# Patient Record
Sex: FEMALE | Race: OTHER | HISPANIC OR LATINO | Employment: UNEMPLOYED | URBAN - METROPOLITAN AREA
[De-identification: names, ages, dates, MRNs, and addresses within clinical notes are randomized per-mention and may not be internally consistent; named-entity substitution may affect disease eponyms.]

---

## 2017-09-07 ENCOUNTER — HOSPITAL ENCOUNTER (EMERGENCY)
Facility: HOSPITAL | Age: 57
Discharge: HOME | End: 2017-09-07
Attending: EMERGENCY MEDICINE
Payer: COMMERCIAL

## 2017-09-07 VITALS
BODY MASS INDEX: 29.66 KG/M2 | RESPIRATION RATE: 18 BRPM | SYSTOLIC BLOOD PRESSURE: 178 MMHG | HEIGHT: 65 IN | WEIGHT: 178 LBS | HEART RATE: 78 BPM | DIASTOLIC BLOOD PRESSURE: 93 MMHG | TEMPERATURE: 98.1 F

## 2017-09-07 DIAGNOSIS — R21 RASH OF HANDS: Primary | ICD-10-CM

## 2017-09-07 PROCEDURE — 99282 EMERGENCY DEPT VISIT SF MDM: CPT

## 2017-09-07 RX ORDER — HYDROXYZINE HYDROCHLORIDE 25 MG/1
25 TABLET, FILM COATED ORAL EVERY 6 HOURS PRN
Qty: 30 TABLET | Refills: 0 | Status: ON HOLD | OUTPATIENT
Start: 2017-09-07 | End: 2019-07-19

## 2017-09-07 RX ORDER — HYDROXYZINE HYDROCHLORIDE 25 MG/1
25 TABLET, FILM COATED ORAL ONCE
Status: COMPLETED | OUTPATIENT
Start: 2017-09-07 | End: 2017-09-07

## 2017-09-07 RX ADMIN — HYDROXYZINE HYDROCHLORIDE 25 MG: 25 TABLET, FILM COATED ORAL at 22:20

## 2017-09-08 NOTE — ED ATTENDING ATTESTATION
Neetu Recinos MD, saw and evaluated the patient  All available labs and X-rays were ordered by me or the resident and have been reviewed by myself  I discussed the patient with the resident / non-physician and agree with the resident's / non-physician practitioner's findings and plan as documented in the resident's / non-physician practicitioner's note, except where noted  At this point, I agree with the current assessment done in the ED  Chief Complaint   Patient presents with    Rash     Pt c/o dry itchy rash on both hands for 1 month now  Pt uses hydrocortisone cream with no releif  This is a 76-year-old female presenting for evaluation of dry hands  According to the patient and the daughter and the granddaughter the patient having these lesions of very dry hands looking the knuckles of both hands  She was diagnosed with it [place] about 3 months ago  She has been using steroid cream but it seems like it is not helping  She does wash her dishes without any gloves  No fevers chills nausea vomiting  No other complaints  She is unable to get any medications here secondary to lack of insurance so she came into the emergency room for evaluation  No history of psoriasis, no history of ex my  No previous rashes apart From being 2 or 3 months ago  PE:  Vitals:    09/07/17 2115   BP: (!) 178/93   Pulse: 78   Resp: 18   Temp: 98 1 °F (36 7 °C)   TempSrc: Oral   Weight: 80 7 kg (178 lb)   Height: 5' 5" (1 651 m)   Dry hands    General: VS reviewed  Appears in NAD  awake, alert  Well-nourished, well-developed  Appears stated age  Speaking normally in full sentences  Head: Normocephalic, atraumatic, nontender  Eyes: EOM-I  No diplopia  No hyphema  No subconjunctival hemorrhages  Symmetrical lids  ENT: Atraumatic external nose and ears  MMM  No malocclusion  No stridor  Normal phonation  No drooling  Normal swallowing  Neck: No JVD    CV: No pallor noted  Peripheral pulses +2 throughout  No chest wall tenderness  Lungs:   No tachypnea  No respiratory distress  MSK:   FROM   Skin: Dry, intact  Neuro: Awake, alert, GCS15, CN II-XII grossly intact  Motor grossly intact  Psychiatric/Behavioral: Appropriate mood and affect   Exam: deferred  A:  - Dry skin verses psoriatic lesions  P:  - advise glove use, advised using Aquaphor for hydration, will change to an appointment instead of a cream, increased concentration at 2 5%, close follow-up with primary care  Return for worsening   - 13 point ROS was performed and all are normal unless stated in the history above  - Nursing note reviewed  Vitals reviewed  - Orders placed by myself and/or advanced practitioner / resident     - Previous chart was not reviewed  - No language barrier    - History obtained from patient, daughter, granddaughter    - There are no limitations to the history obtained  - Critical care time: Not applicable for this patient  Final Diagnosis:  1  Rash of hands        ED Course as of Sep 11 1805   Thu Sep 07, 2017   2305 Awaiting medications from pharmacy then will discharge home  Medications   hydrOXYzine HCL (ATARAX) tablet 25 mg (25 mg Oral Given 9/7/17 2220)     No orders to display     No orders of the defined types were placed in this encounter  Labs Reviewed - No data to display  ED Disposition     ED Disposition Condition Comment    Discharge  Sherrel Seats discharge to home/self care  Condition at discharge: Good        Follow-up Information     Follow up With Specialties Details Why Contact Ignacio Silva   Dermatology, Multidisciplinary Schedule an appointment as soon as possible for a visit  [address] [facility] [address]          Discharge Medication List as of 9/7/2017 11:09 PM      START taking these medications    Details   hydrOXYzine HCL (ATARAX) 25 mg tablet Take 1 tablet by mouth every 6 (six) hours as needed for itching, Starting u 9/7/2017, Print      mineral oil-hydrophilic petrolatum (AQUAPHOR) ointment Apply topically as needed for dry skin, Starting Thu 9/7/2017, Print         CONTINUE these medications which have NOT CHANGED    Details   amLODIPine (NORVASC) 5 mg tablet Take 5 mg by mouth daily  , Until Discontinued, Historical Med      cloNIDine (CATAPRES) 0 1 mg tablet Take 0 1 mg by mouth 2 (two) times a day Indications: patient unsure of correct dosage  , Until Discontinued, Historical Med      hydrochlorothiazide (MICROZIDE) 12 5 mg capsule Take 12 5 mg by mouth daily  , Until Discontinued, Historical Med      olmesartan (BENICAR) 20 mg tablet Take 20 mg by mouth daily  , Until Discontinued, Historical Med           No discharge procedures on file  Prior to Admission Medications   Prescriptions Last Dose Informant Patient Reported? Taking? amLODIPine (NORVASC) 5 mg tablet   Yes No   Sig: Take 5 mg by mouth daily  cloNIDine (CATAPRES) 0 1 mg tablet   Yes No   Sig: Take 0 1 mg by mouth 2 (two) times a day Indications: patient unsure of correct dosage  hydrochlorothiazide (MICROZIDE) 12 5 mg capsule   Yes No   Sig: Take 12 5 mg by mouth daily  olmesartan (BENICAR) 20 mg tablet   Yes No   Sig: Take 20 mg by mouth daily  Facility-Administered Medications: None       Portions of the record may have been created with voice recognition software  Occasional wrong word or "sound a like" substitutions may have occurred due to the inherent limitations of voice recognition software  Read the chart carefully and recognize, using context, where substitutions have occurred      Electronically signed by:  Ruth Robin

## 2017-09-08 NOTE — ED PROVIDER NOTES
History  Chief Complaint   Patient presents with    Rash     Pt c/o dry itchy rash on both hands for 1 month now  Pt uses hydrocortisone cream with no releif  HPI   54-year-old female presents to the emergency department complaining of rash  Patient states that over the past 2 or 3 months, she has had increasing dryness, itchiness, and rash on both hands  She was seen by a doctor and [place] when symptoms 1st began and she was given hydrocortisone cream   She has been applying the cream, but symptoms have continued to worsen  Patient denies having had similar symptoms in the past and has not noted any modifying factors for her symptoms  She denies any known new exposures prior to onset of symptoms  On ROS, she denies any complaints other than stated above  Prior to Admission Medications   Prescriptions Last Dose Informant Patient Reported? Taking? amLODIPine (NORVASC) 5 mg tablet   Yes No   Sig: Take 5 mg by mouth daily  cloNIDine (CATAPRES) 0 1 mg tablet   Yes No   Sig: Take 0 1 mg by mouth 2 (two) times a day Indications: patient unsure of correct dosage  hydrochlorothiazide (MICROZIDE) 12 5 mg capsule   Yes No   Sig: Take 12 5 mg by mouth daily  olmesartan (BENICAR) 20 mg tablet   Yes No   Sig: Take 20 mg by mouth daily  Facility-Administered Medications: None       Past Medical History:   Diagnosis Date    Cancer     Hypertension        History reviewed  No pertinent surgical history  History reviewed  No pertinent family history  I have reviewed and agree with the history as documented  Social History   Substance Use Topics    Smoking status: Never Smoker    Smokeless tobacco: Never Used    Alcohol use No        Review of Systems   Constitutional: Negative for chills and fever  Respiratory: Negative for shortness of breath  Gastrointestinal: Negative for abdominal pain, nausea and vomiting  Musculoskeletal: Negative for arthralgias and joint swelling     Skin: Positive for rash  Negative for wound  Allergic/Immunologic: Negative for immunocompromised state  Neurological: Negative for headaches  Psychiatric/Behavioral: The patient is not nervous/anxious  Physical Exam  ED Triage Vitals [09/07/17 2115]   Temperature Pulse Respirations Blood Pressure SpO2   98 1 °F (36 7 °C) 78 18 (!) 178/93 --      Temp Source Heart Rate Source Patient Position BP Location FiO2 (%)   Oral Monitor Sitting Right arm --      Pain Score       No Pain           Physical Exam   Constitutional: She is oriented to person, place, and time  She appears well-nourished  No distress  HENT:   Head: Normocephalic and atraumatic  Eyes: EOM are normal    Neck: Normal range of motion  Neck supple  Cardiovascular: Normal rate and regular rhythm  Pulmonary/Chest: Effort normal and breath sounds normal  No respiratory distress  Abdominal: Soft  She exhibits no distension  There is no tenderness  Musculoskeletal: Normal range of motion  Neurological: She is alert and oriented to person, place, and time  Skin: Skin is warm and dry  She is not diaphoretic  Dry/cracked skin on hands bilaterally  See imaged below  Psychiatric: She has a normal mood and affect  Her behavior is normal    Nursing note and vitals reviewed  ED Medications  Medications   hydrOXYzine HCL (ATARAX) tablet 25 mg (25 mg Oral Given 9/7/17 2220)       Diagnostic Studies  Labs Reviewed - No data to display    No orders to display       Procedures  Procedures      Phone Consults  ED Phone Contact    ED Course  ED Course            MDM  Number of Diagnoses or Management Options  Rash of hands:   Diagnosis management comments: 59-year-old female with dry/cracked/pruritic hands x 2-3 months  Will prescribe atarax, aquaphor, and hydrocortisone ointment  Derm follow up PRN       CritCare Time    Disposition  Final diagnoses:   Rash of hands     ED Disposition     ED Disposition Condition Comment    Discharge Sudheer Weathers discharge to home/self care  Condition at discharge: Good        Follow-up Information     Follow up With Specialties Details Why Contact Ignacio Herrera  Dermatology, Multidisciplinary Schedule an appointment as soon as possible for a visit  [address] [facility] [address] [place] [address]          Discharge Medication List as of 9/7/2017 11:09 PM      START taking these medications    Details   hydrOXYzine HCL (ATARAX) 25 mg tablet Take 1 tablet by mouth every 6 (six) hours as needed for itching, Starting u 9/7/2017, Print      mineral oil-hydrophilic petrolatum (AQUAPHOR) ointment Apply topically as needed for dry skin, Starting Thu 9/7/2017, Print         CONTINUE these medications which have NOT CHANGED    Details   amLODIPine (NORVASC) 5 mg tablet Take 5 mg by mouth daily  , Until Discontinued, Historical Med      cloNIDine (CATAPRES) 0 1 mg tablet Take 0 1 mg by mouth 2 (two) times a day Indications: patient unsure of correct dosage  , Until Discontinued, Historical Med      hydrochlorothiazide (MICROZIDE) 12 5 mg capsule Take 12 5 mg by mouth daily  , Until Discontinued, Historical Med      olmesartan (BENICAR) 20 mg tablet Take 20 mg by mouth daily  , Until Discontinued, Historical Med           No discharge procedures on file  ED Provider  Attending physically available and evaluated Sudheer Weathers  I managed the patient along with the ED Attending      Electronically Signed by       Cassandra Dunbar  Resident  09/09/17 0061

## 2017-09-08 NOTE — DISCHARGE INSTRUCTIONS
Sarpullido pepe   LO QUE NECESITA SABER:   Un salpullido es la irritación, enrojecimiento o comezón de la piel o de las membranas mucosas  Las [employer] mucosas son las áreas conchis el revestimiento de santana nariz o garganta  Pepe significa que el salpullido comienza repentinamente, que empeora rápidamente y que dura poco tiempo  El salpullido pepe podría ser provocado por aby enfermedad, conchis la hepatitis o la vasculitis  El salpullido podría ser [place]  reacción a algo a lo que usted es alérgico, conchis al látex  Ciertos medicamentos, incluyendo los antibióticos, los AINEs, los medicamentos prescritos para el dolor y la aspirina también pueden provocar un salpullido  INSTRUCCIONES SOBRE [facility]ITAL[facility]:   Regrese a la letty de emergencias si:   · Tiene dolor en el pecho o dificultad repentina para respirar  · Usted está vomitando, tiene dolor de [place] o muscular y santana garganta está adolorida  Pregúntele a santana [place] vitaminas y minerales son adecuados para usted  · Usted tiene fiebre  · Colette heridas se abren debido a que se está rascando santana piel o usted tiene [place]qu[place]  herida que está zaki, [place] o adolorida  · Santana sarpullido dura más de 3 meses  · Usted tiene inflamación o dolor en colette articulaciones  · Usted tiene preguntas o inquietudes acerca de santana condición o cuidado  Medicamentos:  Si santana salpullido no desaparece por sí solo, usted podría necesitar los siguientes medicamentos:  · Los antihistamínicos  podrían administrarse para disminuir la comezón  · Esteroides  podría administrarse para disminuir la inflamación  · Antibióticos  ayudan a combatir o a evitar aby infección bacteriana  · [place] colette medicamentos conchis se le haya indicado  Consulte con santana médico si usted augustus que santana medicamento no le está ayudando o si presenta efectos secundarios  Infórmele si es alérgico a algún medicamento   Mantenga aby lista actualizada de los [place], las vitaminas y los productos herbales que Alexandra Richters los siguientes datos de los medicamentos: cantidad, frecuencia y motivo de administración  Traiga con usted la lista o los envases de la píldoras a colette citas de seguimiento  Lleve la lista de los medicamentos con usted en jeff de aby emergencia  Evite un salpullido o cuide santana piel cuando tenga salpullido:  La piel reseca puede generar más problemas  No se rasque santana piel si tiene comezón  Usted podría provocar aby infección en la piel si se rasca  Lo siguiente podría evitar que se reseque santana piel y podría ayudar a que se karine mejor:  · Use cremas espesas o vaselina para ayudar a aliviar santana salpullido  Estos productos funcionan fransisca en áreas con la piel gruesa, conchis en colette pies  Las compresas de agua fría también podrían usarse para aliviar santana piel  Aplique aby compresa de agua fría o use aby toalla mojada con agua fría y después cúbrala con aby toalla seca  · Use agua tibia para bañarse  [place] puede dañar más santana piel  Séquese la piel con palmaditas suaves  No se frote santana piel con la toalla  · Use detergentes, jabones, champú y burbujas para bañarse que estén hechos para piel sensible  Use ropa que esté hecha de algodón en vez de naylon o de huong  El algodón es [address], así que no dañará tanto santana piel  Acuda a colette consultas de control con santana médico según le indicaron  Es posible que usted necesite aury a un dermatólogo si otros médicos no saben lo que le está provocando el salpullido  Usted también podría necesitar aury a un dermatólogo si santana salpullido no mejora aun con tratamiento  Usted podría necesitar aury a un dietista si tiene alergias a los alimentos  Anote colette preguntas para que se acuerde de hacerlas marianela colette visitas  © 2017 [address] [employer] Information is for End User's use only and may not be sold, redistributed or otherwise used for commercial purposes   All illustrations and images included in CareNotes® are the copyrighted property of A D A M , Inc  or Samson Lieberman  Esta información es sólo para uso en educación  Santana intención no es darle un consejo médico sobre enfermedades o tratamientos  Colsulte con santana [place] farmacéutico antes de seguir cualquier régimen médico para saber si es seguro y efectivo para usted

## 2017-10-27 ENCOUNTER — ALLSCRIPTS OFFICE VISIT (OUTPATIENT)
Dept: OTHER | Facility: OTHER | Age: 57
End: 2017-10-27

## 2017-10-27 DIAGNOSIS — Z85.3 PERSONAL HISTORY OF MALIGNANT NEOPLASM OF BREAST: ICD-10-CM

## 2017-10-27 DIAGNOSIS — I10 ESSENTIAL (PRIMARY) HYPERTENSION: ICD-10-CM

## 2018-01-09 ENCOUNTER — GENERIC CONVERSION - ENCOUNTER (OUTPATIENT)
Dept: OTHER | Facility: OTHER | Age: 58
End: 2018-01-09

## 2018-01-09 DIAGNOSIS — Z11.1 ENCOUNTER FOR SCREENING FOR RESPIRATORY TUBERCULOSIS: ICD-10-CM

## 2018-01-10 ENCOUNTER — APPOINTMENT (OUTPATIENT)
Dept: LAB | Facility: CLINIC | Age: 58
End: 2018-01-10
Payer: COMMERCIAL

## 2018-01-10 DIAGNOSIS — I10 ESSENTIAL (PRIMARY) HYPERTENSION: ICD-10-CM

## 2018-01-10 DIAGNOSIS — Z11.1 ENCOUNTER FOR SCREENING FOR RESPIRATORY TUBERCULOSIS: ICD-10-CM

## 2018-01-10 DIAGNOSIS — Z85.3 PERSONAL HISTORY OF MALIGNANT NEOPLASM OF BREAST: ICD-10-CM

## 2018-01-10 LAB
ALBUMIN SERPL BCP-MCNC: 3.6 G/DL (ref 3.5–5)
ALP SERPL-CCNC: 120 U/L (ref 46–116)
ALT SERPL W P-5'-P-CCNC: 21 U/L (ref 12–78)
ANION GAP SERPL CALCULATED.3IONS-SCNC: 7 MMOL/L (ref 4–13)
AST SERPL W P-5'-P-CCNC: 20 U/L (ref 5–45)
BACTERIA UR QL AUTO: ABNORMAL /HPF
BILIRUB SERPL-MCNC: 0.34 MG/DL (ref 0.2–1)
BILIRUB UR QL STRIP: NEGATIVE
BUN SERPL-MCNC: 13 MG/DL (ref 5–25)
CALCIUM SERPL-MCNC: 9.2 MG/DL (ref 8.3–10.1)
CHLORIDE SERPL-SCNC: 104 MMOL/L (ref 100–108)
CHOLEST SERPL-MCNC: 236 MG/DL (ref 50–200)
CLARITY UR: ABNORMAL
CO2 SERPL-SCNC: 27 MMOL/L (ref 21–32)
COLOR UR: YELLOW
CREAT SERPL-MCNC: 0.76 MG/DL (ref 0.6–1.3)
ERYTHROCYTE [DISTWIDTH] IN BLOOD BY AUTOMATED COUNT: 13.4 % (ref 11.6–15.1)
GFR SERPL CREATININE-BSD FRML MDRD: 87 ML/MIN/1.73SQ M
GLUCOSE P FAST SERPL-MCNC: 81 MG/DL (ref 65–99)
GLUCOSE UR STRIP-MCNC: NEGATIVE MG/DL
HCT VFR BLD AUTO: 39.2 % (ref 34.8–46.1)
HDLC SERPL-MCNC: 43 MG/DL (ref 40–60)
HGB BLD-MCNC: 13 G/DL (ref 11.5–15.4)
HGB UR QL STRIP.AUTO: ABNORMAL
HYALINE CASTS #/AREA URNS LPF: ABNORMAL /LPF
KETONES UR STRIP-MCNC: NEGATIVE MG/DL
LDLC SERPL CALC-MCNC: 143 MG/DL (ref 0–100)
LEUKOCYTE ESTERASE UR QL STRIP: NEGATIVE
MCH RBC QN AUTO: 31.6 PG (ref 26.8–34.3)
MCHC RBC AUTO-ENTMCNC: 33.2 G/DL (ref 31.4–37.4)
MCV RBC AUTO: 95 FL (ref 82–98)
NITRITE UR QL STRIP: NEGATIVE
NON-SQ EPI CELLS URNS QL MICRO: ABNORMAL /HPF
PH UR STRIP.AUTO: 6 [PH] (ref 4.5–8)
PLATELET # BLD AUTO: 287 THOUSANDS/UL (ref 149–390)
PMV BLD AUTO: 11.2 FL (ref 8.9–12.7)
POTASSIUM SERPL-SCNC: 3.6 MMOL/L (ref 3.5–5.3)
PROT SERPL-MCNC: 8.3 G/DL (ref 6.4–8.2)
PROT UR STRIP-MCNC: ABNORMAL MG/DL
RBC # BLD AUTO: 4.12 MILLION/UL (ref 3.81–5.12)
RBC #/AREA URNS AUTO: ABNORMAL /HPF
SODIUM SERPL-SCNC: 138 MMOL/L (ref 136–145)
SP GR UR STRIP.AUTO: 1.02 (ref 1–1.03)
TRIGL SERPL-MCNC: 250 MG/DL
TSH SERPL DL<=0.05 MIU/L-ACNC: 2.19 UIU/ML (ref 0.36–3.74)
UROBILINOGEN UR QL STRIP.AUTO: 0.2 E.U./DL
WBC # BLD AUTO: 5.96 THOUSAND/UL (ref 4.31–10.16)
WBC #/AREA URNS AUTO: ABNORMAL /HPF

## 2018-01-10 PROCEDURE — 36415 COLL VENOUS BLD VENIPUNCTURE: CPT

## 2018-01-10 PROCEDURE — 80061 LIPID PANEL: CPT

## 2018-01-10 PROCEDURE — 85027 COMPLETE CBC AUTOMATED: CPT

## 2018-01-10 PROCEDURE — 86480 TB TEST CELL IMMUN MEASURE: CPT

## 2018-01-10 PROCEDURE — 86803 HEPATITIS C AB TEST: CPT

## 2018-01-10 PROCEDURE — 81001 URINALYSIS AUTO W/SCOPE: CPT

## 2018-01-10 PROCEDURE — 84443 ASSAY THYROID STIM HORMONE: CPT

## 2018-01-10 PROCEDURE — 80053 COMPREHEN METABOLIC PANEL: CPT

## 2018-01-10 PROCEDURE — 87389 HIV-1 AG W/HIV-1&-2 AB AG IA: CPT

## 2018-01-11 LAB — HCV AB SER QL: NORMAL

## 2018-01-12 LAB
ANNOTATION COMMENT IMP: NORMAL
GAMMA INTERFERON BACKGROUND BLD IA-ACNC: 0.03 IU/ML
HIV 1+2 AB+HIV1 P24 AG SERPL QL IA: NORMAL
M TB IFN-G BLD-IMP: NEGATIVE
M TB IFN-G CD4+ BCKGRND COR BLD-ACNC: 0 IU/ML
M TB IFN-G CD4+ T-CELLS BLD-ACNC: 0.03 IU/ML
MITOGEN IGNF BLD-ACNC: >10 IU/ML
QUANTIFERON-TB GOLD IN TUBE: NORMAL
SERVICE CMNT-IMP: NORMAL

## 2018-01-15 NOTE — MISCELLANEOUS
To whom it may concern,     Nereyda Rivera is currently under my care and requires the use of a cream on her hands for medical reasons  I am writing to you to request that accomadation        Sincerely,      Radha Landis       Electronically signed by:Faraz Pradhan DO  Oct 27 2017 10:29AM EST Author

## 2018-01-22 VITALS
TEMPERATURE: 98.2 F | DIASTOLIC BLOOD PRESSURE: 80 MMHG | SYSTOLIC BLOOD PRESSURE: 124 MMHG | BODY MASS INDEX: 31.72 KG/M2 | HEIGHT: 63 IN | HEART RATE: 76 BPM | WEIGHT: 179.01 LBS

## 2018-02-06 RX ORDER — DIPHENHYDRAMINE HCL 25 MG
TABLET ORAL
Status: ON HOLD | COMMUNITY
Start: 2017-10-27 | End: 2019-07-19

## 2018-02-08 ENCOUNTER — OFFICE VISIT (OUTPATIENT)
Dept: INTERNAL MEDICINE CLINIC | Facility: CLINIC | Age: 58
End: 2018-02-08
Payer: COMMERCIAL

## 2018-02-08 VITALS
TEMPERATURE: 97.9 F | HEIGHT: 63 IN | WEIGHT: 175.49 LBS | BODY MASS INDEX: 31.09 KG/M2 | HEART RATE: 80 BPM | SYSTOLIC BLOOD PRESSURE: 178 MMHG | DIASTOLIC BLOOD PRESSURE: 110 MMHG

## 2018-02-08 DIAGNOSIS — R21 RASH AND NONSPECIFIC SKIN ERUPTION: ICD-10-CM

## 2018-02-08 DIAGNOSIS — Z23 NEED FOR TDAP VACCINATION: Primary | ICD-10-CM

## 2018-02-08 DIAGNOSIS — Z12.31 ENCOUNTER FOR MAMMOGRAM TO ESTABLISH BASELINE MAMMOGRAM: ICD-10-CM

## 2018-02-08 DIAGNOSIS — M54.50 CHRONIC MIDLINE LOW BACK PAIN WITHOUT SCIATICA: ICD-10-CM

## 2018-02-08 DIAGNOSIS — I10 ESSENTIAL HYPERTENSION: ICD-10-CM

## 2018-02-08 DIAGNOSIS — G89.29 CHRONIC MIDLINE LOW BACK PAIN WITHOUT SCIATICA: ICD-10-CM

## 2018-02-08 PROCEDURE — 90471 IMMUNIZATION ADMIN: CPT | Performed by: INTERNAL MEDICINE

## 2018-02-08 PROCEDURE — 99213 OFFICE O/P EST LOW 20 MIN: CPT | Performed by: INTERNAL MEDICINE

## 2018-02-08 PROCEDURE — 90715 TDAP VACCINE 7 YRS/> IM: CPT | Performed by: INTERNAL MEDICINE

## 2018-02-08 RX ORDER — ACETAMINOPHEN 325 MG/1
650 TABLET ORAL EVERY 6 HOURS PRN
Status: DISCONTINUED | OUTPATIENT
Start: 2018-02-08 | End: 2018-05-01

## 2018-02-08 RX ORDER — TRIAMCINOLONE ACETONIDE 1 MG/G
CREAM TOPICAL 2 TIMES DAILY
Qty: 30 G | Refills: 0 | Status: ON HOLD | OUTPATIENT
Start: 2018-02-08 | End: 2019-07-19

## 2018-02-08 RX ORDER — AMLODIPINE BESYLATE 10 MG/1
10 TABLET ORAL DAILY
Qty: 90 TABLET | Refills: 1 | Status: SHIPPED | OUTPATIENT
Start: 2018-02-08 | End: 2018-05-02 | Stop reason: HOSPADM

## 2018-02-08 NOTE — PROGRESS NOTES
ASSESSMENT/PLAN:    1  Hypertension - patient was found to have a blood pressure 178/110  - she says she is compliant with her medication and takes amlodipine 5 mg in the morning and Benicar 20 mg in the evening  I increased her amlodipine to 10 ng daily    - Patient was advised blood pressure machine and measure her blood pressure daily  2   Low back pain -patient admits to having low back pain for approximately 3 years, status in [facility], was receiving physical therapy with good response  He is currently taking pain medications from [facility] however she is unable tell me what those are    -I will order physical therapy and Tylenol for patient    3  Bilateral rash on her hands -dry, scaly, erythematous rash that started 1 year and 2 months ago  -hands look like mechanical hands, however patient denies any manual work  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    -in the past patient was given atarax and aquaphor cream -with minimal improvement  -she has an appointment with Dermatology on March 4th  -we will order triamcinolone 0 1% cream for her    4  HM -patient  will receive Tdap today, she already received flu shot  -she claims she had a colonoscopy 5 years ago in [facility] with normal results  -will give her a new referral for mammogram        Problem List Items Addressed This Visit        Cardiovascular and Mediastinum    Hypertension    Relevant Medications    amLODIPine (NORVASC) 10 mg tablet       Musculoskeletal and Integument    Rash and nonspecific skin eruption       Other    Low back pain    Encounter for mammogram to establish baseline mammogram    Relevant Orders    Mammo diagnostic bilateral w cad    Need for Tdap vaccination - Primary    Relevant Orders    Tdap vaccine greater than or equal to 8yo IM (Completed)          Health Maintenance:  Patient says she had colonoscopy 5 years ago in [facility] with normal results    She is due for her mammogram, referral given at the last appointment however she has not made the appointment yet  Patient already received flu shot  Patient is due for Tdap  Schedule a follow-up appointment in 3 months    CHIEF COMPLAINT: low back pain    HISTORY OF PRESENT ILLNESS:  Patient is a 69-year-old female who comes for follow-up of her chronic conditions  She is only Citizen of [place]-speaking,  phone used  Patient comes with a new acute complaint of low back pain  She states that she has had back pain for the past 3 years, started in [place]  She had physical therapy in [facility] with good response  She claims she is currently taking medications for her back pain from [facility], however she is unable to tell me what those are  She was advised to bring those medications at her next appointment  Patient says that her back pain is currently 7/10 intensity, worse with bending, lifting, prolonged standing or sitting and improves with medications  Upon initial evaluation patient was also found to be hypertensive  She was advised to buy a blood pressure machine and measure her blood pressure daily and bring her blood pressure numbers to her next appointment  Otherwise patient denies any nausea, vomiting, diarrhea, constipation, chest pain, abdominal pain, any skin lesions, any joint pain, changes in bowel movements or urination  Review of Systems   Constitutional: Negative for chills, fatigue and fever  HENT: Negative for congestion, postnasal drip and sore throat  Respiratory: Negative for cough, chest tightness and shortness of breath  Cardiovascular: Negative for chest pain and palpitations  Gastrointestinal: Negative for abdominal pain, blood in stool, constipation, diarrhea, nausea and vomiting  Genitourinary: Negative for dysuria  Musculoskeletal: Positive for back pain  Negative for arthralgias  Neurological: Negative for dizziness, light-headedness and headaches  Psychiatric/Behavioral: Negative for behavioral problems   The patient is not nervous/anxious  OBJECTIVE:  Vitals:    02/08/18 0957   BP: (!) 178/110   BP Location: Left arm   Patient Position: Sitting   Cuff Size: Adult   Pulse: 80   Temp: 97 9 °F (36 6 °C)   TempSrc: Oral   Weight: 79 6 kg (175 lb 7 8 oz)   Height: 5' 3" (1 6 m)     Physical Exam   Constitutional: She appears well-developed and well-nourished  HENT:   Head: Normocephalic and atraumatic  Mouth/Throat: No oropharyngeal exudate  Eyes: Pupils are equal, round, and reactive to light  Right eye exhibits no discharge  Left eye exhibits no discharge  No scleral icterus  Neck: Neck supple  Cardiovascular: Normal rate and regular rhythm  No murmur heard  Pulmonary/Chest: Effort normal and breath sounds normal  No respiratory distress  She has no wheezes  Abdominal: Soft  She exhibits no distension  There is no tenderness  Musculoskeletal: She exhibits no edema  Neurological: She is alert  Skin: Rash noted  There is erythema  Bilateral hands rash   Psychiatric: Her behavior is normal          Current Outpatient Prescriptions:     diphenhydrAMINE (BENADRYL ALLERGY) 25 mg tablet, Take by mouth, Disp: , Rfl:     mineral oil-hydrophilic petrolatum (AQUAPHOR) ointment, Apply topically as needed for dry skin, Disp: 420 g, Rfl: 0    olmesartan (BENICAR) 20 mg tablet, Take 20 mg by mouth daily  , Disp: , Rfl:     amLODIPine (NORVASC) 10 mg tablet, Take 1 tablet (10 mg total) by mouth daily, Disp: 90 tablet, Rfl: 1    hydrochlorothiazide (MICROZIDE) 12 5 mg capsule, Take 12 5 mg by mouth daily  , Disp: , Rfl:     hydrOXYzine HCL (ATARAX) 25 mg tablet, Take 1 tablet by mouth every 6 (six) hours as needed for itching, Disp: 30 tablet, Rfl: 0    Past Medical History:   Diagnosis Date    Back ache     Cancer ([facility] [address] )     Hypertension      Past Surgical History:   Procedure Laterality Date    APPENDECTOMY      BILATERAL OOPHORECTOMY      BREAST SURGERY      HERNIA REPAIR       Social History     Social History    Marital status: /Civil Union     Spouse name: N/A    Number of children: N/A    Years of education: N/A     Occupational History    Not on file  Social History Main Topics    Smoking status: Former Smoker    Smokeless tobacco: Never Used      Comment: quit 20 years ago    Alcohol use No    Drug use: No    Sexual activity: Not on file     Other Topics Concern    Not on file     Social History Narrative    No narrative on file     Family History   Problem Relation Age of Onset    Hypertension Mother     Heart disease Mother     Diabetes Mother     Cancer Father        ==  Si Olszewski, MD [address] Internal Medicine PGY-2    [facility]  [address]   [facility] , [address] [address] [facility] [address]  Office: [phone]  Fax: [phone]

## 2018-02-14 ENCOUNTER — EVALUATION (OUTPATIENT)
Dept: PHYSICAL THERAPY | Age: 58
End: 2018-02-14
Payer: COMMERCIAL

## 2018-02-14 DIAGNOSIS — G89.29 CHRONIC MIDLINE LOW BACK PAIN WITHOUT SCIATICA: Primary | ICD-10-CM

## 2018-02-14 DIAGNOSIS — M54.50 CHRONIC MIDLINE LOW BACK PAIN WITHOUT SCIATICA: Primary | ICD-10-CM

## 2018-02-14 PROCEDURE — 97162 PT EVAL MOD COMPLEX 30 MIN: CPT | Performed by: PHYSICAL THERAPIST

## 2018-02-14 PROCEDURE — G8979 MOBILITY GOAL STATUS: HCPCS | Performed by: PHYSICAL THERAPIST

## 2018-02-14 PROCEDURE — G8978 MOBILITY CURRENT STATUS: HCPCS | Performed by: PHYSICAL THERAPIST

## 2018-02-14 NOTE — PROGRESS NOTES
PT Evaluation     Today's date: 2018  Patient name: Meseret Quevedo  : 1960  MRN: [id number]  Referring provider: Georgi Walker MD  Dx:   Encounter Diagnosis   Name Primary?  Chronic midline low back pain without sciatica Yes                  Assessment  Impairments: abnormal or restricted ROM, activity intolerance, impaired physical strength, lacks appropriate home exercise program and pain with function  Functional limitations: Patient reports pain with bending, lifting, household chores, with IADLs, with recreational activiites and with sleeping at night  Assessment details: Patient seen for PT evaluation for chronic low back pain  Patient presents with decreased lumbar ROM with pain at end ranges, lacks LE strength and flexibility, decreased core strength and stability, postural deficits, functional deficits  PT attempted HEP for supine and prone TE , unable to tolerate these positions  Attempted prone press ups- able to perform 1 exercise, then c/o increased back pain  PT talked with patient about aqua therapy vs land therapy  Patient may benefit from aqua therapy to address muscle guarding, improve flexibility and to improve strength, will try to progress to land if able  Patient agreeable to aqua therapy as a trial to assist with patient's ability to tolerate exercising  Thank you for your referrals  Understanding of Dx/Px/POC: good   Prognosis: fair    Goals  Impairment Goals to be met within 4 weeks  - Decrease pain to 3/10  - Improve ROM in lumbar spine to [facility]  - Increase strength to 5/5 throughout B LE s  Functional Goals to be met within 4-6 weeks     - Return to Prior Level of Function  - Increase Functional Status Measure to: 54  - Patient will be independent with HEP         Plan  Patient would benefit from: skilled PT  Planned modality interventions: thermotherapy: hydrocollator packs and cryotherapy  Planned therapy interventions: abdominal trunk stabilization, activity modification, aquatic therapy, manual therapy, neuromuscular re-education, postural training, patient education, stretching, strengthening, therapeutic activities, therapeutic exercise, gait training and home exercise program  Frequency: 2x week  Duration in weeks: 4  Treatment plan discussed with: patient        Subjective Evaluation    History of Present Illness  Mechanism of injury: Patient reports chronic low back pain x ~ 5 years - 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  Notes that she has a herniated disc in her back as well  Patient unsure of which disc specifically she herniated (thinks it may be L3 or L4)  Thinks there are 3 places where her discs are herniated  Patient is currently not working  Patient reports that she tried PT in the past, no relief with TENs nor with heat  Patient with history of breast cancer- had chemotherapy 22 years ago  Patient reports that she has a history of possible hairline fracture in spine (unsure of which level)  Pain  Current pain ratin  At best pain ratin  At worst pain ratin  Quality: dull ache, sharp and radiating  Aggravating factors: sitting, standing and walking  Progression: worsening          Objective     Active Range of Motion     Lumbar   Flexion: 80 degrees with pain  Extension: 20 degrees with pain  Left lateral flexion: 20 degrees   Right lateral flexion: 10 degrees   Left rotation: 20 degrees   Right rotation: 15 degrees   Left Hip   Flexion: 90 degrees with pain    Right Hip   Flexion: 90 degrees with pain  Left Knee   Flexion: WFL  Extension: WFL    Right Knee   Flexion: WFL  Extension: [facility]    Additional Active Range of Motion Details  Patient reports numbness/tingling in B LE s and into B hands  Patient reports that she has numbness/tingling with prolonged sitting and standing and when she lifts heavy objects  Patient reports hand tingling with sleeping at night       Central low back pain at end range flexion and extension, uses UE s to lift self into upright position from flexed position  Negative for clonus in B feet      Strength/Myotome Testing     Left Hip   Planes of Motion   Flexion: 4-  Abduction: 4-  Adduction: 4-    Right Hip   Planes of Motion   Flexion: 4-  Abduction: 4-  Adduction: 4-    Left Knee   Flexion: 4-  Extension: 4-    Right Knee   Flexion: 4-  Extension: 4-    Precautions: h/o breast CA ,     Daily Treatment Diary       Exercise Diary                           Lap walking pre/post                                       Ham stretch with strap             SKTC             Piriformis- if able                                       LAQ                          Standing hip 3 way             Standing ham curls             HR/TR             sidestepping             squats             Step ups                                      Flowsheet Rows    Flowsheet Row Most Recent Value   PT/OT G-Codes   Current Score  47   Projected Score  55   FOTO information reviewed  Yes   Assessment Type  Evaluation   G code set  Mobility: Walking & Moving Around   Mobility: Walking and Moving Around Current Status ()  CK   Mobility: Walking and Moving Around Goal Status ()  CK

## 2018-02-19 ENCOUNTER — APPOINTMENT (OUTPATIENT)
Dept: PHYSICAL THERAPY | Age: 58
End: 2018-02-19
Payer: COMMERCIAL

## 2018-02-21 ENCOUNTER — APPOINTMENT (OUTPATIENT)
Dept: PHYSICAL THERAPY | Age: 58
End: 2018-02-21
Payer: COMMERCIAL

## 2018-02-26 ENCOUNTER — APPOINTMENT (OUTPATIENT)
Dept: PHYSICAL THERAPY | Age: 58
End: 2018-02-26
Payer: COMMERCIAL

## 2018-02-26 NOTE — PROGRESS NOTES
To whom it may concern,     I am one of the providers at [facility] and patient was seen here for PPD screening  Patient reported that she is allergic to the PPD skin test  We ordered a TB quantiferon test which was negative today  Patient currently does not have lab studies suggesting active TB  Thank you     Lyndon Sandhu   1/12/18      Electronically signed by:Tameka  [id number] [facility]  Jan 12 2018 10:51AM EST Author       Electronically signed by:David Mickel Nyhan M D    Jan 12 2018 11:15AM EST Acknowledgement

## 2018-02-28 ENCOUNTER — OFFICE VISIT (OUTPATIENT)
Dept: PHYSICAL THERAPY | Age: 58
End: 2018-02-28
Payer: COMMERCIAL

## 2018-02-28 DIAGNOSIS — G89.29 CHRONIC MIDLINE LOW BACK PAIN WITHOUT SCIATICA: Primary | ICD-10-CM

## 2018-02-28 DIAGNOSIS — M54.50 CHRONIC MIDLINE LOW BACK PAIN WITHOUT SCIATICA: Primary | ICD-10-CM

## 2018-02-28 PROCEDURE — 97113 AQUATIC THERAPY/EXERCISES: CPT

## 2018-02-28 NOTE — PROGRESS NOTES
Daily Note     Today's date: 2018  Patient name: Kathia Lombardo  : 1960  MRN: [id number]  Referring provider: Rachid Bridges MD  Dx:   Encounter Diagnosis     ICD-10-CM    1  Chronic midline low back pain without sciatica M54 5     G89 29                   Subjective: No change to symptoms since IE   Objective: See treatment diary below    Precautions: h/o breast CA ,      Daily Treatment Diary         Exercise Diary                                                Lap walking pre/post  5x                                                                     Ham stretch with strap  5x :20                     SKTC  5x :20                     Piriformis- if able  5x :20                                             LAQ  20x                                             Standing hip flex, abd   10x                     Standing ham curls  10x                     HR  20x                                  sidestepping  NT                     squats  NT                     Step ups  NT                                                  Assessment: Tolerated treatment poor and demonstrates decreased activity tolerance  Patient only able to tolerate short period in pool  May benefit from additional seated TE until tolerance improved  Patient demonstrated fatigue post treatment and would benefit from continued PT  Plan: Continue per plan of care

## 2018-03-05 ENCOUNTER — OFFICE VISIT (OUTPATIENT)
Dept: PHYSICAL THERAPY | Age: 58
End: 2018-03-05
Payer: COMMERCIAL

## 2018-03-05 DIAGNOSIS — G89.29 CHRONIC MIDLINE LOW BACK PAIN WITHOUT SCIATICA: Primary | ICD-10-CM

## 2018-03-05 DIAGNOSIS — M54.50 CHRONIC MIDLINE LOW BACK PAIN WITHOUT SCIATICA: Primary | ICD-10-CM

## 2018-03-05 PROCEDURE — 97113 AQUATIC THERAPY/EXERCISES: CPT | Performed by: PHYSICAL THERAPIST

## 2018-03-05 NOTE — PROGRESS NOTES
Daily Note     Today's date: 3/5/2018  Patient name: Carolyn Pteers  : 1960  MRN: [id number]  Referring provider: Freddy Rouse MD  Dx:   Encounter Diagnosis     ICD-10-CM    1  Chronic midline low back pain without sciatica M54 5     G89 29                   Subjective: Continues to report back pain; has been avoiding heavy lifting and cleaning her home to avoid increasing low back pain  Objective: See treatment diary below    Precautions: h/o breast CA ,      Daily Treatment Diary         Exercise Diary   2/28  3/5                                           Lap walking pre/post  5x  5x                                                                   Ham stretch with strap  5x :20  5:20                   SKTC  5x :20  5x:20                   Piriformis- if able  5x :20  5x:20                                           LAQ  20x  20x                                           Standing hip flex, abd   10x  10x                   Standing ham curls  10x  10x                   HR  20x  20x                                sidestepping  NT  NT                   squats  NT  NT                   Step ups  NT  NT                                                Assessment:  Continued with current plan  PT talked with patient about her pain, MRI negative for defect in lumbar spine, muscle spasms noted and decreased lumbar lordosis  Talked with patient about using the pool to exercise, stretch - recommended to start stretching at home  Administered seated stretches for home - supine position for patient to start stretching daily as she's able to address muscle tightness  Agreeable to continuing with pool therapy at this time  Patient hoping the pool will help her with her flexibility and pain  Plan: Continue per plan of care

## 2018-03-08 ENCOUNTER — APPOINTMENT (OUTPATIENT)
Dept: PHYSICAL THERAPY | Age: 58
End: 2018-03-08
Payer: COMMERCIAL

## 2018-03-13 NOTE — PROGRESS NOTES
Received overdue message for b/l screening mammogram in our in basket  I called pt no answer, message left reminding pt to call central scheduling at [phone] to schedule appt for mammogram asap  Pt to call office with any further questions

## 2018-03-26 ENCOUNTER — HOSPITAL ENCOUNTER (OUTPATIENT)
Dept: MAMMOGRAPHY | Facility: CLINIC | Age: 58
Discharge: HOME | End: 2018-03-26
Payer: COMMERCIAL

## 2018-03-26 DIAGNOSIS — Z85.3 PERSONAL HISTORY OF MALIGNANT NEOPLASM OF BREAST: ICD-10-CM

## 2018-03-26 PROCEDURE — 77066 DX MAMMO INCL CAD BI: CPT

## 2018-04-24 ENCOUNTER — OFFICE VISIT (OUTPATIENT)
Dept: INTERNAL MEDICINE CLINIC | Facility: CLINIC | Age: 58
End: 2018-04-24
Payer: COMMERCIAL

## 2018-04-24 ENCOUNTER — TRANSCRIBE ORDERS (OUTPATIENT)
Dept: RADIOLOGY | Facility: HOSPITAL | Age: 58
End: 2018-04-24

## 2018-04-24 ENCOUNTER — HOSPITAL ENCOUNTER (OUTPATIENT)
Dept: RADIOLOGY | Facility: HOSPITAL | Age: 58
Discharge: HOME | End: 2018-04-24
Payer: COMMERCIAL

## 2018-04-24 VITALS
DIASTOLIC BLOOD PRESSURE: 98 MMHG | BODY MASS INDEX: 31.25 KG/M2 | SYSTOLIC BLOOD PRESSURE: 190 MMHG | TEMPERATURE: 98 F | HEART RATE: 72 BPM | HEIGHT: 63 IN | WEIGHT: 176.37 LBS

## 2018-04-24 DIAGNOSIS — I10 ESSENTIAL HYPERTENSION: ICD-10-CM

## 2018-04-24 DIAGNOSIS — M54.50 ACUTE LOW BACK PAIN DUE TO TRAUMA: ICD-10-CM

## 2018-04-24 DIAGNOSIS — Z12.11 SCREENING FOR COLON CANCER: Primary | ICD-10-CM

## 2018-04-24 DIAGNOSIS — G89.11 ACUTE LOW BACK PAIN DUE TO TRAUMA: ICD-10-CM

## 2018-04-24 DIAGNOSIS — R21 RASH AND OTHER NONSPECIFIC SKIN ERUPTION: Primary | ICD-10-CM

## 2018-04-24 PROCEDURE — 72020 X-RAY EXAM OF SPINE 1 VIEW: CPT

## 2018-04-24 PROCEDURE — 99213 OFFICE O/P EST LOW 20 MIN: CPT | Performed by: INTERNAL MEDICINE

## 2018-04-24 RX ORDER — OLMESARTAN MEDOXOMIL 20 MG/1
40 TABLET ORAL DAILY
Qty: 90 TABLET | Refills: 1 | Status: SHIPPED | OUTPATIENT
Start: 2018-04-24 | End: 2018-05-02 | Stop reason: HOSPADM

## 2018-04-24 RX ORDER — CYCLOBENZAPRINE HCL 5 MG
5 TABLET ORAL 3 TIMES DAILY PRN
Qty: 90 TABLET | Refills: 0 | Status: ON HOLD | OUTPATIENT
Start: 2018-04-24 | End: 2019-07-19

## 2018-04-24 NOTE — PROGRESS NOTES
CLINIC VISIT NOTE  Oksana Simmons 61 y o  female   MRN: [id number]    ASSESSMENT/PLAN:  Problem List Items Addressed This Visit        Cardiovascular and Mediastinum    Hypertension    Relevant Medications    olmesartan (BENICAR) 20 mg tablet    Other Relevant Orders    Renin Direct Assay    Aldosterone    VAS renal artery complete       Other    Acute low back pain due to trauma    Relevant Medications    cyclobenzaprine (FLEXERIL) 5 mg tablet    acetaminophen (TYLENOL) 500 MG chewable tablet    Other Relevant Orders    XR spine lumbosacral 1 view      Other Visit Diagnoses     Screening for colon cancer    -  Primary        1  Hypertension - patient was found to have a blood pressure 190/98  - she says she is compliant with her medication and takes amlodipine 10 mg in the morning and Benicar 20 mg in the evening  Patient thinks she has an elevated blood pressure due to pain from the fall  Patient claims that she takes her blood pressure regularly and the numbers are always high in 180s  Patient says that she has high blood pressure all the time  - will order secondary hypertension workup -VAS or renal arteries, renin, aldosterone  - patient will need baseline EKG - to be done at next appointment   - f/u in 1 month    2  Acute on chronic low back pain - patient admits to having low back pain for approximately 3 years, status in [facility] [place], was receiving physical therapy with good response  Last week patient fell backwards, she slipped on the stairs  She admits to very bed lumbar spine/right buttock pain      -patient says Tylenol is not helping for her pain  -due to the fact the patient has an elevated blood pressure we will not give her NSAIDs  - patient was given flexeril and tylenol for short period of time     HM -patient  UTD with immunizations  - she claims she had a colonoscopy 5 years ago in [facility] [place] with normal results, refusing colonoscopy or FIT kit  -patient had mammogram in March 2018, follow-up in 1 year       Schedule a follow-up appointment in 1 month  Chief Complaint: low back, right buttock pain    History of Present Illness:  Patient is 61years old female with past medical history of hypertension, chronic low back pain comes for follow-up of her chronic conditions  Patient admits to acute low back pain, right buttock pain  Last week patient fell she was going down the stairs  She fell on her low back and right buttock region  Today she complains of severe right buttock/lumbar spine achy pain  This pain impairs her ability to sleep and walk properly  Patient is asking if she could get x-ray of her back just to make sure that everything is okay  She is also asking for tramadol  She measures her blood pressure regularly and says that it constantly elevated in 101H systolic  She says that her blood pressure is higher than usually due to her back pain  Otherwise patient denies any other medical problems  Review of Systems   Constitutional: Negative for chills  HENT: Negative for congestion and postnasal drip  Eyes: Negative for pain  Respiratory: Negative for cough, choking and shortness of breath  Cardiovascular: Negative for chest pain, palpitations and leg swelling  Gastrointestinal: Negative for abdominal distention, abdominal pain, blood in stool, constipation, diarrhea and nausea  Genitourinary: Negative for dysuria  Musculoskeletal: Positive for back pain  Negative for arthralgias  Right buttock pain   Neurological: Negative for dizziness, light-headedness and headaches  Psychiatric/Behavioral: Negative for agitation, confusion and hallucinations  The patient is not nervous/anxious          Objective:  Vitals:    04/24/18 1111   BP: (!) 190/98   BP Location: Left arm   Patient Position: Sitting   Cuff Size: Adult   Pulse: 72   Temp: 98 °F (36 7 °C)   TempSrc: Oral   Weight: 80 kg (176 lb 5 9 oz)   Height: 5' 3" (1 6 m)     Physical Exam Constitutional: She appears well-developed and well-nourished  HENT:   Head: Normocephalic and atraumatic  Eyes: Pupils are equal, round, and reactive to light  Neck: Neck supple  Cardiovascular: Normal rate and regular rhythm  No murmur heard  Pulmonary/Chest: Effort normal and breath sounds normal  No respiratory distress  Abdominal: Soft  She exhibits no distension  There is no tenderness  Musculoskeletal: She exhibits no edema  Right buttock, low lumbar spine tenderness   Neurological: She is alert  Skin: Skin is warm and dry  Rash noted  No erythema  Bilateral hand rash   Psychiatric: She has a normal mood and affect  Current Outpatient Prescriptions:     amLODIPine (NORVASC) 10 mg tablet, Take 1 tablet (10 mg total) by mouth daily, Disp: 90 tablet, Rfl: 1    diphenhydrAMINE (BENADRYL ALLERGY) 25 mg tablet, Take by mouth, Disp: , Rfl:     hydrOXYzine HCL (ATARAX) 25 mg tablet, Take 1 tablet by mouth every 6 (six) hours as needed for itching, Disp: 30 tablet, Rfl: 0    mineral oil-hydrophilic petrolatum (AQUAPHOR) ointment, Apply topically as needed for dry skin, Disp: 420 g, Rfl: 0    olmesartan (BENICAR) 20 mg tablet, Take 2 tablets (40 mg total) by mouth daily, Disp: 90 tablet, Rfl: 1    triamcinolone (KENALOG) 0 1 % cream, Apply topically 2 (two) times a day, Disp: 30 g, Rfl: 0    acetaminophen (TYLENOL) 500 MG chewable tablet, Chew 1 tablet (500 mg total) every 6 (six) hours as needed for mild pain or moderate pain, Disp: 90 tablet, Rfl: 0    cyclobenzaprine (FLEXERIL) 5 mg tablet, Take 1 tablet (5 mg total) by mouth 3 (three) times a day as needed for muscle spasms, Disp: 90 tablet, Rfl: 0    hydrochlorothiazide (MICROZIDE) 12 5 mg capsule, Take 12 5 mg by mouth daily  , Disp: , Rfl:     Current Facility-Administered Medications:     acetaminophen (TYLENOL) tablet 650 mg, 650 mg, Oral, Q6H PRN, Fay Pulido MD    Past Medical History:   Diagnosis Date    Back ache     Cancer ([facility] [address] )     Hypertension      Past Surgical History:   Procedure Laterality Date    APPENDECTOMY      BILATERAL OOPHORECTOMY      BREAST SURGERY      HERNIA REPAIR       Social History     Social History    Marital status: /Civil Union     Spouse name: N/A    Number of children: N/A    Years of education: N/A     Occupational History    Not on file  Social History Main Topics    Smoking status: Former Smoker    Smokeless tobacco: Never Used      Comment: quit 20 years ago    Alcohol use No    Drug use: No    Sexual activity: No     Other Topics Concern    Not on file     Social History Narrative    No narrative on file     Family History   Problem Relation Age of Onset    Hypertension Mother     Heart disease Mother     Diabetes Mother     Cancer Father        ==  Gracy Hatch MD    [place] [address] [address]   [facility] , [address] [address] [facility] [address] [facility]  Office: [phone]  Fax: [phone]

## 2018-04-25 ENCOUNTER — TELEPHONE (OUTPATIENT)
Dept: INTERNAL MEDICINE CLINIC | Facility: CLINIC | Age: 58
End: 2018-04-25

## 2018-04-25 ENCOUNTER — APPOINTMENT (OUTPATIENT)
Dept: LAB | Facility: CLINIC | Age: 58
End: 2018-04-25
Payer: COMMERCIAL

## 2018-04-25 ENCOUNTER — OFFICE VISIT (OUTPATIENT)
Dept: MULTI SPECIALTY CLINIC | Facility: CLINIC | Age: 58
End: 2018-04-25
Payer: COMMERCIAL

## 2018-04-25 DIAGNOSIS — R21 RASH AND OTHER NONSPECIFIC SKIN ERUPTION: ICD-10-CM

## 2018-04-25 DIAGNOSIS — I10 ESSENTIAL HYPERTENSION: ICD-10-CM

## 2018-04-25 PROCEDURE — 99202 OFFICE O/P NEW SF 15 MIN: CPT | Performed by: SPECIALIST

## 2018-04-25 PROCEDURE — 36415 COLL VENOUS BLD VENIPUNCTURE: CPT

## 2018-04-25 PROCEDURE — 82088 ASSAY OF ALDOSTERONE: CPT

## 2018-04-25 PROCEDURE — 84244 ASSAY OF RENIN: CPT

## 2018-04-29 LAB
ALDOST SERPL-MCNC: 14.6 NG/DL (ref 0–30)
RENIN PLAS-CCNC: 0.69 NG/ML/HR (ref 0.17–5.38)

## 2018-05-01 ENCOUNTER — TELEPHONE (OUTPATIENT)
Dept: INTERNAL MEDICINE CLINIC | Facility: CLINIC | Age: 58
End: 2018-05-01

## 2018-05-01 ENCOUNTER — OFFICE VISIT (OUTPATIENT)
Dept: INTERNAL MEDICINE CLINIC | Facility: CLINIC | Age: 58
End: 2018-05-01
Payer: COMMERCIAL

## 2018-05-01 ENCOUNTER — APPOINTMENT (EMERGENCY)
Dept: RADIOLOGY | Facility: HOSPITAL | Age: 58
End: 2018-05-01
Payer: COMMERCIAL

## 2018-05-01 ENCOUNTER — HOSPITAL ENCOUNTER (OUTPATIENT)
Facility: HOSPITAL | Age: 58
Setting detail: OBSERVATION
Discharge: HOME | End: 2018-05-02
Attending: EMERGENCY MEDICINE | Admitting: INTERNAL MEDICINE
Payer: COMMERCIAL

## 2018-05-01 VITALS
HEIGHT: 63 IN | SYSTOLIC BLOOD PRESSURE: 180 MMHG | BODY MASS INDEX: 31.17 KG/M2 | HEART RATE: 84 BPM | DIASTOLIC BLOOD PRESSURE: 110 MMHG | WEIGHT: 175.93 LBS | TEMPERATURE: 98 F

## 2018-05-01 DIAGNOSIS — R01.1 HEART MURMUR: Primary | ICD-10-CM

## 2018-05-01 DIAGNOSIS — I10 BENIGN ESSENTIAL HYPERTENSION: ICD-10-CM

## 2018-05-01 DIAGNOSIS — R07.9 CHEST PAIN: ICD-10-CM

## 2018-05-01 DIAGNOSIS — R51.9 HEADACHE: ICD-10-CM

## 2018-05-01 DIAGNOSIS — E78.5 HYPERLIPIDEMIA, UNSPECIFIED HYPERLIPIDEMIA TYPE: ICD-10-CM

## 2018-05-01 DIAGNOSIS — I16.1 HYPERTENSIVE EMERGENCY: Primary | ICD-10-CM

## 2018-05-01 DIAGNOSIS — R11.2 NAUSEA AND VOMITING: ICD-10-CM

## 2018-05-01 PROBLEM — S02.5XXA BROKEN TOOTH: Status: ACTIVE | Noted: 2017-10-27

## 2018-05-01 PROBLEM — I16.0 HYPERTENSIVE URGENCY: Status: ACTIVE | Noted: 2018-05-01

## 2018-05-01 PROBLEM — H53.9 VISION DISTURBANCE: Status: ACTIVE | Noted: 2017-10-27

## 2018-05-01 PROBLEM — R21 RASH, SKIN: Status: ACTIVE | Noted: 2017-10-27

## 2018-05-01 LAB
ALBUMIN SERPL BCP-MCNC: 3.4 G/DL (ref 3.5–5)
ALP SERPL-CCNC: 135 U/L (ref 46–116)
ALT SERPL W P-5'-P-CCNC: 18 U/L (ref 12–78)
ANION GAP SERPL CALCULATED.3IONS-SCNC: 6 MMOL/L (ref 4–13)
APTT PPP: 32 SECONDS (ref 23–35)
AST SERPL W P-5'-P-CCNC: 20 U/L (ref 5–45)
ATRIAL RATE: 79 BPM
BASOPHILS # BLD AUTO: 0.03 THOUSANDS/ΜL (ref 0–0.1)
BASOPHILS NFR BLD AUTO: 0 % (ref 0–1)
BILIRUB SERPL-MCNC: 0.28 MG/DL (ref 0.2–1)
BUN SERPL-MCNC: 13 MG/DL (ref 5–25)
CALCIUM SERPL-MCNC: 9.1 MG/DL (ref 8.3–10.1)
CHLORIDE SERPL-SCNC: 105 MMOL/L (ref 100–108)
CO2 SERPL-SCNC: 26 MMOL/L (ref 21–32)
CREAT SERPL-MCNC: 0.95 MG/DL (ref 0.6–1.3)
EOSINOPHIL # BLD AUTO: 0.15 THOUSAND/ΜL (ref 0–0.61)
EOSINOPHIL NFR BLD AUTO: 2 % (ref 0–6)
ERYTHROCYTE [DISTWIDTH] IN BLOOD BY AUTOMATED COUNT: 13.2 % (ref 11.6–15.1)
GFR SERPL CREATININE-BSD FRML MDRD: 66 ML/MIN/1.73SQ M
GLUCOSE SERPL-MCNC: 91 MG/DL (ref 65–140)
HCT VFR BLD AUTO: 35.5 % (ref 34.8–46.1)
HGB BLD-MCNC: 12.4 G/DL (ref 11.5–15.4)
INR PPP: 1.05 (ref 0.86–1.16)
LYMPHOCYTES # BLD AUTO: 2.67 THOUSANDS/ΜL (ref 0.6–4.47)
LYMPHOCYTES NFR BLD AUTO: 33 % (ref 14–44)
MCH RBC QN AUTO: 32.4 PG (ref 26.8–34.3)
MCHC RBC AUTO-ENTMCNC: 34.9 G/DL (ref 31.4–37.4)
MCV RBC AUTO: 93 FL (ref 82–98)
MONOCYTES # BLD AUTO: 0.59 THOUSAND/ΜL (ref 0.17–1.22)
MONOCYTES NFR BLD AUTO: 7 % (ref 4–12)
NEUTROPHILS # BLD AUTO: 4.72 THOUSANDS/ΜL (ref 1.85–7.62)
NEUTS SEG NFR BLD AUTO: 58 % (ref 43–75)
NRBC BLD AUTO-RTO: 0 /100 WBCS
P AXIS: 48 DEGREES
PLATELET # BLD AUTO: 288 THOUSANDS/UL (ref 149–390)
PMV BLD AUTO: 10.6 FL (ref 8.9–12.7)
POTASSIUM SERPL-SCNC: 3.7 MMOL/L (ref 3.5–5.3)
PR INTERVAL: 172 MS
PROT SERPL-MCNC: 8 G/DL (ref 6.4–8.2)
PROTHROMBIN TIME: 13.7 SECONDS (ref 12.1–14.4)
QRS AXIS: -25 DEGREES
QRSD INTERVAL: 90 MS
QT INTERVAL: 356 MS
QTC INTERVAL: 408 MS
RBC # BLD AUTO: 3.83 MILLION/UL (ref 3.81–5.12)
SODIUM SERPL-SCNC: 137 MMOL/L (ref 136–145)
T WAVE AXIS: 61 DEGREES
TROPONIN I SERPL-MCNC: <0.02 NG/ML
TROPONIN I SERPL-MCNC: <0.02 NG/ML
VENTRICULAR RATE: 79 BPM
WBC # BLD AUTO: 8.19 THOUSAND/UL (ref 4.31–10.16)

## 2018-05-01 PROCEDURE — 84484 ASSAY OF TROPONIN QUANT: CPT | Performed by: EMERGENCY MEDICINE

## 2018-05-01 PROCEDURE — 1036F TOBACCO NON-USER: CPT | Performed by: NURSE PRACTITIONER

## 2018-05-01 PROCEDURE — 99213 OFFICE O/P EST LOW 20 MIN: CPT | Performed by: NURSE PRACTITIONER

## 2018-05-01 PROCEDURE — 85730 THROMBOPLASTIN TIME PARTIAL: CPT | Performed by: EMERGENCY MEDICINE

## 2018-05-01 PROCEDURE — 85025 COMPLETE CBC W/AUTO DIFF WBC: CPT | Performed by: EMERGENCY MEDICINE

## 2018-05-01 PROCEDURE — 99285 EMERGENCY DEPT VISIT HI MDM: CPT

## 2018-05-01 PROCEDURE — 80053 COMPREHEN METABOLIC PANEL: CPT | Performed by: EMERGENCY MEDICINE

## 2018-05-01 PROCEDURE — 3008F BODY MASS INDEX DOCD: CPT | Performed by: NURSE PRACTITIONER

## 2018-05-01 PROCEDURE — 96374 THER/PROPH/DIAG INJ IV PUSH: CPT

## 2018-05-01 PROCEDURE — 84484 ASSAY OF TROPONIN QUANT: CPT | Performed by: INTERNAL MEDICINE

## 2018-05-01 PROCEDURE — 96361 HYDRATE IV INFUSION ADD-ON: CPT

## 2018-05-01 PROCEDURE — 93005 ELECTROCARDIOGRAM TRACING: CPT

## 2018-05-01 PROCEDURE — 70450 CT HEAD/BRAIN W/O DYE: CPT

## 2018-05-01 PROCEDURE — 71046 X-RAY EXAM CHEST 2 VIEWS: CPT

## 2018-05-01 PROCEDURE — 1111F DSCHRG MED/CURRENT MED MERGE: CPT | Performed by: NURSE PRACTITIONER

## 2018-05-01 PROCEDURE — 36415 COLL VENOUS BLD VENIPUNCTURE: CPT | Performed by: EMERGENCY MEDICINE

## 2018-05-01 PROCEDURE — 93010 ELECTROCARDIOGRAM REPORT: CPT | Performed by: INTERNAL MEDICINE

## 2018-05-01 PROCEDURE — 85610 PROTHROMBIN TIME: CPT | Performed by: EMERGENCY MEDICINE

## 2018-05-01 PROCEDURE — 96375 TX/PRO/DX INJ NEW DRUG ADDON: CPT

## 2018-05-01 RX ORDER — AMLODIPINE BESYLATE 10 MG/1
10 TABLET ORAL DAILY
Status: DISCONTINUED | OUTPATIENT
Start: 2018-05-01 | End: 2018-05-02 | Stop reason: HOSPADM

## 2018-05-01 RX ORDER — CYCLOBENZAPRINE HCL 10 MG
5 TABLET ORAL 3 TIMES DAILY PRN
Status: DISCONTINUED | OUTPATIENT
Start: 2018-05-01 | End: 2018-05-02 | Stop reason: HOSPADM

## 2018-05-01 RX ORDER — OLMESARTAN MEDOXOMIL 20 MG/1
40 TABLET ORAL DAILY
Status: DISCONTINUED | OUTPATIENT
Start: 2018-05-02 | End: 2018-05-02 | Stop reason: HOSPADM

## 2018-05-01 RX ORDER — METOCLOPRAMIDE HYDROCHLORIDE 5 MG/ML
10 INJECTION INTRAMUSCULAR; INTRAVENOUS ONCE
Status: COMPLETED | OUTPATIENT
Start: 2018-05-01 | End: 2018-05-01

## 2018-05-01 RX ORDER — ACETAMINOPHEN 160 MG/5ML
500 SUSPENSION, ORAL (FINAL DOSE FORM) ORAL EVERY 6 HOURS PRN
Status: DISCONTINUED | OUTPATIENT
Start: 2018-05-01 | End: 2018-05-02 | Stop reason: HOSPADM

## 2018-05-01 RX ORDER — DIPHENHYDRAMINE HYDROCHLORIDE 50 MG/ML
25 INJECTION INTRAMUSCULAR; INTRAVENOUS ONCE
Status: COMPLETED | OUTPATIENT
Start: 2018-05-01 | End: 2018-05-01

## 2018-05-01 RX ORDER — LABETALOL HYDROCHLORIDE 5 MG/ML
10 INJECTION, SOLUTION INTRAVENOUS EVERY 4 HOURS PRN
Status: DISCONTINUED | OUTPATIENT
Start: 2018-05-01 | End: 2018-05-02 | Stop reason: HOSPADM

## 2018-05-01 RX ORDER — LABETALOL HYDROCHLORIDE 5 MG/ML
20 INJECTION, SOLUTION INTRAVENOUS ONCE
Status: COMPLETED | OUTPATIENT
Start: 2018-05-01 | End: 2018-05-01

## 2018-05-01 RX ORDER — HYDROCHLOROTHIAZIDE 12.5 MG/1
12.5 TABLET ORAL DAILY
Status: DISCONTINUED | OUTPATIENT
Start: 2018-05-02 | End: 2018-05-02 | Stop reason: HOSPADM

## 2018-05-01 RX ORDER — LIDOCAINE HYDROCHLORIDE 10 MG/ML
10 INJECTION, SOLUTION EPIDURAL; INFILTRATION; INTRACAUDAL; PERINEURAL ONCE
Status: DISCONTINUED | OUTPATIENT
Start: 2018-05-01 | End: 2018-05-02 | Stop reason: HOSPADM

## 2018-05-01 RX ORDER — ATORVASTATIN CALCIUM 40 MG/1
40 TABLET, FILM COATED ORAL DAILY
Qty: 90 TABLET | Refills: 1 | Status: ON HOLD | OUTPATIENT
Start: 2018-05-01 | End: 2019-07-19

## 2018-05-01 RX ORDER — DIPHENHYDRAMINE HCL 25 MG
25 TABLET ORAL EVERY 6 HOURS PRN
Status: DISCONTINUED | OUTPATIENT
Start: 2018-05-01 | End: 2018-05-02 | Stop reason: HOSPADM

## 2018-05-01 RX ORDER — ATORVASTATIN CALCIUM 40 MG/1
40 TABLET, FILM COATED ORAL DAILY
Status: DISCONTINUED | OUTPATIENT
Start: 2018-05-02 | End: 2018-05-02 | Stop reason: HOSPADM

## 2018-05-01 RX ADMIN — DIPHENHYDRAMINE HYDROCHLORIDE 25 MG: 50 INJECTION, SOLUTION INTRAMUSCULAR; INTRAVENOUS at 15:44

## 2018-05-01 RX ADMIN — METOCLOPRAMIDE 10 MG: 5 INJECTION, SOLUTION INTRAMUSCULAR; INTRAVENOUS at 15:44

## 2018-05-01 RX ADMIN — LABETALOL 20 MG/4 ML (5 MG/ML) INTRAVENOUS SYRINGE 20 MG: at 16:44

## 2018-05-01 RX ADMIN — SODIUM CHLORIDE 500 ML: 0.9 INJECTION, SOLUTION INTRAVENOUS at 15:17

## 2018-05-01 RX ADMIN — AMLODIPINE BESYLATE 10 MG: 10 TABLET ORAL at 21:22

## 2018-05-01 NOTE — TELEPHONE ENCOUNTER
Noted pt with elevated blood pressure again today  Patient was sent to the ED after her encounter today  I called the see [facility] on [address] [facility] to check on her prescriptions  The patient had a prescription for amlodipine but the last fill was on February 8, 2018, they did not have her hydrochlorothiazide on file, and she had a recent prescription for home olmesartan, but this was not covered  It appears that losartan valsartan and irbesartan and are covered  So it appears that there are compliance problems in this particular patient  Will cc the staff so appropriate changes can be made in her medication regimen

## 2018-05-01 NOTE — PATIENT INSTRUCTIONS
Hipertensión crónica   CUIDADO AMBULATORIO:   Hipertensión  es la presión arterial cece  La presión arterial es la fuerza que ejerce la kamla contra las mcgee de las arterias  La presión arterial normal debería estar a menos de 120/80  La pre-hipertensión estaría entre 120/80 y 139/ 80  La presión arterial cece estaría a 140/90 o más cece  La hipertensión causa que santana presión arterial se eleve tanto que santana corazón se ve forzado a trabajar [employer] de lo normal  [place] puede dañar santana corazón  La hipertensión crónica es aby condición de roselia plazo que usted puede controlar con un estilo de faheem mariusz o con medicamentos  La presión [place] a proteger colette órganos conchis santana corazón, pulmones, cerebro, y riñones  Los síntomas más comunes incluyen los siguientes:   · Dolor de linette     · Visión borrosa    · Dolor de pecho     · Mareos o debilidad     · Dificultad para respirar     · Hemorragias nasales (sangrado de la Marcelo Durham)  Llame al 911 en jeff de presentar lo siguiente:   · Usted tiene malestar en el pecho que se siente conchis estrujamiento, presión, Minor Mariner o dolor  · Usted se siente confundido o tiene dificultad para hablar  · Repentinamente se siente aturdido o con dificultad para respirar  · Usted tiene dolor o [employer] espalda, [place], [place], abdomen o Kaylyn Cristino  Busque atención médica de inmediato si:   · Usted tiene un darren dolor de linette o pérdida de la visión  · Usted tiene debilidad en un brazo o en aby pierna  Pregúntele a santana Samule Rode vitaminas y minerales son adecuados para usted  · Usted se siente mareado, confundido, somnoliento o conchis si se fuera a desmayar  · Usted se ha tomado santana medicamento para la presión arterial kip santana presión arterial todavía está más cece de lo que le indicó santana médico     · Usted tiene preguntas o inquietudes acerca de santana condición o cuidado    El tratamiento para la hipertensión crónica  puede incluir medicamentos para bajar la presión arterial y reducir el nivel de colesterol  Un nivel bajo de colesterol ayuda a prevenir enfermedades cardíacas y facilita el control de la presión arterial  La enfermedad cardíaca puede dificultar el control de la presión arterial  Es probable que usted también necesite hacer algunos cambios en santana estilo de faheem  Tómese colette medicamentos exactamente conchis se lo indicaron  Controle la hipertensión crónica:  Hable con santana médico sobre las siguientes recomendaciones y otras formas de controlar la hipertensión:  · Tómese la presión arterial en santana casa  Siéntese y descanse por 5 minutos antes de tomarse la presión arterial  Extienda santana brazo y apóyelo en aby superficie plana  Santana brazo debe estar a la misma altura que santana corazón  Siga las instrucciones que vienen con el monitor para la presión arterial o tensiómetro  Si es posible tome por lo menos 2 lecturas de la presión cada vez  Tómese la presión arterial por lo [employer] al día a la misma hora todos los días, aby en la mañana y la otra en la noche  Mantenga un registro de las lecturas de santana presión arterial y llévelo consigo a colette consultas  Pregúntele a santana médico cuál debería ser santana presión arterial            · Limite el sodio (la sal) conchis se le haya indicado  Demasiado sodio puede afectar el equilibrio de líquidos  Revise las etiquetas para buscar alimentos bajos en sodio o sin sal agregada  Algunos alimentos bajos en sodio utilizan sales de potasio para añadir sabor  Demasiado potasio también puede causar problemas de [place]  Santana médico le dirá qué cantidad de sodio y potasio es pearson para el consumo en un día  Él puede recomendarle que limite el sodio a 2,300 mg al día  · Siga el plan de comidas recomendado por santana médico   Un dietista o médico puede darle más información sobre planes de bajo contenido de sodio o el plan de alimentación DASH (enfoques dietéticos para detener la hipertensión)   El plan DASH es bajo en sodio, grasas saturadas y grasa total  Es alto en potasio, calcio y [place]  · Ejercítese para mantener un peso saludable  Realice actividad física por lo menos 30 minutos al día, la mayoría de los días de la [place]  [place] ayudará a bajar addison presión arterial  Pida más información acerca de un plan de ejercicio adecuado para usted  · [address] [facility] estr[address]  [place] podría ayudarlo a bajar addison presión arterial  Aprenda sobre formas de relajarse, conchis respiración profunda o escuchar música  · Limite el consumo de alcohol  Las mujeres deberían limitar el consumo de alcohol a 1 bebida por día  Los hombres deberían limitar el consumo de alcohol a 2 tragos al día  Un trago equivale a 12 onzas de cerveza, 5 onzas de vino o 1 onza y ½ de licor  · No fume  La nicotina y otros químicos en los cigarrillos y cigarros pueden aumentar addison presión arterial y también pueden provocar daño al pulmón  Pida información a addison médico si usted actualmente fuma y necesita ayuda para dejar de fumar  Los cigarrillos electrónicos o tabaco sin humo todavía contienen nicotina  Consulte con addison médico antes de [employer]  Acuda a colette consultas de control con addison médico según le indicaron  Usted tendrá que regresar para que le revisen la presión arterial y para que le delfino otras pruebas de laboratorio  Anote colette preguntas para que se acuerde de hacerlas marianela colette visitas  © 2017 [address] Information is for End User's use only and may not be sold, redistributed or otherwise used for commercial purposes  All illustrations and images included in CareNotes® are the copyrighted property of A D A M , Inc  or Samson Lieberman  Esta información es sólo para uso en educación  Addison intención no es darle un consejo médico sobre enfermedades o tratamientos  Colsulte con addison [place] farmacéutico antes de seguir cualquier régimen médico para saber si es seguro y efectivo para usted

## 2018-05-01 NOTE — ED PROVIDER NOTES
History  Chief Complaint   Patient presents with    Hypertension     pt took her BP at home, was reading SBP 200s, saw her PCP and it was reading SBP 180s/90s  sent in for eval by pcp  states she woke up with a severe headache which prompted her to check her bp  pt sates she has been having problems with increasing blood pressure over the past week, was her PCP on thursday and was prescribed a new medication, has been taking them as prescribed  69-year-old female history of chronic hypertension presents emergency department for evaluation of hypertension headache and chest pain  Patient states that she was sent over by her PCP to the emergency department for further evaluation of her retention  Patient was last seen last week by her PCP was given a prescription of omesartan in addition to her other blood pressure medication  Patient states that despite her taking her bp medications, her bp is still high with  systolics in the 141Y for two months  Patient states that last night she was sleeping and woke up with a headache  Patient states that she had nausea and vomiting associated this headache however she does state that in the past she has had similar type of headache  Patient believes that her headache attributed to her hypertension since she has had similar episodes of headaches in the past   Patient states that since last night she has also had chest pain radiation to the left arm as well which has been intermittent however it has been constant today since 6:00 a m  René Guerrero Patient denies any alleviating remitting factors  Patient has not taken any medication for pain relief  Patient denies any recent trauma  Patient denies any real risk factors or history of PE  Otherwise, patient denies any focal neurological deficits denies any she has shortness of breath abdominal pain dysuria constipation or diarrhea  Prior to Admission Medications   Prescriptions Last Dose Informant Patient Reported? Taking?   acetaminophen (TYLENOL) 500 MG chewable tablet   No No   Sig: Chew 1 tablet (500 mg total) every 6 (six) hours as needed for mild pain or moderate pain   amLODIPine (NORVASC) 10 mg tablet  Self No Yes   Sig: Take 1 tablet (10 mg total) by mouth daily   atorvastatin (LIPITOR) 40 mg tablet   No No   Sig: Take 1 tablet (40 mg total) by mouth daily   cyclobenzaprine (FLEXERIL) 5 mg tablet   No No   Sig: Take 1 tablet (5 mg total) by mouth 3 (three) times a day as needed for muscle spasms   diphenhydrAMINE (BENADRYL ALLERGY) 25 mg tablet  Self Yes No   Sig: Take by mouth   hydrOXYzine HCL (ATARAX) 25 mg tablet  Self No No   Sig: Take 1 tablet by mouth every 6 (six) hours as needed for itching   hydrochlorothiazide (MICROZIDE) 12 5 mg capsule  Self Yes No   Sig: Take 12 5 mg by mouth daily  mineral oil-hydrophilic petrolatum (AQUAPHOR) ointment  Self No No   Sig: Apply topically as needed for dry skin   olmesartan (BENICAR) 20 mg tablet   No No   Sig: Take 2 tablets (40 mg total) by mouth daily   triamcinolone (KENALOG) 0 1 % cream  Self No No   Sig: Apply topically 2 (two) times a day      Facility-Administered Medications: None       Past Medical History:   Diagnosis Date    Back ache     Cancer ([facility] [address] )     Hypertension        Past Surgical History:   Procedure Laterality Date    APPENDECTOMY      BILATERAL OOPHORECTOMY      BREAST SURGERY      HERNIA REPAIR         Family History   Problem Relation Age of Onset    Hypertension Mother     Heart disease Mother     Diabetes Mother     Cancer Father      I have reviewed and agree with the history as documented  Social History   Substance Use Topics    Smoking status: Former Smoker    Smokeless tobacco: Never Used      Comment: quit 20 years ago    Alcohol use No        Review of Systems   Constitutional: Negative for appetite change, chills, diaphoresis, fatigue and fever     HENT: Negative for congestion, ear discharge, ear pain, hearing loss, postnasal drip, rhinorrhea, sneezing and sore throat  Eyes: Negative for pain, discharge and redness  Respiratory: Negative for choking, chest tightness, shortness of breath, wheezing and stridor  Cardiovascular: Positive for chest pain  Negative for palpitations  Gastrointestinal: Positive for nausea and vomiting  Negative for abdominal distention, abdominal pain, blood in stool, constipation and diarrhea  Genitourinary: Negative for decreased urine volume, difficulty urinating, dysuria, flank pain, frequency and hematuria  Musculoskeletal: Negative for arthralgias, gait problem, joint swelling and neck pain  Skin: Negative for color change, pallor and rash  Allergic/Immunologic: Negative for environmental allergies, food allergies and immunocompromised state  Neurological: Positive for light-headedness and headaches  Negative for dizziness, seizures, weakness and numbness  Hematological: Negative for adenopathy  Does not bruise/bleed easily  Psychiatric/Behavioral: Negative for agitation and behavioral problems  Physical Exam  ED Triage Vitals [05/01/18 1430]   Temperature Pulse Respirations Blood Pressure SpO2   98 °F (36 7 °C) 83 22 (!) 178/59 99 %      Temp Source Heart Rate Source Patient Position - Orthostatic VS BP Location FiO2 (%)   Tympanic Monitor Lying Left arm --      Pain Score       Worst Possible Pain           Orthostatic Vital Signs  Vitals:    05/02/18 0000 05/02/18 0400 05/02/18 0736 05/02/18 1200   BP: 120/64 137/65 142/68 145/89   Pulse:   77 81   Patient Position - Orthostatic VS:   Lying Lying       Physical Exam   Constitutional: She is oriented to person, place, and time  She appears well-developed and well-nourished  HENT:   Head: Normocephalic and atraumatic  Nose: Nose normal    Mouth/Throat: Oropharynx is clear and moist    Eyes: Conjunctivae and EOM are normal  Pupils are equal, round, and reactive to light  Neck: Normal range of motion   Neck supple  Cardiovascular: Normal rate, regular rhythm and normal heart sounds  Exam reveals no gallop and no friction rub  No murmur heard  Pulmonary/Chest: Effort normal and breath sounds normal    Abdominal: Soft  Bowel sounds are normal  She exhibits no distension  There is no tenderness  There is no rebound and no guarding  Musculoskeletal: Normal range of motion  Neurological: She is alert and oriented to person, place, and time  She has normal strength and normal reflexes  No cranial nerve deficit or sensory deficit  She displays a negative Romberg sign  Skin: Skin is warm and dry  No erythema  No pallor  Psychiatric: She has a normal mood and affect  Her behavior is normal    Nursing note and vitals reviewed        ED Medications  Medications   sodium chloride 0 9 % bolus 500 mL (0 mL Intravenous Stopped 5/1/18 1644)   metoclopramide (REGLAN) injection 10 mg (10 mg Intravenous Given 5/1/18 1544)   diphenhydrAMINE (BENADRYL) injection 25 mg (25 mg Intravenous Given 5/1/18 1544)   labetalol (NORMODYNE) injection 20 mg (20 mg Intravenous Given 5/1/18 1644)       Diagnostic Studies  Results Reviewed     Procedure Component Value Units Date/Time    Protime-INR [[id number]]  (Normal) Collected:  05/01/18 1601    Lab Status:  Final result Specimen:  Blood from Arm, Left Updated:  05/01/18 1626     Protime 13 7 seconds      INR 1 05    APTT [[id number]]  (Normal) Collected:  05/01/18 1601    Lab Status:  Final result Specimen:  Blood from Arm, Left Updated:  05/01/18 1626     PTT 32 seconds     Comprehensive metabolic panel [[id number]]  (Abnormal) Collected:  05/01/18 1516    Lab Status:  Final result Specimen:  Blood from Arm, Left Updated:  05/01/18 1606     Sodium 137 mmol/L      Potassium 3 7 mmol/L      Chloride 105 mmol/L      CO2 26 mmol/L      Anion Gap 6 mmol/L      BUN 13 mg/dL      Creatinine 0 95 mg/dL      Glucose 91 mg/dL      Calcium 9 1 mg/dL      AST 20 U/L      ALT 18 U/L      Alkaline Phosphatase 135 (H) U/L      Total Protein 8 0 g/dL      Albumin 3 4 (L) g/dL      Total Bilirubin 0 28 mg/dL      eGFR 66 ml/min/1 73sq m     Narrative:         National Kidney Disease Education Program recommendations are as follows:  GFR calculation is accurate only with a steady state creatinine  Chronic Kidney disease less than 60 ml/min/1 73 sq  meters  Kidney failure less than 15 ml/min/1 73 sq  meters  Troponin I [[id number]]  (Normal) Collected:  05/01/18 1516    Lab Status:  Final result Specimen:  Blood from Arm, Left Updated:  05/01/18 1604     Troponin I <0 02 ng/mL     Narrative:         [employer] Chemistry analyzer 99% cutoff is > 0 04 ng/mL in network labs    o cTnI 99% cutoff is useful only when applied to patients in the clinical setting of myocardial ischemia  o cTnI 99% cutoff should be interpreted in the context of clinical history, ECG findings and possibly cardiac imaging to establish correct diagnosis  o cTnI 99% cutoff may be suggestive but clearly not indicative of a coronary event without the clinical setting of myocardial ischemia      CBC and differential [[id number]] Collected:  05/01/18 1516    Lab Status:  Final result Specimen:  Blood from Arm, Left Updated:  05/01/18 1535     WBC 8 19 Thousand/uL      RBC 3 83 Million/uL      Hemoglobin 12 4 g/dL      Hematocrit 35 5 %      MCV 93 fL      MCH 32 4 pg      MCHC 34 9 g/dL      RDW 13 2 %      MPV 10 6 fL      Platelets 896 Thousands/uL      nRBC 0 /100 WBCs      Neutrophils Relative 58 %      Lymphocytes Relative 33 %      Monocytes Relative 7 %      Eosinophils Relative 2 %      Basophils Relative 0 %      Neutrophils Absolute 4 72 Thousands/µL      Lymphocytes Absolute 2 67 Thousands/µL      Monocytes Absolute 0 59 Thousand/µL      Eosinophils Absolute 0 15 Thousand/µL      Basophils Absolute 0 03 Thousands/µL                  VAS renal artery complete   Final Result by Micheline Hinkle MD (05/02 1917)      XR chest 2 views   ED Interpretation by Irene Hughes MD (05/01 0795)   No signs of pulmonary edema, pleural effusiion, infiltrate, or pneumothorax  Final Result by Kylee Borges MD (05/01 2675)      No acute cardiopulmonary disease  Workstation performed: [id number]         CT head without contrast   Final Result by Real Cooley MD (05/01 4910)      No acute intracranial abnormality                    Workstation performed: [id number]               Procedures  ECG 12 Lead Documentation  Date/Time: 5/1/2018 5:00 PM  Performed by: Ignacio Keith  Authorized by: Sarah Villalobos     Indications / Diagnosis:  Hypertension  ECG reviewed by me, the ED Provider: yes    Patient location:  ED  Previous ECG:     Previous ECG:  Unavailable    Comparison to cardiac monitor: Yes    Interpretation:     Interpretation: normal    Rate:     ECG rate:  75    ECG rate assessment: normal    Rhythm:     Rhythm: sinus rhythm    Ectopy:     Ectopy: none    QRS:     QRS axis:  Normal    QRS intervals:  Normal  Conduction:     Conduction: normal    ST segments:     ST segments:  Normal  T waves:     T waves: normal    Other findings:     Other findings: LVH              Phone Consults  ED Phone Contact    ED Course         HEART Risk Score      Most Recent Value   History  1 Filed at: 05/01/2018 1545   ECG  1 Filed at: 05/01/2018 1545   Age  1 Filed at: 05/01/2018 1545   Risk Factors  2 Filed at: 05/01/2018 1545   Troponin  0 Filed at: 05/01/2018 1545   Heart Score Risk Calculator   History  1 Filed at: 05/01/2018 1545   ECG  1 Filed at: 05/01/2018 1545   Age  1 Filed at: 05/01/2018 1545   Risk Factors  2 Filed at: 05/01/2018 1545   Troponin  0 Filed at: 05/01/2018 1545   HEART Score  5 Filed at: 05/01/2018 1545   HEART Score  5 Filed at: 05/01/2018 1545                            MDM  Number of Diagnoses or Management Options  Chest pain:   Headache:   Hypertensive emergency:   Nausea and vomiting:   Diagnosis management comments: 50-year-old female history of chronic hypertension presents emergent department for evaluation of hypertension headache and chest pain  I workup hypertensive emergency by ordering EKG CBC CMP troponin chest x-ray  Also order urinalysis and CT head  I will treat patient's headache with Reglan and Benadryl normal saline  Patient's blood pressure was noted to be systolic in the 060Z I would give 10 mg of labetalol  Patient will be admitted for further workup of her hypertension  CritCare Time    Disposition  Final diagnoses:   Hypertensive emergency   Headache   Chest pain   Nausea and vomiting     Time reflects when diagnosis was documented in both MDM as applicable and the Disposition within this note     Time User Action Codes Description Comment    5/1/2018  4:32 PM Francee Nim Add [R51] Headache     5/1/2018  4:32 PM Francee Nim Remove [R51] Headache     5/1/2018  4:32 PM Francee Nim Add [I16 1] Hypertensive emergency     5/1/2018  4:32 PM Francee Nim Add [R51] Headache     5/1/2018  4:32 PM Francee Nim Add [R07 9] Chest pain     5/1/2018  4:33 PM Francee Nim Add [R11 2] Nausea and vomiting     5/2/2018  1:40 PM Karen Morale Add [I10] Benign essential hypertension       ED Disposition     ED Disposition Condition Comment    Admit  Case was discussed with SOD and the patient's admission status was agreed to be Admission Status: observation status to the service of Dr Irma Jack           Follow-up Information     Follow up With Specialties Details Why [address], DO Internal Medicine Follow up Dr Lillian Herrera May 10th [address]  [phone]          Discharge Medication List as of 5/2/2018  2:53 PM      START taking these medications    Details   amLODIPine-benazepril (LOTREL) 10-20 MG per capsule Take 1 capsule by mouth daily, Starting Wed 5/2/2018, Print      chlorthalidone 25 mg tablet Take 0 5 tablets (12 5 mg total) by mouth daily, Starting Wed 5/2/2018, Print         CONTINUE these medications which have NOT CHANGED    Details   acetaminophen (TYLENOL) 500 MG chewable tablet Chew 1 tablet (500 mg total) every 6 (six) hours as needed for mild pain or moderate pain, Starting Tue 4/24/2018, Normal      atorvastatin (LIPITOR) 40 mg tablet Take 1 tablet (40 mg total) by mouth daily, Starting Tue 5/1/2018, Normal      cyclobenzaprine (FLEXERIL) 5 mg tablet Take 1 tablet (5 mg total) by mouth 3 (three) times a day as needed for muscle spasms, Starting Tue 4/24/2018, Normal      diphenhydrAMINE (BENADRYL ALLERGY) 25 mg tablet Take by mouth, Starting Fri 10/27/2017, Historical Med      hydrOXYzine HCL (ATARAX) 25 mg tablet Take 1 tablet by mouth every 6 (six) hours as needed for itching, Starting Thu 9/7/2017, Print      mineral oil-hydrophilic petrolatum (AQUAPHOR) ointment Apply topically as needed for dry skin, Starting Thu 9/7/2017, Print      triamcinolone (KENALOG) 0 1 % cream Apply topically 2 (two) times a day, Starting Thu 2/8/2018, Normal         STOP taking these medications       amLODIPine (NORVASC) 10 mg tablet Comments:   Reason for Stopping:         hydrochlorothiazide (MICROZIDE) 12 5 mg capsule Comments:   Reason for Stopping:         olmesartan (BENICAR) 20 mg tablet Comments:   Reason for Stopping:               Outpatient Discharge Orders  Discharge Diet     Activity as tolerated         ED Provider  Attending physically available and evaluated Shaka Lopez I managed the patient along with the ED Attending      Electronically Signed by         Joyce Hill MD  05/03/18 9823

## 2018-05-01 NOTE — PROGRESS NOTES
[facility] Senior Admission Note   Unit/Bed # CW2 218-01 Encounter: [id number]  SOD Team B           [address] [facility] [place] 61 y o  female [phone]  79-year-old female with past medical history of hypertension who presents with elevated blood pressure, likely secondary to medication noncompliance at home  We will treat with her home regimen and p r n  labetalol for hypertensive urgency  Patient currently in no acute distress and not complain of any chest pain or shortness of breath  Mild headache on exam      Patient seen and examined  Reviewed H&P per Dr Mya Nunez  Agree with the assessment and plan  Assessment/Plan:   Principal Problem:    Hypertensive urgency  Active Problems:    Benign essential hypertension    Chest pain    Headache    Nausea and vomiting     Hypertensive urgency  · Restart home regimen of Norvasc, hydrochlorothiazide, and olmesartan  · Labetalol p r n    · Obtain echocardiogram and renal vascular duplex    Disposition:  OBSERVATION    Expected LOS: <2 Midnights      Cece Smith, DO

## 2018-05-01 NOTE — H&P
INTERNAL MEDICINE HISTORY AND PHYSICAL  CW2 [id number] SOD Team B     NAME: Oksana Simmons  AGE: 61 y o  SEX: female  : 1960   MRN: [id number]  ENCOUNTER: [id number]    DATE: 2018  TIME: 8:36 PM    Primary Care Physician: Florian Pace DO  Admitting Provider: Evangelista Marcum MD    Chief complaint:  Headaches and elevated blood pressure    Assessment/plan:    Hypertensive emergency (elevated blood pressure with chest pain, headache, and vomiting)  -likely secondary to medical noncompliance  -EKG negative for any acute ischemic changes  Troponins negative x2  -CT head negative for any acute intracranial abnormalities  -chest pain resolved  Mild headache still present  -obtain renal Doppler ultrasound to evaluate for secondary causes of hypertension  -continue home antihypertensive regimen:  Norvasc, hydrochlorothiazide, Benicar  Give Norvasc today and resume hydrochlorothiazide and Benicar with Norvasc in the morning  -continue labetalol p r n ; goal blood pressure less than 170  -continue Reglan for nausea    Chest pain  -likely secondary to #1  See above  -2/6 systolic ejection murmur noted  -obtain echocardiogram    Hyperlipidemia  -stable  -atorvastatin    Acute on chronic low back pain  -secondary to fall approximately 2 weeks ago   -treated by primary care physician  -continue home Flexeril and Tylenol  -encourage ambulation    History of Present Illness     Cristian Hodge is a 61 y o  female with past medical history top of hypertension, hyperlipidemia, and low back pain presents from her primary care physician's office for evaluation of hypertension, chest pain, headaches  Patient reports that for the past week she has had elevated blood pressures with systolic in [address]  This morning she woke up with a headache, dizziness, lightheadedness, and 2 episodes of vomiting which prompted her to visit her primary care physician's office    At the primary care physician's office the patient was noted to be hypertensive at 180/110 and 178/104 on 2 separate readings respectively  Patient was advised to come to the emergency department for evaluation  Her home hypertensive regimen a recently increase Norvasc to 10 mg daily from 5 mg daily, hydrochlorothiazide 12 5 mg daily, and Benicar 40 mg daily  Patient reports only taking Norvasc 5 mg daily  She cannot recall why she was not taking hydrochlorothiazide but states that she is not taking her Benicar due to cost   Per primary care physician notes, patient previously reported she has been compliant with all of her medications  Emergency department evaluation not reveal any acute ischemic changes on EKG and no acute intracranial abnormalities on CT head  She received Benadryl, Reglan 500 mL normal saline bolus, and 20 mg IV labetalol  Upon transfer to the hospital floor, the patient's blood pressure decreased to systolic 978R  Review of Systems   Review of Systems   Constitutional: Negative for activity change, chills, diaphoresis, fatigue and fever  HENT: Negative for congestion, drooling, postnasal drip, rhinorrhea, sinus pain, sinus pressure, sore throat and trouble swallowing  Eyes: Negative for photophobia and visual disturbance  Respiratory: Negative for apnea, cough, choking, chest tightness, shortness of breath, wheezing and stridor  Cardiovascular: Positive for chest pain  Negative for palpitations and leg swelling  Gastrointestinal: Positive for nausea and vomiting  Negative for abdominal distention, abdominal pain, blood in stool, constipation, diarrhea and rectal pain  Endocrine: Negative for polydipsia, polyphagia and polyuria  Genitourinary: Negative for decreased urine volume, difficulty urinating, dysuria, flank pain, frequency, hematuria, pelvic pain and urgency  Musculoskeletal: Positive for back pain  Negative for arthralgias, gait problem, joint swelling, myalgias, neck pain and neck stiffness     Skin: Negative for color change, pallor, rash and wound  Neurological: Positive for dizziness, light-headedness and headaches  Negative for tremors, seizures, syncope, facial asymmetry, speech difficulty, weakness and numbness  Hematological: Does not bruise/bleed easily  Psychiatric/Behavioral: Negative for confusion and sleep disturbance  The patient is not nervous/anxious  Past Medical History     Past Medical History:   Diagnosis Date    Back ache     Cancer ([address] )     Hypertension        Past Surgical History     Past Surgical History:   Procedure Laterality Date    APPENDECTOMY      BILATERAL OOPHORECTOMY      BREAST SURGERY      HERNIA REPAIR         Social History     History   Alcohol Use No     History   Drug Use No     History   Smoking Status    Former Smoker   Smokeless Tobacco    Never Used     Comment: quit 21 years ago       Family History     Family History   Problem Relation Age of Onset    Hypertension Mother     Heart disease Mother     Diabetes Mother     Cancer Father        Medications Prior to Admission     Prior to Admission medications    Medication Sig Start Date End Date Taking? Authorizing Provider   acetaminophen (TYLENOL) 500 MG chewable tablet Chew 1 tablet (500 mg total) every 6 (six) hours as needed for mild pain or moderate pain 4/24/18   Srinivas Bower MD   amLODIPine (NORVASC) 10 mg tablet Take 1 tablet (10 mg total) by mouth daily 2/8/18   Srinivas Bower MD   atorvastatin (LIPITOR) 40 mg tablet Take 1 tablet (40 mg total) by mouth daily 5/1/18   HUONG Valenzuela   cyclobenzaprine (FLEXERIL) 5 mg tablet Take 1 tablet (5 mg total) by mouth 3 (three) times a day as needed for muscle spasms 4/24/18   Srinivas Bower MD   diphenhydrAMINE (BENADRYL ALLERGY) 25 mg tablet Take by mouth 10/27/17   Historical Provider, MD   hydrochlorothiazide (MICROZIDE) 12 5 mg capsule Take 12 5 mg by mouth daily      Historical Provider, MD   hydrOXYzine HCL (ATARAX) 25 mg tablet Take 1 tablet by mouth every 6 (six) hours as needed for itching 9/7/17   Hali Garcia MD   mineral oil-hydrophilic petrolatum (AQUAPHOR) ointment Apply topically as needed for dry skin 9/7/17   Hali Garcia MD   olmesartan (BENICAR) 20 mg tablet Take 2 tablets (40 mg total) by mouth daily 4/24/18   Connor Botello MD   triamcinolone (KENALOG) 0 1 % cream Apply topically 2 (two) times a day 2/8/18   Connor Botello MD       Allergies   No Known Allergies    Objective     Vitals:    05/01/18 1430 05/01/18 1545 05/01/18 1727 05/01/18 1948   BP: (!) 178/59 (!) 212/93 140/73 148/72   BP Location: Left arm  Left arm    Pulse: 83 72 80    Resp: 22 20 18    Temp: 98 °F (36 7 °C)  97 8 °F (36 6 °C)    TempSrc: Tympanic  Oral    SpO2: 99% 99% 96%    Weight:   79 9 kg (176 lb 3 2 oz)      Body mass index is 31 21 kg/m²  No intake or output data in the 24 hours ending 05/01/18 2036  Invasive Devices          No matching active lines, drains, or airways          Physical Exam  Physical Exam   Constitutional: She is oriented to person, place, and time  She appears well-developed and well-nourished  No distress  HENT:   Head: Normocephalic and atraumatic  Right Ear: External ear normal    Left Ear: External ear normal    Nose: Nose normal    Mouth/Throat: Oropharynx is clear and moist  No oropharyngeal exudate  Eyes: Conjunctivae and EOM are normal  Pupils are equal, round, and reactive to light  Right eye exhibits no discharge  Left eye exhibits no discharge  No scleral icterus  Neck: Normal range of motion  Neck supple  No JVD present  No tracheal deviation present  Cardiovascular: Normal rate, regular rhythm and intact distal pulses  Exam reveals no gallop and no friction rub  Murmur heard  Grade 2/6 systolic ejection murmur noted   Pulmonary/Chest: Effort normal and breath sounds normal  No stridor  No respiratory distress  She has no wheezes  She has no rales  She exhibits no tenderness     Abdominal: Soft  Bowel sounds are normal  She exhibits no distension and no mass  There is no tenderness  There is no rebound and no guarding  No hernia  Musculoskeletal: Normal range of motion  She exhibits no edema, tenderness or deformity  Neurological: She is alert and oriented to person, place, and time  No cranial nerve deficit or sensory deficit  Coordination normal    Skin: Skin is warm and dry  Capillary refill takes less than 2 seconds  No rash noted  She is not diaphoretic  No erythema  No pallor  Vitals reviewed  Lab Results: I have personally reviewed pertinent reports      CBC:   Results from last 7 days  Lab Units 05/01/18  1516   WBC Thousand/uL 8 19   RBC Million/uL 3 83   HEMOGLOBIN g/dL 12 4   HEMATOCRIT % 35 5   MCV fL 93   MCH pg 32 4   MCHC g/dL 34 9   RDW % 13 2   MPV fL 10 6   PLATELETS Thousands/uL 288   NRBC AUTO /100 WBCs 0   NEUTROS PCT % 58   LYMPHS PCT % 33   MONOS PCT % 7   EOS PCT % 2   BASOS PCT % 0   NEUTROS ABS Thousands/µL 4 72   LYMPHS ABS Thousands/µL 2 67   MONOS ABS Thousand/µL 0 59   EOS ABS Thousand/µL 0 15   , Chemistry Profile:   Results from last 7 days  Lab Units 05/01/18  1516   SODIUM mmol/L 137   POTASSIUM mmol/L 3 7   CHLORIDE mmol/L 105   CO2 mmol/L 26   ANION GAP mmol/L 6   BUN mg/dL 13   CREATININE mg/dL 0 95   GLUCOSE RANDOM mg/dL 91   CALCIUM mg/dL 9 1   AST U/L 20   ALT U/L 18   ALK PHOS U/L 135*   TOTAL PROTEIN g/dL 8 0   BILIRUBIN TOTAL mg/dL 0 28   EGFR ml/min/1 73sq m 66   , Coagulation Studies:   Results from last 7 days  Lab Units 05/01/18  1601   PROTIME seconds 13 7   INR  1 05   PTT seconds 32   , Cardiac Studies:   Results from last 7 days  Lab Units 05/01/18  1917   TROPONIN I ng/mL <0 02   , Additional Labs:   , iSTAT CHEM 8:   Results from last 7 days  Lab Units 05/01/18  1516   EGFR ml/min/1 73sq m 66   GLUCOSE RANDOM mg/dL 91   HEMOGLOBIN g/dL 12 4   , ABG:   , Toxicology:   , Last A1C/Lipid Panel/Thyroid Panel:   Lab Results   Component Value Date    TRIG 250 (H) 01/10/2018    CHOL 236 (H) 01/10/2018    HDL 43 01/10/2018    LDLCALC 143 (H) 01/10/2018    GYH6YBULNMQE 2 190 01/10/2018       Imaging: I have personally reviewed pertinent films in PACS  Xr Chest 2 Views    Result Date: 5/1/2018  Narrative: CHEST INDICATION:   chest pain  COMPARISON:  3/21/2016  EXAM PERFORMED/VIEWS:  XR CHEST PA & LATERAL FINDINGS: Cardiomediastinal silhouette appears unremarkable  The lungs are clear  No pneumothorax or pleural effusion  Osseous structures appear within normal limits for patient age  Impression: No acute cardiopulmonary disease  Workstation performed: [id number]     Ct Head Without Contrast    Result Date: 5/1/2018  Narrative: CT BRAIN - WITHOUT CONTRAST INDICATION:   Headache and hypertension  COMPARISON:  None  TECHNIQUE:  CT examination of the brain was performed  In addition to axial images, coronal 2D reformatted images were created and submitted for interpretation  Radiation dose length product (DLP) for this visit:  949 77 mGy-cm   This examination, like all CT scans performed in the [facility], was performed utilizing techniques to minimize radiation dose exposure, including the use of iterative  reconstruction and automated exposure control  IMAGE QUALITY:  Diagnostic  FINDINGS: PARENCHYMA:  No intracranial mass, mass effect or midline shift  No CT signs of acute infarction  No acute intracranial hemorrhage  VENTRICLES AND EXTRA-AXIAL SPACES:  Normal for the patient's age  VISUALIZED ORBITS AND PARANASAL SINUSES:  There is a probable mucus retention cyst in the left maxillary sinus, partially imaged  CALVARIUM AND EXTRACRANIAL SOFT TISSUES:  Normal      Impression: No acute intracranial abnormality  Workstation performed: [id number]     Xr Spine Lumbosacral 1 View    Result Date: 4/30/2018  Narrative: LUMBAR SPINE INDICATION:   M54 5: Low back pain G89 11: Acute pain due to trauma   COMPARISON:  None VIEWS:  XR SPINE LUMBOSACRAL 1 VIEW Only a single AP view was requested  FINDINGS: No fracture identified  Alignment is unremarkable  Lateral osteophytes noted within the L2-L4 endplates  The pedicles appear intact  Visualized soft tissues appear unremarkable  Impression: Limited AP projection only demonstrates endplate osteophytes within the mid lumbar spine  No compression fracture  Recommend a complete study with lateral view  Workstation performed: [id number]       Microbiology: cultures obtained in emergency department include; none obtained    Urinalysis:       Invalid input(s): URIBILINOGEN     Urine Micro:        EKG, Pathology, and Other Studies: I have personally reviewed pertinent reports        Medications given in Emergency Department     Medication Administration - last 24 hours from 04/30/2018 2036 to 05/01/2018 2036       Date/Time Order Dose Route Action Action by     05/01/2018 1644 sodium chloride 0 9 % bolus 500 mL 0 mL Intravenous Stopped Kourtney Miller RN     05/01/2018 1517 sodium chloride 0 9 % bolus 500 mL 500 mL [facility]venous [facility] Kourtney [address]     05/01/2018 1544 metoclopramide (REGLAN) injection 10 mg 10 mg Intravenous Given Kourtney Miller RN     05/01/2018 1544 diphenhydrAMINE (BENADRYL) injection 25 mg 25 mg Intravenous Given Kourtney Miller RN     05/01/2018 1654 lidocaine (PF) (XYLOCAINE-MPF) 1 % injection 10 mL 10 mL Infiltration Not Given Kourtney Miller RN     05/01/2018 1644 labetalol (NORMODYNE) injection 20 mg 20 mg Intravenous Given Kourtney Miller RN          Assessment and Plan     Problem List     * (Principal)Hypertensive emergency    Rash and nonspecific skin eruption    Acute low back pain due to trauma    Benign essential hypertension    Broken tooth    Vision disturbance    Chest pain    Headache    Nausea and vomiting          Code Status: Level 3 - DNAR/DNI  VTE Pharmacologic Prophylaxis: Enoxaparin (Lovenox)   VTE Mechanical Prophylaxis: sequential compression device  Admission Status: OBSERVATION    Admission Time  I spent 30 minutes admitting the patient  This involved direct patient contact where I performed a full history and physical, reviewing previous records, and reviewing laboratory and other diagnostic studies      Jennifer Boyer DO  Internal Medicine  PGY-1

## 2018-05-01 NOTE — PROGRESS NOTES
Assessment/Plan:     Diagnoses and all orders for this visit:    Hypertension        -     Patient sent to the ED for evaluation        -     Uncontrolled HTN    Heart murmur  -     Echo complete with contrast if indicated; Future  -     She is advised to get this done if it not done while she is in the ED    Hyperlipidemia, unspecified hyperlipidemia type  -     atorvastatin (LIPITOR) 40 mg tablet; Take 1 tablet (40 mg total) by mouth daily      she is given the order for VAS kidney that was ordered previously by resident  Advised to get it done if it is not done while she is in the hospital      f/u with pcp after leaving the hospital        Subjective: Patient presents to the clinic for elevated BP       Patient ID: Mei Florence is a 61 y o  female  HPI  She reports that earlier today her BP was 200/110 mmHg  Denies C P , but feels like she is going to faint  Has generalized body weakness  Slight change in vision (decreased) with moderate to severe headache, throbbing, nausea and vomiting (x 2 today) and chest tightness  The HA is located on the top and back of the head  She is currently on benicar, amlodipine and HCTZ  Still her BP has not been controlled  Upon arrival at the clinic, her BP was 180/110 mmHg by MA and 178/104 by me, L arm, sitting  10 mins after  Denies abd pain  She has a Hx of elevated cholesterol with last LDL in jan 2018 at 143 mg/dL and total chol at 236 mg/dL  Her ASCVD is calculated and is 9 8% with recommendation for moderate to high statin  She is started on 40 mg atorvastatin  Renin and angiotensin have been negative  Patient is advised to to to the ED  The following portions of the patient's history were reviewed and updated as appropriate: allergies, current medications, past family history, past medical history, past social history, past surgical history and problem list     Review of Systems   Constitutional: Positive for fatigue   Negative for appetite change, chills and unexpected weight change  HENT: Negative for congestion, facial swelling, sore throat and trouble swallowing  Respiratory: Positive for chest tightness  Negative for cough, shortness of breath and wheezing  Cardiovascular: Negative for chest pain and palpitations  Gastrointestinal: Positive for nausea and vomiting  Negative for abdominal pain, constipation and diarrhea  Genitourinary: Negative for dysuria, frequency, hematuria and urgency  Neurological: Positive for dizziness and headaches  Negative for tremors, syncope, speech difficulty and weakness  Objective:      BP (!) 180/110 (BP Location: Left arm, Patient Position: Sitting, Cuff Size: Adult)   Pulse 84   Temp 98 °F (36 7 °C) (Oral)   Ht 5' 3" (1 6 m)   Wt 79 8 kg (175 lb 14 8 oz)   BMI 31 16 kg/m²          Physical Exam   Constitutional: She is oriented to person, place, and time  She appears well-developed and well-nourished  No distress  Eyes: Conjunctivae and EOM are normal  Pupils are equal, round, and reactive to light  Right eye exhibits no discharge  Left eye exhibits no discharge  Cardiovascular: Normal rate, regular rhythm and normal heart sounds  No murmur heard  Pulmonary/Chest: Effort normal and breath sounds normal  No respiratory distress  She has no wheezes  Neurological: She is alert and oriented to person, place, and time  Skin: Skin is warm and dry  She is not diaphoretic  Psychiatric: She has a normal mood and affect   Her behavior is normal  Judgment and thought content normal

## 2018-05-02 ENCOUNTER — APPOINTMENT (OUTPATIENT)
Dept: NON INVASIVE DIAGNOSTICS | Facility: HOSPITAL | Age: 58
End: 2018-05-02
Payer: COMMERCIAL

## 2018-05-02 VITALS
RESPIRATION RATE: 18 BRPM | TEMPERATURE: 98.6 F | DIASTOLIC BLOOD PRESSURE: 89 MMHG | HEART RATE: 81 BPM | BODY MASS INDEX: 31.21 KG/M2 | OXYGEN SATURATION: 98 % | WEIGHT: 176.2 LBS | SYSTOLIC BLOOD PRESSURE: 145 MMHG

## 2018-05-02 PROBLEM — Z91.148 NONCOMPLIANCE WITH MEDICATIONS: Status: ACTIVE | Noted: 2018-05-02

## 2018-05-02 PROBLEM — R11.2 NAUSEA AND VOMITING: Status: RESOLVED | Noted: 2018-05-01 | Resolved: 2018-05-02

## 2018-05-02 PROBLEM — R07.9 CHEST PAIN: Status: RESOLVED | Noted: 2018-05-01 | Resolved: 2018-05-02

## 2018-05-02 PROBLEM — Z91.14 NONCOMPLIANCE WITH MEDICATIONS: Status: ACTIVE | Noted: 2018-05-02

## 2018-05-02 PROBLEM — R51.9 HEADACHE: Status: RESOLVED | Noted: 2018-05-01 | Resolved: 2018-05-02

## 2018-05-02 LAB
ANION GAP SERPL CALCULATED.3IONS-SCNC: 6 MMOL/L (ref 4–13)
BUN SERPL-MCNC: 12 MG/DL (ref 5–25)
CALCIUM SERPL-MCNC: 9.2 MG/DL (ref 8.3–10.1)
CHLORIDE SERPL-SCNC: 107 MMOL/L (ref 100–108)
CO2 SERPL-SCNC: 26 MMOL/L (ref 21–32)
CREAT SERPL-MCNC: 0.84 MG/DL (ref 0.6–1.3)
GFR SERPL CREATININE-BSD FRML MDRD: 77 ML/MIN/1.73SQ M
GLUCOSE SERPL-MCNC: 93 MG/DL (ref 65–140)
PLATELET # BLD AUTO: 264 THOUSANDS/UL (ref 149–390)
PMV BLD AUTO: 10.6 FL (ref 8.9–12.7)
POTASSIUM SERPL-SCNC: 3.6 MMOL/L (ref 3.5–5.3)
SODIUM SERPL-SCNC: 139 MMOL/L (ref 136–145)

## 2018-05-02 PROCEDURE — 80048 BASIC METABOLIC PNL TOTAL CA: CPT | Performed by: INTERNAL MEDICINE

## 2018-05-02 PROCEDURE — 93306 TTE W/DOPPLER COMPLETE: CPT

## 2018-05-02 PROCEDURE — 93975 VASCULAR STUDY: CPT

## 2018-05-02 PROCEDURE — 93306 TTE W/DOPPLER COMPLETE: CPT | Performed by: INTERNAL MEDICINE

## 2018-05-02 PROCEDURE — 85049 AUTOMATED PLATELET COUNT: CPT | Performed by: INTERNAL MEDICINE

## 2018-05-02 PROCEDURE — 93975 VASCULAR STUDY: CPT | Performed by: SURGERY

## 2018-05-02 RX ORDER — CHLORTHALIDONE 25 MG/1
12.5 TABLET ORAL DAILY
Qty: 30 TABLET | Refills: 0 | Status: ON HOLD | OUTPATIENT
Start: 2018-05-02 | End: 2019-07-19

## 2018-05-02 RX ORDER — AMLODIPINE BESYLATE AND BENAZEPRIL HYDROCHLORIDE 10; 20 MG/1; MG/1
1 CAPSULE ORAL DAILY
Qty: 30 CAPSULE | Refills: 0 | Status: ON HOLD | OUTPATIENT
Start: 2018-05-02 | End: 2019-07-19

## 2018-05-02 RX ADMIN — ACETAMINOPHEN 500 MG: 160 SUSPENSION ORAL at 08:44

## 2018-05-02 RX ADMIN — ATORVASTATIN CALCIUM 40 MG: 40 TABLET, FILM COATED ORAL at 08:44

## 2018-05-02 RX ADMIN — ENOXAPARIN SODIUM 40 MG: 40 INJECTION SUBCUTANEOUS at 08:47

## 2018-05-02 RX ADMIN — AMLODIPINE BESYLATE 10 MG: 10 TABLET ORAL at 08:44

## 2018-05-02 RX ADMIN — HYDROCHLOROTHIAZIDE 12.5 MG: 12.5 TABLET ORAL at 08:45

## 2018-05-02 RX ADMIN — OLMESARTAN MEDOXOMIL 40 MG: 20 TABLET, FILM COATED ORAL at 08:45

## 2018-05-02 NOTE — ED ATTENDING ATTESTATION
Vickie Barron MD, saw and evaluated the patient  I have discussed the patient with the resident/non-physician practitioner and agree with the resident's/non-physician practitioner's findings, Plan of Care, and MDM as documented in the resident's/non-physician practitioner's note, except where noted  All available labs and Radiology studies were reviewed  At this point I agree with the current assessment done in the Emergency Department  I have conducted an independent evaluation of this patient a history and physical is as follows:  Patient sent in by primary care doctor for hypertension  Patient states that she woke during the night with a headache, and checked her blood pressure which was high  The patient states that she often gets headaches when she has high blood pressure  These are always the same  There we severe, and occasionally accompanied by nausea and vomiting  The patient vomited twice after onset of her headache  The patient states that she is currently being evaluated for hypertension, and is on 2 medications with which she is compliant  The patient states that she has also developed left-sided chest pain that radiates into her left arm and makes her short of breath  The patient does not typically get chest pain  The patient has not had fevers or cough  She received Benadryl for her headache, and states that her headache is now gone  She does not have any neck pain  She denies insect exposures  She denies any abdominal pain or diarrhea  Her review of systems otherwise negative in 12 systems were reviewed  On exam Vital signs were reviewed  Patient is awake, alert, interactive  The patient's pupils are equally round reactive to light  Oropharynx is clear with moist mucous membranes  Neck is supple and nontender with no adenopathy or JVD  Heart is regular with no murmurs, rubs, or gallops  Lungs are clear and equal with no wheezes, rales, or rhonchi    Abdomen is soft and nontender with no masses, rebound, or guarding  There is no CVA tenderness  The patient was completely exposed  There is no skin breakdown  There are no rashes or skin changes  Extremities are warm and well perfused with good pulses  The patient has normal strength, sensation, and cranial nerves  Patient's EKG with no ischemia or ectopy  Her 1st troponin is negative  Impression:  Hypertensive urgency, headache which has resolved, chest pain  Patient has heart score of 5  Will plan to do cardiac evaluation, will admit for observation for chest pain  Will medicate with labetalol for severe hypertension      Critical Care Time  CritCare Time    Procedures

## 2018-05-02 NOTE — DISCHARGE SUMMARY
[facility] Discharge Summary - Medical Oksana Simmons 61 y o  female MRN: [id number]    [facility] Room / Bed: Lashay Person 218/2 218-02 Encounter: [id number]    BRIEF OVERVIEW    Admitting Provider: Gracy Sears MD  Discharge Provider: Gracy Sears MD  Primary Care Physician at Discharge: Marla Bobo, [address] [place] [address]  Admission Date: 5/1/2018     Discharge Date: No discharge date for patient encounter  Matthew Winter is a 67-year-old female past history of uncontrolled hypertension presented for evaluation of headaches  See HPI for further details  Blood pressure in the emergency room was 178/59, heart rate 83  Patient was given a 1 time dose of labetalol with blood pressure improvement to 231 systolic  CT head without contrast was obtained not show any acute abnormalities  Patient was admitted for management of hypertensive urgency  Laboratory examination was unremarkable  Troponin was negative  EKG did not show any signs of acute ischemia, but left ventricular hypertrophy  Patient was given normal saline, and Reglan for symptomatic treatment  Renal duplex was obtained to assess any secondary causes have high blood pressure  Patient's symptoms resolved  Echo was obtained and was significant for grade 1 diastolic dysfunction  Blood pressure on the day of discharge was 145/89, heart rate 81  Physical exam the day of discharge was:   General:  No acute distress  HEENT:  Moist mucous membranes, No jvd  CVS: Normal rate and rhythm  Lungs: cta  Abd; non tender non distended no bruits  Ext: no edema    Patient was stable at day of discharge without any symptoms  Blood pressure was stable  Appropriate follow-up appointment was made for PCP in about a week  Patient was discharged on a new blood pressure regimen- chlorthalidone, and Lotrel to increase compliance from 3 to 2 medications daily    She has a blood pressure cuff at home as to take daily readings and present to PCP next week for appointment  Presenting Problem/History of Present Illness  Principal Problem:    Hypertensive urgency  Active Problems:    Benign essential hypertension    Noncompliance with medications  Resolved Problems:    Chest pain    Headache    Nausea and vomiting        Diagnostic Procedures Performed  Imaging Studies:  Xr Chest 2 Views    Result Date: 5/1/2018  Impression: No acute cardiopulmonary disease  Workstation performed: [id number]     Ct Head Without Contrast    Result Date: 5/1/2018  Impression: No acute intracranial abnormality  Workstation performed: [id number]       Echo    Result Date: 5/2/2018  LEFT VENTRICLE:  Systolic function was normal  Ejection fraction was estimated to be 60 %  There were no regional wall motion abnormalities  Doppler parameters were consistent with abnormal left ventricular relaxation (grade 1 diastolic dysfunction)      RIGHT VENTRICLE:  The size was normal   Systolic function was normal      HISTORY: PRIOR HISTORY: Cancer, HTN     PROCEDURE: The procedure was performed at the bedside  This was a routine study  The transthoracic approach was used  The study included complete 2D imaging, M-mode, complete spectral Doppler, and color Doppler  The heart rate was 75 bpm,  at the start of the study  Images were obtained from the parasternal, apical, subcostal, and suprasternal notch acoustic windows  Echocardiographic views were limited due to decreased penetration  This was a technically difficult study      LEFT VENTRICLE: Size was normal  Systolic function was normal  Ejection fraction was estimated to be 60 %  There were no regional wall motion abnormalities  Wall thickness was normal  DOPPLER: There was an increased relative contribution  of atrial contraction to ventricular filling   Doppler parameters were consistent with abnormal left ventricular relaxation (grade 1 diastolic dysfunction)      RIGHT VENTRICLE: The size was normal  Systolic function was normal  Wall thickness was normal      LEFT ATRIUM: Size was normal      RIGHT ATRIUM: Size was normal      MITRAL VALVE: Valve structure was normal  There was normal leaflet separation  DOPPLER: The transmitral velocity was within the normal range  There was no evidence for stenosis  There was trace regurgitation      AORTIC VALVE: The valve was trileaflet  Leaflets exhibited normal thickness and normal cuspal separation  DOPPLER: Transaortic velocity was within the normal range  There was no evidence for stenosis  There was trace regurgitation      TRICUSPID VALVE: The valve structure was normal  There was normal leaflet separation  DOPPLER: The transtricuspid velocity was within the normal range  There was no evidence for stenosis  There was trace regurgitation  Pulmonary artery  systolic pressure was within the normal range  Estimated peak PA pressure was 23 mmHg  PULMONIC VALVE: Leaflets exhibited normal thickness, no calcification, and normal cuspal separation  DOPPLER: The transpulmonic velocity was within the normal range  There was trace regurgitation      PERICARDIUM: There was no pericardial effusion  The pericardium was normal in appearance      AORTA: The root exhibited normal size      SYSTEMIC VEINS: IVC: The inferior vena cava was normal in size   Respirophasic changes were normal          Pertinent Labs:      Results from last 7 days  Lab Units 05/02/18  0553 05/01/18  1516   SODIUM mmol/L 139 137   POTASSIUM mmol/L 3 6 3 7   CHLORIDE mmol/L 107 105   CO2 mmol/L 26 26   BUN mg/dL 12 13   CREATININE mg/dL 0 84 0 95   CALCIUM mg/dL 9 2 9 1   TOTAL PROTEIN g/dL  --  8 0   BILIRUBIN TOTAL mg/dL  --  0 28   ALK PHOS U/L  --  135*   ALT U/L  --  18   AST U/L  --  20   GLUCOSE RANDOM mg/dL 93 91         Therapeutic Procedures Performed  n/a    Test Results Pending at Discharge:   Renal artery duplex     Medications Medication List to be Continued at Discharge  Current Discharge Medication List      CONTINUE these medications which have NOT CHANGED    Details   amLODIPine (NORVASC) 10 mg tablet Take 1 tablet (10 mg total) by mouth daily  Qty: 90 tablet, Refills: 1    Associated Diagnoses: Essential hypertension      acetaminophen (TYLENOL) 500 MG chewable tablet Chew 1 tablet (500 mg total) every 6 (six) hours as needed for mild pain or moderate pain  Qty: 90 tablet, Refills: 0    Associated Diagnoses: Acute low back pain due to trauma      atorvastatin (LIPITOR) 40 mg tablet Take 1 tablet (40 mg total) by mouth daily  Qty: 90 tablet, Refills: 1    Associated Diagnoses: Hyperlipidemia, unspecified hyperlipidemia type      cyclobenzaprine (FLEXERIL) 5 mg tablet Take 1 tablet (5 mg total) by mouth 3 (three) times a day as needed for muscle spasms  Qty: 90 tablet, Refills: 0    Associated Diagnoses: Acute low back pain due to trauma      diphenhydrAMINE (BENADRYL ALLERGY) 25 mg tablet Take by mouth      hydrochlorothiazide (MICROZIDE) 12 5 mg capsule Take 12 5 mg by mouth daily        hydrOXYzine HCL (ATARAX) 25 mg tablet Take 1 tablet by mouth every 6 (six) hours as needed for itching  Qty: 30 tablet, Refills: 0      mineral oil-hydrophilic petrolatum (AQUAPHOR) ointment Apply topically as needed for dry skin  Qty: 420 g, Refills: 0      olmesartan (BENICAR) 20 mg tablet Take 2 tablets (40 mg total) by mouth daily  Qty: 90 tablet, Refills: 1    Associated Diagnoses: Essential hypertension      triamcinolone (KENALOG) 0 1 % cream Apply topically 2 (two) times a day  Qty: 30 g, Refills: 0    Associated Diagnoses: Rash and nonspecific skin eruption             Current Discharge Medication List      STOP taking these medications       amLODIPine (NORVASC) 10 mg tablet Comments:   Reason for Stopping:         hydrochlorothiazide (MICROZIDE) 12 5 mg capsule Comments:   Reason for Stopping:         olmesartan (BENICAR) 20 mg tablet Comments:   Reason for Stopping:                   Current Discharge Medication List      START taking these medications    Details   amLODIPine-benazepril (LOTREL) 10-20 MG per capsule Take 1 capsule by mouth daily  Qty: 30 capsule, Refills: 0    Associated Diagnoses: Benign essential hypertension      chlorthalidone 25 mg tablet Take 0 5 tablets (12 5 mg total) by mouth daily  Qty: 30 tablet, Refills: 0    Associated Diagnoses: Benign essential hypertension                 Allergies  No Known Allergies  Discharge Diet: cardiac diet  Activity restrictions: none  Discharge Condition: stable  Discharged With Lines: no    Discharge Disposition: [address] [place] [address] Avenue / Family Member Name: Dion Lehman  Phone Number: [phone]    Outpatient Follow-Up  yes      Follow up: Dr Edward Madrigal  Date and time:  May 10th, 2pm  Location: [facility]        Code Status: Level 3 - DNAR and DNI  Advance Directive and Living Will: <no information>  Power of :    POLST:      Discharge  Statement   I spent 20 minutes minutes discharging the patient  This time was spent on the day of discharge  I had direct contact with the patient on the day of discharge  Additional documentation is required if more than 30 minutes were spent on discharge

## 2018-05-02 NOTE — DISCHARGE INSTRUCTIONS
Crisis hipertensiva   LO QUE NECESITA SABER:   La crisis hipertensiva es un aumento repentino en la presión sanguínea de 180 / 80 o más  También se conoce conchis hipertensión [place] crisis hipertensiva es aby emergencia médica  Podría ocasionar daño a órganos o ser potencialmente mortal    [facility]:   Medicamentos:  A usted le pueden  prescribir los siguientes medicamentos:  · Medicamentos para la presión arterial  se administra para disminuir santana presión arterial  Existen muchos tipos diferentes de medicamentos para la presión arterial y es probable que usted necesite más de un tipo  · Diuréticos  ayudan a eliminar el exceso de líquido que se acumula en colette vasos sanguíneos  [address] reduce la presión arterial al disminuir la presión en las arterias  Los diuréticos se conocen también conchis píldoras de [place]  Es posible que orine más seguido mientras ally melanie medicamento  · [place] colette medicamentos conchis se le haya indicado  Consulte con santana médico si usted augustus que santana medicamento no le está ayudando o si presenta efectos secundarios  Infórmele si es alérgico a cualquier medicamento  Mantenga aby lista actualizada de los [place], las vitaminas y los productos herbales que ally  Incluya los siguientes datos de los medicamentos: cantidad, frecuencia y motivo de administración  Traiga con usted la lista o los envases de la píldoras a colette citas de seguimiento  Lleve la lista de los medicamentos con usted en jeff de aby emergencia  Programe aby jessi con santana médico para dentro de 1 semana o según se le haya indicado:  Usted tendrá que regresar a que le tomen la presión arterial y también para realizarse otras pruebas  Santana médico podría también remitirlo a un cardiólogo  Anote colette preguntas para que se acuerde de hacerlas marianela colette visitas  [place]ese la presión arterial en casa:  Tó[place]ese santana presión arterial mientras está sentado   Tó[place]ese la presión por lo [place] al día, por ejemplo en las mañanas y las noches  Mantenga un control de las lecturas de santana presión arterial y llévelo a oclette citas  Ayude a prevenir otra crisis hipertensiva:   · Controle otras afecciones de [employer] diabetes, la enfermedad de la tiroides o problemas de la glándula suprarrenal  Estos padecimientos pueden causar o empeorar aby crisis hipertensiva  · Consuma alimentos saludables y variados  incluyendo frutas, verduras, panes integrales, productos lácteos bajos en grasa, frijoles, jacinto magras y pescado  Usted tendrá que limitar la cantidad de sodio y grasas que consume  Además podría necesitar consumir más potasio  Pregunte si necesita seguir aby dieta especial  Los cambios en santana Stacie Cutter a bajar santana presión arterial      · Pregúntele a santana médico si santana peso es mariusz  Solicite que santana médico le ayude a crear un plan para adelgazar si usted tiene sobrepeso  Incluso un poco de pérdida de peso puede ayudar a bajar santana presión arterial      · Pregunte a santana médico acerca del mejor plan de ejercicio para usted  El ejercicio podría ayudar a bajar santana presión arterial      · Limite el consumo de alcohol  a 1 bebida al día si usted es sneha  Los hombres deben limitar el consumo de alcohol a 2 bebidas al día  Un trago equivale a 12 onzas de cerveza, 5 onzas de vino o 1 onza y ½ de licor  · No fume  El fumado causa enfermedad cardíaca y podría también elevar santana presión arterial  Si usted fuma, nunca es demasiado tarde para dejar de hacerlo  Solicite a santana médico más información si usted necesita ayuda para dejar de fumar  Para más información:   · [place] Matthew Jackson   Phone: [phone]  Web Address: https://www strong com/  org   Pregúntele a santana Daquan Stephen vitaminas y minerales son adecuados para usted  · Se le acaba el medicamento  · Usted tiene preguntas o inquietudes acerca de santana condición o cuidado    [place] atención médica de inmediato o llame al 911 si:   · Usted se ally la presión arterial y está en 180 / 110 o más cece  · Usted tiene un darren dolor de linette  · Usted tiene dolor en el pecho o falta de aire  · Usted siente debilidad o adormecimiento en la katie, brazos o piernas  · Usted no puede aury o hablar clark fransisca conchis usualmente lo hace  © 2017 [address] Information is for End User's use only and may not be sold, redistributed or otherwise used for commercial purposes  All illustrations and images included in CareNotes® are the copyrighted property of A D A M , Inc  or Samson Lieberman  Esta información es sólo para uso en educación  Addison intención no es darle un consejo médico sobre enfermedades o tratamientos  Colsulte con addison Stormy Binder farmacéutico antes de seguir cualquier régimen médico para saber si es seguro y efectivo para usted

## 2018-05-02 NOTE — SOCIAL WORK
CM received scripts from [facility] Amanda Davies,requesting CM  To check for cost  CM tubed scripts and facesheet to [facility] with request to check for cost and fill  CM received a phone call from Guadalupe at [facility]  Reporting there is no copay  CM requested for meds to be delivered to patient's bedside  Patient for discharge to home today

## 2018-05-02 NOTE — CASE MANAGEMENT
Initial Clinical Review    Thank you,  [facility] in the [facility] by Samson Lieberman for 2017  Network Utilization Review Department  Phone: [phone]; Fax [phone]  ATTENTION: The Network Utilization Review Department is now centralized for our 7 Facilities  Make a note that we have a new phone and fax numbers for our Department  Please call with any questions or concerns to [phone] and carefully follow the prompts so that you are directed to the right person  All voicemails are confidential  Fax any determinations, approvals, denials, and requests for initial or continue stay review clinical to [phone]  Due to HIGH CALL volume, it would be easier if you could please send faxed requests to expedite your requests and in part, help us provide discharge notifications faster  Admission: Date/Time/Statement: Placed in Observation status on 5/1 @ 16:32    Orders Placed This Encounter   Procedures    Place in Observation (expected length of stay for this patient is less than two midnights)     Standing Status:   Standing     Number of Occurrences:   1     Order Specific Question:   Admitting Physician     Answer:   Heather Gonzalez     Order Specific Question:   Level of Care     Answer:   Med Surg [16]         ED: Date/Time/Mode of Arrival:   ED Arrival Information     Expected Arrival Acuity Means of Arrival Escorted By Service Admission Type    - 5/1/2018 14:17 Urgent Ambulance [facility] General Medicine Urgent    Arrival Complaint    HBP          Chief Complaint:   Chief Complaint   Patient presents with    Hypertension     pt took her BP at home, was reading SBP 200s, saw her PCP and it was reading SBP 180s/90s   sent in for eval by pcp  states she woke up with a severe headache which prompted her to check her bp  pt sates she has been having problems with increasing blood pressure over the past week, was her PCP on thursday and was prescribed a new medication, has been taking them as prescribed  History of Illness: Roberto Marks is a 61 y o  female  presents from her primary care physician's office for evaluation of hypertension, chest pain, headaches  Patient reports that for the past week she has had elevated blood pressures with systolic in [address]54[address]  This morning she woke up with a headache, dizziness, lightheadedness, and 2 episodes of vomiting which prompted her to visit her primary care physician's office  At the primary care physician's office the patient was noted to be hypertensive at 180/110 and 178/104 on 2 separate readings respectively  Patient was advised to come to the emergency department for evaluation  Her home hypertensive regimen a recently increase Norvasc to 10 mg daily from 5 mg daily, hydrochlorothiazide 12 5 mg daily, and Benicar 40 mg daily  Patient reports only taking Norvasc 5 mg daily  She cannot recall why she was not taking hydrochlorothiazide but states that she is not taking her Benicar due to cost   Per primary care physician notes, patient previously reported she has been compliant with all of her medications  Emergency department evaluation not reveal any acute ischemic changes on EKG and no acute intracranial abnormalities on CT head  She received Benadryl, Reglan 500 mL normal saline bolus, and 20 mg IV labetalol    Upon transfer to the hospital floor, the patient's blood pressure decreased to systolic 958V        ED Vital Signs:   ED Triage Vitals [05/01/18 1430]   Temperature Pulse Respirations Blood Pressure SpO2   98 °F (36 7 °C) 83 22 (!) 178/59 99 %      Temp Source Heart Rate Source Patient Position - Orthostatic VS BP Location FiO2 (%)   Tympanic Monitor Lying Left arm --      Pain Score       Worst Possible Pain        Wt Readings from Last 1 Encounters:   05/01/18 79 9 kg (176 lb 3 2 oz)       Vital Signs (abnormal):   Date/Time  Temp  Pulse  Resp  BP  SpO2  O2 Device Patient Position - Orthostatic VS   05/02/18 0400  --  --  --  137/65  --  --  --   05/02/18 0000  --  --  --  120/64  --  --  --   05/01/18 2254  98 9 °F (37 2 °C)  79  20  124/59  97 %  None (Room air)  Lying   05/01/18 1948  --  --  --  148/72  --  --  --   05/01/18 1727  97 8 °F (36 6 °C)  80  18  140/73  96 %  None (Room air)  Lying   05/01/18 1545  --  72  20   212/93  99 %  --  --         Abnormal Labs/Diagnostic Test Results:  Troponin 0 02-0 02  Alk Phos 135- Albumin 3 4- CBC - normal     CXR - no acute   CT Head - no acute     ECHO - pending    ED Treatment:   Medication Administration from 05/01/2018 1417 to 05/01/2018 1726       Date/Time Order Dose Route Action     05/01/2018 1517 sodium chloride 0 9 % bolus 500 mL 500 mL Intravenous New Bag     05/01/2018 1544 metoclopramide (REGLAN) injection 10 mg 10 mg Intravenous Given     05/01/2018 1544 diphenhydrAMINE (BENADRYL) injection 25 mg 25 mg Intravenous Given     05/01/2018 1644 labetalol (NORMODYNE) injection 20 mg 20 mg Intravenous Given       Past Medical/Surgical History:     Diagnosis    Back ache    Cancer ([facility] [address] )    Hypertension     Past Surgical History:   Procedure Laterality Date    APPENDECTOMY        BILATERAL OOPHORECTOMY        BREAST SURGERY        HERNIA REPAIR           Admitting Diagnosis: Chest pain [R07 9]  Nausea and vomiting [R11 2]  HBP (high blood pressure) [I10]  Hypertensive emergency [I16 1]  Headache [R51]    Age/Sex: 61 y o  female    Assessment/Plan:   Hypertensive emergency (elevated blood pressure with chest pain, headache, and vomiting)  -likely secondary to medical noncompliance  -EKG negative for any acute ischemic changes  Troponins negative x2  -CT head negative for any acute intracranial abnormalities  -chest pain resolved    Mild headache still present  -obtain renal Doppler ultrasound to evaluate for secondary causes of hypertension  -continue home antihypertensive regimen:  Norvasc, hydrochlorothiazide, Benicar  Give Norvasc today and resume hydrochlorothiazide and Benicar with Norvasc in the morning  -continue labetalol p r n ; goal blood pressure less than 170  -continue Reglan for nausea     Chest pain  -likely secondary to #1   See above  -2/6 systolic ejection murmur noted  -obtain echocardiogram     Hyperlipidemia  -stable  -atorvastatin     Acute on chronic low back pain  -secondary to fall approximately 2 weeks ago   -treated by primary care physician  -continue home Flexeril and Tylenol  -encourage ambulation    Admission Orders:  Scheduled Meds:   Current Facility-Administered Medications:  acetaminophen 500 mg Oral Q6H PRN   amLODIPine 10 mg Oral Daily   atorvastatin 40 mg Oral Daily   cyclobenzaprine 5 mg Oral TID PRN   diphenhydrAMINE 25 mg Oral Q6H PRN   enoxaparin 40 mg Subcutaneous Daily   hydrochlorothiazide 12 5 mg Oral Daily   labetalol 10 mg Intravenous Q4H PRN   lidocaine (PF) 10 mL Infiltration Once   olmesartan (Benicar)  40 mg Oral Daily     Nursing Orders -   VS q 4 - SCD's to le's- Up with assistance - Diet cardiac 2 gm NA -

## 2018-05-03 DIAGNOSIS — G89.11 ACUTE LOW BACK PAIN DUE TO TRAUMA: Primary | ICD-10-CM

## 2018-05-03 DIAGNOSIS — M54.50 ACUTE LOW BACK PAIN DUE TO TRAUMA: Primary | ICD-10-CM

## 2018-05-04 ENCOUNTER — TELEPHONE (OUTPATIENT)
Dept: INTERNAL MEDICINE CLINIC | Facility: CLINIC | Age: 58
End: 2018-05-04

## 2018-05-04 NOTE — TELEPHONE ENCOUNTER
Called patient today to discuss orders for VAS renal artery complete and Echo complete with contrast if indicated   In order for her to call and schedule these test

## 2018-05-07 NOTE — TELEPHONE ENCOUNTER
Patient called, I gave her the number to central scheduling and explained to her she had to call to schedule

## 2019-07-01 ENCOUNTER — PATIENT OUTREACH (OUTPATIENT)
Dept: INTERNAL MEDICINE CLINIC | Facility: CLINIC | Age: 59
End: 2019-07-01

## 2019-07-01 DIAGNOSIS — Z78.9 NEEDS ASSISTANCE WITH COMMUNITY RESOURCES: Primary | ICD-10-CM

## 2019-07-01 NOTE — PROGRESS NOTES
JHONY met with this 61 y/o [place] speaking  female this date as an urgent request as pt and her disabled elderly  had their power shut off  Pt and  are  extremely anxious due to same as well as an impending eviction  Wife has lost hours at work to care for her   Case discussed with Dr Brisa Iniguez who agrees to help with a request to [employer] to resume power  JHONY called [employer] [phone] and we spoke to Helen Prader and she relates they have completed phone certification with Dr Brisa Iniguez and power should be turned on in 24 hours  Pt is working on catching up the bill  JHONY also assisted wife with call to [facility] and [phone] Intake  & [phone]  We were able to get through and Intake relates they will attempt to help with both power shut off and eviction  Issue

## 2019-07-11 ENCOUNTER — HOSPITAL ENCOUNTER (EMERGENCY)
Facility: HOSPITAL | Age: 59
Discharge: HOME | End: 2019-07-12
Attending: EMERGENCY MEDICINE
Payer: COMMERCIAL

## 2019-07-11 ENCOUNTER — APPOINTMENT (EMERGENCY)
Dept: RADIOLOGY | Facility: HOSPITAL | Age: 59
End: 2019-07-11
Payer: COMMERCIAL

## 2019-07-11 DIAGNOSIS — G43.909 MIGRAINE: ICD-10-CM

## 2019-07-11 DIAGNOSIS — R55 SYNCOPE: Primary | ICD-10-CM

## 2019-07-11 DIAGNOSIS — M54.50 CHRONIC LEFT-SIDED LOW BACK PAIN WITHOUT SCIATICA: ICD-10-CM

## 2019-07-11 DIAGNOSIS — G89.29 CHRONIC LEFT-SIDED LOW BACK PAIN WITHOUT SCIATICA: ICD-10-CM

## 2019-07-11 LAB
ALBUMIN SERPL BCP-MCNC: 2.9 G/DL (ref 3.5–5)
ALP SERPL-CCNC: 102 U/L (ref 46–116)
ALT SERPL W P-5'-P-CCNC: 17 U/L (ref 12–78)
ANION GAP SERPL CALCULATED.3IONS-SCNC: 3 MMOL/L (ref 4–13)
AST SERPL W P-5'-P-CCNC: 23 U/L (ref 5–45)
BASOPHILS # BLD AUTO: 0.05 THOUSANDS/ΜL (ref 0–0.1)
BASOPHILS NFR BLD AUTO: 1 % (ref 0–1)
BILIRUB SERPL-MCNC: 0.17 MG/DL (ref 0.2–1)
BUN SERPL-MCNC: 17 MG/DL (ref 5–25)
CALCIUM SERPL-MCNC: 7.9 MG/DL (ref 8.3–10.1)
CHLORIDE SERPL-SCNC: 111 MMOL/L (ref 100–108)
CO2 SERPL-SCNC: 27 MMOL/L (ref 21–32)
CREAT SERPL-MCNC: 0.84 MG/DL (ref 0.6–1.3)
EOSINOPHIL # BLD AUTO: 0.23 THOUSAND/ΜL (ref 0–0.61)
EOSINOPHIL NFR BLD AUTO: 3 % (ref 0–6)
ERYTHROCYTE [DISTWIDTH] IN BLOOD BY AUTOMATED COUNT: 13 % (ref 11.6–15.1)
GFR SERPL CREATININE-BSD FRML MDRD: 76 ML/MIN/1.73SQ M
GLUCOSE SERPL-MCNC: 117 MG/DL (ref 65–140)
GLUCOSE SERPL-MCNC: 96 MG/DL (ref 65–140)
HCT VFR BLD AUTO: 33.6 % (ref 34.8–46.1)
HGB BLD-MCNC: 10.9 G/DL (ref 11.5–15.4)
IMM GRANULOCYTES # BLD AUTO: 0.01 THOUSAND/UL (ref 0–0.2)
IMM GRANULOCYTES NFR BLD AUTO: 0 % (ref 0–2)
LYMPHOCYTES # BLD AUTO: 2.77 THOUSANDS/ΜL (ref 0.6–4.47)
LYMPHOCYTES NFR BLD AUTO: 35 % (ref 14–44)
MCH RBC QN AUTO: 31.3 PG (ref 26.8–34.3)
MCHC RBC AUTO-ENTMCNC: 32.4 G/DL (ref 31.4–37.4)
MCV RBC AUTO: 97 FL (ref 82–98)
MONOCYTES # BLD AUTO: 0.84 THOUSAND/ΜL (ref 0.17–1.22)
MONOCYTES NFR BLD AUTO: 11 % (ref 4–12)
NEUTROPHILS # BLD AUTO: 3.93 THOUSANDS/ΜL (ref 1.85–7.62)
NEUTS SEG NFR BLD AUTO: 50 % (ref 43–75)
NRBC BLD AUTO-RTO: 0 /100 WBCS
PLATELET # BLD AUTO: 250 THOUSANDS/UL (ref 149–390)
PMV BLD AUTO: 11.2 FL (ref 8.9–12.7)
POTASSIUM SERPL-SCNC: 3.5 MMOL/L (ref 3.5–5.3)
PROT SERPL-MCNC: 6.9 G/DL (ref 6.4–8.2)
RBC # BLD AUTO: 3.48 MILLION/UL (ref 3.81–5.12)
SODIUM SERPL-SCNC: 141 MMOL/L (ref 136–145)
TROPONIN I SERPL-MCNC: <0.02 NG/ML
WBC # BLD AUTO: 7.83 THOUSAND/UL (ref 4.31–10.16)

## 2019-07-11 PROCEDURE — 96361 HYDRATE IV INFUSION ADD-ON: CPT

## 2019-07-11 PROCEDURE — 80053 COMPREHEN METABOLIC PANEL: CPT | Performed by: EMERGENCY MEDICINE

## 2019-07-11 PROCEDURE — 82948 REAGENT STRIP/BLOOD GLUCOSE: CPT

## 2019-07-11 PROCEDURE — 96375 TX/PRO/DX INJ NEW DRUG ADDON: CPT

## 2019-07-11 PROCEDURE — 84484 ASSAY OF TROPONIN QUANT: CPT | Performed by: EMERGENCY MEDICINE

## 2019-07-11 PROCEDURE — 85025 COMPLETE CBC W/AUTO DIFF WBC: CPT | Performed by: EMERGENCY MEDICINE

## 2019-07-11 PROCEDURE — 36415 COLL VENOUS BLD VENIPUNCTURE: CPT

## 2019-07-11 PROCEDURE — 96374 THER/PROPH/DIAG INJ IV PUSH: CPT

## 2019-07-11 PROCEDURE — 99284 EMERGENCY DEPT VISIT MOD MDM: CPT

## 2019-07-11 PROCEDURE — 71046 X-RAY EXAM CHEST 2 VIEWS: CPT

## 2019-07-11 PROCEDURE — 99284 EMERGENCY DEPT VISIT MOD MDM: CPT | Performed by: EMERGENCY MEDICINE

## 2019-07-11 PROCEDURE — 93005 ELECTROCARDIOGRAM TRACING: CPT

## 2019-07-11 RX ORDER — KETOROLAC TROMETHAMINE 30 MG/ML
15 INJECTION, SOLUTION INTRAMUSCULAR; INTRAVENOUS ONCE
Status: COMPLETED | OUTPATIENT
Start: 2019-07-11 | End: 2019-07-11

## 2019-07-11 RX ORDER — DEXAMETHASONE SODIUM PHOSPHATE 4 MG/ML
10 INJECTION, SOLUTION INTRA-ARTICULAR; INTRALESIONAL; INTRAMUSCULAR; INTRAVENOUS; SOFT TISSUE ONCE
Status: COMPLETED | OUTPATIENT
Start: 2019-07-11 | End: 2019-07-11

## 2019-07-11 RX ORDER — LIDOCAINE 50 MG/G
1 PATCH TOPICAL ONCE
Status: DISCONTINUED | OUTPATIENT
Start: 2019-07-11 | End: 2019-07-12 | Stop reason: HOSPADM

## 2019-07-11 RX ORDER — METOCLOPRAMIDE HYDROCHLORIDE 5 MG/ML
10 INJECTION INTRAMUSCULAR; INTRAVENOUS ONCE
Status: COMPLETED | OUTPATIENT
Start: 2019-07-11 | End: 2019-07-11

## 2019-07-11 RX ADMIN — DEXAMETHASONE SODIUM PHOSPHATE 10 MG: 4 INJECTION, SOLUTION INTRAMUSCULAR; INTRAVENOUS at 23:32

## 2019-07-11 RX ADMIN — SODIUM CHLORIDE 1000 ML: 0.9 INJECTION, SOLUTION INTRAVENOUS at 23:32

## 2019-07-11 RX ADMIN — LIDOCAINE 1 PATCH: 50 PATCH CUTANEOUS at 23:31

## 2019-07-11 RX ADMIN — KETOROLAC TROMETHAMINE 15 MG: 30 INJECTION, SOLUTION INTRAMUSCULAR at 23:33

## 2019-07-11 RX ADMIN — METOCLOPRAMIDE 10 MG: 5 INJECTION, SOLUTION INTRAMUSCULAR; INTRAVENOUS at 23:33

## 2019-07-12 VITALS
WEIGHT: 180 LBS | HEART RATE: 70 BPM | TEMPERATURE: 98 F | SYSTOLIC BLOOD PRESSURE: 168 MMHG | HEIGHT: 63 IN | RESPIRATION RATE: 22 BRPM | BODY MASS INDEX: 31.89 KG/M2 | OXYGEN SATURATION: 98 % | DIASTOLIC BLOOD PRESSURE: 74 MMHG

## 2019-07-12 LAB
ATRIAL RATE: 74 BPM
P AXIS: 57 DEGREES
PR INTERVAL: 170 MS
QRS AXIS: -29 DEGREES
QRSD INTERVAL: 94 MS
QT INTERVAL: 378 MS
QTC INTERVAL: 419 MS
T WAVE AXIS: 60 DEGREES
VENTRICULAR RATE: 74 BPM

## 2019-07-12 PROCEDURE — 93010 ELECTROCARDIOGRAM REPORT: CPT | Performed by: INTERNAL MEDICINE

## 2019-07-12 NOTE — ED ATTENDING ATTESTATION
Estiven Rhoades DO, saw and evaluated the patient  I have discussed the patient with the resident/non-physician practitioner and agree with the resident's/non-physician practitioner's findings, Plan of Care, and MDM as documented in the resident's/non-physician practitioner's note, except where noted  All available labs and Radiology studies were reviewed  I was present for key portions of any procedure(s) performed by the resident/non-physician practitioner and I was immediately available to provide assistance  At this point I agree with the current assessment done in the Emergency Department  I have conducted an independent evaluation of this patient a history and physical is as follows:    61 yo female presents for evaluation after syncope  She felt lightheaded while talking to a neighbor, was found unresponsive  EMS did a quick sternal rub resulting in her awakening and had normal mental status  C/o intermittent HA earlier today, then again at 1900h, bifrontal throbbing HA associated with photophobia and nausea  Denies focal weakness/numbness/tingling  States it feels like her prior HA  Also c/o lower back pain which is chronic, no new symptoms  Denies CP/SOB/cough, abd pain  No other c/o at this time  Imp: syncope  Plan: FNP cardiac eval sent and reviewed - no acute findings  Will tx HA and reassess        Critical Care Time  Procedures

## 2019-07-12 NOTE — ED PROVIDER NOTES
History  Chief Complaint   Patient presents with    Syncope     Patient passed out at home today; once police arrived they were able to wake her up with a sternal rub and she has been oriented since      65-year-old female with past medical history of hypertension and hyperlipidemia who is presenting after a syncopal episode  Patient reports she was at home talking with her neighbor when she became dizzy  She is not certain if she lost consciousness  Per report from EMS, the patient was found unresponsive but she was able to be woken easily and has been at her baseline mental status since that time  Patient's daughter and niece at bedside both agree that the patient is at her baseline  Patient reports that she has had a migraine headache off and on since 1300 this afternoon  The headache returned at 1900 and she took Advil for her symptoms with some relief  The pain is located in the bilateral frontal regions and is described as throbbing  It is typical of the patient's usual migraine and is not more intense or different than normal   Patient reports sensitivity to light as well as nausea  She denies any vomiting  No neck pain, neck stiffness, focal numbness or weakness, speech difficulty  On review of systems, patient notes chronic left lower back pain which she states she has had intermittently for years  She relates this to a fall which occurred many years ago  This back pain is again typical of her usual back pain and is not in any way different  Patient denies any weakness or numbness of her lower extremities, urinary incontinence, urinary retention, or saddle anesthesia  Patient denies any chest pain, shortness of breath, palpitations, diaphoresis, abdominal pain, or any other complaints at this time  Prior to Admission Medications   Prescriptions Last Dose Informant Patient Reported?  Taking?   acetaminophen (TYLENOL) 500 MG chewable tablet   No No   Sig: Chew 1 tablet (500 mg total) every 6 (six) hours as needed for mild pain or moderate pain   amLODIPine-benazepril (LOTREL) 10-20 MG per capsule   No Yes   Sig: Take 1 capsule by mouth daily   atorvastatin (LIPITOR) 40 mg tablet   No Yes   Sig: Take 1 tablet (40 mg total) by mouth daily   chlorthalidone 25 mg tablet   No No   Sig: Take 0 5 tablets (12 5 mg total) by mouth daily   cyclobenzaprine (FLEXERIL) 5 mg tablet   No No   Sig: Take 1 tablet (5 mg total) by mouth 3 (three) times a day as needed for muscle spasms   diphenhydrAMINE (BENADRYL ALLERGY) 25 mg tablet  Self Yes No   Sig: Take by mouth   hydrOXYzine HCL (ATARAX) 25 mg tablet  Self No No   Sig: Take 1 tablet by mouth every 6 (six) hours as needed for itching   mineral oil-hydrophilic petrolatum (AQUAPHOR) ointment  Self No No   Sig: Apply topically as needed for dry skin   triamcinolone (KENALOG) 0 1 % cream  Self No No   Sig: Apply topically 2 (two) times a day      Facility-Administered Medications: None       Past Medical History:   Diagnosis Date    Back ache     Cancer ([facility] [address] )     Hypertension        Past Surgical History:   Procedure Laterality Date    APPENDECTOMY      BILATERAL OOPHORECTOMY      BREAST SURGERY      HERNIA REPAIR         Family History   Problem Relation Age of Onset    Hypertension Mother     Heart disease Mother     Diabetes Mother     Cancer Father      I have reviewed and agree with the history as documented  Social History     Tobacco Use    Smoking status: Former Smoker    Smokeless tobacco: Never Used    Tobacco comment: quit 20 years ago   Substance Use Topics    Alcohol use: No    Drug use: No        Review of Systems   Constitutional: Negative for diaphoresis, fever and unexpected weight change  HENT: Negative for congestion, rhinorrhea and sore throat  Eyes: Negative for pain, discharge and visual disturbance  Respiratory: Negative for cough, shortness of breath and wheezing      Cardiovascular: Negative for chest pain, palpitations and leg swelling  Gastrointestinal: Negative for abdominal pain, blood in stool, constipation, diarrhea, nausea and vomiting  Genitourinary: Negative for dysuria, flank pain and hematuria  Musculoskeletal: Positive for back pain  Negative for arthralgias and joint swelling  Skin: Negative for rash and wound  Allergic/Immunologic: Negative for environmental allergies and food allergies  Neurological: Positive for syncope and headaches  Negative for dizziness, seizures, weakness and numbness  Hematological: Negative for adenopathy  Psychiatric/Behavioral: Negative for confusion and hallucinations  Physical Exam  ED Triage Vitals [07/11/19 2227]   Temperature Pulse Respirations Blood Pressure SpO2   98 °F (36 7 °C) 73 22 (!) 184/81 98 %      Temp Source Heart Rate Source Patient Position - Orthostatic VS BP Location FiO2 (%)   Oral Monitor Lying Right arm --      Pain Score       9             Orthostatic Vital Signs  Vitals:    07/11/19 2227 07/11/19 2345   BP: (!) 184/81 168/74   Pulse: 73 70   Patient Position - Orthostatic VS: Lying Lying       Physical Exam   Constitutional: She is oriented to person, place, and time  She appears well-developed and well-nourished  No distress  HENT:   Head: Normocephalic and atraumatic  Right Ear: External ear normal    Left Ear: External ear normal    Eyes: Pupils are equal, round, and reactive to light  Conjunctivae and EOM are normal    Patient reluctant to open eyes for exam due to photophobia  Pupils reactive bilaterally  Extraocular movements intact  Conjunctiva appears normal    Neck: Normal range of motion  Neck supple  No meningismus  Cardiovascular: Normal rate, regular rhythm and normal heart sounds  No murmur heard  Pulmonary/Chest: Effort normal and breath sounds normal  No respiratory distress  She has no wheezes  She has no rales  Abdominal: Soft  Bowel sounds are normal  She exhibits no distension   There is no tenderness  There is no guarding  Musculoskeletal: Normal range of motion  She exhibits tenderness  She exhibits no deformity  Mild tenderness to palpation of left lumbar paraspinal region  Strength is 5/5 and symmetric in the bilateral lower extremities  Sensation grossly intact in the bilateral lower extremities  Neurological: She is alert and oriented to person, place, and time  Patient is alert and oriented to time, person, place, and situation  Speech is fluent with no aphasia or dysarthria  CN II-XII are intact  Strength is 5/5 in the upper and lower extremities bilaterally  Sensation grossly intact  No dysmetria on finger to nose testing  No pronator drift  Skin: Skin is warm and dry  Capillary refill takes less than 2 seconds  Psychiatric: She has a normal mood and affect  Her behavior is normal    Nursing note and vitals reviewed        ED Medications  Medications   lidocaine (LIDODERM) 5 % patch 1 patch (1 patch Topical Medication Applied 7/11/19 2331)   metoclopramide (REGLAN) injection 10 mg (10 mg Intravenous Given 7/11/19 2333)   sodium chloride 0 9 % bolus 1,000 mL (0 mL Intravenous Stopped 7/12/19 0035)   ketorolac (TORADOL) injection 15 mg (15 mg Intravenous Given 7/11/19 2333)   dexamethasone (DECADRON) injection 10 mg (10 mg Intravenous Given 7/11/19 2332)       Diagnostic Studies  Results Reviewed     Procedure Component Value Units Date/Time    Comprehensive metabolic panel [[id number]]  (Abnormal) Collected:  07/11/19 2235    Lab Status:  Final result Specimen:  Blood from Arm, Left Updated:  07/11/19 2331     Sodium 141 mmol/L      Potassium 3 5 mmol/L      Chloride 111 mmol/L      CO2 27 mmol/L      ANION GAP 3 mmol/L      BUN 17 mg/dL      Creatinine 0 84 mg/dL      Glucose 96 mg/dL      Calcium 7 9 mg/dL      AST 23 U/L      ALT 17 U/L      Alkaline Phosphatase 102 U/L      Total Protein 6 9 g/dL      Albumin 2 9 g/dL      Total Bilirubin 0 17 mg/dL      eGFR 76 ml/min/1 73sq m Narrative:       National Kidney Disease Foundation guidelines for Chronic Kidney Disease (CKD):     Stage 1 with normal or high GFR (GFR > 90 mL/min/1 73 square meters)    Stage 2 Mild CKD (GFR = 60-89 mL/min/1 73 square meters)    Stage 3A Moderate CKD (GFR = 45-59 mL/min/1 73 square meters)    Stage 3B Moderate CKD (GFR = 30-44 mL/min/1 73 square meters)    Stage 4 Severe CKD (GFR = 15-29 mL/min/1 73 square meters)    Stage 5 End Stage CKD (GFR <15 mL/min/1 73 square meters)  Note: GFR calculation is accurate only with a steady state creatinine    Troponin I [[id number]]  (Normal) Collected:  07/11/19 2235    Lab Status:  Final result Specimen:  Blood from Arm, Left Updated:  07/11/19 2322     Troponin I <0 02 ng/mL     CBC and differential [[id number]]  (Abnormal) Collected:  07/11/19 2235    Lab Status:  Final result Specimen:  Blood from Arm, Left Updated:  07/11/19 2303     WBC 7 83 Thousand/uL      RBC 3 48 Million/uL      Hemoglobin 10 9 g/dL      Hematocrit 33 6 %      MCV 97 fL      MCH 31 3 pg      MCHC 32 4 g/dL      RDW 13 0 %      MPV 11 2 fL      Platelets 068 Thousands/uL      nRBC 0 /100 WBCs      Neutrophils Relative 50 %      Immat GRANS % 0 %      Lymphocytes Relative 35 %      Monocytes Relative 11 %      Eosinophils Relative 3 %      Basophils Relative 1 %      Neutrophils Absolute 3 93 Thousands/µL      Immature Grans Absolute 0 01 Thousand/uL      Lymphocytes Absolute 2 77 Thousands/µL      Monocytes Absolute 0 84 Thousand/µL      Eosinophils Absolute 0 23 Thousand/µL      Basophils Absolute 0 05 Thousands/µL     Fingerstick Glucose (POCT) [[id number]]  (Normal) Collected:  07/11/19 2229    Lab Status:  Final result Updated:  07/11/19 2230     POC Glucose 117 mg/dl                  XR chest 2 views    (Results Pending)         Procedures  ECG 12 Lead Documentation Only  Date/Time: 7/11/2019 11:30 PM  Performed by: Hudson Lamar MD  Authorized by: Hudson Lamar MD     Patient location:  ED  Previous ECG:     Previous ECG:  Compared to current    Comparison ECG info: May 1, 2018    Similarity:  Changes noted    Comparison to cardiac monitor: Yes    Interpretation:     Interpretation: non-specific    Rate:     ECG rate:  74    ECG rate assessment: normal    Rhythm:     Rhythm: sinus rhythm    Ectopy:     Ectopy: none    QRS:     QRS axis:  Left    QRS intervals:  Normal  Conduction:     Conduction: normal    ST segments:     ST segments:  Normal  T waves:     T waves: inverted      Inverted:  V2 and aVL            ED Course  ED Course as of Jul 12 0214   Thu Jul 11, 2019   2310 POC Glucose: 117   2310 12 4 approximately 1 year ago, no recent baseline  Hemoglobin(!): 10 9   2324 Troponin I: <0 02   Fri Jul 12, 2019   0028 Patient re-evaluated,  She reports a significant improvement in her pain  She is ready for discharge home  MDM  Number of Diagnoses or Management Options  Chronic left-sided low back pain without sciatica: established and worsening  Migraine: established and worsening  Syncope: new and requires workup  Diagnosis management comments:     Patient presented after a syncopal event  Patient was unable to characterize the event and did not recall it completely  She was back to baseline by the time she arrives to the ER  She had no complaints of chest pain, shortness of breath, palpitations, or other symptoms that would indicate a more concerning etiology for the syncopal event  Patient noted headache similar to her usual migraine  No high risk features on history or physical examination to suggest a more serious secondary etiology for the headache  Patient was treated symptomatically with IV Reglan, IV Toradol, and IV Decadron with near resolution of her symptoms  Patient also complained of acute on chronic left lower back pain  She had this pain for many years and was unchanged    No high risk features on history or physical examination to suggest a more serious acute etiology for the back pain such as acute fracture, metastatic neoplasm, epidural hematoma, spinal epidural abscess, or cauda equina syndrome  Back pain was treated with a Lidoderm patch  On reassessment, the patient had improvement in her symptoms  She felt at her baseline  Discussed return precautions for syncope, headache, and back pain with the patient and her daughter at bedside  Both verbalized understanding of return precautions  Amount and/or Complexity of Data Reviewed  Clinical lab tests: ordered and reviewed  Decide to obtain previous medical records or to obtain history from someone other than the patient: yes  Obtain history from someone other than the patient: yes  Review and summarize past medical records: yes  Independent visualization of images, tracings, or specimens: yes    Risk of Complications, Morbidity, and/or Mortality  Presenting problems: moderate  Diagnostic procedures: minimal  Management options: minimal    Patient Progress  Patient progress: improved      Disposition  Final diagnoses:   Syncope   Migraine   Chronic left-sided low back pain without sciatica     Time reflects when diagnosis was documented in both MDM as applicable and the Disposition within this note     Time User Action Codes Description Comment    7/12/2019 12:40 AM Becky Rojas [R55] Syncope     7/12/2019 12:40 AM Becky Rojas [G43 909] Migraine     7/12/2019 12:41 AM Becky Rojas [M54 5,  G89 29] Chronic left-sided low back pain without sciatica       ED Disposition     ED Disposition Condition Date/Time Comment    Discharge Stable Fri Jul 12, 2019 12:40 AM [place] Ha discharge to home/self care  Follow-up Information     Follow up With Specialties Details Why Contact Info Additional [address] Internal Medicine Call  Please follow up with your PCP as needed   Jerrod Walters [address] [facility] [address]  [address] [facility], [address] [place] [address], [place], [place], [place] Emergency Department Emergency Medicine Go to  If symptoms worsen  [address]  [phone]  ED, [address], [address]          Discharge Medication List as of 7/12/2019 12:41 AM      CONTINUE these medications which have NOT CHANGED    Details   amLODIPine-benazepril (LOTREL) 10-20 MG per capsule Take 1 capsule by mouth daily, Starting Wed 5/2/2018, Print      atorvastatin (LIPITOR) 40 mg tablet Take 1 tablet (40 mg total) by mouth daily, Starting Tue 5/1/2018, Normal      acetaminophen (TYLENOL) 500 MG chewable tablet Chew 1 tablet (500 mg total) every 6 (six) hours as needed for mild pain or moderate pain, Starting Tue 4/24/2018, Normal      chlorthalidone 25 mg tablet Take 0 5 tablets (12 5 mg total) by mouth daily, Starting Wed 5/2/2018, Print      cyclobenzaprine (FLEXERIL) 5 mg tablet Take 1 tablet (5 mg total) by mouth 3 (three) times a day as needed for muscle spasms, Starting Tue 4/24/2018, Normal      diphenhydrAMINE (BENADRYL ALLERGY) 25 mg tablet Take by mouth, Starting Fri 10/27/2017, Historical Med      hydrOXYzine HCL (ATARAX) 25 mg tablet Take 1 tablet by mouth every 6 (six) hours as needed for itching, Starting Thu 9/7/2017, Print      mineral oil-hydrophilic petrolatum (AQUAPHOR) ointment Apply topically as needed for dry skin, Starting Thu 9/7/2017, Print      triamcinolone (KENALOG) 0 1 % cream Apply topically 2 (two) times a day, Starting Thu 2/8/2018, Normal           No discharge procedures on file  ED Provider  Attending physically available and evaluated Jaya  I managed the patient along with the ED Attending      Electronically Signed by         Tashi Wild MD  07/12/19 8463

## 2019-07-18 ENCOUNTER — APPOINTMENT (INPATIENT)
Dept: RADIOLOGY | Facility: HOSPITAL | Age: 59
DRG: 305 | End: 2019-07-18
Payer: COMMERCIAL

## 2019-07-18 ENCOUNTER — APPOINTMENT (EMERGENCY)
Dept: RADIOLOGY | Facility: HOSPITAL | Age: 59
DRG: 305 | End: 2019-07-18
Payer: COMMERCIAL

## 2019-07-18 ENCOUNTER — TELEPHONE (OUTPATIENT)
Dept: INTERNAL MEDICINE CLINIC | Facility: CLINIC | Age: 59
End: 2019-07-18

## 2019-07-18 ENCOUNTER — HOSPITAL ENCOUNTER (INPATIENT)
Facility: HOSPITAL | Age: 59
LOS: 2 days | Discharge: HOME | DRG: 305 | End: 2019-07-20
Attending: EMERGENCY MEDICINE | Admitting: INTERNAL MEDICINE
Payer: COMMERCIAL

## 2019-07-18 DIAGNOSIS — I16.1 HYPERTENSIVE EMERGENCY: ICD-10-CM

## 2019-07-18 DIAGNOSIS — G45.9 TIA (TRANSIENT ISCHEMIC ATTACK): ICD-10-CM

## 2019-07-18 DIAGNOSIS — R29.90 STROKE-LIKE SYMPTOMS: Primary | ICD-10-CM

## 2019-07-18 DIAGNOSIS — I10 HYPERTENSION: ICD-10-CM

## 2019-07-18 PROBLEM — R91.1 INCIDENTAL PULMONARY NODULE: Status: ACTIVE | Noted: 2019-07-18

## 2019-07-18 LAB
ALBUMIN SERPL BCP-MCNC: 3.4 G/DL (ref 3.5–5)
ALP SERPL-CCNC: 130 U/L (ref 46–116)
ALT SERPL W P-5'-P-CCNC: 24 U/L (ref 12–78)
ANION GAP SERPL CALCULATED.3IONS-SCNC: 5 MMOL/L (ref 4–13)
APTT PPP: 30 SECONDS (ref 23–37)
AST SERPL W P-5'-P-CCNC: 19 U/L (ref 5–45)
ATRIAL RATE: 71 BPM
BASOPHILS # BLD AUTO: 0.05 THOUSANDS/ΜL (ref 0–0.1)
BASOPHILS NFR BLD AUTO: 1 % (ref 0–1)
BILIRUB SERPL-MCNC: 0.39 MG/DL (ref 0.2–1)
BUN SERPL-MCNC: 13 MG/DL (ref 5–25)
CALCIUM SERPL-MCNC: 9.3 MG/DL (ref 8.3–10.1)
CHLORIDE SERPL-SCNC: 103 MMOL/L (ref 100–108)
CO2 SERPL-SCNC: 27 MMOL/L (ref 21–32)
CREAT SERPL-MCNC: 0.85 MG/DL (ref 0.6–1.3)
EOSINOPHIL # BLD AUTO: 0.18 THOUSAND/ΜL (ref 0–0.61)
EOSINOPHIL NFR BLD AUTO: 2 % (ref 0–6)
ERYTHROCYTE [DISTWIDTH] IN BLOOD BY AUTOMATED COUNT: 12.8 % (ref 11.6–15.1)
GFR SERPL CREATININE-BSD FRML MDRD: 75 ML/MIN/1.73SQ M
GLUCOSE SERPL-MCNC: 93 MG/DL (ref 65–140)
HCT VFR BLD AUTO: 38.4 % (ref 34.8–46.1)
HGB BLD-MCNC: 12.9 G/DL (ref 11.5–15.4)
IMM GRANULOCYTES # BLD AUTO: 0.04 THOUSAND/UL (ref 0–0.2)
IMM GRANULOCYTES NFR BLD AUTO: 1 % (ref 0–2)
INR PPP: 1.08 (ref 0.84–1.19)
LYMPHOCYTES # BLD AUTO: 1.9 THOUSANDS/ΜL (ref 0.6–4.47)
LYMPHOCYTES NFR BLD AUTO: 22 % (ref 14–44)
MCH RBC QN AUTO: 31.9 PG (ref 26.8–34.3)
MCHC RBC AUTO-ENTMCNC: 33.6 G/DL (ref 31.4–37.4)
MCV RBC AUTO: 95 FL (ref 82–98)
MONOCYTES # BLD AUTO: 0.95 THOUSAND/ΜL (ref 0.17–1.22)
MONOCYTES NFR BLD AUTO: 11 % (ref 4–12)
NEUTROPHILS # BLD AUTO: 5.54 THOUSANDS/ΜL (ref 1.85–7.62)
NEUTS SEG NFR BLD AUTO: 63 % (ref 43–75)
NRBC BLD AUTO-RTO: 0 /100 WBCS
P AXIS: 57 DEGREES
PLATELET # BLD AUTO: 264 THOUSANDS/UL (ref 149–390)
PMV BLD AUTO: 11 FL (ref 8.9–12.7)
POTASSIUM SERPL-SCNC: 3.5 MMOL/L (ref 3.5–5.3)
PR INTERVAL: 174 MS
PROT SERPL-MCNC: 8.2 G/DL (ref 6.4–8.2)
PROTHROMBIN TIME: 13.6 SECONDS (ref 11.6–14.5)
QRS AXIS: 45 DEGREES
QRSD INTERVAL: 100 MS
QT INTERVAL: 394 MS
QTC INTERVAL: 428 MS
RBC # BLD AUTO: 4.04 MILLION/UL (ref 3.81–5.12)
SODIUM SERPL-SCNC: 135 MMOL/L (ref 136–145)
T WAVE AXIS: 31 DEGREES
VENTRICULAR RATE: 71 BPM
WBC # BLD AUTO: 8.66 THOUSAND/UL (ref 4.31–10.16)

## 2019-07-18 PROCEDURE — 85610 PROTHROMBIN TIME: CPT | Performed by: EMERGENCY MEDICINE

## 2019-07-18 PROCEDURE — 80053 COMPREHEN METABOLIC PANEL: CPT | Performed by: EMERGENCY MEDICINE

## 2019-07-18 PROCEDURE — 96374 THER/PROPH/DIAG INJ IV PUSH: CPT

## 2019-07-18 PROCEDURE — 70496 CT ANGIOGRAPHY HEAD: CPT

## 2019-07-18 PROCEDURE — 99284 EMERGENCY DEPT VISIT MOD MDM: CPT | Performed by: EMERGENCY MEDICINE

## 2019-07-18 PROCEDURE — 70551 MRI BRAIN STEM W/O DYE: CPT

## 2019-07-18 PROCEDURE — 93005 ELECTROCARDIOGRAM TRACING: CPT

## 2019-07-18 PROCEDURE — 36415 COLL VENOUS BLD VENIPUNCTURE: CPT | Performed by: EMERGENCY MEDICINE

## 2019-07-18 PROCEDURE — 85025 COMPLETE CBC W/AUTO DIFF WBC: CPT | Performed by: EMERGENCY MEDICINE

## 2019-07-18 PROCEDURE — 93010 ELECTROCARDIOGRAM REPORT: CPT | Performed by: INTERNAL MEDICINE

## 2019-07-18 PROCEDURE — 70498 CT ANGIOGRAPHY NECK: CPT

## 2019-07-18 PROCEDURE — 99285 EMERGENCY DEPT VISIT HI MDM: CPT

## 2019-07-18 PROCEDURE — 85730 THROMBOPLASTIN TIME PARTIAL: CPT | Performed by: EMERGENCY MEDICINE

## 2019-07-18 RX ORDER — LABETALOL 20 MG/4 ML (5 MG/ML) INTRAVENOUS SYRINGE
5 EVERY 4 HOURS PRN
Status: DISCONTINUED | OUTPATIENT
Start: 2019-07-18 | End: 2019-07-20 | Stop reason: HOSPADM

## 2019-07-18 RX ORDER — LABETALOL 20 MG/4 ML (5 MG/ML) INTRAVENOUS SYRINGE
10 ONCE
Status: COMPLETED | OUTPATIENT
Start: 2019-07-18 | End: 2019-07-18

## 2019-07-18 RX ORDER — ASPIRIN 81 MG/1
81 TABLET ORAL DAILY
Status: DISCONTINUED | OUTPATIENT
Start: 2019-07-19 | End: 2019-07-20 | Stop reason: HOSPADM

## 2019-07-18 RX ORDER — AMLODIPINE BESYLATE 10 MG/1
10 TABLET ORAL DAILY
Status: DISCONTINUED | OUTPATIENT
Start: 2019-07-19 | End: 2019-07-18

## 2019-07-18 RX ORDER — ASPIRIN 325 MG
325 TABLET ORAL ONCE
Status: COMPLETED | OUTPATIENT
Start: 2019-07-18 | End: 2019-07-18

## 2019-07-18 RX ORDER — ATORVASTATIN CALCIUM 40 MG/1
40 TABLET, FILM COATED ORAL EVERY EVENING
Status: DISCONTINUED | OUTPATIENT
Start: 2019-07-18 | End: 2019-07-20 | Stop reason: HOSPADM

## 2019-07-18 RX ORDER — LISINOPRIL 20 MG/1
20 TABLET ORAL DAILY
Status: DISCONTINUED | OUTPATIENT
Start: 2019-07-19 | End: 2019-07-18

## 2019-07-18 RX ORDER — HEPARIN SODIUM 5000 [USP'U]/ML
5000 INJECTION, SOLUTION INTRAVENOUS; SUBCUTANEOUS EVERY 8 HOURS SCHEDULED
Status: DISCONTINUED | OUTPATIENT
Start: 2019-07-18 | End: 2019-07-20 | Stop reason: HOSPADM

## 2019-07-18 RX ORDER — AMLODIPINE BESYLATE 10 MG/1
10 TABLET ORAL DAILY
Status: DISCONTINUED | OUTPATIENT
Start: 2019-07-19 | End: 2019-07-20 | Stop reason: HOSPADM

## 2019-07-18 RX ORDER — AMLODIPINE BESYLATE 10 MG/1
10 TABLET ORAL DAILY
Status: ON HOLD | COMMUNITY
End: 2019-07-19

## 2019-07-18 RX ORDER — ACETAMINOPHEN 325 MG/1
650 TABLET ORAL EVERY 6 HOURS PRN
Status: DISCONTINUED | OUTPATIENT
Start: 2019-07-18 | End: 2019-07-20 | Stop reason: HOSPADM

## 2019-07-18 RX ADMIN — LABETALOL 20 MG/4 ML (5 MG/ML) INTRAVENOUS SYRINGE 10 MG: at 12:59

## 2019-07-18 RX ADMIN — ATORVASTATIN CALCIUM 40 MG: 40 TABLET, FILM COATED ORAL at 21:07

## 2019-07-18 RX ADMIN — ASPIRIN 325 MG ORAL TABLET 325 MG: 325 PILL ORAL at 21:06

## 2019-07-18 RX ADMIN — HEPARIN SODIUM 5000 UNITS: 5000 INJECTION INTRAVENOUS; SUBCUTANEOUS at 21:06

## 2019-07-18 RX ADMIN — IOHEXOL 85 ML: 350 INJECTION, SOLUTION INTRAVENOUS at 14:29

## 2019-07-18 RX ADMIN — ACETAMINOPHEN 650 MG: 325 TABLET ORAL at 21:06

## 2019-07-18 NOTE — SOCIAL WORK
CM consulted to meet with Pt and daughter as Pt will be admitted to hospital and her  needs 24/7 care  Pt's  has an aide until 1500  CM met with Pt and daughter, at bedside  Pt Chinese speaking only  Daughter provided translation  Daughter reported she has been trying to reach the agency and  but no one has called her back  Alex Hilario number is [phone]  CM contacted Nathalie Shanks who reported she contacted the aide agency is attempting to have them come to Pt's house for emergency services  Konrad Watkins reported she has already spoke to Pt and daughter  Konrad June requesting update on Pt's D/C  CM met with Pt and daughter who reported they did speak with Nathalie Shanks and are aware of the plan

## 2019-07-18 NOTE — TELEPHONE ENCOUNTER
SPOKE TO PTS  AID MAURISIO, SHE STATED HER BP TODAY /159, SHE IS C/O HA, HEAD PRESSURE, DIZZINESS, BLURRY VISION AND SHE FEELS HOT  THIS ALL STARTED THIS A M  AND SHE STATED SHE DID TAKE HER BP MEDS TODAY BUT THIS ALL HAPPENED AFTER  I ADVISED HER AID THAT SHE NEEDS TO GO TO THE ER NOW AS SHE IS AT RISK FOR STROKE WITH THE ELEVATED BP  SHE ASKED IF THERE WAS AN URGENT CARE CLOSE BY AND I ADVISED SHE SHOULD NOT GO TO URGENT CARE BUT DIRECTLY TO [facility] [address]  SHE VERBALIZED UNDERSTANDING

## 2019-07-18 NOTE — H&P
INTERNAL MEDICINE RESIDENCY ADMISSION H&P     Name: Angeli Diaz   Age & Sex: 60 y o  female   MRN: [id number]  Unit/Bed#: CW2 212-01   Encounter: [id number]  Primary Care Provider: Shelby Villegas DO    Code Status: Level 3 - DNAR and DNI  Admission Status: INPATIENT   Disposition: Patient requires Med/Surg with Telemetry    ASSESSMENT/PLAN     Principal Problem:    Stroke-like symptoms  Active Problems:    Hypertensive emergency    Incidental pulmonary nodule    * Stroke-like symptoms  Assessment & Plan  Reported generalized weakness but R worse than left, slurred speech, numbness along R face, R arm, R leg  Present at 7am this morning with occipital headache, improved with labetalol in ED, per pt  Will continue with permissive hypertension given stroke like symptoms with labetalol 5mg q4h PRN SBP >200  Will restart home lotrel (amlodipine-benazepril 10-20) tomorrow AM  Pt reports taking 10mg amlodipine this AM   Admitted via stroke pathway,  CTA without acute intracranial abnormality  Will obtain lipid panel, Hgb A1c  Echo cardiogram  Monitor on tele  Neurology consult  Aspirin 325 to be given now  Aspirin 81mg daily tomorrow  MRI ordered  Incidental pulmonary nodule  Assessment & Plan  3 mm pulmonary nodule noted on CT  20 pack year history given 30 years smoking with 6-7 cigarettes per day, quit 15 years ago  Pt will need 12 month follow-up non-contrast chest CT, will need to discuss with pt  Hypertensive emergency  Assessment & Plan  BP elevated on admission   Decreased to 166 SBP in the ED with 10mg Labetalol  Pt stated that she takes amlodipine 10mg at home, however this has not been filled since 2/2018  Suspect patient is noncompliant with medications  Pt was previously prescribed Lotrel (combination benazepril-amlodipine) per chart review to improve compliance    Will restart amlodipine 10mg tomorrow,   Can consider restarting lisinopril 20mg tomorrow AM depending on blood pressures, unclear chronic BP if pt has not been taking medications, goal -200 as pt presented with  and will allow for permissive hypertension in the setting of stroke like symptoms  Labetalol PRN >200  Pt reports no other medications, pt stated her BP was normal at most recent clinic and that all medications were stopped besides amlodipine  VTE Pharmacologic Prophylaxis: Heparin  VTE Mechanical Prophylaxis: sequential compression device    CHIEF COMPLAINT     Chief Complaint   Patient presents with    Hypertension     pt started with htn this morning at home, now c/o severe posterior headache, R sided weakness, blurry vision, tingling in b/l hands  denies n/v  states to be taking her bp meds as prescribed      HISTORY OF PRESENT ILLNESS     62-year-old Liberian-speaking female with past medical history significant for breast cancer 18 years ago (s/p resection, chemo, radiation), history of stroke 4 years ago in [facility], hypertension, who presented to the ED today with elevated blood pressure and stroke-like symptoms  Patient reported that her symptoms started around 7:00 a m  with right-sided numbness and tingling in her face arm and leg in addition to slurred speech and generalized joint pains bilaterally  Patient reported generalized weakness but stated it was worse on the right  Patient additionally had a headache at the back of her head that was different from her chronic migraines; patient checked her blood pressure and noticed it was elevated  When symptoms persisted for several hours, patient came into the ED for further evaluation around noon  Patient reports that she had a similar episode diagnosed with either a stroke or a mini stroke 4 years ago in [facility] in which she experienced left-sided weakness and numbness and tingling  Patient states she was discharged from the hospital after 2 days but required therapy to improve her weakness   Patient reported that all of her left-sided symptoms resolved after that episode and that she was also discharged with medications at that time for treatment  Pt reports she smoked for 30 years, quit 15 years ago, smoked 6-7 cigarettes per day  In the ED, temperature 97 9, pulse 67, respirations 20, blood pressure 236/92, SpO2 98% on room air  Patient received 10 mg of labetalol with improvement in her blood pressure to 184 systolic and reported improvement in all of her symptoms  CTA was negative for acute abnormality  At time of evaluation, pt continued to report mild right sided weakness, numbness and tingling in bilateral hands, worse on R, occipital headache  Per [facility] [address], pt last filled medications in 2018 - did not fill any medications in 2019:   atorvastatin 40mg 30 tablets, losartan 20mg 60 tablets  2018 amlodipine 10mg 30 tablets  Pt reports no other pharmacies  REVIEW OF SYSTEMS     Review of Systems   Constitutional: Negative for chills, fatigue and fever  Respiratory: Negative for chest tightness, shortness of breath and wheezing  Cardiovascular: Negative for chest pain, palpitations and leg swelling  Gastrointestinal: Negative for diarrhea, nausea and vomiting  Genitourinary: Negative for hematuria  Musculoskeletal: Positive for arthralgias  Neurological: Positive for weakness, numbness and headaches (occipital HA)  Negative for dizziness       OBJECTIVE     Vitals:    19 1416 19 1602 19 1700 19 1802   BP: 166/70 (!) 175/76  (!) 178/88   BP Location:  Right arm     Pulse: 68 84     Resp: 20 17  18   Temp:    98 5 °F (36 9 °C)   TempSrc:       SpO2: 98% 93%     Weight:   79 4 kg (175 lb)    Height:   5' 4" (1 626 m)       Temperature:   Temp (24hrs), Av 2 °F (36 8 °C), Min:97 9 °F (36 6 °C), Max:98 5 °F (36 9 °C)    Temperature: 98 5 °F (36 9 °C)  Intake & Output:  I/O     None        Weights:   IBW: 54 7 kg    Body mass index is 30 04 kg/m²  Weight (last 2 days)     Date/Time   Weight    07/18/19 1700   79 4 (175)    07/18/19 1238   78 5 (173 06)    07/18/19 1209   76 8 (169 31)            Physical Exam   Constitutional: She is oriented to person, place, and time  She appears well-developed and well-nourished  HENT:   Head: Normocephalic and atraumatic  Nose: Nose normal    Mouth/Throat: Oropharynx is clear and moist    Eyes: Right eye exhibits no discharge  Left eye exhibits no discharge  Cardiovascular: Normal rate, regular rhythm and normal heart sounds  Exam reveals no gallop and no friction rub  No murmur heard  Pulmonary/Chest: Effort normal and breath sounds normal  No respiratory distress  She has no wheezes  She has no rales  Abdominal: Soft  Bowel sounds are normal  She exhibits no distension  There is no tenderness  There is no guarding  Neurological: She is alert and oriented to person, place, and time  CN2-12 intact besides some R facial numbness in V2,V3 distribution  Difficulty with finger to nose using RUE  Muscle strength appears 5/5 bilateral upper and lower extremities with 4+/5 for right  strength, 5/5 L  strength  Numbness to dorsal and palmar R hand  Skin: Skin is warm and dry     Psychiatric: Her speech is normal      PAST MEDICAL HISTORY     Past Medical History:   Diagnosis Date    Back ache     Cancer ([address] )     Hypertension      PAST SURGICAL HISTORY     Past Surgical History:   Procedure Laterality Date    APPENDECTOMY      BILATERAL OOPHORECTOMY      BREAST SURGERY      HERNIA REPAIR       SOCIAL & FAMILY HISTORY     Social History     Substance and Sexual Activity   Alcohol Use No     Substance and Sexual Activity   Alcohol Use No        Substance and Sexual Activity   Drug Use No     Social History     Tobacco Use   Smoking Status Former Smoker    Packs/day: 0 65    Years: 30 00    Pack years: 19 50   Smokeless Tobacco Never Used   Tobacco Comment    quit 15 years ago Family History   Problem Relation Age of Onset    Hypertension Mother     Heart disease Mother     Diabetes Mother     Cancer Father      LABORATORY DATA     Labs: I have personally reviewed pertinent reports  Results from last 7 days   Lab Units 07/18/19  1258 07/11/19  2235   WBC Thousand/uL 8 66 7 83   HEMOGLOBIN g/dL 12 9 10 9*   HEMATOCRIT % 38 4 33 6*   PLATELETS Thousands/uL 264 250   NEUTROS PCT % 63 50   MONOS PCT % 11 11      Results from last 7 days   Lab Units 07/18/19  1258 07/11/19  2235   POTASSIUM mmol/L 3 5 3 5   CHLORIDE mmol/L 103 111*   CO2 mmol/L 27 27   BUN mg/dL 13 17   CREATININE mg/dL 0 85 0 84   CALCIUM mg/dL 9 3 7 9*   ALK PHOS U/L 130* 102   ALT U/L 24 17   AST U/L 19 23              Results from last 7 days   Lab Units 07/18/19  1351   INR  1 08   PTT seconds 30         Results from last 7 days   Lab Units 07/11/19  2235   TROPONIN I ng/mL <0 02     Micro:  No results found for: Jn Dupree, WOUNDCULT, SPUTUMCULTUR  IMAGING & DIAGNOSTIC TESTS     Imaging: I have personally reviewed pertinent reports  Cta Head And Neck With And Without Contrast    Result Date: 7/18/2019  Impression: 1  No acute intracranial CT abnormality  2   Patent major vasculature of [place] without critical stenosis  3   Mild atherosclerotic disease of cervical carotid bifurcation and proximal cervical internal carotid arteries without significant stenosis 4  Incidentally noted right upper lobe 3 mm nodule  Based on current Fleischner Society 2017 Guidelines on incidental pulmonary nodule, no routine follow-up is needed for this nodule if the patient is considered low risk for lung cancer  If the patient is considered high risk for lung cancer, and if no prior outside chest CT available to assess interval change, 12 month follow-up non-contrast chest CT is recommended    Workstation performed: [id number]     EKG, Pathology, and Other Studies: I have personally reviewed pertinent reports  ALLERGIES   No Known Allergies  MEDICATIONS PRIOR TO ARRIVAL     Prior to Admission medications    Medication Sig Start Date End Date Taking?  Authorizing Provider   amLODIPine (NORVASC) 10 mg tablet Take 10 mg by mouth daily   Yes Historical Provider, MD   acetaminophen (TYLENOL) 500 MG chewable tablet Chew 1 tablet (500 mg total) every 6 (six) hours as needed for mild pain or moderate pain  Patient not taking: Reported on 7/18/2019 4/24/18   Connor Botello MD   amLODIPine-benazepril (LOTREL) 10-20 MG per capsule Take 1 capsule by mouth daily  Patient not taking: Reported on 7/18/2019 5/2/18   Wendy Chirinos MD   atorvastatin (LIPITOR) 40 mg tablet Take 1 tablet (40 mg total) by mouth daily  Patient not taking: Reported on 7/18/2019 5/1/18   HUONG Tovar   chlorthalidone 25 mg tablet Take 0 5 tablets (12 5 mg total) by mouth daily  Patient not taking: Reported on 7/18/2019 5/2/18   Wendy Chirinos MD   cyclobenzaprine (FLEXERIL) 5 mg tablet Take 1 tablet (5 mg total) by mouth 3 (three) times a day as needed for muscle spasms  Patient not taking: Reported on 7/18/2019 4/24/18   Connor Botello MD   diphenhydrAMINE (BENADRYL ALLERGY) 25 mg tablet Take by mouth 10/27/17   Historical Provider, MD   hydrOXYzine HCL (ATARAX) 25 mg tablet Take 1 tablet by mouth every 6 (six) hours as needed for itching  Patient not taking: Reported on 7/18/2019 9/7/17   Hali Garcia MD   mineral oil-hydrophilic petrolatum (AQUAPHOR) ointment Apply topically as needed for dry skin  Patient not taking: Reported on 7/18/2019 9/7/17   Hali Garcia MD   triamcinolone (KENALOG) 0 1 % cream Apply topically 2 (two) times a day  Patient not taking: Reported on 7/18/2019 2/8/18   Connor Botello MD     MEDICATIONS ADMINISTERED IN LAST 24 HOURS     Medication Administration - last 24 hours from 07/17/2019 1837 to 07/18/2019 1837       Date/Time Order Dose Route Action Action by     07/18/2019 1259 Labetalol HCl (NORMODYNE) injection 10 mg 10 mg Intravenous Given Joaquin Iyer RN     07/18/2019 1429 iohexol (OMNIPAQUE) 350 MG/ML injection (MULTI-DOSE) 85 mL 85 mL Intravenous Given Melissa Radha        CURRENT MEDICATIONS       Current Facility-Administered Medications:  acetaminophen 650 mg Oral Q6H PRN Aleshia Chamakkala, DO   [START ON 7/19/2019] lisinopril 20 mg Oral Daily Aleshia Chamakkala, DO   And       [START ON 7/19/2019] amLODIPine 10 mg Oral Daily Aleshia Chamakkala, DO   [START ON 7/19/2019] aspirin 81 mg Oral Daily Aleshia Chamakkala, DO   aspirin 325 mg Oral Once Aleshia Chamakkala, DO   atorvastatin 40 mg Oral QPM Aleshia Chamakkala, DO   heparin (porcine) 5,000 Units Subcutaneous Q8H [facility] Aleshia Chamakkala, DO   Labetalol HCl 5 mg Intravenous Q4H PRN Aleshia Chamakkala, DO          acetaminophen 650 mg Q6H PRN   Labetalol HCl 5 mg Q4H PRN       Admission Time  I spent 30 minutes admitting the patient  This involved direct patient contact where I performed a full history and physical, reviewing previous records, and reviewing laboratory and other diagnostic studies  Portions of the record may have been created with voice recognition software  Occasional wrong word or "sound a like" substitutions may have occurred due to the inherent limitations of voice recognition software    Read the chart carefully and recognize, using context, where substitutions have occurred     ==  Brice Beltre,   [address]  Internal Medicine Residency PGY-2

## 2019-07-18 NOTE — ED PROVIDER NOTES
History  Chief Complaint   Patient presents with    Hypertension     pt started with htn this morning at home, now c/o severe posterior headache, R sided weakness, blurry vision, tingling in b/l hands  denies n/v  states to be taking her bp meds as prescribed     HPI  Patient is a 79-year-old female with a past medical history of high blood pressure and she states a small stroke 12 years ago that affected her right side  She states that that time she did not have any residual problems  Patient states that this morning at approximately 7:00 a m  She started with the sensation of a headache especially in the occipital area right-sided weakness of her hand and arm blurry vision tingling in both hands and a sensation of numbness over the right hand and face  She denies any nausea or vomiting she said she took her prescribed medication for her high blood pressure at that time  Patient does feel that she had some slurred speech at that time but now it has improved she also feels that the weakness in her hand has improved she feel that there still numbness in that area and she feels weak all over  Patient denies chest pain shortness of breath abdominal pain fevers chills cough weight loss  MDM:  Will do CT with and without contrast of head and neck will treat with labetalol will check labs were I have already tried to offer the patient admission to the hospital and it is questionable whether she will stay I am hoping I can convince her to do that  Prior to Admission Medications   Prescriptions Last Dose Informant Patient Reported?  Taking?   acetaminophen (TYLENOL) 500 MG chewable tablet   No No   Sig: Chew 1 tablet (500 mg total) every 6 (six) hours as needed for mild pain or moderate pain   amLODIPine-benazepril (LOTREL) 10-20 MG per capsule   No No   Sig: Take 1 capsule by mouth daily   atorvastatin (LIPITOR) 40 mg tablet   No No   Sig: Take 1 tablet (40 mg total) by mouth daily   chlorthalidone 25 mg tablet   No No   Sig: Take 0 5 tablets (12 5 mg total) by mouth daily   cyclobenzaprine (FLEXERIL) 5 mg tablet   No No   Sig: Take 1 tablet (5 mg total) by mouth 3 (three) times a day as needed for muscle spasms   diphenhydrAMINE (BENADRYL ALLERGY) 25 mg tablet  Self Yes No   Sig: Take by mouth   hydrOXYzine HCL (ATARAX) 25 mg tablet  Self No No   Sig: Take 1 tablet by mouth every 6 (six) hours as needed for itching   mineral oil-hydrophilic petrolatum (AQUAPHOR) ointment  Self No No   Sig: Apply topically as needed for dry skin   triamcinolone (KENALOG) 0 1 % cream  Self No No   Sig: Apply topically 2 (two) times a day      Facility-Administered Medications: None       Past Medical History:   Diagnosis Date    Back ache     Cancer [facility] [address]     Hypertension        Past Surgical History:   Procedure Laterality Date    APPENDECTOMY      BILATERAL OOPHORECTOMY      BREAST SURGERY      HERNIA REPAIR         Family History   Problem Relation Age of Onset    Hypertension Mother     Heart disease Mother     Diabetes Mother     Cancer Father      I have reviewed and agree with the history as documented  Social History     Tobacco Use    Smoking status: Former Smoker    Smokeless tobacco: Never Used    Tobacco comment: quit 20 years ago   Substance Use Topics    Alcohol use: No    Drug use: No        Review of Systems   Constitutional: Positive for fatigue  Negative for activity change, appetite change and fever  HENT: Negative for congestion, sinus pressure and sinus pain  Eyes: Positive for visual disturbance  Negative for photophobia and discharge  Respiratory: Negative for chest tightness and shortness of breath  Cardiovascular: Negative for chest pain  Gastrointestinal: Negative for abdominal distention, abdominal pain, constipation, diarrhea and nausea  Genitourinary: Negative for difficulty urinating  Musculoskeletal: Negative for arthralgias and back pain     Neurological: Positive for speech difficulty, weakness, numbness and headaches  Negative for facial asymmetry  Weakness and speech symptoms have resolved  Psychiatric/Behavioral: Negative for agitation  All other systems reviewed and are negative  Physical Exam  Physical Exam   Constitutional: She is oriented to person, place, and time  She appears well-developed and well-nourished  HENT:   Head: Normocephalic and atraumatic  Eyes: Pupils are equal, round, and reactive to light  EOM are normal    Neck: Normal range of motion  Neck supple  Cardiovascular: Normal rate, regular rhythm, normal heart sounds and intact distal pulses  Pulmonary/Chest: Effort normal and breath sounds normal  No respiratory distress  Abdominal: Soft  Bowel sounds are normal  She exhibits no distension  There is no tenderness  Musculoskeletal: Normal range of motion  She exhibits no edema or deformity  Neurological: She is alert and oriented to person, place, and time  She displays normal reflexes  A cranial nerve deficit and sensory deficit is present  She exhibits normal muscle tone  Coordination normal    Patient has some numbness over the 5th cranial nerve in all distributions  To light touch on the right   Skin: Skin is warm and dry  No rash noted  She is not diaphoretic         Vital Signs  ED Triage Vitals   Temperature Pulse Respirations Blood Pressure SpO2   07/18/19 1209 07/18/19 1209 07/18/19 1209 07/18/19 1225 07/18/19 1209   97 9 °F (36 6 °C) 67 20 (!) 236/92 98 %      Temp Source Heart Rate Source Patient Position - Orthostatic VS BP Location FiO2 (%)   07/18/19 1209 07/18/19 1209 07/18/19 1209 07/18/19 1209 --   Oral Monitor Lying Right arm       Pain Score       07/18/19 1209       Worst Possible Pain           Vitals:    07/18/19 1209 07/18/19 1225 07/18/19 1244   BP:  (!) 236/92 (!) 138/102   Pulse: 67  74   Patient Position - Orthostatic VS: Lying  Lying         Visual Acuity      ED Medications  Medications   Labetalol HCl (NORMODYNE) injection 10 mg (10 mg Intravenous Given 7/18/19 1259)       Diagnostic Studies  Results Reviewed     Procedure Component Value Units Date/Time    Protime-INR [[id number]] Updated:  07/18/19 1324    Lab Status:  No result Specimen:  Blood from Arm, Right     APTT [[id number]] Updated:  07/18/19 1324    Lab Status:  No result Specimen:  Blood from Arm, Right     CBC and differential [[id number]] Collected:  07/18/19 1258    Lab Status:  Final result Specimen:  Blood from Arm, Right Updated:  07/18/19 1308     WBC 8 66 Thousand/uL      RBC 4 04 Million/uL      Hemoglobin 12 9 g/dL      Hematocrit 38 4 %      MCV 95 fL      MCH 31 9 pg      MCHC 33 6 g/dL      RDW 12 8 %      MPV 11 0 fL      Platelets 122 Thousands/uL      nRBC 0 /100 WBCs      Neutrophils Relative 63 %      Immat GRANS % 1 %      Lymphocytes Relative 22 %      Monocytes Relative 11 %      Eosinophils Relative 2 %      Basophils Relative 1 %      Neutrophils Absolute 5 54 Thousands/µL      Immature Grans Absolute 0 04 Thousand/uL      Lymphocytes Absolute 1 90 Thousands/µL      Monocytes Absolute 0 95 Thousand/µL      Eosinophils Absolute 0 18 Thousand/µL      Basophils Absolute 0 05 Thousands/µL     Comprehensive metabolic panel [[id number]] Collected:  07/18/19 1258    Lab Status: In process Specimen:  Blood from Arm, Right Updated:  07/18/19 1304    POCT urinalysis dipstick [[id number]]     Lab Status:  No result Specimen:  Urine                  CTA head and neck with and without contrast    (Results Pending)              Procedures  ECG 12 Lead Documentation Only  Date/Time: 7/18/2019 2:38 PM  Performed by: Caryn Kirkpatrick MD  Authorized by:  Caryn Kirkpatrick MD     ECG reviewed by me, the ED Provider: yes    Patient location:  ED  Previous ECG:     Previous ECG:  Compared to current    Similarity:  No change  Interpretation:     Interpretation: normal             ED Course                               MDM    Disposition  Final diagnoses:   None     ED Disposition     None      Follow-up Information    None         Patient's Medications   Discharge Prescriptions    No medications on file     No discharge procedures on file      ED Provider  Electronically Signed by           Darrel Delcid MD  07/18/19 6677

## 2019-07-18 NOTE — SOCIAL WORK
CM met with Pt and daughter, Jimmy Marroquin, to discuss CM role at d/c  Jimmy Marroquin translated  Pt resides in a town home with her   Her  has aide services during the day while Pt is at work  Pt is IPTA, does not use DME, and relies on Jimmy Marroquin or the aide for transportation  No MH dx or drug/etoh tx  Pt [facility] the [facility]  [facility] [address]  No POA reported  Jimmy Marroquin reported the aide agency will be able to have an aide stay with Pt's  tonight and until she gets out of hospital      CM reviewed d/c planning process including the following: identifying help at home, patient preference for d/c planning needs, Discharge Lounge, [facility] Meds to Bed program, availability of treatment team to discuss questions or concerns patient and/or family may have regarding understanding medications and recognizing signs and symptoms once discharged  CM also encouraged patient to follow up with all recommended appointments after discharge  Patient advised of importance for patient and family to participate in managing patients medical well being

## 2019-07-18 NOTE — NURSING NOTE
Patient ordered [facility]  Spoke with Lesia Rasmussen charge RN on P7 and is unable to come down at this time  Also spoke with [facility] charge RN on [facility] and is also unable to come down to complete baseline NIH  Will attempt again later

## 2019-07-18 NOTE — ASSESSMENT & PLAN NOTE
MRI brain with extensive periventricular and subcortical white matter lesions possibly recommending micro angiopathy disease, vasculitis or demyelinating process    MRI C spine and C-spine without demyelinating disease  Echo benign  Hyperlipidemia lipid panel  Hemoglobin A1c 5 5  Plan:  Neurology considering possible rheumatologic cause of symptoms  Follow-up serologies as outpatient  Recommend referral to Rheumatology  Discharged on high-intensity statin  Goal normotension with amlodipine and losartan

## 2019-07-18 NOTE — ASSESSMENT & PLAN NOTE
3 mm pulmonary nodule noted on CT  20 pack year history given 30 years smoking with 6-7 cigarettes per day, quit 15 years ago  Pt will need 12 month follow-up non-contrast chest CT, will need to discuss with pt

## 2019-07-18 NOTE — ASSESSMENT & PLAN NOTE
BP elevated on admission   Decreased to 166 SBP in the ED with 10mg Labetalol  Pt stated that she takes amlodipine 10mg at home, however this has not been filled since 2/2018  Suspect patient is noncompliant with medications  Pt was previously prescribed Lotrel (combination benazepril-amlodipine) per chart review to improve compliance  Plan  Continue amlodipine, losartan 25 mg on discharge  Outpatient follow-up with PCP

## 2019-07-19 ENCOUNTER — APPOINTMENT (INPATIENT)
Dept: RADIOLOGY | Facility: HOSPITAL | Age: 59
DRG: 305 | End: 2019-07-19
Payer: COMMERCIAL

## 2019-07-19 ENCOUNTER — APPOINTMENT (INPATIENT)
Dept: NON INVASIVE DIAGNOSTICS | Facility: HOSPITAL | Age: 59
DRG: 305 | End: 2019-07-19
Payer: COMMERCIAL

## 2019-07-19 PROBLEM — I16.1 HYPERTENSIVE EMERGENCY: Status: RESOLVED | Noted: 2019-07-18 | Resolved: 2019-07-19

## 2019-07-19 PROBLEM — R29.90 STROKE-LIKE SYMPTOMS: Status: RESOLVED | Noted: 2019-07-18 | Resolved: 2019-07-19

## 2019-07-19 PROBLEM — G45.9 TIA (TRANSIENT ISCHEMIC ATTACK): Status: ACTIVE | Noted: 2019-07-19

## 2019-07-19 PROBLEM — I16.0 HYPERTENSIVE URGENCY: Status: RESOLVED | Noted: 2018-05-01 | Resolved: 2019-07-19

## 2019-07-19 PROBLEM — I10 HYPERTENSION: Status: ACTIVE | Noted: 2019-07-19

## 2019-07-19 LAB
ANION GAP SERPL CALCULATED.3IONS-SCNC: 7 MMOL/L (ref 4–13)
BUN SERPL-MCNC: 16 MG/DL (ref 5–25)
CALCIUM SERPL-MCNC: 9.2 MG/DL (ref 8.3–10.1)
CHLORIDE SERPL-SCNC: 104 MMOL/L (ref 100–108)
CHOLEST SERPL-MCNC: 233 MG/DL (ref 50–200)
CO2 SERPL-SCNC: 25 MMOL/L (ref 21–32)
CREAT SERPL-MCNC: 0.9 MG/DL (ref 0.6–1.3)
CRP SERPL QL: 14 MG/L
DEPRECATED D DIMER PPP: 343 NG/ML (FEU)
ERYTHROCYTE [DISTWIDTH] IN BLOOD BY AUTOMATED COUNT: 13.2 % (ref 11.6–15.1)
EST. AVERAGE GLUCOSE BLD GHB EST-MCNC: 111 MG/DL
GFR SERPL CREATININE-BSD FRML MDRD: 70 ML/MIN/1.73SQ M
GLUCOSE SERPL-MCNC: 105 MG/DL (ref 65–140)
HBA1C MFR BLD: 5.5 % (ref 4.2–6.3)
HCT VFR BLD AUTO: 36.7 % (ref 34.8–46.1)
HDLC SERPL-MCNC: 32 MG/DL (ref 40–60)
HGB BLD-MCNC: 12 G/DL (ref 11.5–15.4)
LDLC SERPL CALC-MCNC: 127 MG/DL (ref 0–100)
MCH RBC QN AUTO: 31.3 PG (ref 26.8–34.3)
MCHC RBC AUTO-ENTMCNC: 32.7 G/DL (ref 31.4–37.4)
MCV RBC AUTO: 96 FL (ref 82–98)
PLATELET # BLD AUTO: 269 THOUSANDS/UL (ref 149–390)
PMV BLD AUTO: 11.1 FL (ref 8.9–12.7)
POTASSIUM SERPL-SCNC: 3.5 MMOL/L (ref 3.5–5.3)
RBC # BLD AUTO: 3.83 MILLION/UL (ref 3.81–5.12)
SODIUM SERPL-SCNC: 136 MMOL/L (ref 136–145)
TRIGL SERPL-MCNC: 371 MG/DL
WBC # BLD AUTO: 7.15 THOUSAND/UL (ref 4.31–10.16)

## 2019-07-19 PROCEDURE — G8978 MOBILITY CURRENT STATUS: HCPCS

## 2019-07-19 PROCEDURE — 86618 LYME DISEASE ANTIBODY: CPT | Performed by: PSYCHIATRY & NEUROLOGY

## 2019-07-19 PROCEDURE — 99223 1ST HOSP IP/OBS HIGH 75: CPT | Performed by: PSYCHIATRY & NEUROLOGY

## 2019-07-19 PROCEDURE — 86140 C-REACTIVE PROTEIN: CPT | Performed by: PSYCHIATRY & NEUROLOGY

## 2019-07-19 PROCEDURE — 72157 MRI CHEST SPINE W/O & W/DYE: CPT

## 2019-07-19 PROCEDURE — 85379 FIBRIN DEGRADATION QUANT: CPT | Performed by: PSYCHIATRY & NEUROLOGY

## 2019-07-19 PROCEDURE — 93306 TTE W/DOPPLER COMPLETE: CPT

## 2019-07-19 PROCEDURE — 86430 RHEUMATOID FACTOR TEST QUAL: CPT | Performed by: PSYCHIATRY & NEUROLOGY

## 2019-07-19 PROCEDURE — 72156 MRI NECK SPINE W/O & W/DYE: CPT

## 2019-07-19 PROCEDURE — 80048 BASIC METABOLIC PNL TOTAL CA: CPT | Performed by: INTERNAL MEDICINE

## 2019-07-19 PROCEDURE — 84165 PROTEIN E-PHORESIS SERUM: CPT | Performed by: PSYCHIATRY & NEUROLOGY

## 2019-07-19 PROCEDURE — 99222 1ST HOSP IP/OBS MODERATE 55: CPT | Performed by: INTERNAL MEDICINE

## 2019-07-19 PROCEDURE — 83036 HEMOGLOBIN GLYCOSYLATED A1C: CPT | Performed by: INTERNAL MEDICINE

## 2019-07-19 PROCEDURE — A9585 GADOBUTROL INJECTION: HCPCS | Performed by: PSYCHIATRY & NEUROLOGY

## 2019-07-19 PROCEDURE — G8979 MOBILITY GOAL STATUS: HCPCS

## 2019-07-19 PROCEDURE — 84165 PROTEIN E-PHORESIS SERUM: CPT | Performed by: PATHOLOGY

## 2019-07-19 PROCEDURE — 97163 PT EVAL HIGH COMPLEX 45 MIN: CPT

## 2019-07-19 PROCEDURE — 93306 TTE W/DOPPLER COMPLETE: CPT | Performed by: INTERNAL MEDICINE

## 2019-07-19 PROCEDURE — 86038 ANTINUCLEAR ANTIBODIES: CPT | Performed by: PSYCHIATRY & NEUROLOGY

## 2019-07-19 PROCEDURE — 85027 COMPLETE CBC AUTOMATED: CPT | Performed by: INTERNAL MEDICINE

## 2019-07-19 PROCEDURE — 80061 LIPID PANEL: CPT | Performed by: INTERNAL MEDICINE

## 2019-07-19 RX ORDER — AMLODIPINE BESYLATE 10 MG/1
10 TABLET ORAL DAILY
Qty: 60 TABLET | Refills: 0 | Status: CANCELLED | OUTPATIENT
Start: 2019-07-19

## 2019-07-19 RX ORDER — AMLODIPINE BESYLATE 10 MG/1
10 TABLET ORAL DAILY
Qty: 60 TABLET | Refills: 0 | Status: SHIPPED | OUTPATIENT
Start: 2019-07-20 | End: 2020-05-21 | Stop reason: SDUPTHER

## 2019-07-19 RX ORDER — ASPIRIN 81 MG/1
81 TABLET ORAL DAILY
Qty: 60 TABLET | Refills: 0 | Status: SHIPPED | OUTPATIENT
Start: 2019-07-19 | End: 2019-07-25

## 2019-07-19 RX ORDER — POTASSIUM CHLORIDE 20 MEQ/1
40 TABLET, EXTENDED RELEASE ORAL ONCE
Status: COMPLETED | OUTPATIENT
Start: 2019-07-19 | End: 2019-07-19

## 2019-07-19 RX ORDER — LOSARTAN POTASSIUM 25 MG/1
25 TABLET ORAL DAILY
Qty: 60 TABLET | Refills: 0 | Status: SHIPPED | OUTPATIENT
Start: 2019-07-19 | End: 2019-07-22 | Stop reason: SDUPTHER

## 2019-07-19 RX ORDER — LOSARTAN POTASSIUM 25 MG/1
25 TABLET ORAL DAILY
Status: DISCONTINUED | OUTPATIENT
Start: 2019-07-19 | End: 2019-07-20 | Stop reason: HOSPADM

## 2019-07-19 RX ORDER — ATORVASTATIN CALCIUM 40 MG/1
40 TABLET, FILM COATED ORAL EVERY EVENING
Qty: 60 TABLET | Refills: 0 | Status: SHIPPED | OUTPATIENT
Start: 2019-07-19 | End: 2020-05-21 | Stop reason: SDUPTHER

## 2019-07-19 RX ADMIN — HEPARIN SODIUM 5000 UNITS: 5000 INJECTION INTRAVENOUS; SUBCUTANEOUS at 21:15

## 2019-07-19 RX ADMIN — ATORVASTATIN CALCIUM 40 MG: 40 TABLET, FILM COATED ORAL at 21:15

## 2019-07-19 RX ADMIN — GADOBUTROL 8 ML: 604.72 INJECTION INTRAVENOUS at 22:56

## 2019-07-19 RX ADMIN — HEPARIN SODIUM 5000 UNITS: 5000 INJECTION INTRAVENOUS; SUBCUTANEOUS at 06:11

## 2019-07-19 RX ADMIN — HEPARIN SODIUM 5000 UNITS: 5000 INJECTION INTRAVENOUS; SUBCUTANEOUS at 14:51

## 2019-07-19 RX ADMIN — ASPIRIN 81 MG: 81 TABLET, COATED ORAL at 10:24

## 2019-07-19 RX ADMIN — AMLODIPINE BESYLATE 10 MG: 10 TABLET ORAL at 10:24

## 2019-07-19 RX ADMIN — ACETAMINOPHEN 650 MG: 325 TABLET ORAL at 23:24

## 2019-07-19 RX ADMIN — LOSARTAN POTASSIUM 25 MG: 25 TABLET, FILM COATED ORAL at 16:58

## 2019-07-19 RX ADMIN — POTASSIUM CHLORIDE 40 MEQ: 1500 TABLET, EXTENDED RELEASE ORAL at 10:24

## 2019-07-19 NOTE — PHYSICAL THERAPY NOTE
Physical Therapy Evaluation     Patient's Name: Replaced by [facility]    Admitting Diagnosis  TIA (transient ischemic attack) [G45 9]  Hypertension [I10]    Problem List  Patient Active Problem List   Diagnosis    Rash and nonspecific skin eruption    Acute low back pain due to trauma    Benign essential hypertension    Broken tooth    Vision disturbance    Hypertensive urgency    Noncompliance with medications    Incidental pulmonary nodule       Past Medical History  Past Medical History:   Diagnosis Date    Back ache     Cancer ([address] )     Hypertension        Past Surgical History  Past Surgical History:   Procedure Laterality Date    APPENDECTOMY      BILATERAL OOPHORECTOMY      BREAST SURGERY      HERNIA REPAIR          07/19/19 1300   Note Type   Note type Eval only   Pain Assessment   Pain Assessment No/denies pain   Home Living   Type of Home Apartment  ([facility])   Home Layout Two level;Stairs to enter without rails;Bed/bath upstairs   Bathroom Toilet Standard   Bathroom Equipment   (none)   Home Equipment   (none)   Prior Function   Level of [place] Independent with ADLs and functional mobility   Lives With Spouse   Receives Help From Family   ADL Assistance Independent   IADLs Independent   Falls in the last 6 months 0   Vocational Full time employment  (was working full time + overtime for financial reasons)   Comments pt is concerned regarding financial hardship of being ill  PT spoke with CM regarding this   receives aide services, patient does not usually need to assist him      Restrictions/Precautions   Weight Bearing Precautions Per Order No   Braces or Orthoses   (none)   Other Precautions Fall Risk;Telemetry;Multiple lines   General   Family/Caregiver Present Yes  (dtr present and translating)   Cognition   Overall Cognitive Status WFL   Arousal/Participation Cooperative   Orientation Level Oriented X4   Memory Within functional limits   Following Commands Follows all commands and directions without difficulty   RLE Assessment   RLE Assessment X  (tight gastroc and R knee pain with knee extension)   Strength RLE   RLE Overall Strength 4/5   LLE Assessment   LLE Assessment X  (tight gastroc)   Strength LLE   LLE Overall Strength 4+/5   Coordination   Movements are Fluid and Coordinated 0   Coordination and Movement Description slowed AGUILAR BLEs, sequencing appropriate   Bed Mobility   Additional Comments received seated EOB, independent with bed mob   Transfers   Sit to Stand 6  Modified independent   Additional items Bedrails   Stand to Sit 6  Modified independent   Additional items Armrests   Ambulation/Elevation   Gait pattern Short stride;Decreased R stance;Decreased foot clearance   Gait Assistance 5  Supervision   Assistive Device   (none)   Distance 160'   Stair Management Assistance 5  Supervision   Additional items Assist x 1   Stair Management Technique One rail R;Alternating pattern; Step to pattern  (reciprocal pattern to ascend, reciprocal->step to descending)   Number of Stairs 10   Balance   Static Sitting Normal   Dynamic Sitting Good   Static Standing Good   Dynamic Standing Good   Ambulatory Fair +  (on evens in low stim environment)   Endurance Deficit   Endurance Deficit Yes   Endurance Deficit Description seated rest breaks x3 during session   Activity Tolerance   Activity Tolerance Patient limited by fatigue   Medical Staff Made Aware Darlene DELGADO   Nurse Made Aware ok to see per RN   Assessment   Prognosis Good   Problem List Decreased strength;Decreased range of motion;Decreased endurance; Impaired balance;Decreased mobility; Decreased coordination; Impaired judgement  (impaired judgement in regards to not returning to work at [employer])   Assessment Pt is 60 y o  female seen for PT evaluation s/p admit to [facility] on 7/18/2019 w/ Stroke-like symptoms  PT consulted to assess pt's functional mobility and d/c needs   Order placed for PT eval and tx, w/ up and OOB as tolerated order  Comorbidities affecting pt's physical performance at time of assessment include: breast CA s/p 18 yrs, CVA s/p 4 yrs, HTN  PTA, pt was independent w/ all functional mobility w/ out AD, ambulates community distances and elevations, lives in multi-level home, has 3 SIM[address]N and works full time  Personal factors affecting pt at time of IE include: stairs to enter home, inability to navigate community distances, unable to perform dynamic tasks in community, preferred language not [place] (language barrier), inability to perform current job functions and inability to perform IADLs  Please find objective findings from PT assessment regarding body systems outlined above with impairments and limitations including weakness, decreased ROM, impaired balance, decreased endurance, impaired coordination, gait deviations, decreased activity tolerance and impaired judgement  The following objective measures performed on IE also reveal limitations: Barthel Index: 75/100  Pt's clinical presentation is currently unstable/unpredictable seen in pt's presentation of ongoing medical needs regarding primary dx, signifcant decline in functional mobility with need for supervision and not able to perform IADLs and work duties  Pt to benefit from continued PT tx to address deficits as defined above and maximize level of functional independent mobility and consistency  From PT/mobility standpoint, recommendation at time of d/c would be Home PT with family support pending progress in order to facilitate return to PLOF     Barriers to Discharge None  (dtr is working on changing her schedule to be more avail)   Goals   Patient Goals to go home and back to work   STG Expiration Date 07/29/19   Short Term Goal #1 In 10 days, Jaya will demonstrate 1) bed mobility at independent level in order to get in/out of bed safely, 2) sit<>stand at independent level in order to rise/sit from bed, chair, and complete toileting, 3) ambulate 500 feet with out AD at independent level in order to access their home environment, 4) negotiate full flight of stairs with one railing at mod I level in order to safely enter/exit their home and access second floor of home, 5) participation in HEP to include but not limited to strengthening, stretching, endurance, balance, and coordination in order to facilitate progress toward the above goals  Treatment Day 0   Plan   Treatment/Interventions LE strengthening/ROM; Elevations; Therapeutic exercise; Endurance training;Patient/family training;Equipment eval/education;Gait training   PT Frequency   (3-5x/wk)   Recommendation   Recommendation Home PT; Home with family support   Equipment Recommended   (no AD for mobility)   PT - OK to Discharge Yes  (when med yaya)   Barthel Index   Feeding 10   Bathing 0   Grooming Score 5   Dressing Score 5   Bladder Score 10   Bowels Score 10   Toilet Use Score 10   Transfers (Bed/Chair) Score 10   Mobility (Level Surface) Score 10   Stairs Score 5   Barthel Index Score 75           Jane Rutherford, PT

## 2019-07-19 NOTE — PROGRESS NOTES
IM Residency Progress Note   Unit/Bed#: CW2 212-01 Encounter: [id number]  SOD Team A      Tereso Palomares 60 y o  female [id number]    Hospital Stay Days: 1      Assessment/Plan:    Principal Problem:    TIA (transient ischemic attack)  Active Problems:    Incidental pulmonary nodule    Hypertension    1  Stroke like symptoms:  Generalized weakness, right more than left, preceded by occipital headache  Patient has  Hypertension, not complient with medications because she lost her insurance  Hx of stroke 4 years ago  Ex-smoker, quit 15 years ago  mild rt arm distal weakness  CTA without acute intracranial pathology  MRI showed extensive periventricular and subcortical white matter lesions possibly representing microangiopathic disease, advanced for age  Vasculitis or demyelinating process could also have this appearance in the appropriate clinical context  No evidence of acute infarct, hemorrhage or mass  ECHO Systolic function was hyperdynamic  Ejection fraction was estimated to be 70 %  There were no regional wall motion abnormalities  Wall thickness was increased  Concentric hypertrophy was present  Doppler parameters were consistent with abnormal left ventricular relaxation (grade 1 diastolic dysfunction)  Plan:  Continue ASA 81 mg  Continue atorvastatin 40 mg  Continue Amlodipine 10 mg   Neurology Consulted, appreciate recommendations  2  Incidental Pulmonary Nodule  3 mm pulmonary nodule noted on CTA  Ex-smoker, 6-7 cigarettes per day for 30 years, quit 15 years ago  Plan:  12 month follow-up non-contrast chest CT    3   Hypertensive Emergency- Resolved     Disposition: Pending Neurology recommendations       Subjective:   Patient looks well, headache subsided, weakness improved        Vitals: Temp (24hrs), Av 5 °F (36 9 °C), Min:98 2 °F (36 8 °C), Max:98 6 °F (37 °C)  Current: Temperature: 98 6 °F (37 °C)  Vitals:    19 0313 19 0715 19 1523 19 1653   BP: 166/75 115/53 (!) 170/136 157/66   BP Location:  Left arm     Pulse: 80 68 89    Resp: 18 18 18    Temp:  98 5 °F (36 9 °C) 98 6 °F (37 °C)    TempSrc:  Oral     SpO2: 94% 99% 98%    Weight:       Height:        Body mass index is 30 04 kg/m²  I/O last 24 hours: In: 400 [P O :400]  Out: -       Physical Exam:  Physical Exam   Constitutional: She is oriented to person, place, and time  She appears well-developed and well-nourished  HENT:   Head: Normocephalic and atraumatic  Eyes: Pupils are equal, round, and reactive to light  EOM are normal    Neck: No JVD present  Cardiovascular: Normal rate, regular rhythm, normal heart sounds and intact distal pulses  Exam reveals no gallop and no friction rub  No murmur heard  Pulmonary/Chest: Effort normal and breath sounds normal  She has no wheezes  Abdominal: Soft  She exhibits no distension  There is no tenderness  Neurological: She is alert and oriented to person, place, and time  No cranial nerve deficit  She exhibits normal muscle tone  Coordination normal    Strength   RUE 4/5 , other wise unremarkable     Skin: Skin is warm  Dry scaly rash on the dorsum of both hands and wrists    Psychiatric: She has a normal mood and affect   Her behavior is normal        Invasive Devices     Peripheral Intravenous Line            Peripheral IV 07/18/19 Right Antecubital 1 day                          Labs:   Recent Results (from the past 24 hour(s))   Lipid Panel with Direct LDL reflex    Collection Time: 07/19/19  5:53 AM   Result Value Ref Range    Cholesterol 233 (H) 50 - 200 mg/dL    Triglycerides 371 (H) <=150 mg/dL    HDL, Direct 32 (L) 40 - 60 mg/dL    LDL Calculated 127 (H) 0 - 100 mg/dL   Hemoglobin A1c w/EAG Estimation    Collection Time: 07/19/19  5:53 AM   Result Value Ref Range    Hemoglobin A1C 5 5 4 2 - 6 3 %     mg/dl   Basic metabolic panel    Collection Time: 07/19/19  5:53 AM   Result Value Ref Range    Sodium 136 136 - 145 mmol/L    Potassium 3 5 3 5 - 5 3 mmol/L    Chloride 104 100 - 108 mmol/L    CO2 25 21 - 32 mmol/L    ANION GAP 7 4 - 13 mmol/L    BUN 16 5 - 25 mg/dL    Creatinine 0 90 0 60 - 1 30 mg/dL    Glucose 105 65 - 140 mg/dL    Calcium 9 2 8 3 - 10 1 mg/dL    eGFR 70 ml/min/1 73sq m   CBC (With Platelets)    Collection Time: 07/19/19  5:53 AM   Result Value Ref Range    WBC 7 15 4 31 - 10 16 Thousand/uL    RBC 3 83 3 81 - 5 12 Million/uL    Hemoglobin 12 0 11 5 - 15 4 g/dL    Hematocrit 36 7 34 8 - 46 1 %    MCV 96 82 - 98 fL    MCH 31 3 26 8 - 34 3 pg    MCHC 32 7 31 4 - 37 4 g/dL    RDW 13 2 11 6 - 15 1 %    Platelets 778 544 - 275 Thousands/uL    MPV 11 1 8 9 - 12 7 fL   C-reactive protein    Collection Time: 07/19/19  4:23 PM   Result Value Ref Range    CRP 14 0 (H) <3 0 mg/L       Radiology Results: I have personally reviewed pertinent reports  Other Diagnostic Testing:   I have personally reviewed pertinent reports          Active Meds:   Current Facility-Administered Medications   Medication Dose Route Frequency    acetaminophen (TYLENOL) tablet 650 mg  650 mg Oral Q6H PRN    amLODIPine (NORVASC) tablet 10 mg  10 mg Oral Daily    aspirin (ECOTRIN LOW STRENGTH) EC tablet 81 mg  81 mg Oral Daily    atorvastatin (LIPITOR) tablet 40 mg  40 mg Oral QPM    heparin (porcine) subcutaneous injection 5,000 Units  5,000 Units Subcutaneous Q8H [facility]    Labetalol HCl (NORMODYNE) injection 5 mg  5 mg Intravenous Q4H PRN    losartan (COZAAR) tablet 25 mg  25 mg Oral Daily         VTE Pharmacologic Prophylaxis: Heparin  VTE Mechanical Prophylaxis: sequential compression device    Leslie Doe MD

## 2019-07-19 NOTE — UTILIZATION REVIEW
Initial Clinical Review    Admission: Date/Time/Statement: 7/18/19 @ 1712  Orders Placed This Encounter   Procedures    Inpatient Admission     Standing Status:   Standing     Number of Occurrences:   1     Order Specific Question:   Admitting Physician     Answer:   Tamie Diggs [[id number]]     Order Specific Question:   Level of Care     Answer:   Med Surg [16]     Order Specific Question:   Estimated length of stay     Answer:   More than 2 Midnights     Order Specific Question:   Certification     Answer:   I certify that inpatient services are medically necessary for this patient for a duration of greater than two midnights  See H&P and MD Progress Notes for additional information about the patient's course of treatment  ED Arrival Information     Expected Arrival Acuity Means of Arrival Escorted By Service Admission Type    - 7/18/2019 11:45 Emergent Walk-In Self General Medicine Emergency    Arrival Complaint    -headache, right sided weakness        Chief Complaint   Patient presents with    Hypertension     pt started with htn this morning at home, now c/o severe posterior headache, R sided weakness, blurry vision, tingling in b/l hands  denies n/v  states to be taking her bp meds as prescribed     Assessment/Plan: 60year old female, presented to the ED from home via car  Admitted as INPATIENT due to stroke like symptoms  Started around 7AM   with right-sided numbness, headache and tingling in her face arm and leg in addition to slurred speech and generalized joint pains bilaterally  patient checked her blood pressure and noticed it was elevated  patient came into the ED for further evaluation around noon  In the ED, temperature 97 9, pulse 67, respirations 20, blood pressure 236/92, SpO2 98% on room air  Patient received 10 mg of labetalol with improvement in her blood pressure to 403 systolic and reported improvement in all of her symptoms  CTA was negative for acute abnormality     * Stroke-like symptoms:  Reported generalized weakness but R worse than left, slurred speech, numbness along R face, R arm, R leg  Present at 7am this morning with occipital headache, improved with labetalol in ED, per pt  Will continue with permissive hypertension given stroke like symptoms with labetalol 5mg q4h PRN SBP >200  Will restart home lotrel (amlodipine-benazepril 10-20) tomorrow AM  Pt reports taking 10mg amlodipine this AM   Admitted via stroke pathway  CTA without acute intracranial abnormality  Will obtain lipid panel, Hgb A1c  Echo cardiogram   Monitor on tele  Neurology consult  Aspirin 325 to be given now  Aspirin 81mg daily tomorrow  MRI ordered      ED Triage Vitals   Temperature Pulse Respirations Blood Pressure SpO2   07/18/19 1209 07/18/19 1209 07/18/19 1209 07/18/19 1225 07/18/19 1209   97 9 °F (36 6 °C) 67 20 (!) 236/92 98 %      Temp Source Heart Rate Source Patient Position - Orthostatic VS BP Location FiO2 (%)   07/18/19 1209 07/18/19 1209 07/18/19 1209 07/18/19 1209 --   Oral Monitor Lying Right arm       Pain Score       07/18/19 1209       Worst Possible Pain        Wt Readings from Last 1 Encounters:   07/18/19 79 4 kg (175 lb)     Additional Vital Signs:   Date/Time  Temp  Pulse  Resp  BP  SpO2  O2 Device  Patient Position - Orthostatic VS   07/19/19 0715  98 5 °F (36 9 °C)  68  18  115/53  99 %  None (Room air)  Lying   07/19/19 03:13:03    80  18  166/75  94 %       07/18/19 23:20:52  98 2 °F (36 8 °C)    18  166/75  94 %       07/18/19 2112            None (Room air)     07/18/19 20:22:55        157/88         07/18/19 1900        171/83Abnormal          07/18/19 1830        185/104Abnormal          07/18/19 18:02:53  98 5 °F (36 9 °C)    18  178/88Abnormal          07/18/19 1602    84  17  175/76Abnormal   93 %  None (Room air)     07/18/19 1416    68  20  166/70  98 %  None (Room air)  Lying   07/18/19 1338        171/76Abnormal        07/18/19 1330    73  20  171/76Abnormal   98 %  None (Room air)  Lying   07/18/19 1244    74  20  138/102Abnormal   97 %  None (Room air)  Lying       Pertinent Labs/Diagnostic Test Results:   Results from last 7 days   Lab Units 07/19/19  0553 07/18/19  1258   WBC Thousand/uL 7 15 8 66   HEMOGLOBIN g/dL 12 0 12 9   HEMATOCRIT % 36 7 38 4   PLATELETS Thousands/uL 269 264   NEUTROS ABS Thousands/µL  --  5 54     Results from last 7 days   Lab Units 07/19/19  0553 07/18/19  1258   SODIUM mmol/L 136 135*   POTASSIUM mmol/L 3 5 3 5   CHLORIDE mmol/L 104 103   CO2 mmol/L 25 27   ANION GAP mmol/L 7 5   BUN mg/dL 16 13   CREATININE mg/dL 0 90 0 85   EGFR ml/min/1 73sq m 70 75   CALCIUM mg/dL 9 2 9 3     Results from last 7 days   Lab Units 07/18/19  1258   AST U/L 19   ALT U/L 24   ALK PHOS U/L 130*   TOTAL PROTEIN g/dL 8 2   ALBUMIN g/dL 3 4*   TOTAL BILIRUBIN mg/dL 0 39     Results from last 7 days   Lab Units 07/19/19  0553 07/18/19  1258   GLUCOSE RANDOM mg/dL 105 93     Results from last 7 days   Lab Units 07/19/19  0553   HEMOGLOBIN A1C % 5 5   EAG mg/dl 111     Results from last 7 days   Lab Units 07/18/19  1351   PROTIME seconds 13 6   INR  1 08   PTT seconds 30     07/18/2019 @ 1928  MRI brain:   Extensive periventricular and subcortical white matter lesions possibly representing microangiopathic disease, advanced for age   Vasculitis or demyelinating process could also have this appearance in the appropriate clinical context  2   No evidence of acute infarct, hemorrhage or mass  07/18/2019 @  1501  CTa head/neck:  1   No acute intracranial CT abnormality  2   Patent major vasculature of [place] without critical stenosis  3   Mild atherosclerotic disease of cervical carotid bifurcation and proximal cervical internal carotid arteries without significant stenosis  4    Incidentally noted right upper lobe 3 mm nodule   Based on current Fleischner Society 2017 Guidelines on incidental pulmonary nodule, no routine follow-up is needed for this nodule if the patient is considered low risk for lung cancer   If the patient is considered high risk for lung cancer, and if no prior outside chest CT available to assess interval change, 12 month follow-up non-contrast chest CT is recommended       2019 @ 1254   EC, NSR    ED Treatment:   Medication Administration from 2019 1145 to 2019 1704       Date/Time Order Dose Route Action     2019 1259 Labetalol HCl (NORMODYNE) injection 10 mg 10 mg Intravenous Given     2019 1429 iohexol (OMNIPAQUE) 350 MG/ML injection (MULTI-DOSE) 85 mL 85 mL Intravenous Given        Past Medical History:   Diagnosis Date    Back ache     Cancer [address] [address] )     Hypertension      Admitting Diagnosis: TIA (transient ischemic attack) [G45 9]  Hypertension [I10]  Age/Sex: 60 y o  female  Admission Orders:  Current Facility-Administered Medications:  acetaminophen 650 mg Oral Q6H PRN   amLODIPine 10 mg Oral Daily   aspirin 81 mg Oral Daily   atorvastatin 40 mg Oral QPM   heparin (porcine) 5,000 Units Subcutaneous Q8H [address]   Labetalol HCl 5 mg Intravenous Q4H PRN   dysphagia eval > regular diet  ECHO: pending  TELM  Dani SCDs  Neuro checks q1hx4, q2hx4 then q4h   GCS   IP CONSULT TO CASE MANAGEMENT  IP CONSULT TO NEUROLOGY  IP CONSULT TO CASE MANAGEMENT  IP CONSULT TO [address] [place] [address] Utilization Review Department  Phone: [phone]; Fax [phone]  [email or web address]  ATTENTION: Please call with any questions or concerns to [phone]  and carefully listen to the prompts so that you are directed to the right person  Send all requests for admission clinical reviews, approved or denied determinations and any other requests to fax [phone]   All voicemails are confidential

## 2019-07-19 NOTE — DISCHARGE SUMMARY
IMR Discharge Summary - Medical Craige Fair 60 y o  female MRN: [id number]    [address] [facility] Room / Bed: [facility] 212-01 Encounter: [id number]    BRIEF OVERVIEW    Admitting Provider: Charissa Clements MD  Discharge Provider: Charissa Clements MD  Primary Care Physician at Discharge: Lisa Flores     Discharge To: Home  Facility / Family Member Name: Jade Blevins (daughter)  Phone Number: [phone]    Admission Date: 7/18/2019     Discharge Date: 7/19/2019    Primary Discharge Diagnosis  Principal Problem:    Stroke-like symptoms  Active Problems:    Hypertensive emergency    Incidental pulmonary nodule  Resolved Problems:    * No resolved hospital problems  *      Other Problems Addressed: None     Consulting Providers   Neurology          Therapeutic Operative Procedures Performed  ***    Diagnostic Procedures Performed  radiology: X-Ray: Chest, MRI: Head  and CT scan: head    Discharge Disposition: Home/Self Care  Discharged With Lines: no    Test Results Pending at Discharge: ECHO     Outpatient Follow-Up  PCP: Jean Chiang   Follow up with consulting providers  No   Active Issues Requiring Follow-up   Hypertension, Hyperlipidemia     Code Status: Level 3 - DNAR and DNI  Advance Directive and Living Will: <no information>  Power of :    POLST:      Medications   See after visit summary for reconciled discharge medications provided to patient and family  Allergies  No Known Allergies  Discharge Diet: low fat, low cholesterol diet  Activity restrictions: As tollerated     [facility] Course  ***    Presenting Problem/History of Present Illness  Principal Problem:    Stroke-like symptoms  Active Problems:    Hypertensive emergency    Incidental pulmonary nodule  Resolved Problems:    * No resolved hospital problems   *        Other Pertinent Test Results  ***     Discharge Condition: good  ***    Discharge  Statement   I spent {Time; 15 min - 1 hour:18279} minutes discharging the patient  This time was spent on the day of discharge  I had direct contact with the patient on the day of discharge  Additional documentation is required if more than 30 minutes were spent on discharge     ***

## 2019-07-19 NOTE — CONSULTS
PATIENT NAME: Jaya  YOB: 1960   MEDICAL RECORD NUMBER: [id number]  Chief Complaint/Reason for Consult: Weakness Right side, slurred speak, blurry vision, head ache, numbness-R face, R arm and R leg  HPI: Jaya is a 60 y o  female with history of hypertension  She is Sami speaking  History was given by her daughter, she reported that when her mother woke up in the morning at 7:00 a m  Then she was feeling dizzy and weak that time their attendant who was also at home checked BP- 270/ ? she took her blood pressure medication but within an hour she also started having blurry vision, posterior headache, tingling in both of her hands, sensation of numbness over right hand and face and slurred speech  She also mentioned she had deviation of her face towards the right side and dysarthria for about half an hour then they called 911 and admitted in ED  In ED her blood pressure was noted to be 236/92  that time labetalol was given to her and then her blood pressure became okay with time  There is also a past history of stroke about 4 years ago when she was in [facility]  She also mentioned history of migraine that she gets about once in 2-3 months under control use Fioricet as needed for her migraines  Currently She c/o generalized malaise  No h/o headache,  fever, chills, loss of appetite or  loss of weight  PAST MEDICAL HISTORY  Past Medical History:   Diagnosis Date    Back ache     Cancer ([address] )     Hypertension         PAST SURGICAL HISTORY  Past Surgical History:   Procedure Laterality Date    APPENDECTOMY      BILATERAL OOPHORECTOMY      BREAST SURGERY      HERNIA REPAIR          ALLERGIES: Patient has no known allergies       CURRENT MEDICATIONS  Scheduled Meds:  Current Facility-Administered Medications:  acetaminophen 650 mg Oral Q6H PRN Aleshia Chamakkala, DO   amLODIPine 10 mg Oral Daily Aleshia Chamakkala, DO   aspirin 81 mg Oral Daily Aleshia Chamakkala, DO   atorvastatin 40 mg Oral QPM Aleshia Irena, DO   heparin (porcine) 5,000 Units Subcutaneous Q8H [address] Aleshia Irena, DO   Labetalol HCl 5 mg Intravenous Q4H PRN Aleshia Irena, DO   losartan 25 mg Oral Daily Merilee Peaches, DO     Continuous Infusions:   PRN Meds:   acetaminophen    Labetalol HCl     SOCIAL HISTORY   reports that she has quit smoking  She has a 19 50 pack-year smoking history  She has never used smokeless tobacco  She reports that she does not drink alcohol or use drugs  The patient currently lives with the   She works as a ware house worker  Her baseline functional status is within normal limits  There is past h/o smoking  FAMILY HISTORY  Family History   Problem Relation Age of Onset    Hypertension Mother     Heart disease Mother     Diabetes Mother     Cancer Father         REVIEW OF SYSTEMS   Constitutional: Negative for fever, chills, diaphoresis, activity change and appetite change  HEENT: Negative for hearing loss, ear pain, facial swelling, neck pain, neck stiffness, tinnitus and ear discharge  Negative for ocular pain, discharge, redness and itching  Respiratory: Negative for apnea, chest tightness, shortness of breath, wheezing and stridor  Cardiovascular: Negative for chest pain, palpitations and leg swelling  Gastrointestinal: Negative for diarrhea, constipation and abdominal distention  Endocrine: Negative for cold intolerance and heat intolerance  Genitourinary: Negative for dysuria, urgency, frequency, hematuria, flank pain and difficulty urinating  Neurological: Negative for dizziness, seizures, syncope, facial asymmetry, speech difficulty, light-headedness, numbness and headaches  Hematological: Negative for adenopathy  Does not bruise/bleed easily  Psychiatric/Behavioral: Negative for behavioral problems and agitation  Otherwise complete review of systems is negative aside from what is mentioned above and in the HPI       PHYSICAL EXAMINATION  Temp:  [98 2 °F (36 8 °C)-98 6 °F (37 °C)] 98 6 °F (37 °C)  HR:  [68-89] 89  Resp:  [18] 18  BP: (115-185)/() 170/136   General Examination: In no apparent distress, well developed and well nourished, and cooperative   HEENT: Normocephalic, Atraumatic  Moist mucus membranes  Anicteric  PPC    CVS: Regular rate and rhythm  S1 S2 noted  No audible murmurs  No carotids bruits  Peripheral pulses palpable throughout   Lungs: Clear to auscultation bilaterally  No rales, rhonchi, wheezing  Abdomen: Bowel sounds positive  Non- tender  Non-distended  No organomegaly  Ext: No edema   Psych: Thought content - No VH/AH  No delusions  Though Process - logical    Skin - Rash on both of her hands  Neurological Examination:   Exam could be limited due to pain in her Rt hand joint  Mental Status: The patient was awake, alert, attentive, oriented to person, place, and time  While the exam was limited because she was speaking Citizen of [place]  Cranial Nerves:   I: smell Not tested   II: visual fields Full to confrontation  Pupils equal, round, reactive to light with normal accomodation  Fundus: benign fundus  III,IV,VI: extraocular muscles EOMI, no nystagmus   V: [place] and pterygoid strength slightly lower towards right  Sensation in the V1 seems intact  V2 through V3 distributions on right side slightly diminished with light touch  V1 through V3 distribution is intact with light touch and pin prick  VII: Face is symmetric with no weakness noted  VIII: Audition intact to finger rub bilaterally  IX/X: Uvula midline  Soft palate elevation symmetric  XI: Trapezius and SCM strength 5/5 B/L  XII: Tongue midline with no atrophy or fasciculations with appropriate movement  Motor Examination:   No pronator drift  Bulk: Normal  No atrophy Tone: Normal  Fasciculations: None        Deltoid Biceps Triceps WE   WF   FF IO     Right        4         4          4       4        4     4   4 Left           5        5          5          5      5     5   5                       IP        Quad   Ham     TA       Gastroc   Right      4            4          4         4               4  Left         5            5         5         5                5       Reflexes:                   Biceps Brachioradialis Triceps Patella Achilles Plantars   Right          2+            2+                  2+        2+       2+         Down   Left            2+             2+                 2+         2+       2+         Down         Coordination: Patient able to perform  finger-to-nose with slower intensity  Slightly slower rapid alternating movements d/t pain in her right wrist joint  Sensory: Normal sensation to light touch, pin prick and vibratory sensation throughout left side  Sensation slighly dimished to light touch on her Rt  Hand  Normal on Rt arm, Rt leg  Gait:normal stance and posture, normal stride length and arm swing, normal turn around    Labs:  Recent Results (from the past 24 hour(s))   Lipid Panel with Direct LDL reflex    Collection Time: 07/19/19  5:53 AM   Result Value Ref Range    Cholesterol 233 (H) 50 - 200 mg/dL    Triglycerides 371 (H) <=150 mg/dL    HDL, Direct 32 (L) 40 - 60 mg/dL    LDL Calculated 127 (H) 0 - 100 mg/dL   Hemoglobin A1c w/EAG Estimation    Collection Time: 07/19/19  5:53 AM   Result Value Ref Range    Hemoglobin A1C 5 5 4 2 - 6 3 %     mg/dl   Basic metabolic panel    Collection Time: 07/19/19  5:53 AM   Result Value Ref Range    Sodium 136 136 - 145 mmol/L    Potassium 3 5 3 5 - 5 3 mmol/L    Chloride 104 100 - 108 mmol/L    CO2 25 21 - 32 mmol/L    ANION GAP 7 4 - 13 mmol/L    BUN 16 5 - 25 mg/dL    Creatinine 0 90 0 60 - 1 30 mg/dL    Glucose 105 65 - 140 mg/dL    Calcium 9 2 8 3 - 10 1 mg/dL    eGFR 70 ml/min/1 73sq m   CBC (With Platelets)    Collection Time: 07/19/19  5:53 AM   Result Value Ref Range    WBC 7 15 4 31 - 10 16 Thousand/uL RBC 3 83 3 81 - 5 12 Million/uL    Hemoglobin 12 0 11 5 - 15 4 g/dL    Hematocrit 36 7 34 8 - 46 1 %    MCV 96 82 - 98 fL    MCH 31 3 26 8 - 34 3 pg    MCHC 32 7 31 4 - 37 4 g/dL    RDW 13 2 11 6 - 15 1 %    Platelets 761 600 - 989 Thousands/uL    MPV 11 1 8 9 - 12 7 fL            panel  Results from last 7 days   Lab Units 07/19/19  0553   POTASSIUM mmol/L 3 5   CHLORIDE mmol/L 104   BUN mg/dL 16   CREATININE mg/dL 0 90    Results from last 7 days   Lab Units 07/19/19  0553   HEMATOCRIT % 36 7   HEMOGLOBIN g/dL 12 0   MCH pg 31 3   MCHC g/dL 32 7   MCV fL 96   MPV fL 11 1   PLATELETS Thousands/uL 269   RDW % 13 2   WBC Thousand/uL 7 15    Results from last 7 days   Lab Units 07/18/19  1351   INR  1 08   PTT seconds 30    Results from last 7 days   Lab Units 07/19/19  0553   SODIUM mmol/L 136   CO2 mmol/L 25   BUN mg/dL 16   CREATININE mg/dL 0 90    Lab Results   Component Value Date    ALT 24 07/18/2019    AST 19 07/18/2019    ALKPHOS 130 (H) 07/18/2019    TBILI 0 39 07/18/2019    Results from last 7 days   Lab Units 07/19/19  0553   CHOLESTEROL mg/dL 233*   HDL mg/dL 32*    Lab Results   Component Value Date    HGBA1C 5 5 07/19/2019    TSH i: No results found for: TSH Imaging: CT head         ASSESSMENT/PLAN  59 y/o female with history of hypertension presented with blurry vision, head ache, Rt side weakness  Current symptoms likely due to  Hypertensive emergency with                                                      Autoimmune arthritis  Vs                                                       Demyelination (White matter change)                                                          -MRI C&T Spine with and without contrast    -Sedimentation Rate  -CRP  -D-Dimer  -CASIMIRO with ds dna  -Lyme titre  -RF (Rheumatoid factor)  SPEP    -PT/OT/Speech   -Check Hba1c    Euglycemia (140-180 goal)   Normothermia       Please call with questions

## 2019-07-19 NOTE — INCIDENTAL FINDINGS
Incidentally noted right upper lobe 3 mm nodule  Based on current Fleischner Society 2017 Guidelines on incidental pulmonary nodule, no routine follow-up is needed for this nodule if the patient is considered low risk for lung cancer  If the   patient is considered high risk for lung cancer, and if no prior outside chest CT available to assess interval change, 12 month follow-up non-contrast chest CT is recommended

## 2019-07-19 NOTE — PROGRESS NOTES
Provider ordered [facility] daria  Called [facility] and spoke with Krysta Hall  She stated that she will attempt to send someone but it may have to wait until morning shift  Will continue to monitor

## 2019-07-19 NOTE — PLAN OF CARE
Problem: Potential for Falls  Goal: Patient will remain free of falls  Description  INTERVENTIONS:  - Assess patient frequently for physical needs  -  Identify cognitive and physical deficits and behaviors that affect risk of falls    -  [place] fall precautions as indicated by assessment   - Educate patient/family on patient safety including physical limitations  - Instruct patient to call for assistance with activity based on assessment  - Modify environment to reduce risk of injury  - Consider OT/PT consult to assist with strengthening/mobility  Outcome: Progressing

## 2019-07-19 NOTE — SPEECH THERAPY NOTE
Speech Language/Pathology    Speech/Language Pathology Progress Note    Patient Name: Megan Gray  Today's Date: 7/19/2019     Consult received and chart reviewed  Pt passed RN dysphagia screen and per RN, has been tolerating meals and meds whole c water without difficulties  Speech is WNL as well  Skilled ST intervention not warranted at this time

## 2019-07-19 NOTE — PLAN OF CARE
Problem: PHYSICAL THERAPY ADULT  Goal: Performs mobility at highest level of function for planned discharge setting  See evaluation for individualized goals  Description  Treatment/Interventions: LE strengthening/ROM, Elevations, Therapeutic exercise, Endurance training, Patient/family training, Equipment eval/education, Gait training  Equipment Recommended: (no AD for mobility)       See flowsheet documentation for full assessment, interventions and recommendations  Note:   Prognosis: Good  Problem List: Decreased strength, Decreased range of motion, Decreased endurance, Impaired balance, Decreased mobility, Decreased coordination, Impaired judgement(impaired judgement in regards to not returning to work at [employer])  Assessment: Pt is 60 y o  female seen for PT evaluation s/p admit to [facility] on 7/18/2019 w/ Stroke-like symptoms  PT consulted to assess pt's functional mobility and d/c needs  Order placed for PT eval and tx, w/ up and OOB as tolerated order  Comorbidities affecting pt's physical performance at time of assessment include: breast CA s/p 18 yrs, CVA s/p 4 yrs, HTN  PTA, pt was independent w/ all functional mobility w/ out AD, ambulates community distances and elevations, lives in multi-level home, has 3 SIMÓN and works full time  Personal factors affecting pt at time of IE include: stairs to enter home, inability to navigate community distances, unable to perform dynamic tasks in community, preferred language not [place] (language barrier), inability to perform current job functions and inability to perform IADLs  Please find objective findings from PT assessment regarding body systems outlined above with impairments and limitations including weakness, decreased ROM, impaired balance, decreased endurance, impaired coordination, gait deviations, decreased activity tolerance and impaired judgement   The following objective measures performed on IE also reveal limitations: Barthel Index: 75/100  Pt's clinical presentation is currently unstable/unpredictable seen in pt's presentation of ongoing medical needs regarding primary dx, signifcant decline in functional mobility with need for supervision and not able to perform IADLs and work duties  Pt to benefit from continued PT tx to address deficits as defined above and maximize level of functional independent mobility and consistency  From PT/mobility standpoint, recommendation at time of d/c would be Home PT with family support pending progress in order to facilitate return to PLOF  Barriers to Discharge: None(dtr is working on changing her schedule to be more avail)     Recommendation: Home PT, Home with family support     PT - OK to Discharge: Yes(when med yaya)    See flowsheet documentation for full assessment

## 2019-07-20 VITALS
OXYGEN SATURATION: 97 % | WEIGHT: 175 LBS | TEMPERATURE: 97.7 F | BODY MASS INDEX: 29.88 KG/M2 | HEIGHT: 64 IN | HEART RATE: 82 BPM | DIASTOLIC BLOOD PRESSURE: 91 MMHG | RESPIRATION RATE: 18 BRPM | SYSTOLIC BLOOD PRESSURE: 143 MMHG

## 2019-07-20 LAB
B BURGDOR IGG SER IA-ACNC: 0.05
B BURGDOR IGM SER IA-ACNC: 0.31
ERYTHROCYTE [SEDIMENTATION RATE] IN BLOOD: 67 MM/HOUR (ref 0–20)

## 2019-07-20 PROCEDURE — 97166 OT EVAL MOD COMPLEX 45 MIN: CPT

## 2019-07-20 PROCEDURE — G8989 SELF CARE D/C STATUS: HCPCS

## 2019-07-20 PROCEDURE — G8988 SELF CARE GOAL STATUS: HCPCS

## 2019-07-20 PROCEDURE — G8987 SELF CARE CURRENT STATUS: HCPCS

## 2019-07-20 PROCEDURE — 85652 RBC SED RATE AUTOMATED: CPT | Performed by: PSYCHIATRY & NEUROLOGY

## 2019-07-20 PROCEDURE — 99239 HOSP IP/OBS DSCHRG MGMT >30: CPT | Performed by: INTERNAL MEDICINE

## 2019-07-20 PROCEDURE — 99232 SBSQ HOSP IP/OBS MODERATE 35: CPT | Performed by: PSYCHIATRY & NEUROLOGY

## 2019-07-20 RX ADMIN — AMLODIPINE BESYLATE 10 MG: 10 TABLET ORAL at 09:06

## 2019-07-20 RX ADMIN — LOSARTAN POTASSIUM 25 MG: 25 TABLET, FILM COATED ORAL at 09:06

## 2019-07-20 RX ADMIN — ASPIRIN 81 MG: 81 TABLET, COATED ORAL at 09:05

## 2019-07-20 RX ADMIN — HEPARIN SODIUM 5000 UNITS: 5000 INJECTION INTRAVENOUS; SUBCUTANEOUS at 05:22

## 2019-07-20 NOTE — OCCUPATIONAL THERAPY NOTE
[address]  Evaluation     Patient Name: Eden Martinez  Today's Date: 7/20/2019  Problem List  Patient Active Problem List   Diagnosis    Rash and nonspecific skin eruption    Acute low back pain due to trauma    Benign essential hypertension    Broken tooth    Vision disturbance    Noncompliance with medications    Incidental pulmonary nodule    Hypertension     Past Medical History  Past Medical History:   Diagnosis Date    Back ache     Cancer ([address] )     Hypertension      Past Surgical History  Past Surgical History:   Procedure Laterality Date    APPENDECTOMY      BILATERAL OOPHORECTOMY      BREAST SURGERY      HERNIA REPAIR           07/20/19 0943   Note Type   Note type Eval only   Restrictions/Precautions   Weight Bearing Precautions Per Order No   Other Precautions Fall Risk;Telemetry   Pain Assessment   Pain Assessment No/denies pain   Pain Score No Pain   Home Living   Type of Home House  (2 story townhouse with 3STE)   Home Layout Two level   Bathroom Shower/Tub Tub/shower unit   Bathroom Toilet Standard   Bathroom Equipment   (no bathroom DME PTA)   Home Equipment   (no use of DME PTA)   Additional Comments Pt resides with her  in a 2 story townhouse with 3STE  Pt's spouse is unable to provide physical assist   Prior Function   Level of [place] Independent with ADLs and functional mobility   Lives With Spouse   ADL Assistance Independent   IADLs Independent   Falls in the last 6 months 0   Vocational Full time employment   Lifestyle   Autonomy Pt reports being completely I with ADLs, IADLs, and functional mobility without use of DME PTA  Pt is a      Reciprocal Relationships daughter   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   Intrinsic Gratification active PTA   Felix Kennedy 19 (WDL) C/[address] and her daughter report that they feel that pt is back to her baseline and do not have any concerns about d/c home and completing her daily activities  ADL   Eating Assistance 7  Independent   Grooming Assistance 7  Independent   UB Bathing Assistance 5  Supervision/Setup   LB Bathing Assistance 5  Supervision/Setup   UB Dressing Assistance 7  Independent   LB Dressing Assistance 5  Supervision/Setup   Toileting Assistance  7  Independent   Bed Mobility   Supine to Sit 7  Independent   Sit to Supine 7  Independent   Transfers   Sit to Stand 6  Modified independent   Stand to Sit 6  Modified independent   Functional Mobility   Functional Mobility 5  Supervision   Additional Comments excessively slow, short stride   Balance   Static Sitting Normal   Dynamic Sitting Good   Static Standing Good   Dynamic Standing Good   Ambulatory Fair +   Activity Tolerance   Activity Tolerance Patient tolerated treatment well   Nurse Made Aware Pt cleared by NGUYEN [place] for OT evaluation and OOB mobility   RUE Assessment   RUE Assessment WFL   LUE Assessment   LUE Assessment WFL   Hand Function   Gross Motor Coordination Functional   Fine Motor Coordination Functional   Sensation   Light Touch No apparent deficits   Sharp/Dull No apparent deficits   Cognition   Overall Cognitive Status WFL   Arousal/Participation Alert; Cooperative;Responsive   Attention Within functional limits   Orientation Level Oriented X4   Memory Within functional limits   Following Commands Follows one step commands without difficulty   Assessment   Prognosis Fair   Assessment Pt is a 60year old male seen for initial OT evaluation s/p admission to [facility] with dizziness, weakness, blurry vision, posterior headache, tingling in b/l hands, and slurred speech  Etiology thought to be rheumatologic  MRI brain negative  Pt is primarily American speaking, but understands some [place]  Pt's daughter present for evaluation and translates  Pt resides with her  in a [facility] with 3STE  Pt's spouse is unable to provide any physical assistance    Pt reports being I with ADLs, IADLs, and functional mobility without use of DME PTA  Pt is a   Pt is currently functioning at/close to her functional baseline, and does not require continued acute care occupational therapy services at this time  From an OT standpoint, recommend home with family support upon d/c   OT orders to be d/c  Please re-consult OT if medically appropriate     Goals   Patient Goals to go home   Recommendation   OT Discharge Recommendation Home with family support   OT - OK to Discharge Yes  (when medically stable)   Barthel Index   Feeding 10   Bathing 0   Grooming Score 5   Dressing Score 5   Bladder Score 10   Bowels Score 10   Toilet Use Score 10   Transfers (Bed/Chair) Score 15   Mobility (Level Surface) Score 10   Stairs Score 0  (not assessed)   Barthel Index Score 75     Sarai Venegas, MOT, OTR/L

## 2019-07-20 NOTE — PROGRESS NOTES
Progress Note - Neurology   Kee Kerns 60 y o  female MRN: [id number]  Unit/Bed#: CW2 212-01 Encounter: [id number]    Assessment:  Arthralgia and subjective weakness on R without evidence of CNS pathology  Suspect underlying rheumatologic etiology, especially with elevated CRP  Plan:  -MRI Brain reviewed - nonspecific white matter lesions; cannot entirely rule out demyelinating disease but appears less likely  -MRI C/T spine reviewed - unremarkable  -CRP 14, D-dimer normal  -Lyme, RF, SPEP, CASIMIRO, ANCA, and Sjogren's pending  -recommend Rheumatology follow-up as an outpatient  -no further inpatient recommendations; does not require Neurology follow-up      Subjective:   Continues to have arthralgia and weakness on R  MRI C/T spine reviewed - negative for evidence of demyelination  Rheumatology work-up pending  No other complaints  ROS:  Arthralgia, weakness on 12-point review  Vitals: Blood pressure 143/91, pulse 82, temperature 97 7 °F (36 5 °C), temperature source Oral, resp  rate 18, height 5' 4" (1 626 m), weight 79 4 kg (175 lb), SpO2 97 %, not currently breastfeeding  ,Body mass index is 30 04 kg/m²  Physical Exam: General appearance: alert and oriented, in no acute distress  Head: Normocephalic, without obvious abnormality, atraumatic  Eyes: conjunctivae/corneas clear  PERRL, EOM's intact  Fundi benign  Neck: no adenopathy, no carotid bruit, no JVD and supple, symmetrical, trachea midline  Lungs: clear to auscultation bilaterally  Heart: regular rate and rhythm, S1, S2 normal, no murmur, click, rub or gallop  Abdomen: soft, non-tender; bowel sounds normal; no masses,  no organomegaly  Extremities: extremities normal, warm and well-perfused; no cyanosis, clubbing, or edema    Neurologic exam:  Awake and alert with appropriate mental status and language function  No visual, EOM, or facial deficit   Motor testing reveals 5/5 strength on L; with encouragement and full effort able to provide 5/5 strength throughout R however appears limited otherwise by effort/pain  Sensation is intact to light touch in the bilateral upper and lower extremities      Lab, Imaging and other studies:   CBC:   Results from last 7 days   Lab Units 07/19/19  0553 07/18/19  1258   WBC Thousand/uL 7 15 8 66   RBC Million/uL 3 83 4 04   HEMOGLOBIN g/dL 12 0 12 9   HEMATOCRIT % 36 7 38 4   MCV fL 96 95   PLATELETS Thousands/uL 269 264   , BMP/CMP:   Results from last 7 days   Lab Units 07/19/19  0553 07/18/19  1258   SODIUM mmol/L 136 135*   POTASSIUM mmol/L 3 5 3 5   CHLORIDE mmol/L 104 103   CO2 mmol/L 25 27   BUN mg/dL 16 13   CREATININE mg/dL 0 90 0 85   CALCIUM mg/dL 9 2 9 3   AST U/L  --  19   ALT U/L  --  24   ALK PHOS U/L  --  130*   EGFR ml/min/1 73sq m 70 75     VTE Prophylaxis: Heparin

## 2019-07-20 NOTE — DISCHARGE INSTRUCTIONS
Please make appointment at [address] [place], [place] [address]micheal Snow for hospital follow-up  Please make follow-up appointment with Neurology  Take medications scripts and fill at [facility]  Hipertensión   LO QUE NECESITA SABER:   La hipertensión es la presión arterial fidelia  La presión arterial es la fuerza que ejerce la kamla contra las mcgee de las arterias  La presión arterial normal debería estar a menos de 120/80  La pre-hipertensión estaría entre 120/80 y 139/ 80  La presión arterial fidelia estaría a 140/90 o más fidelia  La hipertensión causa que santana presión arterial se eleve tanto que santana corazón se ve forzado a trabajar [employer] de lo normal  [place] puede dañar santana corazón  Puede controlar la hipertensión con un estilo de faheem saludable o con medicamentos  La presión [place] a proteger colette órganos conchis santana corazón, pulmones, cerebro, y riñones  INSTRUCCIONES SOBRE [facility]ITAL[facility]:   Vladimir al 911 en jeff de presentar lo siguiente:   · Usted tiene malestar en el pecho que se siente conchis estrujamiento, presión, Ross Fus o dolor  · Usted se siente confundido o tiene dificultad para hablar  · Repentinamente se siente aturdido o con dificultad para respirar  · Usted tiene dolor o [employer] espalda, [place], [place], abdomen o Lawerence People  Regrese a la letty de emergencias si:   · Usted tiene un darren dolor de linette o pérdida de la visión  · Usted tiene debilidad en un brazo o en aby pierna  Pregúntele a santana Chelsy Dust vitaminas y minerales son adecuados para usted  · Usted se siente mareado, confundido, somnoliento o conchis si se fuera a desmayar  · Usted se ha tomado santana medicamento para la presión arterial kip santana presión arterial todavía está más fidelia de lo que le indicó santana médico     · Usted tiene preguntas o inquietudes acerca de santana condición o cuidado    Medicamentos:  Es posible que usted necesite alguno de los siguientes:  · Medicamento  podría usarse para ayudar a disminuir la presión arterial  Es posible que necesite más de un tipo de [place]  [place] el medicamento exactamente conchis indicado  · Diuréticos  ayudan a eliminar el exceso de líquido que se acumula en el organismo  [place] contribuirá a bajar santana presión arterial  Es posible que orine más seguido mientras ally melanie medicamento  · Los medicamentos para el colesterol  ayudan a bajar los niveles de [place]  Un nivel bajo de colesterol ayuda a prevenir enfermedades cardíacas y facilita el control de la presión arterial      · [place] colette medicamentos conchis se le haya indicado  Consulte con santana médico si usted augustus que santana medicamento no le está ayudando o si presenta efectos secundarios  Infórmele si es alérgico a cualquier medicamento  Mantenga aby lista actualizada de los [place], las vitaminas y los productos herbales que ally  Incluya los siguientes datos de los medicamentos: cantidad, frecuencia y motivo de administración  Traiga con usted la lista o los envases de la píldoras a colette citas de seguimiento  Lleve la lista de los medicamentos con usted en jeff de aby emergencia  Acuda a colette consultas de control con santana médico según le indicaron  Usted tendrá que regresar para que le revisen la presión arterial y para que le delfino otras pruebas de laboratorio  Anote colette preguntas para que se acuerde de hacerlas marianela colette visitas  Maneje santana hipertensión:  Hable con santana médico sobre las siguientes recomendaciones y otras formas de controlar la hipertensión:  · Revise santana presión arterial en casa  Siéntese y descanse por 5 minutos antes de tomarse la presión arterial  Extienda santana brazo y apóyelo en aby superficie plana  Santana brazo debe estar a la misma altura que santana corazón  Siga las instrucciones que vienen con el monitor para la presión arterial o tensiómetro  Si es posible tome por lo menos 2 lecturas de la presión cada vez   Tómese la presión arterial por lo [place] [employer] al día a la misma hora todos los días, Penny en la Serene Romero y la otra en la noche  Mantenga un registro de las lecturas de santana presión arterial y llévelo consigo a colette consultas  Pregúntele a santana médico cuál debe ser santana presión arterial            · Limite el sodio (la sal) conchis se le haya indicado  Demasiado sodio puede afectar el equilibrio de líquidos  Revise las etiquetas para buscar alimentos bajos en sodio o sin sal agregada  Algunos alimentos bajos en sodio utilizan sales de potasio para añadir sabor  Demasiado potasio también puede causar problemas de [place]  Santana médico le dirá qué cantidad de sodio y potasio es pearson para el consumo en un día  Él puede recomendarle que limite el sodio a 2,300 mg al día  · Siga el plan de comidas recomendado por santana médico   Un dietista o médico puede darle más información sobre planes de bajo contenido de sodio o el plan de alimentación DASH (enfoques dietéticos para detener la hipertensión)  El plan DASH es bajo en sodio, grasas saturadas y grasa total  Es alto en potasio, calcio y [place]  · Ejercítese para mantener un peso saludable  Realice actividad física por lo menos 30 minutos al día, la mayoría de los días de la [place]  [facility] ayudará a bajar santana presión arterial  Pregunte a santana médico acerca del mejor plan de ejercicio para usted  · [address] [place] [address] estrés  [facility] podría ayudarlo a bajar santana presión arterial  Aprenda sobre formas de relajarse, conchis respiración profunda o escuchar música  · Limite el consumo de alcohol  Las mujeres deberían limitar el consumo de alcohol a 1 bebida por día  Los hombres deberían limitar el consumo de alcohol a 2 tragos al día  Un trago equivale a 12 onzas de cerveza, 5 onzas de vino o 1 onza y ½ de licor  · No fume  La nicotina y otros químicos en los cigarrillos y cigarros pueden aumentar santana presión arterial y también pueden provocar daño al pulmón  Pida información a santana médico si usted actualmente fuma y necesita ayuda para dejar de fumar   Los cigarrillos electrónicos o tabaco sin humo todavía contienen nicotina  Consulte con addison médico antes de [employer]  · Controle cualquier otra condición médica que usted tenga  Algunas condiciones médicas conchis la diabetes pueden aumentar addison riesgo de hipertensión  [address] addison médico y tómese colette medicamentos según dichas instrucciones  © 2017 [facility] Information is for End User's use only and may not be sold, redistributed or otherwise used for commercial purposes  All illustrations and images included in CareNotes® are the copyrighted property of [employer]  or Samson Lieberman  Esta información es sólo para uso en educación  Addison intención no es darle un consejo médico sobre enfermedades o tratamientos  Colsulte con addison Dewain Racer farmacéutico antes de seguir cualquier régimen médico para saber si es seguro y efectivo para usted  Derrame cerebral   CUIDADO AMBULATORIO:   Un derrame cerebral  ocurre cuando el flujo sanguíneo a parte del cerebro es interrumpido  [place] puede provocar daño noemi al cerebro debido a la falta de oxígeno  La función cerebral podría verse afectada dependiendo del lugar donde ocurre el derrame cerebral  Un derrame cerebral provocado por un coágulo de kamla se conoce conchis derrame cerebral isquémico  Marina es el tipo más común  Un derrame cerebral provocado por un vaso sanguíneo que estalla o se desgarra se conoce conchis derrame cerebral hemorrágico  Cuando los síntomas de un accidente cerebrovascular gordon entre unos minutos y unas horas y no provocan daño, se conoce conchis accidente isquémico transitorio (AIT)  Un AIT es aby señal de advertencia de que usted tiene riesgo de tener un derrame cerebral pronto  Conozca los signos de advertencia de un derrame cerebral:  La palabra C B H T   (F A S  T  por colette siglas en inglés) puede ayudarlo a recordar y reconocer los signos de advertencia de un derrame    · C = Katie:  Un lado de addison katie está caído  · B = [place]: Un brazo comienza a caerse cuando ambos brazos están levantados  · H = Habla:  Dificultad para hablar o sonar diferente de lo normal     · T = Tiempo:  Aby persona que está teniendo un derrame cerebral necesita atención médica inmediata  Un derrame cerebral es aby emergencia médica que necesita tratamiento inmediato  Amanda Loulou de los medicamentos funcionan mejor cuando son administrados rápidamente  ·        Llame al 911 en jeff de presentar lo siguiente:   · Usted tiene alguno de los siguientes signos de derrame cerebral:      ¨ Adormecimiento o caída de un lado de santana katie     ¨ Debilidad en un brazo o aby pierna    ¨ Confusión o debilidad para hablar    ¨ Mareos o dolor de linette intenso, o pérdida de la visión  · Usted sufre aby convulsión  · Usted se siente mareada, le hace falta el aire y tiene dolor de [place]  Busque atención médica de inmediato si:   · Santana nivel de azúcar en la kamla o santana presión arterial es más alto o bajo que lo habitual     · Usted tiene dificultad para tragar  · Usted sufre aby caída  Pregúntele a santana [place] vitaminas y minerales son adecuados para usted  · Usted tiene dificultad para evacuar el intestino u orinar  · Usted tiene preguntas o inquietudes acerca de santana condición o cuidado  Los signos y síntomas de un derrame cerebral  dependerán del tipo de derrame cerebral y de cuándo ocurrió:  · Debilidad repentina en santana brazo, pierna o katie    · Dificultad repentina para caminar    · Dificultad para hablar o entender las palabras que usted donnie o escucha     · Cambios en la visión    · Incapacidad repentina de sentir parte de santana cuerpo o de colette extremidades    · Pérdida del conocimiento    · Vómitos o dolor de linette intenso  El tratamiento  depende del tipo de derrame cerebral:  · Medicamentos,  para evitar los coágulos sanguíneos, para [employer] coágulos o para ayudar a que santana cuerpo coagule más fácilmente   Es posible que usted necesite medicamentos para tratar el colesterol alto, la hipertensión o la diabetes  · Trombólisis  es un procedimiento que se [place] para disolver los coágulos en aby arteria  Un catéter será guiado dentro de la arteria hasta que esté cerca del coágulo  [place] medicamento en el catéter para ayudar a disolver el coágulo  O podrían jalar el coágulo de la arteria  · Cirugía  podría usarse para extraer un coágulo de kamla o para ayudar a aliviar la presión dentro de santana cerebro  Es posible que también necesite cirugía para remover la placa acumulada de yanely arterias carótidas  Acuda a yanely consultas de control con santana médico según le indicaron  Es posible que usted necesite concurrir para que le realicen exámenes periódicos de santana función cerebral  Si usted está tomando warfarina, usted necesitará hacer citas para análisis de Primo periódicos  Yanely niveles del índice internacional normalizado (IIN) también necesitan revisarse  Estos exámenes ayudan a determinar si usted está tomando la cantidad apropiada de [place]  Anote yanely preguntas para que se acuerde de hacerlas marianela yanely visitas  Acuda a rehabilitación conchis se le indique  La rehabilitación es aby parte importante del tratamiento  Un terapeuta del habla le ayuda a aprender de nuevo y a mejorar santana capacidad de hablar y de tragar  Usted podría comenzar lentamente y empezar a realizar tareas más difíciles con el tiempo  Los fisioterapeutas pueden ayudarlo a obtener fuerza y resistencia  Los terapeutas ocupacionales pueden enseñarle nuevas formas de hacer yanely actividades cotidianas, conchis vestirse  La terapia puede ayudarlo a mejorar santana capacidad de caminar y de JENNY el equilibrio  Santana terapia podría incluir tareas o movimientos que usted necesitará hacer para las actividades cotidianas  Un ejemplo es el poder subirse o levantarse usted mismo de aby silla  Cuidado personal después de un derrame cerebral:   · [place] de santana hogar un lugar seguro    Quite cualquier cosa con la que pueda tropezarse  Coloque cinta [employer]  Mantenga libres los pasillos libres en santana hogar  Asegúrese que santana hogar esté fransisca iluminado  Coloque material antiderrapante en las superficies que podrían estar resbaladizas  Un ejemplo sería santana ira de baño o el piso de la ducha  · Use aparatos de asistencia  Un bastón o aby caminadora podrían ayudarlo a mantener santana equilibrio mientras camina  Prevenga el infarto cerebral:   · Controle colette afecciones de maricel  Las [employer] la fibrilación auricular y la diabetes pueden aumentar santana riesgo de un derrame cerebral  Controle santana glucosa cuidadosamente si usted tiene hiperglucemia o diabetes  · Tómese la presión arterial micky conchis le indicaron  La hipertensión puede aumentar santana riesgo de un derrame cerebral  Si usted tiene hipertensión, siga las indicaciones de santana médico sobre cómo controlar santana presión arterial            · No fume  La nicotina y otros químicos en los cigarrillo y los cigarros pueden aumentar santana riesgo de otro derrame cerebral y provocar daño a los pulmones  Pida información a santana médico si usted actualmente fuma y necesita ayuda para dejar de fumar  Los cigarrillos electrónicos o tabaco sin humo todavía contienen nicotina  Consulte con santana médico antes de [employer]  · No consuma alcohol  El alcohol puede aumentar santana riesgo de un derrame cerebral  El alcohol también podría aumentar santana presión arterial o adelgazar santana kamla  El adelgazamiento de la kamla puede aumentar santana riesgo de un derrame cerebral hemorrágico     · Consuma alimentos saludables y variados  Los alimentos sanos incluyen panes integrales, productos lácteos bajos en grasas, frijoles, jacinto livianas y pescado  Consuma al menos 5 porciones de frutas y verduras todos los días  Escoja alimentos con un bajo contenido de grasa, colesterol, sal y azúcar   Consuma alimentos ricos en potasio, conchis las walter y METHLICK plátanos  · Ejercítese según indicaciones  La actividad es importante para evitar un derrame cerebral  Es posible que usted necesite trabajar junto con un terapeuta de ejercicio para aprender cómo ejercitarse de manera pearson  El ejercicio podría ayudarlo a que pueda hacer colette actividades normales más fácilmente  El ejercicio también ayuda a controlar addison presión arterial y [place]  · Mantenga un peso saludable  Consulte con addison médico cuánto debería pesar  [place] que lo ayude a crear un plan para bajar de peso si tiene sobrepeso  Pida más información acerca de un plan de ejercicio adecuado para usted  · Controle el estrés  El estrés puede aumentar addison presión arterial  Busque nuevas maneras de [place], conchis la respiración profunda o escuchar música  Lo que necesita saber sobre la depresión:  La depresión puede ocurrir después de un derrame cerebral  Hable con addison médico si usted tiene depresión que continúa o está empeorando  Addison médico puede ayudar a tratar addison depresión  Addison médico también puede recomendarle grupos de [employer] que podría participar  Un porsha de apoyo es un lugar para hablar con otras personas que chacon sufrido un derrame cerebral  También puede ayudar si habla con amigos y familiares sobre cómo se siente  Dígale a colette familiares y amigos que si notan estos signos, deben informar a addison médico  Usted podría mostrar cualquiera de los siguientes signos de depresión:  · Tristeza extrema    · Abby la interacción social con familiares o amigos    · Falta de interés en cosas que antes disfrutaba    · Irritabilidad    · Dificultad para dormir    · Niveles bajos de energía    · Un cambio en hábitos alimenticios, ganancia o pérdida repentina de peso  © 2017 [address] Information is for End User's use only and may not be sold, redistributed or otherwise used for commercial purposes   All illustrations and images included in CareNotes® are the copyrighted property of [employer]  or Samson Mio  Esta información es sólo para uso en educación  Santana intención no es darle un consejo médico sobre enfermedades o tratamientos  Colsulte con santana Catherne Bernardino farmacéutico antes de seguir cualquier régimen médico para saber si es seguro y efectivo para usted

## 2019-07-20 NOTE — DISCHARGE SUMMARY
INTERNAL MEDICINE RESIDENCY DISCHARGE SUMMARY     Meera Gonzalez   60 y o  female  MRN: [id number]  Room/Bed: [facility] 212/[facility] [address]    Encounter: [id number]    Active Problems:    Stroke-like symptoms    Hypertensive urgency    Incidental pulmonary nodule    Hypertension      Stroke-like symptoms  Assessment & Plan  MRI brain with extensive periventricular and subcortical white matter lesions possibly recommending micro angiopathy disease, vasculitis or demyelinating process  MRI C spine and C-spine without demyelinating disease  Echo benign  Hyperlipidemia lipid panel  Hemoglobin A1c 5 5  Plan:  Neurology considering possible rheumatologic cause of symptoms  Follow-up serologies as outpatient  Recommend referral to Rheumatology  Discharged on high-intensity statin  Goal normotension with amlodipine and losartan      Hypertension  Assessment & Plan  Management as above    Incidental pulmonary nodule  Assessment & Plan  3 mm pulmonary nodule noted on CT  20 pack year history given 30 years smoking with 6-7 cigarettes per day, quit 15 years ago  Pt will need 12 month follow-up non-contrast chest CT, will need to discuss with pt  Hypertensive urgency  Assessment & Plan  BP elevated on admission   Decreased to 166 SBP in the ED with 10mg Labetalol  Pt stated that she takes amlodipine 10mg at home, however this has not been filled since 2/2018  Suspect patient is noncompliant with medications  Pt was previously prescribed Lotrel (combination benazepril-amlodipine) per chart review to improve compliance  Plan  Continue amlodipine, losartan 25 mg on discharge  Outpatient follow-up with PCP        [address]   "79-year-old Portuguese-speaking female with past medical history significant for breast cancer 18 years ago (s/p resection, chemo, radiation), history of stroke 4 years ago in [place], hypertension, who presented to the ED today with elevated blood pressure and stroke-like symptoms  Patient reported that her symptoms started around 7:00 a m  with right-sided numbness and tingling in her face arm and leg in addition to slurred speech and generalized joint pains bilaterally  Patient reported generalized weakness but stated it was worse on the right  Patient additionally had a headache at the back of her head that was different from her chronic migraines; patient checked her blood pressure and noticed it was elevated  When symptoms persisted for several hours, patient came into the ED for further evaluation around noon       Patient reports that she had a similar episode diagnosed with either a stroke or a mini stroke 4 years ago in [facility] in which she experienced left-sided weakness and numbness and tingling  Patient states she was discharged from the hospital after 2 days but required therapy to improve her weakness  Patient reported that all of her left-sided symptoms resolved after that episode and that she was also discharged with medications at that time for treatment      Pt reports she smoked for 30 years, quit 15 years ago, smoked 6-7 cigarettes per day      In the ED, temperature 97 9, pulse 67, respirations 20, blood pressure 236/92, SpO2 98% on room air  Patient received 10 mg of labetalol with improvement in her blood pressure to 277 systolic and reported improvement in all of her symptoms  CTA was negative for acute abnormality  At time of evaluation, pt continued to report mild right sided weakness, numbness and tingling in bilateral hands, worse on R, occipital headache      Per [facility] on [address], pt last filled medications in 5/2018 - did not fill any medications in 2019:   atorvastatin 40mg 30 tablets, losartan 20mg 60 tablets  February 2018 amlodipine 10mg 30 tablets    Pt reports no other pharmacies " Per Dr Darline Real     While inpatient, patient continued to complain of multiple musculoskeletal complaints in her back shoulder hands  She did have continued right upper extremity 4/5 weakness  Patient was admitted under stroke pathway, Echo was benign, MRI showed microangiopathic disease concerning for possible demyelination disorder  Neurology recommended MRI C-spine and T-spine to rule out demyelination as well as multiple serologies  MRI showed no demyelination  Plan for outpatient follow-up with Rheumatology  On day of discharge patient's exam was stable  Discussed plan for discharge and follow up  Physical exam on day of discharge  Physical Exam   Constitutional: She is oriented to person, place, and time  She appears well-developed and well-nourished  No distress  HENT:   Head: Normocephalic and atraumatic  Mouth/Throat: Oropharynx is clear and moist  No oropharyngeal exudate  Eyes: Pupils are equal, round, and reactive to light  Conjunctivae and EOM are normal  No scleral icterus  Neck: Normal range of motion  Neck supple  Cardiovascular: Normal rate, regular rhythm and normal heart sounds  No murmur heard  Pulmonary/Chest: Effort normal and breath sounds normal  No respiratory distress  She has no wheezes  She has no rales  Abdominal: Soft  She exhibits no distension  There is no tenderness  There is no rebound and no guarding  Musculoskeletal: She exhibits no edema, tenderness or deformity  Lymphadenopathy:     She has no cervical adenopathy  Neurological: She is alert and oriented to person, place, and time  Skin: Skin is warm and dry  Rash (dry, scaly on back of bilateral hands) noted  She is not diaphoretic  Psychiatric: She has a normal mood and affect  Her behavior is normal  Judgment and thought content normal    Vitals reviewed          DISCHARGE INFORMATION     PCP at Discharge: Jose Francisco Narvaez MD    Admitting Provider: Jose Francisco Narvaez MD  Admission Date: 7/18/2019    Discharge Provider: No att  providers found  Discharge Date: 7/20/2019    Discharge Disposition: Home/Self Care  Discharge Condition: good  Discharge with Lines: no    Discharge Diet: diabetic diet  Activity Restrictions: none  Test Results Pending at Discharge:  CASIMIRO, SPEP, rheumatoid factor    Discharge Diagnoses: Active Problems:    Stroke-like symptoms    Hypertensive urgency    Incidental pulmonary nodule    Hypertension  Resolved Problems:    * No resolved hospital problems  *      Consulting Providers:  Neurology - Dr Shala Bhatt      Diagnostic & Therapeutic Procedures Performed:  Ranny Shaper And Neck With And Without Contrast    Result Date: 7/18/2019  Impression: 1  No acute intracranial CT abnormality  2   Patent major vasculature of [place] without critical stenosis  3   Mild atherosclerotic disease of cervical carotid bifurcation and proximal cervical internal carotid arteries without significant stenosis 4  Incidentally noted right upper lobe 3 mm nodule  Based on current Fleischner Society 2017 Guidelines on incidental pulmonary nodule, no routine follow-up is needed for this nodule if the patient is considered low risk for lung cancer  If the patient is considered high risk for lung cancer, and if no prior outside chest CT available to assess interval change, 12 month follow-up non-contrast chest CT is recommended  Workstation performed: [id number]     Mri Brain Wo Contrast    Result Date: 7/18/2019  Impression: 1  Extensive periventricular and subcortical white matter lesions possibly representing microangiopathic disease, advanced for age  Vasculitis or demyelinating process could also have this appearance in the appropriate clinical context  2   No evidence of acute infarct, hemorrhage or mass  Workstation performed: [id number]     Mri Cervical Spine W Wo Contrast    Result Date: 7/19/2019  Impression: 1  No evidence of demyelinating process in the cervical cord  2   Chronic disc degenerative change, most prominent from C3-4 to C5-6  Workstation performed: [id number]     Mri Thoracic Spine W Wo Contrast    Result Date: 7/19/2019  Impression: No evidence of demyelinating lesion in the thoracic cord  Workstation performed: [id number]       Code Status: Prior  Advance Directive & Living Will: <no information>  Power of :    POLST:      Medications:  Current Discharge Medication List        Current Discharge Medication List      START taking these medications    Details   amLODIPine (NORVASC) 10 mg tablet Take 1 tablet (10 mg total) by mouth daily  Qty: 60 tablet, Refills: 0    Associated Diagnoses: Hypertension; Hypertensive emergency      aspirin (ECOTRIN LOW STRENGTH) 81 mg EC tablet Take 1 tablet (81 mg total) by mouth daily  Qty: 60 tablet, Refills: 0    Associated Diagnoses: Stroke-like symptoms      atorvastatin (LIPITOR) 40 mg tablet Take 1 tablet (40 mg total) by mouth every evening  Qty: 60 tablet, Refills: 0    Associated Diagnoses: Stroke-like symptoms      losartan (COZAAR) 25 mg tablet Take 1 tablet (25 mg total) by mouth daily  Qty: 60 tablet, Refills: 0    Associated Diagnoses: Hypertension           Current Discharge Medication List          Allergies:  No Known Allergies    FOLLOW-UP     PCP Outpatient Follow-up:  [id number] [place] [address]  Internal Medicine [phone] [address] [address]     Next Steps: Schedule an appointment as soon as possible for a visit in 1 week(s)            Discharge Statement:   I spent 45 minutes minutes discharging the patient  This time was spent on the day of discharge  I had direct contact with the patient on the day of discharge  Additional documentation is required if more than 30 minutes were spent on discharge  Portions of the record may have been created with voice recognition software  Occasional wrong word or "sound a like" substitutions may have occurred due to the inherent limitations of voice recognition software    Read the chart carefully and recognize, using context, where substitutions have occurred     ==  Ana Ureña, [address] [facility]  Internal Medicine Resident PGY-3

## 2019-07-21 LAB — RHEUMATOID FACT SER QL LA: NEGATIVE

## 2019-07-22 ENCOUNTER — TRANSITIONAL CARE MANAGEMENT (OUTPATIENT)
Dept: INTERNAL MEDICINE CLINIC | Facility: CLINIC | Age: 59
End: 2019-07-22

## 2019-07-22 ENCOUNTER — OFFICE VISIT (OUTPATIENT)
Dept: INTERNAL MEDICINE CLINIC | Facility: CLINIC | Age: 59
End: 2019-07-22

## 2019-07-22 VITALS
SYSTOLIC BLOOD PRESSURE: 142 MMHG | WEIGHT: 168.87 LBS | DIASTOLIC BLOOD PRESSURE: 84 MMHG | HEIGHT: 63 IN | HEART RATE: 76 BPM | TEMPERATURE: 98.7 F | BODY MASS INDEX: 29.92 KG/M2

## 2019-07-22 DIAGNOSIS — I10 HYPERTENSION: ICD-10-CM

## 2019-07-22 DIAGNOSIS — R29.90 STROKE-LIKE SYMPTOMS: ICD-10-CM

## 2019-07-22 DIAGNOSIS — I16.0 HYPERTENSIVE URGENCY: Primary | ICD-10-CM

## 2019-07-22 PROBLEM — S02.5XXA BROKEN TOOTH: Status: RESOLVED | Noted: 2017-10-27 | Resolved: 2019-07-22

## 2019-07-22 PROBLEM — R21 RASH AND NONSPECIFIC SKIN ERUPTION: Status: RESOLVED | Noted: 2018-02-08 | Resolved: 2019-07-22

## 2019-07-22 LAB — RYE IGE QN: NEGATIVE

## 2019-07-22 PROCEDURE — 99214 OFFICE O/P EST MOD 30 MIN: CPT | Performed by: PHYSICIAN ASSISTANT

## 2019-07-22 PROCEDURE — 1111F DSCHRG MED/CURRENT MED MERGE: CPT | Performed by: PHYSICIAN ASSISTANT

## 2019-07-22 RX ORDER — LOSARTAN POTASSIUM 50 MG/1
50 TABLET ORAL DAILY
Qty: 30 TABLET | Refills: 1
Start: 2019-07-22 | End: 2019-07-25

## 2019-07-22 NOTE — PROGRESS NOTES
Assessment/Plan:      Diagnoses and all orders for this visit:    Hypertensive urgency    Stroke-like symptoms    Hypertension  -     losartan (COZAAR) 50 mg tablet; Take 1 tablet (50 mg total) by mouth daily      patient is a 48-year-old female added as a same-day appointment as she walked into office requesting blood pressure check  She was placed on my schedule today to be evaluated  She recently was admitted to hospital as she was evaluated in the emergency department for stroke-like symptoms  Based on resident discharge note does not seem she had a stroke but possibly symptoms secondary to a rheumatologic/autoimmune cause and has been referred to Rheumatology  Her facial and extremity  N/T and slurred speech has resolved  She has an appointment with them next week  She was noted to have hypertensive urgency and has a history of noncompliance with medication  Patient was discharged on amlodipine 10 mg, atorvastatin 40 mg and was also started on losartan 25 mg  Patient states that she has been checking blood pressures at home 150/90 this AM with repeat 140/80  Tolerating medications well, still reporting some mild weakness and fatigue, intermittent mild dizziness but reporting much better than what it was  Pt has TCM scheduled this Thursday 7/25 with resident  Today /84  Based in reported BP's at home and today, will increase her losartan to 50mg once a day, continue amlodipine at 10mg daily  Continue checking BP's at home daily and keep a written log, pt advised to bring to her appt on Thursday  She can call ASAP with any urgent concerns  ED precautions also discussed  Chief Complaint   Patient presents with    Follow-up    Blood Pressure Check       Subjective:     Patient ID: Susy Sanchez is a 60 y o  female     58y/o female here today for HTN check  Pt walked into office asking for BP check today w/o appt and was put on my schedule  She has TCM scheduled this Thursday 7/25   Was in ED for stroke like steven, had hypertensive urgency   She feels tired and a little weak, No facial or extremity weakness, just generalized weakness  She is drinking a lot of water and eating 3 meals a day  No pain in chest or trouble breathing  She sometimes feels dizziness but not as much as before  She notes BP this AM with arm cuff from neighbor 150/90, then 140/80 later on  Review of Systems   Constitutional: Positive for fatigue  Respiratory: Negative  Cardiovascular: Negative  Gastrointestinal: Negative  Genitourinary: Negative  Neurological: Positive for dizziness (intermittent, less than before) and weakness (generalized, mild)  Psychiatric/Behavioral: Negative  The following portions of the patient's history were reviewed and updated as appropriate: allergies, current medications, past family history, past medical history, past social history, past surgical history and problem list       Objective:     Physical Exam   Constitutional: She is oriented to person, place, and time  She appears well-developed  No distress  Neck: Neck supple  Normal carotid pulses present  Carotid bruit is not present  Cardiovascular: Normal rate, regular rhythm, normal heart sounds and normal pulses  No LE edema   Pulmonary/Chest: Effort normal and breath sounds normal    Lymphadenopathy:        Head (right side): No submandibular and no tonsillar adenopathy present  Head (left side): No submandibular and no tonsillar adenopathy present  Neurological: She is alert and oriented to person, place, and time  Coordination and gait normal    Psychiatric: She has a normal mood and affect  Vitals reviewed        Vitals:    07/22/19 1205   BP: 142/84   BP Location: Right arm   Patient Position: Sitting   Cuff Size: Adult   Pulse: 76   Temp: 98 7 °F (37 1 °C)   TempSrc: Oral   Weight: 76 6 kg (168 lb 14 oz)   Height: 5' 3" (1 6 m)

## 2019-07-23 LAB
ALBUMIN SERPL ELPH-MCNC: 3.58 G/DL (ref 3.5–5)
ALBUMIN SERPL ELPH-MCNC: 49.7 % (ref 52–65)
ALPHA1 GLOB SERPL ELPH-MCNC: 0.32 G/DL (ref 0.1–0.4)
ALPHA1 GLOB SERPL ELPH-MCNC: 4.4 % (ref 2.5–5)
ALPHA2 GLOB SERPL ELPH-MCNC: 0.95 G/DL (ref 0.4–1.2)
ALPHA2 GLOB SERPL ELPH-MCNC: 13.2 % (ref 7–13)
BETA GLOB ABNORMAL SERPL ELPH-MCNC: 0.4 G/DL (ref 0.4–0.8)
BETA1 GLOB SERPL ELPH-MCNC: 5.5 % (ref 5–13)
BETA2 GLOB SERPL ELPH-MCNC: 9.2 % (ref 2–8)
BETA2+GAMMA GLOB SERPL ELPH-MCNC: 0.66 G/DL (ref 0.2–0.5)
GAMMA GLOB ABNORMAL SERPL ELPH-MCNC: 1.3 G/DL (ref 0.5–1.6)
GAMMA GLOB SERPL ELPH-MCNC: 18 % (ref 12–22)
IGG/ALB SER: 0.99 {RATIO} (ref 1.1–1.8)
PROT PATTERN SERPL ELPH-IMP: ABNORMAL
PROT SERPL-MCNC: 7.2 G/DL (ref 6.4–8.2)

## 2019-07-25 ENCOUNTER — OFFICE VISIT (OUTPATIENT)
Dept: INTERNAL MEDICINE CLINIC | Facility: CLINIC | Age: 59
End: 2019-07-25

## 2019-07-25 ENCOUNTER — PATIENT OUTREACH (OUTPATIENT)
Dept: INTERNAL MEDICINE CLINIC | Facility: CLINIC | Age: 59
End: 2019-07-25

## 2019-07-25 VITALS
DIASTOLIC BLOOD PRESSURE: 92 MMHG | BODY MASS INDEX: 30.39 KG/M2 | HEIGHT: 63 IN | SYSTOLIC BLOOD PRESSURE: 150 MMHG | TEMPERATURE: 98.5 F | WEIGHT: 171.52 LBS | HEART RATE: 88 BPM

## 2019-07-25 DIAGNOSIS — R91.1 INCIDENTAL PULMONARY NODULE: ICD-10-CM

## 2019-07-25 DIAGNOSIS — E78.5 HYPERLIPIDEMIA, UNSPECIFIED HYPERLIPIDEMIA TYPE: ICD-10-CM

## 2019-07-25 DIAGNOSIS — Z12.39 SCREENING FOR BREAST CANCER: ICD-10-CM

## 2019-07-25 DIAGNOSIS — I10 ESSENTIAL HYPERTENSION: ICD-10-CM

## 2019-07-25 DIAGNOSIS — R29.90 STROKE-LIKE SYMPTOMS: Primary | ICD-10-CM

## 2019-07-25 PROCEDURE — 99496 TRANSJ CARE MGMT HIGH F2F 7D: CPT | Performed by: INTERNAL MEDICINE

## 2019-07-25 RX ORDER — LOSARTAN POTASSIUM 100 MG/1
100 TABLET ORAL DAILY
Qty: 90 TABLET | Refills: 1 | Status: SHIPPED | OUTPATIENT
Start: 2019-07-25 | End: 2020-05-21 | Stop reason: SDUPTHER

## 2019-07-25 RX ORDER — MINERAL OIL 100 MG/100ML
OIL ORAL; TOPICAL DAILY
Qty: 1 DEVICE | Refills: 0 | Status: CANCELLED | OUTPATIENT
Start: 2019-07-25

## 2019-07-25 NOTE — PATIENT INSTRUCTIONS
Hipertensión crónica, Cuidados ambulatorios   INFORMACIÓN GENERAL:   La hipertensión crónica  es aby condición a roselia plazo en la cual santana presión arterial es más cece de lo normal  La presión arterial es la fuerza que ejerce la kamla contra las mcgee de las arterias  La hipertensión es aby presión arterial de 140/90 o mucho más elevada  Los siguientes son los síntomas más comunes:   · Dolor de linette     · Visión borrosa     · Dolor en el pecho     · [employer] o debilidad    · Dificultad para respirar     · Sangrados nasales  Busque atención médica inmediata al presentar los siguientes síntomas:   · Rachelle dolor de linette o pérdida de la visión    · Debilidad en un brazo o aby pierna     · Confusión o tiene dificultad para hablar claramente    · Siente aby molestia en santana pecho que parece conchis si lo estuvieran apretando, aby presión, aby pesadez o dolor     · De repente se siente mareado o tiene dificultad para respirar    · Dolor o incomodidad en santana espalda, gaby, mandíbula, estómago o brazo  El tratamiento para la hipertensión crónica  puede incluir un medicamento para baja la presión arterial  También necesitará hacer un cambio en santana estilo de faheem  Se debe jihan colette medicamentos exactamente conchis se lo indicaron  Controle la hipertensión crónica:   · Tómese la presión arterial en santana casa  Siéntese y descanse por 5 minutos antes de tomarse la presión arterial  Extienda santana brazo y apóyelo en aby superficie plana  Santana brazo debe estar a la misma altura que santana corazón  Siga las instrucciones que vienen con el monitor para la presión arterial o tensiómetro  Si es posible tome por lo menos 2 lecturas de la presión cada vez  Tómese la presión arterial por lo [employer] al día a la misma hora todos los días, por ejemplo aby en la mañana y la otra en la noche  Mantenga un registro de colette lecturas de santana presión arterial y llévelo consigo a colette consultas de control  · Consuma menos sodio    No le añada sodio a los alimentos  Limete el consumo de alimentos que contienen un alto nivel de sodio, conchis los alimentos [place], papitas saladas de [employer] air force, y jacinto para sandwich  Mejia proveedor de maricel puede recomendarle que siga el régimen de alimentación denominada enfoque dietético para disminuir la hipertensión (o DASH, por colette siglas en inglés)  El régimen es bajo en sodio, grasas saturadas y las grasas en total  Es cece en potasio, calcio y Kinga  · Cheek Speedy regular  Genoveva actividad física que sobrepase los 30 minutos al día margarita todos los días de la [place]  [place] ayudará a bajar mejia presión arterial  Solicítele a mejia proveedor de [employer] recomiende el plan de actividad física que se ajuste a mejia situación personal      · Limite el consumo de bebidas alcohólicas  Las mujeres deberían limitar mejia consumo de alcohol a 1 trago por día  Los hombres deberían limitar mejia consumo de alcohol a 2 tragos por día  Se considera un trago de alcohol 12 onzas (350 ml) de cerveza, 5 onzas (150 ml) de vino o 1 ½ onza (45 ml) de licor  · No fume  Si usted fuma, nunca es tarde para dejar de fumar  El tabaco puede aumentar mejia presión arterial  El tabaquismo también puede empeorar otras afecciones de maricel que usted padezca las cuales pueden aumentar mejia riesgo de presentar hipertensión  Solicite a mejia proveedor de [employer] información si requiere ayuda para dejar de fumar  Kristy Sabrina a mejia jesis de control con mejia proveedor de [employer] se lo indicaron:  Usted necesitará regresar para que le revisen mejia presión arterial y para que le ordenen otros exámenes de laboratorio  Escriba las preguntas que tenga para que recuerde hacerlas marianela colette consultas médicas  ACUERDOS SOBRE MEJIA CUIDADO:   Usted tiene el derecho de participar en la planificación de mejia cuidado  Aprenda todo lo que pueda sobre mejia condición y conchis darle tratamiento   Discuta con colette médicos colette opciones de tratamiento para juntos decidir el cuidado que usted quiere recibir  Usted siempre tiene el derecho a rechazar santana tratamiento  Esta información es sólo para uso en educación  Santana intención no es darle un consejo médico sobre enfermedades o tratamientos  Colsulte con santana Helene Cabot farmacéutico antes de seguir cualquier régimen médico para saber si es seguro y efectivo para usted  © 2014 [address] is for End User's use only and may not be sold, redistributed or otherwise used for commercial purposes  All illustrations and images included in CareNotes® are the copyrighted property of [employer] , [employer]  or Samson Lieberman

## 2019-07-25 NOTE — PROGRESS NOTES
JHONY/Zackary has met with pt this date as per her request and  has spoken with her via  ID # [id number]  Pt is known to me as she is the spouse of another of our pts who is disabled and under the P A waiver Program   Wife is is only family support and she is under termendous stress due to his care needs , her loss of employment , their already limited finances ( he receives $ 569/month SSI and $ 135  SNAP)and now also feelings of depression  Pt denies any SI or HI  Pt is receptive to ESTELA Quevedo and with pt's permission call made to [facility] and they will return call either [facility] or tomorrow to set up an appointment  S has also provided pt with the # for Crisis and encouraged her to call or go to ED if symptoms worsen  Pt again denies any SI/HI   Supportive counseling provided  Sw informed her the if any temporary housing is needed for her hubansd we can help (He would require a Nursing home)  Pt denies that there are any family members to help them   Her children can not help and his family in [place] are unable as well  With pt's permission call made to Renee Coon [phone] [address] [facility] COURTNEY for [address]  She has previously refered to for the  and to help with Housing options/ resources as possible  SW to remain available for support and to assist as indicted

## 2019-07-25 NOTE — PROGRESS NOTES
TCM Call (since 6/24/2019)     Date and time call was made  7/22/2019 12:44 PM    Hospital care reviewed  Records reviewed    Patient was hospitialized at  [facility]    Date of Admission  07/18/19    Date of discharge  07/20/19    Diagnosis  STROKE LIKE SYMPTOMS    Disposition  Home <img src='C:FILES (X86)    Were the patients medications reviewed and updated  Yes    Current Symptoms  Fatigue; Weakness    Weakness severity  Mild    Fatigue severity  Mild      TCM Call (since 6/24/2019)     Post hospital issues  Poor medication adherence <img src='C:FILES (X86)    Should patient be enrolled in anticoag monitoring? No    Scheduled for follow up? Yes <img src='C:FILES (X86)    Did you obtain your prescribed medications  Yes <img src='C:FILES (X86)    Do you need help managing your prescriptions or medications  No <img src='C:FILES (X86)    Is transportation to your appointment needed  No <img src='C:FILES (X86)    I have advised the patient to call PCP with any new or worsening symptoms  Lizzie Hammond LPN    Living Arrangements  Spouse or Significiant other    Are you recieving any outpatient services  No    Are you recieving home care services  No    Are you using any community resources  No    Have you fallen in the last 12 months  No    Interperter language line needed  Yes <img src='C:FILES (X86)    Counseling  Patient    Counseling topics  instructions for management; Importance of RX compliance    Comments  PT  IS S/P STROKE A FEW YEARS AGO IN [place], NON COMPLIANT WITH TAKING HER MEDS            INTERNAL MEDICINE FOLLOW-UP OFFICE VISIT  [facility]  [address] [place] [address]    NAME: Lb Hughes  AGE: 60 y o  SEX: female    DATE OF ENCOUNTER: 7/25/2019    Assessment and Plan     Diagnoses and all orders for this visit:    Stroke-like symptoms    Essential hypertension  -     losartan (COZAAR) 100 MG tablet;  Take 1 tablet (100 mg total) by mouth daily    Incidental pulmonary nodule    Hyperlipidemia, unspecified hyperlipidemia type    Screening for breast cancer  -     Mammo screening bilateral w cad; Future    Other orders  -     Cancel: Ambulatory referral to Rheumatology; Future  -     Cancel: Blood Pressure Monitor NASH; by Does not apply route daily        Orders Placed This Encounter   Procedures    Mammo screening bilateral w cad       1  Stroke like symptoms - Symptoms have since evaluated  Patient has an MRI which r/o acute stroke  MRI  Of the t spine and c spine were also performed which showed no demyelinating process is unlikely  Neurology recommened Rheum evaluation  ESR 67, CRP 14, CASIMIRO, RF negative  Can hold off on Rheum workup at this time given negative antibody testing  Will repeat ESR, CRP in the future to see if down trending vs acutely worsening  Wll discontinue ASA at this time as unnecessary given no acute stroke    2  HTN- Currently on Losartan 25  and amlodipine 10   BP at home has consistently been 968-523 systolic the past 1 week    Will increase losartin to 100 mg    3- HLD- C/w atorvastatin    4- Incidental pulm nodule - h/o of smoking abuse, quit 15 yrs ago  CT chest recommened in 12 months  HM- Referral for Mammogram    - Counseling Documentation: patient was counseled regarding: diagnostic results, instructions for management, risk factor reductions, prognosis, patient and family education, impressions, risks and benefits of treatment options and importance of compliance with treatment  - Counseling Time: counseling time more than 50% of visit: 30 minutes  - Barriers to treatment: no  - Medication Side Effects: Adverse side effects of medications were reviewed with the patient/guardian today      Chief Complaint     Chief Complaint   Patient presents with    Transition of Care Management     hypertension       History of Present Illness     60year old female with PMH of breast CA s/p resection, chemo, HTN, HLD who presents as a transtion of care  Patient was recently discharged 5 days ago  Admitted due to R sided numbness, slurred speech  Symptoms resolved prior to admission  MRI was performed which showed no acute stroke  Was evalauted by Neurology in the inpatient setting  Also was noted to have hypertensive urgency with SBP >230  The following portions of the patient's history were reviewed and updated as appropriate: allergies, current medications, past family history, past medical history, past social history, past surgical history and problem list     Review of Systems     Review of Systems   Constitutional: Negative for activity change, appetite change, chills, diaphoresis, fever and unexpected weight change  HENT: Negative for congestion, facial swelling and rhinorrhea  Eyes: Negative for photophobia and visual disturbance  Respiratory: Negative for cough, shortness of breath and wheezing  Cardiovascular: Negative for chest pain and palpitations  Gastrointestinal: Negative for abdominal pain, blood in stool, constipation, diarrhea, nausea and vomiting  Genitourinary: Negative for decreased urine volume, difficulty urinating, dysuria, flank pain, frequency, hematuria and urgency  Musculoskeletal: Negative for arthralgias, back pain, joint swelling and myalgias  Neurological: Negative for dizziness, syncope, facial asymmetry, light-headedness, numbness and headaches  Psychiatric/Behavioral: Negative for confusion and decreased concentration  The patient is not nervous/anxious          Active Problem List     Patient Active Problem List   Diagnosis    Acute low back pain due to trauma    Benign essential hypertension    Vision disturbance    Hypertensive urgency    Noncompliance with medications    Stroke-like symptoms    Incidental pulmonary nodule    Hypertension    Hyperlipemia       Objective     /92 (BP Location: Right arm, Patient Position: Sitting, Cuff Size: Adult) Pulse 88   Temp 98 5 °F (36 9 °C) (Oral)   Ht 5' 3" (1 6 m)   Wt 77 8 kg (171 lb 8 3 oz)   LMP  (LMP Unknown)   BMI 30 38 kg/m²     Physical Exam   Constitutional: She is oriented to person, place, and time  She appears well-developed and well-nourished  No distress  HENT:   Head: Normocephalic and atraumatic  Nose: Nose normal    Mouth/Throat: Oropharynx is clear and moist  No oropharyngeal exudate  Eyes: Pupils are equal, round, and reactive to light  EOM are normal  Right eye exhibits no discharge  Left eye exhibits no discharge  No scleral icterus  Neck: Normal range of motion  Neck supple  No JVD present  Cardiovascular: Normal rate, regular rhythm, normal heart sounds and intact distal pulses  Exam reveals no gallop and no friction rub  No murmur heard  Pulmonary/Chest: Effort normal and breath sounds normal  No respiratory distress  She has no wheezes  She has no rales  She exhibits no tenderness  Abdominal: Soft  Bowel sounds are normal  She exhibits no distension and no mass  There is no tenderness  There is no rebound and no guarding  Musculoskeletal: Normal range of motion  She exhibits no edema, tenderness or deformity  Lymphadenopathy:     She has no cervical adenopathy  Neurological: She is alert and oriented to person, place, and time  She has normal reflexes  Skin: Skin is warm and dry  No rash noted  She is not diaphoretic  No erythema  No pallor  Psychiatric: She has a normal mood and affect   Her behavior is normal        Pertinent Laboratory/Diagnostic Studies:  CBC:   Lab Results   Component Value Date/Time    WBC 7 15 07/19/2019 05:53 AM    RBC 3 83 07/19/2019 05:53 AM    HGB 12 0 07/19/2019 05:53 AM    HCT 36 7 07/19/2019 05:53 AM    MCV 96 07/19/2019 05:53 AM    MCH 31 3 07/19/2019 05:53 AM    MCHC 32 7 07/19/2019 05:53 AM    RDW 13 2 07/19/2019 05:53 AM    MPV 11 1 07/19/2019 05:53 AM     07/19/2019 05:53 AM    NRBC 0 07/18/2019 12:58 PM    NEUTOPHILPCT 63 07/18/2019 12:58 PM    LYMPHOPCT 22 07/18/2019 12:58 PM    MONOPCT 11 07/18/2019 12:58 PM    EOSPCT 2 07/18/2019 12:58 PM    BASOPCT 1 07/18/2019 12:58 PM    NEUTROABS 5 54 07/18/2019 12:58 PM    LYMPHSABS 1 90 07/18/2019 12:58 PM    MONOSABS 0 95 07/18/2019 12:58 PM    EOSABS 0 18 07/18/2019 12:58 PM     Chemistry Profile:   Lab Results   Component Value Date/Time    K 3 5 07/19/2019 05:53 AM     07/19/2019 05:53 AM    CO2 25 07/19/2019 05:53 AM    BUN 16 07/19/2019 05:53 AM    CREATININE 0 90 07/19/2019 05:53 AM    GLUC 105 07/19/2019 05:53 AM    GLUF 81 01/10/2018 10:19 AM    CALCIUM 9 2 07/19/2019 05:53 AM    AST 19 07/18/2019 12:58 PM    ALT 24 07/18/2019 12:58 PM    ALKPHOS 130 (H) 07/18/2019 12:58 PM    EGFR 70 07/19/2019 05:53 AM     CBC:   Results from last 6 Months   Lab Units 07/19/19  0553 07/18/19  1258   WBC Thousand/uL 7 15 8 66   RBC Million/uL 3 83 4 04   HEMOGLOBIN g/dL 12 0 12 9   HEMATOCRIT % 36 7 38 4   MCV fL 96 95   MCH pg 31 3 31 9   MCHC g/dL 32 7 33 6   RDW % 13 2 12 8   MPV fL 11 1 11 0   PLATELETS Thousands/uL 269 264   NRBC AUTO /100 WBCs  --  0   NEUTROS PCT %  --  63   LYMPHS PCT %  --  22   MONOS PCT %  --  11   EOS PCT %  --  2   BASOS PCT %  --  1   NEUTROS ABS Thousands/µL  --  5 54   LYMPHS ABS Thousands/µL  --  1 90   MONOS ABS Thousand/µL  --  0 95   EOS ABS Thousand/µL  --  0 18       Current Medications     Current Outpatient Medications:     amLODIPine (NORVASC) 10 mg tablet, Take 1 tablet (10 mg total) by mouth daily, Disp: 60 tablet, Rfl: 0    atorvastatin (LIPITOR) 40 mg tablet, Take 1 tablet (40 mg total) by mouth every evening, Disp: 60 tablet, Rfl: 0    losartan (COZAAR) 100 MG tablet, Take 1 tablet (100 mg total) by mouth daily, Disp: 90 tablet, Rfl: 1    Health Maintenance     Health Maintenance   Topic Date Due    CRC Screening: Colonoscopy  1960    Pneumococcal Vaccine: Pediatrics (0 to 5 Years) and At-Risk Patients (6 to 59 Years) (1 of 3 - PCV13) 07/31/1965    BMI: Followup Plan  07/31/1977    Depression Screening PHQ  02/14/2019    MAMMOGRAM  03/26/2019    INFLUENZA VACCINE  07/01/2019    BMI: Adult  07/22/2020    Pneumococcal Vaccine: 65+ Years (1 of 2 - PCV13) 07/31/2024    DTaP,Tdap,and Td Vaccines (2 - Td) 02/08/2028    Hepatitis C Screening  Completed    HEPATITIS B VACCINES  Aged Out     Immunization History   Administered Date(s) Administered    INFLUENZA 10/27/2017    Influenza Quadrivalent, 6-35 Months IM 10/27/2017    Tdap 02/08/2018       Keisha MADDOX  Internal Medicine PGY-2  7/25/2019 10:03 AM

## 2019-08-14 ENCOUNTER — TELEPHONE (OUTPATIENT)
Dept: INTERNAL MEDICINE CLINIC | Facility: CLINIC | Age: 59
End: 2019-08-14

## 2019-09-16 ENCOUNTER — PATIENT OUTREACH (OUTPATIENT)
Dept: INTERNAL MEDICINE CLINIC | Facility: CLINIC | Age: 59
End: 2019-09-16

## 2019-09-16 NOTE — UTILIZATION REVIEW
URGENT/EMERGENT  INPATIENT/SPU AUTHORIZATION REQUEST    Date: 09/16/19            # Pages in this Request:     x New Request   Additional Information for PA#:     Office Contact Name:  Fuad Allred Title: Utilization Review, [place] Nurse     Phone: [phone]  Ext  Availability (Date/Time): Wednesday - Friday 8 am- 4 pm    x Inpatient Review  SPU Review        Current       x Late Pick-up   · How your facility was first notified of the Late Pick-up: Paths Letter   · When your facility was first notified of the Late Pick-up (date):9/3/2019         RECIPIENT INFORMATION    Recipient ID#: [id number]   Recipient Name: Chaparrita Lam     YOB: 1960  60 y o  Recipient Alias:     Gender:   Male x Female Medicaid Eligibility ([address] [place] [address]): INSURANCE INFORMATION    (All other private or governmental health insurance benefits must be utilized prior to billing the [place] Program)    Was this admission the result of an MVA, other accident, assault, injury, fall, gunshot, bite etc ? Yes x No                   If yes, provide a brief description of the incident  Does the recipient have other insurance coverage? Yes x No        Insurance Company Name/Policy #      Did that insurance pay on this claim? Yes  No        Did that insurance deny this claim? Yes  No    If yes, reason for denial:      Does the recipient have Medicare? Yes x No        Did Medicare exhaust prior to this admission? Yes  No        Did Medicare partially pay this claim? Yes  No        Did that insurance deny this claim? Yes  No    If yes, reason for denial:          Was the recipient a prisoner at the time of admission?   Yes x No            PROVIDER INFORMATION    Hospital Name: [address] [place] [address] Provider ID#:[id number]    Admitting Physician Name: Elmer Duke Provider ID#: [id number]        ADMISSION INFORMATION    Type of Admission: (please choose one)    x ED      Direct    If yes, from where? Transfer    If yes, transferring hospital (inpatient, rehab, or psych) Provider Name/Provider ID#: Admission Floor or Unit Type: Med Surg     Dates/Times:        ED Date/Time: 7/18/2019 11:45 AM        Observation Date/Time:         Admission Date/Time: 7/18/19 1712        Discharge or Transfer Date/Time: 7/20/2019 12:49 PM        DIAGNOSIS/PROCEDURE CODES    Primary Diagnosis Code/Primary Diagnosis Code description:  G45 9 Transient cerebral ischemic attack, unspecified     I16 1 Hypertensive emergency   M25 50 Pain in unspecified joint     I10 Essential (primary) hypertension  Additional Diagnosis Code(s) and Description(s)-(up to three additional codes):    Procedure Code (one) and description:        CLINICAL INFORMATION - PRIOR ADMISSION ONLY    Is there a prior admission with a discharge date within 30 days of the date of this admission?    x No (Proceed to the next section - "Clinical Information - General Review Checklist:)      Yes (Provide the following information)     Prior admission dates:    MA Prior Authorization Number:        Review Outcome:     Diagnosis Code(s)/Description:    Procedure Code/Description:    Findings:    Treatment:    Condition on Discharge:   Vitals:    Labs:   Imaging:   Medications: Follow-up Instructions:    Disposition:        CLINICAL INFORMATION - GENERAL REVIEW CHECKLIST    EMERGENCY DEPARTMENT: (Proceed to "ADMISSION" if Direct Admission)    Presenting Signs/Symptoms:60year old female, presented to the ED from home via car  Admitted as INPATIENT due to stroke like symptoms  Started around 7AM   with right-sided numbness, headache and tingling in her face arm and leg in addition to slurred speech and generalized joint pains bilaterally  patient checked her blood pressure and noticed it was elevated  patient came into the ED for further evaluation around noon    In the ED, temperature 97 9, pulse 67, respirations 20, blood pressure 236/92, SpO2 98% on room air   Patient received 10 mg of labetalol with improvement in her blood pressure to 123 systolic and reported improvement in all of her symptoms  CTA was negative for acute abnormality  * Stroke-like symptoms:  Reported generalized weakness but R worse than left, slurred speech, numbness along R face, R arm, R leg  Present at 7am this morning with occipital headache, improved with labetalol in ED, per pt  Will continue with permissive hypertension given stroke like symptoms with labetalol 5mg q4h PRN SBP >200  Will restart home lotrel (amlodipine-benazepril 10-20) tomorrow AM  Pt reports taking 10mg amlodipine this AM   Admitted via stroke pathway  CTA without acute intracranial abnormality  Will obtain lipid panel, Hgb A1c  Echo cardiogram   Monitor on tele  Neurology consult  Aspirin 325 to be given now  Aspirin 81mg daily tomorrow  MRI ordered  Medication/treatment prior to arrival in the ED:    Past Medical History:    Active Ambulatory Problems     Diagnosis Date Noted    Acute low back pain due to trauma 02/08/2018    Benign essential hypertension 10/27/2017    Vision disturbance 10/27/2017    Hypertensive urgency 05/01/2018    Noncompliance with medications 05/02/2018    Hyperlipemia 07/25/2019     Past Medical History:   Diagnosis Date    Back ache     Cancer ([facility] [address] )     Hypertension        Clinical Exam:    Initial Vital Signs: (Temp, Pulse, Resp, and BP)   ED Triage Vitals   Temperature Pulse Respirations Blood Pressure SpO2   07/18/19 1209 07/18/19 1209 07/18/19 1209 07/18/19 1225 07/18/19 1209   97 9 °F (36 6 °C) 67 20 (!) 236/92 98 %      Temp Source Heart Rate Source Patient Position - Orthostatic VS BP Location FiO2 (%)   07/18/19 1209 07/18/19 1209 07/18/19 1209 07/18/19 1209 --   Oral Monitor Lying Right arm       Pain Score       07/18/19 1209       Worst Possible Pain           Pertinent Repeat Vital Signs: (include times they were obtained)  Date/Time   Temp   Pulse   Resp   BP   SpO2   O2 Device   Patient Position - Orthostatic VS   07/19/19 0715   98 5 °F (36 9 °C)   68   18   115/53   99 %   None (Room air)   Lying   07/19/19 03:13:03      80   18   166/75   94 %         07/18/19 23:20:52   98 2 °F (36 8 °C)      18   166/75   94 %         07/18/19 2112                  None (Room air)      07/18/19 20:22:55            157/88            07/18/19 1900            171/83Abnormal             07/18/19 1830            185/104Abnormal             07/18/19 18:02:53   98 5 °F (36 9 °C)      18   178/88Abnormal             07/18/19 1602      84   17   175/76Abnormal    93 %   None (Room air)      07/18/19 1416      68   20   166/70   98 %   None (Room air)   Lying   07/18/19 1338            171/76Abnormal             07/18/19 1330      73   20   171/76Abnormal    98 %   None (Room air)   Lying   07/18/19 1244      74   20   138/102Abnormal    97 %   None (Room air)   Lying       Pertinent Sustained Findings: (include times they were obtained)    Weight in Kilograms:  Wt Readings from Last 1 Encounters:   07/25/19 77 8 kg (171 lb 8 3 oz)       Pertinent Labs (results):  Results from last 7 days   Lab Units 07/19/19  0553 07/18/19  1258   WBC Thousand/uL 7 15 8 66   HEMOGLOBIN g/dL 12 0 12 9   HEMATOCRIT % 36 7 38 4   PLATELETS Thousands/uL 269 264   NEUTROS ABS Thousands/µL  --  5 54            Results from last 7 days   Lab Units 07/19/19  0553 07/18/19  1258   SODIUM mmol/L 136 135*   POTASSIUM mmol/L 3 5 3 5   CHLORIDE mmol/L 104 103   CO2 mmol/L 25 27   ANION GAP mmol/L 7 5   BUN mg/dL 16 13   CREATININE mg/dL 0 90 0 85   EGFR ml/min/1 73sq m 70 75   CALCIUM mg/dL 9 2 9 3           Results from last 7 days   Lab Units 07/18/19  1258   AST U/L 19   ALT U/L 24   ALK PHOS U/L 130*   TOTAL PROTEIN g/dL 8 2   ALBUMIN g/dL 3 4*   TOTAL BILIRUBIN mg/dL 0 39            Results from last 7 days   Lab Units 07/19/19  0553 07/18/19  1259 GLUCOSE RANDOM mg/dL 105 93      Results from last 7 days   Lab Units 07/19/19  0553   HEMOGLOBIN A1C % 5 5   EAG mg/dl 111           Results from last 7 days   Lab Units 07/18/19  1351   PROTIME seconds 13 6   INR   1 08   PTT seconds 30       Radiology (results):  07/18/2019 @ 1928  MRI brain:   Extensive periventricular and subcortical white matter lesions possibly representing microangiopathic disease, advanced for age   Vasculitis or demyelinating process could also have this appearance in the appropriate clinical context  2   No evidence of acute infarct, hemorrhage or mass      07/18/2019 @  1501  CTa head/neck:  1   No acute intracranial CT abnormality  2   Patent major vasculature of [place] without critical stenosis  3   Mild atherosclerotic disease of cervical carotid bifurcation and proximal cervical internal carotid arteries without significant stenosis  4    Incidentally noted right upper lobe 3 mm nodule  EKG (results):  71, NSR    Other tests (results):    Tests pending final results:    Treatment in the ED:   Medication Administration from 07/18/2019 1145 to 07/18/2019 1704       Date/Time Order Dose Route Action     07/18/2019 1259 Labetalol HCl (NORMODYNE) injection 10 mg 10 mg Intravenous Given     07/18/2019 1429 iohexol (OMNIPAQUE) 350 MG/ML injection (MULTI-DOSE) 85 mL 85 mL Intravenous Given           Other treatments:      Change in condition while in the ED:     Response to ED Treatment:          OBSERVATION: (Proceed to "ADMISSION" if Direct Admission)    Orders written during the observation period  Meds Name, dose, route, time, how may doses given:  PRN Meds Name, dose, route, time, how many doses given within the first 24 hrs :  IVs Type, rate, and total amt   ordered/given:  Labs, imaging, other:  Consults and findings:    Test Results during the observation period  Pertinent Lab tests (dates/results):  Culture results (blood, urine, spinal, wound, respiratory, etc ):  Imaging tests (dates/results):  EKG (dates/results): Other test (dates/results):  Tests pending (dates/results):    Surgical or Invasive Procedures during the observation period  Name of surgery/procedure:  Date & Time:  Patient Response:  Post-operative orders:  Operative Report/Findings:    Response to Treatment, Major Change in Condition, Major Charge in Treatment during the observation period          ADMISSION:    DIRECT Admissions Only:    · Presenting Signs/Symptoms:   ·   · Medication/treatment prior to arrival:  ·   · Past Medical History:  ·   · Clinical Exam on admission:  ·   · Vital Signs on admission: (Temp, Pulse, Resp, and BP)  ·   · Weight in kilograms:     ALL Admissions:    Admission Orders and Other Orders written within the first 24 hrs after admission    dysphagia eval > regular diet  ECHO: pending  TELM  Dani SCDs  Neuro checks q1hx4, q2hx4 then q4h   GCS       Meds Name, dose, route, time, how may doses given:  acetaminophen 650 mg Oral Q6H PRN   amLODIPine 10 mg Oral Daily   aspirin 81 mg Oral Daily   atorvastatin 40 mg Oral QPM   heparin (porcine) 5,000 Units Subcutaneous Q8H [address]   Labetalol HCl 5 mg Intravenous Q4H PRN       PRN Meds Name, dose, route, time, how many doses given within the first 24 hrs :  IVs Type, rate, and total amt  ordered/given:  Labs, imaging, other:  Lyme antibody 7/19   Ref Range & Units 7/19/19 1623   LYME AB IGG 0 00 - 0 79 0 05    Comment: NEGATIVE(0 00-0 79)-Absence of detectable Borrelia IgG Antibodies  A negative result does not exclude the possibility of Borrelia infection  If early Lyme disease is suspected,a second sample should be collected & tested 4 weeks after initial testing  LYME AB IGM 0 00 - 0 79 0 31    Comment: NEGATIVE (0 00-0 79)-Absence of detectable Borrelia IgM antibodies  A negative result does not exclude the possibility of Borrelia infection   If early lyme disease is suspected, a second sample should be collected & tested 4 weeks after initial testing  CRP - 7/19 - 14 0    Consults and findings: Neurology - 7/19 -    I personally reviewed her MRI brain and there is no evidence for acute ischemic stroke  At this point in time I have greater concern for rheumatologic disease verses spinal stenosis  There is a reasonably significant component of white matter disease on her MRI brain  This does raise the question of possible demyelinating disease which should be evaluated as well      -MRI C and T-spine with and without contrast  -will check a in inflammatory rheumatologic panel    Test Results after admission  Pertinent Lab tests (dates/results):  Culture results (blood, urine, spinal, wound, respiratory, etc ):  Imaging tests (dates/results):  MRI Thoracic Spine - 7/19 - No evidence of demyelinating lesion in the thoracic cord  MRI Cervical Spine - 7/19 - 1  No evidence of demyelinating process in the cervical cord  2   Chronic disc degenerative change, most prominent from C3-4 to C5-6  EKG (dates/results): Other test (dates/results):  Tests pending (dates/results):    Surgical or Invasive Procedures  Name of surgery/procedure:  Date & Time:  Patient Response:  Post-operative orders:  Operative Report/Findings:    Response to Treatment, Major Change in Condition, Major Charge in Treatment anytime during admission  While inpatient, patient continued to complain of multiple musculoskeletal complaints in her back shoulder hands  She did have continued right upper extremity 4/5 weakness  Patient was admitted under stroke pathway, Echo was benign, MRI showed microangiopathic disease concerning for possible demyelination disorder  Neurology recommended MRI C-spine and T-spine to rule out demyelination as well as multiple serologies  MRI showed no demyelination  Plan for outpatient follow-up with Rheumatology    Disposition on Discharge  Home, Rehab, SNF, LTC, Shelter, etc : Home/Self Care    Cease to Breathe (CTB)  If a patient expires during an admission, in addition to the above information, please include:    Summary/timeline of the patient's decline in condition:    Medications and treatment:    Patient response to treatment:    Date and time patient ceased to breathe:        Is there a Readmission that follows this admission? Yes x No    If yes, provide dates:          InterQual Review    InterQual Criteria Met: x Yes  No  N/A        Please include the InterQual Review, InterQual year/version used, and the criteria selected:   Created Using Review Status Review Entered   [employer] In Primary 7/19/2019 14:32       Criteria Set Name - Subset   LOC:Acute Adult-General Medical       Criteria Review   REVIEW SUMMARY     Patient: Soila Richards  Review Number: [id number]  Review Status: In Primary  Criteria Status: Acute Met  Day Met: Episode Day 1     Condition Specific: Yes        OUTCOMES  Outcome Type: Primary           REVIEW DETAILS     Product: [employer] [place] Adult  Subset: General Medical      (Symptom or finding within 24h)         (Excludes PO medications unless noted)          [X] Select Day, One:              [X] Episode Day 1, One:                  [X] ACUTE, >= One:                      [X] Neurological, One:                          [X] Other neurological diagnosis, actual or suspected, >= One:                              [X] Neurological disorder, new onset, All:                                  [X] Neurological disorder, new onset                                  [X] Finding, >= One:                                      [X] Paresis or paralysis of extremity                                  [X] Intervention, Both:                                      [X] Central nervous system (CNS) imaging study, scheduled or performed within 24h                                      [X] Neurological assessment at least 6x/24h     Version: [employer] 2018  2  [employer] and [employer]  © 2018 [employer] and/or one of its subsidiaries  All Rights Reserved  CPT only © 2017 American Medical Association  All Rights Reserved             PLEASE SUBMIT THE COMPLETED FORM TO THE DEPARTMENT OF HUMAN SERVICES - DIVISION OF CLINICAL  REVIEW VIA FAX -[phone] or VIA E-MAIL TO [email or web address]    Signature: [place] Zoe Date:  09/16/19    Confidentiality Notice: The documents accompanying this telecopy may contain confidential information belonging to the sender  The information is intended only for the use of the individual named above  If you are not the intended recipient, you are hereby notified  That any disclosure, copying, distribution or taking of any telecopy is strictly prohibited

## 2019-09-17 ENCOUNTER — PATIENT OUTREACH (OUTPATIENT)
Dept: INTERNAL MEDICINE CLINIC | Facility: CLINIC | Age: 59
End: 2019-09-17

## 2019-09-17 DIAGNOSIS — Z59.9 HOUSING OR ECONOMIC PROBLEM: Primary | ICD-10-CM

## 2019-09-17 SDOH — ECONOMIC STABILITY - INCOME SECURITY: PROBLEM RELATED TO HOUSING AND ECONOMIC CIRCUMSTANCES, UNSPECIFIED: Z59.9

## 2019-09-18 ENCOUNTER — PATIENT OUTREACH (OUTPATIENT)
Dept: INTERNAL MEDICINE CLINIC | Facility: CLINIC | Age: 59
End: 2019-09-18

## 2019-09-18 ENCOUNTER — TELEPHONE (OUTPATIENT)
Dept: INTERNAL MEDICINE CLINIC | Facility: CLINIC | Age: 59
End: 2019-09-18

## 2019-09-18 NOTE — PROGRESS NOTES
JHONY joined meeting with Kassandra Escamilla this date  Pt,  and his [place] Waiver aide were present  Pt's aide assisted with translation per pt's request  This couple is facing possible eviction due to continuing inability to pay there rent  Pt reports through [facility] assistance she has applied for Unemployment has received help from the [employer] which has covered this months rent  A Rastafarian in [facility] helped last months through the [address] [facility] assistance  JHONY has advised they consider other housing option,  Wife declines the [place] /room option  JHONY has asked what their Plan B will because if they continue to not have enough $ for rent they will be evicted  He might then need a SNF and she a shelter  Pt reports that her niece will be moving to [place] [facility] from [place] in October if nothing else works she has said they will be able to live with her niece  JHONY has encouraged pt to apply to th [address] , if not approved for SSI/SSD or feels better and can work have Waiver increase hours so she can work  Also encouraged doing as many housing applications as possible   [facility] will contin to follow and as sit with process as possible

## 2019-09-18 NOTE — PROGRESS NOTES
JHONY spoke with pt via the phone and  ID # [id number]  SW has inquired if pt and  may want to consider try the Minisrty through  [facility] which rents rooms to see if this  would be more affordable  Pt indicates she does not seem to think this will work as they need two bedrooms and he has a queen size bed he needs and a bedside commode  There would not be enough room for the 2 of them and their things  She has been asking programs and family for help with her rent and our  is attempting to help with resources as well  Her  is on the A Waiver Program and her aide has been helping with these many applications  SW has suggested  Pt confirm he is  on Bere and get an estimate of how long they will need to wait  SW has perviously suggested pt/ get on as many housing list as possible  Wife relates  will not consider any high rises as due to fear of heights  With pt call made to [facility] and spoke with Cassie Hernández [phone]  She confirms applications are pending with Social Security  she would let SSI know that this is an  Urgent situation  Their applications are pending in [place]   It will take approximately 2 weeks for husbands decision which may give him a slight increase in $ and 1-2 months for wife's decision  Wife has been cautioned that  this  can be denied as many SSI/SSD application often  Initially are  denied  Pt states she understand same

## 2019-09-18 NOTE — TELEPHONE ENCOUNTER
07:37am- [facility] was contacted to confirm the family coming in for help with unemployment benefit application

## 2019-09-18 NOTE — PROGRESS NOTES
Visited with Patient and family on 9/17/2019 at 1:30pm- Received referral from [employer] [employer] to see how we might be able to assist with patients housing issue  Upon arrival I was met by the patient Aide who is bilingual  The Aide sat with the patients wife and I as we discussed the families issues  Outcome- Patients wife is unemployed and has some medical issues  Patients wife is seeking to receive her own Social Security for being disabled  The Patient and Wife are facing eviction due to lack of funds   At this time they are short 400 dollars on Septembers rent  Assessment- Upon arrival patient was lying on a couch with a german under him to prevent urinating on the couch  House appeared to be very clean, no odors of urine  Patients wife was tearful, and seemed to not know what to do about the financial hardship  Patients wife had a job but was let go  I asked the patients wife did she apply for Unemployment Benefits since she was let go  She stated No  She didn't know if she could  Today the patients wife will be arriving so that she can get assistance with the application for benefits

## 2019-09-19 ENCOUNTER — PATIENT OUTREACH (OUTPATIENT)
Dept: INTERNAL MEDICINE CLINIC | Facility: CLINIC | Age: 59
End: 2019-09-19

## 2019-09-19 NOTE — PATIENT INSTRUCTIONS
Patient arrived at office yesterday with his wife and Aide  Patients wife stated that they were still 300 dollars short on the rent  Some calls were made by myself to see how I would be able to find funding for the patient and wife  A call was made to the [employer] in [place] that agreed to give the patient and wife 300 dollars to help pay the rent  Patients wife was asked about what she would do about the rent for the months to come  She stated that she had a Plan B and that her niece was moving to town and that she and her  would not have to face homelessness once the niece arrived  Also, while the patient was here and application for unemployment was done online and a case was opened in her name  It is undetermined how long the response time is from [address], so the family was instructed to call when the determination letter arrives in the mail

## 2019-09-20 ENCOUNTER — TELEPHONE (OUTPATIENT)
Dept: INTERNAL MEDICINE CLINIC | Facility: CLINIC | Age: 59
End: 2019-09-20

## 2019-09-20 NOTE — TELEPHONE ENCOUNTER
At [phone] a Phone call went out to the family as a courtesy to not miss the appointment for the funding through the [employer] on Monday at 10 am  I spoke to the Patients Aide who translated to the patient in [place]

## 2019-09-23 ENCOUNTER — PATIENT OUTREACH (OUTPATIENT)
Dept: INTERNAL MEDICINE CLINIC | Facility: CLINIC | Age: 59
End: 2019-09-23

## 2019-09-23 NOTE — PROGRESS NOTES
07:50 am Phone call was made to speak to Patients Arianna Seleneivon to remind her that the family had an appointment at [facility] to  some funds that I was able to get for them to help pay the remainder of this months rent  Patients wife is a bit unsure of how she will handle next months rent  She is hoping that her niece is still moving to the area and can help her with her financial issues

## 2019-09-24 ENCOUNTER — TELEPHONE (OUTPATIENT)
Dept: INTERNAL MEDICINE CLINIC | Facility: CLINIC | Age: 59
End: 2019-09-24

## 2019-09-24 NOTE — PROGRESS NOTES
0130 09/23/19 Patient called stating that she received her determination letter from unemployment  The patient explained to me that her employer had let her go before it was determined that she was sick  When asked if she applied for benefits the patient told me no and that she didn't know she could  I explained to her if she was fired she could apply  I assisted the patient in applying last week which resulted in her receiving 270 dollars a week before taxes  Her determination is 240 dollars a week  This will help the family temporarily until the benefits are exhausted  09/24/19 0730 A call went out to patient about the Direct Deposit form for her benefits  The patient will be arriving today to bring the form into the office for assistance in faxing it back to Unemployment Compensation

## 2019-09-25 ENCOUNTER — TELEPHONE (OUTPATIENT)
Dept: INTERNAL MEDICINE CLINIC | Facility: CLINIC | Age: 59
End: 2019-09-25

## 2019-09-25 ENCOUNTER — PATIENT OUTREACH (OUTPATIENT)
Dept: INTERNAL MEDICINE CLINIC | Facility: CLINIC | Age: 59
End: 2019-09-25

## 2019-09-25 NOTE — PROGRESS NOTES
0730 am A call went out to the [facility] home about returning her information for Direct Deposit for her Unemployment Benefits  I arrived at the Home around 0800 am and spoke to the patients Maribel Hemphill and returned that patients information with a copy of all submitted paperwork

## 2019-09-26 ENCOUNTER — PATIENT OUTREACH (OUTPATIENT)
Dept: INTERNAL MEDICINE CLINIC | Facility: CLINIC | Age: 59
End: 2019-09-26

## 2019-09-26 ENCOUNTER — TELEPHONE (OUTPATIENT)
Dept: INTERNAL MEDICINE CLINIC | Facility: CLINIC | Age: 59
End: 2019-09-26

## 2019-09-26 NOTE — TELEPHONE ENCOUNTER
5am Phone call was made to the Patients Stevie Gallegos asking if everything was ok and was there anything that I could further assist them with  I asked if the patient had a shower chair and if he needed anything that could help his wife provided better care  Vita Ny expressed that at this time all was well  But she did not know whether or not the patients unemployment would be enough to sustain family considering that they have a huge electric bill and asked if there was any assistance that could aid with the bill  I assured the aid that I would look into available resources

## 2019-10-01 ENCOUNTER — TELEPHONE (OUTPATIENT)
Dept: INTERNAL MEDICINE CLINIC | Facility: CLINIC | Age: 59
End: 2019-10-01

## 2019-10-01 NOTE — TELEPHONE ENCOUNTER
A follow call was made this morning to check on the family  I was told by the patients Aide that the family is still having a financial issue  Due to the fact that the patient did not pay One Gerry dollars to her Minna Irene that was owed, the [employer] has not paid out the 300 to help with the rent  The patient was suppose to pay the one hundred dollars first before she could receive the cash to help  As of this point the patient has not complied

## 2019-10-07 ENCOUNTER — PATIENT OUTREACH (OUTPATIENT)
Dept: INTERNAL MEDICINE CLINIC | Facility: CLINIC | Age: 59
End: 2019-10-07

## 2019-10-07 ENCOUNTER — TELEPHONE (OUTPATIENT)
Dept: INTERNAL MEDICINE CLINIC | Facility: CLINIC | Age: 59
End: 2019-10-07

## 2019-10-07 NOTE — PROGRESS NOTES
JHONY initially attempted to reach pt via  ID # [id number] but had to leave a message  Later JHONY and CHW was able to reach pt  via 's  Nilda Montoya [phone]  through the [place] Waiver Program   JHONY and Antonette Jacobo jey was able to speak with pt who continues to attempt to obtain funding for their rent  She is still working on  rent  It is feared they may face eviction at any moment  At her request call made to [facility] and spoke with Azra Gallegos  who confirms pt/ are on the regular housing list ( Not handicapped ) and is # 52 for a 2 bedroom apt  She relates that the wait list is similar to the wait for a one bedroom apt in [facility]  This wait is still many Months away  Housing repeated there is no  EMERGENCY Housing with them  They can not be moved up on the list   Pt has been told this in Telugu  Pt is despirate to keep  at home and appears unrealistic as they can not afford their rent  Pt is hoping to start receiving unemployment as of 10/13/19  Pt admits to feeling depressed  She denies any SI/HI   She reports she is active with [facility] and would like  to go there if posible  His insurance  would need to be changed to do same  JHONY has provided pt with the # for Crisis and encouraged her to call or go to ED if symptoms worsen  Jhony has called [facility] and spoke with German Marshall re any additional options, AAA not appropriate as they do still have housing and as per his aide and pt she and  are getting their nutrition and medicine  JHONY has placed a call to Mr Dany Woodward from the Older Adult [address] [place] [address]  [phone] to see if he can assist with any additional services for   He is off at this time and will return 10/14/10 and JHONY requested return call when able      JHONY later returned call to Wife via [phone]  to assist with call to Miss Shaw DP[employer]  [phone] to request documentation of their BENEFITS  for their [address] application which is being filing in [place]  Ms Shaw reported she will mail it out to wife this date  Wife appreciated same  Supportive counseling provided  SW has repeatedly encouraged her to consider all housing options and to reach out to family  SW and CHW to remain available to assist as indicted

## 2019-10-08 ENCOUNTER — TELEPHONE (OUTPATIENT)
Dept: INTERNAL MEDICINE CLINIC | Facility: CLINIC | Age: 59
End: 2019-10-08

## 2019-10-08 NOTE — TELEPHONE ENCOUNTER
Called patients Aide to let her know to continue to call resources to help the family get the funding they need  At this point the niece that was arriving to help, has not showed up yet  Patient Aide told me that she accidentally answered one of the questions wrong when filing her claim for Unemployment benefits and this delayed her process  Unemployment will be paying the patient from the first time that she applied  Patient is hoping to get caught up on all of her bills

## 2019-10-22 ENCOUNTER — PATIENT OUTREACH (OUTPATIENT)
Dept: INTERNAL MEDICINE CLINIC | Facility: CLINIC | Age: 59
End: 2019-10-22

## 2019-10-22 NOTE — PROGRESS NOTES
JHONY met with pt   Joseph Beard [birth year] along with Naty Edmonds our certified   Pt has been introduced to AAA older adult's  Mr Kecia Ma  who will attempt to help this couple with their housing and other services for her ,  Please see 's chart for details

## 2019-10-23 ENCOUNTER — APPOINTMENT (OUTPATIENT)
Dept: LAB | Facility: CLINIC | Age: 59
End: 2019-10-23
Payer: COMMERCIAL

## 2019-10-23 DIAGNOSIS — E66.9 OBESITY, UNSPECIFIED CLASSIFICATION, UNSPECIFIED OBESITY TYPE, UNSPECIFIED WHETHER SERIOUS COMORBIDITY PRESENT: ICD-10-CM

## 2019-10-23 DIAGNOSIS — E78.5 HYPERLIPIDEMIA, UNSPECIFIED HYPERLIPIDEMIA TYPE: ICD-10-CM

## 2019-10-23 DIAGNOSIS — F31.10 BIPOLAR AFFECTIVE DISORDER, CURRENT EPISODE MANIC, CURRENT EPISODE SEVERITY UNSPECIFIED (HCC): Primary | ICD-10-CM

## 2019-10-23 LAB
25(OH)D3 SERPL-MCNC: 8.8 NG/ML (ref 30–100)
ALBUMIN SERPL BCP-MCNC: 3.9 G/DL (ref 3.5–5)
ALP SERPL-CCNC: 130 U/L (ref 46–116)
ALT SERPL W P-5'-P-CCNC: 17 U/L (ref 12–78)
ANION GAP SERPL CALCULATED.3IONS-SCNC: 6 MMOL/L (ref 4–13)
AST SERPL W P-5'-P-CCNC: 17 U/L (ref 5–45)
BASOPHILS # BLD AUTO: 0.06 THOUSANDS/ΜL (ref 0–0.1)
BASOPHILS NFR BLD AUTO: 1 % (ref 0–1)
BILIRUB SERPL-MCNC: 0.3 MG/DL (ref 0.2–1)
BUN SERPL-MCNC: 10 MG/DL (ref 5–25)
CALCIUM SERPL-MCNC: 9.6 MG/DL (ref 8.3–10.1)
CHLORIDE SERPL-SCNC: 109 MMOL/L (ref 100–108)
CHOLEST SERPL-MCNC: 230 MG/DL (ref 50–200)
CO2 SERPL-SCNC: 27 MMOL/L (ref 21–32)
CREAT SERPL-MCNC: 0.86 MG/DL (ref 0.6–1.3)
EOSINOPHIL # BLD AUTO: 0.15 THOUSAND/ΜL (ref 0–0.61)
EOSINOPHIL NFR BLD AUTO: 2 % (ref 0–6)
ERYTHROCYTE [DISTWIDTH] IN BLOOD BY AUTOMATED COUNT: 13.1 % (ref 11.6–15.1)
EST. AVERAGE GLUCOSE BLD GHB EST-MCNC: 103 MG/DL
GFR SERPL CREATININE-BSD FRML MDRD: 74 ML/MIN/1.73SQ M
GLUCOSE P FAST SERPL-MCNC: 97 MG/DL (ref 65–99)
HBA1C MFR BLD: 5.2 % (ref 4.2–6.3)
HCT VFR BLD AUTO: 36 % (ref 34.8–46.1)
HDLC SERPL-MCNC: 37 MG/DL
HGB BLD-MCNC: 11.7 G/DL (ref 11.5–15.4)
IMM GRANULOCYTES # BLD AUTO: 0.02 THOUSAND/UL (ref 0–0.2)
IMM GRANULOCYTES NFR BLD AUTO: 0 % (ref 0–2)
LDLC SERPL CALC-MCNC: 131 MG/DL (ref 0–100)
LYMPHOCYTES # BLD AUTO: 1.9 THOUSANDS/ΜL (ref 0.6–4.47)
LYMPHOCYTES NFR BLD AUTO: 27 % (ref 14–44)
MCH RBC QN AUTO: 30.8 PG (ref 26.8–34.3)
MCHC RBC AUTO-ENTMCNC: 32.5 G/DL (ref 31.4–37.4)
MCV RBC AUTO: 95 FL (ref 82–98)
MONOCYTES # BLD AUTO: 0.65 THOUSAND/ΜL (ref 0.17–1.22)
MONOCYTES NFR BLD AUTO: 9 % (ref 4–12)
NEUTROPHILS # BLD AUTO: 4.21 THOUSANDS/ΜL (ref 1.85–7.62)
NEUTS SEG NFR BLD AUTO: 61 % (ref 43–75)
NONHDLC SERPL-MCNC: 193 MG/DL
NRBC BLD AUTO-RTO: 0 /100 WBCS
PLATELET # BLD AUTO: 276 THOUSANDS/UL (ref 149–390)
PMV BLD AUTO: 10.9 FL (ref 8.9–12.7)
POTASSIUM SERPL-SCNC: 3.9 MMOL/L (ref 3.5–5.3)
PROT SERPL-MCNC: 8 G/DL (ref 6.4–8.2)
RBC # BLD AUTO: 3.8 MILLION/UL (ref 3.81–5.12)
SODIUM SERPL-SCNC: 142 MMOL/L (ref 136–145)
TRIGL SERPL-MCNC: 308 MG/DL
TSH SERPL DL<=0.05 MIU/L-ACNC: 1.57 UIU/ML (ref 0.36–3.74)
WBC # BLD AUTO: 6.99 THOUSAND/UL (ref 4.31–10.16)

## 2019-10-23 PROCEDURE — 80307 DRUG TEST PRSMV CHEM ANLYZR: CPT

## 2019-10-23 PROCEDURE — 83036 HEMOGLOBIN GLYCOSYLATED A1C: CPT

## 2019-10-23 PROCEDURE — 80053 COMPREHEN METABOLIC PANEL: CPT

## 2019-10-23 PROCEDURE — 36415 COLL VENOUS BLD VENIPUNCTURE: CPT

## 2019-10-23 PROCEDURE — 82306 VITAMIN D 25 HYDROXY: CPT

## 2019-10-23 PROCEDURE — 80061 LIPID PANEL: CPT

## 2019-10-23 PROCEDURE — 84443 ASSAY THYROID STIM HORMONE: CPT

## 2019-10-23 PROCEDURE — 85025 COMPLETE CBC W/AUTO DIFF WBC: CPT

## 2019-10-24 LAB
AMPHETAMINES UR QL SCN: NEGATIVE NG/ML
BARBITURATES UR QL SCN: NEGATIVE NG/ML
BENZODIAZ UR QL: NEGATIVE NG/ML
BZE UR QL: NEGATIVE NG/ML
CANNABINOIDS UR QL SCN: NEGATIVE NG/ML
METHADONE UR QL SCN: NEGATIVE NG/ML
OPIATES UR QL: NEGATIVE NG/ML
PCP UR QL: NEGATIVE NG/ML
PROPOXYPH UR QL SCN: NEGATIVE NG/ML

## 2019-10-28 ENCOUNTER — PATIENT OUTREACH (OUTPATIENT)
Dept: INTERNAL MEDICINE CLINIC | Facility: CLINIC | Age: 59
End: 2019-10-28

## 2019-10-28 NOTE — PROGRESS NOTES
10:21am 10:28/19 Spoke to Patients husbands Aide to let her know that I would be coming out this week to do a home visit  Patients marcus Akhtar stated that Friday this week would be good  I will be conducting my home visit at that time to check on the wellness of the patient as well as the families financial situation

## 2019-11-19 ENCOUNTER — PATIENT OUTREACH (OUTPATIENT)
Dept: INTERNAL MEDICINE CLINIC | Facility: CLINIC | Age: 59
End: 2019-11-19

## 2019-11-21 NOTE — PROGRESS NOTES
On 11/19/2019 Patients marcus Hollis called asking if I knew of any resources that could help with a xochitl bed  I told her that would ask around for resources  While I was on the phone with Sisi Hollis I asked how the patient and  were doing  Sisi Hollis explained that they are still having trouble paying the rent and that they are now two months behind in the rent  There were other community resources that the family tried to receive funds from but were unable to obtain it

## 2019-11-25 ENCOUNTER — PATIENT OUTREACH (OUTPATIENT)
Dept: INTERNAL MEDICINE CLINIC | Facility: CLINIC | Age: 59
End: 2019-11-25

## 2019-11-25 ENCOUNTER — OFFICE VISIT (OUTPATIENT)
Dept: INTERNAL MEDICINE CLINIC | Facility: CLINIC | Age: 59
End: 2019-11-25

## 2019-11-25 VITALS
WEIGHT: 175.49 LBS | BODY MASS INDEX: 31.09 KG/M2 | OXYGEN SATURATION: 98 % | SYSTOLIC BLOOD PRESSURE: 140 MMHG | HEIGHT: 63 IN | TEMPERATURE: 97.6 F | HEART RATE: 76 BPM | DIASTOLIC BLOOD PRESSURE: 80 MMHG

## 2019-11-25 DIAGNOSIS — Z23 NEED FOR INFLUENZA VACCINATION: ICD-10-CM

## 2019-11-25 DIAGNOSIS — E55.9 VITAMIN D DEFICIENCY: ICD-10-CM

## 2019-11-25 DIAGNOSIS — G89.29 CHRONIC PAIN: ICD-10-CM

## 2019-11-25 DIAGNOSIS — R21 RASH: ICD-10-CM

## 2019-11-25 DIAGNOSIS — I10 BENIGN ESSENTIAL HYPERTENSION: Primary | ICD-10-CM

## 2019-11-25 PROCEDURE — 90682 RIV4 VACC RECOMBINANT DNA IM: CPT | Performed by: INTERNAL MEDICINE

## 2019-11-25 PROCEDURE — 90471 IMMUNIZATION ADMIN: CPT | Performed by: INTERNAL MEDICINE

## 2019-11-25 PROCEDURE — 99213 OFFICE O/P EST LOW 20 MIN: CPT | Performed by: INTERNAL MEDICINE

## 2019-11-25 PROCEDURE — 3008F BODY MASS INDEX DOCD: CPT | Performed by: INTERNAL MEDICINE

## 2019-11-25 PROCEDURE — 1036F TOBACCO NON-USER: CPT | Performed by: INTERNAL MEDICINE

## 2019-11-25 RX ORDER — HYDROXYZINE HYDROCHLORIDE 25 MG/1
25 TABLET, FILM COATED ORAL
Refills: 0 | COMMUNITY
Start: 2019-11-23

## 2019-11-25 RX ORDER — CHLORTHALIDONE 25 MG/1
25 TABLET ORAL DAILY
Qty: 30 TABLET | Refills: 5 | Status: SHIPPED | OUTPATIENT
Start: 2019-11-25 | End: 2020-05-21 | Stop reason: ALTCHOICE

## 2019-11-25 RX ORDER — SERTRALINE HYDROCHLORIDE 100 MG/1
100 TABLET, FILM COATED ORAL EVERY MORNING
Refills: 0 | COMMUNITY
Start: 2019-11-23 | End: 2020-05-21 | Stop reason: ALTCHOICE

## 2019-11-25 NOTE — PROGRESS NOTES
INTERNAL MEDICINE FOLLOW-UP OFFICE VISIT  [facility] [place]  [address] [place] [address]    NAME: Sabine Ivey  AGE: 60 y o  SEX: female    DATE OF ENCOUNTER: 11/25/2019    Assessment and Plan     1  Benign essential hypertension  Average home blood pressure systolics 238K to 710R over 90s  Reports compliance with medication  Add chlorthalidone 25 mg  Check BMP in 1 week  - chlorthalidone 25 mg tablet; Take 1 tablet (25 mg total) by mouth daily  Dispense: 30 tablet; Refill: 5  - Basic metabolic panel; Future    2  Need for influenza vaccination  - influenza vaccine, [id number], quadrivalent, recombinant, PF, 0 5 mL, for patients 18 yr+ (FLUBLOK)    3  Rash  Appearance to dyshidrotic eczema  Patient has had issue for many years and has tried different creams, steroids, without improvement  Referral to Dermatology, may benefit from biopsy  - Ambulatory referral to Dermatology; Future    4  Vitamin D deficiency  Replace vitamin-D 15119 units per week x8 weeks  - cholecalciferol (VITAMIN D3) 250 MCG (55133 UT) capsule; Take 5 capsules (50,000 Units total) by mouth once a week for 8 doses  Dispense: 40 capsule; Refill: 0    5  Chronic pain  Elevated ESR, CRP in the past  Recheck inflammatory markers, anti CCP, hepatitis-B, CK  Consider discontinuing statin in future, would need alternative medication as patient has hyperlipidemia  - Sedimentation rate, automated; Future  - C-reactive protein; Future  - Cyclic citrul peptide antibody, IgG; Future  - Hepatitis B Immunity Panel; Future  - CK (with reflex to MB);  Future    Orders Placed This Encounter   Procedures    influenza vaccine, [id number], quadrivalent, recombinant, PF, 0 5 mL, for patients 18 yr+ (FLUBLOK)    Sedimentation rate, automated    C-reactive protein    Cyclic citrul peptide antibody, IgG    Hepatitis B Immunity Panel    CK (with reflex to MB)    Basic metabolic panel    Ambulatory referral to Dermatology       - Counseling Documentation: patient was counseled regarding: diagnostic results, instructions for management and risks and benefits of treatment options    Chief Complaint     Chief Complaint   Patient presents with    Rash on hands       History of Present Illness     Patient is a 9year-old female with past medical history breast cancer status post chemotherapy, hypertension, hyperlipidemia who presents for follow-up  Patient has had rash on hands for 2 years has tried multiple different creams and steroids without benefit  Saw Dermatology in the past and would like to see them again  Checks blood pressure at home average BP is 140s to 150s over 90s  Reports compliance with medication  Denies any change in her diet or high salt intake  Also complains of chronic joint pain which has been also been present for many years  Review most recent labs vitamin-D low at 8  The following portions of the patient's history were reviewed and updated as appropriate: allergies, current medications, past family history, past medical history, past social history, past surgical history and problem list     Review of Systems     Review of Systems   Constitutional: Negative for chills and fever  Eyes: Negative for photophobia and visual disturbance  Respiratory: Negative for chest tightness and shortness of breath  Cardiovascular: Negative for chest pain and palpitations  Gastrointestinal: Negative for abdominal pain  Musculoskeletal: Positive for arthralgias, back pain, joint swelling and neck pain  Skin: Positive for rash         Active Problem List     Patient Active Problem List   Diagnosis    Acute low back pain due to trauma    Benign essential hypertension    Vision disturbance    Hypertensive urgency    Noncompliance with medications    Stroke-like symptoms    Incidental pulmonary nodule    Hypertension    Hyperlipemia       Objective     /80 (BP Location: Right arm, Patient Position: Sitting, Cuff Size: Standard)   Pulse 76   Temp 97 6 °F (36 4 °C) (Oral)   Ht 5' 3" (1 6 m)   Wt 79 6 kg (175 lb 7 8 oz)   SpO2 98%   BMI 31 09 kg/m²     Physical Exam   Constitutional: She appears well-developed and well-nourished  No distress  HENT:   Head: Normocephalic and atraumatic  Eyes: Conjunctivae are normal  No scleral icterus  Cardiovascular: Normal rate, regular rhythm and normal heart sounds  No murmur heard  Pulmonary/Chest: Effort normal and breath sounds normal  No stridor  No respiratory distress  She has no wheezes  Musculoskeletal: She exhibits no edema, tenderness or deformity  Skin: She is not diaphoretic  Raised rash on bilateral hands extending to wrists, areas of excoriation       Pertinent Laboratory/Diagnostic Studies:  Cta Head And Neck With And Without Contrast    Result Date: 7/18/2019  Impression: 1  No acute intracranial CT abnormality  2   Patent major vasculature of [place] without critical stenosis  3   Mild atherosclerotic disease of cervical carotid bifurcation and proximal cervical internal carotid arteries without significant stenosis 4  Incidentally noted right upper lobe 3 mm nodule  Based on current Fleischner Society 2017 Guidelines on incidental pulmonary nodule, no routine follow-up is needed for this nodule if the patient is considered low risk for lung cancer  If the patient is considered high risk for lung cancer, and if no prior outside chest CT available to assess interval change, 12 month follow-up non-contrast chest CT is recommended  Workstation performed: [id number]     Mri Brain Wo Contrast    Result Date: 7/18/2019  Impression: 1  Extensive periventricular and subcortical white matter lesions possibly representing microangiopathic disease, advanced for age  Vasculitis or demyelinating process could also have this appearance in the appropriate clinical context   2   No evidence of acute infarct, hemorrhage or mass  Workstation performed: [id number]     Mri Cervical Spine W Wo Contrast    Result Date: 7/19/2019  Impression: 1  No evidence of demyelinating process in the cervical cord  2   Chronic disc degenerative change, most prominent from C3-4 to C5-6  Workstation performed: [id number]     Mri Thoracic Spine W Wo Contrast    Result Date: 7/19/2019  Impression: No evidence of demyelinating lesion in the thoracic cord   Workstation performed: [id number]       Images and diagnostics reviewed     Current Medications     Current Outpatient Medications:     amLODIPine (NORVASC) 10 mg tablet, Take 1 tablet (10 mg total) by mouth daily, Disp: 60 tablet, Rfl: 0    atorvastatin (LIPITOR) 40 mg tablet, Take 1 tablet (40 mg total) by mouth every evening, Disp: 60 tablet, Rfl: 0    hydrOXYzine HCL (ATARAX) 25 mg tablet, Take 25 mg by mouth daily at bedtime as needed, Disp: , Rfl: 0    losartan (COZAAR) 100 MG tablet, Take 1 tablet (100 mg total) by mouth daily, Disp: 90 tablet, Rfl: 1    sertraline (ZOLOFT) 100 mg tablet, Take 100 mg by mouth every morning, Disp: , Rfl: 0    chlorthalidone 25 mg tablet, Take 1 tablet (25 mg total) by mouth daily, Disp: 30 tablet, Rfl: 5    cholecalciferol (VITAMIN D3) 250 MCG (67955 UT) capsule, Take 5 capsules (50,000 Units total) by mouth once a week for 8 doses, Disp: 40 capsule, Rfl: 0    Health Maintenance     Health Maintenance   Topic Date Due    CRC Screening: Colonoscopy  1960    Pneumococcal Vaccine: Pediatrics (0 to 5 Years) and At-Risk Patients (6 to 59 Years) (1 of 3 - PCV13) 07/31/1965    BMI: Followup Plan  07/31/1977    Cervical Cancer Screening  07/31/1980    MAMMOGRAM  03/26/2019    Influenza Vaccine  07/01/2019    BMI: Adult  07/25/2020    Pneumococcal Vaccine: 65+ Years (1 of 2 - PCV13) 07/31/2024    DTaP,Tdap,and Td Vaccines (2 - Td) 02/08/2028    HIV Screening  Completed    Hepatitis C Screening  Completed    HIB Vaccine  Aged Out    Hepatitis B Vaccine  Aged Out    IPV Vaccine  Aged Out    Hepatitis A Vaccine  Aged Out    Meningococcal ACWY Vaccine  Aged Out    HPV Vaccine  Aged Out     Immunization History   Administered Date(s) Administered    INFLUENZA 10/27/2017    Influenza Quadrivalent, 6-35 Months IM 10/27/2017    Tdap 02/08/2018       Raymond Contreras  Internal Medicine PGY-3  [address] , [address] [address] [facility], [address] [facility]  Office: [phone]  Fax: [phone]

## 2019-11-25 NOTE — PATIENT INSTRUCTIONS
La forma de jihan la presión arterial   LO QUE NECESITA SABER:   ¿Qué es la presión arterial?  La presión arterial es la fuerza o presión que ejerce la kamla en las mcgee de las arterias mientras la kamla fluye a través del cuerpo  Las lecturas de la presión arterial usualmente se escriben conchis 2 números  El virginia o número de [place] se llama presión arterial sistólica  El [place], o número de abajo es la presión diastólica  Aby presión arterial normal está por debajo de 120/80  ¿Por qué barbi tomarme la presión arterial?  Es posible que necesite tomarse la presión arterial en casa si sufre de hipertensión (presión arterial cece) o hipotensión (presión arterial baja)  La presión arterial cece aumenta santana riesgo de un derrame cerebral, un ataque cardíaco o aby enfermedad en los riñones  La presión arterial baja puede disminuir el flujo sanguíneo a los órganos, conchis el cerebro y los riñones  [place] puede dañar los [employer]  Es posible que necesite jihan un medicamento para mantener la presión arterial a un nivel normal  Santana médico puede utilizar las lecturas de la presión arterial que usted se ally en casa para comprobar que colette medicamentos para la presión estén funcionando  Pregúntele a santana médico cuál debe ser santana presión arterial    ¿Cómo me demario la presión arterial?  Usted puede tomarse santana presión en casa con un monitor digital para la presión arterial o tensiómetro  Se recomienda leer las instrucciones que vienen con el tensiómetro  El monitor viene con un brazalete ajustable  Pregunte a santana médico si el brazalete es del tamaño correcto  Un brazalete demasiado pequeño causará aby presión arterial falsamente cece  Un brazalete demasiado tegan causará aby presión arterial falsamente baja  · No tome la presión arterial en un brazo lesionado, o si tiene aby sonda intravenosa o aby derivación       · No se tome la presión arterial marianela los siguientes 30 minutos después de fumar, jihan café, o realizar aby actividad física  Éstos pueden afectar la lectura de santana presión arterial     · Siéntese y descanse por 5 minutos antes de tomarse la presión arterial      · Siéntese con los pies planos en el piso y la espalda contra aby silla  · Extienda santana brazo y apóyelo en aby superficie plana  Santana brazo debe estar a la misma altura que santana corazón  · Asegúrese de que todo el aire está fuera del brazalete  Colóquese la smith por encima del pliegue del codo por lo menos a 1 pulgada (2 5 cm) de distancia  Coloque la smith alrededor del brazo de modo que [place]  La lectura de la presión arterial podría ser incorrecta si la abrazadera está muy floja o suelta  · Si está usando aby Paaste, envuelva el brazalete cómodamente alrededor de la [place] [address]  Mantenga la [place] [address] en el mismo nivel que santana corazón  · Encienda el monitor de la presión arterial y Matt Morena instrucciones  · Anote la lectura de santana presión arterial, la fecha, la hora y en qué brazo se tomó la presión  Si es posible, tómese [employer] presión arterial y [employer]  Estas lecturas pueden ser tomadas con 1 minuto de diferencia  ¿Con que frecuencia barbi jihan mi presión arterial?  Santana médico le puede recomendar que usted se tome la presión arterial al [employer] al día  Tómese la presión arterial a la misma hora todos los días, por ejemplo aby en la mañana y la otra en la noche  Pregúntele a santana médico cuándo y con qué frecuencia debería medir santana presión arterial   ¿Qué más necesito saber? · Santana médico le dirá cuál debe ser sanatna nivel de presión arterial  [place] presión arterial de 120/80 no puede ser santana objetivo  · Tómese colette medicamentos para la presión arterial conchis se le indique  No deje de jihan colette medicamentos si santana presión arterial está en santana objetivo  Si la presión arterial está en santana objetivo significa que el medicamento está funcionando correctamente       · Mantenga un registro de las lecturas de santana presión arterial y Threasa Redder a las consultas de control  · Lleve mejia monitor de presión arterial a colette citas de seguimiento  Mejia médico puede comprobar que usted está usando al monitor correctamente  ¿Cuándo barbi comunicarme con mi médico?   · Mejia presión arterial está más cece o más baja de lo que mejia médico le watson indicado que debería estar  · Usted tiene preguntas o inquietudes acerca de mejia condición o cuidado  ACUERDOS SOBRE MEJIA CUIDADO:   Usted tiene el derecho de ayudar a planear mejia cuidado  Aprenda todo lo que pueda sobre mejia condición y conchis darle tratamiento  Discuta colette opciones de tratamiento con colette médicos para decidir el cuidado que usted desea recibir  Usted siempre tiene el derecho de rechazar el tratamiento  Esta información es sólo para uso en educación  Mejia intención no es darle un consejo médico sobre enfermedades o tratamientos  Colsulte con mejia [facility] farmacéutico antes de seguir cualquier régimen médico para saber si es seguro y efectivo para usted  © 2017 [facility] Information is for End User's use only and may not be sold, redistributed or otherwise used for commercial purposes  All illustrations and images included in CareNotes® are the copyrighted property of A D A M , Inc  or Samson Lieberman  Clortalidona (Por la boca)   Se [place] para tratar la retención de líquidos (edema) y la presión arterial cece  Marina medicamento es un diurético    Agusto(s) :   Existen muchas otras marcas de marina medicamento  Marina medicamento no debe ser usado cuando:   No use marina medicamento si alguna vez ha tenido aby reacción alérgica a la clortalidona, las sulfamidas u otros diuréticos  Usted no debe usar [employer] si es incapaz de orinar  Baldomero Vicente de usar marina medicamento:   Francoise Langston  · Mejia doctor le dirá cuanta medicina usar y que tan a menudo  No tome mas medicina de la que le indique mejia doctor, ni con mayor frecuencia    · Siga cuidadosamente las instrucciones de mejia médico, relacionadas con alguna dieta especial  Es posible que usted necesite comer alimentos con alto contenido en potasio (conchis naranjas y bananos) para prevenir la pérdida de potasio mientras esté usando melanie medicamento  Si aby dosis es olvidada:   · Use la dosis olvidada lo más pronto posible  No use la dosis olvidada si es hora de santana próxima dosis regular  · No use medicina adicional para compensar aby dosis [place]  Forma de guardar y botar melanie medicamento:   · Guarde a temperatura ambiente, lejos del calor, la humedad y la kimberly directa  · Guarde todas las medicinas fuera del alcance de los niños y no las comparta con otra persona  Medicamentos y alimentos que debe evitar:   Consulte con santana médico o farmacéutico antes de usar cualquier medicamento, incluyendo los que compra sin receta médica, las vitaminas y los productos herbales  · No olvide informarle a santana médico si [address] bepridil (Vascor®), colestiramina (Brian), colestipol (Colestid®), digoxina (Lanoxin®), litio, esteroides (conchis cortisona, prednisona), o está tomando leche baja en sal   · No consuma alcohol mientras esté usando Kaiima  Precauciones marianela el uso de melanie medicamento:   · No olvide informarle a santana médico si está embarazada o dando de lactar, o si tiene enfermedad en el hígado o Jestine La, gota, diabetes, pancreatitis o lupus  · Kaiima puede hacer que usted sienta mareos  Evite manejar automóvil, usar maquinaria o hacer alguna otra cosa que pueda ser peligrosa si usted no está alerta  · Melanie medicamento puede hacer que santana piel se vuelva más sensible a la kimberly solar  Use un bloqueador de sol cuando tenga que permanecer al [employer]  Evite las lámparas y christi payam    Efectos secundarios que pueden presentarse marianela el uso de melanie medicamento:   Consulte inmediatamente con el médico si nota cualquiera de estos efectos secundarios:  · Finn en la orina o en las deposiciones  · Confusión, debilidad, ritmo cardíaco irregular, falta de aliento, adormecimiento u hormigueo en las nicole, pies o labios  · [employer] boca, aumento de sed, calambres musculares, náuseas o vómito  · Abhay, escalofríos, tos, ronquera  · Problemas para orinar, dolor en un costado o en la parte inferior de la espalda  · Ronchas o picazón en la piel  · Sangrado o moretones inusuales  · Piel u ojos amarillos  Consulte con el médico si nota los siguientes efectos secundarios menos graves:   · Pérdida del apetito  · Dificultad para tener relaciones sexuales  · Diarrea moderada o malestar estomacal  Consulte con el médico si nota otros efectos secundarios que augustus son causados por melanie medicamento  Llame a addison médico para consultarle Jose R Moore puede notificar colette efectos secundarios al FDA al [id number]  © 2017 [address] Eduardo Gibson Information is for End User's use only and may not be sold, redistributed or otherwise used for commercial purposes  Esta información es sólo para uso en educación  Addison intención no es darle un consejo médico sobre enfermedades o tratamientos  Colsulte con addison Regan Ban farmacéutico antes de seguir cualquier régimen médico para saber si es seguro y efectivo para usted

## 2019-11-25 NOTE — PROGRESS NOTES
ALIREZA has met with pt this date at PCP visit  ALIREZA spoke with pt via [address]  Pt continues to be in a housing crisis  She can not afford where she is living and is behind in rent  She reports today the she was able to get her Unemployment back ($420)  and her  has his SSI ( $035)   She will make a rent payment and she declines any other housing options  Sw has suggested trying to rent a room they could afford or have  go short term to a [address] until their apt opens up as they are on [employer]   Again pt declines   She relates again her niece will move to the area soon and she can move in with her or her good friend and neighbor if needed  Pt reports she continues with MH  She denies any SI or HI  Support Provided  Alireza also assisted pt with a call to her [address] as she wanted to have [employer] added to  her card as we are her PCP  SW assisted with same  Will remain available to assit as needed

## 2019-12-03 ENCOUNTER — PATIENT OUTREACH (OUTPATIENT)
Dept: INTERNAL MEDICINE CLINIC | Facility: CLINIC | Age: 59
End: 2019-12-03

## 2019-12-04 ENCOUNTER — PATIENT OUTREACH (OUTPATIENT)
Dept: INTERNAL MEDICINE CLINIC | Facility: CLINIC | Age: 59
End: 2019-12-04

## 2019-12-05 NOTE — PROGRESS NOTES
12/04/19 I met with Representative from the [facility] in [place]  Representative stated that the Household could be eligible for 500 dollars in funding to help them get back on track with their rent  I received the application for funding, and spoke to the patients Landlorcarla who offered me a new Notice to Quit for to terminate the patients lease if she did not pay the amount which is owed  Currently the patient is responsible for paying 1,200 in rears  I will be meeting with the [address] [place] [address], or the patient on Monday to give them the necessary application for funding  I was told that there should be funding available in January, it is a first come, first serve basis  Also, the highest amount alloted is 500 00 no exceptions

## 2019-12-06 ENCOUNTER — PATIENT OUTREACH (OUTPATIENT)
Dept: INTERNAL MEDICINE CLINIC | Facility: CLINIC | Age: 59
End: 2019-12-06

## 2019-12-06 NOTE — PROGRESS NOTES
1030 I spoke with the Patients aide about a CHW visit on Monday  Patients Lon needs to fill the paper work out to receive the available funding to her with her past due rent   Appointment is set for Monday at 9:00am

## 2019-12-10 ENCOUNTER — PATIENT OUTREACH (OUTPATIENT)
Dept: INTERNAL MEDICINE CLINIC | Facility: CLINIC | Age: 59
End: 2019-12-10

## 2019-12-10 NOTE — PROGRESS NOTES
12/10/19 Patient came into the clinic for an appointment and it had been cancelled  The patients appointment was cancelled due to the [employer] picking Mr Kandace Alston up late  When I seen the two of them in the Clinic, I gave the forms for rental assistance to Oksana Tyler and asked her to have her Landlord fill them out, and to get them back to me asap  When I spoke to Mr Kandace Alston he smiled at me and nodded his head which was the first time he has interacted or acknowledged me since September  It is unknown when the patient will be able to return the papers back to me considering she must see her landlord first

## 2019-12-12 ENCOUNTER — PATIENT OUTREACH (OUTPATIENT)
Dept: INTERNAL MEDICINE CLINIC | Facility: CLINIC | Age: 59
End: 2019-12-12

## 2019-12-12 NOTE — PROGRESS NOTES
12/11/19 I met patients aide on 5th and Manuel in the community to retrieve some information for the patients application for rental assistance  All documentation needed has been collected and application for funding was mailed yesterday to the [facility]  All of the documents concerning the patients application have been scanned into her chart  This includes a Past Due Rent Application from [facility], Request for [employer] form, copies of Social Security card, photo IDS, [place] Notice of Financial Determination for Unemployment, Benefit Amounts for SSI, Medicare information and [employer] to [place] from Azeem Miranda  All documents were provided by the patient for Submission  CHW will remain available to advocate for the patients funding

## 2019-12-17 ENCOUNTER — PATIENT OUTREACH (OUTPATIENT)
Dept: INTERNAL MEDICINE CLINIC | Facility: CLINIC | Age: 59
End: 2019-12-17

## 2019-12-18 ENCOUNTER — PATIENT OUTREACH (OUTPATIENT)
Dept: INTERNAL MEDICINE CLINIC | Facility: CLINIC | Age: 59
End: 2019-12-18

## 2019-12-18 NOTE — PROGRESS NOTES
Incoming call from Patient stating that she needed copies of items out of her chart  Patient asked for her husbands Social Security information and information concerning her unemployment benefits  Patient is going to [employer] to see if she can get any assistance to help with her rent which is overdue  All paperwork requested was given to the pattient by [facility] and myself

## 2019-12-18 NOTE — PROGRESS NOTES
0900 I received a call from the Patients  Rosana Oliveros  Alison Ward seems to take care of both residents in the house  She asked about [facility] and said they were going for help for financial assistance  I confirmed that the Agency's name is [employer] where they are going  Alison Ward wanted to make sure that it was not the same place that [facility]  I confirmed to her that they are not  Alison Ward states that she is concerned about what will happen after all of the resources for help have been exhausted  Alison Ward also states that Oksana Scott has not been receiving her unemployment benefits  Alison Ward stated that she will be checking into why not and taking Oksana Scott to the [facility] office to see what can be done to restore them  Alison Ward was instructed to inform CHW of any changes

## 2019-12-20 ENCOUNTER — PATIENT OUTREACH (OUTPATIENT)
Dept: INTERNAL MEDICINE CLINIC | Facility: CLINIC | Age: 59
End: 2019-12-20

## 2019-12-20 NOTE — PROGRESS NOTES
Called patient today to ask if there was any new on the funding that was needed to help save the couples home  It was explained to me by the patients Aide that the family will be receiving some funding from [facility] to help with November and Decembers rent  The patient has an appointment to receive funding also at the [facility] on Jan 8th which could help with [place] rent  I  Then asked what will happen in February? I was told that Trinidads  Cuco Brooks is expected to have an increase in his Social Security for the expected new year  CHW will remain available for any other community health needs pertaining to this family

## 2020-01-07 ENCOUNTER — LAB (OUTPATIENT)
Dept: LAB | Facility: CLINIC | Age: 60
End: 2020-01-07
Payer: COMMERCIAL

## 2020-01-07 DIAGNOSIS — G89.29 CHRONIC PAIN: ICD-10-CM

## 2020-01-07 DIAGNOSIS — I10 BENIGN ESSENTIAL HYPERTENSION: ICD-10-CM

## 2020-01-07 LAB
ANION GAP SERPL CALCULATED.3IONS-SCNC: 5 MMOL/L (ref 4–13)
BUN SERPL-MCNC: 17 MG/DL (ref 5–25)
CALCIUM SERPL-MCNC: 9.3 MG/DL (ref 8.3–10.1)
CHLORIDE SERPL-SCNC: 108 MMOL/L (ref 100–108)
CK SERPL-CCNC: 118 U/L (ref 26–192)
CO2 SERPL-SCNC: 28 MMOL/L (ref 21–32)
CREAT SERPL-MCNC: 0.92 MG/DL (ref 0.6–1.3)
CRP SERPL QL: 12.3 MG/L
ERYTHROCYTE [SEDIMENTATION RATE] IN BLOOD: 72 MM/HOUR (ref 0–20)
GFR SERPL CREATININE-BSD FRML MDRD: 68 ML/MIN/1.73SQ M
GLUCOSE P FAST SERPL-MCNC: 94 MG/DL (ref 65–99)
HBV CORE AB SER QL: NORMAL
HBV SURFACE AB SER-ACNC: <3.1 MIU/ML
POTASSIUM SERPL-SCNC: 3.8 MMOL/L (ref 3.5–5.3)
SODIUM SERPL-SCNC: 141 MMOL/L (ref 136–145)

## 2020-01-07 PROCEDURE — 82550 ASSAY OF CK (CPK): CPT

## 2020-01-07 PROCEDURE — 86706 HEP B SURFACE ANTIBODY: CPT

## 2020-01-07 PROCEDURE — 80048 BASIC METABOLIC PNL TOTAL CA: CPT

## 2020-01-07 PROCEDURE — 86140 C-REACTIVE PROTEIN: CPT

## 2020-01-07 PROCEDURE — 85652 RBC SED RATE AUTOMATED: CPT

## 2020-01-07 PROCEDURE — 86200 CCP ANTIBODY: CPT

## 2020-01-07 PROCEDURE — 86704 HEP B CORE ANTIBODY TOTAL: CPT

## 2020-01-07 PROCEDURE — 36415 COLL VENOUS BLD VENIPUNCTURE: CPT

## 2020-01-09 LAB — CCP IGA+IGG SERPL IA-ACNC: 9 UNITS (ref 0–19)

## 2020-01-13 ENCOUNTER — PATIENT OUTREACH (OUTPATIENT)
Dept: INTERNAL MEDICINE CLINIC | Facility: CLINIC | Age: 60
End: 2020-01-13

## 2020-01-14 NOTE — PROGRESS NOTES
[address] [place] [address] Dr Bernal visited patient yesterday to see how everything was going with her appointment at [facility]  Patient was denied the funds because she could not show a receipt that she had paid the other monies that were owed  On 1/14/20 Oksana Scott was told that was awarded 1400 dollars froman agency she applied for in [facility]  Patients Landlord however, filed eviction on the family for failure to pay  At this time the patients marcus Caicedo is working to speak to the [place] about dropping the charges for rent  The Landlord is concerned that it cost her 180 00 to file for the court proceedings  At this time it is unknown if the Landlord with accept the money  CHW will follow up at the end of the week to see what the outcome is

## 2020-02-05 ENCOUNTER — PATIENT OUTREACH (OUTPATIENT)
Dept: INTERNAL MEDICINE CLINIC | Facility: CLINIC | Age: 60
End: 2020-02-05

## 2020-02-05 NOTE — PROGRESS NOTES
CHW received a call from the [facility]  The Representative Srinivas Gonzalez explained the the family had been granted 500 dollars for help with the rent but had not submitted a ZACK form from welfare  The ZACK is an Emergency [address] [facility]  [address] [facility] [facility] [address] form  CHW tried to reach out to the patient but got no answer at the time  Update Patients Aide called back and stated she was having a hard time getting the paperwork from the patients   CHW will be calling the patients  in the morning to try to have this issue resolved

## 2020-02-06 ENCOUNTER — PATIENT OUTREACH (OUTPATIENT)
Dept: INTERNAL MEDICINE CLINIC | Facility: CLINIC | Age: 60
End: 2020-02-06

## 2020-02-06 NOTE — PROGRESS NOTES
CHW called [facility] office to see if we were abole to ge t the tenant the letter she needs from the Emergency [address] [facility] [address]  Patients  has retired so I was transferred to a new  where I left a message explaining that the patient needs help with funding and that I need her to return my phone call  CHW left contact information behind and will remain available to help the patient with trying to get assistance

## 2020-02-20 ENCOUNTER — PATIENT OUTREACH (OUTPATIENT)
Dept: INTERNAL MEDICINE CLINIC | Facility: CLINIC | Age: 60
End: 2020-02-20

## 2020-02-20 NOTE — PROGRESS NOTES
CHW called patient to ask her to pay close attention to her telephone because someone from the [facility] would be calling her about picking up a 500 hundred Dollar Check to help with back rent owed for her home  Patient is at the time caught up with her rent, but she will be responsible for paying the March rent which is due

## 2020-03-03 ENCOUNTER — PATIENT OUTREACH (OUTPATIENT)
Dept: INTERNAL MEDICINE CLINIC | Facility: CLINIC | Age: 60
End: 2020-03-03

## 2020-03-03 NOTE — PROGRESS NOTES
RAKESHW has completed all task concerning patients referral including finding funding to help support back rent owed at this time CHW is closing the referral

## 2020-05-20 ENCOUNTER — TELEMEDICINE (OUTPATIENT)
Dept: INTERNAL MEDICINE CLINIC | Facility: CLINIC | Age: 60
End: 2020-05-20

## 2020-05-20 DIAGNOSIS — I10 ESSENTIAL HYPERTENSION: Primary | ICD-10-CM

## 2020-05-20 PROCEDURE — 99213 OFFICE O/P EST LOW 20 MIN: CPT | Performed by: INTERNAL MEDICINE

## 2020-05-21 ENCOUNTER — TELEPHONE (OUTPATIENT)
Dept: INTERNAL MEDICINE CLINIC | Facility: CLINIC | Age: 60
End: 2020-05-21

## 2020-05-21 ENCOUNTER — OFFICE VISIT (OUTPATIENT)
Dept: INTERNAL MEDICINE CLINIC | Facility: CLINIC | Age: 60
End: 2020-05-21

## 2020-05-21 VITALS
DIASTOLIC BLOOD PRESSURE: 98 MMHG | SYSTOLIC BLOOD PRESSURE: 180 MMHG | OXYGEN SATURATION: 98 % | WEIGHT: 183.86 LBS | BODY MASS INDEX: 32.58 KG/M2 | HEIGHT: 63 IN | HEART RATE: 82 BPM | TEMPERATURE: 98.5 F

## 2020-05-21 DIAGNOSIS — I10 HYPERTENSION: ICD-10-CM

## 2020-05-21 DIAGNOSIS — E78.5 HYPERLIPIDEMIA: ICD-10-CM

## 2020-05-21 DIAGNOSIS — I10 ESSENTIAL HYPERTENSION: ICD-10-CM

## 2020-05-21 DIAGNOSIS — I16.1 HYPERTENSIVE EMERGENCY: ICD-10-CM

## 2020-05-21 DIAGNOSIS — E55.9 VITAMIN D DEFICIENCY: Primary | ICD-10-CM

## 2020-05-21 DIAGNOSIS — R29.90 STROKE-LIKE SYMPTOMS: ICD-10-CM

## 2020-05-21 PROCEDURE — 3077F SYST BP >= 140 MM HG: CPT | Performed by: INTERNAL MEDICINE

## 2020-05-21 PROCEDURE — 3080F DIAST BP >= 90 MM HG: CPT | Performed by: INTERNAL MEDICINE

## 2020-05-21 PROCEDURE — 1036F TOBACCO NON-USER: CPT | Performed by: INTERNAL MEDICINE

## 2020-05-21 PROCEDURE — 99213 OFFICE O/P EST LOW 20 MIN: CPT | Performed by: INTERNAL MEDICINE

## 2020-05-21 PROCEDURE — 3008F BODY MASS INDEX DOCD: CPT | Performed by: INTERNAL MEDICINE

## 2020-05-21 RX ORDER — AMLODIPINE BESYLATE 10 MG/1
10 TABLET ORAL DAILY
Qty: 90 TABLET | Refills: 3 | Status: SHIPPED | OUTPATIENT
Start: 2020-05-21 | End: 2020-10-14 | Stop reason: SDUPTHER

## 2020-05-21 RX ORDER — ESCITALOPRAM OXALATE 10 MG/1
10 TABLET ORAL EVERY MORNING
COMMUNITY
Start: 2020-04-23 | End: 2020-10-14 | Stop reason: SDUPTHER

## 2020-05-21 RX ORDER — LOSARTAN POTASSIUM 100 MG/1
100 TABLET ORAL DAILY
Qty: 90 TABLET | Refills: 3 | Status: SHIPPED | OUTPATIENT
Start: 2020-05-21 | End: 2020-11-20

## 2020-05-21 RX ORDER — ATORVASTATIN CALCIUM 40 MG/1
40 TABLET, FILM COATED ORAL EVERY EVENING
Qty: 90 TABLET | Refills: 3 | Status: SHIPPED | OUTPATIENT
Start: 2020-05-21 | End: 2020-10-14 | Stop reason: SDUPTHER

## 2020-05-27 ENCOUNTER — TELEPHONE (OUTPATIENT)
Dept: INTERNAL MEDICINE CLINIC | Facility: CLINIC | Age: 60
End: 2020-05-27

## 2020-07-17 ENCOUNTER — PATIENT OUTREACH (OUTPATIENT)
Dept: INTERNAL MEDICINE CLINIC | Facility: CLINIC | Age: 60
End: 2020-07-17

## 2020-07-17 NOTE — PROGRESS NOTES
SW spoke with pt via  ID # [id number] this date  She reports she and her  are doing alright at víctor time  Pt relates they have an appointment with Housing in 2 weeks and she hoped they will get an affordable apt soon  She reports they are up to date with her rent  She also relates she remains active with [facility] and has her ICM  She will f/u with SW as needed

## 2020-07-27 ENCOUNTER — HOSPITAL ENCOUNTER (EMERGENCY)
Facility: HOSPITAL | Age: 60
Discharge: HOME | End: 2020-07-27
Attending: EMERGENCY MEDICINE | Admitting: EMERGENCY MEDICINE
Payer: COMMERCIAL

## 2020-07-27 ENCOUNTER — APPOINTMENT (EMERGENCY)
Dept: RADIOLOGY | Facility: HOSPITAL | Age: 60
End: 2020-07-27
Payer: COMMERCIAL

## 2020-07-27 VITALS
DIASTOLIC BLOOD PRESSURE: 84 MMHG | SYSTOLIC BLOOD PRESSURE: 166 MMHG | TEMPERATURE: 98.1 F | WEIGHT: 185 LBS | HEART RATE: 85 BPM | HEIGHT: 63 IN | OXYGEN SATURATION: 98 % | RESPIRATION RATE: 20 BRPM | BODY MASS INDEX: 32.78 KG/M2

## 2020-07-27 DIAGNOSIS — M79.672 LEFT FOOT PAIN: Primary | ICD-10-CM

## 2020-07-27 PROCEDURE — 99284 EMERGENCY DEPT VISIT MOD MDM: CPT | Performed by: EMERGENCY MEDICINE

## 2020-07-27 PROCEDURE — 99283 EMERGENCY DEPT VISIT LOW MDM: CPT

## 2020-07-27 PROCEDURE — 73630 X-RAY EXAM OF FOOT: CPT

## 2020-07-27 RX ORDER — IBUPROFEN 600 MG/1
600 TABLET ORAL ONCE
Status: COMPLETED | OUTPATIENT
Start: 2020-07-27 | End: 2020-07-27

## 2020-07-27 RX ORDER — ACETAMINOPHEN 325 MG/1
975 TABLET ORAL ONCE
Status: COMPLETED | OUTPATIENT
Start: 2020-07-27 | End: 2020-07-27

## 2020-07-27 RX ADMIN — ACETAMINOPHEN 975 MG: 325 TABLET, FILM COATED ORAL at 19:15

## 2020-07-27 RX ADMIN — IBUPROFEN 600 MG: 600 TABLET, FILM COATED ORAL at 19:15

## 2020-07-27 NOTE — DISCHARGE INSTRUCTIONS
Return to the emergency department if you experience worsening of symptoms including worsening pain, any numbness or tingling, if you notice a color change in your foot, or if you develop fevers      Recommend Tylenol and ibuprofen for pain    Follow-up with podiatry the foot specialist

## 2020-07-27 NOTE — ED PROVIDER NOTES
History  Chief Complaint   Patient presents with   [address] [place] [address] Injury     Patient reports that while she was walking today she noticed a sharp pain in her left foot and it hurts to stretch the foot out; ambulatory to the room at this time      56-year-old female history of hypertension and cancer presents for evaluation of left foot pain  She states the symptoms began a few days ago while walking  She describes it as a sharp pain primarily located in her heel  The pain is nonradiating  There is no associated paresthesias  She has not taken any medications for the pain  She does not remember any inciting event  The pain is worse in the morning and slowly improves throughout the day  She denies any prior injury to the foot  No systemic symptoms  History taking utilizing daughter who was in the room  The patient gave permission to utilize the daughter as a   Prior to Admission Medications   Prescriptions Last Dose Informant Patient Reported? Taking?    amLODIPine (NORVASC) 10 mg tablet   No Yes   Sig: Take 1 tablet (10 mg total) by mouth daily   atorvastatin (LIPITOR) 40 mg tablet   No Yes   Sig: Take 1 tablet (40 mg total) by mouth every evening   escitalopram (LEXAPRO) 10 mg tablet   Yes Yes   Sig: Take 10 mg by mouth every morning   hydrOXYzine HCL (ATARAX) 25 mg tablet   Yes Yes   Sig: Take 25 mg by mouth daily at bedtime as needed   losartan (COZAAR) 100 MG tablet   No Yes   Sig: Take 1 tablet (100 mg total) by mouth daily      Facility-Administered Medications: None       Past Medical History:   Diagnosis Date    Back ache     Cancer ([facility] [address] )     Hypertension        Past Surgical History:   Procedure Laterality Date    APPENDECTOMY      BILATERAL OOPHORECTOMY      BREAST SURGERY      HERNIA REPAIR         Family History   Problem Relation Age of Onset    Hypertension Mother     Heart disease Mother     Diabetes Mother     Cancer Father      I have reviewed and agree with the history as documented  E-Cigarette/Vaping    E-Cigarette Use Never User      E-Cigarette/Vaping Substances     Social History     Tobacco Use    Smoking status: Former Smoker     Packs/day: 0 65     Years: 30 00     Pack years: 19 50    Smokeless tobacco: Never Used    Tobacco comment: quit 15 years ago   Substance Use Topics    Alcohol use: No    Drug use: No        Review of Systems   Constitutional: Negative for activity change, chills, diaphoresis and fever  HENT: Negative for congestion, rhinorrhea, sore throat and tinnitus  Eyes: Negative for photophobia and visual disturbance  Respiratory: Negative for cough, chest tightness, shortness of breath and wheezing  Cardiovascular: Negative for chest pain, palpitations and leg swelling  Gastrointestinal: Negative for abdominal distention, abdominal pain, blood in stool, constipation, diarrhea, nausea and vomiting  Genitourinary: Negative for dysuria, enuresis, flank pain and frequency  Musculoskeletal: Positive for myalgias  Negative for neck pain and neck stiffness  Skin: Negative for pallor and rash  Neurological: Negative for dizziness, seizures, syncope, light-headedness and headaches  Hematological: Negative  Psychiatric/Behavioral: Negative  Physical Exam  ED Triage Vitals [07/27/20 1852]   Temperature Pulse Respirations Blood Pressure SpO2   98 1 °F (36 7 °C) 85 20 166/84 98 %      Temp Source Heart Rate Source Patient Position - Orthostatic VS BP Location FiO2 (%)   Oral Monitor Lying Right arm --      Pain Score       5             Orthostatic Vital Signs  Vitals:    07/27/20 1852   BP: 166/84   Pulse: 85   Patient Position - Orthostatic VS: Lying       Physical Exam   Constitutional: She is oriented to person, place, and time  She appears well-developed and well-nourished  No distress  HENT:   Head: Normocephalic and atraumatic     Right Ear: External ear normal    Left Ear: External ear normal    Eyes: Pupils are equal, round, and reactive to light  Conjunctivae and EOM are normal    Neck: Normal range of motion  Neck supple  Cardiovascular: Normal rate, regular rhythm, normal heart sounds and intact distal pulses  Exam reveals no gallop and no friction rub  No murmur heard  Pulmonary/Chest: Effort normal and breath sounds normal  No stridor  No respiratory distress  She has no wheezes  Abdominal: Soft  Bowel sounds are normal  She exhibits no distension  There is no tenderness  There is no rebound and no guarding  Musculoskeletal:        Left ankle: Normal  She exhibits no swelling, no ecchymosis, no deformity, no laceration and normal pulse  No tenderness  No lateral malleolus, no medial malleolus, no AITFL, no CF ligament, no posterior TFL, no head of 5th metatarsal and no proximal fibula tenderness found  Achilles tendon normal  Achilles tendon exhibits no pain, no defect and normal Herring's test results  Left lower leg: Normal         Left foot: There is tenderness  There is normal range of motion, no bony tenderness, no swelling, no crepitus, no deformity and no laceration  Feet:    Neurological: She is alert and oriented to person, place, and time  No cranial nerve deficit or sensory deficit  She exhibits normal muscle tone  Skin: Skin is warm and dry  Capillary refill takes less than 2 seconds  No rash noted  No erythema  Psychiatric: She has a normal mood and affect  Her behavior is normal    Nursing note and vitals reviewed        ED Medications  Medications   acetaminophen (TYLENOL) tablet 975 mg (975 mg Oral Given 7/27/20 1915)   ibuprofen (MOTRIN) tablet 600 mg (600 mg Oral Given 7/27/20 1915)       Diagnostic Studies  Results Reviewed     None                 XR foot 3+ views LEFT   ED Interpretation by Kennedy Bolivar MD (07/27 1944)   No acute fracture or dislocation            Procedures  Procedures      ED Course  ED Course as of Jul 28 0159   Mon Jul 27, 2020   1853 Blood Pressure: 166/84   1853 Temperature: 98 1 °F (36 7 °C)   1853 Pulse: 85   1853 Respirations: 20   1853 SpO2: 98 %       US AUDIT      Most Recent Value   Initial Alcohol Screen: US AUDIT-C    1  How often do you have a drink containing alcohol?  0 Filed at: 07/27/2020 1853   2  How many drinks containing alcohol do you have on a typical day you are drinking? 0 Filed at: 07/27/2020 1853   3a  Male UNDER 65: How often do you have five or more drinks on one occasion? 0 Filed at: 07/27/2020 1853   3b  FEMALE Any Age, or MALE 65+: How often do you have 4 or more drinks on one occassion? 0 Filed at: 07/27/2020 1853   Audit-C Score  0 Filed at: 07/27/2020 1853                  HAYLEE/DAST-10      Most Recent Value   How many times in the past year have you    Used an illegal drug or used a prescription medication for non-medical reasons? Never Filed at: 07/27/2020 1853                              MDM  Number of Diagnoses or Management Options  Left foot pain:   Diagnosis management comments: 70-year-old female presents for evaluation of atraumatic left heel pain  The pain is worse in the morning and improves throughout the day  On exam patient is overall well appearing in no acute distress  She was able to ambulate without difficulty  Patient has tenderness along the plantar aspect of her left foot  There is no specific bony tenderness  No surrounding swelling or erythema  Patient requesting x-rays  Will treat symptomatically and x-ray the left foot  X-ray was negative for any acute fracture or dislocation  Patient's symptoms likely secondary to plantar fasciitis  Patient was advised to take Tylenol and ibuprofen for pain  She was also advised to freeze a water bottle and to roll the bottom of her foot on the water bottle a few times a day  She was given information to follow-up with podiatry  She was given strict return precautions for which he expressed understanding          Disposition  Final diagnoses:   Left foot pain     Time reflects when diagnosis was documented in both MDM as applicable and the Disposition within this note     Time User Action Codes Description Comment    7/27/2020  7:44 PM Check, Jose Raulidania Donald [O84 [id number]] Left foot pain       ED Disposition     ED Disposition Condition Date/Time Comment    Discharge Stable Mon Jul 27, 2020  7:44 PM Bashir Haskins discharge to home/self care  Follow-up Information     Follow up With Specialties Details Why Contact Info Additional Information    [facility] Schedule an appointment as soon as possible for a visit   [address] [id number]  [address] [facility], [address] Emergency Department Emergency Medicine  If symptoms worsen [address]  [phone]  ED, [address] , [place], [place], [address]   [phone]          Discharge Medication List as of 7/27/2020  7:45 PM      CONTINUE these medications which have NOT CHANGED    Details   amLODIPine (NORVASC) 10 mg tablet Take 1 tablet (10 mg total) by mouth daily, Starting Thu 5/21/2020, Normal      atorvastatin (LIPITOR) 40 mg tablet Take 1 tablet (40 mg total) by mouth every evening, Starting Thu 5/21/2020, Normal      escitalopram (LEXAPRO) 10 mg tablet Take 10 mg by mouth every morning, Starting Thu 4/23/2020, Historical Med      hydrOXYzine HCL (ATARAX) 25 mg tablet Take 25 mg by mouth daily at bedtime as needed, Starting Sat 11/23/2019, Historical Med      losartan (COZAAR) 100 MG tablet Take 1 tablet (100 mg total) by mouth daily, Starting Thu 5/21/2020, Normal           No discharge procedures on file  PDMP Review     None           ED Provider  Attending physically available and evaluated Bashir Shonnajulia  I managed the patient along with the ED Attending      Electronically Signed by         Veronica Fonseca MD  07/28/20 7618

## 2020-07-30 NOTE — ED ATTENDING ATTESTATION
7/27/2020  I, Saleem Newell MD, saw and evaluated the patient  I have discussed the patient with the resident/non-physician practitioner and agree with the resident's/non-physician practitioner's findings, Plan of Care, and MDM as documented in the resident's/non-physician practitioner's note, except where noted  All available labs and Radiology studies were reviewed  I was present for key portions of any procedure(s) performed by the resident/non-physician practitioner and I was immediately available to provide assistance  At this point I agree with the current assessment done in the Emergency Department  I have conducted an independent evaluation of this patient a history and physical is as follows:    Patient is a 79-year-old female with left heel pain  Patient states pain is worse in the morning improves throughout the day denies any fevers chills or skin changes  Vital signs reviewed  Patient point tender over the plantar aspect of her heel  Skin is normal appearing ears no erythema no rash no pain upper portion  Foot is warm well perfused neurovascular intact palpable pulses and sensate to light touch  Impression heel pain suspect possible plantar fasciitis or heel spur  Will check x-ray of foot  Pain control anticipate discharge home with follow-up with Podiatry          ED Course         Critical Care Time  Procedures

## 2020-08-18 ENCOUNTER — CONSULT (OUTPATIENT)
Dept: MULTI SPECIALTY CLINIC | Facility: CLINIC | Age: 60
End: 2020-08-18

## 2020-08-18 ENCOUNTER — TELEPHONE (OUTPATIENT)
Dept: MULTI SPECIALTY CLINIC | Facility: CLINIC | Age: 60
End: 2020-08-18

## 2020-08-18 VITALS
OXYGEN SATURATION: 98 % | HEART RATE: 76 BPM | DIASTOLIC BLOOD PRESSURE: 102 MMHG | BODY MASS INDEX: 32.73 KG/M2 | TEMPERATURE: 97.2 F | WEIGHT: 184.75 LBS | SYSTOLIC BLOOD PRESSURE: 160 MMHG | HEIGHT: 63 IN

## 2020-08-18 DIAGNOSIS — G57.52 TARSAL TUNNEL SYNDROME OF LEFT SIDE: ICD-10-CM

## 2020-08-18 DIAGNOSIS — M76.829 PTTD (POSTERIOR TIBIAL TENDON DYSFUNCTION): Primary | ICD-10-CM

## 2020-08-18 DIAGNOSIS — M72.2 PLANTAR FASCIITIS: ICD-10-CM

## 2020-08-18 PROCEDURE — 3080F DIAST BP >= 90 MM HG: CPT | Performed by: PODIATRIST

## 2020-08-18 PROCEDURE — 1036F TOBACCO NON-USER: CPT | Performed by: PODIATRIST

## 2020-08-18 PROCEDURE — 3077F SYST BP >= 140 MM HG: CPT | Performed by: PODIATRIST

## 2020-08-18 PROCEDURE — 99243 OFF/OP CNSLTJ NEW/EST LOW 30: CPT | Performed by: PODIATRIST

## 2020-08-18 PROCEDURE — 3008F BODY MASS INDEX DOCD: CPT | Performed by: PODIATRIST

## 2020-08-18 RX ORDER — METHYLPREDNISOLONE 4 MG/1
TABLET ORAL
Qty: 1 EACH | Refills: 0 | Status: SHIPPED | OUTPATIENT
Start: 2020-08-18 | End: 2020-08-25

## 2020-08-18 NOTE — PROGRESS NOTES
Podiatry Clinic Visit  Cheng Robles 63 y o  female MRN: [id number]  Encounter: [id number]    Assessment/Plan        Diagnoses and all orders for this visit:    PTTD (posterior tibial tendon dysfunction)  -     methylPREDNISolone 4 MG tablet therapy pack; Use as directed on package  -     Cam Boot  -     Ambulatory referral to Physical Therapy; Future    Plantar fasciitis  -     methylPREDNISolone 4 MG tablet therapy pack; Use as directed on package  -     Ambulatory referral to Physical Therapy; Future    Tarsal tunnel syndrome of left side  -     methylPREDNISolone 4 MG tablet therapy pack; Use as directed on package  -     Cam Boot         Plan:   Patient was seen and examined with all their questions and concerns addressed   Medrol pack, CAM boot, and Physical therapy were prescribed   Pt was instructed to Ice and elevate her left lower extremity on the daily basis   Patient was re-appointed for 3 weeks     - Dr Nate Escobar was available/present for entirety of patient encounter and present for all procedures  History of Present Illness     HPI: Cheng Robles is a 63 y o  female who presents complaining of pain on the left foot  She states that she went to the ED 7/27/20 were xrays were taken  Pt reports that her left foot is painfull  She states that the pain has been present for 2 months  She describe the pain as deep pain She states that the pain is worse at night  After walking for any amount of time and distance the pain is present  The pain is also present at rest  She states that she takes Advil for pain and that it helps her a little bit  She states that the swollen on her foot and legs b/l appeared at the same time as her foot pain  She denies N/V/F/CP       Review of Systems   Constitutional: Negative  HENT: Negative  Eyes: Negative  Respiratory: Negative  Cardiovascular: b/l leg edema   Gastrointestinal: Negative      Musculoskeletal: leg pain and foot pain  Skin: negative  Neurological: Negative  Historical Information   Past Medical History:   Diagnosis Date    Back ache     Cancer ([address] )     Hypertension      Past Surgical History:   Procedure Laterality Date    APPENDECTOMY      BILATERAL OOPHORECTOMY      BREAST SURGERY      HERNIA REPAIR       Social History   Social History     Substance and Sexual Activity   Alcohol Use No     Social History     Substance and Sexual Activity   Drug Use No     Social History     Tobacco Use   Smoking Status Former Smoker    Packs/day: 0 65    Years: 30 00    Pack years: 19 50   Smokeless Tobacco Never Used   Tobacco Comment    quit 15 years ago     Family History:   Family History   Problem Relation Age of Onset    Hypertension Mother     Heart disease Mother     Diabetes Mother     Cancer Father        Meds/Allergies   (Not in a hospital admission)    No Known Allergies    Objective     Current Vitals:   Blood Pressure: (!) 160/102(did not take meds yet today) (08/18/20 0914)  Pulse: 76 (08/18/20 0914)  Temperature: (!) 97 2 °F (36 2 °C) (08/18/20 0914)  Temp Source: Temporal (08/18/20 0914)  Height: 5' 3" (160 cm) (08/18/20 0914)  Weight - Scale: 83 8 kg (184 lb 11 9 oz) (08/18/20 0914)  SpO2: 98 % (08/18/20 0914)        BP (!) 160/102 (BP Location: Right arm, Patient Position: Sitting, Cuff Size: Adult) Comment: did not take meds yet today  Pulse 76   Temp (!) 97 2 °F (36 2 °C) (Temporal)   Ht 5' 3" (1 6 m)   Wt 83 8 kg (184 lb 11 9 oz)   LMP  (LMP Unknown)   SpO2 98%   BMI 32 73 kg/m²       Lower Extremity Exam:    Foot Exam    Musculoskeletal:  MMT is 4/5 to all compartments of the LE, +2/4 edema B/L, Hallux limitus is present  Equinus is present  No pain with passive ROM of pedal joints  Pain on palpation of left plantar heel  Positive tinel sign on the left  Inability to perform heel lift L>R  Pain with dorsiflexion of ankle b/l  Pain with palpation of calf distally        Vascular:   DP pulses and PT pulses are present bilaterally  , CFT< 3sec to all digits  Pedal hair is not Present  Pulse has regular rate and rhythm  Skin temperature warm to warm B/L  Dermatological:  No open Lesions  Skin of the LE is normal texture, temperature, turgor  Interdigital maceration is not present  Neurologic:  Gross sensation is intact  Protective sensation is Intact

## 2020-08-18 NOTE — TELEPHONE ENCOUNTER
[address] calling to verify if cam boot  is "low tide or high tide"  Pharmacist also requesting progress notes       Phone number: [phone]  Fax: [phone]

## 2020-08-23 ENCOUNTER — HOSPITAL ENCOUNTER (EMERGENCY)
Facility: HOSPITAL | Age: 60
Discharge: HOME | End: 2020-08-23
Attending: EMERGENCY MEDICINE | Admitting: EMERGENCY MEDICINE
Payer: COMMERCIAL

## 2020-08-23 ENCOUNTER — APPOINTMENT (EMERGENCY)
Dept: RADIOLOGY | Facility: HOSPITAL | Age: 60
End: 2020-08-23
Payer: COMMERCIAL

## 2020-08-23 VITALS
HEART RATE: 78 BPM | RESPIRATION RATE: 18 BRPM | DIASTOLIC BLOOD PRESSURE: 89 MMHG | WEIGHT: 184 LBS | TEMPERATURE: 98 F | BODY MASS INDEX: 32.6 KG/M2 | OXYGEN SATURATION: 98 % | HEIGHT: 63 IN | SYSTOLIC BLOOD PRESSURE: 198 MMHG

## 2020-08-23 DIAGNOSIS — M79.89 HAND SWELLING: Primary | ICD-10-CM

## 2020-08-23 PROCEDURE — 99283 EMERGENCY DEPT VISIT LOW MDM: CPT

## 2020-08-23 PROCEDURE — 96372 THER/PROPH/DIAG INJ SC/IM: CPT

## 2020-08-23 PROCEDURE — 99284 EMERGENCY DEPT VISIT MOD MDM: CPT | Performed by: EMERGENCY MEDICINE

## 2020-08-23 PROCEDURE — 73130 X-RAY EXAM OF HAND: CPT

## 2020-08-23 RX ORDER — ACETAMINOPHEN 325 MG/1
650 TABLET ORAL ONCE
Status: COMPLETED | OUTPATIENT
Start: 2020-08-23 | End: 2020-08-23

## 2020-08-23 RX ORDER — KETOROLAC TROMETHAMINE 30 MG/ML
30 INJECTION, SOLUTION INTRAMUSCULAR; INTRAVENOUS ONCE
Status: COMPLETED | OUTPATIENT
Start: 2020-08-23 | End: 2020-08-23

## 2020-08-23 RX ADMIN — KETOROLAC TROMETHAMINE 30 MG: 30 INJECTION, SOLUTION INTRAMUSCULAR at 22:23

## 2020-08-23 RX ADMIN — ACETAMINOPHEN 650 MG: 325 TABLET, FILM COATED ORAL at 22:23

## 2020-08-24 NOTE — ED ATTENDING ATTESTATION
8/23/2020  IGonzalo MD, saw and evaluated the patient  I have discussed the patient with the resident/non-physician practitioner and agree with the resident's/non-physician practitioner's findings, Plan of Care, and MDM as documented in the resident's/non-physician practitioner's note, except where noted  All available labs and Radiology studies were reviewed  I was present for key portions of any procedure(s) performed by the resident/non-physician practitioner and I was immediately available to provide assistance  At this point I agree with the current assessment done in the Emergency Department  I have conducted an independent evaluation of this patient a history and physical is as follows:    ED Course         Critical Care Time  Procedures     62 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

## 2020-08-25 NOTE — ED PROVIDER NOTES
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  who recently had a syncopal episode  She was going to try to stop him from falling and was unable to, helping to lower him to the ground  As he was being evaluated here in the hospital she then started to feel a discomfort in her right hand  It has progressed to swell and is now extremely painful  She denies any change in sensation  Worse with motion  No other complaints  - No language barrier    - History obtained from patient  - There are no limitations to the history obtained  - Previous charting was reviewed    PMH:   has a past medical history of Back ache, Cancer ([address] ), and Hypertension  PSH:   has a past surgical history that includes Breast surgery; Appendectomy; Hernia repair; and Bilateral oophorectomy  ROS:  Review of Systems   Constitutional: Negative for chills, diaphoresis, fatigue and fever  Respiratory: Negative for cough and shortness of breath  Cardiovascular: Negative for chest pain and palpitations  Gastrointestinal: Negative for abdominal distention, abdominal pain, constipation, diarrhea, nausea and vomiting  Genitourinary: Negative for dysuria, frequency and hematuria  Musculoskeletal: Positive for arthralgias and myalgias  Negative for neck pain  Neurological: Negative for dizziness, syncope, light-headedness and headaches  All other systems reviewed and are negative  PE:   Vitals:    08/23/20 2148   BP: (!) 198/89   BP Location: Left arm   Pulse: 78   Resp: 18   Temp: 98 °F (36 7 °C)   TempSrc: Oral   SpO2: 98%   Weight: 83 5 kg (184 lb)   Height: 5' 3" (1 6 m)     Vitals reviewed by me  Physical Exam  Vitals signs reviewed  Constitutional:       General: She is not in acute distress  Appearance: She is not diaphoretic  HENT:      Head: Normocephalic and atraumatic  Right Ear: External ear normal       Left Ear: External ear normal    Eyes:      General: No scleral icterus  Right eye: No discharge  Left eye: No discharge  Conjunctiva/sclera: Conjunctivae normal       Pupils: Pupils are equal, round, and reactive to light  Neck:      Musculoskeletal: Normal range of motion and neck supple  Vascular: No JVD  Cardiovascular:      Rate and Rhythm: Normal rate and regular rhythm  Heart sounds: Normal heart sounds  No murmur  No friction rub  No gallop  Pulmonary:      Effort: Pulmonary effort is normal  No respiratory distress  Breath sounds: Normal breath sounds  No wheezing or rales  Abdominal:      General: Bowel sounds are normal  There is no distension  Palpations: Abdomen is soft  There is no mass  Tenderness: There is no abdominal tenderness  There is no guarding  Musculoskeletal: Normal range of motion  General: Tenderness present  No deformity  Right hand: She exhibits tenderness  She exhibits normal range of motion, normal capillary refill and no laceration  Normal sensation noted  Normal strength noted  Hands:    Skin:     General: Skin is warm  Neurological:      Mental Status: She is alert and oriented to person, place, and time  Cranial Nerves: No cranial nerve deficit  Sensory: No sensory deficit  Motor: No abnormal muscle tone  Coordination: Coordination normal    Psychiatric:         Behavior: Behavior normal          Thought Content: Thought content normal          Judgment: Judgment normal           A:  - Nursing note reviewed  -                      XR hand 3+ views RIGHT   Final Result      No acute osseous abnormality        Workstation performed: [id number] Orders Placed This Encounter   Procedures    XR hand 3+ views RIGHT     Labs Reviewed - No data to display      Final Diagnosis:  1  Hand swelling        P:  - patient presents with nontraumatic swelling of right hand  -x-ray with possible fracture of 2nd metacarpal  -patient provided with thumb spica splint and instructed to follow-up with orthopedics for further evaluation  -Tylenol and ibuprofen as needed for analgesia    Medications   ketorolac (TORADOL) injection 30 mg (30 mg Intramuscular Given 8/23/20 2223)   acetaminophen (TYLENOL) tablet 650 mg (650 mg Oral Given 8/23/20 2223)     Time reflects when diagnosis was documented in both MDM as applicable and the Disposition within this note     Time User Action Codes Description Comment    8/23/2020 11:02 PM Yanet Dang Add [T28 19] Hand swelling       ED Disposition     ED Disposition Condition Date/Time Comment    Discharge Stable Sun Aug 23, 2020 11:02 PM Bashir Haskins discharge to home/self care              Follow-up Information     Follow up With Specialties Details Why Contact Info Additional Information    [address] [place] [address] Emergency Department Emergency Medicine   [address]  [phone]  ED, [address]    [facility] Orth[facility] Surgery Schedule an appointment as soon as possible for a visit   [address] [facility] [facility], [address], [address] [facility]        Discharge Medication List as of 8/23/2020 11:05 PM      CONTINUE these medications which have NOT CHANGED    Details   amLODIPine (NORVASC) 10 mg tablet Take 1 tablet (10 mg total) by mouth daily, Starting Thu 5/21/2020, Normal      atorvastatin (LIPITOR) 40 mg tablet Take 1 tablet (40 mg total) by mouth every evening, Starting Thu 5/21/2020, Normal      escitalopram (LEXAPRO) 10 mg tablet Take 10 mg by mouth every morning, Starting Thu 4/23/2020, Historical Med      hydrOXYzine HCL (ATARAX) 25 mg tablet Take 25 mg by mouth daily at bedtime as needed, Starting Sat 11/23/2019, Historical Med      losartan (COZAAR) 100 MG tablet Take 1 tablet (100 mg total) by mouth daily, Starting Thu 5/21/2020, Normal      methylPREDNISolone 4 MG tablet therapy pack Use as directed on package, Normal           No discharge procedures on file  Prior to Admission Medications   Prescriptions Last Dose Informant Patient Reported? Taking? amLODIPine (NORVASC) 10 mg tablet   No No   Sig: Take 1 tablet (10 mg total) by mouth daily   atorvastatin (LIPITOR) 40 mg tablet   No No   Sig: Take 1 tablet (40 mg total) by mouth every evening   escitalopram (LEXAPRO) 10 mg tablet   Yes No   Sig: Take 10 mg by mouth every morning   hydrOXYzine HCL (ATARAX) 25 mg tablet   Yes No   Sig: Take 25 mg by mouth daily at bedtime as needed   losartan (COZAAR) 100 MG tablet   No No   Sig: Take 1 tablet (100 mg total) by mouth daily   methylPREDNISolone 4 MG tablet therapy pack   No No   Sig: Use as directed on package      Facility-Administered Medications: None       Portions of the record may have been created with voice recognition software  Occasional wrong word or "sound a like" substitutions may have occurred due to the inherent limitations of voice recognition software  Read the chart carefully and recognize, using context, where substitutions have occurred      Electronically signed by:  Vishnu Martines, PGY 3, MD Salbador Waggoner MD  08/25/20 0559

## 2020-09-03 ENCOUNTER — TELEPHONE (OUTPATIENT)
Dept: INTERNAL MEDICINE CLINIC | Facility: CLINIC | Age: 60
End: 2020-09-03

## 2020-09-03 DIAGNOSIS — M79.673 PAIN OF FOOT, UNSPECIFIED LATERALITY: Primary | ICD-10-CM

## 2020-09-08 ENCOUNTER — OFFICE VISIT (OUTPATIENT)
Dept: MULTI SPECIALTY CLINIC | Facility: CLINIC | Age: 60
End: 2020-09-08

## 2020-09-08 ENCOUNTER — HOSPITAL ENCOUNTER (OUTPATIENT)
Dept: RADIOLOGY | Facility: HOSPITAL | Age: 60
Discharge: HOME | End: 2020-09-08
Payer: COMMERCIAL

## 2020-09-08 ENCOUNTER — OFFICE VISIT (OUTPATIENT)
Dept: OBGYN CLINIC | Facility: HOSPITAL | Age: 60
End: 2020-09-08
Payer: COMMERCIAL

## 2020-09-08 ENCOUNTER — PATIENT OUTREACH (OUTPATIENT)
Dept: INTERNAL MEDICINE CLINIC | Facility: CLINIC | Age: 60
End: 2020-09-08

## 2020-09-08 VITALS
TEMPERATURE: 97.1 F | OXYGEN SATURATION: 97 % | SYSTOLIC BLOOD PRESSURE: 176 MMHG | HEART RATE: 77 BPM | DIASTOLIC BLOOD PRESSURE: 100 MMHG

## 2020-09-08 VITALS
DIASTOLIC BLOOD PRESSURE: 88 MMHG | HEIGHT: 63 IN | HEART RATE: 88 BPM | BODY MASS INDEX: 32.59 KG/M2 | SYSTOLIC BLOOD PRESSURE: 170 MMHG

## 2020-09-08 DIAGNOSIS — M76.822 POSTERIOR TIBIAL TENDON DYSFUNCTION (PTTD) OF LEFT LOWER EXTREMITY: ICD-10-CM

## 2020-09-08 DIAGNOSIS — R52 PAIN: ICD-10-CM

## 2020-09-08 DIAGNOSIS — R52 PAIN: Primary | ICD-10-CM

## 2020-09-08 DIAGNOSIS — S66.911A STRAIN OF RIGHT WRIST, INITIAL ENCOUNTER: ICD-10-CM

## 2020-09-08 DIAGNOSIS — G57.52 TARSAL TUNNEL SYNDROME, LEFT: ICD-10-CM

## 2020-09-08 DIAGNOSIS — G57.82 SURAL NEURITIS, LEFT: Primary | ICD-10-CM

## 2020-09-08 PROCEDURE — 73110 X-RAY EXAM OF WRIST: CPT

## 2020-09-08 PROCEDURE — 99213 OFFICE O/P EST LOW 20 MIN: CPT | Performed by: PODIATRIST

## 2020-09-08 PROCEDURE — 99203 OFFICE O/P NEW LOW 30 MIN: CPT | Performed by: PHYSICIAN ASSISTANT

## 2020-09-08 PROCEDURE — 1036F TOBACCO NON-USER: CPT | Performed by: PHYSICIAN ASSISTANT

## 2020-09-08 NOTE — PROGRESS NOTES
Assessment/Plan   Diagnoses and all orders for this visit:        Strain of right wrist, initial encounter  - May discontinue splint  - Start to work on gentle ROM with hand therapy  - Ice as needed  - Follow up with hand        Subjective   Patient ID: Afshin Navarrete is a 63 y o  female  Vitals:    09/08/20 1343   BP: 170/88   Pulse: 80     59yo female comes in for an evaluation of her right wrist and hand  She was injured about 2 weeks ago when she was trying to help her  get up after fainting  There was no specific injury, but after helping lift him, she noticed some soreness in the area of the radial wrist, 1st, and 2nd metacarpals  The pain is dull in character, mild in severity, pain does not radiate and is not associated with numbness  She went to the ER where xrays were read as normal   She was treated with a splint and her pain has significantly improved          The following portions of the patient's history were reviewed and updated as appropriate: allergies, current medications, past family history, past medical history, past social history, past surgical history and problem list The following portions of the patient's history were reviewed and updated as appropriate: allergies, current medications, past family history, past medical history, past social history, past surgical history and problem list     Review of Systems  Ortho Exam  Past Medical History:   Diagnosis Date    Back ache     Cancer ([address] )     Hypertension      Past Surgical History:   Procedure Laterality Date    APPENDECTOMY      BILATERAL OOPHORECTOMY      BREAST SURGERY      HERNIA REPAIR       Family History   Problem Relation Age of Onset    Hypertension Mother     Heart disease Mother     Diabetes Mother     Cancer Father      Social History     Occupational History    Occupation: unemployed   Tobacco Use    Smoking status: Former Smoker     Packs/day: 0 65     Years: 30 00     Pack years: 19 50    Smokeless tobacco: Never Used    Tobacco comment: quit 15 years ago   Substance and Sexual Activity    Alcohol use: No    Drug use: No    Sexual activity: Not Currently       Review of Systems   Constitutional: Negative  HENT: Negative  Eyes: Negative  Respiratory: Negative  Cardiovascular: Negative  Gastrointestinal: Negative  Endocrine: Negative  Genitourinary: Negative  Musculoskeletal: As below      Allergic/Immunologic: Negative  Neurological: Negative  Hematological: Negative  Psychiatric/Behavioral: Negative  Objective   Physical Exam    · Constitutional: Awake, Alert, Oriented  · Eyes: EOMI  · Psych: Mood and affect appropriate  · Heart: regular rate and rhythm  · Lungs: No audible wheezing  · Abdomen: soft  · Lymph: no lymphedema   right wrist, hand:  - Appearance   No swelling, discoloration, deformity, or ecchymosis  - Palpation  o Eduin, mildly, over the snuffbox, 1st MC, 2nd MC  And 1st webspace  Otherwise nontender about the wrist and hand   - ROM  o + stiffness in all planes  Cannot make a full fist   - Motor  o  5/5, wrist extension 5/5, and wrist flexion 5/5, interossi 5/5  - Special Tests  o normal sensation of hand and arm  - NVI distally    I have personally reviewed pertinent films in PACS and my interpretation is no acute displaced fracture

## 2020-09-08 NOTE — PROGRESS NOTES
SW has met withpt as per her  request this date  SW spoke via her adult granddgt Sumanth Crawford [phone] as per pt request     Pt reports she and her  are doing okay at this time  She is healing from her injuries from his fall  She is very excited as she is a [employer] Mother as she has a new great grand son born last week  Her dgt lives in [place] asn 2 sons are in [place]  She relates she is active with [facility] as has her [facility]  Pt has re-applied for [employer] and is waiting fo their answer  She will be moving into their new Housing very soon  She denines any otherneeds at this time  Pt encouraged t f/u with SW if needed

## 2020-09-08 NOTE — PROGRESS NOTES
Podiatry Clinic Visit  Kennedy Quintero 63 y o  female MRN: [id number]  Encounter: [id number]    Assessment/Plan        Diagnoses and all orders for this visit:    Sural neuritis, left  -     EMG 1 Limb; Future    Tarsal tunnel syndrome, left  -     EMG 1 Limb; Future    Posterior tibial tendon dysfunction (PTTD) of left lower extremity       Plan:   Patient was seen/examined  All questions and concerns addressed   EMG ordered left LE to evaluate shooting shock-like ankle pain, positive tinnel's sign both medial and laterally   Continue WBAT in CAM walker with RICE protocol   Patient will re-schedule PT appointment, she was unable to start PT 2/2 fracturing her right arm recently   RTC once EMG is completed  Dr Misty Harmon was present during this entire procedure  History of Present Illness     HPI:   Kennedy Quintero is a 62yo female presenting for follow-up evaluation of left foot/ankle pain likely 2/2 PTTD  She was unable to start physical therapy due to fracturing her arm recently  She has been wearing her CAM walker daily and states it has improved her symptoms mildly  She describes symptoms of numbness, tingling and electric shocks in her left ankle  Onset of symptoms was months ago  Symptoms are currently of moderate severity  Symptoms occur intermittently and last minutes  The patient denies cramping and squeezing  Symptoms are worse on the left side  She denies  dry eyes and dry mouth, blurred vision and lack of sweating  Previous treatment has included none  Review of Systems   Constitutional: Negative  HENT: Negative  Eyes: Negative  Respiratory: Negative  Cardiovascular: Negative  Gastrointestinal: Negative  Musculoskeletal: leg pain  Skin: negative    Neurological: Electric shock pain      Historical Information   Past Medical History:   Diagnosis Date    Back ache     Cancer [address] [address] )     Hypertension      Past Surgical History:   Procedure Laterality Date    APPENDECTOMY      BILATERAL OOPHORECTOMY      BREAST SURGERY      HERNIA REPAIR       Social History   Social History     Substance and Sexual Activity   Alcohol Use No     Social History     Substance and Sexual Activity   Drug Use No     Social History     Tobacco Use   Smoking Status Former Smoker    Packs/day: 0 65    Years: 30 00    Pack years: 19 50   Smokeless Tobacco Never Used   Tobacco Comment    quit 15 years ago     Family History:   Family History   Problem Relation Age of Onset    Hypertension Mother     Heart disease Mother     Diabetes Mother     Cancer Father        Meds/Allergies   (Not in a hospital admission)    No Known Allergies    Objective     Current Vitals:   Blood Pressure: (!) 176/100 (09/08/20 0913)  Pulse: 77 (09/08/20 0913)  Temperature: (!) 97 1 °F (36 2 °C) (09/08/20 0913)  Temp Source: Temporal (09/08/20 0913)  SpO2: 97 % (09/08/20 0913)        BP (!) 176/100 (BP Location: Left arm, Patient Position: Sitting, Cuff Size: Standard)   Pulse 77   Temp (!) 97 1 °F (36 2 °C) (Temporal)   LMP  (LMP Unknown)   SpO2 97%       Lower Extremity Exam:    Foot Exam    Musculoskeletal:  MMT is 5/5 to all compartments of the LE, +0/4 edema B/L, Hallux limitus is present  Equinus is present  Vascular:   R DP is +2/4, R PT is +2/4, L DP is +2/4, L PT is +2/4, CFT< 3sec to all digits  Pedal hair is Present  regular rate and rhythm  Skin:  No open Lesions  Skin of the LE is normal texture, temperature, turgor  Interdigital maceration is not present  Neurologic:  Gross sensation is intact  Protective sensation is Intact  Positive tinnel's sign tibial nerve left leg at ankle, reproducible electric shock pain upon percussion of the lateral ankle traveling both proximally and distally  Positive straight leg raise left side

## 2020-09-14 ENCOUNTER — EVALUATION (OUTPATIENT)
Dept: OCCUPATIONAL THERAPY | Age: 60
End: 2020-09-14
Payer: COMMERCIAL

## 2020-09-14 DIAGNOSIS — IMO0001 STRAIN OF RIGHT HAND AND FINGER, INITIAL ENCOUNTER: Primary | ICD-10-CM

## 2020-09-14 PROCEDURE — 97166 OT EVAL MOD COMPLEX 45 MIN: CPT

## 2020-09-14 NOTE — PROGRESS NOTES
OT Evaluation     Today's date: 2020  Patient name: Cam Beckham  : 1960  MRN: [id number]  Referring provider: Stacie Vila  Dx:   Encounter Diagnosis     ICD-10-CM    1  Strain of right hand and finger, initial encounter  R87 569A                   Assessment  Assessment details: Pt is a 62 yo female, who complains of R  wrist and hand pain  She was injured about 2 weeks ago, 2020, when she was trying to help her  get up after fainting  There was no specific injury, but after helping lift him, she noticed some soreness in the area of the radial wrist, 1st, and 2nd metacarpals  The pain is dull in character, mild in severity, pain can radiate, with now c/o occ numbness at 3rd digit  She went to the ER where xrays were read as normal   She was treated with a splint at ER and ortho f/u d/c splint and order gentle ROM exercises and ice as needed  Pt still demo with mild edema, pain at her thumb, webspace of thumb, 2nd MCP, and wrist  Pt reports feeling numbness at 3rd digit down forearm with tinels test and phalens test  Pt noted with mild limitation of wrist ROM and pain with thumb ROM  Pt c/o inc pain with flexion  Pt provided with edema glove to reduce edema, and KT to also reduce edema and provide support  Pt educated in gentler ROM exercises of thumb, wrist, and fingers  Pt educated to wear brace during sleep as she reports pain/tingling waking her up at night and ice as needed  Impairments: abnormal coordination, abnormal or restricted ROM, activity intolerance, impaired physical strength, lacks appropriate home exercise program and pain with function  Other impairment: Mild edema, mild tenderness, occ c/o numbness/tingling down 3rd digit, inc pain with flexion, unable to make composite fist  Functional limitations: unable to make composite fist, unable to gold/grasp items, difficulty assisting her  when he needs assistance     Symptom irritability: lowBarriers to therapy: Language Barrier; Amharic Speaking  Understanding of Dx/Px/POC: fair   Prognosis: fair    Goals  STGs:  1  Pt will dec pain with movement/function 3/10  2  Pt will demo no edema of R hand  3  Pt will be able to make a fist  LTGs:  1  Pt will be free from pain  2  Pt will report no numbness/tingling  3  Pt will return to I/ADL tasks without difficulty  Plan  Patient would benefit from: skilled occupational therapy  Planned modality interventions: cryotherapy, thermotherapy: hydrocollator packs and ultrasound  Other planned modality interventions: IAS  Planned therapy interventions: manual therapy, massage, neuromuscular re-education, patient education, self care, therapeutic activities, strengthening, stretching, therapeutic exercise, functional ROM exercises and home exercise program  Frequency: 1-2x per week  Duration in weeks: 4  Plan of Care beginning date: 2020  Plan of Care expiration date: 10/12/2020  Treatment plan discussed with: patient        Subjective Evaluation    History of Present Illness  Date of onset: 2020  Mechanism of injury: trauma  Mechanism of injury: Pt is a 64 yo female, who complains of R  wrist and hand pain  She was injured about 2 weeks ago, 2020, when she was trying to help her  get up after fainting  There was no specific injury, but after helping lift him, she noticed some soreness in the area of the radial wrist, 1st, and 2nd metacarpals  The pain is dull in character, mild in severity, pain can radiate, with now c/o occ numbness at 3rd digit  She went to the ER where xrays were read as normal   She was treated with a splint at ER and ortho f/u d/c splint and order gentle ROM exercises and ice as needed    Quality of life: fair    Pain  Current pain rating: 3  At best pain rating: 3  At worst pain ratin  Location: R wrist, R middle finger, R thumb/webspace  Quality: throbbing, radiating and cramping  Relieving factors: ice, rest and support  Aggravating factors: lifting  Progression: no change    Hand dominance: right      Diagnostic Tests  X-ray: normal  Treatments  Previous treatment: immobilization  Current treatment: occupational therapy  Patient Goals  Patient goals for therapy: decreased edema, decreased pain and independence with ADLs/IADLs          Objective     Observations     Right Wrist/Hand   Positive for edema  Additional Observation Details  Mild edema at wrist and hand noted    Tenderness     Right Wrist/Hand   Tenderness in the common extensor tendon  Additional Tenderness Details  Extensor incidis TTP    Neurological Testing     Sensation     Wrist/Hand     Right   Intact: light touch, pin prick and sharp/dull discrimination    Active Range of Motion     Right Wrist   Wrist flexion: 50 degrees   Wrist extension: 50 degrees   Radial deviation: 15 degrees   Ulnar deviation: 15 degrees     Right Thumb   Flexion     MP: 30    DIP: 40  Palmar Abduction    CMC: 40  Radial Abduction    CMC: 40    Strength/Myotome Testing     Additional Strength Details  NT 2* pain    Tests     Right Wrist/Hand   Positive Phalen's sign and Tinel's sign (medial nerve)  Negative Finkelstein's  Swelling     Left Wrist/Hand   Circumference MCP: 20 cm  Circumference wrist: 17 5 cm    Right Wrist/Hand   Circumference MCP: 20 4 cm  Circumference wrist: 18 2 cm      Flowsheet Rows      Most Recent Value   PT/OT G-Codes   Current Score  39   Projected Score  59             Precautions: Universal; MD d/c splint, gentle ROM to wrist/hand, ice as needed        Manuals             IASTM wrist/thumb             Edema mgmt                                       Neuro Re-Ed             AAROM wrist/thumb                                                                                           Ther Ex             Wrist PREs             Thumb ROM             CMC ROM                                                                              Ther Activity 39 Betty [place]gil dexterity                          Gait Training                                       Modalities             MP/CP             U/S

## 2020-10-06 ENCOUNTER — TELEPHONE (OUTPATIENT)
Dept: INTERNAL MEDICINE CLINIC | Facility: CLINIC | Age: 60
End: 2020-10-06

## 2020-10-14 ENCOUNTER — OFFICE VISIT (OUTPATIENT)
Dept: INTERNAL MEDICINE CLINIC | Facility: CLINIC | Age: 60
End: 2020-10-14

## 2020-10-14 VITALS
HEART RATE: 75 BPM | OXYGEN SATURATION: 98 % | DIASTOLIC BLOOD PRESSURE: 100 MMHG | TEMPERATURE: 97.1 F | SYSTOLIC BLOOD PRESSURE: 180 MMHG

## 2020-10-14 DIAGNOSIS — I16.1 HYPERTENSIVE EMERGENCY: ICD-10-CM

## 2020-10-14 DIAGNOSIS — Z12.11 ENCOUNTER FOR SCREENING COLONOSCOPY: ICD-10-CM

## 2020-10-14 DIAGNOSIS — Z23 NEED FOR INFLUENZA VACCINATION: ICD-10-CM

## 2020-10-14 DIAGNOSIS — I10 ESSENTIAL HYPERTENSION: ICD-10-CM

## 2020-10-14 DIAGNOSIS — I10 BENIGN ESSENTIAL HYPERTENSION: ICD-10-CM

## 2020-10-14 DIAGNOSIS — R29.90 STROKE-LIKE SYMPTOMS: ICD-10-CM

## 2020-10-14 DIAGNOSIS — R21 RASH: ICD-10-CM

## 2020-10-14 DIAGNOSIS — I10 HYPERTENSION: Primary | ICD-10-CM

## 2020-10-14 PROCEDURE — 99213 OFFICE O/P EST LOW 20 MIN: CPT | Performed by: INTERNAL MEDICINE

## 2020-10-14 PROCEDURE — 90682 RIV4 VACC RECOMBINANT DNA IM: CPT | Performed by: INTERNAL MEDICINE

## 2020-10-14 PROCEDURE — 1036F TOBACCO NON-USER: CPT | Performed by: INTERNAL MEDICINE

## 2020-10-14 PROCEDURE — 90471 IMMUNIZATION ADMIN: CPT | Performed by: INTERNAL MEDICINE

## 2020-10-14 RX ORDER — ATORVASTATIN CALCIUM 40 MG/1
40 TABLET, FILM COATED ORAL EVERY EVENING
Qty: 90 TABLET | Refills: 3 | Status: SHIPPED | OUTPATIENT
Start: 2020-10-14 | End: 2022-05-13 | Stop reason: SDUPTHER

## 2020-10-14 RX ORDER — ESCITALOPRAM OXALATE 10 MG/1
10 TABLET ORAL EVERY MORNING
Qty: 90 TABLET | Refills: 1 | Status: SHIPPED | OUTPATIENT
Start: 2020-10-14 | End: 2020-11-20 | Stop reason: SDUPTHER

## 2020-10-14 RX ORDER — AMLODIPINE BESYLATE 10 MG/1
10 TABLET ORAL DAILY
Qty: 90 TABLET | Refills: 3 | Status: SHIPPED | OUTPATIENT
Start: 2020-10-14 | End: 2020-11-20 | Stop reason: SDUPTHER

## 2020-10-20 ENCOUNTER — HOSPITAL ENCOUNTER (OUTPATIENT)
Dept: NEUROLOGY | Facility: CLINIC | Age: 60
Discharge: HOME | End: 2020-10-20
Payer: COMMERCIAL

## 2020-10-20 DIAGNOSIS — G57.82 SURAL NEURITIS, LEFT: ICD-10-CM

## 2020-10-20 DIAGNOSIS — G57.52 TARSAL TUNNEL SYNDROME, LEFT: ICD-10-CM

## 2020-10-20 PROCEDURE — 95886 MUSC TEST DONE W/N TEST COMP: CPT | Performed by: PSYCHIATRY & NEUROLOGY

## 2020-10-20 PROCEDURE — 95910 NRV CNDJ TEST 7-8 STUDIES: CPT | Performed by: PSYCHIATRY & NEUROLOGY

## 2020-11-20 ENCOUNTER — TELEMEDICINE (OUTPATIENT)
Dept: INTERNAL MEDICINE CLINIC | Facility: CLINIC | Age: 60
End: 2020-11-20

## 2020-11-20 DIAGNOSIS — I16.1 HYPERTENSIVE EMERGENCY: ICD-10-CM

## 2020-11-20 DIAGNOSIS — I10 ESSENTIAL HYPERTENSION: ICD-10-CM

## 2020-11-20 DIAGNOSIS — E78.5 HYPERLIPIDEMIA, UNSPECIFIED HYPERLIPIDEMIA TYPE: ICD-10-CM

## 2020-11-20 DIAGNOSIS — I10 HYPERTENSION: Primary | ICD-10-CM

## 2020-11-20 PROCEDURE — 99213 OFFICE O/P EST LOW 20 MIN: CPT | Performed by: INTERNAL MEDICINE

## 2020-11-20 PROCEDURE — 1036F TOBACCO NON-USER: CPT | Performed by: INTERNAL MEDICINE

## 2020-11-20 RX ORDER — ESCITALOPRAM OXALATE 10 MG/1
10 TABLET ORAL EVERY MORNING
Qty: 90 TABLET | Refills: 1 | Status: SHIPPED | OUTPATIENT
Start: 2020-11-20 | End: 2021-04-22 | Stop reason: SDUPTHER

## 2020-11-20 RX ORDER — LISINOPRIL 20 MG/1
20 TABLET ORAL DAILY
Qty: 90 TABLET | Refills: 1 | Status: SHIPPED | OUTPATIENT
Start: 2020-11-20 | End: 2021-04-22 | Stop reason: SDUPTHER

## 2020-11-20 RX ORDER — AMLODIPINE BESYLATE 10 MG/1
10 TABLET ORAL DAILY
Qty: 90 TABLET | Refills: 3 | Status: SHIPPED | OUTPATIENT
Start: 2020-11-20 | End: 2021-04-22 | Stop reason: SDUPTHER

## 2020-12-02 ENCOUNTER — PREP FOR PROCEDURE (OUTPATIENT)
Dept: GASTROENTEROLOGY | Facility: CLINIC | Age: 60
End: 2020-12-02

## 2020-12-02 ENCOUNTER — OFFICE VISIT (OUTPATIENT)
Dept: GASTROENTEROLOGY | Facility: CLINIC | Age: 60
End: 2020-12-02
Payer: COMMERCIAL

## 2020-12-02 VITALS
WEIGHT: 183 LBS | BODY MASS INDEX: 29.41 KG/M2 | TEMPERATURE: 97.1 F | DIASTOLIC BLOOD PRESSURE: 76 MMHG | SYSTOLIC BLOOD PRESSURE: 178 MMHG | HEIGHT: 66 IN | HEART RATE: 76 BPM

## 2020-12-02 DIAGNOSIS — R10.9 ABDOMINAL PAIN, UNSPECIFIED ABDOMINAL LOCATION: Primary | ICD-10-CM

## 2020-12-02 DIAGNOSIS — Z12.11 COLON CANCER SCREENING: ICD-10-CM

## 2020-12-02 DIAGNOSIS — Z12.11 ENCOUNTER FOR SCREENING COLONOSCOPY: ICD-10-CM

## 2020-12-02 PROCEDURE — 3077F SYST BP >= 140 MM HG: CPT | Performed by: INTERNAL MEDICINE

## 2020-12-02 PROCEDURE — 99243 OFF/OP CNSLTJ NEW/EST LOW 30: CPT | Performed by: INTERNAL MEDICINE

## 2020-12-02 PROCEDURE — 3008F BODY MASS INDEX DOCD: CPT | Performed by: INTERNAL MEDICINE

## 2020-12-02 PROCEDURE — 3078F DIAST BP <80 MM HG: CPT | Performed by: INTERNAL MEDICINE

## 2020-12-02 PROCEDURE — 1036F TOBACCO NON-USER: CPT | Performed by: INTERNAL MEDICINE

## 2020-12-02 RX ORDER — PAROXETINE 10 MG/1
TABLET, FILM COATED ORAL
COMMUNITY
Start: 2020-11-25 | End: 2021-04-22 | Stop reason: ALTCHOICE

## 2021-01-08 ENCOUNTER — APPOINTMENT (OUTPATIENT)
Dept: LAB | Facility: HOSPITAL | Age: 61
End: 2021-01-08
Attending: INTERNAL MEDICINE
Payer: COMMERCIAL

## 2021-01-08 DIAGNOSIS — I10 ESSENTIAL HYPERTENSION: ICD-10-CM

## 2021-01-08 DIAGNOSIS — E78.5 HYPERLIPIDEMIA, UNSPECIFIED HYPERLIPIDEMIA TYPE: ICD-10-CM

## 2021-01-08 DIAGNOSIS — E55.9 VITAMIN D DEFICIENCY: ICD-10-CM

## 2021-01-08 LAB
25(OH)D3 SERPL-MCNC: 7.4 NG/ML (ref 30–100)
ANION GAP SERPL CALCULATED.3IONS-SCNC: 3 MMOL/L (ref 4–13)
BUN SERPL-MCNC: 13 MG/DL (ref 5–25)
CALCIUM SERPL-MCNC: 9.6 MG/DL (ref 8.3–10.1)
CHLORIDE SERPL-SCNC: 106 MMOL/L (ref 100–108)
CHOLEST SERPL-MCNC: 243 MG/DL (ref 50–200)
CO2 SERPL-SCNC: 29 MMOL/L (ref 21–32)
CREAT SERPL-MCNC: 0.9 MG/DL (ref 0.6–1.3)
GFR SERPL CREATININE-BSD FRML MDRD: 69 ML/MIN/1.73SQ M
GLUCOSE SERPL-MCNC: 73 MG/DL (ref 65–140)
HDLC SERPL-MCNC: 38 MG/DL
LDLC SERPL CALC-MCNC: 158 MG/DL (ref 0–100)
NONHDLC SERPL-MCNC: 205 MG/DL
POTASSIUM SERPL-SCNC: 4 MMOL/L (ref 3.5–5.3)
SODIUM SERPL-SCNC: 138 MMOL/L (ref 136–145)
TRIGL SERPL-MCNC: 234 MG/DL

## 2021-01-08 PROCEDURE — 82306 VITAMIN D 25 HYDROXY: CPT

## 2021-01-08 PROCEDURE — 80048 BASIC METABOLIC PNL TOTAL CA: CPT

## 2021-01-08 PROCEDURE — 80061 LIPID PANEL: CPT

## 2021-01-08 PROCEDURE — 36415 COLL VENOUS BLD VENIPUNCTURE: CPT

## 2021-02-17 ENCOUNTER — TELEPHONE (OUTPATIENT)
Dept: INTERNAL MEDICINE CLINIC | Facility: CLINIC | Age: 61
End: 2021-02-17

## 2021-02-17 NOTE — TELEPHONE ENCOUNTER
I already spoke to the patient and gave her information the she needs to buy the Miralax and dulcolax over the counter   No questions at this time

## 2021-02-17 NOTE — TELEPHONE ENCOUNTER
Patient has a colonoscopy scheduled and must take SUPREP prior to procedure  She was told by the pharmacy that it cannot be purchased OTC and there must be a prescription placed by provider  Please place order  Thank you

## 2021-02-18 ENCOUNTER — HOSPITAL ENCOUNTER (OUTPATIENT)
Dept: GASTROENTEROLOGY | Facility: HOSPITAL | Age: 61
Setting detail: OUTPATIENT SURGERY
Discharge: HOME | End: 2021-02-18
Attending: INTERNAL MEDICINE

## 2021-03-26 ENCOUNTER — IMMUNIZATIONS (OUTPATIENT)
Dept: FAMILY MEDICINE CLINIC | Facility: HOSPITAL | Age: 61
End: 2021-03-26

## 2021-03-26 DIAGNOSIS — Z23 ENCOUNTER FOR IMMUNIZATION: Primary | ICD-10-CM

## 2021-03-26 PROCEDURE — 0001A SARS-COV-2 / COVID-19 MRNA VACCINE (PFIZER-BIONTECH) 30 MCG: CPT

## 2021-03-26 PROCEDURE — 91300 SARS-COV-2 / COVID-19 MRNA VACCINE (PFIZER-BIONTECH) 30 MCG: CPT

## 2021-03-30 ENCOUNTER — TELEPHONE (OUTPATIENT)
Dept: GASTROENTEROLOGY | Facility: CLINIC | Age: 61
End: 2021-03-30

## 2021-03-30 NOTE — TELEPHONE ENCOUNTER
Patient called the office to reschedule  procedure  If someone can please follow up with her   Thank you     Patient only speaks Swedish

## 2021-04-05 NOTE — TELEPHONE ENCOUNTER
Procedure rescheduled on 7/8/21 with Dr Stephens at [facility]  I gave pt verbal instructions/mailed   Prep-Miralax/Dulcolax

## 2021-04-12 ENCOUNTER — HOSPITAL ENCOUNTER (INPATIENT)
Facility: HOSPITAL | Age: 61
LOS: 4 days | Discharge: HOME | DRG: 282 | End: 2021-04-16
Attending: EMERGENCY MEDICINE | Admitting: INTERNAL MEDICINE
Payer: COMMERCIAL

## 2021-04-12 ENCOUNTER — APPOINTMENT (EMERGENCY)
Dept: RADIOLOGY | Facility: HOSPITAL | Age: 61
DRG: 282 | End: 2021-04-12
Payer: COMMERCIAL

## 2021-04-12 DIAGNOSIS — N17.9 AKI (ACUTE KIDNEY INJURY) (HCC): ICD-10-CM

## 2021-04-12 DIAGNOSIS — R11.2 NAUSEA AND VOMITING: ICD-10-CM

## 2021-04-12 DIAGNOSIS — R10.9 ABDOMINAL PAIN: ICD-10-CM

## 2021-04-12 DIAGNOSIS — C25.0 MALIGNANT NEOPLASM OF HEAD OF PANCREAS (HCC): ICD-10-CM

## 2021-04-12 DIAGNOSIS — G89.3 CANCER ASSOCIATED PAIN: ICD-10-CM

## 2021-04-12 DIAGNOSIS — K29.70 GASTRITIS: ICD-10-CM

## 2021-04-12 DIAGNOSIS — K86.89 PANCREATIC MASS: Primary | ICD-10-CM

## 2021-04-12 PROBLEM — Z71.89 GOALS OF CARE, COUNSELING/DISCUSSION: Status: ACTIVE | Noted: 2021-04-12

## 2021-04-12 PROBLEM — F32.A DEPRESSION: Status: ACTIVE | Noted: 2021-04-12

## 2021-04-12 PROBLEM — D72.829 LEUKOCYTOSIS: Status: ACTIVE | Noted: 2021-04-12

## 2021-04-12 LAB
ALBUMIN SERPL BCP-MCNC: 3.3 G/DL (ref 3.5–5)
ALP SERPL-CCNC: 136 U/L (ref 46–116)
ALT SERPL W P-5'-P-CCNC: 23 U/L (ref 12–78)
ANION GAP SERPL CALCULATED.3IONS-SCNC: 1 MMOL/L (ref 4–13)
AST SERPL W P-5'-P-CCNC: 21 U/L (ref 5–45)
BACTERIA UR QL AUTO: NORMAL /HPF
BASOPHILS # BLD AUTO: 0.02 THOUSANDS/ΜL (ref 0–0.1)
BASOPHILS NFR BLD AUTO: 0 % (ref 0–1)
BILIRUB SERPL-MCNC: 0.31 MG/DL (ref 0.2–1)
BILIRUB UR QL STRIP: NEGATIVE
BUN SERPL-MCNC: 16 MG/DL (ref 5–25)
CALCIUM ALBUM COR SERPL-MCNC: 9.4 MG/DL (ref 8.3–10.1)
CALCIUM SERPL-MCNC: 8.8 MG/DL (ref 8.3–10.1)
CEA SERPL-MCNC: <0.5 NG/ML (ref 0–3)
CHLORIDE SERPL-SCNC: 109 MMOL/L (ref 100–108)
CLARITY UR: CLEAR
CO2 SERPL-SCNC: 28 MMOL/L (ref 21–32)
COLOR UR: YELLOW
CREAT SERPL-MCNC: 1.29 MG/DL (ref 0.6–1.3)
EOSINOPHIL # BLD AUTO: 0.12 THOUSAND/ΜL (ref 0–0.61)
EOSINOPHIL NFR BLD AUTO: 1 % (ref 0–6)
ERYTHROCYTE [DISTWIDTH] IN BLOOD BY AUTOMATED COUNT: 13.3 % (ref 11.6–15.1)
GFR SERPL CREATININE-BSD FRML MDRD: 45 ML/MIN/1.73SQ M
GLUCOSE SERPL-MCNC: 104 MG/DL (ref 65–140)
GLUCOSE UR STRIP-MCNC: NEGATIVE MG/DL
HCT VFR BLD AUTO: 35.5 % (ref 34.8–46.1)
HGB BLD-MCNC: 12 G/DL (ref 11.5–15.4)
HGB UR QL STRIP.AUTO: ABNORMAL
HYALINE CASTS #/AREA URNS LPF: NORMAL /LPF
IMM GRANULOCYTES # BLD AUTO: 0.03 THOUSAND/UL (ref 0–0.2)
IMM GRANULOCYTES NFR BLD AUTO: 0 % (ref 0–2)
KETONES UR STRIP-MCNC: NEGATIVE MG/DL
LEUKOCYTE ESTERASE UR QL STRIP: NEGATIVE
LIPASE SERPL-CCNC: 74 U/L (ref 73–393)
LYMPHOCYTES # BLD AUTO: 1.06 THOUSANDS/ΜL (ref 0.6–4.47)
LYMPHOCYTES NFR BLD AUTO: 7 % (ref 14–44)
MCH RBC QN AUTO: 31.7 PG (ref 26.8–34.3)
MCHC RBC AUTO-ENTMCNC: 33.8 G/DL (ref 31.4–37.4)
MCV RBC AUTO: 94 FL (ref 82–98)
MONOCYTES # BLD AUTO: 1.12 THOUSAND/ΜL (ref 0.17–1.22)
MONOCYTES NFR BLD AUTO: 8 % (ref 4–12)
NEUTROPHILS # BLD AUTO: 12.14 THOUSANDS/ΜL (ref 1.85–7.62)
NEUTS SEG NFR BLD AUTO: 84 % (ref 43–75)
NITRITE UR QL STRIP: NEGATIVE
NON-SQ EPI CELLS URNS QL MICRO: NORMAL /HPF
NRBC BLD AUTO-RTO: 0 /100 WBCS
PH UR STRIP.AUTO: 7 [PH] (ref 4.5–8)
PLATELET # BLD AUTO: 283 THOUSANDS/UL (ref 149–390)
PMV BLD AUTO: 10.5 FL (ref 8.9–12.7)
POTASSIUM SERPL-SCNC: 3.6 MMOL/L (ref 3.5–5.3)
PROT SERPL-MCNC: 8.1 G/DL (ref 6.4–8.2)
PROT UR STRIP-MCNC: ABNORMAL MG/DL
RBC # BLD AUTO: 3.79 MILLION/UL (ref 3.81–5.12)
RBC #/AREA URNS AUTO: NORMAL /HPF
SODIUM SERPL-SCNC: 138 MMOL/L (ref 136–145)
SP GR UR STRIP.AUTO: 1.01 (ref 1–1.03)
TROPONIN I SERPL-MCNC: <0.02 NG/ML
UROBILINOGEN UR QL STRIP.AUTO: 0.2 E.U./DL
WBC # BLD AUTO: 14.49 THOUSAND/UL (ref 4.31–10.16)
WBC #/AREA URNS AUTO: NORMAL /HPF

## 2021-04-12 PROCEDURE — 99285 EMERGENCY DEPT VISIT HI MDM: CPT

## 2021-04-12 PROCEDURE — 86301 IMMUNOASSAY TUMOR CA 19-9: CPT | Performed by: INTERNAL MEDICINE

## 2021-04-12 PROCEDURE — 96361 HYDRATE IV INFUSION ADD-ON: CPT

## 2021-04-12 PROCEDURE — 36415 COLL VENOUS BLD VENIPUNCTURE: CPT

## 2021-04-12 PROCEDURE — 96375 TX/PRO/DX INJ NEW DRUG ADDON: CPT

## 2021-04-12 PROCEDURE — 99285 EMERGENCY DEPT VISIT HI MDM: CPT | Performed by: EMERGENCY MEDICINE

## 2021-04-12 PROCEDURE — 74177 CT ABD & PELVIS W/CONTRAST: CPT

## 2021-04-12 PROCEDURE — 85025 COMPLETE CBC W/AUTO DIFF WBC: CPT | Performed by: EMERGENCY MEDICINE

## 2021-04-12 PROCEDURE — G1004 CDSM NDSC: HCPCS

## 2021-04-12 PROCEDURE — 82378 CARCINOEMBRYONIC ANTIGEN: CPT | Performed by: INTERNAL MEDICINE

## 2021-04-12 PROCEDURE — 81001 URINALYSIS AUTO W/SCOPE: CPT

## 2021-04-12 PROCEDURE — 96374 THER/PROPH/DIAG INJ IV PUSH: CPT

## 2021-04-12 PROCEDURE — 80053 COMPREHEN METABOLIC PANEL: CPT | Performed by: EMERGENCY MEDICINE

## 2021-04-12 PROCEDURE — 83690 ASSAY OF LIPASE: CPT | Performed by: EMERGENCY MEDICINE

## 2021-04-12 PROCEDURE — 84484 ASSAY OF TROPONIN QUANT: CPT | Performed by: EMERGENCY MEDICINE

## 2021-04-12 PROCEDURE — 71046 X-RAY EXAM CHEST 2 VIEWS: CPT

## 2021-04-12 PROCEDURE — 93005 ELECTROCARDIOGRAM TRACING: CPT

## 2021-04-12 PROCEDURE — 99223 1ST HOSP IP/OBS HIGH 75: CPT | Performed by: INTERNAL MEDICINE

## 2021-04-12 RX ORDER — ONDANSETRON 2 MG/ML
4 INJECTION INTRAMUSCULAR; INTRAVENOUS ONCE
Status: COMPLETED | OUTPATIENT
Start: 2021-04-12 | End: 2021-04-12

## 2021-04-12 RX ORDER — METOPROLOL TARTRATE 5 MG/5ML
5 INJECTION INTRAVENOUS EVERY 6 HOURS PRN
Status: DISCONTINUED | OUTPATIENT
Start: 2021-04-12 | End: 2021-04-12

## 2021-04-12 RX ORDER — SODIUM CHLORIDE, SODIUM GLUCONATE, SODIUM ACETATE, POTASSIUM CHLORIDE, MAGNESIUM CHLORIDE, SODIUM PHOSPHATE, DIBASIC, AND POTASSIUM PHOSPHATE .53; .5; .37; .037; .03; .012; .00082 G/100ML; G/100ML; G/100ML; G/100ML; G/100ML; G/100ML; G/100ML
100 INJECTION, SOLUTION INTRAVENOUS CONTINUOUS
Status: DISCONTINUED | OUTPATIENT
Start: 2021-04-12 | End: 2021-04-14

## 2021-04-12 RX ORDER — FENTANYL CITRATE 50 UG/ML
50 INJECTION, SOLUTION INTRAMUSCULAR; INTRAVENOUS ONCE
Status: COMPLETED | OUTPATIENT
Start: 2021-04-12 | End: 2021-04-12

## 2021-04-12 RX ORDER — ATORVASTATIN CALCIUM 40 MG/1
40 TABLET, FILM COATED ORAL EVERY EVENING
Status: DISCONTINUED | OUTPATIENT
Start: 2021-04-12 | End: 2021-04-16 | Stop reason: HOSPADM

## 2021-04-12 RX ORDER — HYDROXYZINE HYDROCHLORIDE 25 MG/1
25 TABLET, FILM COATED ORAL
Status: DISCONTINUED | OUTPATIENT
Start: 2021-04-12 | End: 2021-04-16 | Stop reason: HOSPADM

## 2021-04-12 RX ORDER — OXYCODONE HYDROCHLORIDE 10 MG/1
10 TABLET ORAL EVERY 4 HOURS PRN
Status: DISCONTINUED | OUTPATIENT
Start: 2021-04-12 | End: 2021-04-16 | Stop reason: HOSPADM

## 2021-04-12 RX ORDER — HYDROMORPHONE HCL/PF 1 MG/ML
1 SYRINGE (ML) INJECTION ONCE
Status: COMPLETED | OUTPATIENT
Start: 2021-04-12 | End: 2021-04-12

## 2021-04-12 RX ORDER — ACETAMINOPHEN 325 MG/1
650 TABLET ORAL EVERY 6 HOURS PRN
Status: DISCONTINUED | OUTPATIENT
Start: 2021-04-12 | End: 2021-04-16 | Stop reason: HOSPADM

## 2021-04-12 RX ORDER — LISINOPRIL 20 MG/1
20 TABLET ORAL DAILY
Status: DISCONTINUED | OUTPATIENT
Start: 2021-04-13 | End: 2021-04-12

## 2021-04-12 RX ORDER — POLYETHYLENE GLYCOL 3350 17 G/17G
17 POWDER, FOR SOLUTION ORAL DAILY
Status: DISCONTINUED | OUTPATIENT
Start: 2021-04-13 | End: 2021-04-16 | Stop reason: HOSPADM

## 2021-04-12 RX ORDER — PAROXETINE 10 MG/1
10 TABLET, FILM COATED ORAL DAILY
Status: DISCONTINUED | OUTPATIENT
Start: 2021-04-13 | End: 2021-04-13

## 2021-04-12 RX ORDER — AMOXICILLIN 250 MG
1 CAPSULE ORAL
Status: DISCONTINUED | OUTPATIENT
Start: 2021-04-12 | End: 2021-04-16 | Stop reason: HOSPADM

## 2021-04-12 RX ORDER — ONDANSETRON 2 MG/ML
4 INJECTION INTRAMUSCULAR; INTRAVENOUS EVERY 6 HOURS PRN
Status: DISCONTINUED | OUTPATIENT
Start: 2021-04-12 | End: 2021-04-16 | Stop reason: HOSPADM

## 2021-04-12 RX ORDER — HEPARIN SODIUM 5000 [USP'U]/ML
5000 INJECTION, SOLUTION INTRAVENOUS; SUBCUTANEOUS EVERY 8 HOURS SCHEDULED
Status: DISCONTINUED | OUTPATIENT
Start: 2021-04-12 | End: 2021-04-16 | Stop reason: HOSPADM

## 2021-04-12 RX ORDER — HYDROMORPHONE HCL/PF 1 MG/ML
1 SYRINGE (ML) INJECTION EVERY 4 HOURS PRN
Status: DISCONTINUED | OUTPATIENT
Start: 2021-04-12 | End: 2021-04-16 | Stop reason: HOSPADM

## 2021-04-12 RX ORDER — OXYCODONE HYDROCHLORIDE 5 MG/1
5 TABLET ORAL EVERY 4 HOURS PRN
Status: DISCONTINUED | OUTPATIENT
Start: 2021-04-12 | End: 2021-04-16 | Stop reason: HOSPADM

## 2021-04-12 RX ORDER — LABETALOL 20 MG/4 ML (5 MG/ML) INTRAVENOUS SYRINGE
10 EVERY 4 HOURS PRN
Status: DISCONTINUED | OUTPATIENT
Start: 2021-04-12 | End: 2021-04-16 | Stop reason: HOSPADM

## 2021-04-12 RX ORDER — AMLODIPINE BESYLATE 10 MG/1
10 TABLET ORAL DAILY
Status: DISCONTINUED | OUTPATIENT
Start: 2021-04-13 | End: 2021-04-12

## 2021-04-12 RX ORDER — ESCITALOPRAM OXALATE 10 MG/1
10 TABLET ORAL EVERY MORNING
Status: DISCONTINUED | OUTPATIENT
Start: 2021-04-13 | End: 2021-04-13

## 2021-04-12 RX ADMIN — HEPARIN SODIUM 5000 UNITS: 5000 INJECTION INTRAVENOUS; SUBCUTANEOUS at 23:43

## 2021-04-12 RX ADMIN — HYDROMORPHONE HYDROCHLORIDE 1 MG: 1 INJECTION, SOLUTION INTRAMUSCULAR; INTRAVENOUS; SUBCUTANEOUS at 18:47

## 2021-04-12 RX ADMIN — ONDANSETRON 4 MG: 2 INJECTION INTRAMUSCULAR; INTRAVENOUS at 16:48

## 2021-04-12 RX ADMIN — FENTANYL CITRATE 50 MCG: 50 INJECTION INTRAMUSCULAR; INTRAVENOUS at 16:48

## 2021-04-12 RX ADMIN — SODIUM CHLORIDE 500 ML: 0.9 INJECTION, SOLUTION INTRAVENOUS at 16:48

## 2021-04-12 RX ADMIN — HYDROMORPHONE HYDROCHLORIDE 1 MG: 1 INJECTION, SOLUTION INTRAMUSCULAR; INTRAVENOUS; SUBCUTANEOUS at 23:47

## 2021-04-12 RX ADMIN — SODIUM CHLORIDE, SODIUM GLUCONATE, SODIUM ACETATE, POTASSIUM CHLORIDE, MAGNESIUM CHLORIDE, SODIUM PHOSPHATE, DIBASIC, AND POTASSIUM PHOSPHATE 100 ML/HR: .53; .5; .37; .037; .03; .012; .00082 INJECTION, SOLUTION INTRAVENOUS at 23:41

## 2021-04-12 RX ADMIN — ONDANSETRON 4 MG: 2 INJECTION INTRAMUSCULAR; INTRAVENOUS at 20:30

## 2021-04-12 RX ADMIN — IOHEXOL 100 ML: 350 INJECTION, SOLUTION INTRAVENOUS at 17:53

## 2021-04-12 NOTE — ASSESSMENT & PLAN NOTE
Goals of care discussion in regards to new probable pancreatic cancer diagnosis  Patient is reasonable and practical   Number one goal as for her  with Alzheimer's dementia to be cared for      Patient has family support from children and grandchildren who were present at bedside  Patient is level 3 which is appropriate  Palliative care consulted, will follow-up outpatient for pain management  Spiritual care consulted

## 2021-04-12 NOTE — ASSESSMENT & PLAN NOTE
Patient states compliance with medications  Patient was unable to tolerate oral medications for 1 week  Plan:  Restart home meds Lexapro 10 and paroxetine 10  Atarax 25 qhs prn     Outpatient follow-up with PCP   Consider only 1 SSRI at an increased dose

## 2021-04-12 NOTE — ASSESSMENT & PLAN NOTE
Patient comes in with vomiting for 4 days, unable to tolerate solids, liquids, medications for 1 and half months, particularly worse over the last week  Associated with abdominal pain that radiates occasionally to the lower quadrants   Patient has history of 40 pack smoking history, breast cancer history status post chemo, radiation, mastectomy 18 years ago  Workup:  - CT demonstrates lobulated pancreatic head/neck hyperenhancing lesion measuring 19 x 18 x 20 mm  suspicious for a pancreatic malignancy, most typical of a neuroendocrine tumor   - MRCP demonstrates to 2 x 1 9 cm pancreatic head mass similar to neuroendocrine in appearance, no biliary dilation or obstruction and no metastases to liver  - Patient is status post EGD revealing C0M2 Short's esophagus s/p biopsy  Gastritis s/p biopsy for h pylori  And EUS: 1 7 cm oval, well defined mass at head of pancreas s/p core biopsies  - CT chest for staging completed, 2 nonspecific 3 and 4 mm pulmonary nodules identified  Will need initial 3 months follow-up with CT chest   - Medical oncology consulted, once diagnosis of pancreatic cancer established, discussion with Surgical Oncology regarding sequences of treatment either with upfront Whipple followed by chemo or new adjuvant chemotherapy followed by Whipple procedure  As outpatient basis, patient will need genetic consultation has she may have BRCA-2 mutation with her personal history of breast cancer at young age, probable pancreatic cancer as well as family history of ovarian cancer   - Surgical oncology consulted, awaiting pathology results, would likely need resection/Whipple once pathology is established  - A1c normal, CEA and CA 19-9 normal   - Somatostatin, glucagon, VIP, gastrin pending  Plan:   - Palliative care consult for assistance in symptom management and establishment of care  - Odansetron 4mg q6h PRN nausea  - Protonix 40 mg b i d   And Bentyl 10 mg q i d   - Pain regimen:   Oxycodone 5 mg q 4 hours moderate pain   Oxycodone 10 mg q 4 hours severe pain   Dilaudid 0 5 mg q 4 hours breakthrough pain   Prophylactic bowel regimen in place of Senokot-s and MiraLax  - Cleared for discharge from GI perspective with outpatient follow-up with Dr Ernesto Lyon  - Outpatient follow-up with Medical and Surgical Oncology

## 2021-04-12 NOTE — ED ATTENDING ATTESTATION
4/12/2021  I, Snehal Moralez MD, saw and evaluated the patient  I have discussed the patient with the resident/non-physician practitioner and agree with the resident's/non-physician practitioner's findings, Plan of Care, and MDM as documented in the resident's/non-physician practitioner's note, except where noted  All available labs and Radiology studies were reviewed  I was present for key portions of any procedure(s) performed by the resident/non-physician practitioner and I was immediately available to provide assistance  At this point I agree with the current assessment done in the Emergency Department  I have conducted an independent evaluation of this patient a history and physical is as follows:    ED Course         Critical Care Time  Procedures    62 yo female c/o abdominal pain for one month worse with eating, vomiting after eating  Pt symptoms worsened today  pmh appendectomy, hysterectomy, ovarian ca,  Pt with no blood in vomitus  No cp, no sob, no back pain  No fever  Vss, afebrile, lungs cta, rrr, abdomen soft tender epigastric, ruq, no rebound, no guarding  Labs, ekg, trop, cxr, ct a/p, urine, pain meds, ivf

## 2021-04-12 NOTE — ASSESSMENT & PLAN NOTE
Leukocytosis elevated of 14 9 on admission, denies any fevers or sick contacts       Leukocytosis improved to 8 4 this morning, resolved

## 2021-04-12 NOTE — PROGRESS NOTES
INTERNAL MEDICINE RESIDENCY SENIOR ADMISSION NOTE     Name: Adolfo Kim   Age & Sex: 63 y o  female   MRN: [id number]  Unit/Bed#: ED 15   Encounter: [id number]  Primary Care Provider: Fernanda Acharya DO    Admit to team: SOD Team A    Patient seen and examined  Reviewed H&P per Dr Frank Needle  Agree with the assessment and plan with any exception/addition as noted below:    Principal Problem:    Pancreatic mass  Active Problems:    Hypertension    Hyperlipemia    Leukocytosis    Depression    Goals of care, counseling/discussion    YULIYA (acute kidney injury) [facility])    25-year-old female with past medical history significant for breast cancer 20 years ago status post resection, chemo, radiation, hypertension, and history stroke with no deficits  Patient presents to the hospital today secondary to 1-2 months of abdominal pain and weight loss  CT abdomen pelvis with lobulated pancreatic head/neck lesion measuring 19 x 18 x 22 mm suspicious for pancreatic malignancy possibly neuroendocrine tumor  Patient will be admitted for further workup of pancreatic mass  Will consult Gastroenterology, and Medical Oncology  Patient will need biopsy of this area further treatment plans  Pain regimen in place      Code Status: Level 3 - DNAR and DNI  Admission Status: INPATIENT   Disposition: Patient requires Med/Surg  Expected Length of Stay: >2 days

## 2021-04-12 NOTE — ED PROVIDER NOTES
History  Chief Complaint   Patient presents with    Abdominal Pain     Patient reports abdominal pain that has been going on for a month; states that it was been getting worse; reports n/v/d     64 yo female presenting for approximately 1 month  States the pain is significantly worse after eating  It does come and go  It is not there every day however it is there most days  States that today significantly worse than it has been period approximately 4 episodes of nonbloody and nonbilious vomiting earlier today  Complaining of nausea at this time  Also complaining of diarrhea throughout the month  Denies any fevers, sweats, chills, sore throat, cough, chest pain, shortness of breath, dysuria, hematuria  Prior abdominal surgeries include total hysterectomy, hernia surgery, appendectomy  Patient states that she has had prior breast and ovarian cancer  Prior to Admission Medications   Prescriptions Last Dose Informant Patient Reported? Taking?    PARoxetine (PAXIL) 10 mg tablet Past Week at Unknown time Self Yes Yes   amLODIPine (NORVASC) 10 mg tablet Past Week at Unknown time Self No Yes   Sig: Take 1 tablet (10 mg total) by mouth daily   atorvastatin (LIPITOR) 40 mg tablet Past Week at Unknown time Self No Yes   Sig: Take 1 tablet (40 mg total) by mouth every evening   escitalopram (LEXAPRO) 10 mg tablet Past Week at Unknown time Self No Yes   Sig: Take 1 tablet (10 mg total) by mouth every morning   hydrOXYzine HCL (ATARAX) 25 mg tablet Past Week at Unknown time Self Yes Yes   Sig: Take 25 mg by mouth daily at bedtime as needed   lisinopril (ZESTRIL) 20 mg tablet Past Week at Unknown time Self No Yes   Sig: Take 1 tablet (20 mg total) by mouth daily   triamcinolone (KENALOG) 0 1 % ointment Past Week at Unknown time Self No Yes   Sig: Apply 1 application topically 2 (two) times a day      Facility-Administered Medications: None       Past Medical History:   Diagnosis Date    Back ache     Cancer ([facility] [address]     Hypertension        Past Surgical History:   Procedure Laterality Date    APPENDECTOMY      BILATERAL OOPHORECTOMY      BREAST SURGERY      HERNIA REPAIR         Family History   Problem Relation Age of Onset    Hypertension Mother     Heart disease Mother     Diabetes Mother     Cancer Father      I have reviewed and agree with the history as documented  E-Cigarette/Vaping    E-Cigarette Use Never User      E-Cigarette/Vaping Substances    Nicotine No     THC No     CBD No     Flavoring No     Other No     Unknown No      Social History     Tobacco Use    Smoking status: Former Smoker     Packs/day: 0 65     Years: 30 00     Pack years: 19 50    Smokeless tobacco: Never Used    Tobacco comment: quit 15 years ago   Substance Use Topics    Alcohol use: No    Drug use: No        Review of Systems   Gastrointestinal: Positive for abdominal pain, diarrhea, nausea and vomiting  Negative for abdominal distention, anal bleeding, blood in stool and constipation  All other systems reviewed and are negative  Physical Exam  ED Triage Vitals [04/12/21 1622]   Temperature Pulse Respirations Blood Pressure SpO2   98 1 °F (36 7 °C) 90 18 (!) 205/95 98 %      Temp Source Heart Rate Source Patient Position - Orthostatic VS BP Location FiO2 (%)   Oral Monitor Lying Right arm --      Pain Score       9             Orthostatic Vital Signs  Vitals:    04/12/21 1622 04/12/21 1700   BP: (!) 205/95    Pulse: 90 80   Patient Position - Orthostatic VS: Lying        Physical Exam  Vitals signs and nursing note reviewed  Constitutional:       General: She is not in acute distress  Appearance: She is well-developed  She is not diaphoretic  HENT:      Head: Normocephalic and atraumatic  Right Ear: External ear normal       Left Ear: External ear normal       Nose: Nose normal    Eyes:      General: No scleral icterus  Right eye: No discharge  Left eye: No discharge  Conjunctiva/sclera: Conjunctivae normal       Pupils: Pupils are equal, round, and reactive to light  Neck:      Musculoskeletal: Normal range of motion and neck supple  Vascular: No JVD  Trachea: No tracheal deviation  Cardiovascular:      Rate and Rhythm: Normal rate and regular rhythm  Heart sounds: Normal heart sounds  No murmur  No friction rub  No gallop  Pulmonary:      Effort: Pulmonary effort is normal  No respiratory distress  Breath sounds: Normal breath sounds  No stridor  No wheezing or rales  Abdominal:      General: Bowel sounds are normal  There is no distension  Palpations: Abdomen is soft  There is no mass  Tenderness: There is abdominal tenderness in the right upper quadrant and epigastric area  There is no right CVA tenderness, left CVA tenderness, guarding or rebound  Negative signs include Howard's sign, Rovsing's sign, McBurney's sign, psoas sign and obturator sign  Musculoskeletal: Normal range of motion  General: No tenderness or deformity  Skin:     General: Skin is warm and dry  Coloration: Skin is not pale  Findings: No erythema or rash  Neurological:      Mental Status: She is alert and oriented to person, place, and time  Cranial Nerves: No cranial nerve deficit  Sensory: No sensory deficit  Motor: No abnormal muscle tone  Psychiatric:         Behavior: Behavior normal          Thought Content:  Thought content normal          Judgment: Judgment normal          ED Medications  Medications   sodium chloride 0 9 % bolus 500 mL (500 mL Intravenous New Bag 4/12/21 1648)   fentanyl citrate (PF) 100 MCG/2ML 50 mcg (50 mcg Intravenous Given 4/12/21 1648)   ondansetron (ZOFRAN) injection 4 mg (4 mg Intravenous Given 4/12/21 1648)   iohexol (OMNIPAQUE) 350 MG/ML injection (MULTI-DOSE) 100 mL (100 mL Intravenous Given 4/12/21 1753)   HYDROmorphone (DILAUDID) injection 1 mg (1 mg Intravenous Given 4/12/21 1847) Diagnostic Studies  Results Reviewed     Procedure Component Value Units Date/Time    Urine Microscopic [[id number]]  (Normal) Collected: 04/12/21 1813    Lab Status: Final result Specimen: Urine, Other Updated: 04/12/21 1838     RBC, UA 2-4 /hpf      WBC, UA None Seen /hpf      Epithelial Cells None Seen /hpf      Bacteria, UA None Seen /hpf      Hyaline Casts, UA None Seen /lpf     Urine Macroscopic, POC [[id number]]  (Abnormal) Collected: 04/12/21 1813    Lab Status: Final result Specimen: Urine Updated: 04/12/21 1814     Color, UA Yellow     Clarity, UA Clear     pH, UA 7 0     Leukocytes, UA Negative     Nitrite, UA Negative     Protein, UA Trace mg/dl      Glucose, UA Negative mg/dl      Ketones, UA Negative mg/dl      Urobilinogen, UA 0 2 E U /dl      Bilirubin, UA Negative     Blood, UA Small     Specific [place], UA 1 015    Narrative:      CLINITEK RESULT    Troponin I [[id number]]  (Normal) Collected: 04/12/21 1655    Lab Status: Final result Specimen: Blood from Arm, Right Updated: 04/12/21 1726     Troponin I <0 02 ng/mL     Comprehensive metabolic panel [[id number]]  (Abnormal) Collected: 04/12/21 1636    Lab Status: Final result Specimen: Blood from Arm, Left Updated: 04/12/21 1716     Sodium 138 mmol/L      Potassium 3 6 mmol/L      Chloride 109 mmol/L      CO2 28 mmol/L      ANION GAP 1 mmol/L      BUN 16 mg/dL      Creatinine 1 29 mg/dL      Glucose 104 mg/dL      Calcium 8 8 mg/dL      Corrected Calcium 9 4 mg/dL      AST 21 U/L      ALT 23 U/L      Alkaline Phosphatase 136 U/L      Total Protein 8 1 g/dL      Albumin 3 3 g/dL      Total Bilirubin 0 31 mg/dL      eGFR 45 ml/min/1 73sq m     Narrative:      [facility] guidelines for Chronic Kidney Disease (CKD):     Stage 1 with normal or high GFR (GFR > 90 mL/min/1 73 square meters)    Stage 2 Mild CKD (GFR = 60-89 mL/min/1 73 square meters)    Stage 3A Moderate CKD (GFR = 45-59 mL/min/1 73 square meters)    Stage 3B Moderate CKD (GFR = 30-44 mL/min/1 73 square meters)    Stage 4 Severe CKD (GFR = 15-29 mL/min/1 73 square meters)    Stage 5 End Stage CKD (GFR <15 mL/min/1 73 square meters)  Note: GFR calculation is accurate only with a steady state creatinine    Lipase [[id number]]  (Normal) Collected: 04/12/21 1636    Lab Status: Final result Specimen: Blood from Arm, Left Updated: 04/12/21 1716     Lipase 74 u/L     CBC and differential [[id number]]  (Abnormal) Collected: 04/12/21 1636    Lab Status: Final result Specimen: Blood from Arm, Left Updated: 04/12/21 1651     WBC 14 49 Thousand/uL      RBC 3 79 Million/uL      Hemoglobin 12 0 g/dL      Hematocrit 35 5 %      MCV 94 fL      MCH 31 7 pg      MCHC 33 8 g/dL      RDW 13 3 %      MPV 10 5 fL      Platelets 778 Thousands/uL      nRBC 0 /100 WBCs      Neutrophils Relative 84 %      Immat GRANS % 0 %      Lymphocytes Relative 7 %      Monocytes Relative 8 %      Eosinophils Relative 1 %      Basophils Relative 0 %      Neutrophils Absolute 12 14 Thousands/µL      Immature Grans Absolute 0 03 Thousand/uL      Lymphocytes Absolute 1 06 Thousands/µL      Monocytes Absolute 1 12 Thousand/µL      Eosinophils Absolute 0 12 Thousand/µL      Basophils Absolute 0 02 Thousands/µL                  CT abdomen pelvis with contrast   Final Result by Dallin Campa MD (04/12 1818)      Diffuse gastric thickening and multiple thickened loops of right sided small bowel in keeping with a gastritis/nonspecific enteritis  Lobulated pancreatic head/neck hyperenhancing lesion measuring 19 x 18 x 20 mm #2/29 and #601/65 suspicious for a pancreatic malignancy, most typical of a neuroendocrine tumor  The pancreatic neck body and tail distal to this area are diffusely fatty    involuted/atrophic  Surgical consultation is recommended on a nonurgent basis   Some islet cell tumors, such as insulinoma, gastrinoma and VIPoma can can be associated with bowel abnormalities and could explain the above bowel findings  No bowel obstruction or bowel pneumatosis  The study was marked in [facility] for immediate notification  Workstation performed: [id number]         XR chest pa & lateral    (Results Pending)         Procedures  ECG 12 Lead Documentation Only    Date/Time: 4/12/2021 5:20 PM  Performed by: Milton Bailey DO  Authorized by: Milton Bailey DO     Previous ECG:     Previous ECG:  Compared to current    Similarity:  No change  Interpretation:     Interpretation: normal    Rate:     ECG rate:  77    ECG rate assessment: normal    Rhythm:     Rhythm: sinus rhythm    Ectopy:     Ectopy: none    QRS:     QRS axis:  Left    QRS intervals:  Normal  Conduction:     Conduction: normal    ST segments:     ST segments:  Normal  T waves:     T waves: normal            ED Course  ED Course as of Apr 12 1908   Mon Apr 12, 2021   1728 Troponin I: <0 02             HEART Risk Score      Most Recent Value   Heart Score Risk Calculator   History  0 Filed at: 04/12/2021 1908   ECG  0 Filed at: 04/12/2021 1908   Age  2 Filed at: 04/12/2021 1908   Risk Factors  1 Filed at: 04/12/2021 1908   Troponin  0 Filed at: 04/12/2021 1908   HEART Score  3 Filed at: 04/12/2021 1908                                MDM  Number of Diagnoses or Management Options  Abdominal pain:   Gastritis:   Malignant neoplasm of head of pancreas [facility]):   Nausea and vomiting:   Diagnosis management comments: Presenting with abdominal pain  Will check CBC, CMP, EKG, troponin, lipase, CT abdomen pelvis, chest x-ray, urinalysis      CT results discussed with patient via  Casper Huntley [id number]      Disposition  Final diagnoses:   Malignant neoplasm of head of pancreas ([address] )   Abdominal pain   Nausea and vomiting   Gastritis     Time reflects when diagnosis was documented in both MDM as applicable and the Disposition within this note     Time User Action Codes Description Comment    4/12/2021  6:50 PM Flakita Fay Add [C25 0] Malignant neoplasm of head of pancreas ([facility] [address] )     4/12/2021  6:50 PM Lanetta Lobito Add [R10 9] Abdominal pain     4/12/2021  6:50 PM Lanetta Lobito Add [R11 2] Nausea and vomiting     4/12/2021  6:50 PM Lanetta Lobito Add [K29 70] Gastritis       ED Disposition     ED Disposition Condition Date/Time Comment    Admit Stable Mon Apr 12, 2021  6:50 PM Case was discussed with SOD and the patient's admission status was agreed to be Admission Status: inpatient status to the service of Dr Dmitri Gonzalez  Follow-up Information    None         Patient's Medications   Discharge Prescriptions    No medications on file     No discharge procedures on file  PDMP Review     None           ED Provider  Attending physically available and evaluated Jaya  I managed the patient along with the ED Attending      Electronically Signed by         César Santizo DO  04/12/21 2977

## 2021-04-12 NOTE — ASSESSMENT & PLAN NOTE
Patient is compliant with outpatient medications of lisinopril 20 mg and amlodipine 10 mg      Plan:  Restart home amlodipine  Hold home lisinopril in the setting of YULIYA    Restart home meds on discharge

## 2021-04-12 NOTE — H&P
INTERNAL MEDICINE RESIDENCY ADMISSION H&P     Name: [facility]   Age & Sex: 63 y o  female   MRN: [id number]  Unit/Bed#: ED 15   Encounter: [id number]  Primary Care Provider: Tab Liu DO    Code Status: Level 3 - DNAR and DNI  Admission Status: INPATIENT   Disposition: Patient requires Med/Surg    Admit to team: SOD Team A    ASSESSMENT/PLAN     Principal Problem:    Pancreatic mass  Active Problems:    Goals of care, counseling/discussion    YULIYA (acute kidney injury) ([facility] [address] )    Hypertension    Hyperlipemia    Leukocytosis    Depression      * Pancreatic mass  Assessment & Plan  Patient comes in with vomiting for 4 days, unable to tolerate solids, liquids, medications for 1 and half months, particularly worse over the last week  She also endorses abdominal pain which she describes as a tightness in the epigastric region  It radiates occasionally to the lower quadrants and she describes the pain as 10/10  She did not tried taking anything for it  Family convinced her to come in to the hospital to be evaluated  Denies any fever, chills, chest pain, shortness of breath, headaches  Patient has history of 40 pack smoking history, breast cancer history status post chemo, radiation, mastectomy 18 years ago    CT demonstrates Diffuse gastric thickening and multiple thickened loops of right sided small bowel in keeping with a gastritis/nonspecific enteritis  Lobulated pancreatic head/neck hyperenhancing lesion measuring 19 x 18 x 20 mm #2/29 and #601/65 suspicious for a pancreatic malignancy, most typical of a neuroendocrine tumor  The pancreatic neck body and tail distal to this area are diffusely fatty involuted/atrophic  Surgical consultation is recommended on a nonurgent basis  Some islet cell tumors, such as insulinoma, gastrinoma and VIPoma can can be associated with bowel abnormalities and could explain the above bowel findings      Medical oncology consulted  gastroenterology consulted   Will likely need EBUS  Will need surgical and oncological consultation likely  NPO secondary to nausea and vomiting/ p o  Intolerance, IV fluids running  Continue to monitor leukocytosis and fever curve  odansetron 8mg q6h PRN nausea  Pain regimen:   Oxycodone 5 mg q 4 hours moderate pain   Oxycodone 10 mg q 4 hours severe pain   Dilaudid 0 5 mg q 4 hours breakthrough pain   Prophylactic bowel regimen in place of Senokot-s and MiraLax          Goals of care, counseling/discussion  Assessment & Plan  Had goals of care discussion in regards to new probable pancreatic cancer diagnosis  Patient is reasonable and practical   Number one goal as for her  with Alzheimer's dementia to be cared for  Patient has family support from children and grandchildren who were present at bedside  Patient is level 3 which is appropriate  Consider palliative care in future  Will have consult for spiritual care, federico sheppard    YULIYA (acute kidney injury) ([facility] [address] )  [address] [facility], complaining of nausea and vomiting for past week, unable to tolerate solids or liquids    Likely pre renal secondary to decreased p o  Intake    Baseline creatinine 0 9, currently 1 29  Continue to monitor with daily BMP  Continue IV fluids  Avoid any nephrotoxins such as NSAIDs, contrast, hypotensive episodes    Depression  Assessment & Plan  Patient states compliance with medications  Patient unable to tolerate oral medications for 1 week    Will continue Paxil 10mg and Lexapro 10 mg as tolerated  Consider IM mood management      Leukocytosis  Assessment & Plan  Leukocytosis elevated of 14 9, denies any fevers or sick contacts    Denies any night fevers    Elevated in setting of pancreatic mass    14 49 on admission, will continue to trend daily CBC  Continue to monitor fever curve    Hyperlipemia  Assessment & Plan  Stable  Continue statin    Hypertension  Assessment & Plan  Patient is compliant with outpatient medications of lisinopril 20 mg and amlodipine 10 mg  Patient unable to tolerate p o  Medication for past 1 week  Denies any HA, CP or palpitations    stable  Will have labetalol 10 mg p r n  And Lopressor 5 p r n  VTE Pharmacologic Prophylaxis: Heparin  VTE Mechanical Prophylaxis: sequential compression device    CHIEF COMPLAINT     Chief Complaint   Patient presents with    Abdominal Pain     Patient reports abdominal pain that has been going on for a month; states that it was been getting worse; reports n/v/d      HISTORY OF PRESENT ILLNESS     Patient is a 69-year-old female with past medical history of breast cancer status post chemo, radiation and mastectomy 18 years ago, essential hypertension and depression who presents [facility] for 1 month of decreased p o  Intake and intractable nausea and vomiting  Patient states that approximately 1 5  months ago patient began to not be interested in food and discomfort after eating  Approximately 1 week ago the pain became worse  The pain is located in the epigastric region and she describes it as a tightening pressure  It is worsened by a p o  Intake to solids, liquids and even medications  Patient vomited approximately 4 times each day since Friday 4/9  Nonbilious, nonbloody vomiting, and she attributed it to only GERD  She is very stoic  Patient denies any night sweats, fever, chills, chest pain, shortness of breath, palpitations, bloody stools, or falls  While in the ED, blood pressure 205/95 and saturating well on room air  Hypertension likely secondary stress of new cancer news  BMP only remarkable for mild leukocytosis of 14 49 and YULIYA of 1 29 above her creatinine of 0 9  Patient received fentanyl 50 mcg x 1, Dilaudid 1 mg x 1, 1 L normal saline bolus, and 4 mg of Decadron x1  CT chest abdomen pelvis demonstrated 2 cm pancreatic mass in the head concerning for malignancy  Daughter and granddaughter were bedside and contributing to history    Daughter is appointed as POA as per patient  Of note, patient has 40 year pack history, no alcohol or drug history  Patient is expectedly tearful with new diagnosis however she remains practical     REVIEW OF SYSTEMS     Review of Systems   Constitutional: Positive for appetite change (decreased 1 5 mo) and fatigue  Negative for chills and fever  Respiratory: Negative for shortness of breath  Cardiovascular: Negative for chest pain and palpitations  Gastrointestinal: Positive for abdominal pain, nausea and vomiting  Negative for diarrhea  Neurological: Negative for headaches  OBJECTIVE     Vitals:    21 1622 21 1700   BP: (!) 205/95    BP Location: Right arm    Pulse: 90 80   Resp: 18 16   Temp: 98 1 °F (36 7 °C)    TempSrc: Oral    SpO2: 98%    Weight: 82 6 kg (182 lb 1 6 oz)    Height: 5' 6" (1 676 m)       Temperature:   Temp (24hrs), Av 1 °F (36 7 °C), Min:98 1 °F (36 7 °C), Max:98 1 °F (36 7 °C)    Temperature: 98 1 °F (36 7 °C)  Intake & Output:  I/O     None        Weights:   IBW: 59 3 kg    Body mass index is 29 39 kg/m²  Weight (last 2 days)     Date/Time   Weight    21 1622   82 6 (182 1)            Physical Exam  Constitutional:       General: She is in acute distress  Appearance: She is not ill-appearing  HENT:      Mouth/Throat:      Mouth: Mucous membranes are dry  Cardiovascular:      Rate and Rhythm: Normal rate and regular rhythm  Heart sounds: No murmur  No gallop  Pulmonary:      Effort: Pulmonary effort is normal       Breath sounds: Normal breath sounds  No wheezing, rhonchi or rales  Abdominal:      General: Bowel sounds are normal       Palpations: Abdomen is soft  Tenderness: There is abdominal tenderness (epigastric, to palpation)  There is no guarding or rebound  Musculoskeletal:      Right lower leg: No edema  Left lower leg: No edema  Skin:     General: Skin is warm and dry  Findings: No erythema        Comments: Negative cullens or grey-turners signs   Neurological:      Mental Status: She is alert and oriented to person, place, and time  Psychiatric:         Behavior: Behavior normal          Thought Content: Thought content normal       Comments: Tearful mood, as expected       PAST MEDICAL HISTORY     Past Medical History:   Diagnosis Date    Back ache     Cancer ([address] )     Hypertension      PAST SURGICAL HISTORY     Past Surgical History:   Procedure Laterality Date    APPENDECTOMY      BILATERAL OOPHORECTOMY      BREAST SURGERY      HERNIA REPAIR       SOCIAL & FAMILY HISTORY     Social History     Substance and Sexual Activity   Alcohol Use No     Substance and Sexual Activity   Alcohol Use No        Substance and Sexual Activity   Drug Use No     Social History     Tobacco Use   Smoking Status Former Smoker    Packs/day: 0 65    Years: 30 00    Pack years: 19 50   Smokeless Tobacco Never Used   Tobacco Comment    quit 15 years ago     Family History   Problem Relation Age of Onset    Hypertension Mother     Heart disease Mother     Diabetes Mother     Cancer Father      LABORATORY DATA     Labs: I have personally reviewed pertinent reports  Results from last 7 days   Lab Units 04/12/21  1636   WBC Thousand/uL 14 49*   HEMOGLOBIN g/dL 12 0   HEMATOCRIT % 35 5   PLATELETS Thousands/uL 283   NEUTROS PCT % 84*   MONOS PCT % 8      Results from last 7 days   Lab Units 04/12/21  1636   POTASSIUM mmol/L 3 6   CHLORIDE mmol/L 109*   CO2 mmol/L 28   BUN mg/dL 16   CREATININE mg/dL 1 29   CALCIUM mg/dL 8 8   ALK PHOS U/L 136*   ALT U/L 23   AST U/L 21                      Results from last 7 days   Lab Units 04/12/21  1655   TROPONIN I ng/mL <0 02     Micro:  No results found for: Naida Kennard, WOUNDCULT, SPUTUMCULTUR  IMAGING & DIAGNOSTIC TESTS     Imaging: I have personally reviewed pertinent reports      Ct Abdomen Pelvis With Contrast    Result Date: 4/12/2021  Impression: Diffuse gastric thickening and multiple thickened loops of right sided small bowel in keeping with a gastritis/nonspecific enteritis  Lobulated pancreatic head/neck hyperenhancing lesion measuring 19 x 18 x 20 mm #2/29 and #601/65 suspicious for a pancreatic malignancy, most typical of a neuroendocrine tumor  The pancreatic neck body and tail distal to this area are diffusely fatty involuted/atrophic  Surgical consultation is recommended on a nonurgent basis  Some islet cell tumors, such as insulinoma, gastrinoma and VIPoma can can be associated with bowel abnormalities and could explain the above bowel findings  No bowel obstruction or bowel pneumatosis  The study was marked in [facility] for immediate notification  Workstation performed: [id number]     EKG, Pathology, and Other Studies: I have personally reviewed pertinent reports  ALLERGIES   No Known Allergies  MEDICATIONS PRIOR TO ARRIVAL     Prior to Admission medications    Medication Sig Start Date End Date Taking?  Authorizing Provider   amLODIPine (NORVASC) 10 mg tablet Take 1 tablet (10 mg total) by mouth daily 11/20/20   Kristian Guerrier DO   atorvastatin (LIPITOR) 40 mg tablet Take 1 tablet (40 mg total) by mouth every evening 10/14/20   Kristian Guerrier DO   escitalopram (LEXAPRO) 10 mg tablet Take 1 tablet (10 mg total) by mouth every morning  Patient not taking: Reported on 12/2/2020 11/20/20   Kristian Guerrier DO   hydrOXYzine HCL (ATARAX) 25 mg tablet Take 25 mg by mouth daily at bedtime as needed 11/23/19   Historical Provider, MD   lisinopril (ZESTRIL) 20 mg tablet Take 1 tablet (20 mg total) by mouth daily 11/20/20   Kristian Guerrier DO   PARoxetine (PAXIL) 10 mg tablet  11/25/20   Historical Provider, MD   triamcinolone (KENALOG) 0 1 % ointment Apply 1 application topically 2 (two) times a day 10/14/20   Kristian Guerrier DO     MEDICATIONS ADMINISTERED IN LAST 24 HOURS     Medication Administration - last 24 hours from 04/11/2021 1947 to 04/12/2021 1947       Date/Time Order Dose Route Action Action by 04/12/2021 1648 sodium chloride 0 9 % bolus 500 mL 500 mL Intravenous New Bag Jordan Landa RN     04/12/2021 1648 fentanyl citrate (PF) 100 MCG/2ML 50 mcg 50 mcg Intravenous Given Jordan aLnda RN     04/12/2021 1648 ondansetron (ZOFRAN) injection 4 mg 4 mg Intravenous Given Jordan Landa RN     04/12/2021 1753 iohexol (OMNIPAQUE) 350 MG/ML injection (MULTI-DOSE) 100 mL 100 mL Intravenous Given Martine Perez     04/12/2021 1847 HYDROmorphone (DILAUDID) injection 1 mg 1 mg Intravenous Given Jordan Landa RN        CURRENT MEDICATIONS     Current Facility-Administered Medications   Medication Dose Route Frequency Provider Last Rate    acetaminophen  650 mg Oral Q6H PRN Rebel Meals, DO      atorvastatin  40 mg Oral QPM Tad Jessica, DO      [START ON 4/13/2021] escitalopram  10 mg Oral QAM Tad Jessica, DO      heparin (porcine)  5,000 Units Subcutaneous Q8H [address] Tad Jessica, DO      HYDROmorphone  1 mg Intravenous Q4H PRN Rebel Meals, DO      hydrOXYzine HCL  25 mg Oral HS PRN Tad Jessica, DO      Labetalol HCl  10 mg Intravenous Q4H PRN Tad Jessica, DO      metoprolol  5 mg Intravenous Q6H PRN Tad Jessica, DO      multi-electrolyte  125 mL/hr Intravenous Continuous Tad Jessica, DO      ondansetron  8 mg Intravenous Q6H PRN Tad Jessica, DO      oxyCODONE  10 mg Oral Q4H PRN Rebel Meals, DO      oxyCODONE  5 mg Oral Q4H PRN Rebel Meals, DO      [START ON 4/13/2021] PARoxetine  10 mg Oral Daily Tad Jessica, DO      [START ON 4/13/2021] polyethylene glycol  17 g Oral Daily Tad Jessica, DO      senna-docusate sodium  1 tablet Oral HS Millie Villalpando, DO       multi-electrolyte, 125 mL/hr      acetaminophen, 650 mg, Q6H PRN  HYDROmorphone, 1 mg, Q4H PRN  hydrOXYzine HCL, 25 mg, HS PRN  Labetalol HCl, 10 mg, Q4H PRN  metoprolol, 5 mg, Q6H PRN  ondansetron, 8 mg, Q6H PRN  oxyCODONE, 10 mg, Q4H PRN  oxyCODONE, 5 mg, Q4H PRN        Admission Time  I spent 30 minutes admitting the patient  This involved direct patient contact where I performed a full history and physical, reviewing previous records, and reviewing laboratory and other diagnostic studies  Portions of the record may have been created with voice recognition software  Occasional wrong word or "sound a like" substitutions may have occurred due to the inherent limitations of voice recognition software    Read the chart carefully and recognize, using context, where substitutions have occurred     ==  Ulises Soto, [facility]  Internal Medicine Residency PGY-1

## 2021-04-13 ENCOUNTER — TELEPHONE (OUTPATIENT)
Dept: RADIOLOGY | Facility: HOSPITAL | Age: 61
End: 2021-04-13

## 2021-04-13 ENCOUNTER — APPOINTMENT (INPATIENT)
Dept: RADIOLOGY | Facility: HOSPITAL | Age: 61
DRG: 282 | End: 2021-04-13
Payer: COMMERCIAL

## 2021-04-13 ENCOUNTER — ANESTHESIA EVENT (INPATIENT)
Dept: GASTROENTEROLOGY | Facility: HOSPITAL | Age: 61
DRG: 282 | End: 2021-04-13
Payer: COMMERCIAL

## 2021-04-13 LAB
ANION GAP SERPL CALCULATED.3IONS-SCNC: 3 MMOL/L (ref 4–13)
ATRIAL RATE: 77 BPM
BASOPHILS # BLD AUTO: 0.01 THOUSANDS/ΜL (ref 0–0.1)
BASOPHILS NFR BLD AUTO: 0 % (ref 0–1)
BUN SERPL-MCNC: 14 MG/DL (ref 5–25)
CALCIUM SERPL-MCNC: 8.8 MG/DL (ref 8.3–10.1)
CHLORIDE SERPL-SCNC: 109 MMOL/L (ref 100–108)
CO2 SERPL-SCNC: 27 MMOL/L (ref 21–32)
CREAT SERPL-MCNC: 0.98 MG/DL (ref 0.6–1.3)
EOSINOPHIL # BLD AUTO: 0.1 THOUSAND/ΜL (ref 0–0.61)
EOSINOPHIL NFR BLD AUTO: 1 % (ref 0–6)
ERYTHROCYTE [DISTWIDTH] IN BLOOD BY AUTOMATED COUNT: 13.6 % (ref 11.6–15.1)
EST. AVERAGE GLUCOSE BLD GHB EST-MCNC: 114 MG/DL
GFR SERPL CREATININE-BSD FRML MDRD: 62 ML/MIN/1.73SQ M
GLUCOSE SERPL-MCNC: 95 MG/DL (ref 65–140)
HBA1C MFR BLD: 5.6 %
HCT VFR BLD AUTO: 32.9 % (ref 34.8–46.1)
HGB BLD-MCNC: 11 G/DL (ref 11.5–15.4)
IMM GRANULOCYTES # BLD AUTO: 0.04 THOUSAND/UL (ref 0–0.2)
IMM GRANULOCYTES NFR BLD AUTO: 0 % (ref 0–2)
LYMPHOCYTES # BLD AUTO: 2.2 THOUSANDS/ΜL (ref 0.6–4.47)
LYMPHOCYTES NFR BLD AUTO: 21 % (ref 14–44)
MCH RBC QN AUTO: 31.7 PG (ref 26.8–34.3)
MCHC RBC AUTO-ENTMCNC: 33.4 G/DL (ref 31.4–37.4)
MCV RBC AUTO: 95 FL (ref 82–98)
MONOCYTES # BLD AUTO: 0.59 THOUSAND/ΜL (ref 0.17–1.22)
MONOCYTES NFR BLD AUTO: 6 % (ref 4–12)
NEUTROPHILS # BLD AUTO: 7.76 THOUSANDS/ΜL (ref 1.85–7.62)
NEUTS SEG NFR BLD AUTO: 72 % (ref 43–75)
NRBC BLD AUTO-RTO: 0 /100 WBCS
P AXIS: 53 DEGREES
PLATELET # BLD AUTO: 260 THOUSANDS/UL (ref 149–390)
PMV BLD AUTO: 10.6 FL (ref 8.9–12.7)
POTASSIUM SERPL-SCNC: 3.9 MMOL/L (ref 3.5–5.3)
PR INTERVAL: 172 MS
QRS AXIS: -35 DEGREES
QRSD INTERVAL: 92 MS
QT INTERVAL: 366 MS
QTC INTERVAL: 414 MS
RBC # BLD AUTO: 3.47 MILLION/UL (ref 3.81–5.12)
SODIUM SERPL-SCNC: 139 MMOL/L (ref 136–145)
T WAVE AXIS: 66 DEGREES
VENTRICULAR RATE: 77 BPM
WBC # BLD AUTO: 10.7 THOUSAND/UL (ref 4.31–10.16)

## 2021-04-13 PROCEDURE — 36415 COLL VENOUS BLD VENIPUNCTURE: CPT | Performed by: INTERNAL MEDICINE

## 2021-04-13 PROCEDURE — 82943 ASSAY OF GLUCAGON: CPT | Performed by: INTERNAL MEDICINE

## 2021-04-13 PROCEDURE — 83036 HEMOGLOBIN GLYCOSYLATED A1C: CPT | Performed by: INTERNAL MEDICINE

## 2021-04-13 PROCEDURE — G1004 CDSM NDSC: HCPCS

## 2021-04-13 PROCEDURE — 99255 IP/OBS CONSLTJ NEW/EST HI 80: CPT | Performed by: INTERNAL MEDICINE

## 2021-04-13 PROCEDURE — 99255 IP/OBS CONSLTJ NEW/EST HI 80: CPT | Performed by: SURGERY

## 2021-04-13 PROCEDURE — 82941 ASSAY OF GASTRIN: CPT | Performed by: INTERNAL MEDICINE

## 2021-04-13 PROCEDURE — 84307 ASSAY OF SOMATOSTATIN: CPT | Performed by: INTERNAL MEDICINE

## 2021-04-13 PROCEDURE — 99221 1ST HOSP IP/OBS SF/LOW 40: CPT | Performed by: INTERNAL MEDICINE

## 2021-04-13 PROCEDURE — 93010 ELECTROCARDIOGRAM REPORT: CPT | Performed by: INTERNAL MEDICINE

## 2021-04-13 PROCEDURE — 84586 ASSAY OF VIP: CPT | Performed by: INTERNAL MEDICINE

## 2021-04-13 PROCEDURE — 85025 COMPLETE CBC W/AUTO DIFF WBC: CPT | Performed by: STUDENT IN AN ORGANIZED HEALTH CARE EDUCATION/TRAINING PROGRAM

## 2021-04-13 PROCEDURE — 71260 CT THORAX DX C+: CPT

## 2021-04-13 PROCEDURE — 80048 BASIC METABOLIC PNL TOTAL CA: CPT | Performed by: STUDENT IN AN ORGANIZED HEALTH CARE EDUCATION/TRAINING PROGRAM

## 2021-04-13 PROCEDURE — 74183 MRI ABD W/O CNTR FLWD CNTR: CPT

## 2021-04-13 RX ADMIN — ATORVASTATIN CALCIUM 40 MG: 40 TABLET, FILM COATED ORAL at 17:55

## 2021-04-13 RX ADMIN — HEPARIN SODIUM 5000 UNITS: 5000 INJECTION INTRAVENOUS; SUBCUTANEOUS at 21:20

## 2021-04-13 RX ADMIN — POLYETHYLENE GLYCOL 3350 17 G: 17 POWDER, FOR SOLUTION ORAL at 09:58

## 2021-04-13 RX ADMIN — OXYCODONE HYDROCHLORIDE 10 MG: 10 TABLET ORAL at 09:58

## 2021-04-13 RX ADMIN — ONDANSETRON 4 MG: 2 INJECTION INTRAMUSCULAR; INTRAVENOUS at 12:57

## 2021-04-13 RX ADMIN — OXYCODONE HYDROCHLORIDE 10 MG: 10 TABLET ORAL at 17:55

## 2021-04-13 RX ADMIN — IOHEXOL 85 ML: 350 INJECTION, SOLUTION INTRAVENOUS at 18:39

## 2021-04-13 RX ADMIN — DOCUSATE SODIUM AND SENNOSIDES 1 TABLET: 8.6; 5 TABLET ORAL at 21:19

## 2021-04-13 RX ADMIN — SODIUM CHLORIDE, SODIUM GLUCONATE, SODIUM ACETATE, POTASSIUM CHLORIDE, MAGNESIUM CHLORIDE, SODIUM PHOSPHATE, DIBASIC, AND POTASSIUM PHOSPHATE 100 ML/HR: .53; .5; .37; .037; .03; .012; .00082 INJECTION, SOLUTION INTRAVENOUS at 13:13

## 2021-04-13 RX ADMIN — HEPARIN SODIUM 5000 UNITS: 5000 INJECTION INTRAVENOUS; SUBCUTANEOUS at 13:13

## 2021-04-13 RX ADMIN — ONDANSETRON 4 MG: 2 INJECTION INTRAMUSCULAR; INTRAVENOUS at 21:20

## 2021-04-13 NOTE — RESTORATIVE TECHNICIAN NOTE
Restorative Specialist Mobility Note       Activity: Ambulate in ny, Ambulate in room, Bathroom privileges, Chair, Dangle, Stand at bedside(Educated/encouraged pt to ambulate with assistance 3-4 x's/day  Bed alarm on   Pt callbell, phone/tray within reach )     Assistive Device: None       Alta Mckeon BS, Restorative Technician, [employer]

## 2021-04-13 NOTE — CASE MANAGEMENT
LOS 1  Unplanned readmission risk score: 12  Not a bundle  Met with pt and pt's daughter Frederic Harris to discuss the role of CM and to discuss any help pt may need prior to dc  Pt lives with her  in a 2 story home with 6-8 SIM[address]N; bed/bathroom on the 2nd floor  Pt performed ADL's indptly pta, no use of DME  No hx of mental health or D&A treatment  Pt's preferred pharmacy is [facility] on [address] in [facility]  Pt does not drive  Contact: Frederic Harris (daughter) [phone]  No POA or living will  Information for advance directive provided to Frederic Harris  Family will transport home at dc  CM reviewed d/c planning process including the following: identifying help at home, patient preference for d/c planning needs, Discharge Lounge, [facility] Meds to Bed program, availability of treatment team to discuss questions or concerns patient and/or family may have regarding understanding medications and recognizing signs and symptoms once discharged  CM also encouraged patient to follow up with all recommended appointments after discharge  Patient advised of importance for patient and family to participate in managing patients medical well being  Patient/caregiver received discharge checklist  Content reviewed  Patient/caregiver encouraged to participate in discharge plan of care prior to discharge home

## 2021-04-13 NOTE — CONSULTS
Consultation - [facility]  Dori Arteaga 63 y o  female MRN: [id number]  Unit/Bed#: ED 15 Encounter: [id number]        Inpatient consult to gastroenterology  Consult performed by: Nathanial Cushing, DO  Consult ordered by: Bob Hoskins DO          Reason for Consult / Principal Problem:     Pancreatic mass      ASSESSMENT AND PLAN:      27-year-old female with past medical history of breast cancer status post chemo, radiation, right-sided mastectomy 18 years ago, hypertension, hyperlipidemia who is admitted to [facility] for persistent nausea, vomiting, abdominal pain  CT scan showed evidence of pancreatic mass, gastroenterology was consulted for EUS with biopsy for diagnosis  Pancreatic mass  Patient presenting with persistent abdominal pain, nausea, vomiting, diarrhea for 1 month  CT scan shows 1 8 x 1 9 x 2 cm pancreatic head/neck mass which radiologist described as typical for neuroendocrine tumor  Differential includes adeno carcinoma versus VIPoma verses an insulinoma versus gastrinoma  Will check gastrin, somatostatin, A1c, glucagon, CA 19 9, CEA tumor markers  Will also check pancreatic elastase given her diarrhea  Will schedule patient for EUS with biopsy for definitive diagnosis  Will continue NPO status until the determination of timing of procedure based on schedule, likely can be done later this afternoon  Will also order MRCP with MRI with and without contrast to characterize the mass  Oncology is consulted, appreciated  Recommend palliative care consult  Continue to monitor patient has symptoms as described below    Nausea, vomiting  Likely secondary to new pancreatic mass  Continue symptom management with Zofran p r n  Which seems to be effective  Can start patient on clear liquid diet and advanced as tolerated once procedures complete      Abdominal pain  Likely secondary to pancreatic mass  Lipase negative  Pain management as per primary team  Consider palliative care consult    Diarrhea  Chronic  Low suspicion for infectious etiology at this time  Anticipate the use of opioids will help slow down diarrhea  Continue to monitor bowel movements    Acute kidney injury  In the setting of poor p o  Intake due to multiple symptoms and pancreatic mass  Continue IV fluid hydration as per primary team  Continue to monitor creatinine    History of breast cancer  Treated 18 years ago with chemo, radiation, mastectomy  Patient also has family members with breast and ovarian cancer  May be risk factor, consider genetic testing as an outpatient    Rest of care per primary team     ______________________________________________________________________    HPI:  59-year-old female with past medical history of breast cancer status post chemo, radiation, right-sided mastectomy 18 years ago, history of tobacco abuse quit 20 years ago but with a 40 pack-year history, hypertension, hyperlipidemia who is admitted to [facility] abdominal pain, nausea, vomiting, diarrhea for approximately 1 month  She reports epigastric and periumbilical pain which radiates the back  Pain is worse after eating  Her p o  Intake remains poor due to poor appetite as well as pain  She also has persistent nausea and vomiting, has up to 4 episodes a day of regurgitated food  Denies any hematemesis, coffee-ground emesis  She has been also having diarrhea with loose stools up to 4 times daily  She denies any hematochezia or melena  Her family history is significant for breast and ovarian cancer in her daughter, for cancer in her brother, skin cancer in her son  She quit smoking 20 years ago, had smoked for approximately 20 years 2 packs per day  She denies any alcohol or drug use  Upon evaluation in the ED she was noted to have been YULIYA in addition to evidence of a new pancreatic mass on CT scan  She is admitted for further workup of this mass        REVIEW OF SYSTEMS:    CONSTITUTIONAL: Denies any fever, chills, rigors, and weight loss  HEENT: No earache or tinnitus  Denies hearing loss or visual disturbances  CARDIOVASCULAR: No chest pain or palpitations  RESPIRATORY: Denies any cough, hemoptysis, shortness of breath or dyspnea on exertion  GASTROINTESTINAL: As noted in the History of Present Illness  GENITOURINARY: No problems with urination  Denies any hematuria or dysuria  NEUROLOGIC: No dizziness or vertigo, denies headaches  MUSCULOSKELETAL: Denies any muscle or joint pain  SKIN: Denies skin rashes or itching  ENDOCRINE: Denies excessive thirst  Denies intolerance to heat or cold  PSYCHOSOCIAL: Denies depression or anxiety  Denies any recent memory loss         Historical Information   Past Medical History:   Diagnosis Date    Back ache     Cancer [facility] [address] [facility] )     Hypertension      Past Surgical History:   Procedure Laterality Date    APPENDECTOMY      BILATERAL OOPHORECTOMY      BREAST SURGERY      HERNIA REPAIR       Social History   Social History     Substance and Sexual Activity   Alcohol Use No     Social History     Substance and Sexual Activity   Drug Use No     Social History     Tobacco Use   Smoking Status Former Smoker    Packs/day: 0 65    Years: 30 00    Pack years: 19 50   Smokeless Tobacco Never Used   Tobacco Comment    quit 15 years ago     Family History   Problem Relation Age of Onset    Hypertension Mother     Heart disease Mother     Diabetes Mother     Cancer Father        Meds/Allergies     (Not in a hospital admission)    Current Facility-Administered Medications   Medication Dose Route Frequency    acetaminophen (TYLENOL) tablet 650 mg  650 mg Oral Q6H PRN    atorvastatin (LIPITOR) tablet 40 mg  40 mg Oral QPM    heparin (porcine) subcutaneous injection 5,000 Units  5,000 Units Subcutaneous Q8H [address]    HYDROmorphone (DILAUDID) injection 1 mg  1 mg Intravenous Q4H PRN    hydrOXYzine HCL (ATARAX) tablet 25 mg  25 mg Oral HS PRN    Labetalol HCl (NORMODYNE) injection 10 mg  10 mg Intravenous Q4H PRN    multi-electrolyte (PLASMALYTE-A/ISOLYTE-S PH 7 4) IV solution  100 mL/hr Intravenous Continuous    ondansetron (ZOFRAN) injection 4 mg  4 mg Intravenous Q6H PRN    oxyCODONE (ROXICODONE) immediate release tablet 10 mg  10 mg Oral Q4H PRN    oxyCODONE (ROXICODONE) IR tablet 5 mg  5 mg Oral Q4H PRN    polyethylene glycol (MIRALAX) packet 17 g  17 g Oral Daily    senna-docusate sodium (SENOKOT S) 8 6-50 mg per tablet 1 tablet  1 tablet Oral HS       No Known Allergies        Objective     Blood pressure 167/78, pulse 67, temperature 98 1 °F (36 7 °C), temperature source Oral, resp  rate 20, height 5' 6" (1 676 m), weight 82 6 kg (182 lb 1 6 oz), SpO2 95 %, not currently breastfeeding  Body mass index is 29 39 kg/m²  No intake or output data in the 24 hours ending 04/13/21 0648      PHYSICAL EXAM:      General Appearance:   Alert, cooperative, no distress, overweight female, flat affect   HEENT:   Normocephalic, atraumatic, anicteric      Neck:  Supple, symmetrical, trachea midline   Lungs:   Clear to auscultation bilaterally; no rales, rhonchi or wheezing; respirations unlabored    Heart[de-identified]   Regular rate and rhythm; no murmur, rub, or gallop  Abdomen:   Soft, tender to palpation in the epigastric and periumbilical with some guarding, no rigidity, non-distended; normal bowel sounds; no masses, no organomegaly    Genitalia:   Deferred    Rectal:   Deferred    Extremities:  No cyanosis, clubbing or edema    Pulses:  2+ and symmetric all extremities    Skin:  No jaundice, rashes, or lesions, no evidence of Megan or Parker Prince sign     Lymph nodes:  No palpable cervical lymphadenopathy        Lab Results:   Admission on 04/12/2021   Component Date Value    WBC 04/12/2021 14 49*    RBC 04/12/2021 3 79*    Hemoglobin 04/12/2021 12 0     Hematocrit 04/12/2021 35 5     MCV 04/12/2021 94     MCH 04/12/2021 31 7     MCHC 04/12/2021 33 8     RDW 04/12/2021 13 3     MPV 04/12/2021 10 5     Platelets 15/57/9495 283     nRBC 04/12/2021 0     Neutrophils Relative 04/12/2021 84*    Immat GRANS % 04/12/2021 0     Lymphocytes Relative 04/12/2021 7*    Monocytes Relative 04/12/2021 8     Eosinophils Relative 04/12/2021 1     Basophils Relative 04/12/2021 0     Neutrophils Absolute 04/12/2021 12 14*    Immature Grans Absolute 04/12/2021 0 03     Lymphocytes Absolute 04/12/2021 1 06     Monocytes Absolute 04/12/2021 1 12     Eosinophils Absolute 04/12/2021 0 12     Basophils Absolute 04/12/2021 0 02     Sodium 04/12/2021 138     Potassium 04/12/2021 3 6     Chloride 04/12/2021 109*    CO2 04/12/2021 28     ANION GAP 04/12/2021 1*    BUN 04/12/2021 16     Creatinine 04/12/2021 1 29     Glucose 04/12/2021 104     Calcium 04/12/2021 8 8     Corrected Calcium 04/12/2021 9 4     AST 04/12/2021 21     ALT 04/12/2021 23     Alkaline Phosphatase 04/12/2021 136*    Total Protein 04/12/2021 8 1     Albumin 04/12/2021 3 3*    Total Bilirubin 04/12/2021 0 31     eGFR 04/12/2021 45     Lipase 04/12/2021 74     Troponin I 04/12/2021 <0 02     Color, UA 04/12/2021 Yellow     Clarity, UA 04/12/2021 Clear     pH, UA 04/12/2021 7 0     Leukocytes, UA 04/12/2021 Negative     Nitrite, UA 04/12/2021 Negative     Protein, UA 04/12/2021 Trace*    Glucose, UA 04/12/2021 Negative     Ketones, UA 04/12/2021 Negative     Urobilinogen, UA 04/12/2021 0 2     Bilirubin, UA 04/12/2021 Negative     Blood, UA 04/12/2021 Small*    Specific [place], UA 04/12/2021 1 015     RBC, UA 04/12/2021 2-4     WBC, UA 04/12/2021 None Seen     Epithelial Cells 04/12/2021 None Seen     Bacteria, UA 04/12/2021 None Seen     Hyaline Casts, UA 04/12/2021 None Seen     CEA 04/12/2021 <0 5     WBC 04/13/2021 10 70*    RBC 04/13/2021 3 47*    Hemoglobin 04/13/2021 11 0*    Hematocrit 04/13/2021 32 9*    MCV 04/13/2021 95     MCH 04/13/2021 31 7     MCHC 04/13/2021 33 4     RDW 04/13/2021 13 6     MPV 04/13/2021 10 6     Platelets 14/85/1726 260     nRBC 04/13/2021 0     Neutrophils Relative 04/13/2021 72     Immat GRANS % 04/13/2021 0     Lymphocytes Relative 04/13/2021 21     Monocytes Relative 04/13/2021 6     Eosinophils Relative 04/13/2021 1     Basophils Relative 04/13/2021 0     Neutrophils Absolute 04/13/2021 7 76*    Immature Grans Absolute 04/13/2021 0 04     Lymphocytes Absolute 04/13/2021 2 20     Monocytes Absolute 04/13/2021 0 59     Eosinophils Absolute 04/13/2021 0 10     Basophils Absolute 04/13/2021 0 01        Imaging Studies: I have personally reviewed pertinent imaging studies  [address] [facility]    Internal Medicine PGY-2  4/13/2021 6:54 AM

## 2021-04-13 NOTE — UTILIZATION REVIEW
Initial Clinical Review    Admission: Date/Time/Statement:   Admission Orders (From admission, onward)     Ordered        04/12/21 1849  Inpatient Admission  Once                   Orders Placed This Encounter   Procedures    Inpatient Admission     Standing Status:   Standing     Number of Occurrences:   1     Order Specific Question:   Level of Care     Answer:   Med Surg [16]     Order Specific Question:   Estimated length of stay     Answer:   More than 2 Midnights     Order Specific Question:   Certification     Answer:   I certify that inpatient services are medically necessary for this patient for a duration of greater than two midnights  See H&P and MD Progress Notes for additional information about the patient's course of treatment  ED Arrival Information     Expected Arrival Acuity Means of Arrival Escorted By Service Admission Type    - 4/12/2021 16:07 Urgent Walk-In Self General Medicine Urgent    Arrival Complaint    abd pain, N/V/D        Chief Complaint   Patient presents with    Abdominal Pain     Patient reports abdominal pain that has been going on for a month; states that it was been getting worse; reports n/v/d     Assessment/Plan: 62 yo f with a pmh of breast cancer  S/p resection, chemo and radiation 20 yrs ago,  HTN, and a stroke history with no deficits  She presents to the Ed today with 1-2 months of abdominal pain and weight loss, which has worsened to now with vomiting and unable to tolerate solids , liquids and medications  Over then past week  Her pain is now rated  10/10  CT A & P showed a lobulated pancreatic head, neck lesion suspicious for pancreatic malignancy vs neuroendocrine tumor  She is admitted inpatient for work up of her pancreatic mass, will required a biopsy of this mass for further treatment plans  Gastroenterology - 4/13 - Ct showed pancreatic mass, she presented with persistent abd  Pain, nausea, vomiting, diarrhea for 1 month   Check labs, schedule EUS with biopsy for definitive diagnosis  Continue NPO, MCRP to characterize the mass  4/13 - MRCP pending - she remains NPO  Needs surgical and oncological consult  Goals of care discussions, in regards to new probable pancreatic cancer diagnosis  She endorses abdominal pain , described as tightness in the epigastric region, with radiation to the lower quadrants rated 10/10  Surgical Oncology - 4/13 -  Newly diagnosed pancreatic head, neck mass, suspicious for cancer  CT chest with IV contrast for staging  Follow up EUS pathology  Medical Oncology - 4/13 -  New diagnosis, discussed with patient  Pt with Hx of breast cancer s/p lumpectomy and care in [facility], family Hx of ovarian cancer aunt  Surgical Onc on board,  either whipple upfront followed by chemotherapy or neoadjuvant chemo followed by whipple  Await EUS as well as biopsyfor diagnosis  ,           ED Triage Vitals [04/12/21 1622]   Temperature Pulse Respirations Blood Pressure SpO2   98 1 °F (36 7 °C) 90 18 (!) 205/95 98 %      Temp Source Heart Rate Source Patient Position - Orthostatic VS BP Location FiO2 (%)   Oral Monitor Lying Right arm --      Pain Score       9          Wt Readings from Last 1 Encounters:   04/12/21 82 6 kg (182 lb 1 6 oz)     Additional Vital Signs:    Date/Time  Temp  Pulse  Resp  BP  MAP (mmHg)  SpO2  O2 Device  Patient Position - Orthostatic VS   04/13/21 0015  --  67  20  167/78  --  95 %  None (Room air)  Lying   04/12/21 2028  --  78  16  162/77  110  98 %  --  --   04/12/21 1700  --  80  16  --  --  --               Pertinent Labs/Diagnostic Test Results:       Results from last 7 days   Lab Units 04/13/21  0612 04/12/21  1636   WBC Thousand/uL 10 70* 14 49*   HEMOGLOBIN g/dL 11 0* 12 0   HEMATOCRIT % 32 9* 35 5   PLATELETS Thousands/uL 260 283   NEUTROS ABS Thousands/µL 7 76* 12 14*         Results from last 7 days   Lab Units 04/13/21  0612 04/12/21  1636   SODIUM mmol/L 139 138   POTASSIUM mmol/L 3 9 3 6 CHLORIDE mmol/L 109* 109*   CO2 mmol/L 27 28   ANION GAP mmol/L 3* 1*   BUN mg/dL 14 16   CREATININE mg/dL 0 98 1 29   EGFR ml/min/1 73sq m 62 45   CALCIUM mg/dL 8 8 8 8     Results from last 7 days   Lab Units 04/12/21  1636   AST U/L 21   ALT U/L 23   ALK PHOS U/L 136*   TOTAL PROTEIN g/dL 8 1   ALBUMIN g/dL 3 3*   TOTAL BILIRUBIN mg/dL 0 31         Results from last 7 days   Lab Units 04/13/21  0612 04/12/21  1636   GLUCOSE RANDOM mg/dL 95 104     Results from last 7 days   Lab Units 04/13/21  0612   HEMOGLOBIN A1C % 5 6   EAG mg/dl 114       Results from last 7 days   Lab Units 04/12/21  1655   TROPONIN I ng/mL <0 02       Results from last 7 days   Lab Units 04/12/21  1636   LIPASE u/L 74         Results from last 7 days   Lab Units 04/12/21 2011   CEA ng/mL <0 5     Results from last 7 days   Lab Units 04/12/21  1813   CLARITY UA  Clear   COLOR UA  Yellow   SPEC GRAV UA  1 015   PH UA  7 0   GLUCOSE UA mg/dl Negative   KETONES UA mg/dl Negative   BLOOD UA  Small*   PROTEIN UA mg/dl Trace*   NITRITE UA  Negative   BILIRUBIN UA  Negative   UROBILINOGEN UA E U /dl 0 2   LEUKOCYTES UA  Negative   WBC UA /hpf None Seen   RBC UA /hpf 2-4   BACTERIA UA /hpf None Seen   EPITHELIAL CELLS WET PREP /hpf None Seen       CT A & P - 4/12 - Diffuse gastric thickening and multiple thickened loops of right sided small bowel in keeping with a gastritis/nonspecific enteritis  Lobulated pancreatic head/neck hyperenhancing lesion measuring 19 x 18 x 20 mm #2/29 and #601/65 suspicious for a pancreatic malignancy, most typical of a neuroendocrine tumor  The pancreatic neck body and tail distal to this area are diffusely fatty   involuted/atrophic  Surgical consultation is recommended  Some islet cell tumors, such as insulinoma, gastrinoma and VIPoma can can be associated with bowel abnormalities and could explain the above bowel findings  No bowel obstruction or bowel pneumatosis       CXR 4/12 -  No acute    ECG 4/12 - Previous ECG:  Compared to current    Similarity:  No change    Interpretation: normal      ECG rate:  77    ECG rate assessment: normal     Rhythm: sinus rhythm      Ectopy: none      QRS axis:  Left    QRS intervals:  Normal    Conduction: normal      ST segments:  Normal    T waves: normal          ED Treatment:   Medication Administration from 04/12/2021 1607 to 04/13/2021 0751       Date/Time Order Dose Route Action Comments     04/12/2021 1648 sodium chloride 0 9 % bolus 500 mL 500 mL Intravenous New Bag      04/12/2021 1648 fentanyl citrate (PF) 100 MCG/2ML 50 mcg 50 mcg Intravenous Given      04/12/2021 1648 ondansetron (ZOFRAN) injection 4 mg 4 mg Intravenous Given      04/12/2021 1753 iohexol (OMNIPAQUE) 350 MG/ML injection (MULTI-DOSE) 100 mL 100 mL Intravenous Given      04/12/2021 1847 HYDROmorphone (DILAUDID) injection 1 mg 1 mg Intravenous Given      04/12/2021 2343 heparin (porcine) subcutaneous injection 5,000 Units 5,000 Units Subcutaneous Given      04/12/2021 2347 HYDROmorphone (DILAUDID) injection 1 mg 1 mg Intravenous Given      04/12/2021 2341 multi-electrolyte (PLASMALYTE-A/ISOLYTE-S PH 7 4) IV solution 100 mL/hr Intravenous New Bag      04/12/2021 2030 ondansetron (ZOFRAN) injection 4 mg 4 mg Intravenous Given         Past Medical History:   Diagnosis Date    Back ache     [facility]     Hypertension      Present on Admission:   Hypertension   Pancreatic mass   Hyperlipemia   Leukocytosis   Depression   YULIYA [facility]acute kidney injury) [facility]      Admitting Diagnosis: Abdominal pain [R10 9]  Age/Sex: 63 y o  female       Admission Orders:  Scheduled Medications:  atorvastatin, 40 mg, Oral, QPM  heparin (porcine), 5,000 Units, Subcutaneous, Q8H ABDIFATAH  polyethylene glycol, 17 g, Oral, Daily  senna-docusate sodium, 1 tablet, Oral, HS      Continuous IV Infusions:  multi-electrolyte, 100 mL/hr, Intravenous, Continuous      PRN Meds:  acetaminophen, 650 mg, Oral, Q6H PRN  HYDROmorphone, 1 mg, Intravenous, Q4H PRN - 4/12 x 1  hydrOXYzine HCL, 25 mg, Oral, HS PRN  Labetalol HCl, 10 mg, Intravenous, Q4H PRN  ondansetron, 4 mg, Intravenous, Q6H PRN - 4/12x  1 - 4/13 x 1  oxyCODONE, 10 mg, Oral, Q4H PRN - 4/13 x 1  oxyCODONE, 5 mg, Oral, Q4H PRN      Nursing Orders - VS - up & OOB as tolerated - SCD's to le's - Diet NPO    Network Utilization Review Department  ATTENTION: Please call with any questions or concerns to [phone] and carefully listen to the prompts so that you are directed to the right person  All voicemails are confidential   Miesha Skipper all requests for admission clinical reviews, approved or denied determinations and any other requests to dedicated fax number below belonging to the campus where the patient is receiving treatment   List of dedicated fax numbers for the Facilities:  [address] DENIALS (Administrative/Medical Necessity) [phone]   [address] (Maternity/NICU/Pediatrics) [phone] [address] [place] [address] Dr Yfn Ribeiro [phone] (Felix Rey "Keyla" 103) [address] [address] [place] [address] [address] [place] [address] [phone]

## 2021-04-13 NOTE — CONSULTS
[address] [facility] 63 y o  female MRN: [id number]  Unit/Bed#: [facility] 727-01 Encounter: [id number]    Code Status: Level 3 - DNAR and DNI  POA:      Reason for Admission / Principal Problem: Pancreatic Mass  Reason for Consult: Pancreatic Mass    A/P:    1  Pancreatic Mass: Ms Clarice Runner is a pleasant 63year old female with a newly diagnosed pancreatic mass visualized on CT  Based upon her symptoms as well as scan results it is most likely that the mass is cancerous is nature but will need biopsy for definitive definition of its etiology  GI has been consulted and recommended an EGD with EUS and possible biopsy as well as MRI with MRCP  Any further GI recommendations are appreciated  - Follow up on biopsy results  Potential treatment plan pending biopsy results  - Surgical oncology has been consulted and their recommendations are appreciated as well  - Recommend outpatient genetic screening regardless of current diagnosis due to the early age of previous breast cancer diagnosis  HPI: Chani Alvarez is a 63 y o  female who presents with a newly diagnosed pancreatic mass of unknown etiology  Patient reports that she has had approximately 1-2 months of decreased oral intake as well as epigastric discomfort associated with eating  She states that originally the pain would resolve on its own but beginning 4/9 she had multiple episodes emesis  Patient was unable to tolerate solids, liquids, or medications since that time  Patient has not tried taking any medication for pain  Denies any fever, chills, shortness of breath, headaches, or vision changes  Denies any significant weight loss over the past few months   She has a past medical history significant for breast cancer, diagnosed at least 18 years ago that was treated with chemotherapy, radiation, and a partial mastectomy in [place]  Reports that her most recent mammogram was 3 years ago  Endorses a family history positive for possible ovarian cancer in her maternal aunt as well as "stomach" cancer in her maternal grandmother  Patient further endorses an approximately 30-40 pack year smoking history but states that she quit "awhile" ago  No further complaints  Ms Marcianne Lesches had a CT AP done on the current admission that demonstrated a "lobulated pancreatic head/neck hyperenhancing lesion measuring 19 x 18 x 20 mm suspicious for a pancreatic malignancy, possibly neuroendocrine tumor  History obtained from patient and daughter as patient's preferred language is [place]  PMH:  Past Medical History:   Diagnosis Date    Back ache     Cancer ([address] )     Hypertension          PSH:  Past Surgical History:   Procedure Laterality Date    APPENDECTOMY      BILATERAL OOPHORECTOMY      BREAST SURGERY      HERNIA REPAIR         Allergies: No Known Allergies    Family History:   Family History   Problem Relation Age of Onset    Hypertension Mother     Heart disease Mother     Diabetes Mother     Cancer Father        Social History:   Social History     Tobacco Use   Smoking Status Former Smoker    Packs/day: 0 65    Years: 30 00    Pack years: 19 50   Smokeless Tobacco Never Used   Tobacco Comment    quit 15 years ago      Social History     Substance and Sexual Activity   Alcohol Use Not Currently    Frequency: Monthly or less    Binge frequency: Never      Social History     Substance and Sexual Activity   Drug Use No        Home Medications:   Prior to Admission medications    Medication Sig Start Date End Date Taking?  Authorizing Provider   amLODIPine (NORVASC) 10 mg tablet Take 1 tablet (10 mg total) by mouth daily 11/20/20  Yes Kristian Guerrier DO   atorvastatin (LIPITOR) 40 mg tablet Take 1 tablet (40 mg total) by mouth every evening 10/14/20  Yes Kristian Guerrier DO   escitalopram (LEXAPRO) 10 mg tablet Take 1 tablet (10 mg total) by mouth every morning 11/20/20  Yes Kristian Guerrier DO   hydrOXYzine HCL (ATARAX) 25 mg tablet Take 25 mg by mouth daily at bedtime as needed 11/23/19  Yes Historical Provider, MD   lisinopril (ZESTRIL) 20 mg tablet Take 1 tablet (20 mg total) by mouth daily 11/20/20  Yes Kristian Guerrier DO   PARoxetine (PAXIL) 10 mg tablet  11/25/20  Yes Historical Provider, MD   triamcinolone (KENALOG) 0 1 % ointment Apply 1 application topically 2 (two) times a day 10/14/20  Yes Yo Mckay DO       ROS:   Review of Systems   Constitutional: Positive for appetite change  Negative for chills, fever and unexpected weight change  HENT: Negative for rhinorrhea and sore throat  Eyes: Negative for visual disturbance  Respiratory: Negative for cough, chest tightness and shortness of breath  Cardiovascular: Negative for chest pain and palpitations  Gastrointestinal: Positive for abdominal pain (epigastric), nausea and vomiting  Genitourinary: Negative for dysuria and flank pain  Neurological: Negative for dizziness, syncope, weakness, light-headedness and headaches  Vitals:   Vitals:    04/12/21 1622 04/12/21 1700 04/12/21 2028 04/13/21 0015   BP: (!) 205/95  162/77 167/78   BP Location: Right arm      Pulse: 90 80 78 67   Resp: 18 16 16 20   Temp: 98 1 °F (36 7 °C)      TempSrc: Oral      SpO2: 98%  98% 95%   Weight: 82 6 kg (182 lb 1 6 oz)      Height: 5' 6" (1 676 m)        Temp  Min: 98 1 °F (36 7 °C)  Max: 98 1 °F (36 7 °C)  IBW (Ideal Body Weight): 59 3 kg  Body mass index is 29 39 kg/m²  PHYSICAL EXAM:  Physical Exam  Vitals signs reviewed  Constitutional:       General: She is not in acute distress  HENT:      Head: Normocephalic and atraumatic  Nose: Nose normal    Eyes:      Conjunctiva/sclera: Conjunctivae normal    Cardiovascular:      Rate and Rhythm: Normal rate and regular rhythm  Heart sounds: Normal heart sounds     Pulmonary:      Effort: Pulmonary effort is normal  Breath sounds: Normal breath sounds  Abdominal:      General: Bowel sounds are normal       Tenderness: There is abdominal tenderness (present primarily in the epigastric region)  Neurological:      Mental Status: She is alert and oriented to person, place, and time  Labs:   Results from last 7 days   Lab Units 04/13/21  0612 04/12/21  1636   WBC Thousand/uL 10 70* 14 49*   HEMOGLOBIN g/dL 11 0* 12 0   HEMATOCRIT % 32 9* 35 5   PLATELETS Thousands/uL 260 283   NEUTROS PCT % 72 84*   MONOS PCT % 6 8     Results from last 7 days   Lab Units 04/13/21  0612 04/12/21  1636   POTASSIUM mmol/L 3 9 3 6   CHLORIDE mmol/L 109* 109*   CO2 mmol/L 27 28   BUN mg/dL 14 16   CREATININE mg/dL 0 98 1 29   CALCIUM mg/dL 8 8 8 8   ALK PHOS U/L  --  136*   ALT U/L  --  23   AST U/L  --  21           Imaging: I have personally reviewed pertinent reports        Caio Stewart  4/13/2021 1:26 PM

## 2021-04-13 NOTE — PROGRESS NOTES
Consult uncompleted  Attempted to visit patient for consult request  Patient prefers to speak [place]  Tried to reach blue phone for interpretation but blue phone does not exist anywhere  Planned next visit for consult if there is a blue phone        04/13/21 1200   Clinical Encounter Type   Visited With Patient   Routine Visit Introduction   Referral From Physician   Consult Patient care

## 2021-04-13 NOTE — CONSULTS
Consultation - Surgical Oncology   Valeen Mcardle 63 y o  female MRN: [id number]  Unit/Bed#: [facility] 727-01 Encounter: [id number]        Assessment:  63y o  year old female with newly diagnosed pancreatic head/neck mass, suspicious for cancer, 18 x 19 x 20 mm    Plan:  CT chest with IV contrast for staging  Follow up EUS pathology  Follow up laboratory workup    HPI:  Valeen Mcardle is a 63 y o  female with a history of breast cancer s/p chemoradiation, resection 18 years ago, HTN/HLD, migraines who presented to the ED with a month of epigastric pain, nausea, decreased PO intake, and 4 days of nonbloody emesis  She states that her bowel movements have become less frequent and there was a small amount of blood in her stool two weeks ago  She denies a history of hemorrhoids  She endorses a 20 lb weight loss throughout this period  CT AP showed an 18 x 19 x 20 mm mass in the head/neck of the pancreas  Extensive laboratory workup has been ordered, GI is planning EUS, and an abdominal MRI & MRCP has been ordered  Surgical oncology has been consulted to evaluate  Of note she had a hysterectomy 20 years ago (possibly for uterine cancer) and an appendectomy  Physical Exam  Vitals signs and nursing note reviewed  Constitutional:       General: She is not in acute distress  Appearance: She is well-developed  HENT:      Head: Normocephalic and atraumatic  Mouth/Throat:      Mouth: Mucous membranes are moist    Eyes:      Pupils: Pupils are equal, round, and reactive to light  Neck:      Musculoskeletal: Normal range of motion and neck supple  Cardiovascular:      Rate and Rhythm: Normal rate and regular rhythm  Heart sounds: Normal heart sounds  No murmur  No friction rub  No gallop  Pulmonary:      Effort: Pulmonary effort is normal  No respiratory distress  Breath sounds: Normal breath sounds  Abdominal:      General: Bowel sounds are normal       Palpations: Abdomen is soft  There is no mass  Tenderness: There is abdominal tenderness (Mild - moderate, supra pubic and epigastric)  There is no guarding or rebound  Hernia: No hernia is present  Musculoskeletal:         General: No swelling or tenderness  Skin:     General: Skin is warm and dry  Neurological:      Mental Status: She is alert and oriented to person, place, and time  Psychiatric:         Mood and Affect: Mood normal          Thought Content: Thought content normal          Review of Systems   Constitutional: Positive for unexpected weight change (20 lbs over last month)  Negative for chills and fever  HENT: Negative for ear pain and sore throat  Eyes: Negative for pain and visual disturbance  Respiratory: Negative for cough and shortness of breath  Cardiovascular: Negative for chest pain and palpitations  Gastrointestinal: Positive for abdominal pain, blood in stool (x1, small amount two weeks ago), nausea and vomiting  Genitourinary: Negative for dysuria and hematuria  Musculoskeletal: Negative for arthralgias and back pain  Skin: Negative for color change and rash  Neurological: Negative for seizures and syncope  All other systems reviewed and are negative        Objective       No intake or output data in the 24 hours ending 04/13/21 1255    First Vitals:   Blood Pressure: (!) 205/95 (04/12/21 1622)  Pulse: 90 (04/12/21 1622)  Temperature: 98 1 °F (36 7 °C) (04/12/21 1622)  Temp Source: Oral (04/12/21 1622)  Respirations: 18 (04/12/21 1622)  Height: 5' 6" (167 6 cm) (04/12/21 1622)  Weight - Scale: 82 6 kg (182 lb 1 6 oz) (04/12/21 1622)  SpO2: 98 % (04/12/21 1622)    Current Vitals:   Blood Pressure: 167/78 (04/13/21 0015)  Pulse: 67 (04/13/21 0015)  Temperature: 98 1 °F (36 7 °C) (04/12/21 1622)  Temp Source: Oral (04/12/21 1622)  Respirations: 20 (04/13/21 0015)  Height: 5' 6" (167 6 cm) (04/12/21 1622)  Weight - Scale: 82 6 kg (182 lb 1 6 oz) (04/12/21 1622)  SpO2: 95 % (04/13/21 0015)    Invasive Devices     Peripheral Intravenous Line            Peripheral IV 04/12/21 Left Antecubital less than 1 day                Imaging: I have personally reviewed pertinent reports  Xr Chest Pa & Lateral    Result Date: 4/13/2021  Impression: No acute cardiopulmonary disease  Workstation performed: [id number]     Ct Abdomen Pelvis With Contrast    Result Date: 4/12/2021  Impression: Diffuse gastric thickening and multiple thickened loops of right sided small bowel in keeping with a gastritis/nonspecific enteritis  Lobulated pancreatic head/neck hyperenhancing lesion measuring 19 x 18 x 20 mm #2/29 and #601/65 suspicious for a pancreatic malignancy, most typical of a neuroendocrine tumor  The pancreatic neck body and tail distal to this area are diffusely fatty involuted/atrophic  Surgical consultation is recommended on a nonurgent basis  Some islet cell tumors, such as insulinoma, gastrinoma and VIPoma can can be associated with bowel abnormalities and could explain the above bowel findings  No bowel obstruction or bowel pneumatosis  The study was marked in [facility] for immediate notification  Workstation performed: [id number]       EKG, Pathology, and Other Studies: I have personally reviewed pertinent reports      VTE Pharmacologic Prophylaxis: Sequential compression device (Venodyne)   VTE Mechanical Prophylaxis: sequential compression device    Historical Information   Past Medical History:   Diagnosis Date    Back ache     Cancer ([address] )     Hypertension      Past Surgical History:   Procedure Laterality Date    APPENDECTOMY      BILATERAL OOPHORECTOMY      BREAST SURGERY      HERNIA REPAIR       Social History   Social History     Substance and Sexual Activity   Alcohol Use Not Currently    Frequency: Monthly or less    Binge frequency: Never     Social History     Substance and Sexual Activity   Drug Use No     Social History     Tobacco Use   Smoking Status Former Smoker    Packs/day: 0 65    Years: 30 00    Pack years: 19 50   Smokeless Tobacco Never Used   Tobacco Comment    quit 15 years ago     Family History   Problem Relation Age of Onset    Hypertension Mother     Heart disease Mother     Diabetes Mother     Cancer Father        Meds/Allergies   all current active meds have been reviewed, current meds:   Current Facility-Administered Medications   Medication Dose Route Frequency    acetaminophen (TYLENOL) tablet 650 mg  650 mg Oral Q6H PRN    atorvastatin (LIPITOR) tablet 40 mg  40 mg Oral QPM    heparin (porcine) subcutaneous injection 5,000 Units  5,000 Units Subcutaneous Q8H [address]    HYDROmorphone (DILAUDID) injection 1 mg  1 mg Intravenous Q4H PRN    hydrOXYzine HCL (ATARAX) tablet 25 mg  25 mg Oral HS PRN    Labetalol HCl (NORMODYNE) injection 10 mg  10 mg Intravenous Q4H PRN    multi-electrolyte (PLASMALYTE-A/ISOLYTE-S PH 7 4) IV solution  100 mL/hr Intravenous Continuous    ondansetron (ZOFRAN) injection 4 mg  4 mg Intravenous Q6H PRN    oxyCODONE (ROXICODONE) immediate release tablet 10 mg  10 mg Oral Q4H PRN    oxyCODONE (ROXICODONE) IR tablet 5 mg  5 mg Oral Q4H PRN    polyethylene glycol (MIRALAX) packet 17 g  17 g Oral Daily    senna-docusate sodium (SENOKOT S) 8 6-50 mg per tablet 1 tablet  1 tablet Oral HS    and PTA meds:   Prior to Admission Medications   Prescriptions Last Dose Informant Patient Reported? Taking?    PARoxetine (PAXIL) 10 mg tablet Past Week at Unknown time Self Yes Yes   amLODIPine (NORVASC) 10 mg tablet Past Week at Unknown time Self No Yes   Sig: Take 1 tablet (10 mg total) by mouth daily   atorvastatin (LIPITOR) 40 mg tablet Past Week at Unknown time Self No Yes   Sig: Take 1 tablet (40 mg total) by mouth every evening   escitalopram (LEXAPRO) 10 mg tablet Past Week at Unknown time Self No Yes   Sig: Take 1 tablet (10 mg total) by mouth every morning   hydrOXYzine HCL (ATARAX) 25 mg tablet Past Week at Unknown time Self Yes Yes   Sig: Take 25 mg by mouth daily at bedtime as needed   lisinopril (ZESTRIL) 20 mg tablet Past Week at Unknown time Self No Yes   Sig: Take 1 tablet (20 mg total) by mouth daily   triamcinolone (KENALOG) 0 1 % ointment Past Week at Unknown time Self No Yes   Sig: Apply 1 application topically 2 (two) times a day      Facility-Administered Medications: None     No Known Allergies    Lab Results: I have personally reviewed pertinent lab results  , CBC:   Lab Results   Component Value Date    WBC 10 70 (H) 04/13/2021    HGB 11 0 (L) 04/13/2021    HCT 32 9 (L) 04/13/2021    MCV 95 04/13/2021     04/13/2021    MCH 31 7 04/13/2021    MCHC 33 4 04/13/2021    RDW 13 6 04/13/2021    MPV 10 6 04/13/2021    NRBC 0 04/13/2021   , CMP:   Lab Results   Component Value Date    SODIUM 139 04/13/2021    K 3 9 04/13/2021     (H) 04/13/2021    CO2 27 04/13/2021    BUN 14 04/13/2021    CREATININE 0 98 04/13/2021    CALCIUM 8 8 04/13/2021    AST 21 04/12/2021    ALT 23 04/12/2021    ALKPHOS 136 (H) 04/12/2021    EGFR 62 04/13/2021       Counseling / Coordination of Care  Total floor / unit time spent today 25 minutes  Greater than 50% of total time was spent with the patient and / or family counseling and / or coordination of care            Inpatient Consult to Surgical Oncology     Performed by  Humberto Fortune MD     Authorized by DO Humberto Avila MD  4/13/2021 12:55 PM

## 2021-04-13 NOTE — PLAN OF CARE
Problem: PAIN - ADULT  Goal: Verbalizes/displays adequate comfort level or baseline comfort level  Description: Interventions:  - Encourage patient to monitor pain and request assistance  - Assess pain using appropriate pain scale  - Administer analgesics based on type and severity of pain and evaluate response  - Implement non-pharmacological measures as appropriate and evaluate response  - Consider cultural and social influences on pain and pain management  - Notify physician/advanced practitioner if interventions unsuccessful or patient reports new pain  Outcome: Progressing     Problem: INFECTION - ADULT  Goal: Absence or prevention of progression during hospitalization  Description: INTERVENTIONS:  - Assess and monitor for signs and symptoms of infection  - Monitor lab/diagnostic results  - Monitor all insertion sites, i e  indwelling lines, tubes, and drains  - [place] appropriate cooling/warming therapies per order  - Administer medications as ordered  - Instruct and encourage patient and family to use good hand hygiene technique  - Identify and instruct in appropriate isolation precautions for identified infection/condition  Outcome: Progressing  Goal: Absence of fever/infection during neutropenic period  Description: INTERVENTIONS:  - Monitor WBC    Outcome: Progressing     Problem: SAFETY ADULT  Goal: Patient will remain free of falls  Description: INTERVENTIONS:  - Assess patient frequently for physical needs  -  Identify cognitive and physical deficits and behaviors that affect risk of falls    -  [place] fall precautions as indicated by assessment   - Educate patient/family on patient safety including physical limitations  - Instruct patient to call for assistance with activity based on assessment  - Modify environment to reduce risk of injury  - Consider OT/PT consult to assist with strengthening/mobility  Outcome: Progressing  Goal: Maintain or return to baseline ADL function  Description: INTERVENTIONS:  -  Assess patient's ability to carry out ADLs; assess patient's baseline for ADL function and identify physical deficits which impact ability to perform ADLs (bathing, care of mouth/teeth, toileting, grooming, dressing, etc )  - Assess/evaluate cause of self-care deficits   - Assess range of motion  - Assess patient's mobility; develop plan if impaired  - Assess patient's need for assistive devices and provide as appropriate  - Encourage maximum independence but intervene and supervise when necessary  - Involve family in performance of ADLs  - Assess for home care needs following discharge   - Consider OT consult to assist with ADL evaluation and planning for discharge  - Provide patient education as appropriate  Outcome: Progressing  Goal: Maintain or return mobility status to optimal level  Description: INTERVENTIONS:  - Assess patient's baseline mobility status (ambulation, transfers, stairs, etc )    - Identify cognitive and physical deficits and behaviors that affect mobility  - Identify mobility aids required to assist with transfers and/or ambulation (gait belt, sit-to-stand, lift, walker, cane, etc )  - [place] fall precautions as indicated by assessment  - Record patient progress and toleration of activity level on Mobility SBAR; progress patient to next Phase/Stage  - Instruct patient to call for assistance with activity based on assessment  - Consider rehabilitation consult to assist with strengthening/weightbearing, etc   Outcome: Progressing     Problem: DISCHARGE PLANNING  Goal: Discharge to home or other facility with appropriate resources  Description: INTERVENTIONS:  - Identify barriers to discharge w/patient and caregiver  - Arrange for needed discharge resources and transportation as appropriate  - Identify discharge learning needs (meds, wound care, etc )  - Arrange for interpretive services to assist at discharge as needed  - Refer to Case Management Department for coordinating discharge planning if the patient needs post-hospital services based on physician/advanced practitioner order or complex needs related to functional status, cognitive ability, or social support system  Outcome: Progressing     Problem: Knowledge Deficit  Goal: Patient/family/caregiver demonstrates understanding of disease process, treatment plan, medications, and discharge instructions  Description: Complete learning assessment and assess knowledge base  Interventions:  - Provide teaching at level of understanding  - Provide teaching via preferred learning methods  Outcome: Progressing     Problem: Nutrition/Hydration-ADULT  Goal: Nutrient/Hydration intake appropriate for improving, restoring or maintaining nutritional needs  Description: Monitor and assess patient's nutrition/hydration status for malnutrition  Collaborate with interdisciplinary team and initiate plan and interventions as ordered  Monitor patient's weight and dietary intake as ordered or per policy  Utilize nutrition screening tool and intervene as necessary  Determine patient's food preferences and provide high-protein, high-caloric foods as appropriate       INTERVENTIONS:  - Monitor oral intake, urinary output, labs, and treatment plans  - Assess nutrition and hydration status and recommend course of action  - Evaluate amount of meals eaten  - Assist patient with eating if necessary   - Allow adequate time for meals  - Recommend/ encourage appropriate diets, oral nutritional supplements, and vitamin/mineral supplements  - Order, calculate, and assess calorie counts as needed  - Assess need for intravenous fluids  - Provide specific nutrition/hydration education as appropriate  - Include patient/family/caregiver in decisions related to nutrition  Outcome: Progressing

## 2021-04-13 NOTE — PROGRESS NOTES
INTERNAL MEDICINE RESIDENCY PROGRESS NOTE     Name: [facility]   Age & Sex: 63 y o  female   MRN: [id number]  Unit/Bed#: [address] 727[address]01   Encounter: [id number]  Team: SOD Team A    PATIENT INFORMATION     Name: Jaya   Age & Sex: 63 y o  female   MRN: [id number]  Hospital Stay Days: 1    ASSESSMENT/PLAN     Principal Problem:    Pancreatic mass  Active Problems:    Goals of care, counseling/discussion    YULIYA (acute kidney injury) ([address] )    Hypertension    Hyperlipemia    Leukocytosis    Depression      * Pancreatic mass  Assessment & Plan  Patient comes in with vomiting for 4 days, unable to tolerate solids, liquids, medications for 1 and half months, particularly worse over the last week  She also endorses abdominal pain which she describes as a tightness in the epigastric region  It radiates occasionally to the lower quadrants and she describes the pain as 10/10  She did not tried taking anything for it  Family convinced her to come in to the hospital to be evaluated  Denies any fever, chills, chest pain, shortness of breath, headaches  Patient has history of 40 pack smoking history, breast cancer history status post chemo, radiation, mastectomy 18 years ago    CT demonstrates Diffuse gastric thickening and multiple thickened loops of right sided small bowel in keeping with a gastritis/nonspecific enteritis  Lobulated pancreatic head/neck hyperenhancing lesion measuring 19 x 18 x 20 mm #2/29 and #601/65 suspicious for a pancreatic malignancy, most typical of a neuroendocrine tumor  The pancreatic neck body and tail distal to this area are diffusely fatty involuted/atrophic  Surgical consultation is recommended on a nonurgent basis  Some islet cell tumors, such as insulinoma, gastrinoma and VIPoma can can be associated with bowel abnormalities and could explain the above bowel findings      Medical oncology consulted  gastroenterology consulted   Will likely need EBUS   Will remain NPO for MRCP later today  Will need surgical and oncological consultation likely  NPO secondary to nausea and vomiting/ p o  Intolerance, IV fluids running  Continue to monitor leukocytosis and fever curve  odansetron 4mg q6h PRN nausea  Pain regimen:   Oxycodone 5 mg q 4 hours moderate pain   Oxycodone 10 mg q 4 hours severe pain   Dilaudid 0 5 mg q 4 hours breakthrough pain   Prophylactic bowel regimen in place of Senokot-s and MiraLax  VIP, gastrin, somatostatin, CEA, CA 19-9, glucagon, hgb A1c will f/u   R/o gastrinoma, insulinoma, neuroendocrine tumors glucagonoma        Goals of care, counseling/discussion  Assessment & Plan  Had goals of care discussion in regards to new probable pancreatic cancer diagnosis  Patient is reasonable and practical   Number one goal as for her  with Alzheimer's dementia to be cared for  Patient has family support from children and grandchildren who were present at bedside  Patient is level 3 which is appropriate  Consider palliative care in future  Will have consult for spiritual care, appreciate silver    YULIYA (acute kidney injury) ([facility] [address] )  [facility], complaining of nausea and vomiting for past week, unable to tolerate solids or liquids    Likely pre renal secondary to decreased p o  Intake    Baseline creatinine 0 9, currently back to baseline  Continue to monitor with daily BMP  Continue IV fluids as NPO  Avoid any nephrotoxins such as NSAIDs, contrast, hypotensive episodes    Depression  Assessment & Plan  Patient states compliance with medications  Patient unable to tolerate oral medications for 1 week    Hold home medication Paxil 10mg and Lexapro 10 mg as unable to tolerate PO  Consider IM mood management if prolonged NPO  Atarax 25 qhs      Leukocytosis  Assessment & Plan  Leukocytosis elevated of 14 9 on admission, denies any fevers or sick contacts    Denies any night fevers    Elevated in setting of pancreatic mass    14 49 on admission, currently down trending  Continue to monitor fever curve    Hyperlipemia  Assessment & Plan  Stable  Continue statin    Hypertension  Assessment & Plan  Patient is compliant with outpatient medications of lisinopril 20 mg and amlodipine 10 mg  Patient unable to tolerate p o  Medication for past 1 week  Denies any HA, CP or palpitations    stable  Will have labetalol 10 mg p r n  And Lopressor 5 p r n  Disposition: remain inpatient for further pancreatic mass workup     SUBJECTIVE     Patient seen and examined  No acute events overnight  Patient states she is not hungry and her pain is 4/10, around the umbilical area  Emphasized to patient to ask for pain meds if >6/10, but she remains stoic  She has no complaints of CP, SOB, or diarrhea, or HA  OBJECTIVE     Vitals:    21 1622 21 1700 21 0015   BP: (!) 205/95  162/77 167/78   BP Location: Right arm      Pulse: 90 80 78 67   Resp: 18 16 16 20   Temp: 98 1 °F (36 7 °C)      TempSrc: Oral      SpO2: 98%  98% 95%   Weight: 82 6 kg (182 lb 1 6 oz)      Height: 5' 6" (1 676 m)         Temperature:   Temp (24hrs), Av 1 °F (36 7 °C), Min:98 1 °F (36 7 °C), Max:98 1 °F (36 7 °C)    Temperature: 98 1 °F (36 7 °C)  Intake & Output:  I/O     None        Weights:   IBW (Ideal Body Weight): 59 3 kg    Body mass index is 29 39 kg/m²  Weight (last 2 days)     Date/Time   Weight    21 1622   82 6 (182 1)            Physical Exam  Constitutional:       Appearance: She is obese  She is not ill-appearing  HENT:      Mouth/Throat:      Mouth: Mucous membranes are dry  Cardiovascular:      Rate and Rhythm: Normal rate and regular rhythm  Heart sounds: No murmur  No gallop  Pulmonary:      Breath sounds: Normal breath sounds  No wheezing or rales  Abdominal:      General: Bowel sounds are normal       Palpations: Abdomen is soft  Tenderness: There is abdominal tenderness (to palpation diffusely)  There is no guarding or rebound  Musculoskeletal:      Right lower leg: No edema  Left lower leg: No edema  Skin:     General: Skin is warm  Findings: No erythema  Neurological:      Mental Status: She is alert and oriented to person, place, and time  Psychiatric:      Comments: tearful       LABORATORY DATA     Labs: I have personally reviewed pertinent reports  Results from last 7 days   Lab Units 04/13/21  0612 04/12/21  1636   WBC Thousand/uL 10 70* 14 49*   HEMOGLOBIN g/dL 11 0* 12 0   HEMATOCRIT % 32 9* 35 5   PLATELETS Thousands/uL 260 283   NEUTROS PCT % 72 84*   MONOS PCT % 6 8      Results from last 7 days   Lab Units 04/13/21  0612 04/12/21  1636   POTASSIUM mmol/L 3 9 3 6   CHLORIDE mmol/L 109* 109*   CO2 mmol/L 27 28   BUN mg/dL 14 16   CREATININE mg/dL 0 98 1 29   CALCIUM mg/dL 8 8 8 8   ALK PHOS U/L  --  136*   ALT U/L  --  23   AST U/L  --  21                      Results from last 7 days   Lab Units 04/12/21  1655   TROPONIN I ng/mL <0 02       IMAGING & DIAGNOSTIC TESTING     Radiology Results: I have personally reviewed pertinent reports  Xr Chest Pa & Lateral    Result Date: 4/13/2021  Impression: No acute cardiopulmonary disease  Workstation performed: [id number]     Ct Abdomen Pelvis With Contrast    Result Date: 4/12/2021  Impression: Diffuse gastric thickening and multiple thickened loops of right sided small bowel in keeping with a gastritis/nonspecific enteritis  Lobulated pancreatic head/neck hyperenhancing lesion measuring 19 x 18 x 20 mm #2/29 and #601/65 suspicious for a pancreatic malignancy, most typical of a neuroendocrine tumor  The pancreatic neck body and tail distal to this area are diffusely fatty involuted/atrophic  Surgical consultation is recommended on a nonurgent basis  Some islet cell tumors, such as insulinoma, gastrinoma and VIPoma can can be associated with bowel abnormalities and could explain the above bowel findings  No bowel obstruction or bowel pneumatosis   The study was marked in EPIC for immediate notification  Workstation performed: [id number]     Other Diagnostic Testing: I have personally reviewed pertinent reports  ACTIVE MEDICATIONS     Current Facility-Administered Medications   Medication Dose Route Frequency    acetaminophen (TYLENOL) tablet 650 mg  650 mg Oral Q6H PRN    atorvastatin (LIPITOR) tablet 40 mg  40 mg Oral QPM    heparin (porcine) subcutaneous injection 5,000 Units  5,000 Units Subcutaneous Q8H [facility]    HYDROmorphone (DILAUDID) injection 1 mg  1 mg Intravenous Q4H PRN    hydrOXYzine HCL (ATARAX) tablet 25 mg  25 mg Oral HS PRN    Labetalol HCl (NORMODYNE) injection 10 mg  10 mg Intravenous Q4H PRN    multi-electrolyte (PLASMALYTE-A/ISOLYTE-S PH 7 4) IV solution  100 mL/hr Intravenous Continuous    ondansetron (ZOFRAN) injection 4 mg  4 mg Intravenous Q6H PRN    oxyCODONE (ROXICODONE) immediate release tablet 10 mg  10 mg Oral Q4H PRN    oxyCODONE (ROXICODONE) IR tablet 5 mg  5 mg Oral Q4H PRN    polyethylene glycol (MIRALAX) packet 17 g  17 g Oral Daily    senna-docusate sodium (SENOKOT S) 8 6-50 mg per tablet 1 tablet  1 tablet Oral HS       VTE Pharmacologic Prophylaxis: Heparin  VTE Mechanical Prophylaxis: sequential compression device    Portions of the record may have been created with voice recognition software  Occasional wrong word or "sound a like" substitutions may have occurred due to the inherent limitations of voice recognition software    Read the chart carefully and recognize, using context, where substitutions have occurred   ==  Lilian Pearce, [address] [place] [address]  Internal Medicine Residency PGY-1

## 2021-04-13 NOTE — CONSULTS
Consult uncompleted  Attempted to visit patient for consult request  Patient prefers to speak Tamazight  Tried to reach blue phone for interpretation but blue phone does not exist anywhere   Planned next visit for consult if there is a blue phone         04/13/21 1200   Clinical Encounter Type   Visited With Patient   Routine Visit Introduction   Referral From Physician   Consult Patient care

## 2021-04-14 ENCOUNTER — APPOINTMENT (INPATIENT)
Dept: GASTROENTEROLOGY | Facility: HOSPITAL | Age: 61
DRG: 282 | End: 2021-04-14
Payer: COMMERCIAL

## 2021-04-14 ENCOUNTER — ANESTHESIA (INPATIENT)
Dept: GASTROENTEROLOGY | Facility: HOSPITAL | Age: 61
DRG: 282 | End: 2021-04-14
Payer: COMMERCIAL

## 2021-04-14 PROBLEM — N17.9 AKI (ACUTE KIDNEY INJURY) (HCC): Status: RESOLVED | Noted: 2021-04-12 | Resolved: 2021-04-14

## 2021-04-14 LAB
ALBUMIN SERPL BCP-MCNC: 3.2 G/DL (ref 3.5–5)
ALP SERPL-CCNC: 122 U/L (ref 46–116)
ALT SERPL W P-5'-P-CCNC: 24 U/L (ref 12–78)
ANION GAP SERPL CALCULATED.3IONS-SCNC: 5 MMOL/L (ref 4–13)
ANISOCYTOSIS BLD QL SMEAR: PRESENT
AST SERPL W P-5'-P-CCNC: 16 U/L (ref 5–45)
BASOPHILS # BLD MANUAL: 0 THOUSAND/UL (ref 0–0.1)
BASOPHILS NFR MAR MANUAL: 0 % (ref 0–1)
BILIRUB SERPL-MCNC: 0.46 MG/DL (ref 0.2–1)
BUN SERPL-MCNC: 10 MG/DL (ref 5–25)
CALCIUM ALBUM COR SERPL-MCNC: 9.6 MG/DL (ref 8.3–10.1)
CALCIUM SERPL-MCNC: 9 MG/DL (ref 8.3–10.1)
CANCER AG19-9 SERPL-ACNC: 13 U/ML (ref 0–35)
CHLORIDE SERPL-SCNC: 108 MMOL/L (ref 100–108)
CO2 SERPL-SCNC: 28 MMOL/L (ref 21–32)
CREAT SERPL-MCNC: 0.97 MG/DL (ref 0.6–1.3)
EOSINOPHIL # BLD MANUAL: 0.14 THOUSAND/UL (ref 0–0.4)
EOSINOPHIL NFR BLD MANUAL: 2 % (ref 0–6)
ERYTHROCYTE [DISTWIDTH] IN BLOOD BY AUTOMATED COUNT: 13.5 % (ref 11.6–15.1)
GFR SERPL CREATININE-BSD FRML MDRD: 63 ML/MIN/1.73SQ M
GLUCOSE SERPL-MCNC: 79 MG/DL (ref 65–140)
GLUCOSE SERPL-MCNC: 81 MG/DL (ref 65–140)
HCT VFR BLD AUTO: 34 % (ref 34.8–46.1)
HGB BLD-MCNC: 11.3 G/DL (ref 11.5–15.4)
LYMPHOCYTES # BLD AUTO: 1.44 THOUSAND/UL (ref 0.6–4.47)
LYMPHOCYTES # BLD AUTO: 21 % (ref 14–44)
MCH RBC QN AUTO: 32.1 PG (ref 26.8–34.3)
MCHC RBC AUTO-ENTMCNC: 33.2 G/DL (ref 31.4–37.4)
MCV RBC AUTO: 97 FL (ref 82–98)
MONOCYTES # BLD AUTO: 0.34 THOUSAND/UL (ref 0–1.22)
MONOCYTES NFR BLD: 5 % (ref 4–12)
NEUTROPHILS # BLD MANUAL: 4.04 THOUSAND/UL (ref 1.85–7.62)
NEUTS SEG NFR BLD AUTO: 59 % (ref 43–75)
NRBC BLD AUTO-RTO: 0 /100 WBCS
PLATELET # BLD AUTO: 249 THOUSANDS/UL (ref 149–390)
PLATELET BLD QL SMEAR: ADEQUATE
PMV BLD AUTO: 11.1 FL (ref 8.9–12.7)
POLYCHROMASIA BLD QL SMEAR: PRESENT
POTASSIUM SERPL-SCNC: 3.5 MMOL/L (ref 3.5–5.3)
PROT SERPL-MCNC: 7.5 G/DL (ref 6.4–8.2)
RBC # BLD AUTO: 3.52 MILLION/UL (ref 3.81–5.12)
RBC MORPH BLD: PRESENT
SODIUM SERPL-SCNC: 141 MMOL/L (ref 136–145)
VARIANT LYMPHS # BLD AUTO: 13 %
WBC # BLD AUTO: 6.84 THOUSAND/UL (ref 4.31–10.16)

## 2021-04-14 PROCEDURE — 82948 REAGENT STRIP/BLOOD GLUCOSE: CPT

## 2021-04-14 PROCEDURE — 88305 TISSUE EXAM BY PATHOLOGIST: CPT | Performed by: PATHOLOGY

## 2021-04-14 PROCEDURE — 88173 CYTOPATH EVAL FNA REPORT: CPT | Performed by: PATHOLOGY

## 2021-04-14 PROCEDURE — 0DB58ZX EXCISION OF ESOPHAGUS, VIA NATURAL OR ARTIFICIAL OPENING ENDOSCOPIC, DIAGNOSTIC: ICD-10-PCS | Performed by: INTERNAL MEDICINE

## 2021-04-14 PROCEDURE — 0DB68ZX EXCISION OF STOMACH, VIA NATURAL OR ARTIFICIAL OPENING ENDOSCOPIC, DIAGNOSTIC: ICD-10-PCS | Performed by: INTERNAL MEDICINE

## 2021-04-14 PROCEDURE — A9585 GADOBUTROL INJECTION: HCPCS | Performed by: STUDENT IN AN ORGANIZED HEALTH CARE EDUCATION/TRAINING PROGRAM

## 2021-04-14 PROCEDURE — 0FBG8ZX EXCISION OF PANCREAS, VIA NATURAL OR ARTIFICIAL OPENING ENDOSCOPIC, DIAGNOSTIC: ICD-10-PCS | Performed by: INTERNAL MEDICINE

## 2021-04-14 PROCEDURE — 85007 BL SMEAR W/DIFF WBC COUNT: CPT | Performed by: STUDENT IN AN ORGANIZED HEALTH CARE EDUCATION/TRAINING PROGRAM

## 2021-04-14 PROCEDURE — 85027 COMPLETE CBC AUTOMATED: CPT | Performed by: STUDENT IN AN ORGANIZED HEALTH CARE EDUCATION/TRAINING PROGRAM

## 2021-04-14 PROCEDURE — 88341 IMHCHEM/IMCYTCHM EA ADD ANTB: CPT | Performed by: PATHOLOGY

## 2021-04-14 PROCEDURE — 99232 SBSQ HOSP IP/OBS MODERATE 35: CPT | Performed by: SURGERY

## 2021-04-14 PROCEDURE — 86316 IMMUNOASSAY TUMOR OTHER: CPT | Performed by: INTERNAL MEDICINE

## 2021-04-14 PROCEDURE — 88172 CYTP DX EVAL FNA 1ST EA SITE: CPT | Performed by: PATHOLOGY

## 2021-04-14 PROCEDURE — 99232 SBSQ HOSP IP/OBS MODERATE 35: CPT | Performed by: INTERNAL MEDICINE

## 2021-04-14 PROCEDURE — 88342 IMHCHEM/IMCYTCHM 1ST ANTB: CPT | Performed by: PATHOLOGY

## 2021-04-14 PROCEDURE — 43239 EGD BIOPSY SINGLE/MULTIPLE: CPT | Performed by: INTERNAL MEDICINE

## 2021-04-14 PROCEDURE — 80053 COMPREHEN METABOLIC PANEL: CPT | Performed by: STUDENT IN AN ORGANIZED HEALTH CARE EDUCATION/TRAINING PROGRAM

## 2021-04-14 PROCEDURE — 43238 EGD US FINE NEEDLE BX/ASPIR: CPT | Performed by: INTERNAL MEDICINE

## 2021-04-14 RX ORDER — DIPHENHYDRAMINE HYDROCHLORIDE 50 MG/ML
25 INJECTION INTRAMUSCULAR; INTRAVENOUS EVERY 6 HOURS PRN
Status: DISCONTINUED | OUTPATIENT
Start: 2021-04-14 | End: 2021-04-16 | Stop reason: SDUPTHER

## 2021-04-14 RX ORDER — SODIUM CHLORIDE 9 MG/ML
INJECTION, SOLUTION INTRAVENOUS CONTINUOUS PRN
Status: DISCONTINUED | OUTPATIENT
Start: 2021-04-14 | End: 2021-04-14

## 2021-04-14 RX ORDER — FENTANYL CITRATE 50 UG/ML
INJECTION, SOLUTION INTRAMUSCULAR; INTRAVENOUS AS NEEDED
Status: DISCONTINUED | OUTPATIENT
Start: 2021-04-14 | End: 2021-04-14

## 2021-04-14 RX ORDER — LABETALOL 20 MG/4 ML (5 MG/ML) INTRAVENOUS SYRINGE
AS NEEDED
Status: DISCONTINUED | OUTPATIENT
Start: 2021-04-14 | End: 2021-04-14

## 2021-04-14 RX ORDER — FUROSEMIDE 10 MG/ML
20 INJECTION INTRAMUSCULAR; INTRAVENOUS ONCE
Status: COMPLETED | OUTPATIENT
Start: 2021-04-14 | End: 2021-04-14

## 2021-04-14 RX ORDER — PROPOFOL 10 MG/ML
INJECTION, EMULSION INTRAVENOUS CONTINUOUS PRN
Status: DISCONTINUED | OUTPATIENT
Start: 2021-04-14 | End: 2021-04-14

## 2021-04-14 RX ORDER — GLYCOPYRROLATE 0.2 MG/ML
INJECTION INTRAMUSCULAR; INTRAVENOUS AS NEEDED
Status: DISCONTINUED | OUTPATIENT
Start: 2021-04-14 | End: 2021-04-14

## 2021-04-14 RX ORDER — PROPOFOL 10 MG/ML
INJECTION, EMULSION INTRAVENOUS AS NEEDED
Status: DISCONTINUED | OUTPATIENT
Start: 2021-04-14 | End: 2021-04-14

## 2021-04-14 RX ORDER — SODIUM CHLORIDE 9 MG/ML
125 INJECTION, SOLUTION INTRAVENOUS CONTINUOUS
Status: DISCONTINUED | OUTPATIENT
Start: 2021-04-14 | End: 2021-04-14

## 2021-04-14 RX ORDER — LIDOCAINE HYDROCHLORIDE 10 MG/ML
INJECTION, SOLUTION EPIDURAL; INFILTRATION; INTRACAUDAL; PERINEURAL AS NEEDED
Status: DISCONTINUED | OUTPATIENT
Start: 2021-04-14 | End: 2021-04-14

## 2021-04-14 RX ADMIN — LIDOCAINE HYDROCHLORIDE 80 MG: 10 INJECTION, SOLUTION EPIDURAL; INFILTRATION; INTRACAUDAL; PERINEURAL at 16:14

## 2021-04-14 RX ADMIN — DOCUSATE SODIUM AND SENNOSIDES 1 TABLET: 8.6; 5 TABLET ORAL at 21:22

## 2021-04-14 RX ADMIN — HEPARIN SODIUM 5000 UNITS: 5000 INJECTION INTRAVENOUS; SUBCUTANEOUS at 21:22

## 2021-04-14 RX ADMIN — GADOBUTROL 8 ML: 604.72 INJECTION INTRAVENOUS at 00:15

## 2021-04-14 RX ADMIN — PROPOFOL 100 MG: 10 INJECTION, EMULSION INTRAVENOUS at 16:14

## 2021-04-14 RX ADMIN — TOPICAL ANESTHETIC 1 SPRAY: 200 SPRAY DENTAL; PERIODONTAL at 16:08

## 2021-04-14 RX ADMIN — FENTANYL CITRATE 25 MCG: 50 INJECTION INTRAMUSCULAR; INTRAVENOUS at 16:23

## 2021-04-14 RX ADMIN — LABETALOL 20 MG/4 ML (5 MG/ML) INTRAVENOUS SYRINGE 20 MG: at 16:44

## 2021-04-14 RX ADMIN — ONDANSETRON 4 MG: 2 INJECTION INTRAMUSCULAR; INTRAVENOUS at 17:29

## 2021-04-14 RX ADMIN — FUROSEMIDE 20 MG: 10 INJECTION, SOLUTION INTRAVENOUS at 12:33

## 2021-04-14 RX ADMIN — HEPARIN SODIUM 5000 UNITS: 5000 INJECTION INTRAVENOUS; SUBCUTANEOUS at 05:41

## 2021-04-14 RX ADMIN — FENTANYL CITRATE 50 MCG: 50 INJECTION INTRAMUSCULAR; INTRAVENOUS at 16:56

## 2021-04-14 RX ADMIN — HYDROMORPHONE HYDROCHLORIDE 1 MG: 1 INJECTION, SOLUTION INTRAMUSCULAR; INTRAVENOUS; SUBCUTANEOUS at 18:00

## 2021-04-14 RX ADMIN — FENTANYL CITRATE 25 MCG: 50 INJECTION INTRAMUSCULAR; INTRAVENOUS at 16:14

## 2021-04-14 RX ADMIN — GLYCOPYRROLATE 0.1 MG: 0.2 INJECTION, SOLUTION INTRAMUSCULAR; INTRAVENOUS at 16:14

## 2021-04-14 RX ADMIN — LABETALOL 20 MG/4 ML (5 MG/ML) INTRAVENOUS SYRINGE 15 MG: at 16:52

## 2021-04-14 RX ADMIN — HEPARIN SODIUM 5000 UNITS: 5000 INJECTION INTRAVENOUS; SUBCUTANEOUS at 13:53

## 2021-04-14 RX ADMIN — DIPHENHYDRAMINE HYDROCHLORIDE 25 MG: 50 INJECTION INTRAMUSCULAR; INTRAVENOUS at 19:24

## 2021-04-14 RX ADMIN — LABETALOL 20 MG/4 ML (5 MG/ML) INTRAVENOUS SYRINGE 10 MG: at 16:37

## 2021-04-14 RX ADMIN — LABETALOL 20 MG/4 ML (5 MG/ML) INTRAVENOUS SYRINGE 15 MG: at 16:40

## 2021-04-14 RX ADMIN — SODIUM CHLORIDE 125 ML/HR: 0.9 INJECTION, SOLUTION INTRAVENOUS at 05:42

## 2021-04-14 RX ADMIN — ONDANSETRON 4 MG: 2 INJECTION INTRAMUSCULAR; INTRAVENOUS at 09:10

## 2021-04-14 RX ADMIN — PROPOFOL 100 MCG/KG/MIN: 10 INJECTION, EMULSION INTRAVENOUS at 16:14

## 2021-04-14 RX ADMIN — SODIUM CHLORIDE: 0.9 INJECTION, SOLUTION INTRAVENOUS at 16:08

## 2021-04-14 NOTE — PROGRESS NOTES
INTERNAL MEDICINE RESIDENCY PROGRESS NOTE     Name: [facility]   Age & Sex: 63 y o  female   MRN: [id number]  Unit/Bed#: [address] 727[address]01   Encounter: [id number]  Team: SOD Team A    PATIENT INFORMATION     Name: Jaya   Age & Sex: 63 y o  female   MRN: [id number]  Hospital Stay Days: 2    ASSESSMENT/PLAN     Principal Problem:    Pancreatic mass  Active Problems:    Goals of care, counseling/discussion    Hypertension    Hyperlipemia    Leukocytosis    Depression      * Pancreatic mass  Assessment & Plan  Patient comes in with vomiting for 4 days, unable to tolerate solids, liquids, medications for 1 and half months, particularly worse over the last week  She also endorses abdominal pain which she describes as a tightness in the epigastric region  It radiates occasionally to the lower quadrants and she describes the pain as 10/10  She did not tried taking anything for it  Family convinced her to come in to the hospital to be evaluated  Denies any fever, chills, chest pain, shortness of breath, headaches  Patient has history of 40 pack smoking history, breast cancer history status post chemo, radiation, mastectomy 18 years ago    CT demonstrates Diffuse gastric thickening and multiple thickened loops of right sided small bowel in keeping with a gastritis/nonspecific enteritis  Lobulated pancreatic head/neck hyperenhancing lesion measuring 19 x 18 x 20 mm  suspicious for a pancreatic malignancy, most typical of a neuroendocrine tumor  The pancreatic neck body and tail distal to this area are diffusely fatty involuted/atrophic  Surgical consultation is recommended on a nonurgent basis  Some islet cell tumors, such as insulinoma, gastrinoma and VIPoma can can be associated with bowel abnormalities and could explain the above bowel findings      MRCP demonstrates to 2X 1 9 cm pancreatic head mass similar to neuroendocrine in appearance, no biliary dilation or obstruction and no metastases to liver    CT demonstrates bilateral infiltrates suggestive of CHF 3 and 4 mm pulmonary nodules   Will need repeat chest CT in 3 months outpatient    Medical oncology consulted   Genetic outpatient f/u  gastroenterology consulted   EBUS later today, will f/u   Will need surgical and oncological consultation likely  NPO secondary to nausea and vomiting/ p o  Intolerance   As patient CT suggests volume overload, will stop fluids and give IV lasix 20mg  Continue to monitor leukocytosis and fever curve  odansetron 4mg q6h PRN nausea  Pain regimen:   Oxycodone 5 mg q 4 hours moderate pain   Oxycodone 10 mg q 4 hours severe pain   Dilaudid 0 5 mg q 4 hours breakthrough pain   Prophylactic bowel regimen in place of Senokot-s and MiraLax  VIP, gastrin, somatostatin, CA 19-9, glucagon, will f/u   R/o gastrinoma, insulinoma, neuroendocrine tumors glucagonoma   Hemoglobin A1c 5 9, CEA within normal limits  Surgical oncology consulted, appreciate recommendations   appears resectable    Goals of care, counseling/discussion  Assessment & Plan  Had goals of care discussion in regards to new probable pancreatic cancer diagnosis  Patient is reasonable and practical   Number one goal as for her  with Alzheimer's dementia to be cared for  Patient has family support from children and grandchildren who were present at bedside  Patient is level 3 which is appropriate  Consider palliative care in future  Will have consult for spiritual care, appreciate recs    YULIYA (acute kidney injury) (H[facility])-resolved as of 4/14/2021  Assessment & Plan  POA, complaining of nausea and vomiting for past week, unable to tolerate solids or liquids    Likely pre renal secondary to decreased p o   Intake    Baseline creatinine 0 9, currently back to baseline  Continue to monitor with daily BMP  Continue IV fluids as NPO  Avoid any nephrotoxins such as NSAIDs, contrast, hypotensive episodes    Depression  Assessment & Plan  Patient states compliance with medications  Patient unable to tolerate oral medications for 1 week    Hold home medication Paxil 10mg and Lexapro 10 mg as unable to tolerate PO  Consider IM mood management if prolonged NPO  Atarax 25 qhs      Leukocytosis  Assessment & Plan  Leukocytosis elevated of 14 9 on admission, denies any fevers or sick contacts  Denies any night fevers    Elevated in setting of pancreatic mass    14 49 on admission, currently down trending  Continue to monitor fever curve    Hyperlipemia  Assessment & Plan  Stable  Continue statin    Hypertension  Assessment & Plan  Patient is compliant with outpatient medications of lisinopril 20 mg and amlodipine 10 mg  Patient unable to tolerate p o  Medication for past 1 week  Denies any HA, CP or palpitations  BP generally 001-886'D systolic    stable  Will have labetalol 10 mg p r n  And Lopressor 5 p r n  Can add back home meds once tolerating PO        Disposition:  Remain inpatient for further pancreatic mass workup    SUBJECTIVE     Patient seen and examined  Patient went for CT and MRCP overnight  Patient today examined in complaining of 5/10 pain which is well managed with the current regimen  Patient is still nauseous and vomited food which she tried yesterday  Patient updated on plan of care, which she is agreeable to; scheduled to go for an endoscopy with biopsy later today  OBJECTIVE     Vitals:    21 0015 21 1500 21 2113 21 0817   BP: 167/78 (!) 177/84 (!) 173/84 164/85   BP Location:  Left arm  Right arm   Pulse: 67 74  71   Resp: 20   18   Temp:  98 3 °F (36 8 °C) 98 3 °F (36 8 °C) 98 2 °F (36 8 °C)   TempSrc:  Oral  Oral   SpO2: 95% 95%  97%   Weight:       Height:          Temperature:   Temp (24hrs), Av 3 °F (36 8 °C), Min:98 2 °F (36 8 °C), Max:98 3 °F (36 8 °C)    Temperature: 98 2 °F (36 8 °C)  Intake & Output:  I/O       701 -  0700  07 -  0700 701 - 04/15 0700    P  O   1000 0    Total Intake(mL/kg)  1000 (12 1) 0 (0)    Net  +1000 0           Unmeasured Urine Occurrence  1 x     Unmeasured Stool Occurrence  0 x         Weights:   IBW (Ideal Body Weight): 59 3 kg    Body mass index is 29 39 kg/m²  Weight (last 2 days)     Date/Time   Weight    04/12/21 1622   82 6 (182 1)            Physical Exam  Constitutional:       Appearance: She is obese  HENT:      Mouth/Throat:      Mouth: Mucous membranes are moist    Cardiovascular:      Rate and Rhythm: Normal rate and regular rhythm  Heart sounds: No murmur  No gallop  Pulmonary:      Breath sounds: Normal breath sounds  No wheezing or rales  Abdominal:      General: Bowel sounds are normal       Palpations: Abdomen is soft  Tenderness: There is abdominal tenderness (mild, to palpation, diffuse, but particularly in umbilical region)  There is no guarding or rebound  Musculoskeletal:      Right lower leg: No edema  Left lower leg: No edema  Skin:     General: Skin is warm  Neurological:      Mental Status: She is alert and oriented to person, place, and time  Psychiatric:         Mood and Affect: Mood normal          Behavior: Behavior normal        LABORATORY DATA     Labs: I have personally reviewed pertinent reports    Results from last 7 days   Lab Units 04/14/21 0446 04/13/21 0612 04/12/21  1636   WBC Thousand/uL 6 84 10 70* 14 49*   HEMOGLOBIN g/dL 11 3* 11 0* 12 0   HEMATOCRIT % 34 0* 32 9* 35 5   PLATELETS Thousands/uL 249 260 283   NEUTROS PCT %  --  72 84*   MONOS PCT %  --  6 8   MONO PCT % 5  --   --       Results from last 7 days   Lab Units 04/14/21 0446 04/13/21  0612 04/12/21  1636   POTASSIUM mmol/L 3 5 3 9 3 6   CHLORIDE mmol/L 108 109* 109*   CO2 mmol/L 28 27 28   BUN mg/dL 10 14 16   CREATININE mg/dL 0 97 0 98 1 29   CALCIUM mg/dL 9 0 8 8 8 8   ALK PHOS U/L 122*  --  136*   ALT U/L 24  --  23   AST U/L 16  --  21                      Results from last 7 days   Lab Units 04/12/21  1655   TROPONIN I ng/mL <0 02       IMAGING & DIAGNOSTIC TESTING     Radiology Results: I have personally reviewed pertinent reports  Xr Chest Pa & Lateral    Result Date: 4/13/2021  Impression: No acute cardiopulmonary disease  Workstation performed: [id number]     Ct Chest W Contrast    Result Date: 4/14/2021  Impression: Mild cardiomegaly and bilateral infiltrates with pattern suggesting CHF/fluid overload  2 small nonspecific 3 and 4 mm pulmonary nodules  Based on current Fleischner Society 2017 Guidelines on incidental pulmonary nodule, patients with a known malignancy are at increased risk of metastasis and should receive initial three month follow-up chest CT  Workstation performed: [id number]     Mri Abdomen W Wo Contrast And Mrcp    Result Date: 4/14/2021  Impression: 2 x 1 9 cm hyperenhancing mass pancreatic head  The imaging features suggest pancreatic neuroendocrine neoplasia This can be further characterized with endoscopic ultrasound No biliary dilation or pancreatic ductal obstruction No MRI evidence of metastatic disease in the liver parenchyma No retroperitoneal lymphadenopathy Patent Celiac trunk, SMA without evidence of any vascular encasement  Workstation performed: [id number]     Ct Abdomen Pelvis With Contrast    Result Date: 4/12/2021  Impression: Diffuse gastric thickening and multiple thickened loops of right sided small bowel in keeping with a gastritis/nonspecific enteritis  Lobulated pancreatic head/neck hyperenhancing lesion measuring 19 x 18 x 20 mm #2/29 and #601/65 suspicious for a pancreatic malignancy, most typical of a neuroendocrine tumor  The pancreatic neck body and tail distal to this area are diffusely fatty involuted/atrophic  Surgical consultation is recommended on a nonurgent basis  Some islet cell tumors, such as insulinoma, gastrinoma and VIPoma can can be associated with bowel abnormalities and could explain the above bowel findings  No bowel obstruction or bowel pneumatosis   The study was marked in [facility] for immediate notification  Workstation performed: [id number]     Other Diagnostic Testing: I have personally reviewed pertinent reports  ACTIVE MEDICATIONS     Current Facility-Administered Medications   Medication Dose Route Frequency    acetaminophen (TYLENOL) tablet 650 mg  650 mg Oral Q6H PRN    atorvastatin (LIPITOR) tablet 40 mg  40 mg Oral QPM    furosemide (LASIX) injection 20 mg  20 mg Intravenous Once    heparin (porcine) subcutaneous injection 5,000 Units  5,000 Units Subcutaneous Q8H [address]    HYDROmorphone (DILAUDID) injection 1 mg  1 mg Intravenous Q4H PRN    hydrOXYzine HCL (ATARAX) tablet 25 mg  25 mg Oral HS PRN    Labetalol HCl (NORMODYNE) injection 10 mg  10 mg Intravenous Q4H PRN    ondansetron (ZOFRAN) injection 4 mg  4 mg Intravenous Q6H PRN    oxyCODONE (ROXICODONE) immediate release tablet 10 mg  10 mg Oral Q4H PRN    oxyCODONE (ROXICODONE) IR tablet 5 mg  5 mg Oral Q4H PRN    polyethylene glycol (MIRALAX) packet 17 g  17 g Oral Daily    senna-docusate sodium (SENOKOT S) 8 6-50 mg per tablet 1 tablet  1 tablet Oral HS       VTE Pharmacologic Prophylaxis: Heparin  VTE Mechanical Prophylaxis: sequential compression device    Portions of the record may have been created with voice recognition software  Occasional wrong word or "sound a like" substitutions may have occurred due to the inherent limitations of voice recognition software    Read the chart carefully and recognize, using context, where substitutions have occurred   ==  Jaren Shin, [address]  Internal Medicine Residency PGY-1

## 2021-04-14 NOTE — QUICK NOTE
Patient remains off the floor for procedure  Called RN who reports pain has been well controlled on current regimen   Palliative will plan to re-attempt to see for consult in AM

## 2021-04-14 NOTE — PLAN OF CARE
Problem: PAIN - ADULT  Goal: Verbalizes/displays adequate comfort level or baseline comfort level  Description: Interventions:  - Encourage patient to monitor pain and request assistance  - Assess pain using appropriate pain scale  - Administer analgesics based on type and severity of pain and evaluate response  - Implement non-pharmacological measures as appropriate and evaluate response  - Consider cultural and social influences on pain and pain management  - Notify physician/advanced practitioner if interventions unsuccessful or patient reports new pain  Outcome: Progressing     Problem: INFECTION - ADULT  Goal: Absence or prevention of progression during hospitalization  Description: INTERVENTIONS:  - Assess and monitor for signs and symptoms of infection  - Monitor lab/diagnostic results  - Monitor all insertion sites, i e  indwelling lines, tubes, and drains  - [place] appropriate cooling/warming therapies per order  - Administer medications as ordered  - Instruct and encourage patient and family to use good hand hygiene technique  - Identify and instruct in appropriate isolation precautions for identified infection/condition  Outcome: Progressing  Goal: Absence of fever/infection during neutropenic period  Description: INTERVENTIONS:  - Monitor WBC    Outcome: Progressing     Problem: SAFETY ADULT  Goal: Patient will remain free of falls  Description: INTERVENTIONS:  - Assess patient frequently for physical needs  -  Identify cognitive and physical deficits and behaviors that affect risk of falls    -  [place] fall precautions as indicated by assessment   - Educate patient/family on patient safety including physical limitations  - Instruct patient to call for assistance with activity based on assessment  - Modify environment to reduce risk of injury  - Consider OT/PT consult to assist with strengthening/mobility  Outcome: Progressing  Goal: Maintain or return to baseline ADL function  Description: INTERVENTIONS:  -  Assess patient's ability to carry out ADLs; assess patient's baseline for ADL function and identify physical deficits which impact ability to perform ADLs (bathing, care of mouth/teeth, toileting, grooming, dressing, etc )  - Assess/evaluate cause of self-care deficits   - Assess range of motion  - Assess patient's mobility; develop plan if impaired  - Assess patient's need for assistive devices and provide as appropriate  - Encourage maximum independence but intervene and supervise when necessary  - Involve family in performance of ADLs  - Assess for home care needs following discharge   - Consider OT consult to assist with ADL evaluation and planning for discharge  - Provide patient education as appropriate  Outcome: Progressing  Goal: Maintain or return mobility status to optimal level  Description: INTERVENTIONS:  - Assess patient's baseline mobility status (ambulation, transfers, stairs, etc )    - Identify cognitive and physical deficits and behaviors that affect mobility  - Identify mobility aids required to assist with transfers and/or ambulation (gait belt, sit-to-stand, lift, walker, cane, etc )  - [place] fall precautions as indicated by assessment  - Record patient progress and toleration of activity level on Mobility SBAR; progress patient to next Phase/Stage  - Instruct patient to call for assistance with activity based on assessment  - Consider rehabilitation consult to assist with strengthening/weightbearing, etc   Outcome: Progressing     Problem: DISCHARGE PLANNING  Goal: Discharge to home or other facility with appropriate resources  Description: INTERVENTIONS:  - Identify barriers to discharge w/patient and caregiver  - Arrange for needed discharge resources and transportation as appropriate  - Identify discharge learning needs (meds, wound care, etc )  - Arrange for interpretive services to assist at discharge as needed  - Refer to Case Management Department for coordinating discharge planning if the patient needs post-hospital services based on physician/advanced practitioner order or complex needs related to functional status, cognitive ability, or social support system  Outcome: Progressing     Problem: Knowledge Deficit  Goal: Patient/family/caregiver demonstrates understanding of disease process, treatment plan, medications, and discharge instructions  Description: Complete learning assessment and assess knowledge base  Interventions:  - Provide teaching at level of understanding  - Provide teaching via preferred learning methods  Outcome: Progressing     Problem: Nutrition/Hydration-ADULT  Goal: Nutrient/Hydration intake appropriate for improving, restoring or maintaining nutritional needs  Description: Monitor and assess patient's nutrition/hydration status for malnutrition  Collaborate with interdisciplinary team and initiate plan and interventions as ordered  Monitor patient's weight and dietary intake as ordered or per policy  Utilize nutrition screening tool and intervene as necessary  Determine patient's food preferences and provide high-protein, high-caloric foods as appropriate       INTERVENTIONS:  - Monitor oral intake, urinary output, labs, and treatment plans  - Assess nutrition and hydration status and recommend course of action  - Evaluate amount of meals eaten  - Assist patient with eating if necessary   - Allow adequate time for meals  - Recommend/ encourage appropriate diets, oral nutritional supplements, and vitamin/mineral supplements  - Order, calculate, and assess calorie counts as needed  - Assess need for intravenous fluids  - Provide specific nutrition/hydration education as appropriate  - Include patient/family/caregiver in decisions related to nutrition  Outcome: Progressing

## 2021-04-14 NOTE — ANESTHESIA PREPROCEDURE EVALUATION
Procedure:  EGD  ENDOSCOPIC ULTRASOUND (UPPER)    Relevant Problems   CARDIO   (+) Benign essential hypertension   (+) Hyperlipemia   (+) Hypertension   (+) Hypertensive urgency      MUSCULOSKELETAL   (+) Acute low back pain due to trauma      NEURO/PSYCH   (+) Depression   (+) Vision disturbance        Physical Exam    Airway    Mallampati score: II  TM Distance: >3 FB  Neck ROM: full     Dental   Comment: Poor dentition,     Cardiovascular  Rhythm: regular, Rate: normal,     Pulmonary  Breath sounds clear to auscultation,     Other Findings        Anesthesia Plan  ASA Score- 3     Anesthesia Type- IV sedation with anesthesia with ASA Monitors  Additional Monitors:   Airway Plan:           Plan Factors-Exercise tolerance (METS): >4 METS  Chart reviewed  EKG reviewed  Existing labs reviewed  Patient summary reviewed  Patient is not a current smoker  Induction- intravenous  Postoperative Plan-     Informed Consent- Anesthetic plan and risks discussed with patient  I personally reviewed this patient with the CRNA  Discussed and agreed on the Anesthesia Plan with the CRNA  Brunilda Loja

## 2021-04-14 NOTE — RESTORATIVE TECHNICIAN NOTE
Restorative Specialist Mobility Note       Activity: Ambulate in ny, Ambulate in room, Bathroom privileges, Chair, Dangle, Stand at bedside(Educated/encouraged pt to ambulate with assistance 3-4 x's/day  Bed alarm on   Pt callbell, phone/tray within reach )     Assistive Device: None       Rody WEBER, Restorative Technician, [employer] [facility]

## 2021-04-14 NOTE — CONSULTS
Consultation - Palliative and Supportive Care   Kody Rodrigez 63 y o  female [id number]    Assessment:    -   Patient Active Problem List   Diagnosis    Acute low back pain due to trauma    Benign essential hypertension    Vision disturbance    Hypertensive urgency    Noncompliance with medications    Stroke-like symptoms    Incidental pulmonary nodule    Hypertension    Hyperlipemia    Tarsal tunnel syndrome, left    Pancreatic mass    Leukocytosis    Depression    Goals of care, counseling/discussion    YULIYA (acute kidney injury) ([facility] [address] )     Plan:  1  Symptom management -   pain   - acetaminophen 975mg PO Q8H [facility]   - oxycodone 5-10mg PO Q4H PRN moderate-severe pain   - dilaudid 1mg IV Q4H PRN breakthrough pain    Constipation   - daily senokot and miralax     Nausea   - zofran 4mg IV Q6H PRN    2  Goals - level 3 DNR   - Ongoing diagnostic work-up with plan for outpatient surgical oncology follow-up to discuss whipple and other disease directed cares  Code Status: DNR/DNI - Level 3   Decisional apparatus:  Patient is competent on my exam today  If competence is lost, patient's substitute decision maker would default to daughter by [place]  Advance Directive / Living Will / POLST:  No, will need    3  Social support   - Time spent providing supportive listening   - Patient is supported by her spouse, 2 children, and niece Blackhayden Burt) [place] Sandifer interprets for encounter at patient's request   - recommend outpatient follow-up with palliative medicine for ongoing support and symptom management  I have reviewed the patient's controlled substance dispensing history in the Prescription Drug Monitoring Program in compliance with the [facility] regulations before prescribing any controlled substances  We appreciate the invitation to be involved in this patient's care  We will continue to follow    Please do not hesitate to reach our on call provider through our clinic answering service at [phone] should you have acute symptom control concerns  Fern Garcia PA-C  Palliative and Supportive Care  Clinic/Answering Service: [phone]  You can find me on Pak[employer]! IDENTIFICATION:  Inpatient consult to Palliative Care  Consult performed by: Fern Garcia PA-C  Consult ordered by: Mckay Pérez MD        Physician Requesting Consult: Eusebio Peter MD  Reason for Consult / Principal Problem: new malignancy, establish with [facility]  Hx and PE limited by: n/a    HISTORY OF PRESENT ILLNESS:       Lyric Her is a 63 y o  female who presented on 4/12 with abdominal pain  She has a history of HTN, depression and remote breast cancer history where she underwent chemo, radiation and mastectomy nearly 18 years ago  Over the last few months patient has had worsening appetite and abdominal pain  Upon workup she was found to have a pancreatic mass that is highly suspicious for malignancy  Patient underwentEUS and FNA  Patient has a spouse that she cares for who has dementia and is supported by her daughter and grandchild  She also has a son who lives in [facility]      Patient reports the most important thing in her life is her family  She especially cherishes her grandchildren and great-grandchildren  She also is supported by her berhane and berhane group  On exam she is lying in bed, niece Mickie Waters) over the phone  She reports a history of breast cancer which she "overcame" several years ago  Otherwise states "I understand where I'm at"  She expresses understanding of pancreatic cancer diagnosis, pending final pathology and plan for whipple  Review of Systems   Constitution: Negative for decreased appetite  Skin: Positive for dry skin  Musculoskeletal: Negative  Gastrointestinal: Positive for abdominal pain (well controlled on current regimen)  Negative for nausea  Genitourinary: Negative  Neurological: Positive for dizziness and headaches     Psychiatric/Behavioral: The patient does not have insomnia  All other systems reviewed and are negative  Past Medical History:   Diagnosis Date    Back ache     Cancer ([facility] [address] )     Hypertension      Past Surgical History:   Procedure Laterality Date    APPENDECTOMY      BILATERAL OOPHORECTOMY      BREAST SURGERY      HERNIA REPAIR       Social History     Socioeconomic History    Marital status: /Civil Union     Spouse name: Peewee Villanueva [facility]    Number of children: Not on file    Years of education: Not on file    Highest education level: Not on file   Occupational History    Occupation: unemployed   Social Needs    Financial resource strain: Very hard    Food insecurity     Worry: Not on file     Inability: Not on file   [employer] needs     Medical: Not on file     Non-medical: Not on file   Tobacco Use    Smoking status: Former Smoker     Packs/day: 0 65     Years: 30 00     Pack years: 19 50    Smokeless tobacco: Never Used    Tobacco comment: quit 15 years ago   Substance and Sexual Activity    Alcohol use: Not Currently     Frequency: Monthly or less     Binge frequency: Never    Drug use: No    Sexual activity: Not Currently   Lifestyle    Physical activity     Days per week: Not on file     Minutes per session: Not on file    Stress: Not on file   Relationships    Social connections     Talks on phone: Not on file     Gets together: Not on file     Attends Gnosticism service: Not on file     Active member of club or organization: Not on file     Attends meetings of clubs or organizations: Not on file     Relationship status: Not on file    Intimate partner violence     Fear of current or ex partner: Not on file     Emotionally abused: Not on file     Physically abused: Not on file     Forced sexual activity: Not on file   Other Topics Concern    Not on file   Social History Narrative    Not on file     Family History   Problem Relation Age of Onset    Hypertension Mother     Heart disease Mother     Diabetes Mother     Cancer Father        MEDICATIONS / ALLERGIES:    all current active meds have been reviewed and current meds:   Current Facility-Administered Medications   Medication Dose Route Frequency    acetaminophen (TYLENOL) tablet 650 mg  650 mg Oral Q6H PRN    acetaminophen (TYLENOL) tablet 975 mg  975 mg Oral Q8H [facility]    amLODIPine (NORVASC) tablet 10 mg  10 mg Oral Daily    atorvastatin (LIPITOR) tablet 40 mg  40 mg Oral QPM    dicyclomine (BENTYL) capsule 10 mg  10 mg Oral 4x Daily (AC & HS)    diphenhydrAMINE (BENADRYL) injection 25 mg  25 mg Intravenous Q6H PRN    escitalopram (LEXAPRO) tablet 10 mg  10 mg Oral QAM    heparin (porcine) subcutaneous injection 5,000 Units  5,000 Units Subcutaneous Q8H [facility]    HYDROmorphone (DILAUDID) injection 1 mg  1 mg Intravenous Q4H PRN    hydrOXYzine HCL (ATARAX) tablet 25 mg  25 mg Oral HS PRN    Labetalol HCl (NORMODYNE) injection 10 mg  10 mg Intravenous Q4H PRN    multi-electrolyte (PLASMALYTE-A/ISOLYTE-S PH 7 4) IV solution  100 mL/hr Intravenous Continuous    ondansetron (ZOFRAN) injection 4 mg  4 mg Intravenous Q6H PRN    oxyCODONE (ROXICODONE) immediate release tablet 10 mg  10 mg Oral Q4H PRN    oxyCODONE (ROXICODONE) IR tablet 5 mg  5 mg Oral Q4H PRN    pantoprazole (PROTONIX) EC tablet 40 mg  40 mg Oral BID AC    PARoxetine (PAXIL) tablet 10 mg  10 mg Oral Daily    polyethylene glycol (MIRALAX) packet 17 g  17 g Oral Daily    senna-docusate sodium (SENOKOT S) 8 6-50 mg per tablet 1 tablet  1 tablet Oral HS       No Known Allergies    OBJECTIVE:    Physical Exam  Physical Exam  HENT:      Head: Normocephalic and atraumatic  Eyes:      Conjunctiva/sclera: Conjunctivae normal    Cardiovascular:      Rate and Rhythm: Normal rate  Pulmonary:      Effort: Pulmonary effort is normal  No respiratory distress  Abdominal:      General: There is no distension  Tenderness: There is no guarding     Skin:     General: Skin is warm and dry  Neurological:      General: No focal deficit present  Mental Status: She is alert  Mental status is at baseline  Psychiatric:         Mood and Affect: Mood normal          Behavior: Behavior normal          Thought Content: Thought content normal          Judgment: Judgment normal          Lab Results:   I have personally reviewed pertinent labs  , CBC:   Lab Results   Component Value Date    WBC 9 74 04/15/2021    HGB 10 5 (L) 04/15/2021    HCT 31 7 (L) 04/15/2021    MCV 96 04/15/2021     04/15/2021    MCH 31 9 04/15/2021    MCHC 33 1 04/15/2021    RDW 13 3 04/15/2021    MPV 11 1 04/15/2021    NRBC 0 04/15/2021   , CMP:   Lab Results   Component Value Date    SODIUM 141 04/15/2021    K 3 6 04/15/2021     04/15/2021    CO2 28 04/15/2021    BUN 22 04/15/2021    CREATININE 1 70 (H) 04/15/2021    CALCIUM 9 0 04/15/2021    AST 18 04/15/2021    ALT 23 04/15/2021    ALKPHOS 109 04/15/2021    EGFR 32 04/15/2021   , PT/PTT:No results found for: PT, PTT  Imaging Studies: reviewed  EKG, Pathology, and Other Studies: reviewed    Counseling / Coordination of Care    Total floor / unit time spent today 35+ minutes  Greater than 50% of total time was spent with the patient and / or family counseling and / or coordination of care  A description of the counseling / coordination of care: time spent introducing palliative medicine, discussing symptom management, care plan, recommendation for outpatient follow-up, providing supportive listening  Minh Castellanos

## 2021-04-14 NOTE — PROGRESS NOTES
Pastoral Care Progress Note    2021  Patient: Jasmeet Machado : 1960  Admission Date & Time: 2021 1622  MRN: [id number] CSN: [id number]               Chaplaincy Interventions Utilized:   Ritual: Neli Arias provided sacrament of the sick and Provided prayer       21 1200   Clinical Encounter Type   Visited With Patient   Mosque Encounters   Mosque Needs Prayer   Sacramental Encounters   Sacrament of Sick-Anointing Anointed

## 2021-04-14 NOTE — PROGRESS NOTES
Progress Note - Surgical Oncology   [facility] 63 y o  female MRN: [id number]  [facility] Encounter: [id number]    Assessment:  63 y o  female with pancreatic mass suspicious for cancer       Plan:  Follow up CTC for staging  F/u EUS pathology  F/u lab workup     Subjective/Objective     Subjective: No complaints       Objective:     Vitals: Temp:  [98 3 °F (36 8 °C)] 98 3 °F (36 8 °C)  HR:  [74] 74  BP: (173-177)/(84) 173/84  Body mass index is 29 39 kg/m²  I/O       04/12 0701 - 04/13 0700 04/13 0701 - 04/14 0700    P  O   1000    Total Intake(mL/kg)  1000 (12 1)    Net  +1000                Physical Exam:  GEN: NAD  HEENT: MMM  CV: RRR  Lung: Normal effort  Ab: Soft, ND, mild periumbilical tenderness  Extrem: No CCE   Neuro: A+Ox3     Lab, Imaging and other studies: I have personally reviewed pertinent reports    , CBC with diff:   Lab Results   Component Value Date    WBC 10 70 (H) 04/13/2021    HGB 11 0 (L) 04/13/2021    HCT 32 9 (L) 04/13/2021    MCV 95 04/13/2021     04/13/2021    MCH 31 7 04/13/2021    MCHC 33 4 04/13/2021    RDW 13 6 04/13/2021    MPV 10 6 04/13/2021    NRBC 0 04/13/2021   , BMP/CMP:   Lab Results   Component Value Date    SODIUM 139 04/13/2021    K 3 9 04/13/2021     (H) 04/13/2021    CO2 27 04/13/2021    BUN 14 04/13/2021    CREATININE 0 98 04/13/2021    CALCIUM 8 8 04/13/2021    EGFR 62 04/13/2021     VTE Pharmacologic Prophylaxis: Heparin  VTE Mechanical Prophylaxis: sequential compression device        Jameson Churchill MD  4/14/2021 12:30 AM

## 2021-04-15 LAB
ALBUMIN SERPL BCP-MCNC: 3.1 G/DL (ref 3.5–5)
ALP SERPL-CCNC: 109 U/L (ref 46–116)
ALT SERPL W P-5'-P-CCNC: 23 U/L (ref 12–78)
ANION GAP SERPL CALCULATED.3IONS-SCNC: 8 MMOL/L (ref 4–13)
AST SERPL W P-5'-P-CCNC: 18 U/L (ref 5–45)
BACTERIA UR QL AUTO: ABNORMAL /HPF
BASOPHILS # BLD AUTO: 0.03 THOUSANDS/ΜL (ref 0–0.1)
BASOPHILS NFR BLD AUTO: 0 % (ref 0–1)
BILIRUB SERPL-MCNC: 0.44 MG/DL (ref 0.2–1)
BILIRUB UR QL STRIP: ABNORMAL
BUN SERPL-MCNC: 22 MG/DL (ref 5–25)
CALCIUM ALBUM COR SERPL-MCNC: 9.7 MG/DL (ref 8.3–10.1)
CALCIUM SERPL-MCNC: 9 MG/DL (ref 8.3–10.1)
CHLORIDE SERPL-SCNC: 105 MMOL/L (ref 100–108)
CLARITY UR: ABNORMAL
CO2 SERPL-SCNC: 28 MMOL/L (ref 21–32)
COLOR UR: ABNORMAL
CREAT SERPL-MCNC: 1.7 MG/DL (ref 0.6–1.3)
EOSINOPHIL # BLD AUTO: 0.1 THOUSAND/ΜL (ref 0–0.61)
EOSINOPHIL NFR BLD AUTO: 1 % (ref 0–6)
ERYTHROCYTE [DISTWIDTH] IN BLOOD BY AUTOMATED COUNT: 13.3 % (ref 11.6–15.1)
GASTRIN SERPL-MCNC: 16 PG/ML (ref 0–115)
GFR SERPL CREATININE-BSD FRML MDRD: 32 ML/MIN/1.73SQ M
GLUCOSE SERPL-MCNC: 92 MG/DL (ref 65–140)
GLUCOSE UR STRIP-MCNC: NEGATIVE MG/DL
HCT VFR BLD AUTO: 31.7 % (ref 34.8–46.1)
HGB BLD-MCNC: 10.5 G/DL (ref 11.5–15.4)
HGB UR QL STRIP.AUTO: ABNORMAL
HYALINE CASTS #/AREA URNS LPF: ABNORMAL /LPF
IMM GRANULOCYTES # BLD AUTO: 0.03 THOUSAND/UL (ref 0–0.2)
IMM GRANULOCYTES NFR BLD AUTO: 0 % (ref 0–2)
KETONES UR STRIP-MCNC: NEGATIVE MG/DL
LEUKOCYTE ESTERASE UR QL STRIP: NEGATIVE
LYMPHOCYTES # BLD AUTO: 2.67 THOUSANDS/ΜL (ref 0.6–4.47)
LYMPHOCYTES NFR BLD AUTO: 27 % (ref 14–44)
MCH RBC QN AUTO: 31.9 PG (ref 26.8–34.3)
MCHC RBC AUTO-ENTMCNC: 33.1 G/DL (ref 31.4–37.4)
MCV RBC AUTO: 96 FL (ref 82–98)
MONOCYTES # BLD AUTO: 0.85 THOUSAND/ΜL (ref 0.17–1.22)
MONOCYTES NFR BLD AUTO: 9 % (ref 4–12)
NEUTROPHILS # BLD AUTO: 6.06 THOUSANDS/ΜL (ref 1.85–7.62)
NEUTS SEG NFR BLD AUTO: 63 % (ref 43–75)
NITRITE UR QL STRIP: NEGATIVE
NON-SQ EPI CELLS URNS QL MICRO: ABNORMAL /HPF
NRBC BLD AUTO-RTO: 0 /100 WBCS
PH UR STRIP.AUTO: 5 [PH]
PLATELET # BLD AUTO: 255 THOUSANDS/UL (ref 149–390)
PMV BLD AUTO: 11.1 FL (ref 8.9–12.7)
POTASSIUM SERPL-SCNC: 3.6 MMOL/L (ref 3.5–5.3)
PROT SERPL-MCNC: 7.6 G/DL (ref 6.4–8.2)
PROT UR STRIP-MCNC: NEGATIVE MG/DL
RBC # BLD AUTO: 3.29 MILLION/UL (ref 3.81–5.12)
RBC #/AREA URNS AUTO: ABNORMAL /HPF
SODIUM SERPL-SCNC: 141 MMOL/L (ref 136–145)
SP GR UR STRIP.AUTO: 1.02 (ref 1–1.03)
UROBILINOGEN UR QL STRIP.AUTO: 1 E.U./DL
WBC # BLD AUTO: 9.74 THOUSAND/UL (ref 4.31–10.16)
WBC #/AREA URNS AUTO: ABNORMAL /HPF

## 2021-04-15 PROCEDURE — 99232 SBSQ HOSP IP/OBS MODERATE 35: CPT | Performed by: INTERNAL MEDICINE

## 2021-04-15 PROCEDURE — 99222 1ST HOSP IP/OBS MODERATE 55: CPT | Performed by: INTERNAL MEDICINE

## 2021-04-15 PROCEDURE — 80053 COMPREHEN METABOLIC PANEL: CPT | Performed by: STUDENT IN AN ORGANIZED HEALTH CARE EDUCATION/TRAINING PROGRAM

## 2021-04-15 PROCEDURE — 99232 SBSQ HOSP IP/OBS MODERATE 35: CPT | Performed by: SURGERY

## 2021-04-15 PROCEDURE — 81001 URINALYSIS AUTO W/SCOPE: CPT | Performed by: INTERNAL MEDICINE

## 2021-04-15 PROCEDURE — 85025 COMPLETE CBC W/AUTO DIFF WBC: CPT | Performed by: STUDENT IN AN ORGANIZED HEALTH CARE EDUCATION/TRAINING PROGRAM

## 2021-04-15 RX ORDER — SODIUM CHLORIDE, SODIUM GLUCONATE, SODIUM ACETATE, POTASSIUM CHLORIDE, MAGNESIUM CHLORIDE, SODIUM PHOSPHATE, DIBASIC, AND POTASSIUM PHOSPHATE .53; .5; .37; .037; .03; .012; .00082 G/100ML; G/100ML; G/100ML; G/100ML; G/100ML; G/100ML; G/100ML
100 INJECTION, SOLUTION INTRAVENOUS CONTINUOUS
Status: DISPENSED | OUTPATIENT
Start: 2021-04-15 | End: 2021-04-16

## 2021-04-15 RX ORDER — PAROXETINE HYDROCHLORIDE 20 MG/1
10 TABLET, FILM COATED ORAL DAILY
Status: DISCONTINUED | OUTPATIENT
Start: 2021-04-15 | End: 2021-04-16 | Stop reason: HOSPADM

## 2021-04-15 RX ORDER — ACETAMINOPHEN 325 MG/1
975 TABLET ORAL EVERY 8 HOURS SCHEDULED
Status: DISCONTINUED | OUTPATIENT
Start: 2021-04-15 | End: 2021-04-16 | Stop reason: HOSPADM

## 2021-04-15 RX ORDER — DICYCLOMINE HYDROCHLORIDE 10 MG/1
10 CAPSULE ORAL
Status: DISCONTINUED | OUTPATIENT
Start: 2021-04-15 | End: 2021-04-16 | Stop reason: HOSPADM

## 2021-04-15 RX ORDER — PANTOPRAZOLE SODIUM 40 MG/1
40 TABLET, DELAYED RELEASE ORAL
Status: DISCONTINUED | OUTPATIENT
Start: 2021-04-15 | End: 2021-04-16 | Stop reason: HOSPADM

## 2021-04-15 RX ORDER — AMLODIPINE BESYLATE 10 MG/1
10 TABLET ORAL DAILY
Status: DISCONTINUED | OUTPATIENT
Start: 2021-04-15 | End: 2021-04-16 | Stop reason: HOSPADM

## 2021-04-15 RX ORDER — ESCITALOPRAM OXALATE 10 MG/1
10 TABLET ORAL EVERY MORNING
Status: DISCONTINUED | OUTPATIENT
Start: 2021-04-15 | End: 2021-04-16 | Stop reason: HOSPADM

## 2021-04-15 RX ORDER — SODIUM CHLORIDE, SODIUM GLUCONATE, SODIUM ACETATE, POTASSIUM CHLORIDE, MAGNESIUM CHLORIDE, SODIUM PHOSPHATE, DIBASIC, AND POTASSIUM PHOSPHATE .53; .5; .37; .037; .03; .012; .00082 G/100ML; G/100ML; G/100ML; G/100ML; G/100ML; G/100ML; G/100ML
125 INJECTION, SOLUTION INTRAVENOUS CONTINUOUS
Status: DISCONTINUED | OUTPATIENT
Start: 2021-04-15 | End: 2021-04-15

## 2021-04-15 RX ADMIN — SODIUM CHLORIDE, SODIUM GLUCONATE, SODIUM ACETATE, POTASSIUM CHLORIDE, MAGNESIUM CHLORIDE, SODIUM PHOSPHATE, DIBASIC, AND POTASSIUM PHOSPHATE 100 ML/HR: .53; .5; .37; .037; .03; .012; .00082 INJECTION, SOLUTION INTRAVENOUS at 22:10

## 2021-04-15 RX ADMIN — HEPARIN SODIUM 5000 UNITS: 5000 INJECTION INTRAVENOUS; SUBCUTANEOUS at 14:27

## 2021-04-15 RX ADMIN — DICYCLOMINE HYDROCHLORIDE 10 MG: 10 CAPSULE ORAL at 09:03

## 2021-04-15 RX ADMIN — DICYCLOMINE HYDROCHLORIDE 10 MG: 10 CAPSULE ORAL at 12:01

## 2021-04-15 RX ADMIN — AMLODIPINE BESYLATE 10 MG: 10 TABLET ORAL at 09:10

## 2021-04-15 RX ADMIN — POLYETHYLENE GLYCOL 3350 17 G: 17 POWDER, FOR SOLUTION ORAL at 09:03

## 2021-04-15 RX ADMIN — PANTOPRAZOLE SODIUM 40 MG: 40 TABLET, DELAYED RELEASE ORAL at 09:03

## 2021-04-15 RX ADMIN — OXYCODONE HYDROCHLORIDE 10 MG: 10 TABLET ORAL at 22:03

## 2021-04-15 RX ADMIN — HEPARIN SODIUM 5000 UNITS: 5000 INJECTION INTRAVENOUS; SUBCUTANEOUS at 22:01

## 2021-04-15 RX ADMIN — SODIUM CHLORIDE, SODIUM GLUCONATE, SODIUM ACETATE, POTASSIUM CHLORIDE, MAGNESIUM CHLORIDE, SODIUM PHOSPHATE, DIBASIC, AND POTASSIUM PHOSPHATE 100 ML/HR: .53; .5; .37; .037; .03; .012; .00082 INJECTION, SOLUTION INTRAVENOUS at 09:06

## 2021-04-15 RX ADMIN — ATORVASTATIN CALCIUM 40 MG: 40 TABLET, FILM COATED ORAL at 17:23

## 2021-04-15 RX ADMIN — PAROXETINE HYDROCHLORIDE 10 MG: 20 TABLET, FILM COATED ORAL at 09:12

## 2021-04-15 RX ADMIN — DICYCLOMINE HYDROCHLORIDE 10 MG: 10 CAPSULE ORAL at 17:00

## 2021-04-15 RX ADMIN — ESCITALOPRAM OXALATE 10 MG: 10 TABLET ORAL at 09:10

## 2021-04-15 RX ADMIN — PANTOPRAZOLE SODIUM 40 MG: 40 TABLET, DELAYED RELEASE ORAL at 17:00

## 2021-04-15 RX ADMIN — DOCUSATE SODIUM AND SENNOSIDES 1 TABLET: 8.6; 5 TABLET ORAL at 22:01

## 2021-04-15 RX ADMIN — DICYCLOMINE HYDROCHLORIDE 10 MG: 10 CAPSULE ORAL at 22:01

## 2021-04-15 RX ADMIN — ACETAMINOPHEN 975 MG: 325 TABLET ORAL at 11:45

## 2021-04-15 RX ADMIN — HEPARIN SODIUM 5000 UNITS: 5000 INJECTION INTRAVENOUS; SUBCUTANEOUS at 05:16

## 2021-04-15 RX ADMIN — ACETAMINOPHEN 975 MG: 325 TABLET ORAL at 22:01

## 2021-04-15 NOTE — PROGRESS NOTES
Progress Note - Oncologic Surgery   Vishnu Dee 63 y o  female MRN: [id number]  Unit/Bed#: [facility] 727-01 Encounter: [id number]    Assessment:  64 yo female with pancreatic head mass    Plan:  GI completed EUS/FNA of mass yesterday, awaiting pathology result  Patient will likely need a resection/Whipple once path is establish  Patient may follow up outpatient while workup pending  Staging CTs complete, see below      Subjective/Objective     Subjective: No acute events  Patient endorsed some soreness of her throat and mild abdominal pain  No nausea  Objective:   Blood pressure 141/72, pulse 67, temperature 98 2 °F (36 8 °C), resp  rate 16, height 5' 6" (1 676 m), weight 82 6 kg (182 lb 1 6 oz), SpO2 96 %, not currently breastfeeding  ,Body mass index is 29 39 kg/m²        Intake/Output Summary (Last 24 hours) at 4/15/2021 0639  Last data filed at 4/15/2021 0501  Gross per 24 hour   Intake 0 ml   Output 1300 ml   Net -1300 ml       Invasive Devices     Peripheral Intravenous Line            Peripheral IV 04/14/21 Right Arm less than 1 day                Physical Exam:   Gen: NAD, Comfortable  Neuro: A&O, No focal deficits  Head: Normal Cephalic, Atraumatic  Eye: EOMI, PERRLA, No scleral icterus  Neck: Supple, No JVD, Midline trachea  CV: RRR, Cap refill <2 sec  Pulm: Normal work of breathing, no respiratory distress  Abd: Soft, Non-Distended, Tender over mid upper and lower abdomen, no rebound/guarding  Ext: No edema, Non-tender  Skin: warm, dry, intact      Lab, Imaging and other studies:  CBC:   Lab Results   Component Value Date    WBC 9 74 04/15/2021    HGB 10 5 (L) 04/15/2021    HCT 31 7 (L) 04/15/2021    MCV 96 04/15/2021     04/15/2021    MCH 31 9 04/15/2021    MCHC 33 1 04/15/2021    RDW 13 3 04/15/2021    MPV 11 1 04/15/2021    NRBC 0 04/15/2021   , CMP:   No results found for: SODIUM, K, CL, CO2, ANIONGAP, BUN, CREATININE, GLUCOSE, CALCIUM, AST, ALT, ALKPHOS, PROT, BILITOT, EGFR, Coagulation: No results found for: PT, INR, APTT, Urinalysis: No results found for: Verta Davis, SPECGRAV, PHUR, LEUKOCYTESUR, NITRITE, PROTEINUA, GLUCOSEU, KETONESU, BILIRUBINUR, BLOODU, Amylase: No results found for: AMYLASE, Lipase: No results found for: LIPASE     Procedure: Xr Chest Pa & Lateral  Result Date: 4/13/2021  Impression: No acute cardiopulmonary disease  Procedure: Ct Chest W Contrast  Result Date: 4/14/2021  Impression: Mild cardiomegaly and bilateral infiltrates with pattern suggesting CHF/fluid overload  2 small nonspecific 3 and 4 mm pulmonary nodules  Based on current Fleischner Society 2017 Guidelines on incidental pulmonary nodule, patients with a known malignancy are at increased risk of metastasis and should receive initial three month follow-up chest CT  Workstation performed: [id number]     Procedure: Mri Abdomen W Wo Contrast And Mrcp  Result Date: 4/14/2021  Impression: 2 x 1 9 cm hyperenhancing mass pancreatic head  The imaging features suggest pancreatic neuroendocrine neoplasia This can be further characterized with endoscopic ultrasound No biliary dilation or pancreatic ductal obstruction No MRI evidence of metastatic disease in the liver parenchyma No retroperitoneal lymphadenopathy Patent Celiac trunk, SMA without evidence of any vascular encasement  Workstation performed: [id number]     Procedure: Ct Abdomen Pelvis With Contrast  Result Date: 4/12/2021  Impression: Diffuse gastric thickening and multiple thickened loops of right sided small bowel in keeping with a gastritis/nonspecific enteritis  Lobulated pancreatic head/neck hyperenhancing lesion measuring 19 x 18 x 20 mm #2/29 and #601/65 suspicious for a pancreatic malignancy, most typical of a neuroendocrine tumor  The pancreatic neck body and tail distal to this area are diffusely fatty involuted/atrophic  Surgical consultation is recommended on a nonurgent basis   Some islet cell tumors, such as insulinoma, gastrinoma and VIPoma can can be associated with bowel abnormalities and could explain the above bowel findings  No bowel obstruction or bowel pneumatosis  The study was marked in [facility] for immediate notification   Workstation performed: [id number]       VTE Pharmacologic Prophylaxis: Heparin  VTE Mechanical Prophylaxis: sequential compression device

## 2021-04-15 NOTE — PROGRESS NOTES
INTERNAL MEDICINE RESIDENCY PROGRESS NOTE     Name: [facility]   Age & Sex: 63 y o  female   MRN: [id number]  Unit/Bed#: [address] 727-01   Encounter: [id number]  Team: SOD Team A    PATIENT INFORMATION     Name: Jaya   Age & Sex: 63 y o  female   MRN: [id number]  Hospital Stay Days: 3    ASSESSMENT/PLAN     Principal Problem:    Pancreatic mass  Active Problems:    Hypertension    Hyperlipemia    Leukocytosis    Depression    Goals of care, counseling/discussion    YULIYA (acute kidney injury) ([facility] [address] )      * Pancreatic mass  Assessment & Plan  Patient comes in with vomiting for 4 days, unable to tolerate solids, liquids, medications for 1 and half months, particularly worse over the last week  Associated with abdominal pain that radiates occasionally to the lower quadrants   Patient has history of 40 pack smoking history, breast cancer history status post chemo, radiation, mastectomy 18 years ago  Workup:  - CT demonstrates lobulated pancreatic head/neck hyperenhancing lesion measuring 19 x 18 x 20 mm  suspicious for a pancreatic malignancy, most typical of a neuroendocrine tumor   - MRCP demonstrates to 2 x 1 9 cm pancreatic head mass similar to neuroendocrine in appearance, no biliary dilation or obstruction and no metastases to liver  - Patient is status post EGD revealing C0M2 Short's esophagus s/p biopsy  Gastritis s/p biopsy for h pylori  And EUS: 1 7 cm oval, well defined mass at head of pancreas s/p core biopsies  - CT chest for staging completed, 2 nonspecific 3 and 4 mm pulmonary nodules identified  Will need initial 3 months follow-up with CT chest   - Medical oncology consulted, once diagnosis of pancreatic cancer established, discussion with Surgical Oncology regarding sequences of treatment either with upfront Whipple followed by chemo or new adjuvant chemotherapy followed by Whipple procedure    As outpatient basis, patient will need genetic consultation has she may have BRCA-2 mutation with her personal history of breast cancer at young age, probable pancreatic cancer as well as family history of ovarian cancer   - Surgical oncology consulted, awaiting pathology results, would likely need resection/Whipple once pathology is established  - A1c normal, CEA and CA 19-9 normal   - Somatostatin, glucagon, VIP, gastrin pending  Plan:   - Palliative care consult for assistance in symptom management and establishment of care  - Odansetron 4mg q6h PRN nausea  - Protonix 40 mg b i d  And Bentyl 10 mg q i d   - Pain regimen:   Oxycodone 5 mg q 4 hours moderate pain   Oxycodone 10 mg q 4 hours severe pain   Dilaudid 0 5 mg q 4 hours breakthrough pain   Prophylactic bowel regimen in place of Senokot-s and MiraLax  - Cleared for discharge from GI perspective with outpatient follow-up with Dr Rogerio Cardozo  - Outpatient follow-up with Medical and Surgical Oncology  YULIYA (acute kidney injury) ([facility] [address] )  Assessment & Plan  Creatinine 1 7 from 0 9  Multifactorial; prerenal in the setting of poor p o  Intake and Lasix/TONI in the setting of recent contrast load on 4/12 and 4/13  Plan:  IV fluid hydration isolyte 150 mLs x 15 hours  Hold home lisinopril  Monitor BMP  Goals of care, counseling/discussion  Assessment & Plan  Goals of care discussion in regards to new probable pancreatic cancer diagnosis  Patient is reasonable and practical   Number one goal as for her  with Alzheimer's dementia to be cared for  Patient has family support from children and grandchildren who were present at bedside  Patient is level 3 which is appropriate  Palliative care consulted  Spiritual care consulted  Depression  Assessment & Plan  Patient states compliance with medications  Patient was unable to tolerate oral medications for 1 week  Plan:  Restart home meds  Atarax 25 qhs prn  Leukocytosis  Assessment & Plan  Leukocytosis elevated of 14 9 on admission, denies any fevers or sick contacts  Leukocytosis improved to 10 5 this a m  Continue to monitor fever curve and white count  Hyperlipemia  Assessment & Plan  Stable  Continue statin    Hypertension  Assessment & Plan  Patient is compliant with outpatient medications of lisinopril 20 mg and amlodipine 10 mg      Plan:  Restart home amlodipine  Hold home lisinopril in the setting of YULIYA  Disposition:  Continue inpatient care  On IV fluids for YULIYA  SUBJECTIVE     Patient seen and examined  No acute events overnight  Reported mild abdominal pain  Denies any nausea, vomiting, chest pain, shortness of breath orthopnea  Discussed with rounding RN  OBJECTIVE     Vitals:    21 1751 21 2146 04/15/21 0532 04/15/21 0740   BP: 143/59 (!) 89/58 141/72 135/70   BP Location:       Pulse: 67      Resp:    17   Temp:  98 2 °F (36 8 °C)  99 1 °F (37 3 °C)   TempSrc:       SpO2: 96%      Weight:       Height:          Temperature:   Temp (24hrs), Av 4 °F (36 9 °C), Min:98 1 °F (36 7 °C), Max:99 1 °F (37 3 °C)    Temperature: 99 1 °F (37 3 °C)  Intake & Output:  I/O        0701 -  0700  07 - 04/15 0700 04/15 07 -  0700    P  O  1000 0     Total Intake(mL/kg) 1000 (12 1) 0 (0)     Urine (mL/kg/hr)  1300 (0 7)     Total Output  1300     Net +1000 -1300            Unmeasured Urine Occurrence 1 x      Unmeasured Stool Occurrence 0 x          Weights:   IBW (Ideal Body Weight): 59 3 kg    Body mass index is 29 39 kg/m²  Weight (last 2 days)     None        Physical Exam  Vitals signs and nursing note reviewed  Constitutional:       General: She is not in acute distress  Appearance: Normal appearance  She is not ill-appearing  HENT:      Head: Normocephalic and atraumatic  Neck:      Musculoskeletal: Normal range of motion and neck supple  Cardiovascular:      Rate and Rhythm: Normal rate and regular rhythm  Heart sounds: Normal heart sounds  No murmur     Pulmonary:      Effort: Pulmonary effort is normal  No respiratory distress  Breath sounds: Normal breath sounds  No wheezing  Abdominal:      General: There is no distension  Palpations: Abdomen is soft  Tenderness: There is abdominal tenderness (mild tenderness in the epigastrium and lower abdomen )  Musculoskeletal:      Right lower leg: No edema  Left lower leg: No edema  Skin:     General: Skin is warm  Neurological:      General: No focal deficit present  Mental Status: She is alert and oriented to person, place, and time  LABORATORY DATA     Labs: I have personally reviewed pertinent reports  Results from last 7 days   Lab Units 04/15/21  0527 04/14/21 0446 04/13/21  0612 04/12/21  1636   WBC Thousand/uL 9 74 6 84 10 70* 14 49*   HEMOGLOBIN g/dL 10 5* 11 3* 11 0* 12 0   HEMATOCRIT % 31 7* 34 0* 32 9* 35 5   PLATELETS Thousands/uL 255 249 260 283   NEUTROS PCT % 63  --  72 84*   MONOS PCT % 9  --  6 8   MONO PCT %  --  5  --   --       Results from last 7 days   Lab Units 04/15/21  0526 04/14/21 0446 04/13/21  0612 04/12/21  1636   POTASSIUM mmol/L 3 6 3 5 3 9 3 6   CHLORIDE mmol/L 105 108 109* 109*   CO2 mmol/L 28 28 27 28   BUN mg/dL 22 10 14 16   CREATININE mg/dL 1 70* 0 97 0 98 1 29   CALCIUM mg/dL 9 0 9 0 8 8 8 8   ALK PHOS U/L 109 122*  --  136*   ALT U/L 23 24  --  23   AST U/L 18 16  --  21                      Results from last 7 days   Lab Units 04/12/21  1655   TROPONIN I ng/mL <0 02       IMAGING & DIAGNOSTIC TESTING     Radiology Results: I have personally reviewed pertinent reports  Xr Chest Pa & Lateral    Result Date: 4/13/2021  Impression: No acute cardiopulmonary disease  Workstation performed: [id number]     Ct Chest W Contrast    Result Date: 4/14/2021  Impression: Mild cardiomegaly and bilateral infiltrates with pattern suggesting CHF/fluid overload  2 small nonspecific 3 and 4 mm pulmonary nodules   Based on current Fleischner Society 2017 Guidelines on incidental pulmonary nodule, patients with a known malignancy are at increased risk of metastasis and should receive initial three month follow-up chest CT  Workstation performed: [id number]     Mri Abdomen W Wo Contrast And Mrcp    Result Date: 4/14/2021  Impression: 2 x 1 9 cm hyperenhancing mass pancreatic head  The imaging features suggest pancreatic neuroendocrine neoplasia This can be further characterized with endoscopic ultrasound No biliary dilation or pancreatic ductal obstruction No MRI evidence of metastatic disease in the liver parenchyma No retroperitoneal lymphadenopathy Patent Celiac trunk, SMA without evidence of any vascular encasement  Workstation performed: [id number]     Ct Abdomen Pelvis With Contrast    Result Date: 4/12/2021  Impression: Diffuse gastric thickening and multiple thickened loops of right sided small bowel in keeping with a gastritis/nonspecific enteritis  Lobulated pancreatic head/neck hyperenhancing lesion measuring 19 x 18 x 20 mm #2/29 and #601/65 suspicious for a pancreatic malignancy, most typical of a neuroendocrine tumor  The pancreatic neck body and tail distal to this area are diffusely fatty involuted/atrophic  Surgical consultation is recommended on a nonurgent basis  Some islet cell tumors, such as insulinoma, gastrinoma and VIPoma can can be associated with bowel abnormalities and could explain the above bowel findings  No bowel obstruction or bowel pneumatosis  The study was marked in [facility] for immediate notification  Workstation performed: [id number]     Other Diagnostic Testing: I have personally reviewed pertinent reports      ACTIVE MEDICATIONS     Current Facility-Administered Medications   Medication Dose Route Frequency    acetaminophen (TYLENOL) tablet 650 mg  650 mg Oral Q6H PRN    amLODIPine (NORVASC) tablet 10 mg  10 mg Oral Daily    atorvastatin (LIPITOR) tablet 40 mg  40 mg Oral QPM    dicyclomine (BENTYL) capsule 10 mg  10 mg Oral 4x Daily (AC & HS)    diphenhydrAMINE (BENADRYL) injection 25 mg  25 mg Intravenous Q6H PRN    escitalopram (LEXAPRO) tablet 10 mg  10 mg Oral QAM    heparin (porcine) subcutaneous injection 5,000 Units  5,000 Units Subcutaneous Q8H [address]    HYDROmorphone (DILAUDID) injection 1 mg  1 mg Intravenous Q4H PRN    hydrOXYzine HCL (ATARAX) tablet 25 mg  25 mg Oral HS PRN    Labetalol HCl (NORMODYNE) injection 10 mg  10 mg Intravenous Q4H PRN    multi-electrolyte (PLASMALYTE-A/ISOLYTE-S PH 7 4) IV solution  100 mL/hr Intravenous Continuous    ondansetron (ZOFRAN) injection 4 mg  4 mg Intravenous Q6H PRN    oxyCODONE (ROXICODONE) immediate release tablet 10 mg  10 mg Oral Q4H PRN    oxyCODONE (ROXICODONE) IR tablet 5 mg  5 mg Oral Q4H PRN    pantoprazole (PROTONIX) EC tablet 40 mg  40 mg Oral BID AC    PARoxetine (PAXIL) tablet 10 mg  10 mg Oral Daily    polyethylene glycol (MIRALAX) packet 17 g  17 g Oral Daily    senna-docusate sodium (SENOKOT S) 8 6-50 mg per tablet 1 tablet  1 tablet Oral HS       VTE Pharmacologic Prophylaxis: Sequential compression device (Venodyne)  and Heparin  VTE Mechanical Prophylaxis: sequential compression device    Portions of the record may have been created with voice recognition software  Occasional wrong word or "sound a like" substitutions may have occurred due to the inherent limitations of voice recognition software    Read the chart carefully and recognize, using context, where substitutions have occurred   ==  Gabriela Walsh MD  [facility] Medical [facility]  Internal Medicine Residency PGY-2

## 2021-04-15 NOTE — ANESTHESIA POSTPROCEDURE EVALUATION
Post-Op Assessment Note    CV Status:  Stable  Pain Score: 0    Pain management: adequate     Mental Status:  Sleepy   Hydration Status:  Stable   PONV Controlled:  None   Airway Patency:  Patent      Post Op Vitals Reviewed: Yes      Staff: Anesthesiologist   Comments: Patient resting comfortably; vital signs stable; no immediate complications from anesthesia, patient at baseline SBP of 160-170mmHg preprocedure        No complications documented      BP   174/74   Temp   98 3   Pulse  75   Resp   12   SpO2   97

## 2021-04-15 NOTE — INCIDENTAL FINDINGS
The following findings require follow up:  Radiographic finding   Findin small nonspecific 3 and 4 mm pulmonary nodules on CT chest    Follow up required: yes    Follow up should be done within 3  month(s)    Please notify the following clinician to assist with the follow up:   PCP Dr Yared Hernández

## 2021-04-15 NOTE — RESTORATIVE TECHNICIAN NOTE
Restorative Specialist Mobility Note       Activity: Ambulate in ny, Ambulate in room, Bathroom privileges, Chair, Dangle, Stand at bedside(Educated/encouraged pt to ambulate with assistance 3-4 x's/day  Bed alarm on   Pt callbell, phone/tray within reach )     Assistive Device: None       Jozef WEBER, Restorative Technician, [employer] [facility]

## 2021-04-15 NOTE — PLAN OF CARE
Problem: PAIN - ADULT  Goal: Verbalizes/displays adequate comfort level or baseline comfort level  Description: Interventions:  - Encourage patient to monitor pain and request assistance  - Assess pain using appropriate pain scale  - Administer analgesics based on type and severity of pain and evaluate response  - Implement non-pharmacological measures as appropriate and evaluate response  - Consider cultural and social influences on pain and pain management  - Notify physician/advanced practitioner if interventions unsuccessful or patient reports new pain  Outcome: Progressing     Problem: INFECTION - ADULT  Goal: Absence or prevention of progression during hospitalization  Description: INTERVENTIONS:  - Assess and monitor for signs and symptoms of infection  - Monitor lab/diagnostic results  - Monitor all insertion sites, i e  indwelling lines, tubes, and drains  - [place] appropriate cooling/warming therapies per order  - Administer medications as ordered  - Instruct and encourage patient and family to use good hand hygiene technique  - Identify and instruct in appropriate isolation precautions for identified infection/condition  Outcome: Progressing  Goal: Absence of fever/infection during neutropenic period  Description: INTERVENTIONS:  - Monitor WBC    Outcome: Progressing     Problem: SAFETY ADULT  Goal: Patient will remain free of falls  Description: INTERVENTIONS:  - Assess patient frequently for physical needs  -  Identify cognitive and physical deficits and behaviors that affect risk of falls    -  [place] fall precautions as indicated by assessment   - Educate patient/family on patient safety including physical limitations  - Instruct patient to call for assistance with activity based on assessment  - Modify environment to reduce risk of injury  - Consider OT/PT consult to assist with strengthening/mobility  Outcome: Progressing  Goal: Maintain or return to baseline ADL function  Description: INTERVENTIONS:  -  Assess patient's ability to carry out ADLs; assess patient's baseline for ADL function and identify physical deficits which impact ability to perform ADLs (bathing, care of mouth/teeth, toileting, grooming, dressing, etc )  - Assess/evaluate cause of self-care deficits   - Assess range of motion  - Assess patient's mobility; develop plan if impaired  - Assess patient's need for assistive devices and provide as appropriate  - Encourage maximum independence but intervene and supervise when necessary  - Involve family in performance of ADLs  - Assess for home care needs following discharge   - Consider OT consult to assist with ADL evaluation and planning for discharge  - Provide patient education as appropriate  Outcome: Progressing  Goal: Maintain or return mobility status to optimal level  Description: INTERVENTIONS:  - Assess patient's baseline mobility status (ambulation, transfers, stairs, etc )    - Identify cognitive and physical deficits and behaviors that affect mobility  - Identify mobility aids required to assist with transfers and/or ambulation (gait belt, sit-to-stand, lift, walker, cane, etc )  - [place] fall precautions as indicated by assessment  - Record patient progress and toleration of activity level on Mobility SBAR; progress patient to next Phase/Stage  - Instruct patient to call for assistance with activity based on assessment  - Consider rehabilitation consult to assist with strengthening/weightbearing, etc   Outcome: Progressing     Problem: DISCHARGE PLANNING  Goal: Discharge to home or other facility with appropriate resources  Description: INTERVENTIONS:  - Identify barriers to discharge w/patient and caregiver  - Arrange for needed discharge resources and transportation as appropriate  - Identify discharge learning needs (meds, wound care, etc )  - Arrange for interpretive services to assist at discharge as needed  - Refer to Case Management Department for coordinating discharge planning if the patient needs post-hospital services based on physician/advanced practitioner order or complex needs related to functional status, cognitive ability, or social support system  Outcome: Progressing     Problem: Knowledge Deficit  Goal: Patient/family/caregiver demonstrates understanding of disease process, treatment plan, medications, and discharge instructions  Description: Complete learning assessment and assess knowledge base  Interventions:  - Provide teaching at level of understanding  - Provide teaching via preferred learning methods  Outcome: Progressing     Problem: Nutrition/Hydration-ADULT  Goal: Nutrient/Hydration intake appropriate for improving, restoring or maintaining nutritional needs  Description: Monitor and assess patient's nutrition/hydration status for malnutrition  Collaborate with interdisciplinary team and initiate plan and interventions as ordered  Monitor patient's weight and dietary intake as ordered or per policy  Utilize nutrition screening tool and intervene as necessary  Determine patient's food preferences and provide high-protein, high-caloric foods as appropriate       INTERVENTIONS:  - Monitor oral intake, urinary output, labs, and treatment plans  - Assess nutrition and hydration status and recommend course of action  - Evaluate amount of meals eaten  - Assist patient with eating if necessary   - Allow adequate time for meals  - Recommend/ encourage appropriate diets, oral nutritional supplements, and vitamin/mineral supplements  - Order, calculate, and assess calorie counts as needed  - Assess need for intravenous fluids  - Provide specific nutrition/hydration education as appropriate  - Include patient/family/caregiver in decisions related to nutrition  Outcome: Progressing

## 2021-04-15 NOTE — PROGRESS NOTES
Progress Note- [facility] 63 y o  female MRN: [id number]    Unit/Bed#: [facility] 727-01 Encounter: [id number]      Assessment and Plan:    56-year-old female with past medical history of breast cancer status post chemo, radiation, right-sided mastectomy 18 years ago, hypertension, hyperlipidemia who is admitted to [facility] for persistent nausea, vomiting, abdominal pain  CT scan showed evidence of pancreatic mass, gastroenterology was consulted for EUS with biopsy for diagnosis      Pancreatic mass  Patient presenting with persistent abdominal pain, nausea, vomiting, diarrhea for 1 month  CT scan shows 1 8 x 1 9 x 2 cm pancreatic head/neck mass which radiologist described as typical for neuroendocrine tumor  Differential includes adeno carcinoma versus VIPoma verses an insulinoma versus gastrinoma  MRI negative for Liver mets  Completed EGD with EUS/FNA  · Gastrin, somatostatin, A1c, glucagon, chromogranin a CA 19 9, pancreatic elastase pending  CEA negative  · Advance diet as tolerated  · Oncology is consulted, appreciated  · Palliative Care following  · Can discharge from GI standpoint and follow up with Dr Jada Thomas in 3-4 weeks  · Continue to monitor patient has symptoms as described below  · Gastroenterology will sign off at this time, please do not hesitate to contact us with any questions      Nausea, vomiting  Likely secondary to new pancreatic mass  · Continue symptom management with Zofran p r n  Which seems to be effective    Abdominal pain  Likely secondary to pancreatic mass  Lipase negative  · Pain management as per primary team  · Palliative care following  · Continue Bentyl 10 mg QID  · Monitor abdominal exams      Diarrhea  Chronic  Low suspicion for infectious etiology at this time  · Anticipate the use of opioids will help slow down diarrhea  · Continue to monitor bowel movements     Acute kidney injury  In the setting of poor p o   Intake due to multiple symptoms and pancreatic mass  · Encourage p o  Intake  · Continue to monitor creatinine     History of breast cancer  Treated 18 years ago with chemo, radiation, mastectomy  Patient also has family members with breast and ovarian cancer  May be risk factor, consider genetic testing as an outpatient     Rest of care per primary team     ______________________________________________________________________    Subjective:     Patient seen examined lying in bed no acute distress  She states that her abdominal pain is still present but now in her left lower quadrant  She denies any epigastric pain  Does have nausea which has improved, is able to tolerate p o  But has little appetite  She denies any melena, hematochezia, hematemesis  Rest of ROS was negative      Medication Administration - last 24 hours from 04/14/2021 1201 to 04/15/2021 1201       Date/Time Order Dose Route Action Action by     04/14/2021 1600 atorvastatin (LIPITOR) tablet 40 mg 0 mg Oral Hold Cherelle Sterling RN     04/15/2021 0516 heparin (porcine) subcutaneous injection 5,000 Units 5,000 Units Subcutaneous Given Earle Berkowitz RN     04/14/2021 2122 heparin (porcine) subcutaneous injection 5,000 Units 5,000 Units Subcutaneous Given Earle Berkowitz RN     04/14/2021 1353 heparin (porcine) subcutaneous injection 5,000 Units 5,000 Units Subcutaneous Given Cherelle Sterling RN     04/14/2021 1800 HYDROmorphone (DILAUDID) injection 1 mg 1 mg Intravenous Given Cherelle Sterling RN     04/14/2021 2122 senna-docusate sodium (SENOKOT S) 8 6-50 mg per tablet 1 tablet 1 tablet Oral Given Earle Berkowitz RN     04/15/2021 0903 polyethylene glycol (MIRALAX) packet 17 g 17 g Oral Given NGUYEN Price     04/14/2021 1729 ondansetron (ZOFRAN) injection 4 mg 4 mg Intravenous Given Jannie Stratton RN     04/14/2021 1233 sodium chloride 0 9 % infusion 0 mL/hr Intravenous 1645 Madelyn Donahue RN     04/14/2021 1233 furosemide (LASIX) injection 20 mg 20 mg Intravenous Given Luisito Chi, NGUYEN     04/14/2021 1924 diphenhydrAMINE (BENADRYL) injection 25 mg 25 mg Intravenous Given James Crawley, NGUYEN     04/15/2021 0906 multi-electrolyte (PLASMALYTE-A/ISOLYTE-S PH 7 4) IV solution 100 mL/hr Intravenous [employer], RN     04/15/2021 0903 pantoprazole (PROTONIX) EC tablet 40 mg 40 mg Oral Given Dee, RN     04/15/2021 1201 dicyclomine (BENTYL) capsule 10 mg 10 mg Oral Given [place], RN     04/15/2021 0903 dicyclomine (BENTYL) capsule 10 mg 10 mg Oral Given Dee, RN     04/15/2021 0910 escitalopram (LEXAPRO) tablet 10 mg 10 mg Oral Given [place], RN     04/15/2021 0910 amLODIPine (NORVASC) tablet 10 mg 10 mg Oral Given [place], RN     04/15/2021 0912 PARoxetine (PAXIL) tablet 10 mg 10 mg Oral Given [place], RN     04/15/2021 1145 acetaminophen (TYLENOL) tablet 975 mg 975 mg Oral Given Lennox Alfonso RN          Objective:     Vitals: Blood pressure 164/86, pulse 67, temperature 99 1 °F (37 3 °C), resp  rate 17, height 5' 6" (1 676 m), weight 82 6 kg (182 lb 1 6 oz), SpO2 96 %, not currently breastfeeding  ,Body mass index is 29 39 kg/m²  Intake/Output Summary (Last 24 hours) at 4/15/2021 1201  Last data filed at 4/15/2021 0906  Gross per 24 hour   Intake 1200 ml   Output 1300 ml   Net -100 ml       Physical Exam:     GENERAL: NAD  HEENT:  NC/AT, PERRL, EOMI, MMM, no scleral icterus  CARDIAC:  RRR, +S1/S2, no S3/S4 heard, no m/g/r  PULMONARY:  CTA B/L, no wheezing/rales/rhonci, non-labored breathing  ABDOMEN:  Soft, tender in the left lower quadrant, non distended, +BS, no rebound/guarding/rigidity  Extremities:  2+ Pulses in DP/PT  No edema, cyanosis, or clubbing  NEUROLOGIC:  Alert/oriented x3   No motor or sensory deficits  SKIN:  No rashes or erythema        Invasive Devices     Peripheral Intravenous Line            Peripheral IV 04/14/21 Right Arm less than 1 day Lab Results:  Admission on 04/12/2021   Component Date Value    WBC 04/12/2021 14 49*    RBC 04/12/2021 3 79*    Hemoglobin 04/12/2021 12 0     Hematocrit 04/12/2021 35 5     MCV 04/12/2021 94     MCH 04/12/2021 31 7     MCHC 04/12/2021 33 8     RDW 04/12/2021 13 3     MPV 04/12/2021 10 5     Platelets 49/15/5468 283     nRBC 04/12/2021 0     Neutrophils Relative 04/12/2021 84*    Immat GRANS % 04/12/2021 0     Lymphocytes Relative 04/12/2021 7*    Monocytes Relative 04/12/2021 8     Eosinophils Relative 04/12/2021 1     Basophils Relative 04/12/2021 0     Neutrophils Absolute 04/12/2021 12 14*    Immature Grans Absolute 04/12/2021 0 03     Lymphocytes Absolute 04/12/2021 1 06     Monocytes Absolute 04/12/2021 1 12     Eosinophils Absolute 04/12/2021 0 12     Basophils Absolute 04/12/2021 0 02     Sodium 04/12/2021 138     Potassium 04/12/2021 3 6     Chloride 04/12/2021 109*    CO2 04/12/2021 28     ANION GAP 04/12/2021 1*    BUN 04/12/2021 16     Creatinine 04/12/2021 1 29     Glucose 04/12/2021 104     Calcium 04/12/2021 8 8     Corrected Calcium 04/12/2021 9 4     AST 04/12/2021 21     ALT 04/12/2021 23     Alkaline Phosphatase 04/12/2021 136*    Total Protein 04/12/2021 8 1     Albumin 04/12/2021 3 3*    Total Bilirubin 04/12/2021 0 31     eGFR 04/12/2021 45     Lipase 04/12/2021 74     Troponin I 04/12/2021 <0 02     Color, UA 04/12/2021 Yellow     Clarity, UA 04/12/2021 Clear     pH, UA 04/12/2021 7 0     Leukocytes, UA 04/12/2021 Negative     Nitrite, UA 04/12/2021 Negative     Protein, UA 04/12/2021 Trace*    Glucose, UA 04/12/2021 Negative     Ketones, UA 04/12/2021 Negative     Urobilinogen, UA 04/12/2021 0 2     Bilirubin, UA 04/12/2021 Negative     Blood, UA 04/12/2021 Small*    Specific [place], UA 04/12/2021 1 015     RBC, UA 04/12/2021 2-4     WBC, UA 04/12/2021 None Seen     Epithelial Cells 04/12/2021 None Seen     Bacteria, UA 04/12/2021 None Seen     Hyaline Casts, UA 04/12/2021 None Seen     CA 19-9 04/12/2021 13     CEA 04/12/2021 <0 5     Sodium 04/13/2021 139     Potassium 04/13/2021 3 9     Chloride 04/13/2021 109*    CO2 04/13/2021 27     ANION GAP 04/13/2021 3*    BUN 04/13/2021 14     Creatinine 04/13/2021 0 98     Glucose 04/13/2021 95     Calcium 04/13/2021 8 8     eGFR 04/13/2021 62     WBC 04/13/2021 10 70*    RBC 04/13/2021 3 47*    Hemoglobin 04/13/2021 11 0*    Hematocrit 04/13/2021 32 9*    MCV 04/13/2021 95     MCH 04/13/2021 31 7     MCHC 04/13/2021 33 4     RDW 04/13/2021 13 6     MPV 04/13/2021 10 6     Platelets 36/28/4397 260     nRBC 04/13/2021 0     Neutrophils Relative 04/13/2021 72     Immat GRANS % 04/13/2021 0     Lymphocytes Relative 04/13/2021 21     Monocytes Relative 04/13/2021 6     Eosinophils Relative 04/13/2021 1     Basophils Relative 04/13/2021 0     Neutrophils Absolute 04/13/2021 7 76*    Immature Grans Absolute 04/13/2021 0 04     Lymphocytes Absolute 04/13/2021 2 20     Monocytes Absolute 04/13/2021 0 59     Eosinophils Absolute 04/13/2021 0 10     Basophils Absolute 04/13/2021 0 01     Hemoglobin A1C 04/13/2021 5 6     EAG 04/13/2021 114     Ventricular Rate 04/12/2021 77     Atrial Rate 04/12/2021 77     MN Interval 04/12/2021 172     QRSD Interval 04/12/2021 92     QT Interval 04/12/2021 366     QTC Interval 04/12/2021 414     P Axis 04/12/2021 53     QRS Axis 04/12/2021 -35     T Wave Axis 04/12/2021 66     POC Glucose 04/14/2021 79     WBC 04/14/2021 6 84     RBC 04/14/2021 3 52*    Hemoglobin 04/14/2021 11 3*    Hematocrit 04/14/2021 34 0*    MCV 04/14/2021 97     MCH 04/14/2021 32 1     MCHC 04/14/2021 33 2     RDW 04/14/2021 13 5     MPV 04/14/2021 11 1     Platelets 20/19/3106 249     nRBC 04/14/2021 0     Sodium 04/14/2021 141     Potassium 04/14/2021 3 5     Chloride 04/14/2021 108     CO2 04/14/2021 28     ANION GAP 04/14/2021 5     BUN 04/14/2021 10     Creatinine 04/14/2021 0 97     Glucose 04/14/2021 81     Calcium 04/14/2021 9 0     Corrected Calcium 04/14/2021 9 6     AST 04/14/2021 16     ALT 04/14/2021 24     Alkaline Phosphatase 04/14/2021 122*    Total Protein 04/14/2021 7 5     Albumin 04/14/2021 3 2*    Total Bilirubin 04/14/2021 0 46     eGFR 04/14/2021 63     Segmented % 04/14/2021 59     Lymphocytes % 04/14/2021 21     Monocytes % 04/14/2021 5     Eosinophils, % 04/14/2021 2     Basophils % 04/14/2021 0     Atypical Lymphocytes % 04/14/2021 13*    Absolute Neutrophils 04/14/2021 4 04     Lymphocytes Absolute 04/14/2021 1 44     Monocytes Absolute 04/14/2021 0 34     Eosinophils Absolute 04/14/2021 0 14     Basophils Absolute 04/14/2021 0 00     RBC Morphology 04/14/2021 Present     Anisocytosis 04/14/2021 Present     Polychromasia 04/14/2021 Present     Platelet Estimate 87/63/9307 Adequate     WBC 04/15/2021 9 74     RBC 04/15/2021 3 29*    Hemoglobin 04/15/2021 10 5*    Hematocrit 04/15/2021 31 7*    MCV 04/15/2021 96     MCH 04/15/2021 31 9     MCHC 04/15/2021 33 1     RDW 04/15/2021 13 3     MPV 04/15/2021 11 1     Platelets 62/35/7647 255     nRBC 04/15/2021 0     Neutrophils Relative 04/15/2021 63     Immat GRANS % 04/15/2021 0     Lymphocytes Relative 04/15/2021 27     Monocytes Relative 04/15/2021 9     Eosinophils Relative 04/15/2021 1     Basophils Relative 04/15/2021 0     Neutrophils Absolute 04/15/2021 6 06     Immature Grans Absolute 04/15/2021 0 03     Lymphocytes Absolute 04/15/2021 2 67     Monocytes Absolute 04/15/2021 0 85     Eosinophils Absolute 04/15/2021 0 10     Basophils Absolute 04/15/2021 0 03     Sodium 04/15/2021 141     Potassium 04/15/2021 3 6     Chloride 04/15/2021 105     CO2 04/15/2021 28     ANION GAP 04/15/2021 8     BUN 04/15/2021 22     Creatinine 04/15/2021 1 70*    Glucose 04/15/2021 92     Calcium 04/15/2021 9 0  Corrected Calcium 04/15/2021 9 7     AST 04/15/2021 18     ALT 04/15/2021 23     Alkaline Phosphatase 04/15/2021 109     Total Protein 04/15/2021 7 6     Albumin 04/15/2021 3 1*    Total Bilirubin 04/15/2021 0 44     eGFR 04/15/2021 32        Imaging Studies: I have personally reviewed pertinent imaging studies  [address] [facility]    Internal Medicine PGY-2  4/15/2021 12:02 PM

## 2021-04-16 ENCOUNTER — IMMUNIZATIONS (INPATIENT)
Dept: FAMILY MEDICINE CLINIC | Facility: HOSPITAL | Age: 61
DRG: 282 | End: 2021-04-16
Payer: COMMERCIAL

## 2021-04-16 VITALS
SYSTOLIC BLOOD PRESSURE: 134 MMHG | OXYGEN SATURATION: 96 % | HEART RATE: 67 BPM | HEIGHT: 66 IN | TEMPERATURE: 98.1 F | DIASTOLIC BLOOD PRESSURE: 57 MMHG | BODY MASS INDEX: 28.49 KG/M2 | WEIGHT: 177.25 LBS | RESPIRATION RATE: 20 BRPM

## 2021-04-16 DIAGNOSIS — Z23 ENCOUNTER FOR IMMUNIZATION: Primary | ICD-10-CM

## 2021-04-16 LAB
ANION GAP SERPL CALCULATED.3IONS-SCNC: 6 MMOL/L (ref 4–13)
BASOPHILS # BLD AUTO: 0.02 THOUSANDS/ΜL (ref 0–0.1)
BASOPHILS NFR BLD AUTO: 0 % (ref 0–1)
BUN SERPL-MCNC: 14 MG/DL (ref 5–25)
CALCIUM SERPL-MCNC: 9.2 MG/DL (ref 8.3–10.1)
CGA SERPL-MCNC: 54.5 NG/ML (ref 0–101.8)
CHLORIDE SERPL-SCNC: 104 MMOL/L (ref 100–108)
CO2 SERPL-SCNC: 29 MMOL/L (ref 21–32)
CREAT SERPL-MCNC: 1.05 MG/DL (ref 0.6–1.3)
EOSINOPHIL # BLD AUTO: 0.17 THOUSAND/ΜL (ref 0–0.61)
EOSINOPHIL NFR BLD AUTO: 2 % (ref 0–6)
ERYTHROCYTE [DISTWIDTH] IN BLOOD BY AUTOMATED COUNT: 13.2 % (ref 11.6–15.1)
GFR SERPL CREATININE-BSD FRML MDRD: 57 ML/MIN/1.73SQ M
GLUCOSE SERPL-MCNC: 85 MG/DL (ref 65–140)
HCT VFR BLD AUTO: 32.7 % (ref 34.8–46.1)
HGB BLD-MCNC: 10.7 G/DL (ref 11.5–15.4)
IMM GRANULOCYTES # BLD AUTO: 0.03 THOUSAND/UL (ref 0–0.2)
IMM GRANULOCYTES NFR BLD AUTO: 0 % (ref 0–2)
LYMPHOCYTES # BLD AUTO: 3 THOUSANDS/ΜL (ref 0.6–4.47)
LYMPHOCYTES NFR BLD AUTO: 36 % (ref 14–44)
MCH RBC QN AUTO: 31.7 PG (ref 26.8–34.3)
MCHC RBC AUTO-ENTMCNC: 32.7 G/DL (ref 31.4–37.4)
MCV RBC AUTO: 97 FL (ref 82–98)
MONOCYTES # BLD AUTO: 0.78 THOUSAND/ΜL (ref 0.17–1.22)
MONOCYTES NFR BLD AUTO: 9 % (ref 4–12)
NEUTROPHILS # BLD AUTO: 4.44 THOUSANDS/ΜL (ref 1.85–7.62)
NEUTS SEG NFR BLD AUTO: 53 % (ref 43–75)
NRBC BLD AUTO-RTO: 0 /100 WBCS
PLATELET # BLD AUTO: 233 THOUSANDS/UL (ref 149–390)
PMV BLD AUTO: 11.1 FL (ref 8.9–12.7)
POTASSIUM SERPL-SCNC: 3.6 MMOL/L (ref 3.5–5.3)
RBC # BLD AUTO: 3.38 MILLION/UL (ref 3.81–5.12)
SODIUM SERPL-SCNC: 139 MMOL/L (ref 136–145)
WBC # BLD AUTO: 8.44 THOUSAND/UL (ref 4.31–10.16)

## 2021-04-16 PROCEDURE — 80048 BASIC METABOLIC PNL TOTAL CA: CPT | Performed by: STUDENT IN AN ORGANIZED HEALTH CARE EDUCATION/TRAINING PROGRAM

## 2021-04-16 PROCEDURE — 85025 COMPLETE CBC W/AUTO DIFF WBC: CPT | Performed by: STUDENT IN AN ORGANIZED HEALTH CARE EDUCATION/TRAINING PROGRAM

## 2021-04-16 PROCEDURE — 99238 HOSP IP/OBS DSCHRG MGMT 30/<: CPT | Performed by: INTERNAL MEDICINE

## 2021-04-16 PROCEDURE — 0002A SARS-COV-2 / COVID-19 MRNA VACCINE (PFIZER-BIONTECH) 30 MCG: CPT

## 2021-04-16 PROCEDURE — 91300 SARS-COV-2 / COVID-19 MRNA VACCINE (PFIZER-BIONTECH) 30 MCG: CPT

## 2021-04-16 RX ORDER — OXYCODONE HYDROCHLORIDE 5 MG/1
TABLET ORAL
Qty: 30 TABLET | Refills: 0 | Status: SHIPPED | OUTPATIENT
Start: 2021-04-16 | End: 2021-09-01 | Stop reason: HOSPADM

## 2021-04-16 RX ORDER — DICYCLOMINE HYDROCHLORIDE 10 MG/1
10 CAPSULE ORAL
Qty: 180 CAPSULE | Refills: 0 | Status: SHIPPED | OUTPATIENT
Start: 2021-04-16 | End: 2021-12-22 | Stop reason: HOSPADM

## 2021-04-16 RX ORDER — ONDANSETRON 4 MG/1
4 TABLET, ORALLY DISINTEGRATING ORAL EVERY 6 HOURS PRN
Qty: 30 TABLET | Refills: 0 | Status: SHIPPED | OUTPATIENT
Start: 2021-04-16 | End: 2022-05-13 | Stop reason: ALTCHOICE

## 2021-04-16 RX ORDER — DIPHENHYDRAMINE HCL 25 MG
25 TABLET ORAL EVERY 6 HOURS PRN
Status: DISCONTINUED | OUTPATIENT
Start: 2021-04-16 | End: 2021-04-16 | Stop reason: HOSPADM

## 2021-04-16 RX ADMIN — HEPARIN SODIUM 5000 UNITS: 5000 INJECTION INTRAVENOUS; SUBCUTANEOUS at 06:05

## 2021-04-16 RX ADMIN — AMLODIPINE BESYLATE 10 MG: 10 TABLET ORAL at 09:16

## 2021-04-16 RX ADMIN — DICYCLOMINE HYDROCHLORIDE 10 MG: 10 CAPSULE ORAL at 06:06

## 2021-04-16 RX ADMIN — PAROXETINE HYDROCHLORIDE 10 MG: 20 TABLET, FILM COATED ORAL at 09:17

## 2021-04-16 RX ADMIN — ACETAMINOPHEN 975 MG: 325 TABLET ORAL at 06:05

## 2021-04-16 RX ADMIN — PANTOPRAZOLE SODIUM 40 MG: 40 TABLET, DELAYED RELEASE ORAL at 06:06

## 2021-04-16 RX ADMIN — POLYETHYLENE GLYCOL 3350 17 G: 17 POWDER, FOR SOLUTION ORAL at 09:18

## 2021-04-16 RX ADMIN — ESCITALOPRAM OXALATE 10 MG: 10 TABLET ORAL at 09:17

## 2021-04-16 NOTE — DISCHARGE INSTRUCTIONS
Cáncer pancreático   LO QUE NECESITA SABER:   El cáncer pancreático comienza en el páncreas  El páncreas se encuentra estefani detrás del BJDARIONHOLM  Produce enzimas que ayudan con la digestión de los alimentos  El páncreas también produce hormonas, conchis la [place], que contribuyen a estabilizar el nivel de azúcar (glucosa) en la [place]  INSTRUCCIONES SOBRE EL FIDELIA HOSPITALARIA:   Llame al número de emergencias local (911 en los [place]) en cualquiera de los siguientes casos:  · Si de repente siente un desvanecimiento y falta de aire  · Usted expectora kamla  Llame a addison médico si:  · Addison brazo o pierna se siente caliente, sensible y [place]  Se podría aury inflamado y chapin  · Addison dolor no mejora, incluso después de jihan el medicamento para el dolor  · Addison abdomen está más tegan de lo normal     · Usted tiene náusea nueva o creciente o está vomitando  · Usted tiene diarrea, heces de color malu o aceitosas y Matt Nelson  · Usted pierde peso, presenta ictericia o dolor de espalda, o estos síntomas empeoran  · Usted se siente deprimido, ansioso o incapaz de lidiar con addison condición  · Usted tiene preguntas o inquietudes acerca de addison condición o cuidado  Medicamentos: Es posible que usted necesite alguno de los siguientes:  · Puede administrarse podrían administrarse  Pregunte al médico cómo debe jihan melanie medicamento de forma pearson  Algunos medicamentos recetados para el dolor contienen acetaminofén  No tome otros medicamentos que contengan acetaminofén sin consultarlo con addison médico  Demasiado acetaminofeno puede causar daño al hígado  Los medicamentos recetados para el dolor podrían causar estreñimiento  Pregunte a addison médico conchis prevenir o tratar estreñimiento  · Las enzimas pancreáticas ayudan a que addison cuerpo digiera las proteínas, carbohidratos y grasas de los alimentos      · Las inyecciones de insulina se [place] para estabilizar el nivel de glucosa en la kamla  · [place] colette medicamentos conchis se le haya indicado  Consulte con santana médico si usted augustus que santana medicamento no le está ayudando o si presenta efectos secundarios  Infórmele si es alérgico a cualquier medicamento  Mantenga aby lista actualizada de los [place], las vitaminas y los productos herbales que ally  Incluya los siguientes datos de los medicamentos: cantidad, frecuencia y motivo de administración  Traiga con usted la lista o los envases de las píldoras a colette citas de seguimiento  Lleve la lista de los medicamentos con usted en jeff de aby emergencia  Cuidados personales:  · No fume  La nicotina y otras sustancias químicas que contienen los cigarrillos y cigarros pueden dañar los pulmones  Pida información a santana médico si usted actualmente fuma y necesita ayuda para dejar de fumar  Los cigarrillos electrónicos o el tabaco sin humo igualmente contienen nicotina  Consulte con santana médico antes de [employer]  · Coma comidas pequeñas marianela el día  Es posible que usted no sienta [place], kip es importante que coma  Aby nutrición [place] puede darle a usted más [place], mantiene santana peso y le ayuda a sentirse mejor  Un dietista puede ayudarlo a encontrar formas de consumir suficientes proteínas, calorías, vitaminas y minerales  Pregunte si usted necesita jihan suplementos enzimáticos para el páncreas con colette comidas para ayudarle con santana digestión  · [address] se le haya indicado  Pregunte cuánto líquido debe jihan cada día y cuáles líquidos son los más adecuados para usted  Es posible que necesite jihan más líquidos de lo habitual para evitar deshidratarse  [place] es especialmente cierto si usted tiene vómitos o diarrea debido a tratamientos contra el cáncer  · Limite o no consuma bebidas alcohólicas  El consumo de alcohol puede hacer que le sea más difícil manejar colette síntomas o efectos secundarios del [place]  Pregúntele a santana oncólogo antes de beber alcohol  Pregúntele también cuánto puede beber en un día o aby semana  · Ejercítese según indicaciones  El ejercicio puede aumentar santana nivel de energía y apetito  Pregúntele a santana médico cuánto ejercicio necesita usted y cuáles ejercicios son mejores para usted  Programe aby jessi de seguimiento con santana oncólogo según indicaciones: Usted necesitará regresar para que le realicen más exámenes  Anote colette preguntas para que se acuerde de hacerlas marianela colette visitas  Para apoyo y [place] información:  · [address]  [address]  [place] [address]  Phone: [phone]  Web Address: http://[employer]/  org    · [facility] [address]  Phone: [phone]  Web Address: http://[employer]/  [address] Information is for End User's use only and may not be sold, redistributed or otherwise used for commercial purposes  All illustrations and images included in CareNotes® are the copyrighted property of A D A M , Inc  or [address] [place] [address] es sólo para uso en educación  Santana intención no es darle un consejo médico sobre enfermedades o tratamientos  Colsulte con santana [place] farmacéutico antes de seguir cualquier régimen médico para saber si es seguro y efectivo para usted  Lesión Renal Aguda (LRA)  Se le diagnosticó aby lesión renal aguda (LRA)  La siguiente información se desarrolló para proporcionarle información sobre la LRA y [place]  ¿Qué es la Lesión Renal Aguda (LRA)? La LRA se produce cuando se reduce la función del riñón en un período corto de [place]  Esta afección genera aby acumulación de desechos en la kamla y puede causar retención de líquido, lo que produce inflamación en las piernas y dificultad para respirar  A veces llamada insuficiencia renal aguda, la LRA muchas veces puede revertirse si se detecta y trata rápido  ¿Cómo sabe si tiene aby LRA?   La LRA se diagnostica mediante la evaluación del funcionamiento de los riñones  [place] se realiza Yannick's obtención de [place] de kamla para medir el nivel de creatinina en [place]  A veces, la reducción de la producción de orina también puede indicar LRA  ¿Quién corre riesgo de padecer LRA? Cualquiera puede padecer LRA, kip, en general, la padecen personas que ya están enfermas y pueden estar internadas  Las personas corren un mayor riesgo de padecer LRA si cumplen alguna de las siguientes condiciones:   son Plons de 72 años   tienen presión arterial cece o baja   padecen aby enfermedad renal subyacente (conchis aby enfermedad renal crónica [ERC])   padecen aby enfermedad vascular periférica (endurecimiento de las arterias)   tienen enfermedades crónicas conchis aby enfermedad hepática, cardiopatía y diabetes   tienen un solo Nayeli Sheer son los síntomas de la LRA? Puede o no tener síntomas que sugieren aby lesión renal hasta que la LRA haya progresado  Algunos de los síntomas son los siguientes:   No producir suficiente orina   Mayor inflamación de las piernas    Sentirse cansado   Dificultad para respirar   Náuseas   Confusión nueva o que [place]      ¿Cuáles son las causas de la LRA? Las causas se dividen en kristel categorías:   La kamla que fluye a los riñones no es suficiente (p  ej , presión arterial baja, sangrado, diarrea, deshidratación)   Lesión directa en los riñones (p  ej , coágulos de [place], infecciones graves conchis sepsis, toxicidad de algún medicamento, tinte de contraste intravenoso usado para la cateterización cardíaca o tomografías)   Obstrucción de los tubos (uréteres) que drenan la orina de los riñones (p  ej , próstata agrandada, piedras en los riñones, coágulos de [place])    ¿Cuál es el tratamiento para la LRA? El tratamiento para la LRA se basa en corregir lo que la ocasionó  En general, implica eliminar la causa y jihan medidas para evitar un mayor daño en los riñones  [place] puede requerir [employer] de líquidos o medicamentos intravenosos o de diálisis temporal     La diálisis es un proceso en el que se [place] aby máquina que cumple la función de los riñones para eliminar los desechos y ayudar a corregir el equilibrio de electrolitos y líquido [place] riñones se recuperan  Si se requiere diálisis para tratar la LRA, el médico evaluará al paciente a diario para determinar si los riñones muestran signos de recuperación  Las evaluaciones diarias determinan por cuánto tiempo debe continuar la diálisis  Dependiendo de la causa y el lara del daño, un episodio de LRA puede resolverse en pocos días, varias semanas o varios meses  ¿Cuáles son los efectos a roselia plazo de tener un episodio de [place]?  Las [employer] padecen un episodio de [place] corren un mayor riesgo de sufrir otro episodio, así conchis otros problemas de [place], conchis aby enfermedad renal, un accidente cerebrovascular y [place] cardiopatía   En un pequeño número de personas que tuvieron aby enfermedad renal no reconocida, un episodio de LRA puede provocar aby enfermedad renal crónica (ERC) que requiere supervisión y tratamiento de por faheem  ¿Cómo se pueden evitar episodios futuros de LRA?  Asegúrese de realizar un seguimiento con el médico después de que le den el cece del \[facility]\"" y [place] [facility] análisis de kamla para volver a evaluar y supervisar la función renal    Si tiene diabetes, mantenga el azúcar en kamla dentro del rango objetivo y asista a las citas con el especialista en diabetes   Si tiene hipertensión arterial, contrólese la presión arterial de manera regular para asegurarse de que se encuentre dentro del rango objetivo    o Si ally medicamentos para la presión arterial llamados ACEI (inhibidores de la encima convertidora de angiotensina) o ARB (bloqueadores del receptor de angiotensina) (p  ej , Lisinopril, Enalapril, Diovan, [place]), el médico puede indicarle que saltee aby dosis o dos si Männimetsa Mack 69 deshidratado y la presión arterial está bajando   Evite jihan medicamentos conchis ALIS (fármacos antiinflamatorios no esteroides) e inhibidores de la MARTINEZ-2 (un tipo de [place]) que pueden ser nocivos para el funcionamiento de los riñones  Estos pueden incluir los medicamentos detallados en la siguiente tabla  Ejemplos de ALIS e inhibidores de la MARTINEZ-2   Hable con el médico o proveedor de atención médica antes de dejar de jihan cualquiera de los medicamentos que le hayan recetado  Celecoxib (CELEBREX) Ketoprofen (ORUDIS, ORUVAIL)   Diclofenac (VOLTAREN, CATAFLAM) Ketorolac (TORADOL)   Diflunisal (DOLOBID) Meloxicam (MOBIC)   Etodolac (LODINE) Nabumetone (RELAFEN)   Fenoprofeno (NALFON) Naproxeno (ALEVE, NAPROSYN, NAPRELAN, ANAPROX)   Flurbiprofeno (ANSAID) Oxaprozin (DAYPRO)   Ibuprofeno (MOTRIN, ADVIL) Piroxicam (FELDENE)   Indometacina (INDOCIN) Sulindac (CLINORIL)    Tolmetina (TOLETIN)     ¿Las personas que padecen LRA deben seguir algún tipo especial de dieta? Las personas que padecen LRA u otra enfermedad renal a menudo tienen niveles elevados de potasio y fósforo en la kamla  Para evitar mayores daños a los riñones y complicaciones, la mayoría de los médicos recomienda seguir aby dieta saludable que incluya alimentos con un bajo contenido de potasio y fósforo   Se recomienda limitar el consumo de potasio a 2,5 gramos por día y el de fósforo a 800 miligramos por día   Un dietista puede ayudarlo a aprender [place] tipo de [address]   Las tablas de la siguiente página pueden ayudarlo a elegir alimentos con un bajo contenido de potasio y fósforo  Los siguientes sitios web también son buenas grubbs de información:  Zayda at  org/nutrition/Kidney-Disease-Stages-1-4 Latoya escalera  https://www niddk nih gov/health-information/kidney-disease/chronic-kidney-disease-ckd/eating-nutrition  https://[facility]/health/articles/15641-renal-diet-basics  [employer] com cy    Si tiene [place] pregunta o inquietud sobre santana afección, comuníquese con el médico o el proveedor de [facility]  Estas tablas pueden ayudarlo a elegir alimentos con bajo contenido de potasio y fósforo    EVITE estos alimentos con alto contenido de fósforo*: ELIJA estos alimentos con bajo contenido de fósforo*:   Leche, pudín, yogur de origen animal y de diferentes variedades de soja Leche de arroz (no fortificada), sustitutos de crema no lácteos   Quesos duros, ricota, [employer], queso crema sin Emmit Fitch crema regular y bajo en grasa   Helado, yogur helado Sorbetes, paletas de frutas congeladas   Sopas hechas con Benuel Ebbing, frijoles, lentejas u otros ingredientes con alto contenido de fósforo Sopas de caldo o a base de agua o con otros ingredientes con bajo contenido de fósforo   Granos enteros, incluso panes integrales, galletas saladas, cereales, arroz y pastas, tortillas de maíz Granos refinados, incluso pan hdez, galletas saladas, cereales, arroz y pastas   Panes rápidos, galletas, panes de maíz, [place], panqueques, gofres, granola, germen de leeann Panecillos caseros refinados (blancos), roscas, madalenas, panecillos ingleses, galletas de azúcar, galletas de [place], pastel de [employer] (partidos, de ojos negros), frijoles (negros, garbanzos, pallares, rojos, blancos, pintos), lentejas Guisantes verdes (enlatados, congelados), judías verdes, judías [employer], gio riddle, beatris Stack res, gila wood, otros pescados   Kianna secos y semillas [place]om[place] de maíz   [place] u otras a base de melissa secos, tofu, hamburguesas vegetarianas o de soja Mermelada, jalea, miel   Chocolate, incluso bebidas con chocolate Caramelos de algarroba (con sabor a chocolate), caramelos duros, gominolas   [place], refrescos similares a Dr Isabella Hopkins, tés embotellados, cervezas Refrescos de lima-gabbie, ginger ale o cervezas de raíz, agua corriente, refrescos con sabor a vainilla, refrescos con sabor a uva   *Bonny siempre las etiquetas y [employer] alimentos con ingredientes que contengan "fos"  EVITE estos alimentos con alto contenido de potasio*: ELIJA estos alimentos con bajo contenido de potasio*:      Leche (sin grasa, con bajo contenido de [place], [place], [facility], de soja), yogur    [place] crema regular y bajo en grasa      Frijoles (blancos, lima), coles de Bruselas, espinaca, acelga, brócoli, aguacate, alcachofas, patatas, camotes, tomates/salsa de tomate, remolacha      Judías verdes, brotes de alfalfa, brotes de bambú (enlatados), col, zanahorias, coliflor, maíz, pepino, berenjena, endibia, [place], hongos, cebollas, rábanos, berros, castañas de [place] [address]), arroz, [place] [address], atún, Isak, [place], steven Bell, jeanna [facility], [place], spear, mariscos, [employer], papaya, [facility], Den Harvey, [facility], pasas y otros melissa secos, gricelda, [address], compota de [place], moras, frambuesas, peras, sandía, pepinos, janneth Keen, janneth roth, [address] [place], nueces de [place], Goran Cameron, manda ([facility])     Panecillos caseros refinados (blancos), roscas, panecillos mikala, macks divina [place], Parulkassandralaady [facility], [place], palomitas de [facility] springs, pretzels, espaguetis o [place], hummus      Jugo de tomate o de [place], jugo de ciruela    Néctar de papaya, see o samantha, cóctel de jugo de arándanos ira      [employer]

## 2021-04-16 NOTE — PLAN OF CARE
Problem: PAIN - ADULT  Goal: Verbalizes/displays adequate comfort level or baseline comfort level  Description: Interventions:  - Encourage patient to monitor pain and request assistance  - Assess pain using appropriate pain scale  - Administer analgesics based on type and severity of pain and evaluate response  - Implement non-pharmacological measures as appropriate and evaluate response  - Consider cultural and social influences on pain and pain management  - Notify physician/advanced practitioner if interventions unsuccessful or patient reports new pain  Outcome: Progressing     Problem: INFECTION - ADULT  Goal: Absence or prevention of progression during hospitalization  Description: INTERVENTIONS:  - Assess and monitor for signs and symptoms of infection  - Monitor lab/diagnostic results  - Monitor all insertion sites, i e  indwelling lines, tubes, and drains  - [place] appropriate cooling/warming therapies per order  - Administer medications as ordered  - Instruct and encourage patient and family to use good hand hygiene technique  - Identify and instruct in appropriate isolation precautions for identified infection/condition  Outcome: Progressing  Goal: Absence of fever/infection during neutropenic period  Description: INTERVENTIONS:  - Monitor WBC    Outcome: Progressing     Problem: SAFETY ADULT  Goal: Patient will remain free of falls  Description: INTERVENTIONS:  - Assess patient frequently for physical needs  -  Identify cognitive and physical deficits and behaviors that affect risk of falls    -  [place] fall precautions as indicated by assessment   - Educate patient/family on patient safety including physical limitations  - Instruct patient to call for assistance with activity based on assessment  - Modify environment to reduce risk of injury  - Consider OT/PT consult to assist with strengthening/mobility  Outcome: Progressing  Goal: Maintain or return to baseline ADL function  Description: INTERVENTIONS:  -  Assess patient's ability to carry out ADLs; assess patient's baseline for ADL function and identify physical deficits which impact ability to perform ADLs (bathing, care of mouth/teeth, toileting, grooming, dressing, etc )  - Assess/evaluate cause of self-care deficits   - Assess range of motion  - Assess patient's mobility; develop plan if impaired  - Assess patient's need for assistive devices and provide as appropriate  - Encourage maximum independence but intervene and supervise when necessary  - Involve family in performance of ADLs  - Assess for home care needs following discharge   - Consider OT consult to assist with ADL evaluation and planning for discharge  - Provide patient education as appropriate  Outcome: Progressing  Goal: Maintain or return mobility status to optimal level  Description: INTERVENTIONS:  - Assess patient's baseline mobility status (ambulation, transfers, stairs, etc )    - Identify cognitive and physical deficits and behaviors that affect mobility  - Identify mobility aids required to assist with transfers and/or ambulation (gait belt, sit-to-stand, lift, walker, cane, etc )  - [place] fall precautions as indicated by assessment  - Record patient progress and toleration of activity level on Mobility SBAR; progress patient to next Phase/Stage  - Instruct patient to call for assistance with activity based on assessment  - Consider rehabilitation consult to assist with strengthening/weightbearing, etc   Outcome: Progressing     Problem: DISCHARGE PLANNING  Goal: Discharge to home or other facility with appropriate resources  Description: INTERVENTIONS:  - Identify barriers to discharge w/patient and caregiver  - Arrange for needed discharge resources and transportation as appropriate  - Identify discharge learning needs (meds, wound care, etc )  - Arrange for interpretive services to assist at discharge as needed  - Refer to Case Management Department for coordinating discharge planning if the patient needs post-hospital services based on physician/advanced practitioner order or complex needs related to functional status, cognitive ability, or social support system  Outcome: Progressing     Problem: Knowledge Deficit  Goal: Patient/family/caregiver demonstrates understanding of disease process, treatment plan, medications, and discharge instructions  Description: Complete learning assessment and assess knowledge base  Interventions:  - Provide teaching at level of understanding  - Provide teaching via preferred learning methods  Outcome: Progressing     Problem: Nutrition/Hydration-ADULT  Goal: Nutrient/Hydration intake appropriate for improving, restoring or maintaining nutritional needs  Description: Monitor and assess patient's nutrition/hydration status for malnutrition  Collaborate with interdisciplinary team and initiate plan and interventions as ordered  Monitor patient's weight and dietary intake as ordered or per policy  Utilize nutrition screening tool and intervene as necessary  Determine patient's food preferences and provide high-protein, high-caloric foods as appropriate       INTERVENTIONS:  - Monitor oral intake, urinary output, labs, and treatment plans  - Assess nutrition and hydration status and recommend course of action  - Evaluate amount of meals eaten  - Assist patient with eating if necessary   - Allow adequate time for meals  - Recommend/ encourage appropriate diets, oral nutritional supplements, and vitamin/mineral supplements  - Order, calculate, and assess calorie counts as needed  - Assess need for intravenous fluids  - Provide specific nutrition/hydration education as appropriate  - Include patient/family/caregiver in decisions related to nutrition  Outcome: Progressing

## 2021-04-16 NOTE — DISCHARGE SUMMARY
INTERNAL MEDICINE RESIDENCY DISCHARGE SUMMARY     Dmitriy Valdez   63 y o  female  MRN: [id number]  Room/Bed: [facility] 72/[facility] [address] [place], [place] [address] [facility] GI LAB PRE/POST   Encounter: [id number]    Principal Problem:    Pancreatic mass  Active Problems:    Goals of care, counseling/discussion    YULIYA (acute kidney injury) ([facility] [address] )    Hypertension    Hyperlipemia    Leukocytosis    Depression      * Pancreatic mass  Assessment & Plan  Patient comes in with vomiting for 4 days, unable to tolerate solids, liquids, medications for 1 and half months, particularly worse over the last week  Associated with abdominal pain that radiates occasionally to the lower quadrants   Patient has history of 40 pack smoking history, breast cancer history status post chemo, radiation, mastectomy 18 years ago  Workup:  - CT demonstrates lobulated pancreatic head/neck hyperenhancing lesion measuring 19 x 18 x 20 mm  suspicious for a pancreatic malignancy, most typical of a neuroendocrine tumor   - MRCP demonstrates to 2 x 1 9 cm pancreatic head mass similar to neuroendocrine in appearance, no biliary dilation or obstruction and no metastases to liver  - Patient is status post EGD revealing C0M2 Short's esophagus s/p biopsy  Gastritis s/p biopsy for h pylori  And EUS: 1 7 cm oval, well defined mass at head of pancreas s/p core biopsies  - CT chest for staging completed, 2 nonspecific 3 and 4 mm pulmonary nodules identified  Will need initial 3 months follow-up with CT chest   - Medical oncology consulted, once diagnosis of pancreatic cancer established, discussion with Surgical Oncology regarding sequences of treatment either with upfront Whipple followed by chemo or new adjuvant chemotherapy followed by Whipple procedure    As outpatient basis, patient will need genetic consultation has she may have BRCA-2 mutation with her personal history of breast cancer at young age, probable pancreatic cancer as well as family history of ovarian cancer   - Surgical oncology consulted, awaiting pathology results, would likely need resection/Whipple once pathology is established  - A1c normal, CEA and CA 19-9 normal   - Somatostatin, glucagon, VIP, gastrin pending  Plan:   - Palliative care consult for assistance in symptom management and establishment of care  - Odansetron 4mg q6h PRN nausea  - Protonix 40 mg b i d  And Bentyl 10 mg q i d   - Pain regimen:   Oxycodone 5 mg q 4 hours moderate pain   Oxycodone 10 mg q 4 hours severe pain   Dilaudid 0 5 mg q 4 hours breakthrough pain   Prophylactic bowel regimen in place of Senokot-s and MiraLax  - Cleared for discharge from GI perspective with outpatient follow-up with Dr Marquita Diana  - Outpatient follow-up with Medical and Surgical Oncology  Goals of care, counseling/discussion  Assessment & Plan  Goals of care discussion in regards to new probable pancreatic cancer diagnosis  Patient is reasonable and practical   Number one goal as for her  with Alzheimer's dementia to be cared for  Patient has family support from children and grandchildren who were present at bedside  Patient is level 3 which is appropriate  Palliative care consulted, will follow-up outpatient for pain management  Spiritual care consulted    YULIYA (acute kidney injury) ([facility] [address] )  Assessment & Plan  Creatinine 1 7 from 0 9  Currently at baseline  Multifactorial; prerenal in the setting of poor p o  Intake and Lasix/TONI in the setting of recent contrast load on 4/12 and 4/13  Plan:  Complete IV fluids  Hold home lisinopril, restart on discharge      Depression  Assessment & Plan  Patient states compliance with medications  Patient was unable to tolerate oral medications for 1 week  Plan:  Restart home meds Lexapro 10 and paroxetine 10  Atarax 25 qhs prn     Outpatient follow-up with PCP   Consider only 1 SSRI at an increased dose      Leukocytosis  Assessment & Plan  Leukocytosis elevated of 14 9 on admission, denies any fevers or sick contacts  Leukocytosis improved to 8 4 this morning, resolved      Hyperlipemia  Assessment & Plan  Stable  Continue statin    Hypertension  Assessment & Plan  Patient is compliant with outpatient medications of lisinopril 20 mg and amlodipine 10 mg      Plan:  Restart home amlodipine  Hold home lisinopril in the setting of YULIYA  Restart home meds on discharge      [address]     Patient is a 77-year-old female past medical history of breast cancer status post chemo radiation and right partial mastectomy nearly 20 years ago, central hypertension and depression who presented to [address] [place] [address] before him for 1 month decreased p o  Intake and 1 week of intractable nausea and vomiting  Patient has had not felt disinterest in food and unable to tolerate solids, liquids, and medications, vomiting almost every time after she attempted p o  Intake  Vomitus was nonbilious nonbloody non bloody  At first, patient attributed to GERD as she denies any night sweats, fever, chills, chest pain, shortness of breath, palpitations, bloody stools, or syncope  While presenting to the ED:  Blood pressure elevated at 205/95 but saturating well on room air BMP remarkable for mild leukocytosis and elevated creatinine  CT chest abdomen pelvis demonstrated a 2 cm pancreatic mass in the head concerning for malignancy, and was admitted for subsequent workup  During her hospital stay, the patient was hemodynamically stable and placed on IV fluids for fluid hydration considering history of decreased p o  Intake and vomiting  Pain was managed adequately with infrequent opioids  GI, Medical Oncology, Surgical Oncology, palliative Care were all consulted  GI performed MRCP and EUS with fine-needle aspiration biopsy on 4/13 and 4/14 respectively   Biopsy results pending and the surgical oncology team recommended outpatient follow-up with consideration of Whipple procedure  Creatinine trended down back to baseline with white blood cell count  Patient remained afebrile and on room air  She eventually was transitioned to p o  Diet which she tolerated well  Patient seen examined today, only complaining of mild pain which she rates as 4/10  It is well controlled on her current regimen and she does not utilize it often  She is tolerating foods and liquids now and is motivated to go home  Patient is aware and understands importance and need for follow-up with Surgical Oncology for biopsy results, potential resection of mass and pending lab results  Physical Exam  Constitutional:       Appearance: She is obese  She is not ill-appearing  HENT:      Mouth/Throat:      Mouth: Mucous membranes are moist    Cardiovascular:      Rate and Rhythm: Normal rate and regular rhythm  Heart sounds: Murmur (Systolic ejection murmur best heard at the 2nd right intercostal space) present  No gallop  Pulmonary:      Breath sounds: Normal breath sounds  No wheezing or rales  Abdominal:      General: Bowel sounds are normal       Palpations: Abdomen is soft  Tenderness: There is abdominal tenderness (Mild to palpation epigastric region)  There is no guarding or rebound  Musculoskeletal:      Right lower leg: No edema  Left lower leg: No edema  Skin:     General: Skin is warm  Findings: No erythema  Neurological:      Mental Status: She is alert and oriented to person, place, and time  Psychiatric:         Mood and Affect: Mood normal          Behavior: Behavior normal          Thought Content: Thought content normal          Judgment: Judgment normal        DVT prophylaxis completed with Lovenox and SCVD boots    DISCHARGE INFORMATION     PCP at Discharge: TAN Prieto      Admitting Provider: Alejandrina Saunders MD  Admission Date: 4/12/2021    Discharge Provider: Alejandrina Saunders MD  Discharge Date:  04/16/2021    Discharge Disposition: Home/Self Care  Discharge Condition: fair  Discharge with Lines: no    Discharge Diet: regular diet  Activity Restrictions: none  Test Results Pending at Discharge:  pancreatic elastase, gastrin, chromogranin A, vasoactive peptide, glucagon, somatostatin, pancreatic FNA/bx    Discharge Diagnoses:  Principal Problem:    Pancreatic mass  Active Problems:    Goals of care, counseling/discussion    YULIYA (acute kidney injury) ([facility] [address] )    Hypertension    Hyperlipemia    Leukocytosis    Depression  Resolved Problems:    * No resolved hospital problems  *      Consulting Providers:   medical Oncology, Surgical Oncology, Gastroenterology    Diagnostic & Therapeutic Procedures Performed:  Xr Chest Pa & Lateral    Result Date: 4/13/2021  Impression: No acute cardiopulmonary disease  Workstation performed: [id number]     Ct Chest W Contrast    Result Date: 4/14/2021  Impression: Mild cardiomegaly and bilateral infiltrates with pattern suggesting CHF/fluid overload  2 small nonspecific 3 and 4 mm pulmonary nodules  Based on current Fleischner Society 2017 Guidelines on incidental pulmonary nodule, patients with a known malignancy are at increased risk of metastasis and should receive initial three month follow-up chest CT  Workstation performed: [id number]     Mri Abdomen W Wo Contrast And Mrcp    Result Date: 4/14/2021  Impression: 2 x 1 9 cm hyperenhancing mass pancreatic head  The imaging features suggest pancreatic neuroendocrine neoplasia This can be further characterized with endoscopic ultrasound No biliary dilation or pancreatic ductal obstruction No MRI evidence of metastatic disease in the liver parenchyma No retroperitoneal lymphadenopathy Patent Celiac trunk, SMA without evidence of any vascular encasement   Workstation performed: [id number]     Ct Abdomen Pelvis With Contrast    Result Date: 4/12/2021  Impression: Diffuse gastric thickening and multiple thickened loops of right sided small bowel in keeping with a gastritis/nonspecific enteritis  Lobulated pancreatic head/neck hyperenhancing lesion measuring 19 x 18 x 20 mm #2/29 and #601/65 suspicious for a pancreatic malignancy, most typical of a neuroendocrine tumor  The pancreatic neck body and tail distal to this area are diffusely fatty involuted/atrophic  Surgical consultation is recommended on a nonurgent basis  Some islet cell tumors, such as insulinoma, gastrinoma and VIPoma can can be associated with bowel abnormalities and could explain the above bowel findings  No bowel obstruction or bowel pneumatosis  The study was marked in [facility] for immediate notification  Workstation performed: [id number]       Code Status: Level 3 - DNAR and DNI  Advance Directive & Living Will: <no information>  Power of :    POLST:      Medications:  Current Discharge Medication List        Current Discharge Medication List        Current Discharge Medication List      CONTINUE these medications which have NOT CHANGED    Details   amLODIPine (NORVASC) 10 mg tablet Take 1 tablet (10 mg total) by mouth daily  Qty: 90 tablet, Refills: 3    Associated Diagnoses: Hypertension;  Hypertensive emergency      atorvastatin (LIPITOR) 40 mg tablet Take 1 tablet (40 mg total) by mouth every evening  Qty: 90 tablet, Refills: 3    Associated Diagnoses: Stroke-like symptoms      escitalopram (LEXAPRO) 10 mg tablet Take 1 tablet (10 mg total) by mouth every morning  Qty: 90 tablet, Refills: 1    Associated Diagnoses: Essential hypertension      hydrOXYzine HCL (ATARAX) 25 mg tablet Take 25 mg by mouth daily at bedtime as needed  Refills: 0      lisinopril (ZESTRIL) 20 mg tablet Take 1 tablet (20 mg total) by mouth daily  Qty: 90 tablet, Refills: 1    Associated Diagnoses: Essential hypertension      PARoxetine (PAXIL) 10 mg tablet       triamcinolone (KENALOG) 0 1 % ointment Apply 1 application topically 2 (two) times a day  Qty: 30 g, Refills: 2    Associated Diagnoses: Rash Allergies:  No Known Allergies    FOLLOW-UP     PCP Outpatient Follow-up:  Yes, within 1-2 weeks    Consulting Providers Follow-up:  Surgical Oncology and Gastroenterology, within 1-2 weeks     Active Issues Requiring Follow-up:   Pancreatic head mass    Discharge Statement:   I spent 30 minutes minutes discharging the patient  This time was spent on the day of discharge  I had direct contact with the patient on the day of discharge  Additional documentation is required if more than 30 minutes were spent on discharge  Portions of the record may have been created with voice recognition software  Occasional wrong word or "sound a like" substitutions may have occurred due to the inherent limitations of voice recognition software    Read the chart carefully and recognize, using context, where substitutions have occurred     ==  Darrel Adjutant, [facility]  Internal Medicine Resident PGY-1

## 2021-04-16 NOTE — RESTORATIVE TECHNICIAN NOTE
Restorative Specialist Mobility Note       Activity: Ambulate in ny, Ambulate in room, Bathroom privileges, Chair, Dangle, Stand at bedside(Educated/encouraged pt to ambulate with assistance 3-4 x's/day  Bed alarm on   Pt callbell, phone/tray within reach )     Assistive Device: None       Frances WEBER, Restorative Technician, [employer] [facility]

## 2021-04-19 ENCOUNTER — TRANSITIONAL CARE MANAGEMENT (OUTPATIENT)
Dept: INTERNAL MEDICINE CLINIC | Facility: CLINIC | Age: 61
End: 2021-04-19

## 2021-04-19 ENCOUNTER — TELEPHONE (OUTPATIENT)
Dept: PALLIATIVE MEDICINE | Facility: CLINIC | Age: 61
End: 2021-04-19

## 2021-04-19 ENCOUNTER — TELEPHONE (OUTPATIENT)
Dept: INTERNAL MEDICINE CLINIC | Facility: CLINIC | Age: 61
End: 2021-04-19

## 2021-04-19 NOTE — TELEPHONE ENCOUNTER
I reached out patient to schedule a follow up appointment, per patient " she will like to wait until her results to give us a call to schedule an appointment"  Awaiting a call back

## 2021-04-19 NOTE — UTILIZATION REVIEW
Notification of Discharge   This is a Notification of Discharge from our facility [address]  Please be advised that this patient has been discharge from our facility  Below you will find the admission and discharge date and time including the patients disposition  UTILIZATION REVIEW CONTACT:  Stiven Cash  Utilization   Network Utilization Review Department  Phone: [phone] x carefully listen to the prompts  All voicemails are confidential   Email: [email or web address]     PHYSICIAN ADVISORY SERVICES:  FOR NYND-IS-LMBM REVIEW - MEDICAL NECESSITY DENIAL  Phone: [phone]  Fax: [phone]  Email: [email or web address]     PRESENTATION DATE: 4/12/2021  4:22 PM  OBERVATION ADMISSION DATE:   CHANGE FROM OBSERVATION TO INPATIENT: [unfilled]   INPATIENT ADMISSION DATE: 4/12/21 1849   DISCHARGE DATE: 4/16/2021 12:01 PM  DISPOSITION: Home/Self Care Home/Self Care      IMPORTANT INFORMATION:  Send all requests for admission clinical reviews, approved or denied determinations and any other requests to dedicated fax number below belonging to the campus where the patient is receiving treatment   List of dedicated fax numbers:  [address] [place] [address] DENIALS (Administrative/Medical Necessity) [phone]   [address] (Maternity/NICU/Pediatrics) [phone]   Kris Steele [phone]   Matt Head [phone]   Barbara Dean [phone]   Darren Giraldo [facility] [address] [facility] [phone]   [facility]  [phone]   [address] [place] [address], [address]  [address] [phone]

## 2021-04-20 LAB
GLUCAGON SERPL-MCNC: NORMAL PG/ML
VIP SERPL-MCNC: NORMAL PG/ML

## 2021-04-21 ENCOUNTER — APPOINTMENT (OUTPATIENT)
Dept: LAB | Facility: HOSPITAL | Age: 61
End: 2021-04-21
Payer: COMMERCIAL

## 2021-04-21 ENCOUNTER — TELEPHONE (OUTPATIENT)
Dept: HEMATOLOGY ONCOLOGY | Facility: CLINIC | Age: 61
End: 2021-04-21

## 2021-04-21 DIAGNOSIS — N17.9 AKI (ACUTE KIDNEY INJURY) (HCC): ICD-10-CM

## 2021-04-21 LAB
ANION GAP SERPL CALCULATED.3IONS-SCNC: 3 MMOL/L (ref 4–13)
BUN SERPL-MCNC: 14 MG/DL (ref 5–25)
CALCIUM SERPL-MCNC: 9 MG/DL (ref 8.3–10.1)
CHLORIDE SERPL-SCNC: 110 MMOL/L (ref 100–108)
CO2 SERPL-SCNC: 28 MMOL/L (ref 21–32)
CREAT SERPL-MCNC: 1.01 MG/DL (ref 0.6–1.3)
GFR SERPL CREATININE-BSD FRML MDRD: 60 ML/MIN/1.73SQ M
GLUCOSE SERPL-MCNC: 94 MG/DL (ref 65–140)
POTASSIUM SERPL-SCNC: 4.2 MMOL/L (ref 3.5–5.3)
SODIUM SERPL-SCNC: 141 MMOL/L (ref 136–145)

## 2021-04-21 PROCEDURE — 36415 COLL VENOUS BLD VENIPUNCTURE: CPT

## 2021-04-21 PROCEDURE — 80048 BASIC METABOLIC PNL TOTAL CA: CPT

## 2021-04-21 NOTE — TELEPHONE ENCOUNTER
New Patient GI Form   Patient Details:  Eulalia Mccullough  1960  [id number]     Background Information:  [address] starts by opening a telephone encounter and gathering the following information   Who is calling to schedule and relationship?  self / Alejandro Rosalind (caregiver)   To which speciality is the referral?  Surgical Oncology   Reason for Visit? New Diagnosis   Tumor Type? Pancreatic Mass   Is there a confimed diagnosis from biopsy/tissue reviewed by Pathology? Yes   Date of Tissue Diagnosis  (If done outside of [facility] please obtain report and slides)  (If no tissue diagnosis, please stop and discuss with Navigator prior to scheduling)  Results Pending   Is patient aware of the diagnosis? Yes   Has Imaging been completed? Yes   If YES, where was the imaging done? (If outside [place] [address] obtain records)     Have any endoscopies been done (colonoscopy, EGD, EUS)  Yes   If YES where were they performed? (If outside of [address] [place] [address] obtain the records)  [facility]   Has blood work been done? Yes    If YES, where was the blood work done? (If outside of [facility] obtain records)  [facility]   Is there a personal history of cancer? (If YES please list type)  Yes - Breast (2000)   Is there a family history of cancer? (If YES please list type)  Yes - throat / stomach   Scheduling Information:   Preferred [facility]   Are there any days the patient cannot be seen? Miscellaneous:     After completing the above information, please route to finance, nurse navigation and clinical trials for review

## 2021-04-22 ENCOUNTER — PATIENT OUTREACH (OUTPATIENT)
Dept: INTERNAL MEDICINE CLINIC | Facility: CLINIC | Age: 61
End: 2021-04-22

## 2021-04-22 ENCOUNTER — OFFICE VISIT (OUTPATIENT)
Dept: INTERNAL MEDICINE CLINIC | Facility: CLINIC | Age: 61
End: 2021-04-22

## 2021-04-22 VITALS
HEART RATE: 68 BPM | BODY MASS INDEX: 29.88 KG/M2 | WEIGHT: 175 LBS | HEIGHT: 64 IN | OXYGEN SATURATION: 97 % | SYSTOLIC BLOOD PRESSURE: 151 MMHG | DIASTOLIC BLOOD PRESSURE: 67 MMHG | TEMPERATURE: 97.5 F

## 2021-04-22 DIAGNOSIS — K22.70 BARRETT'S ESOPHAGUS WITHOUT DYSPLASIA: ICD-10-CM

## 2021-04-22 DIAGNOSIS — L40.9 PSORIASIS: ICD-10-CM

## 2021-04-22 DIAGNOSIS — I10 HYPERTENSION: ICD-10-CM

## 2021-04-22 DIAGNOSIS — N17.9 AKI (ACUTE KIDNEY INJURY) (HCC): Primary | ICD-10-CM

## 2021-04-22 DIAGNOSIS — Z78.9 NEEDS ASSISTANCE WITH COMMUNITY RESOURCES: Primary | ICD-10-CM

## 2021-04-22 DIAGNOSIS — I16.1 HYPERTENSIVE EMERGENCY: ICD-10-CM

## 2021-04-22 DIAGNOSIS — R21 RASH: ICD-10-CM

## 2021-04-22 DIAGNOSIS — I10 ESSENTIAL HYPERTENSION: ICD-10-CM

## 2021-04-22 PROCEDURE — 1036F TOBACCO NON-USER: CPT | Performed by: INTERNAL MEDICINE

## 2021-04-22 PROCEDURE — 99214 OFFICE O/P EST MOD 30 MIN: CPT | Performed by: INTERNAL MEDICINE

## 2021-04-22 RX ORDER — LISINOPRIL 20 MG/1
20 TABLET ORAL DAILY
Qty: 90 TABLET | Refills: 3 | Status: SHIPPED | OUTPATIENT
Start: 2021-04-22 | End: 2021-11-15 | Stop reason: SDUPTHER

## 2021-04-22 RX ORDER — ESCITALOPRAM OXALATE 10 MG/1
10 TABLET ORAL EVERY MORNING
Qty: 90 TABLET | Refills: 3 | Status: SHIPPED | OUTPATIENT
Start: 2021-04-22 | End: 2022-05-13 | Stop reason: ALTCHOICE

## 2021-04-22 RX ORDER — OMEPRAZOLE 40 MG/1
40 CAPSULE, DELAYED RELEASE ORAL DAILY
Qty: 30 CAPSULE | Refills: 0 | Status: SHIPPED | OUTPATIENT
Start: 2021-04-22 | End: 2021-12-22 | Stop reason: HOSPADM

## 2021-04-22 RX ORDER — AMLODIPINE BESYLATE 10 MG/1
10 TABLET ORAL DAILY
Qty: 90 TABLET | Refills: 3 | Status: SHIPPED | OUTPATIENT
Start: 2021-04-22 | End: 2021-11-15 | Stop reason: SDUPTHER

## 2021-04-22 NOTE — PROGRESS NOTES
JHONY  Has met with pt and her dgt Ernestine Landers this date as per pt request for assistance with community resources  Pt's dgt assisted with translation as per pt request    Pt shared that she is being evaluated for pancreatic cancer  Support provided  Dgt asked about transportation services as she works and lives in the [place] area  She is trying to help with trasnportion for pt and her step fathrer but is is difficult  SW has explained the [employer] and  offered to help pt apply as well as her  who is also a pt in our practice  Pt requested help to do  application which Sw did  assist with   Pt declined for herself  SW has reviewed at she should ask the [id number] team for assistance if needed in getting to any of her cancer TXs  Pt/family to f/u with JHONY if needed

## 2021-04-22 NOTE — PROGRESS NOTES
INTERNAL MEDICINE FOLLOW-UP OFFICE VISIT  [facility]  [address] [facility], [address]    NAME: Cheng Ferrell  AGE: 63 y o  SEX: female    DATE OF ENCOUNTER: 4/22/2021    Assessment and Plan     TCM Visit (patient was admitted on 04/12/2021 and discharged on 04/16/2021)   patient  presented with complaint  of decreased p o  Intake, intractable nausea and  nonbloody vomiting associated with abdominal pain x 1 month duration  - CT abdomen pelvis  demonstrates lobulated pancreatic head/neck hyperenhancing lesion measuring 19 x 18 x 20 mm  suspicious for a pancreatic malignancy, most typical of a neuroendocrine tumor   - MRCP  demonstrates to 2 x 1 9 cm pancreatic head mass similar to neuroendocrine in appearance, no biliary dilation or obstruction and no metastases to liver  - She is now s/p EUS showing 1 7 cm oval, well defined mass at head of pancreas and  Initial biopsy results nondiagnostic, showed  Paucicellular specimen consisting of ductal epithelial cells and lymphocytes  She is currently awaiting core pathology results to determine further management      -  CT chest for staging completed, 2 nonspecific 3 and 4 mm pulmonary nodules identified  Will need initial 3 months follow-up with CT chest   -  GI,  Palliative Care,medical /surgical oncology were consulted  While in the hospital and she has upcoming appointment with the different specialties  - Per Surgical oncology,  would likely need resection/Whipple once pathology is established  -  Today patient admits to resolution of her nausea, vomiting and abdominal pain  She admits to occasional heartburn after eating  EGD  Was significant for Short's esophagitis but biopsy showed no dysplasia/ metaplasia  I ordered omeprazole 40 mg daily on till she sees GI on 05/11/2021   -   BP elevated today will restart home lisinopril as kidney function is almost back to normal   Recheck BMP in 3-4 weeks    - Patient stable on exam denies SI/HI has no complaint at this time 10 point review of system unremarkable except that listed in my HPI   -  Schedule a follow-up appointment in 4 weeks /with PCP      2  Psoriasis  Patient with psoriatic rash on her knuckles and wrist bilaterally    - triamcinolone (KENALOG) 0 1 % ointment; Apply 1 application topically 2 (two) times a day  Dispense: 30 g; Refill: 2  - Ambulatory referral to Dermatology; Future  ordered today    3  Essential hypertension blood pressure today 151/67  Patient's lisinopril was held while hospitalized due to YULIYA  Repeat BMP shows creatinine 1 01 from 1 72 on admission, her baseline creatinine is 0 92   -  Will restart patient's lisinopril 20 mg daily and repeat BMP in 3 weeks    - lisinopril (ZESTRIL) 20 mg tablet; Take 1 tablet (20 mg total) by mouth daily  Dispense: 90 tablet; Refill: 3  - amLODIPine (NORVASC) 10 mg tablet; Take 1 tablet (10 mg total) by mouth daily  Dispense: 90 tablet; Refill: 3        4  Depression patient has no depressive symptoms she denies SI/HI  Will continue her current dose of antidepressant and can increase in the future as needed  - escitalopram (LEXAPRO) 10 mg tablet; Take 1 tablet (10 mg total) by mouth every morning  Dispense: 90 tablet; Refill: 3    5  YULIYA (acute kidney injury) ([address] )  Baseline creatinine 0 92  Creatinine on admission is 1 72 most recent on 04/21/2021 is 1 01  Kidney function almost back to baseline  Will restart held lisinopril 20 mg daily at this time as her blood pressure is elevated 151/67 today with home SBP is  150 -170s/80s    - Basic metabolic panel; Future in 3 weeks    6  Short's esophagus without dysplasia   most recent EGD 04/13/2021 significant for Short's esophagus, biopsy resolved of the esophagus and stomach showed no metaplasia or dysplasia  She admits to acid reflux    Patient was counseled to avoid NSAIDs, spicy food, carbonated drinks, peppermint, chocolate, elevate head of bed, avoid eating 2-3 hours before bedtime  And she verbalized understanding  She has an upcoming appointment with GI on 05/11/2021  Will start a trial of PPI at this time  - omeprazole (PriLOSEC) 40 MG capsule; Take 1 capsule (40 mg total) by mouth daily  Dispense: 30 capsule; Refill: 0          Orders Placed This Encounter   Procedures    Basic metabolic panel    Ambulatory referral to Dermatology       - Counseling Documentation: patient was counseled regarding: diagnostic results, prognosis, risks and benefits of treatment options and importance of compliance with treatment    Chief Complaint     Chief Complaint   Patient presents with    Transition of Care Management       History of Present Illness     HPI    Patient is a 59-year-old female With a past medical history of breast cancer  s/p chemo radiation and right partial mastectomy  about 20 years ago,hypertension , former smoker 40 pack years quit 14years ago, and depression is here for a TCM visit  She presented to  The ED on 04/12/2021 with complaint  of decreased p o  Intake, intractable nausea and  Non- bloody vomiting x 1 month duration  She was found to have elevated blood pressure 205/95, leukocytosis which is now resolved, creatinine was 1 7 from baseline 0 92, she was started on IV fluids, pain medication  She had a CT abdomen pelvis that showed  lobulated pancreatic head/neck hyperenhancing lesion measuring 19 x 18 x 20 mm  suspicious for a pancreatic malignancy, most typical of a neuroendocrine tumor  GI was consulted,  Palliative Care,medical /surgical oncology were consulted  She is now s/p MRCP, EUS, EGD and fine-needle biopsy on 04/13  MRCP demonstrated to 2 x 1 9 cm pancreatic head mass similar to neuroendocrine in appearance, no biliary dilation or obstruction and no metastases to liver , EUS showed  1 7 cm oval, well defined mass at head of pancreas s/p core biopsies     Initial FNA result was non- diagnostic, showed Paucicellular specimen consisting of ductal epithelial cells and lymphocytes  This  Results was discussed with patient   And daughter during this encounter  She is currently awaiting core  Pathology results which she reports was told by medical oncology will be available in about 10 days, today is day 7  She has upcoming appointment on 05/2021 with medical oncology  Patient was seen and examined by bedside today she admits to resolution of her nausea and vomiting, she admits to occasional heartburn after eating ( regardless of the type of food) lasting about 5 minutes and resolves on its own ( she likes/ eats) spicy food  Patient was counseled to avoid spicy food, so carbonated drinks, peppermint, chocolate, elevate head of bed, avoid eating 2-3 hours before bedtime  And she verbalized understanding  She had a repeat BMP on 04/21/2021 which shows improved creatinine of 1 01  From 1 72 on admission,baseline Cr is 0 92,  Will restart patient's lisinopril  Which was held in the hospital due to YULIYA today as  Her kidney function is almost back to baseline and her blood pressure is elevated 151/67, home BPs have maintained systolic's 507-265/62  She will have a recheck BMP within 3-4 weeks which is ordered today  She denies HI/ SI she denies depressive symptoms and has been preoccupied with her grandson  She has good family support  I encouraged patient to keep up with all her appointments  She denies headaches, lightheadedness, chest pain, shortness of breath, palpitation, numbness tingling sensation of extremity  Patient looks stable not in any obvious distress  On room air      The following portions of the patient's history were reviewed and updated as appropriate: allergies, current medications, past family history, past medical history, past social history, past surgical history and problem list     Review of Systems     Review of Systems 12 point review of system unremarkable except that listed in my HPI above    Active Problem List     Patient Active Problem List   Diagnosis    Acute low back pain due to trauma    Benign essential hypertension    Vision disturbance    Hypertensive urgency    Noncompliance with medications    Stroke-like symptoms    Incidental pulmonary nodule    Hypertension    Hyperlipemia    Tarsal tunnel syndrome, left    Pancreatic mass    Leukocytosis    Depression    Goals of care, counseling/discussion    YULIYA (acute kidney injury) ([facility])       Objective     /67 (BP Location: Left arm, Patient Position: Sitting, Cuff Size: Standard)   Pulse 68   Temp 97 5 °F (36 4 °C) (Temporal)   Ht 5' 4" (1 626 m)   Wt 79 4 kg (175 lb)   LMP  (LMP Unknown)   SpO2 97%   BMI 30 04 kg/m²     Physical Exam  Constitutional:       Appearance: Normal appearance  HENT:      Head: Normocephalic and atraumatic  Mouth/Throat:      Mouth: Mucous membranes are moist    Eyes:      General: No scleral icterus  Extraocular Movements: Extraocular movements intact  Neck:      Musculoskeletal: Normal range of motion and neck supple  No muscular tenderness  Cardiovascular:      Rate and Rhythm: Normal rate and regular rhythm  Pulses: Normal pulses  Heart sounds: Normal heart sounds  Pulmonary:      Effort: Pulmonary effort is normal       Breath sounds: Normal breath sounds  Abdominal:      General: Bowel sounds are normal  There is no distension  Tenderness: There is no abdominal tenderness  Musculoskeletal: Normal range of motion  General: No swelling or tenderness  Skin:     General: Skin is warm and dry  Capillary Refill: Capillary refill takes less than 2 seconds  Findings: Rash present  Comments: Psoriatic rash on bilateral knuckles and wrist   Neurological:      General: No focal deficit present  Mental Status: She is alert and oriented to person, place, and time  Mental status is at baseline     Psychiatric:         Mood and Affect: Mood normal          Behavior: Behavior normal          Pertinent Laboratory/Diagnostic Studies:  CBC:   Lab Results   Component Value Date/Time    WBC 8 44 04/16/2021 04:55 AM    RBC 3 38 (L) 04/16/2021 04:55 AM    HGB 10 7 (L) 04/16/2021 04:55 AM    HCT 32 7 (L) 04/16/2021 04:55 AM    MCV 97 04/16/2021 04:55 AM    MCH 31 7 04/16/2021 04:55 AM    MCHC 32 7 04/16/2021 04:55 AM    RDW 13 2 04/16/2021 04:55 AM    MPV 11 1 04/16/2021 04:55 AM     04/16/2021 04:55 AM    NRBC 0 04/16/2021 04:55 AM    NEUTOPHILPCT 53 04/16/2021 04:55 AM    LYMPHOPCT 36 04/16/2021 04:55 AM    MONOPCT 9 04/16/2021 04:55 AM    EOSPCT 2 04/16/2021 04:55 AM    BASOPCT 0 04/16/2021 04:55 AM    NEUTROABS 4 44 04/16/2021 04:55 AM    LYMPHSABS 3 00 04/16/2021 04:55 AM    MONOSABS 0 78 04/16/2021 04:55 AM    EOSABS 0 17 04/16/2021 04:55 AM     Chemistry Profile:   Results from Last 12 Months   Lab Units 04/21/21  0857  04/15/21  0526   POTASSIUM mmol/L 4 2   < > 3 6   CHLORIDE mmol/L 110*   < > 105   CO2 mmol/L 28   < > 28   BUN mg/dL 14   < > 22   CREATININE mg/dL 1 01   < > 1 70*   GLUCOSE RANDOM mg/dL 94   < > 92   CALCIUM mg/dL 9 0   < > 9 0   CORRECTED CALCIUM mg/dL  --   --  9 7   AST U/L  --   --  18   ALT U/L  --   --  23   ALK PHOS U/L  --   --  109   EGFR ml/min/1 73sq m 60   < > 32    < > = values in this interval not displayed         Current Medications     Current Outpatient Medications:     amLODIPine (NORVASC) 10 mg tablet, Take 1 tablet (10 mg total) by mouth daily, Disp: 90 tablet, Rfl: 3    atorvastatin (LIPITOR) 40 mg tablet, Take 1 tablet (40 mg total) by mouth every evening, Disp: 90 tablet, Rfl: 3    escitalopram (LEXAPRO) 10 mg tablet, Take 1 tablet (10 mg total) by mouth every morning, Disp: 90 tablet, Rfl: 3    hydrOXYzine HCL (ATARAX) 25 mg tablet, Take 25 mg by mouth daily at bedtime as needed, Disp: , Rfl: 0    lisinopril (ZESTRIL) 20 mg tablet, Take 1 tablet (20 mg total) by mouth daily, Disp: 90 tablet, Rfl: 3    ondansetron (ZOFRAN-ODT) 4 mg disintegrating tablet, Take 1 tablet (4 mg total) by mouth every 6 (six) hours as needed for nausea or vomiting, Disp: 30 tablet, Rfl: 0    oxyCODONE (ROXICODONE) 5 mg immediate release tablet, Take 1-2 tablets PO Q4H PRN moderate-severe pain, Disp: 30 tablet, Rfl: 0    triamcinolone (KENALOG) 0 1 % ointment, Apply 1 application topically 2 (two) times a day, Disp: 30 g, Rfl: 2    dicyclomine (BENTYL) 10 mg capsule, Take 1 capsule (10 mg total) by mouth 4 (four) times a day (before meals and at bedtime) (Patient not taking: Reported on 4/22/2021), Disp: 180 capsule, Rfl: 0    omeprazole (PriLOSEC) 40 MG capsule, Take 1 capsule (40 mg total) by mouth daily, Disp: 30 capsule, Rfl: 0    Health Maintenance     Health Maintenance   Topic Date Due    BMI: Followup Plan  Never done    Annual Physical  Never done    Cervical Cancer Screening  Never done    Colorectal Cancer Screening  Never done    MAMMOGRAM  03/26/2019    OT PLAN OF CARE  10/14/2020    Depression Remission PHQ  05/21/2021    BMI: Adult  04/16/2022    DTaP,Tdap,and Td Vaccines (2 - Td) 02/08/2028    HIV Screening  Completed    Hepatitis C Screening  Completed    Influenza Vaccine  Completed    COVID-19 Vaccine  Completed    Pneumococcal Vaccine: Pediatrics (0 to 5 Years) and At-Risk Patients (6 to 59 Years)  Aged Out    HIB Vaccine  Aged Out    Hepatitis B Vaccine  Aged Out    IPV Vaccine  Aged Out    Hepatitis A Vaccine  Aged Out    Meningococcal ACWY Vaccine  Aged Out    HPV Vaccine  Aged [employer] History   Administered Date(s) Administered    INFLUENZA 10/27/2017    Influenza Quadrivalent, 6-35 Months IM 10/27/2017    Influenza, recombinant, quadrivalent,injectable, preservative free 11/25/2019, 10/14/2020    SARS-CoV-2 / COVID-19 mRNA IM (Pfizer-BioNTech) 03/26/2021, 04/16/2021    Tdap 02/08/2018       Ryan MAGAÑA    Internal Medicine PGY-2  4/22/2021 3:11 PM

## 2021-05-03 ENCOUNTER — CONSULT (OUTPATIENT)
Dept: SURGICAL ONCOLOGY | Facility: CLINIC | Age: 61
End: 2021-05-03
Payer: COMMERCIAL

## 2021-05-03 VITALS
BODY MASS INDEX: 29.71 KG/M2 | WEIGHT: 174 LBS | SYSTOLIC BLOOD PRESSURE: 128 MMHG | HEIGHT: 64 IN | RESPIRATION RATE: 18 BRPM | HEART RATE: 90 BPM | DIASTOLIC BLOOD PRESSURE: 84 MMHG | TEMPERATURE: 97.8 F | OXYGEN SATURATION: 98 %

## 2021-05-03 DIAGNOSIS — K86.89 PANCREATIC MASS: Primary | ICD-10-CM

## 2021-05-03 PROCEDURE — 3074F SYST BP LT 130 MM HG: CPT | Performed by: SURGERY

## 2021-05-03 PROCEDURE — 3008F BODY MASS INDEX DOCD: CPT | Performed by: SURGERY

## 2021-05-03 PROCEDURE — 3079F DIAST BP 80-89 MM HG: CPT | Performed by: SURGERY

## 2021-05-03 PROCEDURE — 99214 OFFICE O/P EST MOD 30 MIN: CPT | Performed by: SURGERY

## 2021-05-03 PROCEDURE — 1036F TOBACCO NON-USER: CPT | Performed by: SURGERY

## 2021-05-03 PROCEDURE — 3008F BODY MASS INDEX DOCD: CPT | Performed by: INTERNAL MEDICINE

## 2021-05-03 RX ORDER — TRAZODONE HYDROCHLORIDE 50 MG/1
TABLET ORAL
COMMUNITY
Start: 2021-04-09

## 2021-05-03 RX ORDER — SERTRALINE HYDROCHLORIDE 25 MG/1
25 TABLET, FILM COATED ORAL DAILY
COMMUNITY
Start: 2021-04-09 | End: 2022-05-13 | Stop reason: SDUPTHER

## 2021-05-03 NOTE — LETTER
May 3, 2021     [address]  [address] [address]    Patient: Valeen Mcardle   YOB: 1960   Date of Visit: 5/3/2021       Dear Dr Stephon Chappell: Thank you for referring Valeen Mcardle to me for evaluation  Below are my notes for this consultation  If you have questions, please do not hesitate to call me  I look forward to following your patient along with you  Sincerely,        Fracisco Ramirez MD        CC: MD Fracisco Castillo MD  5/3/2021 12:01 PM  Sign when Signing Visit               [facility] [address] [place] [address] [facility] [address] [facility] [address]  [phone]    Valeen Mcardle  1960  [id number]  [address] [address] [place] [address]  [phone]    Chief Complaint   Patient presents with    Consult       Assessment/Plan:    No problem-specific Assessment & Plan notes found for this encounter  Diagnoses and all orders for this visit:    Pancreatic mass    Other orders  -     sertraline (ZOLOFT) 25 mg tablet; Take 25 mg by mouth daily  -     traZODone (DESYREL) 50 mg tablet; take 1/2 tablet by mouth at bedtime if needed for insomnia        Advance Care Planning/Advance Directives:  Discussed disease status, cancer treatment plans and/or cancer treatment goals with the patient  Oncology History    No history exists  History of Present Illness:   Patient is a 41-year-old woman who has had abdominal / epigastric pain for last several weeks  This prompted workup  She was found to have a 2 cm pancreatic head/ neck mass  The GI team saw her and performed endoscopic ultrasound with biopsy  She has been referred for evaluation and treatment  She had blood work including tumor markers sent off as well as part of her workup    Aside from the epigastric pain, she has no nausea or vomiting per se  No bowel changes  She has lost about 10 lb in the last several weeks  No new onset of diabetes  Review of Systems   Constitutional: Positive for unexpected weight change  HENT: Negative  Eyes: Negative  Respiratory: Negative  Cardiovascular: Negative  Gastrointestinal: Positive for abdominal pain  Negative for blood in stool, nausea and vomiting  Endocrine: Negative  Genitourinary: Negative  Musculoskeletal: Negative  Skin: Negative  Negative for color change  Allergic/Immunologic: Negative  Neurological: Negative  Hematological: Negative  Psychiatric/Behavioral: Negative  All other systems reviewed and are negative  Patient Active Problem List   Diagnosis    Acute low back pain due to trauma    Benign essential hypertension    Vision disturbance    Hypertensive urgency    Noncompliance with medications    Stroke-like symptoms    Incidental pulmonary nodule    Hypertension    Hyperlipemia    Tarsal tunnel syndrome, left    Pancreatic mass    Leukocytosis    Depression    Goals of care, counseling/discussion    YULIYA (acute kidney injury) ([facility] [address] )     Past Medical History:   Diagnosis Date    Back ache     Cancer ([facility] [address] )     Hypertension      Past Surgical History:   Procedure Laterality Date    APPENDECTOMY      BILATERAL OOPHORECTOMY      BREAST SURGERY      HERNIA REPAIR       Family History   Problem Relation Age of Onset    Hypertension Mother     Heart disease Mother     Diabetes Mother     Cancer Father      Social History     Socioeconomic History    Marital status: /Civil Union     Spouse name: Peewee Villanueva [facility]    Number of children: Not on file    Years of education: Not on file    Highest education level: Not on file   Occupational History    Occupation: unemployed   Social Needs    Financial resource strain: Not very hard    Food insecurity     Worry: Sometimes true     Inability: Sometimes true    Transportation needs     Medical: No     Non-medical: No   Tobacco Use    Smoking status: Former Smoker     Packs/day: 0 65     Years: 30 00     Pack years: 19 50    Smokeless tobacco: Never Used    Tobacco comment: quit 15 years ago   Substance and Sexual Activity    Alcohol use: Not Currently     Frequency: Monthly or less     Binge frequency: Never    Drug use: No    Sexual activity: Not Currently   Lifestyle    Physical activity     Days per week: 0 days     Minutes per session: 0 min    Stress: Not on file   Relationships    Social connections     Talks on phone: Not on file     Gets together: Not on file     Attends [employer] service: Not on file     Active member of club or organization: Not on file     Attends meetings of clubs or organizations: Not on file     Relationship status: Not on file    Intimate partner violence     Fear of current or ex partner: Not on file     Emotionally abused: Not on file     Physically abused: Not on file     Forced sexual activity: Not on file   Other Topics Concern    Not on file   Social History Narrative    Not on file       Current Outpatient Medications:     amLODIPine (NORVASC) 10 mg tablet, Take 1 tablet (10 mg total) by mouth daily, Disp: 90 tablet, Rfl: 3    atorvastatin (LIPITOR) 40 mg tablet, Take 1 tablet (40 mg total) by mouth every evening, Disp: 90 tablet, Rfl: 3    escitalopram (LEXAPRO) 10 mg tablet, Take 1 tablet (10 mg total) by mouth every morning, Disp: 90 tablet, Rfl: 3    hydrOXYzine HCL (ATARAX) 25 mg tablet, Take 25 mg by mouth daily at bedtime as needed, Disp: , Rfl: 0    lisinopril (ZESTRIL) 20 mg tablet, Take 1 tablet (20 mg total) by mouth daily, Disp: 90 tablet, Rfl: 3    omeprazole (PriLOSEC) 40 MG capsule, Take 1 capsule (40 mg total) by mouth daily, Disp: 30 capsule, Rfl: 0    ondansetron (ZOFRAN-ODT) 4 mg disintegrating tablet, Take 1 tablet (4 mg total) by mouth every 6 (six) hours as needed for nausea or vomiting, Disp: 30 tablet, Rfl: 0    oxyCODONE (ROXICODONE) 5 mg immediate release tablet, Take 1-2 tablets PO Q4H PRN moderate-severe pain, Disp: 30 tablet, Rfl: 0    sertraline (ZOLOFT) 25 mg tablet, Take 25 mg by mouth daily, Disp: , Rfl:     traZODone (DESYREL) 50 mg tablet, take 1/2 tablet by mouth at bedtime if needed for insomnia, Disp: , Rfl:     triamcinolone (KENALOG) 0 1 % ointment, Apply 1 application topically 2 (two) times a day, Disp: 30 g, Rfl: 2    dicyclomine (BENTYL) 10 mg capsule, Take 1 capsule (10 mg total) by mouth 4 (four) times a day (before meals and at bedtime) (Patient not taking: Reported on 4/22/2021), Disp: 180 capsule, Rfl: 0  No Known Allergies  Vitals:    05/03/21 1113   BP: 128/84   Pulse: 90   Resp: 18   Temp: 97 8 °F (36 6 °C)   SpO2: 98%       Physical Exam  Constitutional:       General: She is not in acute distress  Appearance: Normal appearance  She is not toxic-appearing  HENT:      Head: Normocephalic and atraumatic  Right Ear: External ear normal       Left Ear: External ear normal       Nose: Nose normal    Eyes:      General: No scleral icterus  Right eye: No discharge  Left eye: No discharge  Extraocular Movements: Extraocular movements intact  Conjunctiva/sclera: Conjunctivae normal       Pupils: Pupils are equal, round, and reactive to light  Neck:      Musculoskeletal: Normal range of motion and neck supple  Cardiovascular:      Rate and Rhythm: Normal rate and regular rhythm  Heart sounds: Normal heart sounds  Pulmonary:      Effort: Pulmonary effort is normal       Breath sounds: Normal breath sounds  Abdominal:      General: Abdomen is flat  There is no distension  Palpations: Abdomen is soft  There is no mass  Tenderness: There is no abdominal tenderness  There is no guarding or rebound  Hernia: No hernia is present  Musculoskeletal: Normal range of motion  Skin:     General: Skin is warm and dry  Neurological:      General: No focal deficit present  Mental Status: She is alert and oriented to person, place, and time  Psychiatric:         Mood and Affect: Mood normal          Behavior: Behavior normal          Thought Content: Thought content normal          Judgment: Judgment normal          Pathology:    Case Report   Non-gynecologic Cytology                          Case: [id number]                                   Authorizing Provider: Anthony Wagoner MD         Collected:           04/14/2021 1626               Ordering Location:     [address]      Received:            04/14/2021 [address] [place] [address] 7                                                               Pathologist:           Yara Young MD                                                                  Specimens:   A) - Pancreas, pancreatic head mass                                                                  B) - Pancreas, pancreatic head mass                                                                  C) - Pancreas, Cell Block                                                                  Final Diagnosis   A B C  Pancreas, pancreatic head mass (ThinPrep, smears and cell block preparations ):  Non-diagnostic (PSC Category I) -See note  Paucicellular specimen consisting of ductal epithelial cells and lymphocytes      Note:  There is minute fragment of smooth muscle identified in cell block which is conformed by positive SMA and negative , DOG-1, S100, MUM-1, CDX2, JULISSA-EP4; CD31 highlights endothelial cells  DPC-4, MOC31 and AE1/3 are performed to help in the assessment of this case   Given clinical impression of pancreatic head mass, this specimen is considered as non-diagnostic       The above diagnostic category is part of the six-tiered system proposed by The Papanicolaou Society of Cytopathology for the reporting of pancreaticobiliary specimens  This proposed scheme provides terminology that correlates the cytologic diagnostic nomenclature with the 2010 WHO classification of pancreatic tumors and standardizes the categorization of the various diseases of the pancreas, some of which are difficult to diagnose specifically by cytology      *The Papanicolaou Society of Cytopathology System for Reporting Pancreaticobiliary Cytology: Definitions, Criteria and Explanatory Notes  Eron Sim; Gross, 2015                         Electronically signed by Liset Littlejohn MD on 4/19/2021 at  3:17 PM       Labs:  Lab Results   Component Value Date    SODIUM 141 04/21/2021    K 4 2 04/21/2021     (H) 04/21/2021    CO2 28 04/21/2021    AGAP 3 (L) 04/21/2021    BUN 14 04/21/2021    CREATININE 1 01 04/21/2021    GLUC 94 04/21/2021    GLUF 94 01/07/2020    CALCIUM 9 0 04/21/2021    AST 18 04/15/2021    ALT 23 04/15/2021    ALKPHOS 109 04/15/2021    TP 7 6 04/15/2021    TBILI 0 44 04/15/2021    EGFR 60 04/21/2021         Imaging  Xr Chest Pa & Lateral    Result Date: 4/13/2021  Narrative: CHEST INDICATION:   severe abdominal pain  COMPARISON:  7/11/2019 EXAM PERFORMED/VIEWS:  XR CHEST PA & LATERAL FINDINGS: Cardiomediastinal silhouette appears unremarkable  The lungs are clear  No pneumothorax or pleural effusion  Osseous structures appear within normal limits for patient age  Impression: No acute cardiopulmonary disease  Workstation performed: [id number]     Ct Chest W Contrast    Result Date: 4/14/2021  Narrative: CT CHEST WITH IV CONTRAST INDICATION:   Staging, likely pancreatic head mass  COMPARISON:  None  TECHNIQUE: CT examination of the chest was performed  Axial, sagittal, and coronal 2D reformatted images were created from the source data and submitted for interpretation  Radiation dose length product (DLP) for this visit:  284 92 mGy-cm     This examination, like all CT scans performed in the [facility], was performed utilizing techniques to minimize radiation dose exposure, including the use of iterative  reconstruction and automated exposure control  IV Contrast:  85 mL of iohexol (OMNIPAQUE) FINDINGS: LUNGS:  Lungs are clear  There is no tracheal or endobronchial lesion  PLEURA:  Unremarkable  HEART/GREAT VESSELS:  Mild cardiomegaly  Central pulmonary vascular distention and perihilar interstitial and groundglass opacities, suggesting CHF/fluid overload  2 small round well-defined noncalcified nodules identified, right upper lobe image 3/38, 3 mm, left upper lobe image 3/28 measuring 4 mm  MEDIASTINUM AND LLOYD:  Unremarkable  CHEST WALL AND LOWER NECK:   Unremarkable  VISUALIZED STRUCTURES IN THE UPPER ABDOMEN:  Mild hepatomegaly and probable mild steatosis  Diffuse atrophy and fatty replacement of the body and tail the pancreas  Please refer to recent abdominal CT regarding the pancreatic lesion, only partially visible on this exam   Left upper pole renal cyst  OSSEOUS STRUCTURES:  No acute fracture or destructive osseous lesion  Impression: Mild cardiomegaly and bilateral infiltrates with pattern suggesting CHF/fluid overload  2 small nonspecific 3 and 4 mm pulmonary nodules  Based on current Fleischner Society 2017 Guidelines on incidental pulmonary nodule, patients with a known malignancy are at increased risk of metastasis and should receive initial three month follow-up chest CT   Workstation performed: [id number]     Mri Abdomen W Wo Contrast And Mrcp    Result Date: 4/14/2021  Narrative: MRI OF THE ABDOMEN WITH AND WITHOUT CONTRAST WITH MRCP INDICATION:  Pancreatic mass lesion COMPARISON: CT from April 12, 2021, CT chest from April 13, 2021 TECHNIQUE:  The following pulse sequences were obtained:  Coronal and axial T2 with TE of 90 and 180 respectively, axial T2 with fat saturation, axial FIESTA fat-sat, axial T1-weighted in-and-out-of phase, axial DWI/ADC, pre-contrast axial T1 with fat saturation, post-contrast dynamic axial T1 with fat saturation at 20, 70, and 180 seconds, followed by coronal and 7 minute delayed axial T1 with fat saturation  3D MRCP images were obtained with radial thick slabs and projections  3D rendering was performed from the acquisition scanner  IV Contrast:  8 mL of Gadobutrol injection (SINGLE-DOSE) FINDINGS: LOWER CHEST:   Unremarkable  LIVER: Diffuse hepatic steatosis seen  There is no focal liver lesion seen  There is no diffusion restricting liver lesion  The hepatic veins and portal veins are patent  BILE DUCTS:  No intrahepatic or extrahepatic bile duct dilation  Common bile duct is normal in caliber  No choledocholithiasis, biliary stricture or suspicious mass  GALLBLADDER:  Normal  PANCREAS:  Again noted is a mass in the head of the pancreas which measures 2 x 1 9 cm   This demonstrates enhancement during the arterial phase which persists on the delayed imaging  There is fatty infiltration in the pancreas  There is no pancreatic ductal dilation  ADRENAL GLANDS:  Normal  SPLEEN:  Normal  KIDNEYS/PROXIMAL URETERS:  Dilation of the right renal pelvis noted without any obstruction  Parapelvic cyst seen in the upper pole of the left kidney  BOWEL:   No dilated loops of bowel  Stomach nondistended No abnormal dilation small bowel loops seen PERITONEUM/RETROPERITONEUM:  No ascites  LYMPH NODES:  No abdominal lymphadenopathy  VASCULAR STRUCTURES:  The celiac trunk, SMA are patent Renal arteries are patent YASMIN is patent ABDOMINAL WALL:  Unremarkable  OSSEOUS STRUCTURES:  No suspicious osseous lesion  Impression: 2 x 1 9 cm hyperenhancing mass pancreatic head    The imaging features suggest pancreatic neuroendocrine neoplasia This can be further characterized with endoscopic ultrasound No biliary dilation or pancreatic ductal obstruction No MRI evidence of metastatic disease in the liver parenchyma No retroperitoneal lymphadenopathy Patent Celiac trunk, SMA without evidence of any vascular encasement  Workstation performed: [id number]     Ct Abdomen Pelvis With Contrast    Result Date: 4/12/2021  Narrative: CT ABDOMEN AND PELVIS WITH IV CONTRAST INDICATION:   Abdominal pain, acute, nonlocalized severe abdominal pain   "abdominal pain that has been going on for a month; states that it was been getting worse; reports n/v/d""tenderness in the right upper quadrant and epigastric area  ""62 yo female presenting for approximately 1 month  States the pain is significantly worse after eating  It does come and go  It is not there every day however it is there most days  States that today significantly worse than it has been period approximately 4 episodes of nonbloody and nonbilious vomiting earlier  today  Complaining of nausea at this time  Also complaining of diarrhea throughout the month  COMPARISON:  None  TECHNIQUE:  CT examination of the abdomen and pelvis was performed  Axial, sagittal, and coronal 2D reformatted images were created from the source data and submitted for interpretation  Radiation dose length product (DLP) for this visit:  473 69 mGy-cm   This examination, like all CT scans performed in the [facility], was performed utilizing techniques to minimize radiation dose exposure, including the use of iterative  reconstruction and automated exposure control  IV Contrast:  100 mL of iohexol (OMNIPAQUE) Enteric Contrast:  Enteric contrast was not administered  FINDINGS: ABDOMEN LOWER CHEST:  Partially imaged cardiomegaly  LIVER/BILIARY TREE:  Unremarkable  GALLBLADDER:  No calcified gallstones  No pericholecystic inflammatory change  SPLEEN:  Unremarkable  PANCREAS:  Lobulated pancreatic head/neck hyperenhancing lesion measuring 19 x 18 x 20 mm #2/29 and #601/65 suspicious for a pancreatic malignancy, possibly neuroendocrine tumor  The pancreatic neck body and tail distal to this area are diffusely fatty involuted/atrophic   ADRENAL GLANDS:  Unremarkable  KIDNEYS/URETERS:  No hydronephrosis  Simple appearing cyst  STOMACH AND BOWEL:  Mild diffuse gastric wall thickening  Multiple mildly wall thickened loops of small bowel throughout the right mid abdomen  No bowel obstruction or bowel pneumatosis  APPENDIX:  Absent ABDOMINOPELVIC CAVITY:  No ascites  No pneumoperitoneum  No lymphadenopathy  VESSELS:  Atherosclerotic changes of the aorta with infrarenal ulcerating plaque  No aneurysm  PELVIS REPRODUCTIVE ORGANS:  Unremarkable for patient's age  URINARY BLADDER:  Unremarkable  ABDOMINAL WALL/INGUINAL REGIONS:  Unremarkable  OSSEOUS STRUCTURES:  No acute fracture or destructive osseous lesion  Impression: Diffuse gastric thickening and multiple thickened loops of right sided small bowel in keeping with a gastritis/nonspecific enteritis  Lobulated pancreatic head/neck hyperenhancing lesion measuring 19 x 18 x 20 mm #2/29 and #601/65 suspicious for a pancreatic malignancy, most typical of a neuroendocrine tumor  The pancreatic neck body and tail distal to this area are diffusely fatty involuted/atrophic  Surgical consultation is recommended on a nonurgent basis  Some islet cell tumors, such as insulinoma, gastrinoma and VIPoma can can be associated with bowel abnormalities and could explain the above bowel findings  No bowel obstruction or bowel pneumatosis  The study was marked in [facility] for immediate notification  Workstation performed: [id number]     I reviewed the above laboratory and imaging data  Discussion/Summary:  Pancreatic head/ neck lesion, with nondiagnostic biopsy  Case discussed with GI team this morning  Will set her up for repeat biopsy, with follow-up here afterwards  Concerned is that this could represent neuroendocrine tumor, nonfunctioning  Based on size criteria, if this is confirmed, then resection versus observation would have to be considered  She will follow-up here once results available for review

## 2021-05-03 NOTE — PROGRESS NOTES
Surgical Oncology Consult       [facility] [address] [id number] [phone]    Julianne Young  1960  [id number]  [facility] Cherrieed Andrews  [address] [facility] [id number] [phone]    Chief Complaint   Patient presents with    Consult       Assessment/Plan:    No problem-specific Assessment & Plan notes found for this encounter  Diagnoses and all orders for this visit:    Pancreatic mass    Other orders  -     sertraline (ZOLOFT) 25 mg tablet; Take 25 mg by mouth daily  -     traZODone (DESYREL) 50 mg tablet; take 1/2 tablet by mouth at bedtime if needed for insomnia        Advance Care Planning/Advance Directives:  Discussed disease status, cancer treatment plans and/or cancer treatment goals with the patient  Oncology History    No history exists  History of Present Illness:   Patient is a 56-year-old woman who has had abdominal / epigastric pain for last several weeks  This prompted workup  She was found to have a 2 cm pancreatic head/ neck mass  The GI team saw her and performed endoscopic ultrasound with biopsy  She has been referred for evaluation and treatment  She had blood work including tumor markers sent off as well as part of her workup  Aside from the epigastric pain, she has no nausea or vomiting per se  No bowel changes  She has lost about 10 lb in the last several weeks  No new onset of diabetes  Review of Systems   Constitutional: Positive for unexpected weight change  HENT: Negative  Eyes: Negative  Respiratory: Negative  Cardiovascular: Negative  Gastrointestinal: Positive for abdominal pain  Negative for blood in stool, nausea and vomiting  Endocrine: Negative  Genitourinary: Negative  Musculoskeletal: Negative  Skin: Negative  Negative for color change  Allergic/Immunologic: Negative  Neurological: Negative  Hematological: Negative  Psychiatric/Behavioral: Negative  All other systems reviewed and are negative  Patient Active Problem List   Diagnosis    Acute low back pain due to trauma    Benign essential hypertension    Vision disturbance    Hypertensive urgency    Noncompliance with medications    Stroke-like symptoms    Incidental pulmonary nodule    Hypertension    Hyperlipemia    Tarsal tunnel syndrome, left    Pancreatic mass    Leukocytosis    Depression    Goals of care, counseling/discussion    [facility]     Past Medical History:   Diagnosis Date    Back ache     [facility]     Hypertension      Past Surgical History:   Procedure Laterality Date    APPENDECTOMY      BILATERAL OOPHORECTOMY      BREAST SURGERY      HERNIA REPAIR       Family History   Problem Relation Age of Onset    Hypertension Mother     Heart disease Mother     Diabetes Mother     Cancer Father      Social History     Socioeconomic History    Marital status: /Civil Union     Spouse name: Peewee Villan[facility]va [facility]    Number of children: Not on file    Years of education: Not on file    Highest education level: Not on file   Occupational History    Occupation: unemployed   Social Needs    Financial resource strain: Not very hard    Food insecurity     Worry: Sometimes true     Inability: Sometimes true   [employer] needs     Medical: No     Non-medical: No   Tobacco Use    Smoking status: Former Smoker     Packs/day: 0 65     Years: 30 00     Pack years: 19 50    Smokeless tobacco: Never Used    Tobacco comment: quit 15 years ago   Substance and Sexual Activity    Alcohol use: Not Currently     Frequency: Monthly or less     Binge frequency: Never    Drug use: No    Sexual activity: Not Currently   Lifestyle    Physical activity     Days per week: 0 days     Minutes per session: 0 min    Stress: Not on file   Relationships    Social connections     Talks on phone: Not on file     Gets together: Not on file     Attends [employer] service: Not on file     Active member of club or organization: Not on file     Attends meetings of clubs or organizations: Not on file     Relationship status: Not on file    Intimate partner violence     Fear of current or ex partner: Not on file     Emotionally abused: Not on file     Physically abused: Not on file     Forced sexual activity: Not on file   Other Topics Concern    Not on file   Social History Narrative    Not on file       Current Outpatient Medications:     amLODIPine (NORVASC) 10 mg tablet, Take 1 tablet (10 mg total) by mouth daily, Disp: 90 tablet, Rfl: 3    atorvastatin (LIPITOR) 40 mg tablet, Take 1 tablet (40 mg total) by mouth every evening, Disp: 90 tablet, Rfl: 3    escitalopram (LEXAPRO) 10 mg tablet, Take 1 tablet (10 mg total) by mouth every morning, Disp: 90 tablet, Rfl: 3    hydrOXYzine HCL (ATARAX) 25 mg tablet, Take 25 mg by mouth daily at bedtime as needed, Disp: , Rfl: 0    lisinopril (ZESTRIL) 20 mg tablet, Take 1 tablet (20 mg total) by mouth daily, Disp: 90 tablet, Rfl: 3    omeprazole (PriLOSEC) 40 MG capsule, Take 1 capsule (40 mg total) by mouth daily, Disp: 30 capsule, Rfl: 0    ondansetron (ZOFRAN-ODT) 4 mg disintegrating tablet, Take 1 tablet (4 mg total) by mouth every 6 (six) hours as needed for nausea or vomiting, Disp: 30 tablet, Rfl: 0    oxyCODONE (ROXICODONE) 5 mg immediate release tablet, Take 1-2 tablets PO Q4H PRN moderate-severe pain, Disp: 30 tablet, Rfl: 0    sertraline (ZOLOFT) 25 mg tablet, Take 25 mg by mouth daily, Disp: , Rfl:     traZODone (DESYREL) 50 mg tablet, take 1/2 tablet by mouth at bedtime if needed for insomnia, Disp: , Rfl:     triamcinolone (KENALOG) 0 1 % ointment, Apply 1 application topically 2 (two) times a day, Disp: 30 g, Rfl: 2    dicyclomine (BENTYL) 10 mg capsule, Take 1 capsule (10 mg total) by mouth 4 (four) times a day (before meals and at bedtime) (Patient not taking: Reported on 4/22/2021), Disp: 180 capsule, Rfl: 0  No Known Allergies  Vitals:    05/03/21 1113   BP: 128/84   Pulse: 90   Resp: 18   Temp: 97 8 °F (36 6 °C)   SpO2: 98%       Physical Exam  Constitutional:       General: She is not in acute distress  Appearance: Normal appearance  She is not toxic-appearing  HENT:      Head: Normocephalic and atraumatic  Right Ear: External ear normal       Left Ear: External ear normal       Nose: Nose normal    Eyes:      General: No scleral icterus  Right eye: No discharge  Left eye: No discharge  Extraocular Movements: Extraocular movements intact  Conjunctiva/sclera: Conjunctivae normal       Pupils: Pupils are equal, round, and reactive to light  Neck:      Musculoskeletal: Normal range of motion and neck supple  Cardiovascular:      Rate and Rhythm: Normal rate and regular rhythm  Heart sounds: Normal heart sounds  Pulmonary:      Effort: Pulmonary effort is normal       Breath sounds: Normal breath sounds  Abdominal:      General: Abdomen is flat  There is no distension  Palpations: Abdomen is soft  There is no mass  Tenderness: There is no abdominal tenderness  There is no guarding or rebound  Hernia: No hernia is present  Musculoskeletal: Normal range of motion  Skin:     General: Skin is warm and dry  Neurological:      General: No focal deficit present  Mental Status: She is alert and oriented to person, place, and time  Psychiatric:         Mood and Affect: Mood normal          Behavior: Behavior normal          Thought Content:  Thought content normal          Judgment: Judgment normal          Pathology:    Case Report   Non-gynecologic Cytology                          Case: [id number]                                   Authorizing Provider: Chiki Arias MD         Collected:           04/14/2021 1626               Ordering Location:     [facility]      Received:            04/14/2021 [address] [place] [address] 7                                                               Pathologist:           Rachele Victor MD                                                                  Specimens:   A) - Pancreas, pancreatic head mass                                                                  B) - Pancreas, pancreatic head mass                                                                  C) - Pancreas, Cell Block                                                                  Final Diagnosis   A B C  Pancreas, pancreatic head mass (ThinPrep, smears and cell block preparations ):  Non-diagnostic (PSC Category I) -See note  Paucicellular specimen consisting of ductal epithelial cells and lymphocytes      Note:  There is minute fragment of smooth muscle identified in cell block which is conformed by positive SMA and negative , DOG-1, S100, MUM-1, CDX2, JULISSA-EP4; CD31 highlights endothelial cells  DPC-4, MOC31 and AE1/3 are performed to help in the assessment of this case  Given clinical impression of pancreatic head mass, this specimen is considered as non-diagnostic       The above diagnostic category is part of the six-tiered system proposed by The Papanicolaou Society of Cytopathology for the reporting of pancreaticobiliary specimens  This proposed scheme provides terminology that correlates the cytologic diagnostic nomenclature with the 2010 WHO classification of pancreatic tumors and standardizes the categorization of the various diseases of the pancreas, some of which are difficult to diagnose specifically by cytology      *The Papanicolaou Society of Cytopathology System for Reporting Pancreaticobiliary Cytology: Definitions, Criteria and Explanatory Notes  Lucille Navy Darryle Aldo; Gross, 2015                         Electronically signed by Gregg Mcmahon MD on 4/19/2021 at  3:17 PM       Labs:  Lab Results   Component Value Date    SODIUM 141 04/21/2021    K 4 2 04/21/2021     (H) 04/21/2021    CO2 28 04/21/2021    AGAP 3 (L) 04/21/2021    BUN 14 04/21/2021    CREATININE 1 01 04/21/2021    GLUC 94 04/21/2021    GLUF 94 01/07/2020    CALCIUM 9 0 04/21/2021    AST 18 04/15/2021    ALT 23 04/15/2021    ALKPHOS 109 04/15/2021    TP 7 6 04/15/2021    TBILI 0 44 04/15/2021    EGFR 60 04/21/2021         Imaging  Xr Chest Pa & Lateral    Result Date: 4/13/2021  Narrative: CHEST INDICATION:   severe abdominal pain  COMPARISON:  7/11/2019 EXAM PERFORMED/VIEWS:  XR CHEST PA & LATERAL FINDINGS: Cardiomediastinal silhouette appears unremarkable  The lungs are clear  No pneumothorax or pleural effusion  Osseous structures appear within normal limits for patient age  Impression: No acute cardiopulmonary disease  Workstation performed: [id number]     Ct Chest W Contrast    Result Date: 4/14/2021  Narrative: CT CHEST WITH IV CONTRAST INDICATION:   Staging, likely pancreatic head mass  COMPARISON:  None  TECHNIQUE: CT examination of the chest was performed  Axial, sagittal, and coronal 2D reformatted images were created from the source data and submitted for interpretation  Radiation dose length product (DLP) for this visit:  284 92 mGy-cm   This examination, like all CT scans performed in the [facility], was performed utilizing techniques to minimize radiation dose exposure, including the use of iterative  reconstruction and automated exposure control  IV Contrast:  85 mL of iohexol (OMNIPAQUE) FINDINGS: LUNGS:  Lungs are clear  There is no tracheal or endobronchial lesion  PLEURA:  Unremarkable  HEART/GREAT VESSELS:  Mild cardiomegaly  Central pulmonary vascular distention and perihilar interstitial and groundglass opacities, suggesting CHF/fluid overload   2 small round well-defined noncalcified nodules identified, right upper lobe image 3/38, 3 mm, left upper lobe image 3/28 measuring 4 mm  MEDIASTINUM AND LLOYD:  Unremarkable  CHEST WALL AND LOWER NECK:   Unremarkable  VISUALIZED STRUCTURES IN THE UPPER ABDOMEN:  Mild hepatomegaly and probable mild steatosis  Diffuse atrophy and fatty replacement of the body and tail the pancreas  Please refer to recent abdominal CT regarding the pancreatic lesion, only partially visible on this exam   Left upper pole renal cyst  OSSEOUS STRUCTURES:  No acute fracture or destructive osseous lesion  Impression: Mild cardiomegaly and bilateral infiltrates with pattern suggesting CHF/fluid overload  2 small nonspecific 3 and 4 mm pulmonary nodules  Based on current Fleischner Society 2017 Guidelines on incidental pulmonary nodule, patients with a known malignancy are at increased risk of metastasis and should receive initial three month follow-up chest CT  Workstation performed: [id number]     Mri Abdomen W Wo Contrast And Mrcp    Result Date: 4/14/2021  Narrative: MRI OF THE ABDOMEN WITH AND WITHOUT CONTRAST WITH MRCP INDICATION:  Pancreatic mass lesion COMPARISON: CT from April 12, 2021, CT chest from April 13, 2021 TECHNIQUE:  The following pulse sequences were obtained:  Coronal and axial T2 with TE of 90 and 180 respectively, axial T2 with fat saturation, axial FIESTA fat-sat, axial T1-weighted in-and-out-of phase, axial DWI/ADC, pre-contrast axial T1 with fat saturation, post-contrast dynamic axial T1 with fat saturation at 20, 70, and 180 seconds, followed by coronal and 7 minute delayed axial T1 with fat saturation  3D MRCP images were obtained with radial thick slabs and projections  3D rendering was performed from the acquisition scanner  IV Contrast:  8 mL of Gadobutrol injection (SINGLE-DOSE) FINDINGS: LOWER CHEST:   Unremarkable  LIVER: Diffuse hepatic steatosis seen  There is no focal liver lesion seen  There is no diffusion restricting liver lesion    The hepatic veins and portal veins are patent  BILE DUCTS:  No intrahepatic or extrahepatic bile duct dilation  Common bile duct is normal in caliber  No choledocholithiasis, biliary stricture or suspicious mass  GALLBLADDER:  Normal  PANCREAS:  Again noted is a mass in the head of the pancreas which measures 2 x 1 9 cm   This demonstrates enhancement during the arterial phase which persists on the delayed imaging  There is fatty infiltration in the pancreas  There is no pancreatic ductal dilation  ADRENAL GLANDS:  Normal  SPLEEN:  Normal  KIDNEYS/PROXIMAL URETERS:  Dilation of the right renal pelvis noted without any obstruction  Parapelvic cyst seen in the upper pole of the left kidney  BOWEL:   No dilated loops of bowel  Stomach nondistended No abnormal dilation small bowel loops seen PERITONEUM/RETROPERITONEUM:  No ascites  LYMPH NODES:  No abdominal lymphadenopathy  VASCULAR STRUCTURES:  The celiac trunk, SMA are patent Renal arteries are patent YASMIN is patent ABDOMINAL WALL:  Unremarkable  OSSEOUS STRUCTURES:  No suspicious osseous lesion  Impression: 2 x 1 9 cm hyperenhancing mass pancreatic head  The imaging features suggest pancreatic neuroendocrine neoplasia This can be further characterized with endoscopic ultrasound No biliary dilation or pancreatic ductal obstruction No MRI evidence of metastatic disease in the liver parenchyma No retroperitoneal lymphadenopathy Patent Celiac trunk, SMA without evidence of any vascular encasement  Workstation performed: [id number]     Ct Abdomen Pelvis With Contrast    Result Date: 4/12/2021  Narrative: CT ABDOMEN AND PELVIS WITH IV CONTRAST INDICATION:   Abdominal pain, acute, nonlocalized severe abdominal pain   "abdominal pain that has been going on for a month; states that it was been getting worse; reports n/v/d""tenderness in the right upper quadrant and epigastric area  ""64 yo female presenting for approximately 1 month  States the pain is significantly worse after eating    It does come and go  It is not there every day however it is there most days  States that today significantly worse than it has been period approximately 4 episodes of nonbloody and nonbilious vomiting earlier  today  Complaining of nausea at this time  Also complaining of diarrhea throughout the month  COMPARISON:  None  TECHNIQUE:  CT examination of the abdomen and pelvis was performed  Axial, sagittal, and coronal 2D reformatted images were created from the source data and submitted for interpretation  Radiation dose length product (DLP) for this visit:  473 69 mGy-cm   This examination, like all CT scans performed in the [facility], was performed utilizing techniques to minimize radiation dose exposure, including the use of iterative  reconstruction and automated exposure control  IV Contrast:  100 mL of iohexol (OMNIPAQUE) Enteric Contrast:  Enteric contrast was not administered  FINDINGS: ABDOMEN LOWER CHEST:  Partially imaged cardiomegaly  LIVER/BILIARY TREE:  Unremarkable  GALLBLADDER:  No calcified gallstones  No pericholecystic inflammatory change  SPLEEN:  Unremarkable  PANCREAS:  Lobulated pancreatic head/neck hyperenhancing lesion measuring 19 x 18 x 20 mm #2/29 and #601/65 suspicious for a pancreatic malignancy, possibly neuroendocrine tumor  The pancreatic neck body and tail distal to this area are diffusely fatty involuted/atrophic  ADRENAL GLANDS:  Unremarkable  KIDNEYS/URETERS:  No hydronephrosis  Simple appearing cyst  STOMACH AND BOWEL:  Mild diffuse gastric wall thickening  Multiple mildly wall thickened loops of small bowel throughout the right mid abdomen  No bowel obstruction or bowel pneumatosis  APPENDIX:  Absent ABDOMINOPELVIC CAVITY:  No ascites  No pneumoperitoneum  No lymphadenopathy  VESSELS:  Atherosclerotic changes of the aorta with infrarenal ulcerating plaque  No aneurysm  PELVIS REPRODUCTIVE ORGANS:  Unremarkable for patient's age   URINARY BLADDER: Unremarkable  ABDOMINAL WALL/INGUINAL REGIONS:  Unremarkable  OSSEOUS STRUCTURES:  No acute fracture or destructive osseous lesion  Impression: Diffuse gastric thickening and multiple thickened loops of right sided small bowel in keeping with a gastritis/nonspecific enteritis  Lobulated pancreatic head/neck hyperenhancing lesion measuring 19 x 18 x 20 mm #2/29 and #601/65 suspicious for a pancreatic malignancy, most typical of a neuroendocrine tumor  The pancreatic neck body and tail distal to this area are diffusely fatty involuted/atrophic  Surgical consultation is recommended on a nonurgent basis  Some islet cell tumors, such as insulinoma, gastrinoma and VIPoma can can be associated with bowel abnormalities and could explain the above bowel findings  No bowel obstruction or bowel pneumatosis  The study was marked in [facility] for immediate notification  Workstation performed: [id number]     I reviewed the above laboratory and imaging data  Discussion/Summary:  Pancreatic head/ neck lesion, with nondiagnostic biopsy  Case discussed with GI team this morning  Will set her up for repeat biopsy, with follow-up here afterwards  Concerned is that this could represent neuroendocrine tumor, nonfunctioning  Based on size criteria, if this is confirmed, then resection versus observation would have to be considered  She will follow-up here once results available for review

## 2021-05-11 ENCOUNTER — OFFICE VISIT (OUTPATIENT)
Dept: GASTROENTEROLOGY | Facility: CLINIC | Age: 61
End: 2021-05-11
Payer: COMMERCIAL

## 2021-05-11 VITALS
TEMPERATURE: 98.1 F | SYSTOLIC BLOOD PRESSURE: 164 MMHG | HEART RATE: 76 BPM | DIASTOLIC BLOOD PRESSURE: 80 MMHG | HEIGHT: 64 IN | BODY MASS INDEX: 29.71 KG/M2 | WEIGHT: 174 LBS

## 2021-05-11 DIAGNOSIS — Z12.11 ENCOUNTER FOR SCREENING COLONOSCOPY: ICD-10-CM

## 2021-05-11 DIAGNOSIS — K86.89 PANCREATIC MASS: Primary | ICD-10-CM

## 2021-05-11 PROCEDURE — 3079F DIAST BP 80-89 MM HG: CPT | Performed by: INTERNAL MEDICINE

## 2021-05-11 PROCEDURE — 1036F TOBACCO NON-USER: CPT | Performed by: INTERNAL MEDICINE

## 2021-05-11 PROCEDURE — 3077F SYST BP >= 140 MM HG: CPT | Performed by: INTERNAL MEDICINE

## 2021-05-11 PROCEDURE — 99214 OFFICE O/P EST MOD 30 MIN: CPT | Performed by: INTERNAL MEDICINE

## 2021-05-11 RX ORDER — POLYETHYLENE GLYCOL 3350 17 G/17G
POWDER, FOR SOLUTION ORAL
Qty: 238 G | Refills: 0 | Status: SHIPPED | OUTPATIENT
Start: 2021-05-11 | End: 2021-12-22 | Stop reason: HOSPADM

## 2021-05-11 NOTE — PROGRESS NOTES
[facility] - Outpatient Consultation  Ariane Zambrano 63 y o  female MRN: [id number]  Encounter: [id number]          ASSESSMENT AND PLAN:        1  Pancreatic mass     2  Encounter for screening colonoscopy  Colonoscopy       Patient had nondiagnostic result on biopsy of pancreatic mass  Mass is concerning for neuroendocrine tumor  Will plan for repeat upper endoscopic ultrasound with FNA of pancreatic head mass  At the same time we will plan for screening colonoscopy as she is due for colorectal cancer screening  The rationale for endoscopic ultrasound with FNA and colonoscopy with possible biopsy, possible polypectomy, with IV sedation as well as all risks, benefits, and alternatives were discussed with the patient in detail  Risks including but not limited to perforation, bleeding, need for blood transfusion or emergent surgery, and missed neoplasm were reviewed in detail with the patient  The patient demonstrates full understanding and wishes to proceed with the colonoscopy   ______________________________________________________________    HPI:  Ariane Zambrano is a 63y o  year old female who presents to the office for evaluation for pancreatic mass  Patient presented to hospital and April with vomiting for 4 days with inability to tolerate p o  Cross-sectional imaging revealed 2 cm mass at the head of the pancreas concerning for neuroendocrine tumor  She underwent endoscopic ultrasound with core biopsies  Biopsies were inconclusive  She has underwent comprehensive evaluation and this could be nonfunctional neuroendocrine tumor  She has seen surgical oncology last week and will need positive pathology prior to proceeding with possible [facility]  She is also due for colonoscopy    She has not had a colonoscopy in the past   Reported symptoms: none  Family history of stomach cancer - grandmother  Father - throat cancer  Previous colonoscopy: 7 years ago in [facility] [place]  Was told to repeat in 3 years    No blood in stool  No weight loss  No family history of colon cancer  No change in bowel habits  They are not on blood thinners  REVIEW OF SYSTEMS:    CONSTITUTIONAL: Denies any fever, chills, rigors, and weight loss  HEENT: No earache or tinnitus  Denies hearing loss or visual disturbances  CARDIOVASCULAR: No chest pain or palpitations  RESPIRATORY: Denies any cough, hemoptysis, shortness of breath or dyspnea on exertion  GASTROINTESTINAL: As noted in the History of Present Illness  GENITOURINARY: No problems with urination  Denies any hematuria or dysuria  NEUROLOGIC: No dizziness or vertigo, denies headaches  MUSCULOSKELETAL: Denies any muscle or joint pain  SKIN: Denies skin rashes or itching  ENDOCRINE: Denies excessive thirst  Denies intolerance to heat or cold  PSYCHOSOCIAL: Denies depression or anxiety  Denies any recent memory loss         Historical Information   Past Medical History:   Diagnosis Date    Back ache     Cancer ([facility] [address] )     Hypertension      Past Surgical History:   Procedure Laterality Date    APPENDECTOMY      BILATERAL OOPHORECTOMY      BREAST SURGERY      HERNIA REPAIR       Social History   Social History     Substance and Sexual Activity   Alcohol Use Not Currently    Frequency: Monthly or less    Binge frequency: Never     Social History     Substance and Sexual Activity   Drug Use No     Social History     Tobacco Use   Smoking Status Former Smoker    Packs/day: 0 65    Years: 30 00    Pack years: 19 50   Smokeless Tobacco Never Used   Tobacco Comment    quit 15 years ago     Family History   Problem Relation Age of Onset    Hypertension Mother     Heart disease Mother     Diabetes Mother     Cancer Father        Meds/Allergies       Current Outpatient Medications:     amLODIPine (NORVASC) 10 mg tablet    atorvastatin (LIPITOR) 40 mg tablet    dicyclomine (BENTYL) 10 mg capsule    escitalopram (LEXAPRO) 10 mg tablet    hydrOXYzine HCL (ATARAX) 25 mg tablet    lisinopril (ZESTRIL) 20 mg tablet    omeprazole (PriLOSEC) 40 MG capsule    ondansetron (ZOFRAN-ODT) 4 mg disintegrating tablet    oxyCODONE (ROXICODONE) 5 mg immediate release tablet    sertraline (ZOLOFT) 25 mg tablet    traZODone (DESYREL) 50 mg tablet    triamcinolone (KENALOG) 0 1 % ointment    No Known Allergies        Objective     Blood pressure 164/80, pulse 76, temperature 98 1 °F (36 7 °C), temperature source Tympanic, height 5' 4" (1 626 m), weight 78 9 kg (174 lb), not currently breastfeeding  Body mass index is 29 87 kg/m²  PHYSICAL EXAM:      General Appearance:   Alert, cooperative, no distress   HEENT:   Normocephalic, atraumatic, anicteric  Lungs:   Equal chest rise and unlabored breathing, normal cough   Heart:   No visualized JVD   Abdomen:   Soft, non-tender, non-distended; no masses, no organomegaly    Genitalia:   Deferred    Rectal:   Deferred    Extremities:  No cyanosis, clubbing or edema    Pulses:  Musculoskeletal:  2+ and symmetric  Normal range of motion visualized    Skin:  Neuro:  No jaundice, rashes, or lesions   Alert and appropriate       Lab Results:   No visits with results within 1 Day(s) from this visit  Latest known visit with results is:   Appointment on 04/21/2021   Component Date Value    Sodium 04/21/2021 141     Potassium 04/21/2021 4 2     Chloride 04/21/2021 110*    CO2 04/21/2021 28     ANION GAP 04/21/2021 3*    BUN 04/21/2021 14     Creatinine 04/21/2021 1 01     Glucose 04/21/2021 94     Calcium 04/21/2021 9 0     eGFR 04/21/2021 60          Radiology Results:   Xr Chest Pa & Lateral    Result Date: 4/13/2021  Narrative: CHEST INDICATION:   severe abdominal pain  COMPARISON:  7/11/2019 EXAM PERFORMED/VIEWS:  XR CHEST PA & LATERAL FINDINGS: Cardiomediastinal silhouette appears unremarkable  The lungs are clear  No pneumothorax or pleural effusion   Osseous structures appear within normal limits for patient age  Impression: No acute cardiopulmonary disease  Workstation performed: [id number]     Ct Chest W Contrast    Result Date: 4/14/2021  Narrative: CT CHEST WITH IV CONTRAST INDICATION:   Staging, likely pancreatic head mass  COMPARISON:  None  TECHNIQUE: CT examination of the chest was performed  Axial, sagittal, and coronal 2D reformatted images were created from the source data and submitted for interpretation  Radiation dose length product (DLP) for this visit:  284 92 mGy-cm   This examination, like all CT scans performed in the [facility], was performed utilizing techniques to minimize radiation dose exposure, including the use of iterative  reconstruction and automated exposure control  IV Contrast:  85 mL of iohexol (OMNIPAQUE) FINDINGS: LUNGS:  Lungs are clear  There is no tracheal or endobronchial lesion  PLEURA:  Unremarkable  HEART/GREAT VESSELS:  Mild cardiomegaly  Central pulmonary vascular distention and perihilar interstitial and groundglass opacities, suggesting CHF/fluid overload  2 small round well-defined noncalcified nodules identified, right upper lobe image 3/38, 3 mm, left upper lobe image 3/28 measuring 4 mm  MEDIASTINUM AND LLOYD:  Unremarkable  CHEST WALL AND LOWER NECK:   Unremarkable  VISUALIZED STRUCTURES IN THE UPPER ABDOMEN:  Mild hepatomegaly and probable mild steatosis  Diffuse atrophy and fatty replacement of the body and tail the pancreas  Please refer to recent abdominal CT regarding the pancreatic lesion, only partially visible on this exam   Left upper pole renal cyst  OSSEOUS STRUCTURES:  No acute fracture or destructive osseous lesion  Impression: Mild cardiomegaly and bilateral infiltrates with pattern suggesting CHF/fluid overload  2 small nonspecific 3 and 4 mm pulmonary nodules   Based on current Fleischner Society 2017 Guidelines on incidental pulmonary nodule, patients with a known malignancy are at increased risk of metastasis and should receive initial three month follow-up chest CT  Workstation performed: [id number]     Mri Abdomen W Wo Contrast And Mrcp    Result Date: 4/14/2021  Narrative: MRI OF THE ABDOMEN WITH AND WITHOUT CONTRAST WITH MRCP INDICATION:  Pancreatic mass lesion COMPARISON: CT from April 12, 2021, CT chest from April 13, 2021 TECHNIQUE:  The following pulse sequences were obtained:  Coronal and axial T2 with TE of 90 and 180 respectively, axial T2 with fat saturation, axial FIESTA fat-sat, axial T1-weighted in-and-out-of phase, axial DWI/ADC, pre-contrast axial T1 with fat saturation, post-contrast dynamic axial T1 with fat saturation at 20, 70, and 180 seconds, followed by coronal and 7 minute delayed axial T1 with fat saturation  3D MRCP images were obtained with radial thick slabs and projections  3D rendering was performed from the acquisition scanner  IV Contrast:  8 mL of Gadobutrol injection (SINGLE-DOSE) FINDINGS: LOWER CHEST:   Unremarkable  LIVER: Diffuse hepatic steatosis seen  There is no focal liver lesion seen  There is no diffusion restricting liver lesion  The hepatic veins and portal veins are patent  BILE DUCTS:  No intrahepatic or extrahepatic bile duct dilation  Common bile duct is normal in caliber  No choledocholithiasis, biliary stricture or suspicious mass  GALLBLADDER:  Normal  PANCREAS:  Again noted is a mass in the head of the pancreas which measures 2 x 1 9 cm   This demonstrates enhancement during the arterial phase which persists on the delayed imaging  There is fatty infiltration in the pancreas  There is no pancreatic ductal dilation  ADRENAL GLANDS:  Normal  SPLEEN:  Normal  KIDNEYS/PROXIMAL URETERS:  Dilation of the right renal pelvis noted without any obstruction  Parapelvic cyst seen in the upper pole of the left kidney  BOWEL:   No dilated loops of bowel  Stomach nondistended No abnormal dilation small bowel loops seen PERITONEUM/RETROPERITONEUM:  No ascites  LYMPH NODES:  No abdominal lymphadenopathy  VASCULAR STRUCTURES:  The celiac trunk, SMA are patent Renal arteries are patent YASMIN is patent ABDOMINAL WALL:  Unremarkable  OSSEOUS STRUCTURES:  No suspicious osseous lesion  Impression: 2 x 1 9 cm hyperenhancing mass pancreatic head  The imaging features suggest pancreatic neuroendocrine neoplasia This can be further characterized with endoscopic ultrasound No biliary dilation or pancreatic ductal obstruction No MRI evidence of metastatic disease in the liver parenchyma No retroperitoneal lymphadenopathy Patent Celiac trunk, SMA without evidence of any vascular encasement  Workstation performed: [id number]     Ct Abdomen Pelvis With Contrast    Result Date: 4/12/2021  Narrative: CT ABDOMEN AND PELVIS WITH IV CONTRAST INDICATION:   Abdominal pain, acute, nonlocalized severe abdominal pain   "abdominal pain that has been going on for a month; states that it was been getting worse; reports n/v/d""tenderness in the right upper quadrant and epigastric area  ""64 yo female presenting for approximately 1 month  States the pain is significantly worse after eating  It does come and go  It is not there every day however it is there most days  States that today significantly worse than it has been period approximately 4 episodes of nonbloody and nonbilious vomiting earlier  today  Complaining of nausea at this time  Also complaining of diarrhea throughout the month  COMPARISON:  None  TECHNIQUE:  CT examination of the abdomen and pelvis was performed  Axial, sagittal, and coronal 2D reformatted images were created from the source data and submitted for interpretation  Radiation dose length product (DLP) for this visit:  473 69 mGy-cm   This examination, like all CT scans performed in the [facility], was performed utilizing techniques to minimize radiation dose exposure, including the use of iterative  reconstruction and automated exposure control  IV Contrast:  100 mL of iohexol (OMNIPAQUE) Enteric Contrast:  Enteric contrast was not administered  FINDINGS: ABDOMEN LOWER CHEST:  Partially imaged cardiomegaly  LIVER/BILIARY TREE:  Unremarkable  GALLBLADDER:  No calcified gallstones  No pericholecystic inflammatory change  SPLEEN:  Unremarkable  PANCREAS:  Lobulated pancreatic head/neck hyperenhancing lesion measuring 19 x 18 x 20 mm #2/29 and #601/65 suspicious for a pancreatic malignancy, possibly neuroendocrine tumor  The pancreatic neck body and tail distal to this area are diffusely fatty involuted/atrophic  ADRENAL GLANDS:  Unremarkable  KIDNEYS/URETERS:  No hydronephrosis  Simple appearing cyst  STOMACH AND BOWEL:  Mild diffuse gastric wall thickening  Multiple mildly wall thickened loops of small bowel throughout the right mid abdomen  No bowel obstruction or bowel pneumatosis  APPENDIX:  Absent ABDOMINOPELVIC CAVITY:  No ascites  No pneumoperitoneum  No lymphadenopathy  VESSELS:  Atherosclerotic changes of the aorta with infrarenal ulcerating plaque  No aneurysm  PELVIS REPRODUCTIVE ORGANS:  Unremarkable for patient's age  URINARY BLADDER:  Unremarkable  ABDOMINAL WALL/INGUINAL REGIONS:  Unremarkable  OSSEOUS STRUCTURES:  No acute fracture or destructive osseous lesion  Impression: Diffuse gastric thickening and multiple thickened loops of right sided small bowel in keeping with a gastritis/nonspecific enteritis  Lobulated pancreatic head/neck hyperenhancing lesion measuring 19 x 18 x 20 mm #2/29 and #601/65 suspicious for a pancreatic malignancy, most typical of a neuroendocrine tumor  The pancreatic neck body and tail distal to this area are diffusely fatty involuted/atrophic  Surgical consultation is recommended on a nonurgent basis  Some islet cell tumors, such as insulinoma, gastrinoma and VIPoma can can be associated with bowel abnormalities and could explain the above bowel findings  No bowel obstruction or bowel pneumatosis   The study was marked in EPIC for immediate notification   Workstation performed: [id number]

## 2021-05-11 NOTE — PATIENT INSTRUCTIONS
EUS/Colon scheduled for Thursday, 5/20/21, at [facility] with Dr Eran Soler by Navin Acosta in GI lab  Dulcolax/Miralax prep reviewed with patient in office by Everette Jeter

## 2021-05-20 ENCOUNTER — HOSPITAL ENCOUNTER (OUTPATIENT)
Dept: GASTROENTEROLOGY | Facility: HOSPITAL | Age: 61
Setting detail: OUTPATIENT SURGERY
Discharge: HOME | End: 2021-05-20
Attending: INTERNAL MEDICINE
Payer: COMMERCIAL

## 2021-05-20 ENCOUNTER — ANESTHESIA (OUTPATIENT)
Dept: GASTROENTEROLOGY | Facility: HOSPITAL | Age: 61
End: 2021-05-20

## 2021-05-20 ENCOUNTER — ANESTHESIA EVENT (OUTPATIENT)
Dept: GASTROENTEROLOGY | Facility: HOSPITAL | Age: 61
End: 2021-05-20

## 2021-05-20 VITALS
TEMPERATURE: 98.3 F | DIASTOLIC BLOOD PRESSURE: 75 MMHG | SYSTOLIC BLOOD PRESSURE: 176 MMHG | HEART RATE: 69 BPM | RESPIRATION RATE: 16 BRPM | OXYGEN SATURATION: 99 %

## 2021-05-20 DIAGNOSIS — K86.89 PANCREATIC MASS: ICD-10-CM

## 2021-05-20 DIAGNOSIS — Z12.11 ENCOUNTER FOR SCREENING COLONOSCOPY: ICD-10-CM

## 2021-05-20 PROCEDURE — 43235 EGD DIAGNOSTIC BRUSH WASH: CPT | Performed by: INTERNAL MEDICINE

## 2021-05-20 RX ORDER — FENTANYL CITRATE 50 UG/ML
INJECTION, SOLUTION INTRAMUSCULAR; INTRAVENOUS AS NEEDED
Status: DISCONTINUED | OUTPATIENT
Start: 2021-05-20 | End: 2021-05-20

## 2021-05-20 RX ORDER — PROPOFOL 10 MG/ML
INJECTION, EMULSION INTRAVENOUS CONTINUOUS PRN
Status: DISCONTINUED | OUTPATIENT
Start: 2021-05-20 | End: 2021-05-20

## 2021-05-20 RX ORDER — PROPOFOL 10 MG/ML
INJECTION, EMULSION INTRAVENOUS AS NEEDED
Status: DISCONTINUED | OUTPATIENT
Start: 2021-05-20 | End: 2021-05-20

## 2021-05-20 RX ORDER — SODIUM CHLORIDE 9 MG/ML
INJECTION, SOLUTION INTRAVENOUS CONTINUOUS PRN
Status: DISCONTINUED | OUTPATIENT
Start: 2021-05-20 | End: 2021-05-20

## 2021-05-20 RX ADMIN — PROPOFOL 80 MG: 10 INJECTION, EMULSION INTRAVENOUS at 09:07

## 2021-05-20 RX ADMIN — FENTANYL CITRATE 25 MCG: 50 INJECTION INTRAMUSCULAR; INTRAVENOUS at 09:05

## 2021-05-20 RX ADMIN — SODIUM CHLORIDE: 9 INJECTION, SOLUTION INTRAVENOUS at 09:01

## 2021-05-20 RX ADMIN — PROPOFOL 140 MCG/KG/MIN: 10 INJECTION, EMULSION INTRAVENOUS at 09:07

## 2021-05-20 NOTE — ANESTHESIA PREPROCEDURE EVALUATION
Procedure:  COLONOSCOPY  ENDOSCOPIC ULTRASOUND (UPPER)    Relevant Problems   CARDIO   (+) Benign essential hypertension   (+) Hyperlipemia   (+) Hypertension   (+) Hypertensive urgency      /RENAL   (+) YULIYA (acute kidney injury) (HCC)      MUSCULOSKELETAL   (+) Acute low back pain due to trauma      NEURO/PSYCH   (+) Depression   (+) Vision disturbance      Other   (+) Incidental pulmonary nodule   (+) Noncompliance with medications   (+) Pancreatic mass   (+) Stroke-like symptoms      ECHO 7/19/19:    SUMMARY     LEFT VENTRICLE:  Systolic function was hyperdynamic  Ejection fraction was estimated to be 70 %  There were no regional wall motion abnormalities  Wall thickness was increased  Concentric hypertrophy was present  Doppler parameters were consistent with abnormal left ventricular relaxation (grade 1 diastolic dysfunction)      AORTIC VALVE:  There was mild stenosis (Valve area   1 4 cmï¾²)   The peak valve velocity was 261 cm/s  Valve mean gradient was 17 mmHg        Physical Exam    Airway    Mallampati score: II  TM Distance: >3 FB  Neck ROM: full     Dental   implants,     Cardiovascular  Cardiovascular exam normal    Pulmonary  Pulmonary exam normal     Other Findings        Anesthesia Plan  ASA Score- 3     Anesthesia Type- IV sedation with anesthesia with ASA Monitors  Additional Monitors:   Airway Plan:           Plan Factors-Exercise tolerance (METS): >4 METS  Chart reviewed  Existing labs reviewed  Patient summary reviewed  Patient is not a current smoker  Patient did not smoke on day of surgery  Induction- intravenous  Postoperative Plan-     Informed Consent- Anesthetic plan and risks discussed with patient  I personally reviewed this patient with the CRNA  Discussed and agreed on the Anesthesia Plan with the CRNA  Ilene Hidalgo

## 2021-05-20 NOTE — ANESTHESIA POSTPROCEDURE EVALUATION
Post-Op Assessment Note    CV Status:  Stable  Pain Score: 0    Pain management: adequate     Mental Status:  Arousable and sleepy   Hydration Status:  Stable   PONV Controlled:  None   Airway Patency:  Patent      Post Op Vitals Reviewed: Yes      Staff: CRNA         No complications documented      BP   138/59   Temp      Pulse  73   Resp   16   SpO2   100%

## 2021-05-25 ENCOUNTER — PREP FOR PROCEDURE (OUTPATIENT)
Dept: GASTROENTEROLOGY | Facility: CLINIC | Age: 61
End: 2021-05-25

## 2021-05-25 DIAGNOSIS — Z12.11 COLON CANCER SCREENING: ICD-10-CM

## 2021-05-25 DIAGNOSIS — K86.89 PANCREATIC MASS: Primary | ICD-10-CM

## 2021-06-08 ENCOUNTER — TELEPHONE (OUTPATIENT)
Dept: GASTROENTEROLOGY | Facility: CLINIC | Age: 61
End: 2021-06-08

## 2021-06-16 ENCOUNTER — OFFICE VISIT (OUTPATIENT)
Dept: MULTI SPECIALTY CLINIC | Facility: CLINIC | Age: 61
End: 2021-06-16

## 2021-06-16 ENCOUNTER — TELEPHONE (OUTPATIENT)
Dept: GASTROENTEROLOGY | Facility: HOSPITAL | Age: 61
End: 2021-06-16

## 2021-06-16 VITALS — WEIGHT: 175 LBS | HEIGHT: 64 IN | BODY MASS INDEX: 29.88 KG/M2

## 2021-06-16 DIAGNOSIS — L30.9 HAND DERMATITIS: Primary | ICD-10-CM

## 2021-06-16 DIAGNOSIS — L40.9 PSORIASIS: ICD-10-CM

## 2021-06-16 PROCEDURE — 1036F TOBACCO NON-USER: CPT | Performed by: STUDENT IN AN ORGANIZED HEALTH CARE EDUCATION/TRAINING PROGRAM

## 2021-06-16 PROCEDURE — 3008F BODY MASS INDEX DOCD: CPT | Performed by: STUDENT IN AN ORGANIZED HEALTH CARE EDUCATION/TRAINING PROGRAM

## 2021-06-16 PROCEDURE — 99203 OFFICE O/P NEW LOW 30 MIN: CPT | Performed by: STUDENT IN AN ORGANIZED HEALTH CARE EDUCATION/TRAINING PROGRAM

## 2021-06-16 PROCEDURE — 3008F BODY MASS INDEX DOCD: CPT | Performed by: INTERNAL MEDICINE

## 2021-06-16 RX ORDER — CLOBETASOL PROPIONATE 0.5 MG/G
OINTMENT TOPICAL
Qty: 60 G | Refills: 0 | Status: SHIPPED | OUTPATIENT
Start: 2021-06-16

## 2021-06-16 NOTE — PROGRESS NOTES
[facility] Dermatology Clinic Note     Patient Name: Abhay Sullivan  Encounter Date: 6/16/21     Have you been cared for by a [facility] Dermatologist in the last 3 years and, if so, which one? No    · Have you traveled outside of the [address] [place] [address] in the past 3 months or outside of the [facility] in the last 2 weeks? No     May we call your Preferred Phone number to discuss your specific medical information? Yes     May we leave a detailed message that includes your specific medical information? Yes      Today's Chief Concerns:   Concern #1:  Rash on hands    Past Medical History:  Have you personally ever had or currently have any of the following? · Skin cancer (such as Melanoma, Basal Cell Carcinoma, Squamous Cell Carcinoma? (If Yes, please provide more detail)- No  · Eczema: YES  · Psoriasis: No  · HIV/AIDS: No  · Hepatitis B or C: No  · Tuberculosis: No  · Systemic Immunosuppression such as Diabetes, Biologic or Immunotherapy, Chemotherapy, Organ Transplantation, Bone Marrow Transplantation (If YES, please provide more detail): YES, chemotherapy for breast cancer of right breast  · Radiation Treatment (If YES, please provide more detail): YES, radiation treatment for breast cancer of right breast  · Any other major medical conditions/concerns? (If Yes, which types)- YES, currently undergoing workup of pancreatic mass    Family History:  Have any of your "first degree relatives" (parent, brother, sister, or child) had any of the following       · Skin cancer such as Melanoma or Merkel Cell Carcinoma or Pancreatic Cancer? No  · Eczema, Asthma, Hay Fever or Seasonal Allergies: No  · Psoriasis or Psoriatic Arthritis: No  · Do any other medical conditions seem to run in your family? If Yes, what condition and which relatives?   No    Current Medications:       Current Outpatient Medications:     amLODIPine (NORVASC) 10 mg tablet, Take 1 tablet (10 mg total) by mouth daily, Disp: 90 tablet, Rfl: 3    atorvastatin (LIPITOR) 40 mg tablet, Take 1 tablet (40 mg total) by mouth every evening, Disp: 90 tablet, Rfl: 3    dicyclomine (BENTYL) 10 mg capsule, Take 1 capsule (10 mg total) by mouth 4 (four) times a day (before meals and at bedtime), Disp: 180 capsule, Rfl: 0    escitalopram (LEXAPRO) 10 mg tablet, Take 1 tablet (10 mg total) by mouth every morning, Disp: 90 tablet, Rfl: 3    hydrOXYzine HCL (ATARAX) 25 mg tablet, Take 25 mg by mouth daily at bedtime as needed, Disp: , Rfl: 0    lisinopril (ZESTRIL) 20 mg tablet, Take 1 tablet (20 mg total) by mouth daily, Disp: 90 tablet, Rfl: 3    omeprazole (PriLOSEC) 40 MG capsule, Take 1 capsule (40 mg total) by mouth daily, Disp: 30 capsule, Rfl: 0    ondansetron (ZOFRAN-ODT) 4 mg disintegrating tablet, Take 1 tablet (4 mg total) by mouth every 6 (six) hours as needed for nausea or vomiting, Disp: 30 tablet, Rfl: 0    oxyCODONE (ROXICODONE) 5 mg immediate release tablet, Take 1-2 tablets PO Q4H PRN moderate-severe pain, Disp: 30 tablet, Rfl: 0    polyethylene glycol (GLYCOLAX) 17 GM/SCOOP powder, At 5pm take 5mgx2 dulcolax  At 6pm 32oz miralax in 64oz gatorade  Drink 8oz glass every 5 mins until 32oz finished  Drink remaining as rec , Disp: 238 g, Rfl: 0    sertraline (ZOLOFT) 25 mg tablet, Take 25 mg by mouth daily, Disp: , Rfl:     traZODone (DESYREL) 50 mg tablet, take 1/2 tablet by mouth at bedtime if needed for insomnia, Disp: , Rfl:     triamcinolone (KENALOG) 0 1 % ointment, Apply 1 application topically 2 (two) times a day, Disp: 30 g, Rfl: 2      Review of Systems:  Have you recently had or currently have any of the following? If YES, what are you doing for the problem?     · Fever, chills or unintended weight loss: No  · Sudden loss or change in your vision: No  · Nausea, vomiting or blood in your stool: No  · Painful or swollen joints: No  · Wheezing or cough: No  · Changing mole or non-healing wound: No  · Nosebleeds: No  · Excessive sweating: No  · Easy or prolonged bleeding? No  · Over the last 2 weeks, how often have you been bothered by the following problems? · Taking little interest or pleasure in doing things: 1 - Not at All  · Feeling down, depressed, or hopeless: 1 - Not at All  · Rapid heartbeat with epinephrine:  No    · Any known allergies? · No Known Allergies      Physical Exam:     Was a chaperone (Derm Clinical Assistant) present throughout the entire Physical Exam? NO, limited exam     Did the Dermatology Team specifically  the patient on the importance of a Full Skin Exam to be sure that nothing is missed clinically?  Yes}  o Did the patient ultimately request or accept a Full Skin Exam?  NO      CONSTITUTIONAL:   Vitals:    06/16/21 1101   Weight: 79 4 kg (175 lb)   Height: 5' 4" (1 626 m)       PSYCH: Normal mood and affect  EYES: Normal conjunctiva  ENT: Normal lips and oral mucosa  CARDIOVASCULAR: No edema  RESPIRATORY: Normal respirations  HEME/LYMPH/IMMUNO:  No regional lymphadenopathy except as noted below in "ASSESSMENT AND PLAN BY DIAGNOSIS"    SKIN:  FULL ORGAN SYSTEM EXAM   Hair, Scalp, Ears, Face Normal except as noted below in Assessment   Neck Normal except as noted below in Assessment   Right Arm/Hand/Fingers Normal except as noted below in Assessment   Left Arm/Hand/Fingers Normal except as noted below in Assessment   Chest/Breasts/Axillae Viewed areas Normal except as noted below in Assessment   Abdomen, Umbilicus Normal except as noted below in Assessment   Back/Spine Normal except as noted below in Assessment   Groin/Genitalia/Buttocks NOT EXAMINED                Assessment and Plan by Diagnosis:    History of Present Condition:     Duration:  How long has this been an issue for you?    o  For months   Location Affected:  Where on the body is this affecting you?    o  Hands   Quality:  Is there any bleeding, pain, itch, burning/irritation, or redness associated with the skin lesion?    o  Itching   Severity:  Describe any bleeding, pain, itch, burning/irritation, or redness on a scale of 1 to 10 (with 10 being the worst)  o  10/10   Timing:  Does this condition seem to be there pretty constantly or do you notice it more at specific times throughout the day? o  constant   Context:  Have you ever noticed that this condition seems to be associated with specific activities you do?    o  No   Modifying Factors:    o Anything that seems to make the condition worse?    -  None  o What have you tried to do to make the condition better?    -  Tried triamcinolone cream without improvement   Associated Signs and Symptoms:  Does this skin lesion seem to be associated with any of the following:  o  SL AMB DERM SIGNS AND SYMPTOMS: Redness and Itching and Scratching     Begin "ASSESSMENT AND PLAN BY DIAGNOSIS" here    IRRITANT vs ALLERGIC CONTACT DERMATITIS     Physical Exam:   Anatomic Location Affected:  Bilateral extensor hands, flexural wrists   Morphological Description:  Erythema and lichenification   Severity: moderate   Pertinent Positives:   Pertinent Negatives:    Assessment and Plan:  Based on a thorough discussion of this condition and the management approach to it (including a comprehensive discussion of the known risks, side effects and potential benefits of treatment), the patient (family) agrees to implement the following specific plan:   Start clobetasol 0 05% ointment  Apply twice a day to areas of rash on the hands for 2 weeks  Then, apply twice a day from Monday-Friday for another 2 weeks     Use protective gloves when working with chemicals or detergents (example: when washing dishes or gardening)   Use a gentle soap such as Dove sensitive skin  Moisturize 2-3 times daily   BEST - OINTMENTS, such as Vaseline, Aquaphor, Cerave healing ointments   BETTER - CREAMS, such as Cerave, Cetaphil, VaniCREAM, Aveeno, Eucerin   Follow up in 1 month

## 2021-06-16 NOTE — PATIENT INSTRUCTIONS
IRRITANT vs ALLERGIC CONTACT DERMATITIS     Assessment and Plan:  Based on a thorough discussion of this condition and the management approach to it (including a comprehensive discussion of the known risks, side effects and potential benefits of treatment), the patient (family) agrees to implement the following specific plan:   Start clobetasol 0 05% ointment  Apply twice a day to areas of rash on the hands for 2 weeks  Then, apply twice a day from Monday-Friday for another 2 weeks   Use protective gloves when working with chemicals or detergents (example: when washing dishes or gardening)   Use a gentle soap such as Dove sensitive skin  Moisturize 2-3 times daily all over the body   BEST - OINTMENTS, such as Vaseline, Aquaphor, Cerave healing ointments   BETTER - CREAMS, such as Cerave, Cetaphil, VaniCREAM, Aveeno, Eucerin   Follow up in 1 month    Assessment and Plan:  Irritant contact dermatitis is an inflammatory reaction in the skin resulting from exposure to a substance that can cause an eruption in most people who come into contact with it  Irritants remove oils and moisture from the skin allowing chemicals to penetrate more deeply, causing more damage  No previous exposure is necessary, so it may occur from a single application of toxic substances or repeated application of mildly irritating substances (e g , soaps, detergents)  In irritant dermatitis, the inherent toxicity of the substance is the most important factor  Those who have atopic dermatitis are at increased risk due to impaired barrier function of the skin       Some characteristics of irritant contact dermatitis include:   Confined to site of contact with irritant and dependent on substance    Erythema, chapped skin, dryness and fissuring after repeated exposures   Mild to extreme pruritus   Pain when erosions and fissures are present    Severe cases can present with edema, exudate, and tenderness   Potent irritants can produce painful blisters (bullae) within hours after exposure   In babies, can see dribble rash around the mouth due to alkaline saliva

## 2021-06-17 ENCOUNTER — ANESTHESIA (OUTPATIENT)
Dept: GASTROENTEROLOGY | Facility: HOSPITAL | Age: 61
End: 2021-06-17

## 2021-06-17 ENCOUNTER — HOSPITAL ENCOUNTER (OUTPATIENT)
Dept: GASTROENTEROLOGY | Facility: HOSPITAL | Age: 61
Setting detail: OUTPATIENT SURGERY
Discharge: HOME | End: 2021-06-17
Attending: INTERNAL MEDICINE | Admitting: INTERNAL MEDICINE
Payer: COMMERCIAL

## 2021-06-17 ENCOUNTER — ANESTHESIA EVENT (OUTPATIENT)
Dept: GASTROENTEROLOGY | Facility: HOSPITAL | Age: 61
End: 2021-06-17

## 2021-06-17 VITALS
OXYGEN SATURATION: 99 % | SYSTOLIC BLOOD PRESSURE: 161 MMHG | HEIGHT: 64 IN | TEMPERATURE: 96.5 F | HEART RATE: 70 BPM | WEIGHT: 175 LBS | BODY MASS INDEX: 29.88 KG/M2 | RESPIRATION RATE: 18 BRPM | DIASTOLIC BLOOD PRESSURE: 65 MMHG

## 2021-06-17 DIAGNOSIS — K86.89 PANCREATIC MASS: ICD-10-CM

## 2021-06-17 DIAGNOSIS — Z12.11 COLON CANCER SCREENING: ICD-10-CM

## 2021-06-17 PROCEDURE — 88313 SPECIAL STAINS GROUP 2: CPT | Performed by: PATHOLOGY

## 2021-06-17 PROCEDURE — 88305 TISSUE EXAM BY PATHOLOGIST: CPT | Performed by: PATHOLOGY

## 2021-06-17 PROCEDURE — 88342 IMHCHEM/IMCYTCHM 1ST ANTB: CPT | Performed by: PATHOLOGY

## 2021-06-17 PROCEDURE — 88173 CYTOPATH EVAL FNA REPORT: CPT | Performed by: PATHOLOGY

## 2021-06-17 PROCEDURE — 88172 CYTP DX EVAL FNA 1ST EA SITE: CPT | Performed by: PATHOLOGY

## 2021-06-17 PROCEDURE — 88341 IMHCHEM/IMCYTCHM EA ADD ANTB: CPT | Performed by: PATHOLOGY

## 2021-06-17 PROCEDURE — 45385 COLONOSCOPY W/LESION REMOVAL: CPT | Performed by: INTERNAL MEDICINE

## 2021-06-17 PROCEDURE — 43238 EGD US FINE NEEDLE BX/ASPIR: CPT | Performed by: INTERNAL MEDICINE

## 2021-06-17 RX ORDER — PROPOFOL 10 MG/ML
INJECTION, EMULSION INTRAVENOUS AS NEEDED
Status: DISCONTINUED | OUTPATIENT
Start: 2021-06-17 | End: 2021-06-17

## 2021-06-17 RX ORDER — PROPOFOL 10 MG/ML
INJECTION, EMULSION INTRAVENOUS CONTINUOUS PRN
Status: DISCONTINUED | OUTPATIENT
Start: 2021-06-17 | End: 2021-06-17

## 2021-06-17 RX ORDER — HYDROMORPHONE HCL/PF 1 MG/ML
0.5 SYRINGE (ML) INJECTION
Status: COMPLETED | OUTPATIENT
Start: 2021-06-17 | End: 2021-06-17

## 2021-06-17 RX ORDER — SODIUM CHLORIDE 9 MG/ML
INJECTION, SOLUTION INTRAVENOUS CONTINUOUS PRN
Status: DISCONTINUED | OUTPATIENT
Start: 2021-06-17 | End: 2021-06-17

## 2021-06-17 RX ORDER — LABETALOL 20 MG/4 ML (5 MG/ML) INTRAVENOUS SYRINGE
AS NEEDED
Status: DISCONTINUED | OUTPATIENT
Start: 2021-06-17 | End: 2021-06-17

## 2021-06-17 RX ORDER — GLYCOPYRROLATE 0.2 MG/ML
INJECTION INTRAMUSCULAR; INTRAVENOUS AS NEEDED
Status: DISCONTINUED | OUTPATIENT
Start: 2021-06-17 | End: 2021-06-17

## 2021-06-17 RX ORDER — FENTANYL CITRATE 50 UG/ML
INJECTION, SOLUTION INTRAMUSCULAR; INTRAVENOUS AS NEEDED
Status: DISCONTINUED | OUTPATIENT
Start: 2021-06-17 | End: 2021-06-17

## 2021-06-17 RX ORDER — LIDOCAINE HYDROCHLORIDE 10 MG/ML
INJECTION, SOLUTION EPIDURAL; INFILTRATION; INTRACAUDAL; PERINEURAL AS NEEDED
Status: DISCONTINUED | OUTPATIENT
Start: 2021-06-17 | End: 2021-06-17

## 2021-06-17 RX ADMIN — PROPOFOL 80 MG: 10 INJECTION, EMULSION INTRAVENOUS at 10:23

## 2021-06-17 RX ADMIN — LABETALOL 20 MG/4 ML (5 MG/ML) INTRAVENOUS SYRINGE 5 MG: at 10:51

## 2021-06-17 RX ADMIN — HYDROMORPHONE HYDROCHLORIDE 0.5 MG: 1 INJECTION, SOLUTION INTRAMUSCULAR; INTRAVENOUS; SUBCUTANEOUS at 13:23

## 2021-06-17 RX ADMIN — LIDOCAINE HYDROCHLORIDE 50 MG: 10 INJECTION, SOLUTION EPIDURAL; INFILTRATION; INTRACAUDAL; PERINEURAL at 10:21

## 2021-06-17 RX ADMIN — FENTANYL CITRATE 25 MCG: 50 INJECTION INTRAMUSCULAR; INTRAVENOUS at 10:57

## 2021-06-17 RX ADMIN — GLYCOPYRROLATE 0.1 MG: 0.2 INJECTION, SOLUTION INTRAMUSCULAR; INTRAVENOUS at 10:57

## 2021-06-17 RX ADMIN — HYDROMORPHONE HYDROCHLORIDE 0.5 MG: 1 INJECTION, SOLUTION INTRAMUSCULAR; INTRAVENOUS; SUBCUTANEOUS at 13:53

## 2021-06-17 RX ADMIN — LABETALOL 20 MG/4 ML (5 MG/ML) INTRAVENOUS SYRINGE 5 MG: at 10:46

## 2021-06-17 RX ADMIN — PROPOFOL 120 MG: 10 INJECTION, EMULSION INTRAVENOUS at 10:21

## 2021-06-17 RX ADMIN — PROPOFOL 120 MCG/KG/MIN: 10 INJECTION, EMULSION INTRAVENOUS at 10:21

## 2021-06-17 RX ADMIN — PROPOFOL 100 MG: 10 INJECTION, EMULSION INTRAVENOUS at 10:25

## 2021-06-17 RX ADMIN — SODIUM CHLORIDE: 0.9 INJECTION, SOLUTION INTRAVENOUS at 10:19

## 2021-06-17 RX ADMIN — FENTANYL CITRATE 25 MCG: 50 INJECTION INTRAMUSCULAR; INTRAVENOUS at 11:05

## 2021-06-17 NOTE — H&P
History and Physical - [facility]  Pat Xie 63 y o  female MRN: [id number]                  HPI: Pat Xie is a 63y o  year old female who presents for EUS/colonoscopy      REVIEW OF SYSTEMS: Per the HPI, and otherwise unremarkable      Historical Information   Past Medical History:   Diagnosis Date    Back ache     Cancer ([address] )     Hyperlipidemia     Hypertension      Past Surgical History:   Procedure Laterality Date    APPENDECTOMY      BILATERAL OOPHORECTOMY      BREAST SURGERY      HERNIA REPAIR       Social History   Social History     Substance and Sexual Activity   Alcohol Use Not Currently     Social History     Substance and Sexual Activity   Drug Use No     Social History     Tobacco Use   Smoking Status Former Smoker    Packs/day: 0 65    Years: 30 00    Pack years: 19 50   Smokeless Tobacco Never Used   Tobacco Comment    quit 15 years ago     Family History   Problem Relation Age of Onset    Hypertension Mother     Heart disease Mother     Diabetes Mother     Cancer Father        Meds/Allergies       Current Outpatient Medications:     amLODIPine (NORVASC) 10 mg tablet    atorvastatin (LIPITOR) 40 mg tablet    dicyclomine (BENTYL) 10 mg capsule    escitalopram (LEXAPRO) 10 mg tablet    hydrOXYzine HCL (ATARAX) 25 mg tablet    lisinopril (ZESTRIL) 20 mg tablet    clobetasol (TEMOVATE) 0 05 % ointment    omeprazole (PriLOSEC) 40 MG capsule    ondansetron (ZOFRAN-ODT) 4 mg disintegrating tablet    oxyCODONE (ROXICODONE) 5 mg immediate release tablet    polyethylene glycol (GLYCOLAX) 17 GM/SCOOP powder    sertraline (ZOLOFT) 25 mg tablet    traZODone (DESYREL) 50 mg tablet    triamcinolone (KENALOG) 0 1 % ointment    No Known Allergies    Objective     /73   Pulse 78   Temp 97 8 °F (36 6 °C) (Tympanic)   Resp 18   Ht 5' 4" (1 626 m)   Wt 79 4 kg (175 lb)   LMP  (LMP Unknown)   SpO2 100%   BMI 30 04 kg/m²       PHYSICAL EXAM    Gen: NAD  Head: NCAT  CV: RRR  CHEST: Clear  ABD: soft, NT/ND  EXT: no edema      ASSESSMENT/PLAN:  This is a 63y o  year old female here for EUS/colonoscopy, and she is stable and optimized for her procedure

## 2021-06-17 NOTE — DISCHARGE INSTRUCTIONS
Upper Endoscopic Gastrointestinal Ultrasonography   WHAT YOU NEED TO KNOW:   An upper gastrointestinal endoscopic ultrasound is done to look at the different parts of your upper gastrointestinal (GI) tract  The upper GI tract includes the esophagus, stomach, and duodenum (first part of the small intestine)  This procedure is used to help diagnose and treat diseases that affect the upper GI tract  DISCHARGE INSTRUCTIONS:   Seek care immediately if:   · You have sudden, severe abdominal pain  · You have problems swallowing  · You have a large amount of black, sticky bowel movements or blood in your bowel movements  · You have sudden trouble breathing  · You feel weak, lightheaded, or faint or your heart beats faster than normal for you  Contact your healthcare provider if:   · You have a fever and chills  · You have nausea or are vomiting  · Your abdomen is bloated or feels full and hard  · You have abdominal pain  · You have a large amount of black, sticky bowel movements or blood in your bowel movements  · You have not had a bowel movement for 3 days after your procedure  · You have rash or hives  · You lose your appetite, your skin feels itchy, and your skin turns yellow  · You have questions or concerns about your procedure  Self-care:   ·      Rest when you feel it is needed  You may be drowsy for up to 24 hours after your procedure  Return to your daily activities as directed  ·       Ask when you can eat regular foods  Healthy foods include fruits, vegetables, whole-grain breads, low-fat dairy products, beans, lean meats, and fish  ·       Relieve a sore throat with ice chips, liquids, or lozenges as directed  Follow up with your healthcare provider as directed: Write down your questions so you remember to ask them during your visits        If you take a blood thinner, please review the specific instructions from your endoscopist about when you should resume it  These can be found in the Recommendation and Your Medication list sections of this After Visit Summary

## 2021-06-17 NOTE — ANESTHESIA POSTPROCEDURE EVALUATION
Post-Op Assessment Note    CV Status:  Stable  Pain Score: 0    Pain management: adequate     Mental Status:  Sleepy   Hydration Status:  Stable   PONV Controlled:  None   Airway Patency:  Patent      Post Op Vitals Reviewed: Yes      Staff: CRNA         No complications documented      BP   129/58   Temp   36 4   Pulse  63   Resp   15   SpO2   100

## 2021-06-17 NOTE — ADDENDUM NOTE
Addendum  created 06/17/21 1202 by Corina Carroll DO    Attestation recorded in [facility], [facility] 97 filed

## 2021-06-17 NOTE — ADDENDUM NOTE
Addendum  created 06/17/21 1203 by Nida Lanier DO    Attestation recorded in [address] [place] [address], [facility] 97 filed

## 2021-06-17 NOTE — ANESTHESIA PREPROCEDURE EVALUATION
Procedure:  COLONOSCOPY  ENDOSCOPIC ULTRASOUND (UPPER)    Relevant Problems   CARDIO   (+) Benign essential hypertension   (+) Hyperlipemia   (+) Hypertension   (+) Hypertensive urgency      /RENAL   (+) YULIYA (acute kidney injury) ([facility] [address] )      MUSCULOSKELETAL   (+) Acute low back pain due to trauma      NEURO/PSYCH   (+) Depression   (+) Vision disturbance        Physical Exam    Airway    Mallampati score: II  TM Distance: >3 FB  Neck ROM: full     Dental   No notable dental hx     Cardiovascular  Cardiovascular exam normal    Pulmonary  Pulmonary exam normal     Other Findings        Anesthesia Plan  ASA Score- 3     Anesthesia Type- IV sedation with anesthesia with ASA Monitors  Additional Monitors:   Airway Plan: NTT  Plan Factors-Exercise tolerance (METS): >4 METS  Chart reviewed  Existing labs reviewed  Patient is not a current smoker  Patient not instructed to abstain from smoking on day of procedure  Patient did not smoke on day of surgery  Induction- intravenous  Postoperative Plan-     Informed Consent- Anesthetic plan and risks discussed with patient  I personally reviewed this patient with the CRNA  Discussed and agreed on the Anesthesia Plan with the CRNA  Adolfo Rubi

## 2021-06-28 ENCOUNTER — TELEPHONE (OUTPATIENT)
Dept: GASTROENTEROLOGY | Facility: CLINIC | Age: 61
End: 2021-06-28

## 2021-06-28 DIAGNOSIS — D3A.8 NEUROENDOCRINE TUMOR OF PANCREAS: Primary | ICD-10-CM

## 2021-06-28 NOTE — TELEPHONE ENCOUNTER
Spoke to patient (Icelandic speaking) and her son with regards to recent EUS pancreas biopsy  Told them this was a neuroendocrine tumor and likely non functioning  She should follow up with Dr Keenan Sierra as they had seen him in May

## 2021-07-06 ENCOUNTER — CONSULT (OUTPATIENT)
Dept: NEPHROLOGY | Facility: CLINIC | Age: 61
End: 2021-07-06
Payer: COMMERCIAL

## 2021-07-06 VITALS
HEART RATE: 80 BPM | SYSTOLIC BLOOD PRESSURE: 152 MMHG | BODY MASS INDEX: 29.53 KG/M2 | WEIGHT: 173 LBS | HEIGHT: 64 IN | RESPIRATION RATE: 18 BRPM | DIASTOLIC BLOOD PRESSURE: 80 MMHG

## 2021-07-06 DIAGNOSIS — I12.9 BENIGN HYPERTENSION WITH CKD (CHRONIC KIDNEY DISEASE) STAGE III (HCC): Primary | ICD-10-CM

## 2021-07-06 DIAGNOSIS — N17.9 AKI (ACUTE KIDNEY INJURY) (HCC): ICD-10-CM

## 2021-07-06 DIAGNOSIS — N18.2 STAGE 2 CHRONIC KIDNEY DISEASE: ICD-10-CM

## 2021-07-06 DIAGNOSIS — N18.30 BENIGN HYPERTENSION WITH CKD (CHRONIC KIDNEY DISEASE) STAGE III (HCC): Primary | ICD-10-CM

## 2021-07-06 LAB
SL AMB  POCT GLUCOSE, UA: NEGATIVE
SL AMB LEUKOCYTE ESTERASE,UA: NEGATIVE
SL AMB POCT BILIRUBIN,UA: NEGATIVE
SL AMB POCT BLOOD,UA: ABNORMAL
SL AMB POCT CLARITY,UA: CLEAR
SL AMB POCT COLOR,UA: YELLOW
SL AMB POCT KETONES,UA: NEGATIVE
SL AMB POCT NITRITE,UA: NEGATIVE
SL AMB POCT PH,UA: 5
SL AMB POCT SPECIFIC GRAVITY,UA: 1.02
SL AMB POCT URINE PROTEIN: ABNORMAL
SL AMB POCT UROBILINOGEN: 0.2

## 2021-07-06 PROCEDURE — 81002 URINALYSIS NONAUTO W/O SCOPE: CPT | Performed by: INTERNAL MEDICINE

## 2021-07-06 PROCEDURE — 99244 OFF/OP CNSLTJ NEW/EST MOD 40: CPT | Performed by: INTERNAL MEDICINE

## 2021-07-06 PROCEDURE — 3077F SYST BP >= 140 MM HG: CPT | Performed by: INTERNAL MEDICINE

## 2021-07-06 PROCEDURE — 3079F DIAST BP 80-89 MM HG: CPT | Performed by: INTERNAL MEDICINE

## 2021-07-06 NOTE — PATIENT INSTRUCTIONS
Dieta baja en sodio   LO QUE NECESITA SABER:   Aby dieta baja en sodio limita el consumo de alimentos que tienen alto contenido de sodio (sal)  Usted tendrá que seguir aby dieta baja en sodio si padece de presión arterial fidelia, enfermedad del riñón o insuficiencia cardíaca  Es posible también que tenga que seguir esta dieta si padece aby condición que hace que santana cuerpo retenga líquidos  Es posible que deba limitar la cantidad de sodio que consume a diario a unos 1,500 a 2,000 mg  Pregúntele a santana médico cuánto sodio puede consumir por día  INSTRUCCIONES SOBRE [facility]ITAL[facility]:   Cómo usar las etiquetas de los alimentos para escoger los que son bajos en sodio: Bonny las etiquetas de los alimentos para encontrar la cantidad de sodio que contienen  La cantidad de sodio está incluida en miligramos (mg)  La columna del porcentaje de valor diario indica la cantidad de necesidades diarias satisfechas con 1 porción del alimento para cada nutriente en la lista  Escoja alimentos que tengan menos de 5% del porcentaje diario de [place]  Estos alimentos se consideran bajos en sodio  Los alimentos que tienen 20% o más del porcentaje diario de sodio se consideran alimentos altos en sodio  Algunas etiquetas de alimentos podrían también incluir cualquiera de los siguientes términos que indican el contenido de sodio en los alimentos:  · Pat de sodio: Menos de 5 mg de sodio en cada porción    · Sodio muy bajo: 35 mg de sodio o menos por porción    · Bajo sodio: 140 mg de sodio o menos por porción    · Sodio reducido: Por lo menos 25% menos de sodio en cada porción que el tipo regular    · Constanza en sodio: 50% menos de sodio en cada porción    · Sin sal o sin sal adicional: No se ha agregado sal adicional marianela el procesamiento de los alimentos (el alimento en sí aún podría contener sodio)     Alimentos que se deben evitar: Los alimentos salados son altos en sodio   Se debe evitar lo siguiente:  · Alimentos procesados:     ? Mezclas para hacer pan de maíz, panecitos, pastel, y pudin    ? Alimentos instantáneos, conchis walter, cereales, macarrones y arroz    ? Alimentos empacados, conchis relleno de pan, mezclas para preparar arroz y pastas, mezclas para preparar dips, y macarrones con [place]-barre    ? Alimentos enlatados, conchis vegetales enlatados, sopas, consomés, salsas, y [place] de vegetales o tomate    ? Alimentos para merendar, conchis walter tostadas, palomitas de [place], pretzels, piel de cerdo, galletas de soda saladas, y nueces saladas    ? Alimentos congelados, conchis cenas, [place], [employer], y Deja Pummel    ? Sauerkraut, vegetales en escabeche, y otros alimentos preparados con vinagre    · Isai y quesos:     ? Isai ahumadas o curadas, conchis carne preparada con [place], tocineta, jamón, perros calientes, y salchichas    ? Isai o pastas enlatadas, conchis isai preparadas en ollas de arcilla, guadalupe, anchoas, e imitación de mariscos    ? Isai para emparedado, conchis [place], [place], [place], y carne en [place]    ? Queso procesado, conchis queso americano y pastas de queso    · Condimentos, salsas y especias:     ? Sam (¼ de cucharadita de sam contiene 575 mg de sodio)    ? Especias hechas con sam, conchis sal de ajo, sal de apio, sal de cebolla y sal con condimentos    ? Salsa de soya regular, salsa de [address], salsa [place], salsa para bistec, [address], y [address]  vinagres con sabor    ? Salsa enlatada para isai y mezclas enlatadas    ? Condimentos regulares, conchis la [address] [facility], la salsa [facility], y los aderezos para ensalada    ? Pepinillos y aceitunas    ? Ablandadores de isai y glutamato de [employer] que puede incluir: Bonny las etiquetas de los alimentos para encontrar la cantidad de sodio de cada porción  · Manriquez y cereal: Trate de elegir panes con menos de 80 mg de sodio por porción  ? Pan, panecillo, pan estilo oumar, tortilla o galletas sin sal     ?  Cereales preparados con menos de 5% del valor diario de sodio (por ejemplo, leeann triturado y arroz inflado)    ? Pasta    · Verduras y frutas:     ? Vegetales frescos sin sal, congelados o enlatados    ? Frutas frescas, congeladas o enlatadas    ? Jugo de frutas    · Productos lácteos: Aby porción tiene aproximadamente 150 mg de sodio  ? Marceil Coins los tipos    ? Martene Nicolle    ? Clementina Vidales, conchis queso cheddar, suizo, [employer] o mozzarella    · Rebeccaside y otros alimentos con proteína: Algunas jacinto crudas Jackie Andrew sodio extra  ? Jacinto sin aditivos, pescado y carne de ave    ? Huevo    · Otros alimentos:     ? Pudín casero    ? [place] sin sal, palomitas de maíz o galletas saladas    ? Jesus Krupa sin caio    Modos de disminuir el consumo de sodio:  · Agregue hierbas y especias a los alimentos en lugar de sal marianela la cocción  Utilice condimentos sin sal para agregar sabor a los alimentos  Por ejemplo: cebolla en polvo, ajo en polvo, [place], chambers en polvo, pimentón y perejil  Use jugo de lima, gabbie o vinagre para darle a los alimentos un sabor ácido  Use chiles picantes, estephania o estephania de [place] para agregar un sabor picante  · No ponga el salero en la dowd de la cocina  [employer] puede evitar que añada sal a los alimentos en la dowd  Aby cucharadita de sal tiene 2,300 mg de sodio  Puede llevar un tiempo acostumbrarse a disfrutar el sabor natural de los alimentos en lugar de agregar sal  Consulte con santana médico antes de usar sustitutos de la sal  Algunos sustitutos de la sal tienen aby cece cantidad de potasio y deben evitarse si usted tiene aby enfermedad en los riñones  · Escoja alimentos bajos en sodio en los restaurantes  Las comidas de los restaurantes margarita siempre son altas en sodio  Algunos restaurantes ofrecen información nutricional en el menú que indica la cantidad de sodio de colette alimentos   De ser posible, pida que preparen santana comida con menos sal o sin sal     · Compre alimentos y aperitivos sin sal o bajos en sodio en el supermercado  Por ejemplo: caldos, sopas y vegetales enlatados sin caio o bajos en sodio  Elija verduras frescas o congeladas en santana lugar  Elija nueces o semillas sin sal o frutas o verduras frescas conchis bocadillos  Bonny las etiquetas de los alimentos y elija los alimentos sin sal o con bajo o muy bajo contenido divina [place]  © [address] Information is for End User's use only and may not be sold, redistributed or otherwise used for commercial purposes  All illustrations and images included in CareNotes® are the copyrighted property of A D A M , Inc  or [address] [place] [address] es sólo para uso en educación  Santana intención no es darle un consejo médico sobre enfermedades o tratamientos  Colsulte con santana Navya [employer] farmacéutico antes de seguir cualquier régimen médico para saber si es seguro y efectivo para usted

## 2021-07-06 NOTE — PROGRESS NOTES
NEPHROLOGY OUTPATIENT CONSULTATION   Jaya 63 y o  female MRN: [id number]  Date: 7/6/2021  Reason for consultation:   Chief Complaint   Patient presents with    Consult    Chronic Kidney Disease    Hypertension     ASSESSMENT AND PLAN:  Suspect CKD stage 2, baseline serum creatinine 0 9  -recently had an episode of YULIYA with peak creatinine 1 7 in April 2021 seems to have much improved with most recent creatinine 1 0 in April 2021  No repeat values available since then  -UA shows blood, trace proteinuria in the office today   -check repeat UA with microscopy, urine microalbumin/creatinine ratio, BMP this month  -since improvement in creatinine, patient was restarted on lisinopril by PCP  If creatinine continued to worsen significantly, consider renal ultrasound  -recommended to hold lisinopril the day before and on the day of any surgery in the future   -mild CKD suspect in the setting of long-term hypertension  -avoid nephrotoxins or NSAIDs    Hypertension  -blood pressure slightly above goal in the office today   -she does not check BP on regular basis and checks occasionally which is significantly fluctuating  -currently remains on amlodipine 10 mg daily, lisinopril 20 mg daily  -strongly recommended to check BP at home regularly with upper arm BP cuff and call back in 10 days with all her BP readings   -she has significant salt intake in her diet including adding extra salt  Strongly recommended to follow strict low-salt diet   -also has been going through significant stress with family member having medical issues  -recommended to bring BP machine during next office visit    -if renal function stable, may consider increasing lisinopril further if blood pressure remains above goal    Pancreatic mass, pathology shows well-differentiated neuroendocrine tumor, she will be following up with Surgical Oncology, GI     HISTORY OF PRESENT ILLNESS:  Jaya is a 63y o  year old female with medical issues of hypertension for 7 to 8 years, hyperlipidemia, history of breast carcinoma, recently diagnosed pancreatic mass with concern for neuroendocrine tumor, suspect mild CKD stage 2 with baseline creatinine 0 9 who presents for initial consultation for CKD and hypertension management  Patient recently had an episode of YULIYA with peak creatinine 1 7 had improved to 1 0 in April 2021  No recent values available since then  Old medical records including prior creatinine [facility] records were reviewed  At the time of encounter, she denies any chest pain, shortness of breath, nausea or vomiting  She denies any urinary complaint  Denies any obvious history of recurrent UTI or kidney stones  She checks BP at home occasionally with upper arm BP machine which is significantly fluctuating with SBP ranging from 130s to 170s  She has been going through significant stress due to medical issues with family member  She does not speak [place] and accompanied by her daughter Josselin Grewal who helps with translation as per patient's request     REVIEW OF SYSTEMS:    More than 10 point review of systems were obtained and discussed in length with the patient  Complete review of systems were negative / unremarkable except mentioned in the HPI section  PHYSICAL EXAM:  Vitals:    07/06/21 1501 07/06/21 1538   BP: 138/76 152/80   BP Location: Left arm    Patient Position: Sitting    Cuff Size: Standard    Pulse: 80    Resp: 18    Weight: 78 5 kg (173 lb)    Height: 5' 4" (1 626 m)      Body mass index is 29 7 kg/m²  Physical Exam  Vitals reviewed  Constitutional:       Appearance: She is well-developed  HENT:      Head: Normocephalic and atraumatic        Right Ear: External ear normal       Left Ear: External ear normal    Eyes:      Conjunctiva/sclera: Conjunctivae normal    Cardiovascular:      Comments: S1, S2 present  Pulmonary:      Effort: Pulmonary effort is normal       Breath sounds: Normal breath sounds  No wheezing or rales  Abdominal:      General: Bowel sounds are normal  There is no distension  Palpations: Abdomen is soft  Tenderness: There is no abdominal tenderness  Musculoskeletal:         General: No tenderness  Right lower leg: No edema  Left lower leg: No edema  Lymphadenopathy:      Cervical: No cervical adenopathy  Skin:     Findings: No rash  Neurological:      Mental Status: She is alert and oriented to person, place, and time  Psychiatric:         Behavior: Behavior normal          PAST MEDICAL HISTORY:  Past Medical History:   Diagnosis Date    Back ache     Cancer ([address] )     Hyperlipidemia     Hypertension        PAST SURGICAL HISTORY:  Past Surgical History:   Procedure Laterality Date    APPENDECTOMY      BILATERAL OOPHORECTOMY      BREAST SURGERY      HERNIA REPAIR         ALLERGIES:  No Known Allergies    SOCIAL HISTORY:  Social History     Substance and Sexual Activity   Alcohol Use Not Currently     Social History     Substance and Sexual Activity   Drug Use No     Social History     Tobacco Use   Smoking Status Former Smoker    Packs/day: 0 65    Years: 30 00    Pack years: 19 50   Smokeless Tobacco Never Used   Tobacco Comment    quit 15 years ago       FAMILY HISTORY:  Family History   Problem Relation Age of Onset    Hypertension Mother     Heart disease Mother     Diabetes Mother     Cancer Father        MEDICATIONS:    Current Outpatient Medications:     amLODIPine (NORVASC) 10 mg tablet, Take 1 tablet (10 mg total) by mouth daily, Disp: 90 tablet, Rfl: 3    atorvastatin (LIPITOR) 40 mg tablet, Take 1 tablet (40 mg total) by mouth every evening, Disp: 90 tablet, Rfl: 3    clobetasol (TEMOVATE) 0 05 % ointment, Apply twice a day to rash on hands for 2 weeks  Then, apply twice a day from Monday-Friday for another 2 weeks   Avoid the face and groin, Disp: 60 g, Rfl: 0    dicyclomine (BENTYL) 10 mg capsule, Take 1 capsule (10 mg total) by mouth 4 (four) times a day (before meals and at bedtime), Disp: 180 capsule, Rfl: 0    escitalopram (LEXAPRO) 10 mg tablet, Take 1 tablet (10 mg total) by mouth every morning, Disp: 90 tablet, Rfl: 3    hydrOXYzine HCL (ATARAX) 25 mg tablet, Take 25 mg by mouth daily at bedtime as needed, Disp: , Rfl: 0    lisinopril (ZESTRIL) 20 mg tablet, Take 1 tablet (20 mg total) by mouth daily, Disp: 90 tablet, Rfl: 3    omeprazole (PriLOSEC) 40 MG capsule, Take 1 capsule (40 mg total) by mouth daily, Disp: 30 capsule, Rfl: 0    ondansetron (ZOFRAN-ODT) 4 mg disintegrating tablet, Take 1 tablet (4 mg total) by mouth every 6 (six) hours as needed for nausea or vomiting, Disp: 30 tablet, Rfl: 0    oxyCODONE (ROXICODONE) 5 mg immediate release tablet, Take 1-2 tablets PO Q4H PRN moderate-severe pain, Disp: 30 tablet, Rfl: 0    sertraline (ZOLOFT) 25 mg tablet, Take 25 mg by mouth daily, Disp: , Rfl:     traZODone (DESYREL) 50 mg tablet, take 1/2 tablet by mouth at bedtime if needed for insomnia, Disp: , Rfl:     triamcinolone (KENALOG) 0 1 % ointment, Apply 1 application topically 2 (two) times a day, Disp: 30 g, Rfl: 2    polyethylene glycol (GLYCOLAX) 17 GM/SCOOP powder, At 5pm take 5mgx2 dulcolax  At 6pm 32oz miralax in 64oz gatorade  Drink 8oz glass every 5 mins until 32oz finished  Drink remaining as rec  (Patient not taking: Reported on 7/6/2021), Disp: 238 g, Rfl: 0    Lab Results:   Results for orders placed or performed in visit on 07/06/21   POCT urine dip   Result Value Ref Range    LEUKOCYTE ESTERASE,UA negative     NITRITE,UA negative     SL AMB POCT UROBILINOGEN 0 2     POCT URINE PROTEIN +      PH,UA 5 0     BLOOD,UA ++     SPECIFIC GRAVITY,UA 1 020     KETONES,UA negative     BILIRUBIN,UA negative     GLUCOSE, UA negative      COLOR,UA yellow     CLARITY,UA clear    Creatinine 1 0 in April 2021  Portions of the record may have been created with voice recognition software   Occasional wrong word or "sound a like" substitutions may have occurred due to the inherent limitations of voice recognition software  Read the chart carefully and recognize, using context, where substitutions have occurred

## 2021-07-08 PROBLEM — D3A.8 NEUROENDOCRINE TUMOR OF PANCREAS: Status: ACTIVE | Noted: 2021-07-08

## 2021-07-12 ENCOUNTER — OFFICE VISIT (OUTPATIENT)
Dept: SURGICAL ONCOLOGY | Facility: CLINIC | Age: 61
End: 2021-07-12
Payer: COMMERCIAL

## 2021-07-12 VITALS
TEMPERATURE: 98 F | OXYGEN SATURATION: 98 % | DIASTOLIC BLOOD PRESSURE: 78 MMHG | BODY MASS INDEX: 29.19 KG/M2 | HEART RATE: 78 BPM | SYSTOLIC BLOOD PRESSURE: 160 MMHG | HEIGHT: 64 IN | WEIGHT: 171 LBS

## 2021-07-12 DIAGNOSIS — D3A.8 NEUROENDOCRINE TUMOR OF PANCREAS: Primary | ICD-10-CM

## 2021-07-12 PROCEDURE — 3008F BODY MASS INDEX DOCD: CPT | Performed by: SURGERY

## 2021-07-12 PROCEDURE — 99215 OFFICE O/P EST HI 40 MIN: CPT | Performed by: SURGERY

## 2021-07-12 PROCEDURE — 1036F TOBACCO NON-USER: CPT | Performed by: SURGERY

## 2021-07-12 NOTE — LETTER
July 12, 2021     [address]  [address] [address]  [address] [address]    Patient: Gita Fontenot   YOB: 1960   Date of Visit: 7/12/2021       Dear Dr Guevara Plants: Thank you for referring Gita Fontenot to me for evaluation  Below are my notes for this consultation  If you have questions, please do not hesitate to call me  I look forward to following your patient along with you  Sincerely,        Sammy Rangel MD        CC: MD Sammy Martinez MD  7/12/2021  9:56 AM  Sign when Signing Visit     Surgical Oncology Follow Up       [address] [facility]  [address] [facility] [id number] [phone]    Gita Fontenot  1960  [id number]  [address] [facility] [place]  [address] [facility] [address]  [phone]    Chief Complaint   Patient presents with    Follow-up       Assessment/Plan:    No problem-specific Assessment & Plan notes found for this encounter  Diagnoses and all orders for this visit:    Neuroendocrine tumor of pancreas        Advance Care Planning/Advance Directives:  Discussed disease status, cancer treatment plans and/or cancer treatment goals with the patient  Oncology History   Neuroendocrine tumor of pancreas   6/17/2021 Biopsy    Pancreas, Head Cyst:   Well-differentiated neuroendocrine tumor  History of Present Illness:   Patient is a 56-year-old woman here for follow-up visit status post repeat EUS with biopsy   -Interval History:  She has no complaints to report except for occasional epigastric pain  This has been constant/persistent  She is being worked up presently for pancreatic head mass, suspected neuroendocrine tumor  Review of Systems:  Review of Systems   Constitutional: Negative  HENT: Negative  Eyes: Negative  Respiratory: Negative      Cardiovascular: Negative  Gastrointestinal: Positive for abdominal pain  Endocrine: Negative  Genitourinary: Negative  Musculoskeletal: Negative  Skin: Negative  Allergic/Immunologic: Negative  Neurological: Negative  Hematological: Negative  Psychiatric/Behavioral: Negative  Patient Active Problem List   Diagnosis    Acute low back pain due to trauma    Benign hypertension with CKD (chronic kidney disease), stage II    Vision disturbance    Noncompliance with medications    Stroke-like symptoms    Incidental pulmonary nodule    Hyperlipemia    Tarsal tunnel syndrome, left    Pancreatic mass    Leukocytosis    Depression    Goals of care, counseling/discussion    Stage 2 chronic kidney disease    Neuroendocrine tumor of pancreas     Past Medical History:   Diagnosis Date    Back ache     Cancer [address] [address]     Hyperlipidemia     Hypertension      Past Surgical History:   Procedure Laterality Date    APPENDECTOMY      BILATERAL OOPHORECTOMY      BREAST SURGERY      HERNIA REPAIR       Family History   Problem Relation Age of Onset    Hypertension Mother     Heart disease Mother     Diabetes Mother     Cancer Father      Social History     Socioeconomic History    Marital status: /Civil Union     Spouse name: Peewee ADAMS  [facility]    Number of children: Not on file    Years of education: Not on file    Highest education level: Not on file   Occupational History    Occupation: unemployed   Tobacco Use    Smoking status: Former Smoker     Packs/day: 0 65     Years: 30 00     Pack years: 19 50    Smokeless tobacco: Never Used    Tobacco comment: quit 15 years ago   Vaping Use    Vaping Use: Never used   Substance and Sexual Activity    Alcohol use: Not Currently    Drug use: No    Sexual activity: Not Currently   Other Topics Concern    Not on file   Social History Narrative    Not on file     Social Determinants of Health     Financial Resource Strain: Low Risk     Difficulty of Paying Living Expenses: Not very hard   Food Insecurity: Food Insecurity Present    Worried About Running Out of Food in the Last Year: Sometimes true    Bere of Food in the Last Year: Sometimes true   Transportation Needs: No Transportation Needs    Lack of Transportation (Medical): No    Lack of Transportation (Non-Medical): No   Physical Activity: Inactive    Days of Exercise per Week: 0 days    Minutes of Exercise per Session: 0 min   Stress:     Feeling of Stress :    Social Connections:     Frequency of Communication with Friends and Family:     Frequency of Social Gatherings with Friends and Family:     Attends Bahai Services:     Active Member of Clubs or Organizations:     Attends Club or Organization Meetings:     Marital Status:    Intimate Partner Violence:     Fear of Current or Ex-Partner:     Emotionally Abused:     Physically Abused:     Sexually Abused:        Current Outpatient Medications:     amLODIPine (NORVASC) 10 mg tablet, Take 1 tablet (10 mg total) by mouth daily, Disp: 90 tablet, Rfl: 3    atorvastatin (LIPITOR) 40 mg tablet, Take 1 tablet (40 mg total) by mouth every evening, Disp: 90 tablet, Rfl: 3    clobetasol (TEMOVATE) 0 05 % ointment, Apply twice a day to rash on hands for 2 weeks  Then, apply twice a day from Monday-Friday for another 2 weeks   Avoid the face and groin, Disp: 60 g, Rfl: 0    dicyclomine (BENTYL) 10 mg capsule, Take 1 capsule (10 mg total) by mouth 4 (four) times a day (before meals and at bedtime), Disp: 180 capsule, Rfl: 0    escitalopram (LEXAPRO) 10 mg tablet, Take 1 tablet (10 mg total) by mouth every morning, Disp: 90 tablet, Rfl: 3    hydrOXYzine HCL (ATARAX) 25 mg tablet, Take 25 mg by mouth daily at bedtime as needed, Disp: , Rfl: 0    lisinopril (ZESTRIL) 20 mg tablet, Take 1 tablet (20 mg total) by mouth daily, Disp: 90 tablet, Rfl: 3    omeprazole (PriLOSEC) 40 MG capsule, Take 1 capsule (40 mg total) by mouth daily, Disp: 30 capsule, Rfl: 0    oxyCODONE (ROXICODONE) 5 mg immediate release tablet, Take 1-2 tablets PO Q4H PRN moderate-severe pain, Disp: 30 tablet, Rfl: 0    sertraline (ZOLOFT) 25 mg tablet, Take 25 mg by mouth daily, Disp: , Rfl:     traZODone (DESYREL) 50 mg tablet, take 1/2 tablet by mouth at bedtime if needed for insomnia, Disp: , Rfl:     triamcinolone (KENALOG) 0 1 % ointment, Apply 1 application topically 2 (two) times a day, Disp: 30 g, Rfl: 2    ondansetron (ZOFRAN-ODT) 4 mg disintegrating tablet, Take 1 tablet (4 mg total) by mouth every 6 (six) hours as needed for nausea or vomiting (Patient not taking: Reported on 7/12/2021), Disp: 30 tablet, Rfl: 0    polyethylene glycol (GLYCOLAX) 17 GM/SCOOP powder, At 5pm take 5mgx2 dulcolax  At 6pm 32oz miralax in 64oz gatorade  Drink 8oz glass every 5 mins until 32oz finished  Drink remaining as rec  (Patient not taking: Reported on 7/6/2021), Disp: 238 g, Rfl: 0  No Known Allergies  Vitals:    07/12/21 0910   BP: 160/78   Pulse: 78   Temp: 98 °F (36 7 °C)   SpO2: 98%       Physical Exam  HENT:      Head: Normocephalic and atraumatic  Right Ear: External ear normal       Left Ear: External ear normal    Eyes:      General: No scleral icterus  Extraocular Movements: Extraocular movements intact  Conjunctiva/sclera: Conjunctivae normal       Pupils: Pupils are equal, round, and reactive to light  Cardiovascular:      Rate and Rhythm: Normal rate and regular rhythm  Heart sounds: Normal heart sounds  Pulmonary:      Breath sounds: Normal breath sounds  Abdominal:      General: Abdomen is flat  There is no distension  Palpations: Abdomen is soft  There is no mass  Tenderness: There is no abdominal tenderness  There is no guarding or rebound  Hernia: No hernia is present  Musculoskeletal:         General: Normal range of motion  Cervical back: Normal range of motion     Skin:     General: Skin is warm and dry  Neurological:      General: No focal deficit present  Mental Status: She is alert and oriented to person, place, and time  Psychiatric:         Mood and Affect: Mood normal          Behavior: Behavior normal          Thought Content: Thought content normal          Judgment: Judgment normal            Results:  Labs:  Lab Results   Component Value Date    SODIUM 141 04/21/2021    K 4 2 04/21/2021     (H) 04/21/2021    CO2 28 04/21/2021    AGAP 3 (L) 04/21/2021    BUN 14 04/21/2021    CREATININE 1 01 04/21/2021    GLUC 94 04/21/2021    GLUF 94 01/07/2020    CALCIUM 9 0 04/21/2021    AST 18 04/15/2021    ALT 23 04/15/2021    ALKPHOS 109 04/15/2021    TP 7 6 04/15/2021    TBILI 0 44 04/15/2021    EGFR 60 04/21/2021     [facility]  Colonoscopy    Result Date: 6/17/2021  Narrative: [address] [place], [place] [id number] [phone] DATE OF SERVICE: 6/17/21 PHYSICIAN(S): Contreras Guillen MD - Attending Physician INDICATION: Colon cancer screening Colonoscopy performed for a screening indication  POST-OP DIAGNOSIS: See the impression below  HISTORY: Prior colonoscopy: No prior colonoscopy  BOWEL PREPARATION:  PREPROCEDURE: Informed consent was obtained for the procedure, including sedation  Risks including but not limited to bleeding, infection, perforation, adverse drug reaction and aspiration were explained in detail  Also explained about less than 100% sensitivity with the exam and other alternatives  The patient was placed in the left lateral decubitus position  DETAILS OF PROCEDURE: Patient was taken to the procedure room where a time out was performed to confirm correct patient and correct procedure  The patient underwent monitored anesthesia care, which was administered by an anesthesia professional  The patient's blood pressure, heart rate, level of consciousness, oxygen and respirations were monitored throughout the procedure   A digital rectal exam was performed  The scope was introduced through the anus and advanced to the cecum  Retroflexion was performed in the rectum  The quality of bowel preparation was evaluated using the [facility] Bowel Preparation Scale with scores of: right colon = 2, transverse colon = 2, left colon = 2  The total BBPS score was 6  Bowel prep was adequate  The patient experienced no blood loss  The procedure was not difficult  The patient tolerated the procedure well  There were no apparent complications  ANESTHESIA INFORMATION: ASA: III Anesthesia Type: IV Sedation with Anesthesia MEDICATIONS: No administrations occurring from 1018 to 1127 on 06/17/21 FINDINGS: 4 mm polyp in the ascending colon; removed en bloc by cold snare and retrieved specimen EVENTS: Procedure Events Event Event Time ENDO SCOPE OUT TIME 6/17/2021 11:07 AM ENDO CECUM REACHED 6/17/2021 11:16 AM ENDO SCOPE OUT TIME 6/17/2021 11:26 AM SPECIMENS: ID Type Source Tests Collected by Time Destination 1 : pancreatic head cyst FNA Pancreas NON-GYNECOLOGIC CYTOLOGY, FINE NEEDLE ASPIRATION Selena Nolasco MD 6/17/2021 10:37 AM  2 : cold snare; ascending polyp Tissue Polyp, Colorectal TISSUE EXAM Selena Nolasco MD 6/17/2021 11:20 AM      Impression: Single polyp in the ascending colon s/p cold snare polypectomy RECOMMENDATION: Repeat colonoscopy in 5 years due to a personal history of colon polyps  Selena Nolasco MD     Endoscopic ultrasonography, GI (Upper)    Result Date: 6/17/2021  Narrative: [address] [place] [address] [address]: 6/17/21 PHYSICIAN(S): Selena Nolasco MD - Attending Physician INDICATION: Pancreatic mass POST-OP DIAGNOSIS: See the impression below  PREPROCEDURE: Informed consent was obtained for the procedure, including sedation  Risks of perforation, hemorrhage, adverse drug reaction and aspiration were discussed  The patient was placed in the left lateral decubitus position   Patient was explained about the risks and benefits of the procedure  Risks including but not limited to bleeding, infection, and perforation were explained in detail  Also explained about less than 100% sensitivity with the exam and other alternatives  DETAILS OF PROCEDURE: Patient was taken to the procedure room where a time out was performed to confirm correct patient and correct procedure  The patient underwent monitored anesthesia care, which was administered by an anesthesia professional  The patient's blood pressure, heart rate, oxygen, respirations and level of consciousness were monitored throughout the procedure  The patient experienced no blood loss  The procedure was not difficult  The patient tolerated the procedure well  There were no apparent complications  ANESTHESIA INFORMATION: ASA: III Anesthesia Type: IV Sedation with Anesthesia MEDICATIONS: No administrations occurring from 1018 to 1130 on 06/17/21 FINDINGS: Normal celiac takeoff  No evidence of pancreatic ductal dilation throughout the examination  Bile duct was normal caliber with no filling defects noted  Ampulla appeared normal   No lymphadenopathy appreciated  Lobulated, anechoic and hypoechoic mass measuring 26 mm x 28 mm with well-defined and smooth margins in the head of the pancreas; 5 fine needle biopsy passes were taken with a 22 gauge Pro Hoop Strength needle guided by Doppler, sent sample for histology analysis  Onsite cytologist was present and cytology results were preliminarily atypical SPECIMENS: ID Type Source Tests Collected by Time Destination 1 : pancreatic head cyst FNA Pancreas NON-GYNECOLOGIC CYTOLOGY, FINE NEEDLE ASPIRATION Hu Catalan MD 6/17/2021 10:37 AM  2 : cold snare; ascending polyp Tissue Polyp, Colorectal TISSUE EXAM Hu Catalan MD 6/17/2021 11:20 AM       Impression: 2 8 cm mass s/p core biopsy x 5 passes  No vessel involvement noted    Preliminary cytology consistent with atypical cells RECOMMENDATION: Follow core biopsy results  Kenia Greene MD     I reviewed the above laboratory and imaging data  Discussion/Summary:  69-year-old woman, neuroendocrine tumor pancreas 2 8 cm in size  This is resectable  No evidence of metastases  Treatment options including observation versus surgery were discussed with her  Given size, recommend surgery  We will therefore plan on Whipple procedure  Rationale for this along with risks and benefits of surgery including infection, bleeding, anastomotic leak, abscess, heart trouble, lung trouble, kidney trouble, stroke, even death, were discussed  All questions answered consents signed at this visit

## 2021-07-12 NOTE — PATIENT INSTRUCTIONS
Pre-Surgery Instructions:   Medication Instructions    amLODIPine (NORVASC) 10 mg tablet Take morning of surgery    atorvastatin (LIPITOR) 40 mg tablet Take morning of surgery    clobetasol (TEMOVATE) 0 05 % ointment Stop taking 1 days prior to surgery    dicyclomine (BENTYL) 10 mg capsule Take morning of surgery    escitalopram (LEXAPRO) 10 mg tablet Take morning of surgery    hydrOXYzine HCL (ATARAX) 25 mg tablet Take morning of surgery    lisinopril (ZESTRIL) 20 mg tablet Stop taking 1 days prior to surgery    omeprazole (PriLOSEC) 40 MG capsule Take morning of surgery    oxyCODONE (ROXICODONE) 5 mg immediate release tablet Take morning of surgery    sertraline (ZOLOFT) 25 mg tablet Take morning of surgery    traZODone (DESYREL) 50 mg tablet Take morning of surgery    triamcinolone (KENALOG) 0 1 % ointment Stop taking 1 days prior to surgery         Pancreaticoduodenectomy   WHAT YOU NEED TO KNOW:   What do I need to know about a pancreaticoduodenectomy? Pancreaticoduodenectomy is surgery to remove the head of your pancreas, your duodenum, the end of your bile duct, and your gallbladder  Part of your stomach may also be removed  The surgery is also called a Whipple procedure  This surgery is done when a cancerous tumor has been found in the head of your pancreas  The tumor may also be at the place where your bile duct and pancreatic duct meet, or the first part of your duodenum  How do I prepare for surgery? · Your surgeon will talk to you about how to prepare  He or she may tell you not to eat or drink anything after midnight on the day of your surgery  Arrange to have someone drive you home when you are discharged from the hospital     · Tell your surgeon about all medicines you currently take  Be sure to include medicines such as aspirin, ibuprofen, or blood thinners  He or she will tell you if you need to stop any medicine for surgery, and when to stop   He or she will tell you which medicines to take or not take on the day of surgery  What will happen during surgery? · Your surgeon will make an incision in your abdomen  He or she will remove the parts of your pancreas and bile duct, your duodenum, and your gallbladder  Part of your stomach may also be removed  He or she will then reconnect your stomach, pancreas, bile duct, and jejunum  · A feeding tube may be inserted into your intestines  A drain may be placed to remove extra blood or fluid from the surgery area  Your incision will be closed with stitches or surgical tape and covered with bandages  What are the risks of surgery? Surgery may damage your pancreas, stomach, small intestines, and other organs, blood vessels, or nerves  It may increase your risk for bleeding or an infection  Bile and other digestive juices may leak into your abdomen  You may have trouble absorbing food and nutrients after your surgery  A fistula (abnormal connection between organs) may form  You may develop diabetes  Even after surgery, the tumor may spread  You may develop a life-threatening blood clot  CARE AGREEMENT:   You have the right to help plan your care  Learn about your health condition and how it may be treated  Discuss treatment options with your healthcare providers to decide what care you want to receive  You always have the right to refuse treatment  The above information is an  only  It is not intended as medical advice for individual conditions or treatments  Talk to your doctor, nurse or pharmacist before following any medical regimen to see if it is safe and effective for you  © Copyright [address] [place] [address] Drive Information is for End User's use only and may not be sold, redistributed or otherwise used for commercial purposes   All illustrations and images included in CareNotes® are the copyrighted property of [employer]  or [address] [place] [address] [employer]

## 2021-07-12 NOTE — PROGRESS NOTES
Surgical Oncology Follow Up       [address] [facility]  [address] [facility] [id number] [phone]    Marti Hopper  1960  [id number]  [facility] [place]  [address] [facility] [id number] [phone]    Chief Complaint   Patient presents with    Follow-up       Assessment/Plan:    No problem-specific Assessment & Plan notes found for this encounter  Diagnoses and all orders for this visit:    Neuroendocrine tumor of pancreas        Advance Care Planning/Advance Directives:  Discussed disease status, cancer treatment plans and/or cancer treatment goals with the patient  Oncology History   Neuroendocrine tumor of pancreas   6/17/2021 Biopsy    Pancreas, Head Cyst:   Well-differentiated neuroendocrine tumor  History of Present Illness:   Patient is a 50-year-old woman here for follow-up visit status post repeat EUS with biopsy   -Interval History:  She has no complaints to report except for occasional epigastric pain  This has been constant/persistent  She is being worked up presently for pancreatic head mass, suspected neuroendocrine tumor  Review of Systems:  Review of Systems   Constitutional: Negative  HENT: Negative  Eyes: Negative  Respiratory: Negative  Cardiovascular: Negative  Gastrointestinal: Positive for abdominal pain  Endocrine: Negative  Genitourinary: Negative  Musculoskeletal: Negative  Skin: Negative  Allergic/Immunologic: Negative  Neurological: Negative  Hematological: Negative  Psychiatric/Behavioral: Negative          Patient Active Problem List   Diagnosis    Acute low back pain due to trauma    Benign hypertension with CKD (chronic kidney disease), stage II    Vision disturbance    Noncompliance with medications    Stroke-like symptoms    Incidental pulmonary nodule    Hyperlipemia    Tarsal tunnel syndrome, left    Pancreatic mass    Leukocytosis    Depression    Goals of care, counseling/discussion    Stage 2 chronic kidney disease    Neuroendocrine tumor of pancreas     Past Medical History:   Diagnosis Date    Back ache     Cancer ([address] )     Hyperlipidemia     Hypertension      Past Surgical History:   Procedure Laterality Date    APPENDECTOMY      BILATERAL OOPHORECTOMY      BREAST SURGERY      HERNIA REPAIR       Family History   Problem Relation Age of Onset    Hypertension Mother     Heart disease Mother     Diabetes Mother     Cancer Father      Social History     Socioeconomic History    Marital status: /Civil Union     Spouse name: Peewee Villanueva [facility]    Number of children: Not on file    Years of education: Not on file    Highest education level: Not on file   Occupational History    Occupation: unemployed   Tobacco Use    Smoking status: Former Smoker     Packs/day: 0 65     Years: 30 00     Pack years: 19 50    Smokeless tobacco: Never Used    Tobacco comment: quit 15 years ago   Vaping Use    Vaping Use: Never used   Substance and Sexual Activity    Alcohol use: Not Currently    Drug use: No    Sexual activity: Not Currently   Other Topics Concern    Not on file   Social History Narrative    Not on file     Social Determinants of Health     Financial Resource Strain: Low Risk     Difficulty of Paying Living Expenses: Not very hard   Food Insecurity: Food Insecurity Present    Worried About Running Out of Food in the Last Year: Sometimes true    Bere of Food in the Last Year: Sometimes true   Transportation Needs: No Transportation Needs    Lack of Transportation (Medical): No    Lack of Transportation (Non-Medical):  No   Physical Activity: Inactive    Days of Exercise per Week: 0 days    Minutes of Exercise per Session: 0 min   Stress:     Feeling of Stress :    Social Connections:     Frequency of Communication with Friends and Family:     Frequency of Social Gatherings with Friends and Family:     Attends Spiritism Services:     Active Member of Clubs or Organizations:     Attends Club or Organization Meetings:     Marital Status:    Intimate Partner Violence:     Fear of Current or Ex-Partner:     Emotionally Abused:     Physically Abused:     Sexually Abused:        Current Outpatient Medications:     amLODIPine (NORVASC) 10 mg tablet, Take 1 tablet (10 mg total) by mouth daily, Disp: 90 tablet, Rfl: 3    atorvastatin (LIPITOR) 40 mg tablet, Take 1 tablet (40 mg total) by mouth every evening, Disp: 90 tablet, Rfl: 3    clobetasol (TEMOVATE) 0 05 % ointment, Apply twice a day to rash on hands for 2 weeks  Then, apply twice a day from Monday-Friday for another 2 weeks   Avoid the face and groin, Disp: 60 g, Rfl: 0    dicyclomine (BENTYL) 10 mg capsule, Take 1 capsule (10 mg total) by mouth 4 (four) times a day (before meals and at bedtime), Disp: 180 capsule, Rfl: 0    escitalopram (LEXAPRO) 10 mg tablet, Take 1 tablet (10 mg total) by mouth every morning, Disp: 90 tablet, Rfl: 3    hydrOXYzine HCL (ATARAX) 25 mg tablet, Take 25 mg by mouth daily at bedtime as needed, Disp: , Rfl: 0    lisinopril (ZESTRIL) 20 mg tablet, Take 1 tablet (20 mg total) by mouth daily, Disp: 90 tablet, Rfl: 3    omeprazole (PriLOSEC) 40 MG capsule, Take 1 capsule (40 mg total) by mouth daily, Disp: 30 capsule, Rfl: 0    oxyCODONE (ROXICODONE) 5 mg immediate release tablet, Take 1-2 tablets PO Q4H PRN moderate-severe pain, Disp: 30 tablet, Rfl: 0    sertraline (ZOLOFT) 25 mg tablet, Take 25 mg by mouth daily, Disp: , Rfl:     traZODone (DESYREL) 50 mg tablet, take 1/2 tablet by mouth at bedtime if needed for insomnia, Disp: , Rfl:     triamcinolone (KENALOG) 0 1 % ointment, Apply 1 application topically 2 (two) times a day, Disp: 30 g, Rfl: 2    ondansetron (ZOFRAN-ODT) 4 mg disintegrating tablet, Take 1 tablet (4 mg total) by mouth every 6 (six) hours as needed for nausea or vomiting (Patient not taking: Reported on 7/12/2021), Disp: 30 tablet, Rfl: 0    polyethylene glycol (GLYCOLAX) 17 GM/SCOOP powder, At 5pm take 5mgx2 dulcolax  At 6pm 32oz miralax in 64oz gatorade  Drink 8oz glass every 5 mins until 32oz finished  Drink remaining as rec  (Patient not taking: Reported on 7/6/2021), Disp: 238 g, Rfl: 0  No Known Allergies  Vitals:    07/12/21 0910   BP: 160/78   Pulse: 78   Temp: 98 °F (36 7 °C)   SpO2: 98%       Physical Exam  HENT:      Head: Normocephalic and atraumatic  Right Ear: External ear normal       Left Ear: External ear normal    Eyes:      General: No scleral icterus  Extraocular Movements: Extraocular movements intact  Conjunctiva/sclera: Conjunctivae normal       Pupils: Pupils are equal, round, and reactive to light  Cardiovascular:      Rate and Rhythm: Normal rate and regular rhythm  Heart sounds: Normal heart sounds  Pulmonary:      Breath sounds: Normal breath sounds  Abdominal:      General: Abdomen is flat  There is no distension  Palpations: Abdomen is soft  There is no mass  Tenderness: There is no abdominal tenderness  There is no guarding or rebound  Hernia: No hernia is present  Musculoskeletal:         General: Normal range of motion  Cervical back: Normal range of motion  Skin:     General: Skin is warm and dry  Neurological:      General: No focal deficit present  Mental Status: She is alert and oriented to person, place, and time  Psychiatric:         Mood and Affect: Mood normal          Behavior: Behavior normal          Thought Content:  Thought content normal          Judgment: Judgment normal            Results:  Labs:  Lab Results   Component Value Date    SODIUM 141 04/21/2021    K 4 2 04/21/2021     (H) 04/21/2021    CO2 28 04/21/2021    AGAP 3 (L) 04/21/2021    BUN 14 04/21/2021    CREATININE 1 01 04/21/2021    GLUC 94 04/21/2021    GLUF 94 01/07/2020    CALCIUM 9 0 04/21/2021    AST 18 04/15/2021    ALT 23 04/15/2021    ALKPHOS 109 04/15/2021    TP 7 6 04/15/2021    TBILI 0 44 04/15/2021    EGFR 60 04/21/2021     [facility]  Colonoscopy    Result Date: 6/17/2021  Narrative: [address] [place] [address] [address] [facility] [address] [phone] DATE OF SERVICE: 6/17/21 PHYSICIAN(S): Contreras Guillen MD - Attending Physician INDICATION: Colon cancer screening Colonoscopy performed for a screening indication  POST-OP DIAGNOSIS: See the impression below  HISTORY: Prior colonoscopy: No prior colonoscopy  BOWEL PREPARATION:  PREPROCEDURE: Informed consent was obtained for the procedure, including sedation  Risks including but not limited to bleeding, infection, perforation, adverse drug reaction and aspiration were explained in detail  Also explained about less than 100% sensitivity with the exam and other alternatives  The patient was placed in the left lateral decubitus position  DETAILS OF PROCEDURE: Patient was taken to the procedure room where a time out was performed to confirm correct patient and correct procedure  The patient underwent monitored anesthesia care, which was administered by an anesthesia professional  The patient's blood pressure, heart rate, level of consciousness, oxygen and respirations were monitored throughout the procedure  A digital rectal exam was performed  The scope was introduced through the anus and advanced to the cecum  Retroflexion was performed in the rectum  The quality of bowel preparation was evaluated using the [facility] Bowel Preparation Scale with scores of: right colon = 2, transverse colon = 2, left colon = 2  The total BBPS score was 6  Bowel prep was adequate  The patient experienced no blood loss  The procedure was not difficult  The patient tolerated the procedure well  There were no apparent complications   ANESTHESIA INFORMATION: ASA: III Anesthesia Type: IV Sedation with Anesthesia MEDICATIONS: No administrations occurring from 1018 to 1127 on 06/17/21 FINDINGS: 4 mm polyp in the ascending colon; removed en bloc by cold snare and retrieved specimen EVENTS: Procedure Events Event Event Time ENDO SCOPE OUT TIME 6/17/2021 11:07 AM ENDO CECUM REACHED 6/17/2021 11:16 AM ENDO SCOPE OUT TIME 6/17/2021 11:26 AM SPECIMENS: ID Type Source Tests Collected by Time Destination 1 : pancreatic head cyst FNA Pancreas NON-GYNECOLOGIC CYTOLOGY, FINE NEEDLE ASPIRATION Frankie García MD 6/17/2021 10:37 AM  2 : cold snare; ascending polyp Tissue Polyp, Colorectal TISSUE EXAM Frankie García MD 6/17/2021 11:20 AM      Impression: Single polyp in the ascending colon s/p cold snare polypectomy RECOMMENDATION: Repeat colonoscopy in 5 years due to a personal history of colon polyps  Frankie García MD     Endoscopic ultrasonography, GI (Upper)    Result Date: 6/17/2021  Narrative: [facility] [address] [place] [address] Ave: 6/17/21 PHYSICIAN(S): Frankie García MD - Attending Physician INDICATION: Pancreatic mass POST-OP DIAGNOSIS: See the impression below  PREPROCEDURE: Informed consent was obtained for the procedure, including sedation  Risks of perforation, hemorrhage, adverse drug reaction and aspiration were discussed  The patient was placed in the left lateral decubitus position  Patient was explained about the risks and benefits of the procedure  Risks including but not limited to bleeding, infection, and perforation were explained in detail  Also explained about less than 100% sensitivity with the exam and other alternatives  DETAILS OF PROCEDURE: Patient was taken to the procedure room where a time out was performed to confirm correct patient and correct procedure   The patient underwent monitored anesthesia care, which was administered by an anesthesia professional  The patient's blood pressure, heart rate, oxygen, respirations and level of consciousness were monitored throughout the procedure  The patient experienced no blood loss  The procedure was not difficult  The patient tolerated the procedure well  There were no apparent complications  ANESTHESIA INFORMATION: ASA: III Anesthesia Type: IV Sedation with Anesthesia MEDICATIONS: No administrations occurring from 1018 to 1130 on 06/17/21 FINDINGS: Normal celiac takeoff  No evidence of pancreatic ductal dilation throughout the examination  Bile duct was normal caliber with no filling defects noted  Ampulla appeared normal   No lymphadenopathy appreciated  Lobulated, anechoic and hypoechoic mass measuring 26 mm x 28 mm with well-defined and smooth margins in the head of the pancreas; 5 fine needle biopsy passes were taken with a 22 gauge ReadOz needle guided by Doppler, sent sample for histology analysis  Onsite cytologist was present and cytology results were preliminarily atypical SPECIMENS: ID Type Source Tests Collected by Time Destination 1 : pancreatic head cyst FNA Pancreas NON-GYNECOLOGIC CYTOLOGY, FINE NEEDLE ASPIRATION Javan Jeong MD 6/17/2021 10:37 AM  2 : cold snare; ascending polyp Tissue Polyp, Colorectal TISSUE EXAM Javan Jeong MD 6/17/2021 11:20 AM       Impression: 2 8 cm mass s/p core biopsy x 5 passes  No vessel involvement noted  Preliminary cytology consistent with atypical cells RECOMMENDATION: Follow core biopsy results  Javan Jeong MD     I reviewed the above laboratory and imaging data  Discussion/Summary:  78-year-old woman, neuroendocrine tumor pancreas 2 8 cm in size  This is resectable  No evidence of metastases  Treatment options including observation versus surgery were discussed with her  Given size, recommend surgery  We will therefore plan on Whipple procedure    Rationale for this along with risks and benefits of surgery including infection, bleeding, anastomotic leak, abscess, heart trouble, lung trouble, kidney trouble, stroke, even death, were discussed  All questions answered consents signed at this visit

## 2021-08-02 ENCOUNTER — TELEPHONE (OUTPATIENT)
Dept: RADIOLOGY | Facility: HOSPITAL | Age: 61
End: 2021-08-02

## 2021-08-02 DIAGNOSIS — D3A.8 NEUROENDOCRINE TUMOR OF PANCREAS: Primary | ICD-10-CM

## 2021-08-02 NOTE — TELEPHONE ENCOUNTER
Can you please change patients CMP to stat, she needs to get this done in order to be CT scanned tomorrow  Once changed please advise patient to come an hour prior to her appointment to get the stat labs done

## 2021-08-03 ENCOUNTER — LAB REQUISITION (OUTPATIENT)
Dept: LAB | Facility: HOSPITAL | Age: 61
End: 2021-08-03
Payer: COMMERCIAL

## 2021-08-03 ENCOUNTER — APPOINTMENT (OUTPATIENT)
Dept: LAB | Facility: HOSPITAL | Age: 61
End: 2021-08-03
Payer: COMMERCIAL

## 2021-08-03 ENCOUNTER — HOSPITAL ENCOUNTER (OUTPATIENT)
Dept: RADIOLOGY | Facility: HOSPITAL | Age: 61
Discharge: HOME | End: 2021-08-03
Attending: SURGERY
Payer: COMMERCIAL

## 2021-08-03 DIAGNOSIS — N17.9 AKI (ACUTE KIDNEY INJURY) (HCC): ICD-10-CM

## 2021-08-03 DIAGNOSIS — N17.9 ACUTE KIDNEY FAILURE, UNSPECIFIED (HCC): ICD-10-CM

## 2021-08-03 DIAGNOSIS — D3A.8 NEUROENDOCRINE TUMOR OF PANCREAS: ICD-10-CM

## 2021-08-03 DIAGNOSIS — D3A.8 OTHER BENIGN NEUROENDOCRINE TUMORS: ICD-10-CM

## 2021-08-03 LAB
ABO GROUP BLD: NORMAL
ALBUMIN SERPL BCP-MCNC: 3.2 G/DL (ref 3.5–5)
ALP SERPL-CCNC: 119 U/L (ref 46–116)
ALT SERPL W P-5'-P-CCNC: 38 U/L (ref 12–78)
ANION GAP SERPL CALCULATED.3IONS-SCNC: 5 MMOL/L (ref 4–13)
APTT PPP: 32 SECONDS (ref 23–37)
AST SERPL W P-5'-P-CCNC: 40 U/L (ref 5–45)
BACTERIA UR QL AUTO: ABNORMAL /HPF
BASOPHILS # BLD MANUAL: 0.1 THOUSAND/UL (ref 0–0.1)
BASOPHILS NFR MAR MANUAL: 1 % (ref 0–1)
BILIRUB SERPL-MCNC: 0.42 MG/DL (ref 0.2–1)
BILIRUB UR QL STRIP: NEGATIVE
BLD GP AB SCN SERPL QL: NEGATIVE
BUN SERPL-MCNC: 17 MG/DL (ref 5–25)
CALCIUM ALBUM COR SERPL-MCNC: 9.8 MG/DL (ref 8.3–10.1)
CALCIUM SERPL-MCNC: 9.2 MG/DL (ref 8.3–10.1)
CHLORIDE SERPL-SCNC: 106 MMOL/L (ref 100–108)
CLARITY UR: ABNORMAL
CO2 SERPL-SCNC: 25 MMOL/L (ref 21–32)
COLOR UR: YELLOW
CREAT SERPL-MCNC: 1.02 MG/DL (ref 0.6–1.3)
CREAT UR-MCNC: 184 MG/DL
EOSINOPHIL # BLD MANUAL: 0.19 THOUSAND/UL (ref 0–0.4)
EOSINOPHIL NFR BLD MANUAL: 2 % (ref 0–6)
ERYTHROCYTE [DISTWIDTH] IN BLOOD BY AUTOMATED COUNT: 13.7 % (ref 11.6–15.1)
EST. AVERAGE GLUCOSE BLD GHB EST-MCNC: 114 MG/DL
FINE GRAN CASTS URNS QL MICRO: ABNORMAL /LPF
GFR SERPL CREATININE-BSD FRML MDRD: 59 ML/MIN/1.73SQ M
GLUCOSE P FAST SERPL-MCNC: 96 MG/DL (ref 65–99)
GLUCOSE UR STRIP-MCNC: NEGATIVE MG/DL
HBA1C MFR BLD: 5.6 %
HCT VFR BLD AUTO: 35.9 % (ref 34.8–46.1)
HGB BLD-MCNC: 12 G/DL (ref 11.5–15.4)
HGB UR QL STRIP.AUTO: ABNORMAL
HYALINE CASTS #/AREA URNS LPF: ABNORMAL /LPF
INR PPP: 0.98 (ref 0.84–1.19)
KETONES UR STRIP-MCNC: NEGATIVE MG/DL
LEUKOCYTE ESTERASE UR QL STRIP: ABNORMAL
LYMPHOCYTES # BLD AUTO: 5.32 THOUSAND/UL (ref 0.6–4.47)
LYMPHOCYTES # BLD AUTO: 56 % (ref 14–44)
MCH RBC QN AUTO: 30.7 PG (ref 26.8–34.3)
MCHC RBC AUTO-ENTMCNC: 33.4 G/DL (ref 31.4–37.4)
MCV RBC AUTO: 92 FL (ref 82–98)
METAMYELOCYTES NFR BLD MANUAL: 1 % (ref 0–1)
MICROALBUMIN UR-MCNC: 142 MG/L (ref 0–20)
MICROALBUMIN/CREAT 24H UR: 77 MG/G CREATININE (ref 0–30)
MONOCYTES # BLD AUTO: 0.48 THOUSAND/UL (ref 0–1.22)
MONOCYTES NFR BLD: 5 % (ref 4–12)
NEUTROPHILS # BLD MANUAL: 3.04 THOUSAND/UL (ref 1.85–7.62)
NEUTS SEG NFR BLD AUTO: 32 % (ref 43–75)
NITRITE UR QL STRIP: NEGATIVE
NON-SQ EPI CELLS URNS QL MICRO: ABNORMAL /HPF
NRBC BLD AUTO-RTO: 0 /100 WBCS
PH UR STRIP.AUTO: 5.5 [PH]
PLATELET # BLD AUTO: 242 THOUSANDS/UL (ref 149–390)
PLATELET BLD QL SMEAR: ADEQUATE
PMV BLD AUTO: 10.1 FL (ref 8.9–12.7)
POTASSIUM SERPL-SCNC: 3.5 MMOL/L (ref 3.5–5.3)
PROT SERPL-MCNC: 8.1 G/DL (ref 6.4–8.2)
PROT UR STRIP-MCNC: ABNORMAL MG/DL
PROTHROMBIN TIME: 13 SECONDS (ref 11.6–14.5)
RBC # BLD AUTO: 3.91 MILLION/UL (ref 3.81–5.12)
RBC #/AREA URNS AUTO: ABNORMAL /HPF
RH BLD: NEGATIVE
SODIUM SERPL-SCNC: 136 MMOL/L (ref 136–145)
SP GR UR STRIP.AUTO: 1.02 (ref 1–1.03)
SPECIMEN EXPIRATION DATE: NORMAL
UROBILINOGEN UR QL STRIP.AUTO: 1 E.U./DL
VARIANT LYMPHS # BLD AUTO: 3 %
WBC # BLD AUTO: 9.5 THOUSAND/UL (ref 4.31–10.16)
WBC #/AREA URNS AUTO: ABNORMAL /HPF

## 2021-08-03 PROCEDURE — 86850 RBC ANTIBODY SCREEN: CPT | Performed by: SURGERY

## 2021-08-03 PROCEDURE — 80053 COMPREHEN METABOLIC PANEL: CPT

## 2021-08-03 PROCEDURE — 85730 THROMBOPLASTIN TIME PARTIAL: CPT

## 2021-08-03 PROCEDURE — 74160 CT ABDOMEN W/CONTRAST: CPT

## 2021-08-03 PROCEDURE — 85610 PROTHROMBIN TIME: CPT

## 2021-08-03 PROCEDURE — G1004 CDSM NDSC: HCPCS

## 2021-08-03 PROCEDURE — 86900 BLOOD TYPING SEROLOGIC ABO: CPT | Performed by: SURGERY

## 2021-08-03 PROCEDURE — 86901 BLOOD TYPING SEROLOGIC RH(D): CPT | Performed by: SURGERY

## 2021-08-03 PROCEDURE — 81001 URINALYSIS AUTO W/SCOPE: CPT | Performed by: INTERNAL MEDICINE

## 2021-08-03 PROCEDURE — 83036 HEMOGLOBIN GLYCOSYLATED A1C: CPT

## 2021-08-03 PROCEDURE — 36415 COLL VENOUS BLD VENIPUNCTURE: CPT

## 2021-08-03 PROCEDURE — 85027 COMPLETE CBC AUTOMATED: CPT

## 2021-08-03 PROCEDURE — 82570 ASSAY OF URINE CREATININE: CPT | Performed by: INTERNAL MEDICINE

## 2021-08-03 PROCEDURE — 85007 BL SMEAR W/DIFF WBC COUNT: CPT

## 2021-08-03 PROCEDURE — 82043 UR ALBUMIN QUANTITATIVE: CPT | Performed by: INTERNAL MEDICINE

## 2021-08-03 RX ADMIN — IOHEXOL 100 ML: 350 INJECTION, SOLUTION INTRAVENOUS at 09:51

## 2021-08-04 ENCOUNTER — TELEPHONE (OUTPATIENT)
Dept: HEMATOLOGY ONCOLOGY | Facility: CLINIC | Age: 61
End: 2021-08-04

## 2021-08-04 ENCOUNTER — ANESTHESIA EVENT (OUTPATIENT)
Dept: PERIOP | Facility: HOSPITAL | Age: 61
DRG: 680 | End: 2021-08-04
Payer: COMMERCIAL

## 2021-08-04 NOTE — TELEPHONE ENCOUNTER
Aly Manning is patient's daughter and has a few questions about her surgery for 08/24/21, please call back at [phone]

## 2021-08-05 NOTE — TELEPHONE ENCOUNTER
Spoke to [place], she asked what time surgery will be done and how long she will be in the hospital  Explained the hospital OR will call the night before between 4-6pm to let her know what time to show up and explained Oksana Scott will be in the hospital at a minimum for 7 days  Due to the surgery being a whipple it is hard to predict  [place] would like a letter emailed to her explaining the above  Oksana Scott has help to take care of her  and will need to contact his agency to request more help during this time   Letter sent to [email or web address]

## 2021-08-08 ENCOUNTER — TELEPHONE (OUTPATIENT)
Dept: NEPHROLOGY | Facility: CLINIC | Age: 61
End: 2021-08-08

## 2021-08-24 ENCOUNTER — HOSPITAL ENCOUNTER (INPATIENT)
Facility: HOSPITAL | Age: 61
LOS: 8 days | Discharge: HOME | DRG: 680 | End: 2021-09-01
Attending: SURGERY | Admitting: SURGERY
Payer: COMMERCIAL

## 2021-08-24 ENCOUNTER — ANESTHESIA (OUTPATIENT)
Dept: PERIOP | Facility: HOSPITAL | Age: 61
DRG: 680 | End: 2021-08-24
Payer: COMMERCIAL

## 2021-08-24 DIAGNOSIS — D3A.8 NEUROENDOCRINE TUMOR OF PANCREAS: ICD-10-CM

## 2021-08-24 DIAGNOSIS — Z91.89 AT HIGH RISK FOR KIDNEY INJURY: ICD-10-CM

## 2021-08-24 DIAGNOSIS — K86.89 PANCREATIC MASS: Primary | ICD-10-CM

## 2021-08-24 LAB
ABO GROUP BLD: NORMAL
ANION GAP SERPL CALCULATED.3IONS-SCNC: 9 MMOL/L (ref 4–13)
BASE EXCESS BLDA CALC-SCNC: -3 MMOL/L (ref -2–3)
BASE EXCESS BLDA CALC-SCNC: -3 MMOL/L (ref -2–3)
BASE EXCESS BLDA CALC-SCNC: -3.9 MMOL/L
BASE EXCESS BLDA CALC-SCNC: -4 MMOL/L (ref -2–3)
BASE EXCESS BLDA CALC-SCNC: 0 MMOL/L (ref -2–3)
BASOPHILS # BLD AUTO: 0.01 THOUSANDS/ΜL (ref 0–0.1)
BASOPHILS NFR BLD AUTO: 0 % (ref 0–1)
BUN SERPL-MCNC: 13 MG/DL (ref 5–25)
CA-I BLD-SCNC: 1.06 MMOL/L (ref 1.12–1.32)
CA-I BLD-SCNC: 1.1 MMOL/L (ref 1.12–1.32)
CA-I BLD-SCNC: 1.1 MMOL/L (ref 1.12–1.32)
CA-I BLD-SCNC: 1.14 MMOL/L (ref 1.12–1.32)
CA-I BLD-SCNC: 1.19 MMOL/L (ref 1.12–1.32)
CALCIUM SERPL-MCNC: 7.7 MG/DL (ref 8.3–10.1)
CHLORIDE SERPL-SCNC: 111 MMOL/L (ref 100–108)
CO2 SERPL-SCNC: 21 MMOL/L (ref 21–32)
CREAT SERPL-MCNC: 1.28 MG/DL (ref 0.6–1.3)
EOSINOPHIL # BLD AUTO: 0 THOUSAND/ΜL (ref 0–0.61)
EOSINOPHIL NFR BLD AUTO: 0 % (ref 0–6)
ERYTHROCYTE [DISTWIDTH] IN BLOOD BY AUTOMATED COUNT: 14.2 % (ref 11.6–15.1)
GFR SERPL CREATININE-BSD FRML MDRD: 45 ML/MIN/1.73SQ M
GLUCOSE SERPL-MCNC: 113 MG/DL (ref 65–140)
GLUCOSE SERPL-MCNC: 123 MG/DL (ref 65–140)
GLUCOSE SERPL-MCNC: 140 MG/DL (ref 65–140)
GLUCOSE SERPL-MCNC: 142 MG/DL (ref 65–140)
GLUCOSE SERPL-MCNC: 152 MG/DL (ref 65–140)
GLUCOSE SERPL-MCNC: 156 MG/DL (ref 65–140)
GLUCOSE SERPL-MCNC: 181 MG/DL (ref 65–140)
HCO3 BLDA-SCNC: 20.8 MMOL/L (ref 22–28)
HCO3 BLDA-SCNC: 21.1 MMOL/L (ref 22–28)
HCO3 BLDA-SCNC: 21.6 MMOL/L (ref 22–28)
HCO3 BLDA-SCNC: 22.4 MMOL/L (ref 22–28)
HCO3 BLDA-SCNC: 24.8 MMOL/L (ref 22–28)
HCT VFR BLD AUTO: 26.8 % (ref 34.8–46.1)
HCT VFR BLD CALC: 26 % (ref 34.8–46.1)
HCT VFR BLD CALC: 27 % (ref 34.8–46.1)
HCT VFR BLD CALC: 27 % (ref 34.8–46.1)
HCT VFR BLD CALC: 30 % (ref 34.8–46.1)
HGB BLD-MCNC: 8.9 G/DL (ref 11.5–15.4)
HGB BLDA-MCNC: 10.2 G/DL (ref 11.5–15.4)
HGB BLDA-MCNC: 8.8 G/DL (ref 11.5–15.4)
HGB BLDA-MCNC: 9.2 G/DL (ref 11.5–15.4)
HGB BLDA-MCNC: 9.2 G/DL (ref 11.5–15.4)
IMM GRANULOCYTES # BLD AUTO: 0.04 THOUSAND/UL (ref 0–0.2)
IMM GRANULOCYTES NFR BLD AUTO: 0 % (ref 0–2)
LACTATE SERPL-SCNC: 4.3 MMOL/L (ref 0.5–2)
LACTATE SERPL-SCNC: 5.1 MMOL/L (ref 0.5–2)
LYMPHOCYTES # BLD AUTO: 2.45 THOUSANDS/ΜL (ref 0.6–4.47)
LYMPHOCYTES NFR BLD AUTO: 20 % (ref 14–44)
MAGNESIUM SERPL-MCNC: 1.6 MG/DL (ref 1.6–2.6)
MCH RBC QN AUTO: 31.7 PG (ref 26.8–34.3)
MCHC RBC AUTO-ENTMCNC: 33.2 G/DL (ref 31.4–37.4)
MCV RBC AUTO: 95 FL (ref 82–98)
MONOCYTES # BLD AUTO: 0.72 THOUSAND/ΜL (ref 0.17–1.22)
MONOCYTES NFR BLD AUTO: 6 % (ref 4–12)
NASAL CANNULA: 6
NEUTROPHILS # BLD AUTO: 8.8 THOUSANDS/ΜL (ref 1.85–7.62)
NEUTS SEG NFR BLD AUTO: 74 % (ref 43–75)
NON VENT TYPE-AEROSOL MASK: ABNORMAL
NRBC BLD AUTO-RTO: 0 /100 WBCS
O2 CT BLDA-SCNC: 13.5 ML/DL (ref 16–23)
OXYHGB MFR BLDA: 97.9 % (ref 94–97)
PCO2 BLD: 22 MMOL/L (ref 21–32)
PCO2 BLD: 22 MMOL/L (ref 21–32)
PCO2 BLD: 23 MMOL/L (ref 21–32)
PCO2 BLD: 26 MMOL/L (ref 21–32)
PCO2 BLD: 33.9 MM HG (ref 36–44)
PCO2 BLD: 34.6 MM HG (ref 36–44)
PCO2 BLD: 36 MM HG (ref 36–44)
PCO2 BLD: 38.3 MM HG (ref 36–44)
PCO2 BLDA: 46 MM HG (ref 36–44)
PH BLD: 7.37 [PH] (ref 7.35–7.45)
PH BLD: 7.4 [PH] (ref 7.35–7.45)
PH BLD: 7.4 [PH] (ref 7.35–7.45)
PH BLD: 7.42 [PH] (ref 7.35–7.45)
PH BLDA: 7.3 [PH] (ref 7.35–7.45)
PHOSPHATE SERPL-MCNC: 2.9 MG/DL (ref 2.3–4.1)
PLATELET # BLD AUTO: 177 THOUSANDS/UL (ref 149–390)
PLATELET # BLD AUTO: 183 THOUSANDS/UL (ref 149–390)
PMV BLD AUTO: 10 FL (ref 8.9–12.7)
PMV BLD AUTO: 10.2 FL (ref 8.9–12.7)
PO2 BLD: 135 MM HG (ref 75–129)
PO2 BLD: 143 MM HG (ref 75–129)
PO2 BLD: 164 MM HG (ref 75–129)
PO2 BLD: 84 MM HG (ref 75–129)
PO2 BLDA: 144.4 MM HG (ref 75–129)
POTASSIUM BLD-SCNC: 3.6 MMOL/L (ref 3.5–5.3)
POTASSIUM BLD-SCNC: 3.7 MMOL/L (ref 3.5–5.3)
POTASSIUM BLD-SCNC: 3.9 MMOL/L (ref 3.5–5.3)
POTASSIUM BLD-SCNC: 4.1 MMOL/L (ref 3.5–5.3)
POTASSIUM SERPL-SCNC: 4.1 MMOL/L (ref 3.5–5.3)
RBC # BLD AUTO: 2.81 MILLION/UL (ref 3.81–5.12)
RH BLD: NEGATIVE
SAO2 % BLD FROM PO2: 97 % (ref 60–85)
SAO2 % BLD FROM PO2: 99 % (ref 60–85)
SODIUM BLD-SCNC: 140 MMOL/L (ref 136–145)
SODIUM BLD-SCNC: 141 MMOL/L (ref 136–145)
SODIUM BLD-SCNC: 142 MMOL/L (ref 136–145)
SODIUM BLD-SCNC: 143 MMOL/L (ref 136–145)
SODIUM SERPL-SCNC: 141 MMOL/L (ref 136–145)
SPECIMEN SOURCE: ABNORMAL
WBC # BLD AUTO: 12.02 THOUSAND/UL (ref 4.31–10.16)

## 2021-08-24 PROCEDURE — 0FBG0ZZ EXCISION OF PANCREAS, OPEN APPROACH: ICD-10-PCS | Performed by: SURGERY

## 2021-08-24 PROCEDURE — 83735 ASSAY OF MAGNESIUM: CPT | Performed by: STUDENT IN AN ORGANIZED HEALTH CARE EDUCATION/TRAINING PROGRAM

## 2021-08-24 PROCEDURE — 0DT90ZZ RESECTION OF DUODENUM, OPEN APPROACH: ICD-10-PCS | Performed by: SURGERY

## 2021-08-24 PROCEDURE — 86920 COMPATIBILITY TEST SPIN: CPT

## 2021-08-24 PROCEDURE — 88331 PATH CONSLTJ SURG 1 BLK 1SPC: CPT | Performed by: PATHOLOGY

## 2021-08-24 PROCEDURE — 82948 REAGENT STRIP/BLOOD GLUCOSE: CPT

## 2021-08-24 PROCEDURE — 84295 ASSAY OF SERUM SODIUM: CPT

## 2021-08-24 PROCEDURE — NC001 PR NO CHARGE: Performed by: SURGERY

## 2021-08-24 PROCEDURE — 48150 PARTIAL REMOVAL OF PANCREAS: CPT | Performed by: SURGERY

## 2021-08-24 PROCEDURE — 99024 POSTOP FOLLOW-UP VISIT: CPT | Performed by: SURGERY

## 2021-08-24 PROCEDURE — 88307 TISSUE EXAM BY PATHOLOGIST: CPT | Performed by: PATHOLOGY

## 2021-08-24 PROCEDURE — 88304 TISSUE EXAM BY PATHOLOGIST: CPT | Performed by: PATHOLOGY

## 2021-08-24 PROCEDURE — 88342 IMHCHEM/IMCYTCHM 1ST ANTB: CPT | Performed by: PATHOLOGY

## 2021-08-24 PROCEDURE — 83605 ASSAY OF LACTIC ACID: CPT | Performed by: STUDENT IN AN ORGANIZED HEALTH CARE EDUCATION/TRAINING PROGRAM

## 2021-08-24 PROCEDURE — 85025 COMPLETE CBC W/AUTO DIFF WBC: CPT | Performed by: STUDENT IN AN ORGANIZED HEALTH CARE EDUCATION/TRAINING PROGRAM

## 2021-08-24 PROCEDURE — 99291 CRITICAL CARE FIRST HOUR: CPT | Performed by: SURGERY

## 2021-08-24 PROCEDURE — 85049 AUTOMATED PLATELET COUNT: CPT | Performed by: STUDENT IN AN ORGANIZED HEALTH CARE EDUCATION/TRAINING PROGRAM

## 2021-08-24 PROCEDURE — 88341 IMHCHEM/IMCYTCHM EA ADD ANTB: CPT | Performed by: PATHOLOGY

## 2021-08-24 PROCEDURE — 88332 PATH CONSLTJ SURG EA ADD BLK: CPT | Performed by: PATHOLOGY

## 2021-08-24 PROCEDURE — 84132 ASSAY OF SERUM POTASSIUM: CPT

## 2021-08-24 PROCEDURE — 85014 HEMATOCRIT: CPT

## 2021-08-24 PROCEDURE — 84100 ASSAY OF PHOSPHORUS: CPT | Performed by: STUDENT IN AN ORGANIZED HEALTH CARE EDUCATION/TRAINING PROGRAM

## 2021-08-24 PROCEDURE — 80048 BASIC METABOLIC PNL TOTAL CA: CPT | Performed by: STUDENT IN AN ORGANIZED HEALTH CARE EDUCATION/TRAINING PROGRAM

## 2021-08-24 PROCEDURE — 82330 ASSAY OF CALCIUM: CPT

## 2021-08-24 PROCEDURE — 82947 ASSAY GLUCOSE BLOOD QUANT: CPT

## 2021-08-24 PROCEDURE — 0FT40ZZ RESECTION OF GALLBLADDER, OPEN APPROACH: ICD-10-PCS | Performed by: SURGERY

## 2021-08-24 PROCEDURE — 82330 ASSAY OF CALCIUM: CPT | Performed by: STUDENT IN AN ORGANIZED HEALTH CARE EDUCATION/TRAINING PROGRAM

## 2021-08-24 PROCEDURE — 82803 BLOOD GASES ANY COMBINATION: CPT

## 2021-08-24 PROCEDURE — 88309 TISSUE EXAM BY PATHOLOGIST: CPT | Performed by: PATHOLOGY

## 2021-08-24 PROCEDURE — 82805 BLOOD GASES W/O2 SATURATION: CPT | Performed by: STUDENT IN AN ORGANIZED HEALTH CARE EDUCATION/TRAINING PROGRAM

## 2021-08-24 RX ORDER — ACETAMINOPHEN 650 MG/1
650 SUPPOSITORY RECTAL EVERY 4 HOURS PRN
Status: DISCONTINUED | OUTPATIENT
Start: 2021-08-24 | End: 2021-08-26

## 2021-08-24 RX ORDER — LIDOCAINE HYDROCHLORIDE 10 MG/ML
INJECTION, SOLUTION EPIDURAL; INFILTRATION; INTRACAUDAL; PERINEURAL AS NEEDED
Status: DISCONTINUED | OUTPATIENT
Start: 2021-08-24 | End: 2021-08-24

## 2021-08-24 RX ORDER — HYDROMORPHONE HCL/PF 1 MG/ML
0.5 SYRINGE (ML) INJECTION EVERY 2 HOUR PRN
Status: CANCELLED | OUTPATIENT
Start: 2021-08-24

## 2021-08-24 RX ORDER — ROCURONIUM BROMIDE 10 MG/ML
INJECTION, SOLUTION INTRAVENOUS AS NEEDED
Status: DISCONTINUED | OUTPATIENT
Start: 2021-08-24 | End: 2021-08-24

## 2021-08-24 RX ORDER — LABETALOL 20 MG/4 ML (5 MG/ML) INTRAVENOUS SYRINGE
10 EVERY 4 HOURS PRN
Status: DISCONTINUED | OUTPATIENT
Start: 2021-08-24 | End: 2021-08-27

## 2021-08-24 RX ORDER — PROPOFOL 10 MG/ML
INJECTION, EMULSION INTRAVENOUS CONTINUOUS PRN
Status: DISCONTINUED | OUTPATIENT
Start: 2021-08-24 | End: 2021-08-24

## 2021-08-24 RX ORDER — SODIUM CHLORIDE, SODIUM GLUCONATE, SODIUM ACETATE, POTASSIUM CHLORIDE, MAGNESIUM CHLORIDE, SODIUM PHOSPHATE, DIBASIC, AND POTASSIUM PHOSPHATE .53; .5; .37; .037; .03; .012; .00082 G/100ML; G/100ML; G/100ML; G/100ML; G/100ML; G/100ML; G/100ML
125 INJECTION, SOLUTION INTRAVENOUS CONTINUOUS
Status: DISCONTINUED | OUTPATIENT
Start: 2021-08-24 | End: 2021-08-25

## 2021-08-24 RX ORDER — DEXAMETHASONE SODIUM PHOSPHATE 10 MG/ML
INJECTION, SOLUTION INTRAMUSCULAR; INTRAVENOUS AS NEEDED
Status: DISCONTINUED | OUTPATIENT
Start: 2021-08-24 | End: 2021-08-24

## 2021-08-24 RX ORDER — PANTOPRAZOLE SODIUM 40 MG/1
40 INJECTION, POWDER, FOR SOLUTION INTRAVENOUS
Status: DISCONTINUED | OUTPATIENT
Start: 2021-08-25 | End: 2021-08-24 | Stop reason: SDUPTHER

## 2021-08-24 RX ORDER — ONDANSETRON 2 MG/ML
4 INJECTION INTRAMUSCULAR; INTRAVENOUS ONCE AS NEEDED
Status: DISCONTINUED | OUTPATIENT
Start: 2021-08-24 | End: 2021-08-24 | Stop reason: HOSPADM

## 2021-08-24 RX ORDER — MAGNESIUM HYDROXIDE 1200 MG/15ML
LIQUID ORAL AS NEEDED
Status: DISCONTINUED | OUTPATIENT
Start: 2021-08-24 | End: 2021-08-24 | Stop reason: HOSPADM

## 2021-08-24 RX ORDER — CEFAZOLIN SODIUM 1 G/3ML
INJECTION, POWDER, FOR SOLUTION INTRAMUSCULAR; INTRAVENOUS AS NEEDED
Status: DISCONTINUED | OUTPATIENT
Start: 2021-08-24 | End: 2021-08-24

## 2021-08-24 RX ORDER — HYDROMORPHONE HCL IN WATER/PF 6 MG/30 ML
0.2 PATIENT CONTROLLED ANALGESIA SYRINGE INTRAVENOUS
Status: DISCONTINUED | OUTPATIENT
Start: 2021-08-24 | End: 2021-08-24 | Stop reason: HOSPADM

## 2021-08-24 RX ORDER — FENTANYL CITRATE/PF 50 MCG/ML
25 SYRINGE (ML) INJECTION
Status: COMPLETED | OUTPATIENT
Start: 2021-08-24 | End: 2021-08-24

## 2021-08-24 RX ORDER — SODIUM CHLORIDE, SODIUM GLUCONATE, SODIUM ACETATE, POTASSIUM CHLORIDE, MAGNESIUM CHLORIDE, SODIUM PHOSPHATE, DIBASIC, AND POTASSIUM PHOSPHATE .53; .5; .37; .037; .03; .012; .00082 G/100ML; G/100ML; G/100ML; G/100ML; G/100ML; G/100ML; G/100ML
1000 INJECTION, SOLUTION INTRAVENOUS ONCE
Status: COMPLETED | OUTPATIENT
Start: 2021-08-24 | End: 2021-08-24

## 2021-08-24 RX ORDER — ONDANSETRON 2 MG/ML
INJECTION INTRAMUSCULAR; INTRAVENOUS AS NEEDED
Status: DISCONTINUED | OUTPATIENT
Start: 2021-08-24 | End: 2021-08-24

## 2021-08-24 RX ORDER — FENTANYL CITRATE 50 UG/ML
INJECTION, SOLUTION INTRAMUSCULAR; INTRAVENOUS AS NEEDED
Status: DISCONTINUED | OUTPATIENT
Start: 2021-08-24 | End: 2021-08-24

## 2021-08-24 RX ORDER — HYDROMORPHONE HCL/PF 1 MG/ML
SYRINGE (ML) INJECTION AS NEEDED
Status: DISCONTINUED | OUTPATIENT
Start: 2021-08-24 | End: 2021-08-24

## 2021-08-24 RX ORDER — DIPHENHYDRAMINE HYDROCHLORIDE 50 MG/ML
25 INJECTION INTRAMUSCULAR; INTRAVENOUS ONCE
Status: COMPLETED | OUTPATIENT
Start: 2021-08-24 | End: 2021-08-24

## 2021-08-24 RX ORDER — MIDAZOLAM HYDROCHLORIDE 2 MG/2ML
INJECTION, SOLUTION INTRAMUSCULAR; INTRAVENOUS AS NEEDED
Status: DISCONTINUED | OUTPATIENT
Start: 2021-08-24 | End: 2021-08-24

## 2021-08-24 RX ORDER — SODIUM CHLORIDE, SODIUM LACTATE, POTASSIUM CHLORIDE, CALCIUM CHLORIDE 600; 310; 30; 20 MG/100ML; MG/100ML; MG/100ML; MG/100ML
125 INJECTION, SOLUTION INTRAVENOUS CONTINUOUS
Status: DISCONTINUED | OUTPATIENT
Start: 2021-08-24 | End: 2021-08-24

## 2021-08-24 RX ORDER — ONDANSETRON 2 MG/ML
4 INJECTION INTRAMUSCULAR; INTRAVENOUS EVERY 6 HOURS PRN
Status: DISCONTINUED | OUTPATIENT
Start: 2021-08-24 | End: 2021-09-01 | Stop reason: HOSPADM

## 2021-08-24 RX ORDER — HEPARIN SODIUM 5000 [USP'U]/ML
5000 INJECTION, SOLUTION INTRAVENOUS; SUBCUTANEOUS EVERY 8 HOURS SCHEDULED
Status: DISCONTINUED | OUTPATIENT
Start: 2021-08-24 | End: 2021-09-01 | Stop reason: HOSPADM

## 2021-08-24 RX ORDER — PROPOFOL 10 MG/ML
INJECTION, EMULSION INTRAVENOUS AS NEEDED
Status: DISCONTINUED | OUTPATIENT
Start: 2021-08-24 | End: 2021-08-24

## 2021-08-24 RX ORDER — SODIUM CHLORIDE 9 MG/ML
INJECTION, SOLUTION INTRAVENOUS CONTINUOUS PRN
Status: DISCONTINUED | OUTPATIENT
Start: 2021-08-24 | End: 2021-08-24

## 2021-08-24 RX ORDER — MAGNESIUM SULFATE HEPTAHYDRATE 40 MG/ML
2 INJECTION, SOLUTION INTRAVENOUS ONCE
Status: COMPLETED | OUTPATIENT
Start: 2021-08-24 | End: 2021-08-25

## 2021-08-24 RX ORDER — PANTOPRAZOLE SODIUM 40 MG/1
40 INJECTION, POWDER, FOR SOLUTION INTRAVENOUS
Status: DISCONTINUED | OUTPATIENT
Start: 2021-08-25 | End: 2021-09-01

## 2021-08-24 RX ORDER — SODIUM CHLORIDE, SODIUM LACTATE, POTASSIUM CHLORIDE, CALCIUM CHLORIDE 600; 310; 30; 20 MG/100ML; MG/100ML; MG/100ML; MG/100ML
INJECTION, SOLUTION INTRAVENOUS CONTINUOUS PRN
Status: DISCONTINUED | OUTPATIENT
Start: 2021-08-24 | End: 2021-08-24

## 2021-08-24 RX ORDER — ALBUMIN, HUMAN INJ 5% 5 %
SOLUTION INTRAVENOUS CONTINUOUS PRN
Status: DISCONTINUED | OUTPATIENT
Start: 2021-08-24 | End: 2021-08-24

## 2021-08-24 RX ORDER — LABETALOL 20 MG/4 ML (5 MG/ML) INTRAVENOUS SYRINGE
10 EVERY 4 HOURS PRN
Status: DISCONTINUED | OUTPATIENT
Start: 2021-08-24 | End: 2021-08-24

## 2021-08-24 RX ORDER — ROPIVACAINE HYDROCHLORIDE 2 MG/ML
INJECTION, SOLUTION EPIDURAL; INFILTRATION; PERINEURAL CONTINUOUS PRN
Status: DISCONTINUED | OUTPATIENT
Start: 2021-08-24 | End: 2021-08-24

## 2021-08-24 RX ADMIN — MAGNESIUM SULFATE HEPTAHYDRATE 2 G: 40 INJECTION, SOLUTION INTRAVENOUS at 21:52

## 2021-08-24 RX ADMIN — SODIUM CHLORIDE, SODIUM LACTATE, POTASSIUM CHLORIDE, AND CALCIUM CHLORIDE: .6; .31; .03; .02 INJECTION, SOLUTION INTRAVENOUS at 07:50

## 2021-08-24 RX ADMIN — ALBUMIN (HUMAN): 12.5 INJECTION, SOLUTION INTRAVENOUS at 15:23

## 2021-08-24 RX ADMIN — CEFAZOLIN 2000 MG: 1 INJECTION, POWDER, FOR SOLUTION INTRAMUSCULAR; INTRAVENOUS at 12:39

## 2021-08-24 RX ADMIN — LIDOCAINE HYDROCHLORIDE 30 MG: 10 INJECTION, SOLUTION EPIDURAL; INFILTRATION; INTRACAUDAL; PERINEURAL at 08:31

## 2021-08-24 RX ADMIN — HYDROMORPHONE HYDROCHLORIDE 0.5 MG: 1 INJECTION, SOLUTION INTRAMUSCULAR; INTRAVENOUS; SUBCUTANEOUS at 13:31

## 2021-08-24 RX ADMIN — SODIUM CHLORIDE, SODIUM LACTATE, POTASSIUM CHLORIDE, AND CALCIUM CHLORIDE: .6; .31; .03; .02 INJECTION, SOLUTION INTRAVENOUS at 14:19

## 2021-08-24 RX ADMIN — Medication 25 MCG: at 19:12

## 2021-08-24 RX ADMIN — ROCURONIUM BROMIDE 10 MG: 50 INJECTION, SOLUTION INTRAVENOUS at 12:53

## 2021-08-24 RX ADMIN — CEFAZOLIN 2000 MG: 1 INJECTION, POWDER, FOR SOLUTION INTRAMUSCULAR; INTRAVENOUS at 16:36

## 2021-08-24 RX ADMIN — ROPIVACAINE HYDROCHLORIDE 2 ML/HR: 2 INJECTION, SOLUTION EPIDURAL; INFILTRATION at 08:55

## 2021-08-24 RX ADMIN — SODIUM CHLORIDE, SODIUM GLUCONATE, SODIUM ACETATE, POTASSIUM CHLORIDE, MAGNESIUM CHLORIDE, SODIUM PHOSPHATE, DIBASIC, AND POTASSIUM PHOSPHATE 125 ML/HR: .53; .5; .37; .037; .03; .012; .00082 INJECTION, SOLUTION INTRAVENOUS at 18:09

## 2021-08-24 RX ADMIN — Medication 25 MCG: at 19:23

## 2021-08-24 RX ADMIN — SODIUM CHLORIDE, SODIUM GLUCONATE, SODIUM ACETATE, POTASSIUM CHLORIDE, MAGNESIUM CHLORIDE, SODIUM PHOSPHATE, DIBASIC, AND POTASSIUM PHOSPHATE 1000 ML: .53; .5; .37; .037; .03; .012; .00082 INJECTION, SOLUTION INTRAVENOUS at 19:38

## 2021-08-24 RX ADMIN — HYDROMORPHONE HYDROCHLORIDE 0.2 MG: 1 INJECTION, SOLUTION INTRAMUSCULAR; INTRAVENOUS; SUBCUTANEOUS at 09:08

## 2021-08-24 RX ADMIN — ALBUMIN (HUMAN): 12.5 INJECTION, SOLUTION INTRAVENOUS at 11:32

## 2021-08-24 RX ADMIN — Medication 500 MG: at 14:40

## 2021-08-24 RX ADMIN — HYDROMORPHONE HYDROCHLORIDE 0.5 MG: 1 INJECTION, SOLUTION INTRAMUSCULAR; INTRAVENOUS; SUBCUTANEOUS at 10:07

## 2021-08-24 RX ADMIN — MIDAZOLAM 2 MG: 1 INJECTION INTRAMUSCULAR; INTRAVENOUS at 08:00

## 2021-08-24 RX ADMIN — DIPHENHYDRAMINE HYDROCHLORIDE 25 MG: 50 INJECTION, SOLUTION INTRAMUSCULAR; INTRAVENOUS at 21:30

## 2021-08-24 RX ADMIN — ROCURONIUM BROMIDE 10 MG: 50 INJECTION, SOLUTION INTRAVENOUS at 16:22

## 2021-08-24 RX ADMIN — ALBUMIN (HUMAN): 12.5 INJECTION, SOLUTION INTRAVENOUS at 12:13

## 2021-08-24 RX ADMIN — ONDANSETRON 4 MG: 2 INJECTION INTRAMUSCULAR; INTRAVENOUS at 17:05

## 2021-08-24 RX ADMIN — Medication 25 MCG: at 19:05

## 2021-08-24 RX ADMIN — HYDROMORPHONE HYDROCHLORIDE 0.2 MG: 1 INJECTION, SOLUTION INTRAMUSCULAR; INTRAVENOUS; SUBCUTANEOUS at 16:22

## 2021-08-24 RX ADMIN — FENTANYL CITRATE 100 MCG: 50 INJECTION, SOLUTION INTRAMUSCULAR; INTRAVENOUS at 17:00

## 2021-08-24 RX ADMIN — Medication: at 18:00

## 2021-08-24 RX ADMIN — SODIUM CHLORIDE: 0.9 INJECTION, SOLUTION INTRAVENOUS at 13:01

## 2021-08-24 RX ADMIN — Medication 500 MG: at 08:47

## 2021-08-24 RX ADMIN — FENTANYL CITRATE 50 MCG: 50 INJECTION INTRAMUSCULAR; INTRAVENOUS at 08:15

## 2021-08-24 RX ADMIN — LABETALOL 20 MG/4 ML (5 MG/ML) INTRAVENOUS SYRINGE 10 MG: at 21:30

## 2021-08-24 RX ADMIN — HEPARIN SODIUM 5000 UNITS: 5000 INJECTION INTRAVENOUS; SUBCUTANEOUS at 21:35

## 2021-08-24 RX ADMIN — DEXAMETHASONE SODIUM PHOSPHATE 5 MG: 10 INJECTION, SOLUTION INTRAMUSCULAR; INTRAVENOUS at 09:27

## 2021-08-24 RX ADMIN — HYDROMORPHONE HYDROCHLORIDE 0.3 MG: 1 INJECTION, SOLUTION INTRAMUSCULAR; INTRAVENOUS; SUBCUTANEOUS at 08:23

## 2021-08-24 RX ADMIN — HYDROMORPHONE HYDROCHLORIDE 0.3 MG: 1 INJECTION, SOLUTION INTRAMUSCULAR; INTRAVENOUS; SUBCUTANEOUS at 15:00

## 2021-08-24 RX ADMIN — ROCURONIUM BROMIDE 10 MG: 50 INJECTION, SOLUTION INTRAVENOUS at 14:48

## 2021-08-24 RX ADMIN — SODIUM CHLORIDE: 0.9 INJECTION, SOLUTION INTRAVENOUS at 10:55

## 2021-08-24 RX ADMIN — ROCURONIUM BROMIDE 50 MG: 50 INJECTION, SOLUTION INTRAVENOUS at 08:31

## 2021-08-24 RX ADMIN — KETAMINE HYDROCHLORIDE 0.2 MG/KG/HR: 50 INJECTION, SOLUTION INTRAMUSCULAR; INTRAVENOUS at 08:46

## 2021-08-24 RX ADMIN — FENTANYL CITRATE 50 MCG: 50 INJECTION INTRAMUSCULAR; INTRAVENOUS at 08:06

## 2021-08-24 RX ADMIN — SUGAMMADEX 200 MG: 100 INJECTION, SOLUTION INTRAVENOUS at 17:25

## 2021-08-24 RX ADMIN — CEFAZOLIN 2000 MG: 1 INJECTION, POWDER, FOR SOLUTION INTRAMUSCULAR; INTRAVENOUS at 08:50

## 2021-08-24 RX ADMIN — PROPOFOL 100 MG: 10 INJECTION, EMULSION INTRAVENOUS at 08:31

## 2021-08-24 RX ADMIN — PROPOFOL 100 MCG/KG/MIN: 10 INJECTION, EMULSION INTRAVENOUS at 16:23

## 2021-08-24 RX ADMIN — SODIUM CHLORIDE: 0.9 INJECTION, SOLUTION INTRAVENOUS at 08:43

## 2021-08-24 RX ADMIN — ROCURONIUM BROMIDE 20 MG: 50 INJECTION, SOLUTION INTRAVENOUS at 10:14

## 2021-08-24 RX ADMIN — PHENYLEPHRINE HYDROCHLORIDE 50 MCG/MIN: 10 INJECTION INTRAVENOUS at 08:59

## 2021-08-24 NOTE — ANESTHESIA PROCEDURE NOTES
Epidural Block    Patient location during procedure: OR  Start time: 8/24/2021 8:25 AM  Reason for block: at surgeon's request and post-op pain management  Staffing  Performed: Anesthesiologist   Anesthesiologist: Lianna Hirsch MD  Preanesthetic Checklist  Completed: patient identified, IV checked, risks and benefits discussed, surgical consent, monitors and equipment checked, pre-op evaluation and timeout performed  Epidural  Patient position: sitting  Prep: Betadine  Patient monitoring: frequent blood pressure checks and continuous pulse ox  Approach: midline  Location: thoracic  Injection technique: OCTAVIO air  Needle  Needle type: Tuohy   Needle gauge: 18 G  Catheter type: side hole  Catheter size: 20 G  Catheter at skin depth: 12 cm  Catheter securement method: stabilization device  Test dose: negative  Assessment  Number of attempts: 2negative aspiration for CSF, negative aspiration for heme and no paresthesia on injection  patient tolerated the procedure well with no immediate complications

## 2021-08-24 NOTE — OP NOTE
OPERATIVE REPORT  PATIENT NAME: Robbin Urbina    :  1960  MRN: [id number]  Pt Location: [facility] OR ROOM 05    SURGERY DATE: 2021    Surgeon(s) and Role:     * Amanda Pelletier MD - Primary     * Rosario Gutiérrez MD - Assisting    Preop Diagnosis:  Neuroendocrine tumor of pancreas [D3A 8]    Post-Op Diagnosis Codes:     * Neuroendocrine tumor of pancreas [D3A 8]    Procedure(s) (LRB):  LAPAROTOMY EXPLORATORY (N/A)  WHIPPLE PROCEDURE/PANCREATICO-DUODENECTOMY (N/A)  INTRAOPERATIVE US OF PANCREAS  Specimen(s):  ID Type Source Tests Collected by Time Destination   1 :  Tissue Gallbladder TISSUE EXAM Amanda Pelletier MD 2021 1492    2 : pacreatic duoenedectomy Tissue Pancreas TISSUE EXAM Amanda Pelletier MD 2021 1355    3 : new pancreatic margin, purple is new final margin Tissue Pancreas TISSUE EXAM Amanda Pelletier MD 2021 1450        Estimated Blood Loss:   370 mL    Drains:  Closed/Suction Drain Left; Anterior LUQ Bulb 19 Fr  (Active)   Number of days: 0       Closed/Suction Drain Medial;Right; Anterior RUQ Bulb 19 Fr  (Active)   Number of days:        NG/OG/Enteral Tube Nasogastric 14 Fr Right nare (Active)   Number of days: 0       Urethral Catheter Latex 16 Fr  (Active)   Number of days: 0       Anesthesia Type:   General w/ Epidural    Operative Indications:  Neuroendocrine tumor of pancreas [D3A 8]      Operative Findings:  Neuroendocrine tumor, head/neck of pancreas,    Complications:   None    Procedure and Technique:  The patient was placed supine on the operating table after adequate IV access and IV sedation, central venous access, and Lira catheterization were obtained by Anesthesiology and nursing staff respectively  The abdomen was prepped with Betadine solution and draped with sterile drapes  An upper midline incision was created with a 10 blade scalpel and carried down through the peritoneum with electrocautery   Falciform ligament was taken between clamps and ligated with 2-0 silk ligatures  The right colon was now mobilized at the hepatic flexure  The duodenum and pancreatic head were elevated out of the retroperitoneum via the Kocher maneuver  We then turned our attention to the portal triad  Cholecystectomy was performed by taking the gallbladder in dome down fashion  The cystic artery was clipped and ligated; the cystic duct was suture ligated with a 2-0 sile suture  The common bile duct and cystic duct remnant were circumferentially dissected and looped  The stent within the bile duct was palpated and could be felt  The common bile duct was transected and the stent was removed  The pancreatic duct portion was oversewn with 2-0 Vicryl suture  A soft bulldog clamp was temporarily applied to the proximal portion to minimize biliary spillage into the field  The gastroduodenal artery was the identified, and suture ligated with a 3-0 prolene suture  The duodenum was then transected just distal to the pylorus with a SHARONDA 75 blue load stapler  The stomach was retracted out of the surgical field to allow exposure of the pancreas  The portal vein was visualized at this point  The lesser sac was entered  The top of the pancreas was palpated  The superior mesenteric vein identified along the caudal edge/uncinated process  With meticulous dissection, from the superior and inferior aspects of the pancreatic head, we created a tunnel behind the pancreatic head and anterior to the superior mesenteric vein  The pancreas was then transected with cautery directly anterior to the tunnel  We adjusted retractors to allow exposure of the ligament of Treitz and the proximal jejunum  The first portion of the jejunum was now identified at the ligament of Treitz  The bowel was divided just distal to the ligament using a blue load stapler   The mesentery of the distal portion of the duodenum was taken using the Harmonic Scalpel, and the distal duodenum and proximal jejunum then was slipped underneath the mesenteric tunnel back to the right side of the abdomen  The pancreatic head was now dissected off of the portal vein  Venous branches were clipped or ligated as needed  The uncinate pancreas was now dissected from underneath the portal vein removing all uncinate and head of the pancreas tissues with the specimen en bloc, up to the superior mesenteric artery medially  The specimen was sent for frozen section to check biliary and pancreatic margins  The pancreatic margin was said to be positive for neuroendocrine tumor  We therefore took an additional cm of pancreatic tissue and sent off the final pancreatic margin for frozen section  No tumor was seen on the final pancreatic margin  Once margins were cleared, we proceeded with reconstruction  The retroperitoneum was evaluated and found to be hemostatic  The proximal portion of the jejunum was now mobilized the mesentery so that the limb could be brought up into the hilum of the liver and the pancreas body  A tunnel was made through the mesentery of the colon to the right of the middle mesenteric vessels  An antimesenteric enterotomy was created in the jejunum after bringing the jejunum through the tunnel and up to the pancreatic body  Because the pancreas was extremely fatty, and pancreatic duct could not be located, we opted to perform a pancreaticojejunostomy by dunking the pancreas into the jejunal limb  A pancreaticojejunostomy was performed by using 3-0 silk sutures to position the pancreatic stump next to the jejunal limb and to serve as the back wall of the anastomosis  Following this an opening was made in the jejunum to allow for dunking of the pancreatic stump  Following this, 3-0 Prolene was then inserted through the jejunum, and the superior and inferior edges of the pancreas incorporated into the suture  We then used the 3-0 Prolene as traction sutures to help deliver the pancreatic stump into the jejunal opening    The Prolene sutures were then tied, then 3-0 silk sutures were used to create the anterior portion of the anastomosis between the pancreas and the jejunum  A portion of the falciform ligament was used to buttress the anastomosis and anchored in place with 3-0 silk sutures  With the pancreaticojejunostomy now complete, the choledochojejunostomy was performed by creating an enterotomy on the antimesenteric surface then using 4-0 PDS in interrupted fashion to complete the choledochojejunostomy  The jejunum was now run 40 cm from the choledochojejunostomy, and a loop brought cephalad just anterior to the transverse colon  The jejunum was then positioned next to the duodenal stump using 3-0 silk sutures  An end to side duodenojejunostomy was then created by placing an enterotomy in the jejunum and the duodenal stump and performing double layer anastomosis using 3-0 PDS for the inner layer followed by 3-0 silk sutures for the external Lembert sutures  The rent within the mesentery of the colon to the right of the middle vessels was closed with individual 3-0 silk ligatures  The abdomen was irrigated with copious amounts of sterile saline and aspirated dry  All dissection planes were evaluated for hemostasis and found to be hemostatic  The anastomoses were all evaluated and found to be intact and with good vascularity  Two 23 Welsh round KAMLA drain was brought through a separate stab incisions in the RUQ and LUQ to drain the biliary and pancreatic anastomoses  The retracting system was removed  The abdomen was closed in a one-layer fashion with non-looped 1-0 PDS suture  The subcutaneous tissues were irrigated with sterile saline and the skin was reapproximated 3-0 vicryl for the dermis, then finally with sterile staples  The patient was hemodynamically stable, tolerated the procedure very well, and was taken to the recovery room in awake and stable condition      I was present for the entire procedure    Patient Disposition:  PACU     SIGNATURE: Kody Hemphill MD  DATE: August 24, 2021  TIME: 5:39 PM

## 2021-08-24 NOTE — ANESTHESIA POSTPROCEDURE EVALUATION
Post-Op Assessment Note    CV Status:  Stable    Pain management: adequate     Mental Status:  Awake and sleepy   Hydration Status:  Euvolemic   PONV Controlled:  Controlled   Airway Patency:  Patent      Post Op Vitals Reviewed: Yes      Staff: Anesthesiologist, CRNA     Post-op block assessment: no complications      No complications documented      /64 (08/24/21 1805)    Temp   98 7   Pulse 82 (08/24/21 1814)   Resp 21 (08/24/21 1814)    SpO2 98 % (08/24/21 1814)

## 2021-08-24 NOTE — ANESTHESIA PROCEDURE NOTES
Arterial Line Insertion  Performed by: Ana Savage CRNA  Authorized by: Lilian Steinberg MD   Consent: Written consent obtained  Risks and benefits: risks, benefits and alternatives were discussed  Consent given by: patient  Patient understanding: patient states understanding of the procedure being performed  Patient consent: the patient's understanding of the procedure matches consent given  Procedure consent: procedure consent matches procedure scheduled  Relevant documents: relevant documents present and verified  Test results: test results available and properly labeled  Patient identity confirmed: arm band and verbally with patient  Preparation: Patient was prepped and draped in the usual sterile fashion  Indications: hemodynamic monitoring  Orientation:  Right  Location: radial artery  Sedation:  Patient sedated: GETA      Procedure Details:  Needle gauge: 20  Seldinger technique: Seldinger technique used  Number of attempts: 1    Post-procedure:  Post-procedure: dressing applied  Waveform: good waveform and waveform confirmed  Post-procedure CNS: normal and unchanged  Patient tolerance: Patient tolerated the procedure well with no immediate complications  Comments: Placed by EDGARDO GUTIERREZ

## 2021-08-24 NOTE — ANESTHESIA PREPROCEDURE EVALUATION
Procedure:  LAPAROTOMY EXPLORATORY (N/A Abdomen)  WHIPPLE PROCEDURE/PANCREATICO-DUODENECTOMY WITH POSSIBLE INSERTION JEJUNOSTOMY FEEDING TUBE (N/A Abdomen)  INSERTION JEJUNOSTOMY TUBE OPEN (N/A Abdomen)    Relevant Problems   CARDIO   (+) Benign hypertension with CKD (chronic kidney disease), stage II   (+) Hyperlipemia      GI/HEPATIC   (+) Neuroendocrine tumor of pancreas      /RENAL   (+) Stage 2 chronic kidney disease      NEURO/PSYCH   (+) Depression     Lab Results   Component Value Date    WBC 9 50 08/03/2021    HGB 12 0 08/03/2021     08/03/2021     Lab Results   Component Value Date    SODIUM 136 08/03/2021    K 3 5 08/03/2021    BUN 17 08/03/2021    CREATININE 1 02 08/03/2021    EGFR 59 08/03/2021     Lab Results   Component Value Date    PTT 32 08/03/2021      Lab Results   Component Value Date    INR 0 98 08/03/2021       Lab Results   Component Value Date    HGBA1C 5 6 08/03/2021       Type and Screen:  Lab Results   Component Value Date    ABO A 08/03/2021    RH Negative 08/03/2021    ABS Negative 08/03/2021    SPECIMEXP [id number] 08/03/2021        Physical Exam    Airway    Mallampati score: II  TM Distance: >3 FB  Neck ROM: full     Dental   Comment: No reported loose, No notable dental hx     Cardiovascular  Cardiovascular exam normal    Pulmonary  Pulmonary exam normal     Other Findings        Anesthesia Plan  ASA Score- 3     Anesthesia Type- general with ASA Monitors  Additional Monitors: arterial line  Airway Plan: ETT  Comment: I discussed risks (reviewed with patient on the anesthesia consent form), benefits and alternatives for General Anesthesia  These risks did include breathing tube remaining in place if not strong enough, PONV, damage to lips and teeth, sore throat, eye injury or blindness, risk of heart attack or stroke that may lead to death      I discussed with patient the risks(reviewed with patient on the anesthesia consent form), benefits and alternatives of regional anesthesia also including possibilities of infection, temporary paralysis and nerve injury  I answered patient questions and obtained consent  Planned regional anesthesia thoracic epidural for postoperative pain control    Plan Factors-Exercise tolerance (METS): >4 METS  Chart reviewed  EKG reviewed  Existing labs reviewed  Patient summary reviewed  Induction- intravenous  Postoperative Plan- Plan for postoperative opioid use  Informed Consent- Anesthetic plan and risks discussed with patient  I personally reviewed this patient with the CRNA  Discussed and agreed on the Anesthesia Plan with the CRNA  Nataly Montelongo

## 2021-08-24 NOTE — H&P
Surgical Oncology Follow Up                                        [address] [facility] CANCER [facility] [address] [address]  [phone]     Burnice Severin  1960  [id number]  [address] [facility] [address] [id number] [phone]         Chief Complaint   Patient presents with    Follow-up         Assessment/Plan:     No problem-specific Assessment & Plan notes found for this encounter          Diagnoses and all orders for this visit:     Neuroendocrine tumor of pancreas         Advance Care Planning/Advance Directives:  Discussed disease status, cancer treatment plans and/or cancer treatment goals with the patient           Oncology History   Neuroendocrine tumor of pancreas   6/17/2021 Biopsy     Pancreas, Head Cyst:   Well-differentiated neuroendocrine tumor             History of Present Illness:   Patient is a 56-year-old woman here for follow-up visit status post repeat EUS with biopsy   -Interval History:  She has no complaints to report except for occasional epigastric pain  This has been constant/persistent  She is being worked up presently for pancreatic head mass, suspected neuroendocrine tumor      Review of Systems:  Review of Systems   Constitutional: Negative  HENT: Negative  Eyes: Negative  Respiratory: Negative  Cardiovascular: Negative  Gastrointestinal: Positive for abdominal pain  Endocrine: Negative  Genitourinary: Negative  Musculoskeletal: Negative  Skin: Negative  Allergic/Immunologic: Negative  Neurological: Negative  Hematological: Negative      Psychiatric/Behavioral: Negative               Patient Active Problem List   Diagnosis    Acute low back pain due to trauma    Benign hypertension with CKD (chronic kidney disease), stage II    Vision disturbance    Noncompliance with medications    Stroke-like symptoms    Incidental pulmonary nodule    Hyperlipemia    Tarsal tunnel syndrome, left    Pancreatic mass    Leukocytosis    Depression    Goals of care, counseling/discussion    Stage 2 chronic kidney disease    Neuroendocrine tumor of pancreas      Medical History        Past Medical History:   Diagnosis Date    Back ache      Cancer ([address] )      Hyperlipidemia      Hypertension           Surgical History         Past Surgical History:   Procedure Laterality Date    APPENDECTOMY        BILATERAL OOPHORECTOMY        BREAST SURGERY        HERNIA REPAIR                   Family History   Problem Relation Age of Onset    Hypertension Mother      Heart disease Mother      Diabetes Mother      Cancer Father        Social History         Socioeconomic History    Marital status: /Civil Union       Spouse name: Peewee Villanueva [facility]    Number of children: Not on file    Years of education: Not on file    Highest education level: Not on file   Occupational History    Occupation: unemployed   Tobacco Use    Smoking status: Former Smoker       Packs/day: 0 65       Years: 30 00       Pack years: 19 50    Smokeless tobacco: Never Used    Tobacco comment: quit 15 years ago   Vaping Use    Vaping Use: Never used   Substance and Sexual Activity    Alcohol use: Not Currently    Drug use: No    Sexual activity: Not Currently   Other Topics Concern    Not on file   Social History Narrative    Not on file      Social Determinants of Health          Financial Resource Strain: Low Risk     Difficulty of Paying Living Expenses: Not very hard   Food Insecurity: Food Insecurity Present    Worried About Running Out of Food in the Last Year: Sometimes true    Bere of Food in the Last Year: Sometimes true   Transportation Needs: No Transportation Needs    Lack of Transportation (Medical): No    Lack of Transportation (Non-Medical):  No   Physical Activity: Inactive    Days of Exercise per Week: 0 days    Minutes of Exercise per Session: 0 min   Stress:     Feeling of Stress :    Social Connections:     Frequency of Communication with Friends and Family:     Frequency of Social Gatherings with Friends and Family:     Attends Taoism Services:     Active Member of Clubs or Organizations:     Attends Club or Organization Meetings:     Marital Status:    Intimate Partner Violence:     Fear of Current or Ex-Partner:     Emotionally Abused:     Physically Abused:     Sexually Abused:             Current Outpatient Medications:     amLODIPine (NORVASC) 10 mg tablet, Take 1 tablet (10 mg total) by mouth daily, Disp: 90 tablet, Rfl: 3    atorvastatin (LIPITOR) 40 mg tablet, Take 1 tablet (40 mg total) by mouth every evening, Disp: 90 tablet, Rfl: 3    clobetasol (TEMOVATE) 0 05 % ointment, Apply twice a day to rash on hands for 2 weeks  Then, apply twice a day from Monday-Friday for another 2 weeks   Avoid the face and groin, Disp: 60 g, Rfl: 0    dicyclomine (BENTYL) 10 mg capsule, Take 1 capsule (10 mg total) by mouth 4 (four) times a day (before meals and at bedtime), Disp: 180 capsule, Rfl: 0    escitalopram (LEXAPRO) 10 mg tablet, Take 1 tablet (10 mg total) by mouth every morning, Disp: 90 tablet, Rfl: 3    hydrOXYzine HCL (ATARAX) 25 mg tablet, Take 25 mg by mouth daily at bedtime as needed, Disp: , Rfl: 0    lisinopril (ZESTRIL) 20 mg tablet, Take 1 tablet (20 mg total) by mouth daily, Disp: 90 tablet, Rfl: 3    omeprazole (PriLOSEC) 40 MG capsule, Take 1 capsule (40 mg total) by mouth daily, Disp: 30 capsule, Rfl: 0    oxyCODONE (ROXICODONE) 5 mg immediate release tablet, Take 1-2 tablets PO Q4H PRN moderate-severe pain, Disp: 30 tablet, Rfl: 0    sertraline (ZOLOFT) 25 mg tablet, Take 25 mg by mouth daily, Disp: , Rfl:     traZODone (DESYREL) 50 mg tablet, take 1/2 tablet by mouth at bedtime if needed for insomnia, Disp: , Rfl:     triamcinolone (KENALOG) 0 1 % ointment, Apply 1 application topically 2 (two) times a day, Disp: 30 g, Rfl: 2    ondansetron (ZOFRAN-ODT) 4 mg disintegrating tablet, Take 1 tablet (4 mg total) by mouth every 6 (six) hours as needed for nausea or vomiting (Patient not taking: Reported on 7/12/2021), Disp: 30 tablet, Rfl: 0    polyethylene glycol (GLYCOLAX) 17 GM/SCOOP powder, At 5pm take 5mgx2 dulcolax  At 6pm 32oz miralax in 64oz gatorade  Drink 8oz glass every 5 mins until 32oz finished  Drink remaining as rec  (Patient not taking: Reported on 7/6/2021), Disp: 238 g, Rfl: 0  No Known Allergies      Vitals:   BP (!) 198/101   Pulse 88   Temp (!) 95 6 °F (35 3 °C) (Tympanic)   Resp 16   Ht 5' 4" (1 626 m)   Wt 77 6 kg (171 lb)   LMP  (LMP Unknown)   SpO2 99%   Breastfeeding No   BMI 29 35 kg/m²        Physical Exam  HENT:      Head: Normocephalic and atraumatic  Right Ear: External ear normal       Left Ear: External ear normal    Eyes:      General: No scleral icterus  Extraocular Movements: Extraocular movements intact  Conjunctiva/sclera: Conjunctivae normal       Pupils: Pupils are equal, round, and reactive to light  Cardiovascular:      Rate and Rhythm: Normal rate and regular rhythm  Heart sounds: Normal heart sounds  Pulmonary:      Breath sounds: Normal breath sounds  Abdominal:      General: Abdomen is flat  There is no distension  Palpations: Abdomen is soft  There is no mass  Tenderness: There is no abdominal tenderness  There is no guarding or rebound  Hernia: No hernia is present  Musculoskeletal:         General: Normal range of motion  Cervical back: Normal range of motion  Skin:     General: Skin is warm and dry  Neurological:      General: No focal deficit present  Mental Status: She is alert and oriented to person, place, and time  Psychiatric:         Mood and Affect: Mood normal          Behavior: Behavior normal          Thought Content:  Thought content normal          Judgment: Judgment normal                Results:  Labs:        Lab Results   Component Value Date     SODIUM 141 04/21/2021     K 4 2 04/21/2021      (H) 04/21/2021     CO2 28 04/21/2021     AGAP 3 (L) 04/21/2021     BUN 14 04/21/2021     CREATININE 1 01 04/21/2021     GLUC 94 04/21/2021     GLUF 94 01/07/2020     CALCIUM 9 0 04/21/2021     AST 18 04/15/2021     ALT 23 04/15/2021     ALKPHOS 109 04/15/2021     TP 7 6 04/15/2021     TBILI 0 44 04/15/2021     EGFR 60 04/21/2021      [facility]  Colonoscopy     Result Date: 6/17/2021  Narrative: [address] [place] [id number] [phone] DATE OF SERVICE: 6/17/21 PHYSICIAN(S): Roderikc Suarez MD - Attending Physician INDICATION: Colon cancer screening Colonoscopy performed for a screening indication  POST-OP DIAGNOSIS: See the impression below  HISTORY: Prior colonoscopy: No prior colonoscopy  BOWEL PREPARATION:  PREPROCEDURE: Informed consent was obtained for the procedure, including sedation  Risks including but not limited to bleeding, infection, perforation, adverse drug reaction and aspiration were explained in detail  Also explained about less than 100% sensitivity with the exam and other alternatives  The patient was placed in the left lateral decubitus position  DETAILS OF PROCEDURE: Patient was taken to the procedure room where a time out was performed to confirm correct patient and correct procedure  The patient underwent monitored anesthesia care, which was administered by an anesthesia professional  The patient's blood pressure, heart rate, level of consciousness, oxygen and respirations were monitored throughout the procedure  A digital rectal exam was performed  The scope was introduced through the anus and advanced to the cecum  Retroflexion was performed in the rectum   The quality of bowel preparation was evaluated using the [facility] Bowel Preparation Scale with scores of: right colon = 2, transverse colon = 2, left colon = 2  The total BBPS score was 6  Bowel prep was adequate  The patient experienced no blood loss  The procedure was not difficult  The patient tolerated the procedure well  There were no apparent complications  ANESTHESIA INFORMATION: ASA: III Anesthesia Type: IV Sedation with Anesthesia MEDICATIONS: No administrations occurring from 1018 to 1127 on 06/17/21 FINDINGS: 4 mm polyp in the ascending colon; removed en bloc by cold snare and retrieved specimen EVENTS: Procedure Events Event Event Time ENDO SCOPE OUT TIME 6/17/2021 11:07 AM ENDO CECUM REACHED 6/17/2021 11:16 AM ENDO SCOPE OUT TIME 6/17/2021 11:26 AM SPECIMENS: ID Type Source Tests Collected by Time Destination 1 : pancreatic head cyst FNA Pancreas NON-GYNECOLOGIC CYTOLOGY, FINE NEEDLE ASPIRATION Esteban Pritchard MD 6/17/2021 10:37 AM  2 : cold snare; ascending polyp Tissue Polyp, Colorectal TISSUE EXAM Esteban Pirtchard MD 6/17/2021 11:20 AM       Impression: Single polyp in the ascending colon s/p cold snare polypectomy RECOMMENDATION: Repeat colonoscopy in 5 years due to a personal history of colon polyps  Esteban Pritchard MD      Endoscopic ultrasonography, GI (Upper)     Result Date: 6/17/2021  Narrative: [address] [place] [address] Ave: 6/17/21 PHYSICIAN(S): Esteban Pritchard MD - Attending Physician INDICATION: Pancreatic mass POST-OP DIAGNOSIS: See the impression below  PREPROCEDURE: Informed consent was obtained for the procedure, including sedation  Risks of perforation, hemorrhage, adverse drug reaction and aspiration were discussed  The patient was placed in the left lateral decubitus position  Patient was explained about the risks and benefits of the procedure  Risks including but not limited to bleeding, infection, and perforation were explained in detail  Also explained about less than 100% sensitivity with the exam and other alternatives  DETAILS OF PROCEDURE: Patient was taken to the procedure room where a time out was performed to confirm correct patient and correct procedure  The patient underwent monitored anesthesia care, which was administered by an anesthesia professional  The patient's blood pressure, heart rate, oxygen, respirations and level of consciousness were monitored throughout the procedure  The patient experienced no blood loss  The procedure was not difficult  The patient tolerated the procedure well  There were no apparent complications  ANESTHESIA INFORMATION: ASA: III Anesthesia Type: IV Sedation with Anesthesia MEDICATIONS: No administrations occurring from 1018 to 1130 on 06/17/21 FINDINGS: Normal celiac takeoff  No evidence of pancreatic ductal dilation throughout the examination  Bile duct was normal caliber with no filling defects noted  Ampulla appeared normal   No lymphadenopathy appreciated  Lobulated, anechoic and hypoechoic mass measuring 26 mm x 28 mm with well-defined and smooth margins in the head of the pancreas; 5 fine needle biopsy passes were taken with a 22 gauge Shop[employer] needle guided by Doppler, sent sample for histology analysis  Onsite cytologist was present and cytology results were preliminarily atypical SPECIMENS: ID Type Source Tests Collected by Time Destination 1 : pancreatic head cyst FNA Pancreas NON-GYNECOLOGIC CYTOLOGY, FINE NEEDLE ASPIRATION Katiana Olmstead MD 6/17/2021 10:37 AM  2 : cold snare; ascending polyp Tissue Polyp, Colorectal TISSUE EXAM Katiana Olmstead MD 6/17/2021 11:20 AM        Impression: 2 8 cm mass s/p core biopsy x 5 passes  No vessel involvement noted  Preliminary cytology consistent with atypical cells RECOMMENDATION: Follow core biopsy results  Katiana Olmstead MD      I reviewed the above laboratory and imaging data      Discussion/Summary:  18-year-old woman, neuroendocrine tumor pancreas 2 8 cm in size  This is resectable  No evidence of metastases  Treatment options including observation versus surgery were discussed with her  Given size, recommend surgery  We will therefore plan on Whipple procedure  Rationale for this along with risks and benefits of surgery including infection, bleeding, anastomotic leak, abscess, heart trouble, lung trouble, kidney trouble, stroke, even death, were discussed    All questions answered

## 2021-08-25 LAB
ANION GAP SERPL CALCULATED.3IONS-SCNC: 3 MMOL/L (ref 4–13)
BASOPHILS # BLD AUTO: 0.01 THOUSANDS/ΜL (ref 0–0.1)
BASOPHILS NFR BLD AUTO: 0 % (ref 0–1)
BUN SERPL-MCNC: 14 MG/DL (ref 5–25)
CA-I BLD-SCNC: 1.02 MMOL/L (ref 1.12–1.32)
CALCIUM SERPL-MCNC: 7.5 MG/DL (ref 8.3–10.1)
CHLORIDE SERPL-SCNC: 109 MMOL/L (ref 100–108)
CO2 SERPL-SCNC: 28 MMOL/L (ref 21–32)
CREAT SERPL-MCNC: 1.09 MG/DL (ref 0.6–1.3)
EOSINOPHIL # BLD AUTO: 0 THOUSAND/ΜL (ref 0–0.61)
EOSINOPHIL NFR BLD AUTO: 0 % (ref 0–6)
ERYTHROCYTE [DISTWIDTH] IN BLOOD BY AUTOMATED COUNT: 14.4 % (ref 11.6–15.1)
GFR SERPL CREATININE-BSD FRML MDRD: 55 ML/MIN/1.73SQ M
GLUCOSE SERPL-MCNC: 127 MG/DL (ref 65–140)
GLUCOSE SERPL-MCNC: 130 MG/DL (ref 65–140)
GLUCOSE SERPL-MCNC: 141 MG/DL (ref 65–140)
GLUCOSE SERPL-MCNC: 142 MG/DL (ref 65–140)
GLUCOSE SERPL-MCNC: 157 MG/DL (ref 65–140)
GLUCOSE SERPL-MCNC: 174 MG/DL (ref 65–140)
HCT VFR BLD AUTO: 25.5 % (ref 34.8–46.1)
HGB BLD-MCNC: 8.5 G/DL (ref 11.5–15.4)
IMM GRANULOCYTES # BLD AUTO: 0.03 THOUSAND/UL (ref 0–0.2)
IMM GRANULOCYTES NFR BLD AUTO: 0 % (ref 0–2)
LACTATE SERPL-SCNC: 2.1 MMOL/L (ref 0.5–2)
LACTATE SERPL-SCNC: 3.9 MMOL/L (ref 0.5–2)
LYMPHOCYTES # BLD AUTO: 3.8 THOUSANDS/ΜL (ref 0.6–4.47)
LYMPHOCYTES NFR BLD AUTO: 31 % (ref 14–44)
MAGNESIUM SERPL-MCNC: 2.4 MG/DL (ref 1.6–2.6)
MCH RBC QN AUTO: 31.3 PG (ref 26.8–34.3)
MCHC RBC AUTO-ENTMCNC: 33.3 G/DL (ref 31.4–37.4)
MCV RBC AUTO: 94 FL (ref 82–98)
MONOCYTES # BLD AUTO: 1.16 THOUSAND/ΜL (ref 0.17–1.22)
MONOCYTES NFR BLD AUTO: 9 % (ref 4–12)
NEUTROPHILS # BLD AUTO: 7.43 THOUSANDS/ΜL (ref 1.85–7.62)
NEUTS SEG NFR BLD AUTO: 60 % (ref 43–75)
NRBC BLD AUTO-RTO: 0 /100 WBCS
PHOSPHATE SERPL-MCNC: 2 MG/DL (ref 2.3–4.1)
PLATELET # BLD AUTO: 167 THOUSANDS/UL (ref 149–390)
PMV BLD AUTO: 10.5 FL (ref 8.9–12.7)
POTASSIUM SERPL-SCNC: 3.9 MMOL/L (ref 3.5–5.3)
RBC # BLD AUTO: 2.72 MILLION/UL (ref 3.81–5.12)
SODIUM SERPL-SCNC: 140 MMOL/L (ref 136–145)
WBC # BLD AUTO: 12.43 THOUSAND/UL (ref 4.31–10.16)

## 2021-08-25 PROCEDURE — 99253 IP/OBS CNSLTJ NEW/EST LOW 45: CPT | Performed by: ANESTHESIOLOGY

## 2021-08-25 PROCEDURE — 83735 ASSAY OF MAGNESIUM: CPT | Performed by: STUDENT IN AN ORGANIZED HEALTH CARE EDUCATION/TRAINING PROGRAM

## 2021-08-25 PROCEDURE — 84100 ASSAY OF PHOSPHORUS: CPT | Performed by: STUDENT IN AN ORGANIZED HEALTH CARE EDUCATION/TRAINING PROGRAM

## 2021-08-25 PROCEDURE — 99232 SBSQ HOSP IP/OBS MODERATE 35: CPT | Performed by: SURGERY

## 2021-08-25 PROCEDURE — C9113 INJ PANTOPRAZOLE SODIUM, VIA: HCPCS | Performed by: STUDENT IN AN ORGANIZED HEALTH CARE EDUCATION/TRAINING PROGRAM

## 2021-08-25 PROCEDURE — 99024 POSTOP FOLLOW-UP VISIT: CPT | Performed by: SURGERY

## 2021-08-25 PROCEDURE — 97166 OT EVAL MOD COMPLEX 45 MIN: CPT

## 2021-08-25 PROCEDURE — 82330 ASSAY OF CALCIUM: CPT | Performed by: STUDENT IN AN ORGANIZED HEALTH CARE EDUCATION/TRAINING PROGRAM

## 2021-08-25 PROCEDURE — 97163 PT EVAL HIGH COMPLEX 45 MIN: CPT

## 2021-08-25 PROCEDURE — 80048 BASIC METABOLIC PNL TOTAL CA: CPT | Performed by: STUDENT IN AN ORGANIZED HEALTH CARE EDUCATION/TRAINING PROGRAM

## 2021-08-25 PROCEDURE — 85025 COMPLETE CBC W/AUTO DIFF WBC: CPT | Performed by: STUDENT IN AN ORGANIZED HEALTH CARE EDUCATION/TRAINING PROGRAM

## 2021-08-25 PROCEDURE — 83605 ASSAY OF LACTIC ACID: CPT | Performed by: STUDENT IN AN ORGANIZED HEALTH CARE EDUCATION/TRAINING PROGRAM

## 2021-08-25 PROCEDURE — 82948 REAGENT STRIP/BLOOD GLUCOSE: CPT

## 2021-08-25 RX ORDER — DIPHENHYDRAMINE HYDROCHLORIDE 50 MG/ML
25 INJECTION INTRAMUSCULAR; INTRAVENOUS EVERY 6 HOURS PRN
Status: DISCONTINUED | OUTPATIENT
Start: 2021-08-25 | End: 2021-08-26

## 2021-08-25 RX ORDER — DEXTROSE, SODIUM CHLORIDE, AND POTASSIUM CHLORIDE 5; .45; .15 G/100ML; G/100ML; G/100ML
75 INJECTION INTRAVENOUS CONTINUOUS
Status: DISCONTINUED | OUTPATIENT
Start: 2021-08-25 | End: 2021-08-31

## 2021-08-25 RX ORDER — HYDROMORPHONE HCL IN WATER/PF 6 MG/30 ML
0.2 PATIENT CONTROLLED ANALGESIA SYRINGE INTRAVENOUS EVERY 4 HOURS PRN
Status: DISCONTINUED | OUTPATIENT
Start: 2021-08-25 | End: 2021-08-26

## 2021-08-25 RX ADMIN — SODIUM CHLORIDE, SODIUM GLUCONATE, SODIUM ACETATE, POTASSIUM CHLORIDE, MAGNESIUM CHLORIDE, SODIUM PHOSPHATE, DIBASIC, AND POTASSIUM PHOSPHATE 125 ML/HR: .53; .5; .37; .037; .03; .012; .00082 INJECTION, SOLUTION INTRAVENOUS at 05:17

## 2021-08-25 RX ADMIN — HYDROMORPHONE HYDROCHLORIDE 0.2 MG: 0.2 INJECTION, SOLUTION INTRAMUSCULAR; INTRAVENOUS; SUBCUTANEOUS at 21:53

## 2021-08-25 RX ADMIN — DEXTROSE, SODIUM CHLORIDE, AND POTASSIUM CHLORIDE 125 ML/HR: 5; .45; .15 INJECTION INTRAVENOUS at 23:28

## 2021-08-25 RX ADMIN — FENTANYL CITRATE: 50 INJECTION INTRAMUSCULAR; INTRAVENOUS at 18:15

## 2021-08-25 RX ADMIN — HEPARIN SODIUM 5000 UNITS: 5000 INJECTION INTRAVENOUS; SUBCUTANEOUS at 13:47

## 2021-08-25 RX ADMIN — DIPHENHYDRAMINE HYDROCHLORIDE 25 MG: 50 INJECTION, SOLUTION INTRAMUSCULAR; INTRAVENOUS at 02:14

## 2021-08-25 RX ADMIN — POTASSIUM PHOSPHATE, MONOBASIC AND POTASSIUM PHOSPHATE, DIBASIC 30 MMOL: 224; 236 INJECTION, SOLUTION, CONCENTRATE INTRAVENOUS at 12:10

## 2021-08-25 RX ADMIN — HEPARIN SODIUM 5000 UNITS: 5000 INJECTION INTRAVENOUS; SUBCUTANEOUS at 21:54

## 2021-08-25 RX ADMIN — DEXTROSE, SODIUM CHLORIDE, AND POTASSIUM CHLORIDE 125 ML/HR: 5; .45; .15 INJECTION INTRAVENOUS at 09:21

## 2021-08-25 RX ADMIN — HEPARIN SODIUM 5000 UNITS: 5000 INJECTION INTRAVENOUS; SUBCUTANEOUS at 05:11

## 2021-08-25 RX ADMIN — PANTOPRAZOLE SODIUM 40 MG: 40 INJECTION, POWDER, FOR SOLUTION INTRAVENOUS at 05:11

## 2021-08-25 RX ADMIN — INSULIN LISPRO 1 UNITS: 100 INJECTION, SOLUTION INTRAVENOUS; SUBCUTANEOUS at 02:14

## 2021-08-25 RX ADMIN — INSULIN LISPRO 1 UNITS: 100 INJECTION, SOLUTION INTRAVENOUS; SUBCUTANEOUS at 12:15

## 2021-08-25 RX ADMIN — DIPHENHYDRAMINE HYDROCHLORIDE 25 MG: 50 INJECTION, SOLUTION INTRAMUSCULAR; INTRAVENOUS at 17:47

## 2021-08-25 NOTE — UTILIZATION REVIEW
Initial Clinical Review  ELECTIVE INPATIENT SURGICAL PROCEDURE 8/24/21 - IP CODES [id number], [id number] Evangelista Herman  AUTH# [id number]   Age/Sex: 57year old female  Surgery Date: 8/25/21 Wednesday  Procedure: LAPAROTOMY EXPLORATORY (N/A)  WHIPPLE PROCEDURE/PANCREATICO-DUODENECTOMY (N/A)  INTRAOPERATIVE US OF PANCREAS  Anesthesia: General w/ Epidural  Operative Findings: Neuroendocrine tumor, head/neck of pancreas,      8/25/21 - POD # 1 Surgical Oncology Progress Note:   Assessment:  58 y/o F w pancreatic neuroendocrine tumor s/p whipple procedure on 8/24      Doing well  Vss  Afebrile  Dressing c/d/i  Abdomen soft, mild tender, nondistended  Lactate: 5-> 2 1  Hgb: 8 9-> 8 5  KAMLA L: 170 cc serosang  KAMLA R: 195 cc serosang  NGT: no output  UOP: 1970 cc      Subjective: Reported itching associated with anxiety overnight, given benadryl per icu team x2, with good response  This morning abd pain well controlled  Denied nausea or vomiting       Plan:  Continue npo/ ngt  MIVF @ 125  Continue cardenas  Continue epidural analgesia  D/c ivan  Continue abdominal drains, follow output and color  Strict I/Os  SQH dvt ppx  Work with incentive spirometer  Pt/ot  Up in chair, ambulate      Admission Orders: Date/Time/Statement:   Admission Orders (From admission, onward)     Ordered        08/24/21 1737  Inpatient Admission  Once                Orders Placed This Encounter   Procedures    Inpatient Admission     Standing Status:   Standing     Number of Occurrences:   1     Order Specific Question:   Level of Care     Answer:   Critical Care [15]     Order Specific Question:   Estimated length of stay     Answer:   More than 2 Midnights     Order Specific Question:   Certification     Answer:   I certify that inpatient services are medically necessary for this patient for a duration of greater than two midnights  See H&P and MD Progress Notes for additional information about the patient's course of treatment       Vital Signs: /59 Pulse 86   Temp 99 8 °F (37 7 °C) (Oral)   Resp 16   Ht 5' 4" (1 626 m)   Wt 77 6 kg (171 lb)   LMP  (LMP Unknown)   SpO2 96%   BMI 29 35 kg/m²     Additional Vital Signs:  08/25/21 0700  --  88  14  --  --  154/56  92 mmHg  97 %  --  --  --  --  --   08/25/21 0600  --  88  13  134/65  89  152/54  86 mmHg  96 %  --  --  --  --  --   08/25/21 0500  --  90  13  140/70  98  158/58  94 mmHg  95 %  --  --  --  --  --   08/25/21 0400  98 1 °F (36 7 °C)  90  13  140/64  91  160/58  96 mmHg  96 %  32  3 L/min  Nasal cannula  --  Lying   08/25/21 0300  --  94  32Abnormal   100/62  71  138/52  82 mmHg  94 %  --  --  --  --  --   08/25/21 0200  --  88  13  114/59  85  144/52  84 mmHg  98 %  --  --  --  --  --   08/25/21 0100  --  88  22  108/54  72  136/50  80 mmHg  98 %  --  --  --  --  --   08/25/21 0000  98 1 °F (36 7 °C)  90  17  135/73  99  164/58  96 mmHg  97 %  32  3 L/min  Nasal cannula  --  --   08/24/21 2300  --  90  22  118/66  89  150/54  86 mmHg  98 %  --  --  --  --  --   08/24/21 2200  --  88  20  112/67  86  154/56  90 mmHg  97 %  --  --  --  --  --   08/24/21 2100  --  88  19  170/85  112  180/72  114 mmHg  92 %  --  --  --  --  --   08/24/21 1945  98 1 °F (36 7 °C)  80  12  150/84  --  156/66  100 mmHg  99 %  32  3 L/min  Nasal cannula  --  --   08/24/21 1930  --  78  9Abnormal   160/85  --  172/74  112 mmHg  99 %  32  3 L/min  Nasal cannula  WDL  --   08/24/21 1915  --  80  13  150/75  --  176/76  116 mmHg  100 %  --  --  --  --  --   08/24/21 1900  --  88  12  126/81  --  158/70  104 mmHg  99 %  32  3 L/min  Nasal cannula  WDL  --   08/24/21 1845  --  82  13  153/85  --  158/66  102 mmHg  97 %  --  --  --  --  --   08/24/21 1830  97 7 °F (36 5 °C)  82  8Abnormal   133/73  --  148/64  96 mmHg  98 %  32  3 L/min  Nasal cannula  WDL  --   08/24/21 1815  --  88  11Abnormal   109/65  --  150/68  100 mmHg  100 %  --  --  --  --  --   08/24/21 1814  --  82  21  --  --  136/64  90 mmHg  98 %  --  --  --  -- --   08/24/21 1805  --  86  16  118/64  --  122/58  80 mmHg  99 %  --  --  --  --  --   08/24/21 1800  97 8 °F (36 6 °C)  86  11Abnormal   --  --  100/50  68 mmHg  99 %  32  3 L/min  Nasal cannula  WDL         I/O 08/24 0701 08/25 0700 08/25 0701 08/26 0700   P  O  0    I V  (mL/kg) 6650 7 (85 7) 537 8 (6 9)   NG/GT 0    IV Piggyback 800    Total Intake(mL/kg) 7450 7 (96) 537 8 (6 9)   Urine (mL/kg/hr) 2060 (1 1) 175 (0 7)   Emesis/NG output 0 0   Drains 405 30   Blood 370    Total Output 2835 205   Net +4615 7 +332 8     Invasive Devices      Peripheral Intravenous Line               Peripheral IV 08/24/21 Left Antecubital <1 day      Peripheral IV 08/24/21 Right Antecubital <1 day             Epidural Line               Epidural Catheter 08/24/21 <1 day         Arterial Line               Arterial Line 08/24/21 Right Radial <1 day         Drain               Closed/Suction Drain Medial;Right; Anterior RUQ Bulb 19 Fr  -- days      Closed/Suction Drain Left; Anterior LUQ Bulb 19 Fr  <1 day      NG/OG/Enteral Tube Nasogastric 14 Fr Right nare <1 day      Urethral Catheter Latex 16 Fr  <1 day         Pertinent Labs/Diagnostic Test Results:     Results from last 7 days   Lab Units 08/25/21 0427 08/24/21  2025 08/24/21  1758 08/24/21  1704 08/24/21  1411 08/24/21  1207   WBC Thousand/uL 12 43*  --  12 02*  --   --   --    HEMOGLOBIN g/dL 8 5*  --  8 9*  --   --   --    I STAT HEMOGLOBIN g/dl  --   --   --  9 2* 8 8* 9 2*   HEMATOCRIT % 25 5*  --  26 8*  --   --   --    HEMATOCRIT, ISTAT %  --   --   --  27* 26* 27*   PLATELETS Thousands/uL 167 177 183  --   --   --    NEUTROS ABS Thousands/µL 7 43  --  8 80*  --   --   --        Results from last 7 days   Lab Units 08/25/21 0427 08/24/21  1757 08/24/21  1704 08/24/21  1411 08/24/21  1207   SODIUM mmol/L 140 141  --   --   --    POTASSIUM mmol/L 3 9 4 1  --   --   --    CHLORIDE mmol/L 109* 111*  --   --   --    CO2 mmol/L 28 21  --   --   --    CO2, I-STAT mmol/L  -- --  23 22 22   ANION GAP mmol/L 3* 9  --   --   --    BUN mg/dL 14 13  --   --   --    CREATININE mg/dL 1 09 1 28  --   --   --    EGFR ml/min/1 73sq m 55 45  --   --   --    CALCIUM mg/dL 7 5* 7 7*  --   --   --    CALCIUM, IONIZED mmol/L 1 02* 1 06*  --   --   --    CALCIUM, IONIZED, ISTAT mmol/L  --   --  1 10* 1 14 1 10*   MAGNESIUM mg/dL 2 4 1 6  --   --   --    PHOSPHORUS mg/dL 2 0* 2 9  --   --   --        Results from last 7 days   Lab Units 08/25/21  0510 08/25/21  0205 08/24/21  2349 08/24/21  1756   POC GLUCOSE mg/dl 127 157* 181* 142*     Results from last 7 days   Lab Units 08/25/21  0427 08/24/21  1757   GLUCOSE RANDOM mg/dL 130 152*     Results from last 7 days   Lab Units 08/24/21  1802   PH ART  7 305*   PCO2 ART mm Hg 46 0*   PO2 ART mm Hg 144 4*   HCO3 ART mmol/L 22 4   BASE EXC ART mmol/L -3 9   O2 CONTENT ART mL/dL 13 5*   O2 HGB, ARTERIAL % 97 9*   ABG SOURCE  Line, Arterial         Results from last 7 days   Lab Units 08/24/21  1704 08/24/21  1411 08/24/21  1207   I STAT BASE EXC mmol/L -3* -4* -3*   I STAT O2 SAT % 99* 99* 99*   ISTAT PH ART  7 404 7 371 7 402   I STAT ART PCO2 mm HG 34 6* 36 0 33 9*   I STAT ART PO2 mm  0* 143 0* 164 0*   I STAT ART HCO3 mmol/L 21 6* 20 8* 21 1*     Results from last 7 days   Lab Units 08/25/21  0427 08/25/21  0214 08/24/21  2025 08/24/21  1758   LACTIC ACID mmol/L 2 1* 3 9* 4 3* 5 1*     Diet: NPO  Mobility:  Up with assistance  DVT Prophylaxis: Heparin sq; SCD    Scheduled Medications:  heparin (porcine), 5,000 Units, Subcutaneous, Q8H [address]  insulin lispro, 1-6 Units, Subcutaneous, Q6H ABDIFATAH  IV pantoprazole, 40 mg, Intravenous, Q24H ABDIFATAH    Continuous IV Infusions:  IVF dextrose 5 % and sodium chloride 0 45 % with KCl 20 mEq/L, 125 mL/hr, Intravenous, Continuous  Epidural -  ropivacaine 0 1% and fentaNYL 2 mcg/mL, , Epidural, Continuous    PRN Meds:  acetaminophen, 650 mg, Rectal, Q4H PRN  IV diphenhydrAMINE, 25 mg, Intravenous, Q6H PRN GIVEN X 1  IV Labetalol HCl, 10 mg, Intravenous, Q4H PRN SBP > 160 GIVEN X 1  lactated ringers, 1,000 mL, Intravenous, Once PRN   And  lactated ringers, 1,000 mL, Intravenous, Once PRN  ondansetron, 4 mg, Intravenous, Q6H PRN  phenol, 1 spray, Mouth/Throat, Q2H PRN  sodium chloride, 1,000 mL, Intravenous, Once PRN   And  sodium chloride, 1,000 mL, Intravenous, Once PRN        Network Utilization Review Department  ATTENTION: Please call with any questions or concerns to [phone] and carefully listen to the prompts so that you are directed to the right person  All voicemails are confidential   Shakeel Dutta all requests for admission clinical reviews, approved or denied determinations and any other requests to dedicated fax number below belonging to the campus where the patient is receiving treatment   List of dedicated fax numbers for the Facilities:  [address] DENIALS (Administrative/Medical Necessity) [phone]   [address] (Maternity/NICU/Pediatrics) [phone]   [address] [place] [address] [address] [address] [place] [address] [address] [place], [place] [address] [phone]

## 2021-08-25 NOTE — OCCUPATIONAL THERAPY NOTE
Occupational Therapy Evaluation     Patient Name: Burnice Severin  Today's Date: 8/25/2021  Problem List  Principal Problem:    Pancreatic mass    Past Medical History  Past Medical History:   Diagnosis Date    Back ache     Cancer ([address] )     Hyperlipidemia     Hypertension      Past Surgical History  Past Surgical History:   Procedure Laterality Date    APPENDECTOMY      BILATERAL OOPHORECTOMY      BREAST SURGERY Right     partial mastectomy     COLONOSCOPY      HERNIA REPAIR      HYSTERECTOMY      LAPAROTOMY N/A 8/24/2021    Procedure: LAPAROTOMY EXPLORATORY;  Surgeon: Rosemarie Estrada MD;  [facility]: [facility];  Service: Surgical Oncology    MASTECTOMY Right     partial    WHIPPLE PROCEDURE/PANCREATICO-DUODENECTOMY N/A 8/24/2021    Procedure: WHIPPLE PROCEDURE/PANCREATICO-DUODENECTOMY;  Surgeon: Rosemarie Estrada MD;  [facility];  Service: Surgical Oncology         08/25/21 1336   OT Last Visit   OT Visit Date 08/25/21   Note Type   Note type Evaluation   Restrictions/Precautions   Weight Bearing Precautions Per Order No   Other Precautions Multiple lines;Telemetry; Fall Risk;Pain  (KAMLA drains x2, NG, epidural)   Pain Assessment   Pain Assessment Tool 0-10   Pain Score 4   Pain Location/Orientation Location: Abdomen   Hospital Pain Intervention(s) Repositioned; Ambulation/increased activity; Emotional support   Home Living   Type of Home Apartment   Home Layout Two level;Bed/bath upstairs   Bathroom Shower/Tub Tub/shower unit   Bathroom Toilet Standard   Bathroom Equipment Grab bars in shower; Shower chair;Commode   Additional Comments spouse uses DME   Prior Function   Level of [place] Independent with ADLs and functional mobility   Lives With Spouse;Friend(s)   Receives Help From Family   ADL Assistance Independent   IADLs Independent   Falls in the last 6 months 0   Vocational Retired   Comments dtr assisting w/ translation   Lifestyle   Autonomy pta pt reports I in ADls/IADls/functional mobility   Reciprocal Relationships supportive spouse   Service to Others retired   [address] enjoys going to the [facility] (WDL) WDL   Subjective   Subjective "Just pain"   ADL   Where Assessed Chair   Eating Assistance 5  Supervision/Setup   Grooming Assistance 5  Supervision/Setup   [address] to Stand 4  Minimal assistance   Additional items Assist x 1; Increased time required   Stand to Sit 4  Minimal assistance   Additional items Assist x 1; Increased time required   Functional Mobility   Functional Mobility 4  Minimal assistance   Additional items Rolling walker   Balance   Static Sitting Fair   Dynamic Sitting Fair   Static Standing Fair -   Dynamic Standing Fair -   Ambulatory Fair -   Activity Tolerance   Activity Tolerance Patient limited by pain   Medical Staff Made Aware PT [address] due to medical complexity and comorbidities   Nurse Made Aware RN okay to see   RUE Assessment   RUE Assessment WFL   LUE Assessment   LUE Assessment WFL   Hand Function   Gross Motor Coordination Functional   Fine Motor Coordination Functional   Cognition   Overall Cognitive Status WFL   Arousal/Participation Cooperative   Attention Within functional limits   Orientation Level Oriented X4   Memory Within functional limits   Following Commands Follows all commands and directions without difficulty   Comments pt Czech speaking- dtr translating   Assessment   Prognosis Good   Assessment Pt is a 57 y o  YO  female admitted to [facility] on 8/24/2021 w/ pancreatic mass s/p whipple   Pt  has a past medical history of Back ache, Cancer ([address] ), Hyperlipidemia, and Hypertension  Pt with active OT orders and ambulate  orders     Pt resides in a [facility] with spouse and "tenant"  Pt was I w/  ADLS and IADLS, (+) drove, & required no use of DME PTA  Currently pt is Min A for ADls and functional mobility  Pt is limited at this time 2*: pain, endurance, activity tolerance and decreased I w/ ADLS/IADLS  The following Occupational Performance Areas to address include: toilet hygiene, dressing, functional mobility, community mobility and household maintenance  Based on the aforementioned OT evaluation, functional performance deficits, and assessments, pt has been identified as a moderate complexity evaluation  From OT standpoint, anticipate d/c home with family support  Recommend continued participation in ADLs and functional mobility w/ staff  No further acute OT needs, d/c OT  Please re-consult if necessary      Goals   Patient Goals to have less pain   Recommendation   OT Discharge Recommendation No rehabilitation needs   OT - OK to Discharge Yes   AM-PAC Daily Activity Inpatient   Lower Body Dressing 3   Bathing 3   Toileting 3   Upper Body Dressing 4   Grooming 4   Eating 4   Daily Activity Raw Score 21   Daily Activity Standardized Score (Calc for Raw Score >=11) 44 27   AM-PAC Applied Cognition Inpatient   Following a Speech/Presentation 4   Understanding Ordinary Conversation 4   Taking Medications 4   Remembering Where Things Are Placed or Put Away 4   Remembering List of 4-5 Errands 4   Taking Care of Complicated Tasks 4   Applied Cognition Raw Score 24   Applied Cognition Standardized Score 62 21   Modified Crane Scale   Modified Crane Scale 4     Linda Mccall MS, OTR/L

## 2021-08-25 NOTE — PROGRESS NOTES
Daily Progress Note - Critical Care   Jaya 57 y o  female MRN: [id number]  [facility] Encounter: [id number]        ----------------------------------------------------------------------------------------  HPI/24hr events:   Jaya is a 56 yo female with a PMH of pancreatic neuroendocrine tumor, HTN, and HLD,  who was transferred to the SICU after undergoing a whipple procedure for resection of her neuroendocrine tumor on 8/24/2021  There were no reported intra-operative complications and she returned from surgery extubated without pressors  Last 24 hrs:   No acute issues overnight     ---------------------------------------------------------------------------------------  SUBJECTIVE  Ms Veena Tanner is resting in bed this morning  She is Guamanian speaking and understands little english  She does endorse pain to her abdomen around her incision sites         Review of Systems    Review of systems was unable to be performed secondary to patient is Guamanian speaking and understands little english    ---------------------------------------------------------------------------------------  Assessment and Plan:    Neuro:    Sedation/Analgesia  o Acetaminophen 650 RE q4 PRN  o Epidural continuous      CV:    Diagnosis: HTN  o BP last 24 hrs:  109/65 - 198/101  o Plan   - Continue labetolol 10 mg IV q4 PRN  - Home meds include amlodipine 10 mg and lisinopril 20 mg (not currently being given)   Diagnosis: HLD  o Plan  - Continue home atorvastatin 40 mg when diet allows      Pulm:   No acute issues   Saturating upper 90s on 1L NC      GI:    Diagnosis: Pancreatic neuroendocrine tumor  o S/p Whipple procedure 8/24 (POD #1)  o 8/25 lactic acid 2 1 (from 3 9)  o Plan  - NG tube on suction -- management per surgery-oncology  - LUQ & RUQ KAMLA drains -- 8/24 drained 405 mL   - Continue to monitor lactate until cleared   Diagnosis: GERD  o Plan  - Continue pantoprazole 40 mg IV qd  - Home meds include omeprazole 40 mg (not currently being given)      :    Diagnosis: CKD  o 8/25 BUN 14, Cr 1 09 (8/24 BUN 13, Cr 1 28)  o 8/24 UOP: 2060 ml (1 1 ml/kg/hr)  o Plan  - Monitor I/O  - Trend BUN and creatinine      F/E/N:    Fluids: [place] 125   Electrolytes: 8/25 Na 140, K 3 9,   o Goals: K >4 0, Mg >2 0, Phos >3 0  o Plan: no repletion needed today   Nutrition: NPO      Heme/Onc:    Diagnosis: Pancreatic neuroendocrine tumor  o Plan: see above   Diagnosis: Normocytic anemia  o 8/25 Hg 8 5, Hct 25 5 (8/24 Hg 8 9, Hct 26 8)  o Plan  - Hg goal > 7  - Continue to monitor daily CBC      Endo:    No acute issues  o 8/25 glucose: 130  o Plan:   - Insulin lispro (required 1 unit in past 24 hrs)  - Continue to monitor   LDA  o Epidural catheter (1 day)  o R radial ivan (1 day)  o Drain bulb RUQ (1 day)  o Drain bulb LUQ (1 day)  o NG tube (1 day)  o Urethral catheter (1 day)        ID:    Diagnosis: leukocytosis  o 8/25 WBC 12 43 (8/24 WBC 12 02)  o Afebrile  o Plan  - Trend WBC and fever curve      MSK/Skin:    Diagnosis: General pruritis  o Overnight 8/24 developed pruritis  o Plan  - Continue benadryl 25 mg IV q6 PRN   Diagnosis: Abdominal incision sites  o Plan: continue to monitor  o     Patient appropriate for transfer out of the ICU today?: No  Disposition: Continue Critical Care   Code Status: Level 1 - Full Code  ---------------------------------------------------------------------------------------  ICU CORE MEASURES    Prophylaxis   VTE Pharmacologic Prophylaxis: Heparin  VTE Mechanical Prophylaxis: sequential compression device  Stress Ulcer Prophylaxis: Pantoprazole IV     ABCDE Protocol (if indicated)  Plan to perform spontaneous awakening trial today? N/A  Plan to perform spontaneous breathing trial today? N/A  Obvious barriers to extubation?  N/A  CAM-ICU: Negative    Invasive Devices Review  Invasive Devices     Peripheral Intravenous Line            Peripheral IV 08/24/21 Left Antecubital <1 day    Peripheral IV 21 Right Antecubital <1 day          Epidural Line            Epidural Catheter 21 <1 day          Arterial Line            Arterial Line 21 Right Radial <1 day          Drain            Closed/Suction Drain Medial;Right; Anterior RUQ Bulb 19 Fr  -- days    Closed/Suction Drain Left; Anterior LUQ Bulb 19 Fr  <1 day    NG/OG/Enteral Tube Nasogastric 14 Fr Right nare <1 day    Urethral Catheter Latex 16 Fr  <1 day              Can any invasive devices be discontinued today? No  ---------------------------------------------------------------------------------------  OBJECTIVE    Vitals   Vitals:    21 0200 21 0300 21 0400 21 0500   BP: 114/59 100/62 140/64 140/70   BP Location:   Left arm    Pulse: 88 94 90 90   Resp: 13 (!) 32 13 13   Temp:   98 1 °F (36 7 °C)    TempSrc:   Oral    SpO2: 98% 94% 96% 95%   Weight:       Height:         Temp (24hrs), Av °F (36 7 °C), Min:97 7 °F (36 5 °C), Max:98 1 °F (36 7 °C)  Current: Temperature: 98 1 °F (36 7 °C)      Respiratory:  SpO2: SpO2: 95 %, SpO2 Activity: SpO2 Activity: At Rest, SpO2 Device: O2 Device: Nasal cannula, Capnography:    Nasal Cannula O2 Flow Rate (L/min): 3 L/min    Invasive/non-invasive ventilation settings   Respiratory    Lab Data (Last 4 hours)    None         O2/Vent Data (Last 4 hours)    None                Physical Exam  Constitutional:       General: She is not in acute distress  Appearance: She is not ill-appearing  HENT:      Head: Normocephalic and atraumatic  Mouth/Throat:      Mouth: Mucous membranes are moist       Pharynx: Oropharynx is clear  Comments: NG tube right nares  Eyes:      Conjunctiva/sclera: Conjunctivae normal       Pupils: Pupils are equal, round, and reactive to light  Cardiovascular:      Rate and Rhythm: Normal rate and regular rhythm  Pulses: Normal pulses  Heart sounds: No murmur heard  No friction rub  No gallop      Pulmonary: Effort: Pulmonary effort is normal  No respiratory distress  Breath sounds: No wheezing, rhonchi or rales  Abdominal:      General: There is no distension  Tenderness: There is abdominal tenderness  There is guarding  Comments: Incision sites are covered with dressings  Drain bulbs to RUQ and LUQ draining serosanguinous fluid  Genitourinary:     Comments: Lira catheter in place draining clear, light yellow urine  Musculoskeletal:         General: No swelling or tenderness  Skin:     General: Skin is warm and dry  Capillary Refill: Capillary refill takes less than 2 seconds  Neurological:      Mental Status: She is alert        Comments: GCS: 15         Laboratory and Diagnostics:  Results from last 7 days   Lab Units 08/25/21 0427 08/24/21 2025 08/24/21 1758 08/24/21  1704 08/24/21  1411 08/24/21  1207 08/24/21  0943   WBC Thousand/uL 12 43*  --  12 02*  --   --   --   --    HEMOGLOBIN g/dL 8 5*  --  8 9*  --   --   --   --    I STAT HEMOGLOBIN g/dl  --   --   --  9 2* 8 8* 9 2* 10 2*   HEMATOCRIT % 25 5*  --  26 8*  --   --   --   --    HEMATOCRIT, ISTAT %  --   --   --  27* 26* 27* 30*   PLATELETS Thousands/uL 167 177 183  --   --   --   --    NEUTROS PCT % 60  --  74  --   --   --   --    MONOS PCT % 9  --  6  --   --   --   --      Results from last 7 days   Lab Units 08/25/21 0427 08/24/21 1757 08/24/21 1704 08/24/21  1411 08/24/21  1207 08/24/21  0943   SODIUM mmol/L 140 141  --   --   --   --    POTASSIUM mmol/L 3 9 4 1  --   --   --   --    CHLORIDE mmol/L 109* 111*  --   --   --   --    CO2 mmol/L 28 21  --   --   --   --    CO2, I-STAT mmol/L  --   --  23 22 22 26   ANION GAP mmol/L 3* 9  --   --   --   --    BUN mg/dL 14 13  --   --   --   --    CREATININE mg/dL 1 09 1 28  --   --   --   --    CALCIUM mg/dL 7 5* 7 7*  --   --   --   --    GLUCOSE RANDOM mg/dL 130 152*  --   --   --   --      Results from last 7 days   Lab Units 08/25/21  8927 08/24/21  3882 MAGNESIUM mg/dL 2 4 1 6   PHOSPHORUS mg/dL 2 0* 2 9               Results from last 7 days   Lab Units 08/25/21  0427 08/25/21  0214 08/24/21 2025 08/24/21  1758   LACTIC ACID mmol/L 2 1* 3 9* 4 3* 5 1*     ABG:  Results from last 7 days   Lab Units 08/24/21  1802   PH ART  7 305*   PCO2 ART mm Hg 46 0*   PO2 ART mm Hg 144 4*   HCO3 ART mmol/L 22 4   BASE EXC ART mmol/L -3 9   ABG SOURCE  Line, Arterial     VBG:  Results from last 7 days   Lab Units 08/24/21  1802   ABG SOURCE  Line, Arterial           Micro        EKG: NSR  Imaging: I have personally reviewed pertinent reports  Intake and Output  I/O       08/23 0701 - 08/24 0700 08/24 0701 - 08/25 0700    P  O   0    I V  (mL/kg)  6650 7 (85 7)    NG/GT  0    IV Piggyback  800    Total Intake(mL/kg)  7450 7 (96)    Urine (mL/kg/hr)  2060 (1 1)    Emesis/NG output  0    Drains  405    Blood  370    Total Output  2835    Net  +4615 7              UOP: 2060 ml (1 1 ml/kg/hr)    Height and Weights   Height: 5' 4" (162 6 cm)  IBW (Ideal Body Weight): 54 7 kg  Body mass index is 29 35 kg/m²  Weight (last 2 days)     Date/Time   Weight    08/24/21 0644   77 6 (171)                Nutrition       Diet Orders   (From admission, onward)             Start     Ordered    08/24/21 1949  Diet NPO  Diet effective now     Question Answer Comment   Diet Type NPO    RD to adjust diet per protocol? No        08/24/21 1948              TF currently running at 0 ml/hr with a goal of 0 ml/hr   Formula: N/A      Active Medications  Scheduled Meds:  Current Facility-Administered Medications   Medication Dose Route Frequency Provider Last Rate    acetaminophen  650 mg Rectal Q4H PRN Luis Daniel Nazario MD      diphenhydrAMINE  25 mg Intravenous Q6H PRN Maple Cooks Cerni, DO      heparin (porcine)  5,000 Units Subcutaneous [facility] Luis Daniel Nazario MD      insulin lispro  1-6 Units Subcutaneous [facility] Maple Cooks Cerni, DO      Labetalol HCl  10 mg Intravenous Q4H PRN Sejal Zeng, DO      lactated ringers  1,000 mL Intravenous Once PRN Bar Cordoba MD      And    lactated ringers  1,000 mL Intravenous Once PRN Bar Cordoba MD      multi-electrolyte  125 mL/hr Intravenous Continuous Bar Cordoba  mL/hr (08/25/21 0517)    ondansetron  4 mg Intravenous Q6H PRN Bar Cordoba MD      pantoprazole  40 mg Intravenous Q24H [address] Bar Cordoba MD      phenol  1 spray Mouth/Throat Q2H PRN Bar Cordoba MD      ropivacaine 0 1% and fentaNYL 2 mcg/mL   Epidural Continuous Bar Cordoba MD      sodium chloride  1,000 mL Intravenous Once PRN Bar Cordoba MD      And    sodium chloride  1,000 mL Intravenous Once PRN Bar Cordoba MD       Continuous Infusions:  multi-electrolyte, 125 mL/hr, Last Rate: 125 mL/hr (08/25/21 0517)  ropivacaine 0 1% and fentaNYL 2 mcg/mL,       PRN Meds:   acetaminophen, 650 mg, Q4H PRN  diphenhydrAMINE, 25 mg, Q6H PRN  Labetalol HCl, 10 mg, Q4H PRN  lactated ringers, 1,000 mL, Once PRN   And  lactated ringers, 1,000 mL, Once PRN  ondansetron, 4 mg, Q6H PRN  phenol, 1 spray, Q2H PRN  sodium chloride, 1,000 mL, Once PRN   And  sodium chloride, 1,000 mL, Once PRN        Allergies   No Known Allergies  ---------------------------------------------------------------------------------------  Advance Directive and Living Will:      Power of :    POLST:    ---------------------------------------------------------------------------------------  Care Time Delivered: Total Critical Care time spent 30 minutes excluding procedures, teaching and family updates  Aly Gauthier      Portions of the record may have been created with voice recognition software  Occasional wrong word or "sound a like" substitutions may have occurred due to the inherent limitations of voice recognition software    Read the chart carefully and recognize, using context, where substitutions have occurred

## 2021-08-25 NOTE — PLAN OF CARE
Problem: PHYSICAL THERAPY ADULT  Goal: Performs mobility at highest level of function for planned discharge setting  See evaluation for individualized goals  Description: Treatment/Interventions: Functional transfer training, LE strengthening/ROM, Therapeutic exercise, Elevations, Endurance training, Patient/family training, Equipment eval/education, Bed mobility, Gait training, Spoke to nursing  Equipment Recommended: Lucila Maurer       See flowsheet documentation for full assessment, interventions and recommendations  Note: Prognosis: Good  Problem List: Decreased strength, Decreased endurance, Impaired balance, Decreased mobility, Pain  Assessment: Pt seen for high complexity PT evaluation due to decrease in functional mobility status compared to baseline  Pt with active PT eval/treat and ambulate pt orders at this time  Pt is a 57 y o  F who presented to [facility] with pancreatic mass on 8/27/21  Pt is s/p whipple on 8/24/21  Pt resides with  and tenant in 2 story apartment with 0STE and 14 steps to bed/bath  Pt presents with decreased strength, balance, endurance that contribute to limitations in bed mobility, functional transfers, functional mobility  Pt requires Min A for STS and ambulation with RW at this time  Pt left upright in bedside chair with all needs in reach  Pt will benefit from skilled therapy in order to address current impairments and functional limitations  PT to follow pt and recommending home with social support  The patient's AM-PAC Basic Mobility Inpatient Short Form Raw Score is 18, Standardized Score is 41 05  A standardized score less than 42 9 suggests the patient may benefit from discharge to post-acute rehabilitation services  Please also refer to the recommendation of the Physical Therapist for safe discharge planning  Anticipate pt to progress to DC to home    Barriers to Discharge: None        PT Discharge Recommendation: No rehabilitation needs     PT - OK to Discharge: Yes (once medically cleared)    See flowsheet documentation for full assessment

## 2021-08-25 NOTE — CONSULTS
Consult Note- Acute Pain Service   Jaya 57 y o  female MRN: [id number]  Unit/Bed#: [facility] 513-01 Encounter: [id number]               Assessment/Plan     Assessment:   Patient Active Problem List   Diagnosis    Acute low back pain due to trauma    Benign hypertension with CKD (chronic kidney disease), stage II    Vision disturbance    Noncompliance with medications    Stroke-like symptoms    Incidental pulmonary nodule    Hyperlipemia    Tarsal tunnel syndrome, left    Pancreatic mass    Leukocytosis    Depression    Goals of care, counseling/discussion    Stage 2 chronic kidney disease    Neuroendocrine tumor of pancreas      Jaya is a 57 y o  female    Who underwent Whipple procedure for neuroendocrine tumor on August 24th  Plan:   - Continue Thoracic epidural infusion of Ropivacaine 0 1% with fentanyl 2 mcg/mL  Increase basal to 10 mL/hr  10/4/10/4   - Continue Dilaudid 0 5 mg IV q2hr PRN for breakthrough pain  - Anticipate epidural removal and transition to primarily PO regimen in >72 hours with return of bowel function   SQH tid noted     - Bowel regimen when appropriate from surgical perspective    APS will continue to follow  Please contact Acute Pain Service - [facility] via [employer] from [phone] with additional questions or concerns  See Serina or Marissa for additional contacts and after hours information  History of Present Illness    Admit Date:  8/24/2021  Hospital Day:  1 day  Primary Service:  Oncology-Surgical  Attending Provider:  Eran Carlos MD  Reason for Consult / Principal Problem: post operative pain  HPI: Jaya is a 57 y o  female who presents with Neuroendocrine pancreatic tumor  Who underwent Whipple procedure on August 24th  Pain control was adequate overnight with reported pain scores between 4 and 9  Tolerating epidural well  Reports improved analgesia with demand future    She was able to get out of bed to the chair without difficulty  Current pain location(s): abdominal  Pain Scale:   4/10  Quality: dull  Current Analgesic regimen:  epidural      I have reviewed the patient's controlled substance dispensing history in the Prescription Drug Monitoring Program in compliance with the [employer] regulations before prescribing any controlled substances  Inpatient consult to Acute Pain Service  Consult performed by: Ene Moses DO  Consult ordered by: HUONG Lyn          Review of Systems   Constitutional: Negative for chills and fever  HENT: Negative for ear pain and sore throat  Eyes: Negative for pain and visual disturbance  Respiratory: Negative for cough and shortness of breath  Cardiovascular: Negative for chest pain and palpitations  Gastrointestinal: Positive for abdominal pain  Negative for vomiting  Genitourinary: Negative for dysuria, hematuria and urgency  Musculoskeletal: Negative for arthralgias and back pain  Skin: Negative for color change and rash  Neurological: Negative for seizures and syncope  All other systems reviewed and are negative  Historical Information   Past Medical History:   Diagnosis Date    Back ache     Cancer ([address] )     Hyperlipidemia     Hypertension      Past Surgical History:   Procedure Laterality Date    APPENDECTOMY      BILATERAL OOPHORECTOMY      BREAST SURGERY Right     partial mastectomy     COLONOSCOPY      HERNIA REPAIR      HYSTERECTOMY      LAPAROTOMY N/A 8/24/2021    Procedure: LAPAROTOMY EXPLORATORY;  Surgeon: An Mason MD;  [facility]: [facility];  Service: Surgical Oncology    MASTECTOMY Right     partial    WHIPPLE PROCEDURE/PANCREATICO-DUODENECTOMY N/A 8/24/2021    Procedure:  WHIPPLE PROCEDURE/PANCREATICO-DUODENECTOMY;  Surgeon: An Mason MD;  Location: [facility];  Service: Surgical Oncology     Social History   Social History     Substance and Sexual Activity   Alcohol Use Not Currently     Social History     Substance and Sexual Activity   Drug Use No     Social History     Tobacco Use   Smoking Status Former Smoker    Packs/day: 0 65    Years: 30 00    Pack years: 19 50   Smokeless Tobacco Never Used   Tobacco Comment    quit 15 years ago     Family History:   Family History   Problem Relation Age of Onset    Hypertension Mother     Heart disease Mother     Diabetes Mother     Cancer Father        Meds/Allergies   all current active meds have been reviewed, current meds:   Current Facility-Administered Medications   Medication Dose Route Frequency    acetaminophen (TYLENOL) rectal suppository 650 mg  650 mg Rectal Q4H PRN    dextrose 5 % and sodium chloride 0 45 % with KCl 20 mEq/L infusion  125 mL/hr Intravenous Continuous    diphenhydrAMINE (BENADRYL) injection 25 mg  25 mg Intravenous Q6H PRN    heparin (porcine) subcutaneous injection 5,000 Units  5,000 Units Subcutaneous Q8H [address]    HYDROmorphone HCl (DILAUDID) injection 0 2 mg  0 2 mg Intravenous Q4H PRN    insulin lispro (HumaLOG) 100 units/mL subcutaneous injection 1-6 Units  1-6 Units Subcutaneous Q6H [address]    Labetalol HCl (NORMODYNE) injection 10 mg  10 mg Intravenous Q4H PRN    lactated ringers bolus 1,000 mL  1,000 mL Intravenous Once PRN    And    lactated ringers bolus 1,000 mL  1,000 mL Intravenous Once PRN    ondansetron (ZOFRAN) injection 4 mg  4 mg Intravenous Q6H PRN    pantoprazole (PROTONIX) injection 40 mg  40 mg Intravenous Q24H ABDIFATAH    phenol (CHLORASEPTIC) 1 4 % mucosal liquid 1 spray  1 spray Mouth/Throat Q2H PRN    potassium phosphates 30 mmol in sodium chloride 0 9 % 250 mL infusion  30 mmol Intravenous Once    ropivacaine 0 1% and fentaNYL 2 mcg/mL PCEA   Epidural Continuous    sodium chloride 0 9 % bolus 1,000 mL  1,000 mL Intravenous Once PRN    And    sodium chloride 0 9 % bolus 1,000 mL  1,000 mL Intravenous Once PRN    and PTA meds:   Prior to Admission Medications   Prescriptions Last Dose Informant Patient Reported? Taking? amLODIPine (NORVASC) 10 mg tablet 8/11/2021 Self No Yes   Sig: Take 1 tablet (10 mg total) by mouth daily   atorvastatin (LIPITOR) 40 mg tablet 8/10/2021 Self No Yes   Sig: Take 1 tablet (40 mg total) by mouth every evening   clobetasol (TEMOVATE) 0 05 % ointment 8/11/2021 Self No Yes   Sig: Apply twice a day to rash on hands for 2 weeks  Then, apply twice a day from Monday-Friday for another 2 weeks  Avoid the face and groin   dicyclomine (BENTYL) 10 mg capsule More than a month at Unknown time Self No No   Sig: Take 1 capsule (10 mg total) by mouth 4 (four) times a day (before meals and at bedtime)   escitalopram (LEXAPRO) 10 mg tablet 8/23/2021 at Unknown time Self No Yes   Sig: Take 1 tablet (10 mg total) by mouth every morning   hydrOXYzine HCL (ATARAX) 25 mg tablet 8/23/2021 at Unknown time Self Yes Yes   Sig: Take 25 mg by mouth daily at bedtime as needed   lisinopril (ZESTRIL) 20 mg tablet 8/23/2021 at Unknown time Self No Yes   Sig: Take 1 tablet (20 mg total) by mouth daily   omeprazole (PriLOSEC) 40 MG capsule  Self No No   Sig: Take 1 capsule (40 mg total) by mouth daily   ondansetron (ZOFRAN-ODT) 4 mg disintegrating tablet More than a month at Unknown time Self No No   Sig: Take 1 tablet (4 mg total) by mouth every 6 (six) hours as needed for nausea or vomiting   Patient not taking: Reported on 7/12/2021   oxyCODONE (ROXICODONE) 5 mg immediate release tablet Past Month Self No Yes   Sig: Take 1-2 tablets PO Q4H PRN moderate-severe pain   polyethylene glycol (GLYCOLAX) 17 GM/SCOOP powder 8/23/2021 at Unknown time Self No Yes   Sig: At 5pm take 5mgx2 dulcolax  At 6pm 32oz miralax in 64oz gatorade  Drink 8oz glass every 5 mins until 32oz finished   Drink remaining as rec    sertraline (ZOLOFT) 25 mg tablet 8/11/2021 Self Yes Yes   Sig: Take 25 mg by mouth daily   traZODone (DESYREL) 50 mg tablet 8/24/2021 Self Yes Yes   Sig: take 1/2 tablet by mouth at bedtime if needed for insomnia   triamcinolone (KENALOG) 0 1 % ointment 8/23/2021 at Unknown time Self No Yes   Sig: Apply 1 application topically 2 (two) times a day      Facility-Administered Medications: None       No Known Allergies    Objective   Temp:  [97 7 °F (36 5 °C)-99 8 °F (37 7 °C)] 99 8 °F (37 7 °C)  HR:  [78-94] 92  Resp:  [8-35] 24  BP: (100-170)/(54-85) 131/65  Arterial Line BP: (100-180)/(50-76) 138/52    Intake/Output Summary (Last 24 hours) at 8/25/2021 1438  Last data filed at 8/25/2021 1401  Gross per 24 hour   Intake 5263 08 ml   Output 2640 ml   Net 2623 08 ml       Physical Exam  Vitals and nursing note reviewed  Constitutional:       General: She is not in acute distress  Appearance: She is well-developed  HENT:      Head: Normocephalic and atraumatic  Comments: NGT in place      Mouth/Throat:      Mouth: Mucous membranes are moist    Eyes:      Conjunctiva/sclera: Conjunctivae normal    Cardiovascular:      Rate and Rhythm: Normal rate and regular rhythm  Heart sounds: No murmur heard  Pulmonary:      Effort: Pulmonary effort is normal  No respiratory distress  Breath sounds: Normal breath sounds  Abdominal:      Palpations: Abdomen is soft  Tenderness: There is no abdominal tenderness  Musculoskeletal:      Cervical back: Neck supple  Skin:     General: Skin is warm and dry  Comments:  Epidural site clean dry and intact  Non tender to palpation  Neurological:      Mental Status: She is alert and oriented to person, place, and time  GCS: GCS eye subscore is 4  GCS verbal subscore is 5  GCS motor subscore is 6  Lab Results:   I have personally reviewed pertinent labs  , CBC:   Lab Results   Component Value Date    WBC 12 43 (H) 08/25/2021    HGB 8 5 (L) 08/25/2021    HCT 25 5 (L) 08/25/2021    MCV 94 08/25/2021     08/25/2021    MCH 31 3 08/25/2021    MCHC 33 3 08/25/2021    RDW 14 4 08/25/2021    MPV 10 5 08/25/2021    NRBC 0 08/25/2021   , CMP:   Lab Results   Component Value Date    SODIUM 140 08/25/2021    K 3 9 08/25/2021     (H) 08/25/2021    CO2 28 08/25/2021    CO2 23 08/24/2021    BUN 14 08/25/2021    CREATININE 1 09 08/25/2021    GLUCOSE 156 (H) 08/24/2021    CALCIUM 7 5 (L) 08/25/2021    EGFR 55 08/25/2021   , BMP:  Lab Results   Component Value Date    SODIUM 140 08/25/2021    K 3 9 08/25/2021     (H) 08/25/2021    CO2 28 08/25/2021    CO2 23 08/24/2021    BUN 14 08/25/2021    CREATININE 1 09 08/25/2021    GLUC 130 08/25/2021    CALCIUM 7 5 (L) 08/25/2021    AGAP 3 (L) 08/25/2021    EGFR 55 08/25/2021   , PT/PTT:No results found for: PT, PTT    Imaging Studies: I have personally reviewed pertinent reports  EKG, Pathology, and Other Studies: I have personally reviewed pertinent reports  Counseling / Coordination of Care  Total floor / unit time spent today Level 2 = 40 minutes  Greater than 50% of total time was spent with the patient and / or family counseling and / or coordination of care  A description of the counseling / coordination of care: discussion of epidural analgesia     Please note that the APS provides consultative services regarding pain management only  With the exception of ketamine and epidural infusions and except when indicated, final decisions regarding starting or changing doses of analgesic medications are at the discretion of the consulting service  Off hours consultation and/or medication management is generally not available      Efrem Grady DO  Acute Pain Service

## 2021-08-25 NOTE — PROGRESS NOTES
ICU Acceptance Note - [address] [facility] 57 y o  female MRN: [id number]  Unit/Bed#: [facility] 513-01 Encounter: [id number]        ----------------------------------------------------------------------------------------  HPI/24hr events: 60yo female with a PMHx of a neuroendocrine tumor of the pancreas, HTN, and HLD is transferred to the SICU after undergoing a whipple procedure for her resection of her neuroendocrine tumor 8/24/2021  Pt returned from surgery extubated with no pressor requirement, no complications reported intra-op      ---------------------------------------------------------------------------------------  SUBJECTIVE  itchy    Review of Systems   Constitutional: Negative for chills and fever  HENT: Negative for congestion, ear pain, rhinorrhea and sore throat  Eyes: Negative for pain  Respiratory: Negative for apnea, cough, choking, chest tightness, shortness of breath, wheezing and stridor  Cardiovascular: Negative for chest pain, palpitations and leg swelling  Gastrointestinal: Negative for abdominal pain, constipation, diarrhea, nausea and vomiting  Genitourinary: Negative for hematuria  Musculoskeletal: Negative for arthralgias and back pain  Skin: Negative for rash and wound  Neurological: Negative for dizziness  Psychiatric/Behavioral: Negative for agitation and hallucinations  All other systems reviewed and are negative      Review of systems was reviewed and negative unless stated above in HPI/24-hour events   ---------------------------------------------------------------------------------------  Assessment and Plan:    Neuro:    Pain  o Tylenol 650mg   o Epidural continuous       CV:    HTN  o Labetalol PRN   o Home meds  - Amlodipine 10mg  - Lisinopril 20mg   HLD  o Home statin      Pulm:   No acute issues     GI:    Neuroendcrine tumor of head of pancreas s/p whipple procedure   o POD#0  o Monitor in SICU   o Post-op labs show elevated lactate at 5, repeat at midnight after   o NG tube to suction- directions as  surg-onc  o LUQ and RUQ KAMLA drains   GERD  o Prilosec at home   o Protonix IV for now     :    CKD    I's and O's    Trend creatinine and BUN      F/E/N:    Maintenance at 125mL/hr   Electrolytes replete as needed   NPO       Heme/Onc:    Diagnosis: Neuorendocrine tumor of pancreatic head   o Plan: see above     Monitor Hbg    Lactate trending down       Endo:    No acute issues    Monitor glucose       ID:    No acute issues    Monitor fever and WBC curve       MSK/Skin:    No acute issues   o Monitor abdominal incision sites     Patient appropriate for transfer out of the ICU today?: No  Disposition: Admit to Critical Care   Code Status: Level 1 - Full Code  ---------------------------------------------------------------------------------------  ICU CORE MEASURES    Prophylaxis   VTE Pharmacologic Prophylaxis: Pharmacologic VTE Prophylaxis contraindicated due to post-op  VTE Mechanical Prophylaxis: sequential compression device  Stress Ulcer Prophylaxis: Protonix    ABCDE Protocol (if indicated)  Plan to perform spontaneous awakening trial today? Not applicable  Plan to perform spontaneous breathing trial today? Not applicable  Obvious barriers to extubation? Not applicable  CAM-ICU: Negative    Invasive Devices Review  Invasive Devices     Peripheral Intravenous Line            Peripheral IV 08/24/21 Left Antecubital <1 day    Peripheral IV 08/24/21 Right Antecubital <1 day          Epidural Line            Epidural Catheter 08/24/21 <1 day          Arterial Line            Arterial Line 08/24/21 Right Radial <1 day          Drain            Closed/Suction Drain Medial;Right; Anterior RUQ Bulb 19 Fr  -- days    Closed/Suction Drain Left; Anterior LUQ Bulb 19 Fr  <1 day    NG/OG/Enteral Tube Nasogastric 14 Fr Right nare <1 day    Urethral Catheter Latex 16 Fr  <1 day              Can any invasive devices be discontinued today? No  ---------------------------------------------------------------------------------------  OBJECTIVE    Vitals   Vitals:    21 1900 210 21   BP: 126/81 150/75 160/85 150/84   Pulse: 88 80 78 80   Resp: 12 13 (!) 9 12   Temp:    98 1 °F (36 7 °C)   TempSrc:    Oral   SpO2: 99% 100% 99% 99%   Weight:       Height:         Temp (24hrs), Av 3 °F (36 3 °C), Min:95 6 °F (35 3 °C), Max:98 1 °F (36 7 °C)  Current: Temperature: 98 1 °F (36 7 °C)      Respiratory:  SpO2: SpO2: 92 %  Nasal Cannula O2 Flow Rate (L/min): 3 L/min    Invasive/non-invasive ventilation settings   Respiratory    Lab Data (Last 4 hours)       1802            pH, Arterial       7 305           pCO2, Arterial       46 0           pO2, Arterial       144 4           HCO3, Arterial       22 4           Base Excess, Arterial       -3 9                O2/Vent Data     None                Physical Exam  Vitals reviewed  Constitutional:       General: She is not in acute distress  Appearance: She is not ill-appearing  HENT:      Right Ear: External ear normal       Left Ear: External ear normal       Mouth/Throat:      Mouth: Mucous membranes are moist    Eyes:      Extraocular Movements: Extraocular movements intact  Pupils: Pupils are equal, round, and reactive to light  Cardiovascular:      Rate and Rhythm: Normal rate and regular rhythm  Pulses: Normal pulses  Pulmonary:      Effort: Pulmonary effort is normal    Abdominal:      Palpations: Abdomen is soft  Comments: Dressed laparotomy sites    Musculoskeletal:         General: Normal range of motion  Neurological:      General: No focal deficit present  Mental Status: She is alert  Motor: No weakness           Laboratory and Diagnostics:  Results from last 7 days   Lab Units 21  1758 21  1704 21  1411 21  1207 21  0943   WBC Thousand/uL  --  12 02*  --   --   --   -- HEMOGLOBIN g/dL  --  8 9*  --   --   --   --    I STAT HEMOGLOBIN g/dl  --   --  9 2* 8 8* 9 2* 10 2*   HEMATOCRIT %  --  26 8*  --   --   --   --    HEMATOCRIT, ISTAT %  --   --  27* 26* 27* 30*   PLATELETS Thousands/uL 177 183  --   --   --   --    NEUTROS PCT %  --  74  --   --   --   --    MONOS PCT %  --  6  --   --   --   --      Results from last 7 days   Lab Units 08/24/21  1757 08/24/21  1704 08/24/21  1411 08/24/21  1207 08/24/21  0943   SODIUM mmol/L 141  --   --   --   --    POTASSIUM mmol/L 4 1  --   --   --   --    CHLORIDE mmol/L 111*  --   --   --   --    CO2 mmol/L 21  --   --   --   --    CO2, I-STAT mmol/L  --  23 22 22 26   ANION GAP mmol/L 9  --   --   --   --    BUN mg/dL 13  --   --   --   --    CREATININE mg/dL 1 28  --   --   --   --    CALCIUM mg/dL 7 7*  --   --   --   --    GLUCOSE RANDOM mg/dL 152*  --   --   --   --      Results from last 7 days   Lab Units 08/24/21  1757   MAGNESIUM mg/dL 1 6   PHOSPHORUS mg/dL 2 9               Results from last 7 days   Lab Units 08/24/21  1758   LACTIC ACID mmol/L 5 1*     ABG:  Results from last 7 days   Lab Units 08/24/21  1802   PH ART  7 305*   PCO2 ART mm Hg 46 0*   PO2 ART mm Hg 144 4*   HCO3 ART mmol/L 22 4   BASE EXC ART mmol/L -3 9   ABG SOURCE  Line, Arterial     VBG:  Results from last 7 days   Lab Units 08/24/21  1802   ABG SOURCE  Line, Arterial           Micro        Imaging:  I have personally reviewed pertinent reports  Intake and Output  I/O       08/23 0701 - 08/24 0700 08/24 0701 - 08/25 0700    I V  (mL/kg)  4300 (55 4)    NG/GT  0    IV Piggyback  750    Total Intake(mL/kg)  5050 (65 1)    Urine (mL/kg/hr)  1075 (1)    Emesis/NG output  0    Drains  190    Blood  370    Total Output  1635    Net  +3415                 Height and Weights   Height: 5' 4" (162 6 cm)  IBW (Ideal Body Weight): 54 7 kg  Body mass index is 29 35 kg/m²    Weight (last 2 days)     Date/Time   Weight    08/24/21 0644   77 6 (171) Nutrition       Diet Orders   (From admission, onward)             Start     Ordered    08/24/21 1949  Diet NPO  Diet effective now     Question Answer Comment   Diet Type NPO    RD to adjust diet per protocol?  No        08/24/21 1948                  Active Medications  Scheduled Meds:  Current Facility-Administered Medications   Medication Dose Route Frequency Provider Last Rate    acetaminophen  650 mg Rectal Q4H PRN Avery Adan MD      heparin (porcine)  5,000 Units Subcutaneous [facility] Avery Adan MD      lactated ringers  1,000 mL Intravenous Once PRN Avery Adan MD      And    lactated ringers  1,000 mL Intravenous Once PRN Avery Adan MD      lactated ringers  125 mL/hr Intravenous Continuous Avery Adan MD Stopped (08/24/21 1808)    multi-electrolyte  125 mL/hr Intravenous Continuous Avery Adan  mL/hr (08/24/21 1809)    ondansetron  4 mg Intravenous Q6H PRN Avery Adan MD      [START ON 8/25/2021] pantoprazole  40 mg Intravenous Q24H [address] Avery Adan MD      phenol  1 spray Mouth/Throat Q2H PRN Avery Adan MD      ropivacaine 0 1% and fentaNYL 2 mcg/mL   Epidural Continuous Avery Adan MD      sodium chloride  1,000 mL Intravenous Once PRN Avery Adan MD      And    sodium chloride  1,000 mL Intravenous Once PRN Avery Adan MD       Continuous Infusions:  lactated ringers, 125 mL/hr, Last Rate: Stopped (08/24/21 1808)  multi-electrolyte, 125 mL/hr, Last Rate: 125 mL/hr (08/24/21 1809)  ropivacaine 0 1% and fentaNYL 2 mcg/mL,       PRN Meds:   acetaminophen, 650 mg, Q4H PRN  lactated ringers, 1,000 mL, Once PRN   And  lactated ringers, 1,000 mL, Once PRN  ondansetron, 4 mg, Q6H PRN  phenol, 1 spray, Q2H PRN  sodium chloride, 1,000 mL, Once PRN   And  sodium chloride, 1,000 mL, Once PRN        Allergies   No Known Allergies  ---------------------------------------------------------------------------------------  Advance Directive and Living Will:      Power of :    POLST:    ---------------------------------------------------------------------------------------  Care Time Delivered:   Upon my evaluation, this patient had a high probability of imminent or life-threatening deterioration due to major surgery, which required my direct attention, intervention, and personal management  I have personally provided 30 minutes (  to  ) of critical care time, exclusive of procedures, teaching, family meetings, and any prior time recorded by providers other than myself  Sho Ko DO      Portions of the record may have been created with voice recognition software  Occasional wrong word or "sound a like" substitutions may have occurred due to the inherent limitations of voice recognition software    Read the chart carefully and recognize, using context, where substitutions have occurred

## 2021-08-25 NOTE — PROGRESS NOTES
Post op Note - Eladio Cedillo 57 y o  female MRN: [id number]    Unit/Bed#: [facility] 879-54 Encounter: [id number]      Assessment:  56 y/o F w pancreatic neuroendocrine tumor s/p whipple procedure on 8/24  Doing well  Vss  Afebrile  Dressing c/d/i  abd soft, mildly tender, non distended  WILLOW drain x2 with 75 cc (L) and 65 cc (R) serosang ouput  Lactate: 5 1-> 4 3  Plan:  Continue npo, ngt  Do not manipulate  Continue IVF, LR @ 125  Trend lactate  dvt ppx SQH  Continue to monitor abdominal drains, output and character  Strict I/Os  Continue pain control epidural      Subjective:   Pain is well controlled  Denied chest pain or shortness of breath  No nausea or vomiting  Objective:     Vitals: Blood pressure 170/85, pulse 88, temperature 98 1 °F (36 7 °C), temperature source Oral, resp  rate 19, height 5' 4" (1 626 m), weight 77 6 kg (171 lb), SpO2 92 %, not currently breastfeeding  ,Body mass index is 29 35 kg/m²  Intake/Output Summary (Last 24 hours) at 8/24/2021 2230  Last data filed at 8/24/2021 2159  Gross per 24 hour   Intake 6431 23 ml   Output 1880 ml   Net 4551 23 ml       Physical Exam  General: NAD  HEENT: NC/AT  MMM  Cv: RRR  Lungs: normal effort  Ab: Soft, mild tender, ND  willow drain x 2 w serosang output     Ex: no CCE  Neuro: AAOx3    Scheduled Meds:  Current Facility-Administered Medications   Medication Dose Route Frequency Provider Last Rate    acetaminophen  650 mg Rectal Q4H PRN Martha Hinson MD      heparin (porcine)  5,000 Units Subcutaneous [facility] Martha Hinson MD      Labetalol HCl  10 mg Intravenous Q4H PRN Kiley Us,       lactated ringers  1,000 mL Intravenous Once PRN Martha Hinson MD      And    lactated ringers  1,000 mL Intravenous Once PRN Martha Hinson MD      lactated ringers  125 mL/hr Intravenous Continuous Martha Hinson MD Stopped (08/24/21 1808)    magnesium sulfate  2 g Intravenous Once Adriana Damon DO      multi-electrolyte 125 mL/hr Intravenous Continuous Tati Lino  mL/hr (08/24/21 1809)    ondansetron  4 mg Intravenous Q6H PRN Tati Lino MD      [START ON 8/25/2021] pantoprazole  40 mg Intravenous Q24H [facility] Tati Lino MD      phenol  1 spray Mouth/Throat Q2H PRN Tati Lino MD      ropivacaine 0 1% and fentaNYL 2 mcg/mL   Epidural Continuous Tati Lino MD      sodium chloride  1,000 mL Intravenous Once PRN Tati Lino MD      And    sodium chloride  1,000 mL Intravenous Once PRN Tati Lino MD       Continuous Infusions:lactated ringers, 125 mL/hr, Last Rate: Stopped (08/24/21 1808)  multi-electrolyte, 125 mL/hr, Last Rate: 125 mL/hr (08/24/21 1809)  ropivacaine 0 1% and fentaNYL 2 mcg/mL,       PRN Meds:   acetaminophen    Labetalol HCl    lactated ringers **AND** lactated ringers    ondansetron    phenol    sodium chloride **AND** sodium chloride      Invasive Devices     Peripheral Intravenous Line            Peripheral IV 08/24/21 Left Antecubital <1 day    Peripheral IV 08/24/21 Right Antecubital <1 day          Epidural Line            Epidural Catheter 08/24/21 <1 day          Arterial Line            Arterial Line 08/24/21 Right Radial <1 day          Drain            Closed/Suction Drain Medial;Right; Anterior RUQ Bulb 19 Fr  -- days    Closed/Suction Drain Left; Anterior LUQ Bulb 19 Fr  <1 day    NG/OG/Enteral Tube Nasogastric 14 Fr Right nare <1 day    Urethral Catheter Latex 16 Fr  <1 day                Lab, Imaging and other studies: I have personally reviewed pertinent reports      VTE Pharmacologic Prophylaxis: Sequential compression device (Venodyne)   VTE Mechanical Prophylaxis: sequential compression device

## 2021-08-25 NOTE — PHYSICAL THERAPY NOTE
Physical Therapy Evaluation     Patient's Name: [facility]    Admitting Diagnosis  Neuroendocrine tumor of pancreas [D3A 8]    Problem List  Patient Active Problem List   Diagnosis    Acute low back pain due to trauma    Benign hypertension with CKD (chronic kidney disease), stage II    Vision disturbance    Noncompliance with medications    Stroke-like symptoms    Incidental pulmonary nodule    Hyperlipemia    Tarsal tunnel syndrome, left    Pancreatic mass    Leukocytosis    Depression    Goals of care, counseling/discussion    Stage 2 chronic kidney disease    Neuroendocrine tumor of pancreas       Past Medical History  Past Medical History:   Diagnosis Date    Back ache     Cancer ([address] )     Hyperlipidemia     Hypertension        Past Surgical History  Past Surgical History:   Procedure Laterality Date    APPENDECTOMY      BILATERAL OOPHORECTOMY      BREAST SURGERY Right     partial mastectomy     COLONOSCOPY      HERNIA REPAIR      HYSTERECTOMY      LAPAROTOMY N/A 8/24/2021    Procedure: LAPAROTOMY EXPLORATORY;  Surgeon: Momo Bingham MD;  [facility]: [facility];  Service: Surgical Oncology    MASTECTOMY Right     partial    WHIPPLE PROCEDURE/PANCREATICO-DUODENECTOMY N/A 8/24/2021    Procedure: WHIPPLE PROCEDURE/PANCREATICO-DUODENECTOMY;  Surgeon: Momo Bingham MD;  [facility];  Service: Surgical Oncology          08/25/21 1335   PT Last Visit   PT Visit Date 08/25/21   Note Type   Note type Evaluation   Pain Assessment   Pain Assessment Tool 0-10   Pain Score 4   Pain Location/Orientation Location: Abdomen   Pain Onset/Description Onset: Ongoing   Hospital Pain Intervention(s) Medication (See MAR); Repositioned; Ambulation/increased activity   Home Living   Type of Home Apartment   Home Layout Two level;Bed/bath upstairs  (0STE, 14 steps inside to bed/bath)   Bathroom Shower/Tub Tub/shower unit   Bathroom Toilet Standard   Bathroom Equipment Shower chair;Grab bars in shower;Commode   Home Equipment   ( has DME, pt owns none)   Prior Function   Level of [place] Independent with ADLs and functional mobility   Lives With Spouse  (tenant)   ADL Assistance Independent   IADLs Independent   Falls in the last 6 months 0   Restrictions/Precautions   Weight Bearing Precautions Per Order No   Other Precautions Multiple lines;Telemetry; Fall Risk;Pain  (KAMLA drains x2, NG, epidural)   General   Family/Caregiver Present No   Cognition   Overall Cognitive Status WFL   Arousal/Participation Alert   Memory Within functional limits   Following Commands Follows one step commands without difficulty   Comments Pt Russian speaking, daughter present to translate  RLE Assessment   RLE Assessment   (functionally 4-/5)   LLE Assessment   LLE Assessment   (functionally 4-/5)   Bed Mobility   Additional Comments OOB in chair upon PT arrival   Pt left upright in bedside chair with all needs in reach  Transfers   Sit to Stand 4  Minimal assistance   Additional items Assist x 1; Increased time required;Verbal cues   Stand to Sit 4  Minimal assistance   Additional items Assist x 1; Increased time required;Verbal cues   Additional Comments Transfers with RW   VC for hand placement and safety  Ambulation/Elevation   Gait pattern Excessively slow; Short stride; Foward flexed   Gait Assistance 4  Minimal assist   Additional items Assist x 1;Verbal cues; Tactile cues   Assistive Device Rolling walker   Distance 80 ft x 2   Balance   Static Sitting Fair   Dynamic Sitting Fair   Static Standing Fair -   Dynamic Standing Fair -   Ambulatory Fair -   Endurance Deficit   Endurance Deficit Yes   Endurance Deficit Description pain   Activity Tolerance   Activity Tolerance Patient limited by pain   Medical Staff Made Aware [facility]   Nurse Made Aware RN cleared pt to be seen by PT   Assessment   Prognosis Good   Problem List Decreased strength;Decreased endurance; Impaired balance;Decreased mobility;Pain Assessment Pt seen for high complexity PT evaluation due to decrease in functional mobility status compared to baseline  Pt with active PT eval/treat and ambulate pt orders at this time  Pt is a 57 y o  F who [facility] with pancreatic mass on 8/27/21  Pt is s/p whipple on 8/24/21  Pt resides with  and tenant in 2 story apartment with 0STE and 14 steps to bed/bath  Pt presents with decreased strength, balance, endurance that contribute to limitations in bed mobility, functional transfers, functional mobility  Pt requires Min A for STS and ambulation with RW at this time  Pt left upright in bedside chair with all needs in reach  Pt will benefit from skilled therapy in order to address current impairments and functional limitations  PT to follow pt and recommending home with social support  The patient's AM-PAC Basic Mobility Inpatient Short Form Raw Score is 18, Standardized Score is 41 05  A standardized score less than 42 9 suggests the patient may benefit from discharge to post-acute rehabilitation services  Please also refer to the recommendation of the Physical Therapist for safe discharge planning  Anticipate pt to progress to DC to home  Barriers to Discharge None   Goals   Patient Goals to have less pain   STG Expiration Date 09/08/21   Short Term Goal #1 1  Pt will demonstrate ability to perform all aspects of bed mobility with I in order to increase independence and decrease burden on caregivers  2  Pt will demonstrate ability to perform functional transfers with Mod I in order to increase independence and decrease burden on caregivers  3  Pt will demonstrate ability to ambulate 200 ft with least restrictive AD with Mod I in order to return to mobility safely  4  Pt will demonstrate ability to negotiate full flight steps with/without HR and Anna in order to return to household/community mobility safely   5  Pt will demonstrate improved balance by one grade order to decrease risk of falls  6  Pt will increase b/l LE strength by 1 grade in order to increase ease of functional mobility and transfers  Plan   Treatment/Interventions Functional transfer training;LE strengthening/ROM; Therapeutic exercise;Elevations; Endurance training;Patient/family training;Equipment eval/education; Bed mobility;Gait training;Spoke to nursing   PT Frequency Other (Comment)  (3-5x/wk)   Recommendation   PT Discharge Recommendation No rehabilitation needs   Equipment Recommended [facility] Recommended Wheeled walker   Change/add to [employer]?  No   PT - OK to Discharge Yes  (once medically cleared)   AM-PAC Basic Mobility Inpatient   Turning in Bed Without Bedrails 3   Lying on Back to Sitting on Edge of Flat Bed 3   Moving Bed to Chair 3   Standing Up From Chair 3   Walk in Room 3   Climb 3-5 Stairs 3   Basic Mobility Inpatient Raw Score 18   Basic Mobility Standardized Score 41 05   Modified [place] Scale   Modified [place] Scale 4         Michaela Hoffman, PT, DPT

## 2021-08-26 LAB
ABO GROUP BLD BPU: NORMAL
ABO GROUP BLD BPU: NORMAL
ALBUMIN SERPL BCP-MCNC: 2.3 G/DL (ref 3.5–5)
ALP SERPL-CCNC: 115 U/L (ref 46–116)
ALT SERPL W P-5'-P-CCNC: 121 U/L (ref 12–78)
ANION GAP SERPL CALCULATED.3IONS-SCNC: 2 MMOL/L (ref 4–13)
AST SERPL W P-5'-P-CCNC: 156 U/L (ref 5–45)
BASOPHILS # BLD AUTO: 0.03 THOUSANDS/ΜL (ref 0–0.1)
BASOPHILS NFR BLD AUTO: 0 % (ref 0–1)
BILIRUB SERPL-MCNC: 4.67 MG/DL (ref 0.2–1)
BPU ID: NORMAL
BPU ID: NORMAL
BUN SERPL-MCNC: 14 MG/DL (ref 5–25)
CALCIUM ALBUM COR SERPL-MCNC: 9.1 MG/DL (ref 8.3–10.1)
CALCIUM SERPL-MCNC: 7.7 MG/DL (ref 8.3–10.1)
CHLORIDE SERPL-SCNC: 108 MMOL/L (ref 100–108)
CO2 SERPL-SCNC: 28 MMOL/L (ref 21–32)
CREAT SERPL-MCNC: 1.05 MG/DL (ref 0.6–1.3)
CROSSMATCH: NORMAL
CROSSMATCH: NORMAL
EOSINOPHIL # BLD AUTO: 0.02 THOUSAND/ΜL (ref 0–0.61)
EOSINOPHIL NFR BLD AUTO: 0 % (ref 0–6)
ERYTHROCYTE [DISTWIDTH] IN BLOOD BY AUTOMATED COUNT: 15 % (ref 11.6–15.1)
GFR SERPL CREATININE-BSD FRML MDRD: 57 ML/MIN/1.73SQ M
GLUCOSE SERPL-MCNC: 120 MG/DL (ref 65–140)
GLUCOSE SERPL-MCNC: 129 MG/DL (ref 65–140)
GLUCOSE SERPL-MCNC: 133 MG/DL (ref 65–140)
GLUCOSE SERPL-MCNC: 155 MG/DL (ref 65–140)
HCT VFR BLD AUTO: 25.9 % (ref 34.8–46.1)
HGB BLD-MCNC: 8.1 G/DL (ref 11.5–15.4)
IMM GRANULOCYTES # BLD AUTO: 0.09 THOUSAND/UL (ref 0–0.2)
IMM GRANULOCYTES NFR BLD AUTO: 1 % (ref 0–2)
LYMPHOCYTES # BLD AUTO: 3.62 THOUSANDS/ΜL (ref 0.6–4.47)
LYMPHOCYTES NFR BLD AUTO: 24 % (ref 14–44)
MCH RBC QN AUTO: 30.7 PG (ref 26.8–34.3)
MCHC RBC AUTO-ENTMCNC: 31.3 G/DL (ref 31.4–37.4)
MCV RBC AUTO: 98 FL (ref 82–98)
MONOCYTES # BLD AUTO: 1.33 THOUSAND/ΜL (ref 0.17–1.22)
MONOCYTES NFR BLD AUTO: 9 % (ref 4–12)
NEUTROPHILS # BLD AUTO: 10.04 THOUSANDS/ΜL (ref 1.85–7.62)
NEUTS SEG NFR BLD AUTO: 66 % (ref 43–75)
NRBC BLD AUTO-RTO: 0 /100 WBCS
PLATELET # BLD AUTO: 158 THOUSANDS/UL (ref 149–390)
PMV BLD AUTO: 10.8 FL (ref 8.9–12.7)
POTASSIUM SERPL-SCNC: 4.4 MMOL/L (ref 3.5–5.3)
PROT SERPL-MCNC: 5.7 G/DL (ref 6.4–8.2)
RBC # BLD AUTO: 2.64 MILLION/UL (ref 3.81–5.12)
SODIUM SERPL-SCNC: 138 MMOL/L (ref 136–145)
UNIT DISPENSE STATUS: NORMAL
UNIT DISPENSE STATUS: NORMAL
UNIT PRODUCT CODE: NORMAL
UNIT PRODUCT CODE: NORMAL
UNIT PRODUCT VOLUME: 350 ML
UNIT PRODUCT VOLUME: 350 ML
UNIT RH: NORMAL
UNIT RH: NORMAL
WBC # BLD AUTO: 15.13 THOUSAND/UL (ref 4.31–10.16)

## 2021-08-26 PROCEDURE — C9113 INJ PANTOPRAZOLE SODIUM, VIA: HCPCS | Performed by: STUDENT IN AN ORGANIZED HEALTH CARE EDUCATION/TRAINING PROGRAM

## 2021-08-26 PROCEDURE — 85025 COMPLETE CBC W/AUTO DIFF WBC: CPT | Performed by: PHYSICIAN ASSISTANT

## 2021-08-26 PROCEDURE — 80053 COMPREHEN METABOLIC PANEL: CPT | Performed by: PHYSICIAN ASSISTANT

## 2021-08-26 PROCEDURE — 82948 REAGENT STRIP/BLOOD GLUCOSE: CPT

## 2021-08-26 PROCEDURE — 99232 SBSQ HOSP IP/OBS MODERATE 35: CPT | Performed by: SURGERY

## 2021-08-26 PROCEDURE — 99024 POSTOP FOLLOW-UP VISIT: CPT | Performed by: SURGERY

## 2021-08-26 PROCEDURE — 99232 SBSQ HOSP IP/OBS MODERATE 35: CPT | Performed by: ANESTHESIOLOGY

## 2021-08-26 RX ORDER — DIPHENHYDRAMINE HYDROCHLORIDE 50 MG/ML
50 INJECTION INTRAMUSCULAR; INTRAVENOUS EVERY 6 HOURS PRN
Status: DISCONTINUED | OUTPATIENT
Start: 2021-08-26 | End: 2021-08-28

## 2021-08-26 RX ORDER — DIPHENHYDRAMINE HYDROCHLORIDE 50 MG/ML
25 INJECTION INTRAMUSCULAR; INTRAVENOUS ONCE
Status: COMPLETED | OUTPATIENT
Start: 2021-08-26 | End: 2021-08-26

## 2021-08-26 RX ORDER — HYDROMORPHONE HCL/PF 1 MG/ML
0.5 SYRINGE (ML) INJECTION EVERY 4 HOURS PRN
Status: DISCONTINUED | OUTPATIENT
Start: 2021-08-26 | End: 2021-08-26

## 2021-08-26 RX ORDER — ACETAMINOPHEN 650 MG/1
650 SUPPOSITORY RECTAL EVERY 6 HOURS SCHEDULED
Status: DISCONTINUED | OUTPATIENT
Start: 2021-08-26 | End: 2021-08-27

## 2021-08-26 RX ORDER — HYDROMORPHONE HCL/PF 1 MG/ML
0.5 SYRINGE (ML) INJECTION EVERY 2 HOUR PRN
Status: DISCONTINUED | OUTPATIENT
Start: 2021-08-26 | End: 2021-09-01 | Stop reason: HOSPADM

## 2021-08-26 RX ADMIN — HEPARIN SODIUM 5000 UNITS: 5000 INJECTION INTRAVENOUS; SUBCUTANEOUS at 05:05

## 2021-08-26 RX ADMIN — DEXTROSE, SODIUM CHLORIDE, AND POTASSIUM CHLORIDE 125 ML/HR: 5; .45; .15 INJECTION INTRAVENOUS at 06:59

## 2021-08-26 RX ADMIN — LABETALOL 20 MG/4 ML (5 MG/ML) INTRAVENOUS SYRINGE 10 MG: at 01:07

## 2021-08-26 RX ADMIN — FENTANYL CITRATE: 50 INJECTION INTRAMUSCULAR; INTRAVENOUS at 08:19

## 2021-08-26 RX ADMIN — INSULIN LISPRO 1 UNITS: 100 INJECTION, SOLUTION INTRAVENOUS; SUBCUTANEOUS at 05:22

## 2021-08-26 RX ADMIN — HEPARIN SODIUM 5000 UNITS: 5000 INJECTION INTRAVENOUS; SUBCUTANEOUS at 17:30

## 2021-08-26 RX ADMIN — DIPHENHYDRAMINE HYDROCHLORIDE 25 MG: 50 INJECTION, SOLUTION INTRAMUSCULAR; INTRAVENOUS at 04:52

## 2021-08-26 RX ADMIN — ACETAMINOPHEN 650 MG: 650 SUPPOSITORY RECTAL at 17:30

## 2021-08-26 RX ADMIN — DIPHENHYDRAMINE HYDROCHLORIDE 25 MG: 50 INJECTION, SOLUTION INTRAMUSCULAR; INTRAVENOUS at 10:14

## 2021-08-26 RX ADMIN — DIPHENHYDRAMINE HYDROCHLORIDE 50 MG: 50 INJECTION, SOLUTION INTRAMUSCULAR; INTRAVENOUS at 17:30

## 2021-08-26 RX ADMIN — PANTOPRAZOLE SODIUM 40 MG: 40 INJECTION, POWDER, FOR SOLUTION INTRAVENOUS at 05:05

## 2021-08-26 NOTE — PROGRESS NOTES
Epidural Follow-up Note - Acute Pain Service    Felecia Arauz 57 y o  female MRN: [id number]  Unit/Bed#: [facility] 513-01 Encounter: [id number]      Assessment:   Principal Problem:    Pancreatic mass      Felecia Arauz is a 57y o  year old female POD 2 s/p whipple procedure with thoracic epidural for postop pain control  Pt seen in her room, her NGT has been removed  She is allows sips of clears today  She is on NCO2  She does admit she is in pain, of mild-mod nature  She does press the PCEA button with good relief  She denies nausea, vomiting, low back pain, paresthesias of her legs  She is itchy, most likely related to fentanyl in her epidural   Encouraged use of benadryl if needed  Upon inspection of backside, epidural site is C/D/I and well secured  Plan:  - -Continue epidural infusion Ropivacaine 0 1% with fentanyl 2 mcg/mL, with rate of 10 cc/hr, 4 cc bolus, 10 min lockout, 4 doses max/hr  Anticipate epidural use for another 3 days time, discussed with surgical team    - modify Dilaudid To 0 5 mg IV q2hr PRN for breakthrough pain  - Modify to 650 mg PO Q 6 hrs scheduled   - continue benadryl 50 mg IV Q 6 PRN pruritis       Bowel regimen when appropriate from surgical perspective    APS will continue to follow  Please contact Acute Pain Service - [facility] via [facility] from [phone] with additional questions or concerns  See Serina or Marissa for additional contacts and after hours information      Pain History  Current pain location(s): abdomen surgical incision site  Pain Scale:  4-9/10  24 hour history: use of epidural for pain management  Opioid requirement previous 24 hours: none, use of PCEA only    Meds/Allergies   all current active meds have been reviewed, current meds:   Current Facility-Administered Medications   Medication Dose Route Frequency    acetaminophen (TYLENOL) rectal suppository 650 mg  650 mg Rectal Q4H PRN    dextrose 5 % and sodium chloride 0 45 % with KCl 20 mEq/L infusion  125 mL/hr Intravenous Continuous    diphenhydrAMINE (BENADRYL) injection 50 mg  50 mg Intravenous Q6H PRN    heparin (porcine) subcutaneous injection 5,000 Units  5,000 Units Subcutaneous Q8H [address]    HYDROmorphone HCl (DILAUDID) injection 0 2 mg  0 2 mg Intravenous Q4H PRN    insulin lispro (HumaLOG) 100 units/mL subcutaneous injection 1-6 Units  1-6 Units Subcutaneous Q6H [address]    Labetalol HCl (NORMODYNE) injection 10 mg  10 mg Intravenous Q4H PRN    ondansetron (ZOFRAN) injection 4 mg  4 mg Intravenous Q6H PRN    pantoprazole (PROTONIX) injection 40 mg  40 mg Intravenous Q24H ABDIFATAH    phenol (CHLORASEPTIC) 1 4 % mucosal liquid 1 spray  1 spray Mouth/Throat Q2H PRN    ropivacaine 0 1% and fentaNYL 2 mcg/mL PCEA   Epidural Continuous    and PTA meds:   Prior to Admission Medications   Prescriptions Last Dose Informant Patient Reported? Taking? amLODIPine (NORVASC) 10 mg tablet 8/11/2021 Self No Yes   Sig: Take 1 tablet (10 mg total) by mouth daily   atorvastatin (LIPITOR) 40 mg tablet 8/10/2021 Self No Yes   Sig: Take 1 tablet (40 mg total) by mouth every evening   clobetasol (TEMOVATE) 0 05 % ointment 8/11/2021 Self No Yes   Sig: Apply twice a day to rash on hands for 2 weeks  Then, apply twice a day from Monday-Friday for another 2 weeks   Avoid the face and groin   dicyclomine (BENTYL) 10 mg capsule More than a month at Unknown time Self No No   Sig: Take 1 capsule (10 mg total) by mouth 4 (four) times a day (before meals and at bedtime)   escitalopram (LEXAPRO) 10 mg tablet 8/23/2021 at Unknown time Self No Yes   Sig: Take 1 tablet (10 mg total) by mouth every morning   hydrOXYzine HCL (ATARAX) 25 mg tablet 8/23/2021 at Unknown time Self Yes Yes   Sig: Take 25 mg by mouth daily at bedtime as needed   lisinopril (ZESTRIL) 20 mg tablet 8/23/2021 at Unknown time Self No Yes   Sig: Take 1 tablet (20 mg total) by mouth daily   omeprazole (PriLOSEC) 40 MG capsule  Self No No   Sig: Take 1 capsule (40 mg total) by mouth daily   ondansetron (ZOFRAN-ODT) 4 mg disintegrating tablet More than a month at Unknown time Self No No   Sig: Take 1 tablet (4 mg total) by mouth every 6 (six) hours as needed for nausea or vomiting   Patient not taking: Reported on 7/12/2021   oxyCODONE (ROXICODONE) 5 mg immediate release tablet Past Month Self No Yes   Sig: Take 1-2 tablets PO Q4H PRN moderate-severe pain   polyethylene glycol (GLYCOLAX) 17 GM/SCOOP powder 8/23/2021 at Unknown time Self No Yes   Sig: At 5pm take 5mgx2 dulcolax  At 6pm 32oz miralax in 64oz gatorade  Drink 8oz glass every 5 mins until 32oz finished  Drink remaining as rec    sertraline (ZOLOFT) 25 mg tablet 8/11/2021 Self Yes Yes   Sig: Take 25 mg by mouth daily   traZODone (DESYREL) 50 mg tablet 8/24/2021 Self Yes Yes   Sig: take 1/2 tablet by mouth at bedtime if needed for insomnia   triamcinolone (KENALOG) 0 1 % ointment 8/23/2021 at Unknown time Self No Yes   Sig: Apply 1 application topically 2 (two) times a day      Facility-Administered Medications: None       No Known Allergies    Objective     Temp:  [99 1 °F (37 3 °C)-100 4 °F (38 °C)] 99 5 °F (37 5 °C)  HR:  [] 90  Resp:  [13-35] 14  BP: (109-193)/(45-89) 134/66    Physical Exam  Vitals and nursing note reviewed  Constitutional:       Appearance: Normal appearance  HENT:      Head: Normocephalic and atraumatic  Nose: Nose normal       Mouth/Throat:      Mouth: Mucous membranes are moist    Eyes:      Extraocular Movements: Extraocular movements intact  Pupils: Pupils are equal, round, and reactive to light  Cardiovascular:      Rate and Rhythm: Normal rate  Pulses: Normal pulses  Pulmonary:      Effort: Pulmonary effort is normal    Abdominal:      Tenderness: There is abdominal tenderness  Comments: Pain from surgical incision   Musculoskeletal:      Cervical back: Normal range of motion  Comments: Encourage to ambulate   Skin:     General: Skin is warm  Neurological:      General: No focal deficit present  Mental Status: She is alert and oriented to person, place, and time  Mental status is at baseline  Psychiatric:         Mood and Affect: Mood normal          Behavior: Behavior normal          Thought Content: Thought content normal          Judgment: Judgment normal        Epidural: Site clean/dry/intact, no surrounding erythema/edema/induration, infusion functioning appropriately    Lab Results:   Results from last 7 days   Lab Units 08/26/21  0510   WBC Thousand/uL 15 13*   HEMOGLOBIN g/dL 8 1*   HEMATOCRIT % 25 9*   PLATELETS Thousands/uL 158      Results from last 7 days   Lab Units 08/26/21  0510 08/24/21  1704   POTASSIUM mmol/L 4 4  --    CHLORIDE mmol/L 108  --    CO2 mmol/L 28  --    CO2, I-STAT mmol/L  --  23   BUN mg/dL 14  --    CREATININE mg/dL 1 05  --    CALCIUM mg/dL 7 7*  --    ALK PHOS U/L 115  --    ALT U/L 121*  --    AST U/L 156*  --    GLUCOSE, ISTAT mg/dl  --  156*          Imaging Studies: I have personally reviewed pertinent reports  EKG, Pathology, and Other Studies: I have personally reviewed pertinent reports  Counseling / Coordination of Care  Total floor / unit time spent today 20 minutes  Greater than 50% of total time was spent with the patient and / or family counseling and / or coordination of care  A description of the counseling / coordination of care: discussed use of epidural with patient and goals for pain control  Discussed plan with surgical team     Please note that the APS provides consultative services regarding pain management only  With the exception of ketamine, peripheral nerve catheters, and epidural infusions (and except when indicated), final decisions regarding starting or changing doses of analgesic medications are at the discretion of the consulting service  Off hours consultation and/or medication management is generally not available      Kelly Dowling MD  Acute Pain Service

## 2021-08-26 NOTE — PROGRESS NOTES
Progress Note - Surgical Oncology  Morena Mary 57 y o  female MRN: [id number]  Unit/Bed#: [facility] 513-01 Encounter: [id number]    Assessment:  69-year-old female with past medical history of pancreatic neuroendocrine tumor now postop day 2 status post Whipple procedure on 08/24    Febrile to 100 4 early this a m  2/00  Some systolics into 300N to 333P overnight  White blood cell count 15 from 12 4, hemoglobin 8 1 from 8 5  Urine output 1320  NG output 150  Left upper quadrant KAMLA 165 serosanguineous  Right upper quadrant KAMLA 105 serosanguineous    Plan:  - continue with NPO/NGT  - continue to monitor KAMLA drain output and character  - epidural analgesia  - IV fluids  - maintain Lira  - encourage ambulation  - nausea control p r n   - SQH    Subjective/Objective   Subjective:  Patient reports pain in abdomen in the left lower and right lower quadrants  Denies nausea, denies vomiting, NG tube, not out of bed  Objective:     Blood pressure 142/54, pulse 92, temperature 99 9 °F (37 7 °C), temperature source Oral, resp  rate (!) 23, height 5' 4" (1 626 m), weight 77 6 kg (171 lb), SpO2 95 %, not currently breastfeeding  ,Body mass index is 29 35 kg/m²  Intake/Output Summary (Last 24 hours) at 8/26/2021 0423  Last data filed at 8/26/2021 0000  Gross per 24 hour   Intake 3012 7 ml   Output 1520 ml   Net 1492 7 ml       Invasive Devices     Peripheral Intravenous Line            Peripheral IV 08/24/21 Right Antecubital 1 day    Peripheral IV 08/25/21 Right;Upper;Ventral (anterior) Arm <1 day          Epidural Line            Epidural Catheter 08/24/21 1 day          Drain            Closed/Suction Drain Medial;Right; Anterior RUQ Bulb 19 Fr  -- days    Closed/Suction Drain Left; Anterior LUQ Bulb 19 Fr  1 day    NG/OG/Enteral Tube Nasogastric 14 Fr Right nare 1 day    Urethral Catheter Latex 16 Fr  1 day                Physical Exam:   General: NAD  Skin: Warm, anicteric  HEENT: Normocephalic, atraumatic  CV: RRR, no m/r/g  Pulm: CTA b/l, no inc WOB  Abd: Soft, ND, tender to palpation in right lower and left lower quadrants, surgical incisions clean dry and intact  MSK: Symmetric, no cyanosis, no edema  Neuro: AOx3    Lab, Imaging and other studies:I have personally reviewed pertinent lab results      VTE Pharmacologic Prophylaxis: Heparin  VTE Mechanical Prophylaxis: sequential compression device

## 2021-08-26 NOTE — PROGRESS NOTES
Daily Progress Note - Critical Care   Jaya 57 y o  female MRN: [id number]  [facility] Encounter: [id number]        ----------------------------------------------------------------------------------------  HPI/24hr events:   Jaya is a 58 yo female with a PMH of pancreatic neuroendocrine tumor, HTN, and HLD,  who was transferred to the SICU after undergoing a whipple procedure for resection of her neuroendocrine tumor on 8/24/2021  There were no reported intra-operative complications and she returned from surgery extubated without pressors  Last 24 hrs:   No acute issues overnight     ---------------------------------------------------------------------------------------  SUBJECTIVE  Ms  Oc Rodgers is resting in bed this morning  She is [place] speaking and understands little english  She endorses pain to her abdomen        Review of Systems    Review of systems was unable to be performed secondary to patient is [place] speaking and understands little english    ---------------------------------------------------------------------------------------  Assessment and Plan:    Neuro:    Sedation/Analgesia  o Acetaminophen 650 RE q4 PRN  o Dilaudid 0 2 mg IV q4 PRN  o Ropivacaine & fentanyl 10 ml/hr continuous epidural    CV:    Diagnosis: HTN  o BP last 24 hrs: 109/45 - 189/89  o Plan   - Continue labetolol 10 mg IV q4 PRN  - Home meds include amlodipine 10 mg and lisinopril 20 mg (not currently being given)   Diagnosis: HLD  o Plan  - Continue home atorvastatin 40 mg when diet allows      Pulm:   No acute issues   Saturating upper 90s on 2L NC      GI:    Diagnosis: Pancreatic neuroendocrine tumor  o S/p Whipple procedure 8/24 (POD #2)  o Plan  - NG tube on suction -- management per surgery-oncology  - LUQ & RUQ KAMLA drains -- 8/25 drained 270 mL    Diagnosis: GERD  o Plan  - Continue pantoprazole 40 mg IV qd  - Home meds include omeprazole 40 mg (not currently being given)      :    Diagnosis: CKD  o 8/26 BUN 14, Cr 1 05 (8/25 BUN 14, Cr 1 09)  o 8/25 UOP: 1320 ml (1 9 ml/kg/hr)  o Plan  - Monitor I/O  - Trend BUN and creatinine      F/E/N:    Fluids: D5 in 1/2    Electrolytes: 8/26 Na 138, K 4 4  o Goals: K >4 0, Mg >2 0, Phos >3 0  o Plan: no repletion needed today   Nutrition: NPO      Heme/Onc:    Diagnosis: Pancreatic neuroendocrine tumor  o Plan: see above   Diagnosis: Normocytic anemia  o 8/26 Hg 8 1, Hct 25 9 (8/25 Hg 8 5, Hct 25 5)  o Plan  - Hg goal > 7  - Continue to monitor daily CBC      Endo:    No acute issues  o 8/25 glucose: 155  o Plan:   - Insulin lispro (required 1 unit in past 24 hrs)  - Continue to monitor   LDA  o Epidural catheter (2 day)  o Drain bulb RUQ (2 day)  o Drain bulb LUQ (2 day)  o NG tube (2 day)  o Urethral catheter (2 day)        ID:    Diagnosis: leukocytosis  o 8/26 WBC 15 13 (8/25 WBC 12 43)  o Tmax last 24 hrs 100 4F  o Plan  - Trend WBC and fever curve  - Consider starting antibiotics and/or cultures if pt becomes febrile      MSK/Skin:    Diagnosis: General pruritis  o Plan: continue benadryl 25 mg IV q6 PRN   Diagnosis: Abdominal incision sites  o Plan: continue to monitor        Patient appropriate for transfer out of the ICU today?: No  Disposition: Continue Critical Care   Code Status: Level 1 - Full Code  ---------------------------------------------------------------------------------------  ICU CORE MEASURES    Prophylaxis   VTE Pharmacologic Prophylaxis: Heparin  VTE Mechanical Prophylaxis: sequential compression device  Stress Ulcer Prophylaxis: Pantoprazole IV     ABCDE Protocol (if indicated)  Plan to perform spontaneous awakening trial today? N/A  Plan to perform spontaneous breathing trial today? N/A  Obvious barriers to extubation?  N/A  CAM-ICU: Negative    Invasive Devices Review  Invasive Devices     Peripheral Intravenous Line            Peripheral IV 08/24/21 Right Antecubital 1 day    Peripheral IV 21 Right;Upper;Ventral (anterior) Arm <1 day          Epidural Line            Epidural Catheter 21 1 day          Drain            Closed/Suction Drain Medial;Right; Anterior RUQ Bulb 19 Fr  -- days    Closed/Suction Drain Left; Anterior LUQ Bulb 19 Fr  1 day    NG/OG/Enteral Tube Nasogastric 14 Fr Right nare 1 day    Urethral Catheter Latex 16 Fr  1 day              Can any invasive devices be discontinued today? No  ---------------------------------------------------------------------------------------  OBJECTIVE    Vitals   Vitals:    21 0200 21 0300 21 0400 21 0500   BP: 135/62 142/54 149/59 160/62   Pulse: 90 92 88 90   Resp: (!)  (!) 23 13 18   Temp:   100 4 °F (38 °C)    TempSrc:   Oral    SpO2: 98% 95% 97% 97%   Weight:       Height:         Temp (24hrs), Av 8 °F (37 7 °C), Min:99 1 °F (37 3 °C), Max:100 4 °F (38 °C)  Current: Temperature: 100 4 °F (38 °C)      Respiratory:  SpO2: SpO2: 97 %, SpO2 Activity: SpO2 Activity: At Rest, SpO2 Device: O2 Device: None (Room air), Capnography:    Nasal Cannula O2 Flow Rate (L/min): 2 L/min    Invasive/non-invasive ventilation settings   Respiratory    Lab Data (Last 4 hours)    None         O2/Vent Data (Last 4 hours)    None                Physical Exam  Constitutional:       General: She is not in acute distress  Appearance: She is not ill-appearing  HENT:      Head: Normocephalic and atraumatic  Mouth/Throat:      Mouth: Mucous membranes are moist       Pharynx: Oropharynx is clear  Comments: NG tube right nares  Eyes:      Conjunctiva/sclera: Conjunctivae normal       Pupils: Pupils are equal, round, and reactive to light  Cardiovascular:      Rate and Rhythm: Normal rate and regular rhythm  Pulses: Normal pulses  Heart sounds: No murmur heard  No friction rub  No gallop  Pulmonary:      Effort: Pulmonary effort is normal  No respiratory distress  Breath sounds:  No wheezing, rhonchi or rales  Abdominal:      General: There is no distension  Tenderness: There is abdominal tenderness  There is guarding  Comments: Left drainage site covered with dressing  Right drainage site is uncovered and without erythema  Drain bulbs to RUQ and LUQ draining serosanguinous fluid  Midline incision site is uncovered and without erythema or drainage  Genitourinary:     Comments: Lira catheter in place draining clear, light yellow urine  Musculoskeletal:         General: No swelling or tenderness  Skin:     General: Skin is warm and dry  Capillary Refill: Capillary refill takes less than 2 seconds  Neurological:      Mental Status: She is alert        Comments: GCS: 15         Laboratory and Diagnostics:  Results from last 7 days   Lab Units 08/26/21  0510 08/25/21  0427 08/24/21 2025 08/24/21  1758 08/24/21  1704 08/24/21  1411 08/24/21  1207 08/24/21  0943   WBC Thousand/uL 15 13* 12 43*  --  12 02*  --   --   --   --    HEMOGLOBIN g/dL 8 1* 8 5*  --  8 9*  --   --   --   --    I STAT HEMOGLOBIN g/dl  --   --   --   --  9 2* 8 8* 9 2* 10 2*   HEMATOCRIT % 25 9* 25 5*  --  26 8*  --   --   --   --    HEMATOCRIT, ISTAT %  --   --   --   --  27* 26* 27* 30*   PLATELETS Thousands/uL 158 167 177 183  --   --   --   --    NEUTROS PCT % 66 60  --  74  --   --   --   --    MONOS PCT % 9 9  --  6  --   --   --   --      Results from last 7 days   Lab Units 08/26/21  0510 08/25/21  0427 08/24/21 1757 08/24/21  1704 08/24/21  1411 08/24/21  1207 08/24/21  0943   SODIUM mmol/L 138 140 141  --   --   --   --    POTASSIUM mmol/L 4 4 3 9 4 1  --   --   --   --    CHLORIDE mmol/L 108 109* 111*  --   --   --   --    CO2 mmol/L 28 28 21  --   --   --   --    CO2, I-STAT mmol/L  --   --   --  23 22 22 26   ANION GAP mmol/L 2* 3* 9  --   --   --   --    BUN mg/dL 14 14 13  --   --   --   --    CREATININE mg/dL 1 05 1 09 1 28  --   --   --   --    CALCIUM mg/dL 7 7* 7 5* 7 7*  --   --   --   -- GLUCOSE RANDOM mg/dL 133 130 152*  --   --   --   --    ALT U/L 121*  --   --   --   --   --   --    AST U/L 156*  --   --   --   --   --   --    ALK PHOS U/L 115  --   --   --   --   --   --    ALBUMIN g/dL 2 3*  --   --   --   --   --   --    TOTAL BILIRUBIN mg/dL 4 67*  --   --   --   --   --   --      Results from last 7 days   Lab Units 08/25/21  0427 08/24/21  1757   MAGNESIUM mg/dL 2 4 1 6   PHOSPHORUS mg/dL 2 0* 2 9               Results from last 7 days   Lab Units 08/25/21  0427 08/25/21 0214 08/24/21 2025 08/24/21  1758   LACTIC ACID mmol/L 2 1* 3 9* 4 3* 5 1*     ABG:  Results from last 7 days   Lab Units 08/24/21  1802   PH ART  7 305*   PCO2 ART mm Hg 46 0*   PO2 ART mm Hg 144 4*   HCO3 ART mmol/L 22 4   BASE EXC ART mmol/L -3 9   ABG SOURCE  Line, Arterial     VBG:  Results from last 7 days   Lab Units 08/24/21  1802   ABG SOURCE  Line, Arterial           Micro        EKG: NSR  Imaging: I have personally reviewed pertinent reports  Intake and Output  I/O       08/23 0701 - 08/24 0700 08/24 0701 - 08/25 0700    P  O   0    I V  (mL/kg)  6650 7 (85 7)    NG/GT  0    IV Piggyback  800    Total Intake(mL/kg)  7450 7 (96)    Urine (mL/kg/hr)  2060 (1 1)    Emesis/NG output  0    Drains  405    Blood  370    Total Output  2835    Net  +4615 7              UOP: 1320 ml (1 9 ml/kg/hr)    Height and Weights   Height: 5' 4" (162 6 cm)  IBW (Ideal Body Weight): 54 7 kg  Body mass index is 29 35 kg/m²  Weight (last 2 days)     Date/Time   Weight    08/24/21 0644   77 6 (171)                Nutrition       Diet Orders   (From admission, onward)             Start     Ordered    08/24/21 1949  Diet NPO  Diet effective now     Question Answer Comment   Diet Type NPO    RD to adjust diet per protocol? No        08/24/21 1948              TF currently running at 0 ml/hr with a goal of 0 ml/hr   Formula: N/A      Active Medications  Scheduled Meds:  Current Facility-Administered Medications   Medication Dose Route Frequency Provider Last Rate    acetaminophen  650 mg Rectal Q4H PRN Alexandra Moore MD      dextrose 5 % and sodium chloride 0 45 % with KCl 20 mEq/L  125 mL/hr Intravenous Continuous Gayle Valle PA-C 125 mL/hr (08/25/21 2328)    diphenhydrAMINE  25 mg Intravenous Q6H PRN Jesus Cart Cerni, DO      heparin (porcine)  5,000 Units Subcutaneous Q8H [address] Alexandra Moore MD      HYDROmorphone  0 2 mg Intravenous Q4H PRN Aransas Frankford, DO      insulin lispro  1-6 Units Subcutaneous Q6H [address] Jesus Cart Cerni, DO      Labetalol HCl  10 mg Intravenous Q4H PRN Jesus Cart Cerni, DO      ondansetron  4 mg Intravenous Q6H PRN Alexandra Moore MD      pantoprazole  40 mg Intravenous Q24H [address] Alexandra Moore MD      phenol  1 spray Mouth/Throat Q2H PRN Alexandra Moore MD      ropivacaine 0 1% and fentaNYL 2 mcg/mL   Epidural Continuous Jean Tesoriero, DO       Continuous Infusions:  dextrose 5 % and sodium chloride 0 45 % with KCl 20 mEq/L, 125 mL/hr, Last Rate: 125 mL/hr (08/25/21 2328)  ropivacaine 0 1% and fentaNYL 2 mcg/mL,       PRN Meds:   acetaminophen, 650 mg, Q4H PRN  diphenhydrAMINE, 25 mg, Q6H PRN  HYDROmorphone, 0 2 mg, Q4H PRN  Labetalol HCl, 10 mg, Q4H PRN  ondansetron, 4 mg, Q6H PRN  phenol, 1 spray, Q2H PRN        Allergies   No Known Allergies  ---------------------------------------------------------------------------------------  Advance Directive and Living Will:      Power of :    POLST:    ---------------------------------------------------------------------------------------  Care Time Delivered: Total Critical Care time spent 30 minutes excluding procedures, teaching and family updates  Cheli Aguilar      Portions of the record may have been created with voice recognition software  Occasional wrong word or "sound a like" substitutions may have occurred due to the inherent limitations of voice recognition software    Read the chart carefully and recognize, using context, where substitutions have occurred

## 2021-08-26 NOTE — CASE MANAGEMENT
LOS 2  Not a documented bundle  Not a readmission  Green readmission score of 14=low risk for readmission    Met with patient and daughter, [place] in room  Pt is Amharic-speaking and [place] interpreted  Patient is alert and appropriate  Pt lives in 2 story home with spouse with [address] and [address] to bed and bath rooms  Spouse is disabled and receives aid service through the [facility] for day and evening hours  Pt was independent with all ADLs PTA  Ambulation w/o device  Family drives to appointments  No prior homecare or inpatient rehab  Pt is treated for anxiety/depression by PCP: Dr Nicole Davey h/o substance abuse treatment  No POA/LW  Pharmacy is [facility] on [address] [place]  Pt would like to use [facility] upon d/c for medications  Therapy rec: no rehab needs  No d/c needs evaluated at this time  ? D/c w/ [facility]  Will follow  CM reviewed d/c planning process including the following: identifying help at home, patient preference for d/c planning needs, Discharge Lounge, [facility] Meds to Bed program, availability of treatment team to discuss questions or concerns patient and/or family may have regarding understanding medications and recognizing signs and symptoms once discharged  CM also encouraged patient to follow up with all recommended appointments after discharge  Patient advised of importance for patient and family to participate in managing patients medical well being

## 2021-08-27 LAB
ALBUMIN SERPL BCP-MCNC: 2.1 G/DL (ref 3.5–5)
ALP SERPL-CCNC: 134 U/L (ref 46–116)
ALT SERPL W P-5'-P-CCNC: 88 U/L (ref 12–78)
ANION GAP SERPL CALCULATED.3IONS-SCNC: 2 MMOL/L (ref 4–13)
AST SERPL W P-5'-P-CCNC: 96 U/L (ref 5–45)
BILIRUB SERPL-MCNC: 4.36 MG/DL (ref 0.2–1)
BUN SERPL-MCNC: 10 MG/DL (ref 5–25)
CALCIUM ALBUM COR SERPL-MCNC: 9.3 MG/DL (ref 8.3–10.1)
CALCIUM SERPL-MCNC: 7.8 MG/DL (ref 8.3–10.1)
CHLORIDE SERPL-SCNC: 109 MMOL/L (ref 100–108)
CO2 SERPL-SCNC: 26 MMOL/L (ref 21–32)
CREAT SERPL-MCNC: 1.01 MG/DL (ref 0.6–1.3)
ERYTHROCYTE [DISTWIDTH] IN BLOOD BY AUTOMATED COUNT: 15.1 % (ref 11.6–15.1)
GFR SERPL CREATININE-BSD FRML MDRD: 60 ML/MIN/1.73SQ M
GLUCOSE SERPL-MCNC: 103 MG/DL (ref 65–140)
GLUCOSE SERPL-MCNC: 104 MG/DL (ref 65–140)
GLUCOSE SERPL-MCNC: 106 MG/DL (ref 65–140)
GLUCOSE SERPL-MCNC: 116 MG/DL (ref 65–140)
GLUCOSE SERPL-MCNC: 91 MG/DL (ref 65–140)
GLUCOSE SERPL-MCNC: 91 MG/DL (ref 65–140)
HCT VFR BLD AUTO: 22.6 % (ref 34.8–46.1)
HGB BLD-MCNC: 7.2 G/DL (ref 11.5–15.4)
MCH RBC QN AUTO: 32.1 PG (ref 26.8–34.3)
MCHC RBC AUTO-ENTMCNC: 31.9 G/DL (ref 31.4–37.4)
MCV RBC AUTO: 101 FL (ref 82–98)
PLATELET # BLD AUTO: 149 THOUSANDS/UL (ref 149–390)
PMV BLD AUTO: 11.1 FL (ref 8.9–12.7)
POTASSIUM SERPL-SCNC: 4.6 MMOL/L (ref 3.5–5.3)
PROT SERPL-MCNC: 5.7 G/DL (ref 6.4–8.2)
RBC # BLD AUTO: 2.24 MILLION/UL (ref 3.81–5.12)
SODIUM SERPL-SCNC: 137 MMOL/L (ref 136–145)
WBC # BLD AUTO: 11.24 THOUSAND/UL (ref 4.31–10.16)

## 2021-08-27 PROCEDURE — 99024 POSTOP FOLLOW-UP VISIT: CPT | Performed by: SURGERY

## 2021-08-27 PROCEDURE — 99232 SBSQ HOSP IP/OBS MODERATE 35: CPT | Performed by: STUDENT IN AN ORGANIZED HEALTH CARE EDUCATION/TRAINING PROGRAM

## 2021-08-27 PROCEDURE — 82948 REAGENT STRIP/BLOOD GLUCOSE: CPT

## 2021-08-27 PROCEDURE — 80053 COMPREHEN METABOLIC PANEL: CPT | Performed by: SURGERY

## 2021-08-27 PROCEDURE — C9113 INJ PANTOPRAZOLE SODIUM, VIA: HCPCS | Performed by: SURGERY

## 2021-08-27 PROCEDURE — 85027 COMPLETE CBC AUTOMATED: CPT | Performed by: SURGERY

## 2021-08-27 RX ORDER — NALBUPHINE HCL 10 MG/ML
10 AMPUL (ML) INJECTION
Status: DISCONTINUED | OUTPATIENT
Start: 2021-08-27 | End: 2021-09-01 | Stop reason: HOSPADM

## 2021-08-27 RX ORDER — LABETALOL 20 MG/4 ML (5 MG/ML) INTRAVENOUS SYRINGE
5 EVERY 4 HOURS PRN
Status: DISCONTINUED | OUTPATIENT
Start: 2021-08-27 | End: 2021-08-29

## 2021-08-27 RX ORDER — ACETAMINOPHEN 325 MG/1
650 TABLET ORAL EVERY 6 HOURS PRN
Status: DISCONTINUED | OUTPATIENT
Start: 2021-08-27 | End: 2021-08-30

## 2021-08-27 RX ADMIN — DIPHENHYDRAMINE HYDROCHLORIDE 50 MG: 50 INJECTION, SOLUTION INTRAMUSCULAR; INTRAVENOUS at 10:17

## 2021-08-27 RX ADMIN — DEXTROSE, SODIUM CHLORIDE, AND POTASSIUM CHLORIDE 125 ML/HR: 5; .45; .15 INJECTION INTRAVENOUS at 05:57

## 2021-08-27 RX ADMIN — DIPHENHYDRAMINE HYDROCHLORIDE 50 MG: 50 INJECTION, SOLUTION INTRAMUSCULAR; INTRAVENOUS at 23:20

## 2021-08-27 RX ADMIN — FENTANYL CITRATE: 50 INJECTION INTRAMUSCULAR; INTRAVENOUS at 03:29

## 2021-08-27 RX ADMIN — HEPARIN SODIUM 5000 UNITS: 5000 INJECTION INTRAVENOUS; SUBCUTANEOUS at 05:52

## 2021-08-27 RX ADMIN — HEPARIN SODIUM 5000 UNITS: 5000 INJECTION INTRAVENOUS; SUBCUTANEOUS at 17:04

## 2021-08-27 RX ADMIN — FENTANYL CITRATE: 50 INJECTION INTRAMUSCULAR; INTRAVENOUS at 20:02

## 2021-08-27 RX ADMIN — DIPHENHYDRAMINE HYDROCHLORIDE 50 MG: 50 INJECTION, SOLUTION INTRAMUSCULAR; INTRAVENOUS at 17:04

## 2021-08-27 RX ADMIN — HYDROMORPHONE HYDROCHLORIDE 0.5 MG: 1 INJECTION, SOLUTION INTRAMUSCULAR; INTRAVENOUS; SUBCUTANEOUS at 17:04

## 2021-08-27 RX ADMIN — LABETALOL 20 MG/4 ML (5 MG/ML) INTRAVENOUS SYRINGE 10 MG: at 00:20

## 2021-08-27 RX ADMIN — ACETAMINOPHEN 650 MG: 325 TABLET, FILM COATED ORAL at 17:04

## 2021-08-27 RX ADMIN — HEPARIN SODIUM 5000 UNITS: 5000 INJECTION INTRAVENOUS; SUBCUTANEOUS at 00:20

## 2021-08-27 RX ADMIN — HEPARIN SODIUM 5000 UNITS: 5000 INJECTION INTRAVENOUS; SUBCUTANEOUS at 23:12

## 2021-08-27 RX ADMIN — PANTOPRAZOLE SODIUM 40 MG: 40 INJECTION, POWDER, FOR SOLUTION INTRAVENOUS at 05:52

## 2021-08-27 NOTE — CASE MANAGEMENT
Pt discussed with [facility] for care coordination  Possible dc over w/e  No CM needs  Cleared by therapy  S/w family in room  Pt has rw at home

## 2021-08-27 NOTE — PROGRESS NOTES
Epidural Follow-up Note - Acute Pain Service    Avelino Reynolds 57 y o  female MRN: [id number]  Unit/Bed#: [facility] 902-01 Encounter: [id number]      Assessment:   Principal Problem:    Pancreatic mass      Avelino Reynolds is a 57y o  year old female POD 3 s/p whipple procedure with thoracic epidural for postop pain control  This morning she states her pain is 7/10, which is tolerable for her  She states the pain feels like a "tightness" and is located in her right abdomen and flank  She denies any nausea or vomiting, but has not had a diet yet  She says she is supposed to start clear liquids today  She has not yet had a bowel movement  She is having some itching of her skin  Plan:  Analgesia:  - Continue epidural infusion Ropivacaine 0 1% with fentanyl 2 mcg/mL, with rate of 10 cc/hr, 4 cc bolus, 10 min lockout, 4 doses max/hr  Anticipate epidural use for another 2 days time  - ContinueHydromorphone 0 5 mg IV q2hr PRN for breakthrough pain  - Continue acetaminophen 650 mg PO Q 6h hrs scheduled   - Continue benadryl 50 mg IV Q 6 PRN pruritis  Bowel Regimen:  - Bowel regimen when appropriate from surgical perspective    APS will continue to follow  Please contact Acute Pain Service - [facility] via [employer] from [phone] with additional questions or concerns  See Serina or Marissa for additional contacts and after hours information      Pain History  Current pain location(s): Right abdomen and flank  Pain Scale:   7/10  Quality: tight  24 hour history: tolerable pain control    PCEA use:  Opioid requirement previous 24 hours: none  Meds/Allergies   all current active meds have been reviewed and current meds:   Current Facility-Administered Medications   Medication Dose Route Frequency    acetaminophen (TYLENOL) rectal suppository 650 mg  650 mg Rectal Q6H [address]    dextrose 5 % and sodium chloride 0 45 % with KCl 20 mEq/L infusion  100 mL/hr Intravenous Continuous    diphenhydrAMINE (BENADRYL) injection 50 mg 50 mg Intravenous Q6H PRN    heparin (porcine) subcutaneous injection 5,000 Units  5,000 Units Subcutaneous Q8H [facility]    HYDROmorphone (DILAUDID) injection 0 5 mg  0 5 mg Intravenous Q2H PRN    insulin lispro (HumaLOG) 100 units/mL subcutaneous injection 1-6 Units  1-6 Units Subcutaneous Q6H [facility]    Labetalol HCl (NORMODYNE) injection 5 mg  5 mg Intravenous Q4H PRN    ondansetron (ZOFRAN) injection 4 mg  4 mg Intravenous Q6H PRN    pantoprazole (PROTONIX) injection 40 mg  40 mg Intravenous Q24H [facility]    phenol (CHLORASEPTIC) 1 4 % mucosal liquid 1 spray  1 spray Mouth/Throat Q2H PRN    ropivacaine 0 1% and fentaNYL 2 mcg/mL PCEA   Epidural Continuous       No Known Allergies    Objective     Temp:  [98 5 °F (36 9 °C)-99 5 °F (37 5 °C)] 98 5 °F (36 9 °C)  HR:  [79-99] 80  Resp:  [13-18] 18  BP: (101-165)/(58-70) 114/65    Physical Exam  Vitals reviewed  HENT:      Head: Atraumatic  Eyes:      Extraocular Movements: Extraocular movements intact  Pulmonary:      Effort: Pulmonary effort is normal    Skin:     General: Skin is warm and dry  Neurological:      General: No focal deficit present  Mental Status: She is alert and oriented to person, place, and time  Psychiatric:         Mood and Affect: Mood normal          Behavior: Behavior normal        Epidural: Site clean/dry/intact, no surrounding erythema/edema/induration, infusion functioning appropriately  Some dried blood under dressing   No active bleeding    Lab Results:   Results from last 7 days   Lab Units 08/27/21  0458   WBC Thousand/uL 11 24*   HEMOGLOBIN g/dL 7 2*   HEMATOCRIT % 22 6*   PLATELETS Thousands/uL 149      Results from last 7 days   Lab Units 08/27/21  0458 08/24/21  1757 08/24/21  1704   POTASSIUM mmol/L 4 6  --   --    CHLORIDE mmol/L 109*  --   --    CO2 mmol/L 26  --   --    CO2, I-STAT mmol/L  --   --  23   BUN mg/dL 10  --   --    CREATININE mg/dL 1 01  --   --    CALCIUM mg/dL 7 8*  --   --    ALK PHOS U/L 134*   < > --    ALT U/L 88*   < >  --    AST U/L 96*   < >  --    GLUCOSE, ISTAT mg/dl  --   --  156*    < > = values in this interval not displayed  Imaging Studies: I have personally reviewed pertinent reports  EKG, Pathology, and Other Studies: I have personally reviewed pertinent reports  Counseling / Coordination of Care  Total floor / unit time spent today 15 minutes  Greater than 50% of total time was spent with the patient and / or family counseling and / or coordination of care  A description of the counseling / coordination of care: Chart review  Evaluation of pain control  Evaluation of epidural      Please note that the APS provides consultative services regarding pain management only  With the exception of ketamine, peripheral nerve catheters, and epidural infusions (and except when indicated), final decisions regarding starting or changing doses of analgesic medications are at the discretion of the consulting service  Off hours consultation and/or medication management is generally not available      Crista Mckoy DO  Acute Pain Service

## 2021-08-27 NOTE — PROGRESS NOTES
Progress Note - [facility] 57 y o  female MRN: [id number]    Unit/Bed#: [facility] 902-01 Encounter: [id number]      Assessment:  56 y/o F w neuroendocrine tumor s/p whipple on 8/24  Vss  Afebrile  abd soft, nontender, non distended  Incision c/d/i    R KAMLA: 65  L KAMLA: 345  Uop: 460, w 4 x unmeasured occurrences  Plan:  Start sips of clears  Ambulate  dvt ppx  Follow bilirubin  Continue epidural, appreciate aps    Subjective:   Pain is much improved last 2 days  Passing flatus  Denied nausea vomiting today  No fever or chills  Objective:     Vitals: Blood pressure 101/62, pulse 89, temperature 98 6 °F (37 °C), resp  rate 18, height 5' 4" (1 626 m), weight 77 6 kg (171 lb), SpO2 97 %, not currently breastfeeding  ,Body mass index is 29 35 kg/m²  Intake/Output Summary (Last 24 hours) at 8/27/2021 0233  Last data filed at 8/27/2021 0001  Gross per 24 hour   Intake 1117 02 ml   Output 1265 ml   Net -147 98 ml     Physical Exam  General: NAD  HEENT: NC/AT  MMM  Cv: RRR     Lungs: normal effort  Ab: Soft, NT/ND  Ex: no CCE  Neuro: AAOx3    Scheduled Meds:  Current Facility-Administered Medications   Medication Dose Route Frequency Provider Last Rate    acetaminophen  650 mg Rectal Q6H Brittany Boyer MD      dextrose 5 % and sodium chloride 0 45 % with KCl 20 mEq/L  125 mL/hr Intravenous Continuous Ashleigh Rodriguez  mL/hr (08/26/21 0659)    diphenhydrAMINE  50 mg Intravenous Q6H PRN Ashleigh Rodriguez MD      heparin (porcine)  5,000 Units Subcutaneous [facility] Ashleigh Rodriguez MD      HYDROmorphone  0 5 mg Intravenous Q2H PRN Ashleigh Rodriguez MD      insulin lispro  1-6 Units Subcutaneous Q6H [address] Ashleigh Rodriguez MD      Labetalol HCl  5 mg Intravenous Q4H PRN Ashleigh Rodriguez MD      ondansetron  4 mg Intravenous Q6H PRN Ashleigh Rodriguez MD      pantoprazole  40 mg Intravenous Q24H [address] Ashleigh Rodriguez MD      phenol  1 spray Mouth/Throat Q2H PRN Ashleigh Rodriguez MD      ropivacaine 0 1% and fentaNYL 2 mcg/mL   Epidural Continuous Chaz Miller MD       Continuous Infusions:dextrose 5 % and sodium chloride 0 45 % with KCl 20 mEq/L, 125 mL/hr, Last Rate: 125 mL/hr (08/26/21 0659)  ropivacaine 0 1% and fentaNYL 2 mcg/mL,       PRN Meds: diphenhydrAMINE    HYDROmorphone    Labetalol HCl    ondansetron    phenol      Invasive Devices     Peripheral Intravenous Line            Peripheral IV 08/24/21 Right Antecubital 2 days    Peripheral IV 08/25/21 Right;Upper;Ventral (anterior) Arm 1 day          Epidural Line            Epidural Catheter 08/24/21 2 days          Drain            Closed/Suction Drain Medial;Right; Anterior RUQ Bulb 19 Fr  -- days    Closed/Suction Drain Left; Anterior LUQ Bulb 19 Fr  2 days                Lab, Imaging and other studies: I have personally reviewed pertinent reports      VTE Pharmacologic Prophylaxis: Sequential compression device (Venodyne)   VTE Mechanical Prophylaxis: sequential compression device

## 2021-08-27 NOTE — OCCUPATIONAL THERAPY NOTE
Occupational Therapy Cancel Note        Patient Name: Marti Hopper  Today's Date: 8/27/2021 08/27/21 1104   OT Last Visit   OT Visit Date 08/27/21   Note Type   Note type Re-Evaluation   Cancel Reasons Other       OT orders received  Chart reviewed  Attempted to see pt for OT re-evaluation  However, pt currently very fatigued/ having difficulty staying awake  Requesting to hold off on session at this time  Will continue to follow and see pt as appropriate and able       [place], MS, OTR/L

## 2021-08-28 LAB
ALBUMIN SERPL BCP-MCNC: 2.1 G/DL (ref 3.5–5)
ALP SERPL-CCNC: 137 U/L (ref 46–116)
ALT SERPL W P-5'-P-CCNC: 73 U/L (ref 12–78)
ANION GAP SERPL CALCULATED.3IONS-SCNC: 4 MMOL/L (ref 4–13)
AST SERPL W P-5'-P-CCNC: 71 U/L (ref 5–45)
BILIRUB SERPL-MCNC: 2.59 MG/DL (ref 0.2–1)
BUN SERPL-MCNC: 8 MG/DL (ref 5–25)
CALCIUM ALBUM COR SERPL-MCNC: 9.8 MG/DL (ref 8.3–10.1)
CALCIUM SERPL-MCNC: 8.3 MG/DL (ref 8.3–10.1)
CHLORIDE SERPL-SCNC: 109 MMOL/L (ref 100–108)
CO2 SERPL-SCNC: 25 MMOL/L (ref 21–32)
CREAT SERPL-MCNC: 0.93 MG/DL (ref 0.6–1.3)
ERYTHROCYTE [DISTWIDTH] IN BLOOD BY AUTOMATED COUNT: 15.2 % (ref 11.6–15.1)
GFR SERPL CREATININE-BSD FRML MDRD: 66 ML/MIN/1.73SQ M
GLUCOSE SERPL-MCNC: 101 MG/DL (ref 65–140)
GLUCOSE SERPL-MCNC: 101 MG/DL (ref 65–140)
GLUCOSE SERPL-MCNC: 102 MG/DL (ref 65–140)
GLUCOSE SERPL-MCNC: 105 MG/DL (ref 65–140)
GLUCOSE SERPL-MCNC: 94 MG/DL (ref 65–140)
GLUCOSE SERPL-MCNC: 99 MG/DL (ref 65–140)
HCT VFR BLD AUTO: 21.5 % (ref 34.8–46.1)
HCT VFR BLD AUTO: 23.5 % (ref 34.8–46.1)
HGB BLD-MCNC: 6.8 G/DL (ref 11.5–15.4)
HGB BLD-MCNC: 7.3 G/DL (ref 11.5–15.4)
MCH RBC QN AUTO: 31.9 PG (ref 26.8–34.3)
MCHC RBC AUTO-ENTMCNC: 31.6 G/DL (ref 31.4–37.4)
MCV RBC AUTO: 101 FL (ref 82–98)
PLATELET # BLD AUTO: 188 THOUSANDS/UL (ref 149–390)
PMV BLD AUTO: 11.7 FL (ref 8.9–12.7)
POTASSIUM SERPL-SCNC: 4.4 MMOL/L (ref 3.5–5.3)
PROT SERPL-MCNC: 6.1 G/DL (ref 6.4–8.2)
RBC # BLD AUTO: 2.13 MILLION/UL (ref 3.81–5.12)
SODIUM SERPL-SCNC: 138 MMOL/L (ref 136–145)
WBC # BLD AUTO: 7.87 THOUSAND/UL (ref 4.31–10.16)

## 2021-08-28 PROCEDURE — 82948 REAGENT STRIP/BLOOD GLUCOSE: CPT

## 2021-08-28 PROCEDURE — 85014 HEMATOCRIT: CPT | Performed by: SURGERY

## 2021-08-28 PROCEDURE — 80053 COMPREHEN METABOLIC PANEL: CPT

## 2021-08-28 PROCEDURE — 99232 SBSQ HOSP IP/OBS MODERATE 35: CPT | Performed by: ANESTHESIOLOGY

## 2021-08-28 PROCEDURE — 85027 COMPLETE CBC AUTOMATED: CPT

## 2021-08-28 PROCEDURE — 99024 POSTOP FOLLOW-UP VISIT: CPT | Performed by: SURGERY

## 2021-08-28 PROCEDURE — 85018 HEMOGLOBIN: CPT | Performed by: SURGERY

## 2021-08-28 PROCEDURE — C9113 INJ PANTOPRAZOLE SODIUM, VIA: HCPCS | Performed by: SURGERY

## 2021-08-28 RX ORDER — OXYCODONE HYDROCHLORIDE 5 MG/1
5 TABLET ORAL EVERY 4 HOURS PRN
Status: DISCONTINUED | OUTPATIENT
Start: 2021-08-28 | End: 2021-08-30

## 2021-08-28 RX ADMIN — HYDROMORPHONE HYDROCHLORIDE 0.5 MG: 1 INJECTION, SOLUTION INTRAMUSCULAR; INTRAVENOUS; SUBCUTANEOUS at 14:55

## 2021-08-28 RX ADMIN — HYDROMORPHONE HYDROCHLORIDE 0.5 MG: 1 INJECTION, SOLUTION INTRAMUSCULAR; INTRAVENOUS; SUBCUTANEOUS at 10:14

## 2021-08-28 RX ADMIN — HEPARIN SODIUM 5000 UNITS: 5000 INJECTION INTRAVENOUS; SUBCUTANEOUS at 21:01

## 2021-08-28 RX ADMIN — HYDROMORPHONE HYDROCHLORIDE 0.5 MG: 1 INJECTION, SOLUTION INTRAMUSCULAR; INTRAVENOUS; SUBCUTANEOUS at 04:19

## 2021-08-28 RX ADMIN — HYDROMORPHONE HYDROCHLORIDE 0.5 MG: 1 INJECTION, SOLUTION INTRAMUSCULAR; INTRAVENOUS; SUBCUTANEOUS at 18:49

## 2021-08-28 RX ADMIN — ONDANSETRON 4 MG: 2 INJECTION INTRAMUSCULAR; INTRAVENOUS at 14:55

## 2021-08-28 RX ADMIN — FENTANYL CITRATE: 50 INJECTION INTRAMUSCULAR; INTRAVENOUS at 13:33

## 2021-08-28 RX ADMIN — HEPARIN SODIUM 5000 UNITS: 5000 INJECTION INTRAVENOUS; SUBCUTANEOUS at 05:28

## 2021-08-28 RX ADMIN — DEXTROSE, SODIUM CHLORIDE, AND POTASSIUM CHLORIDE 100 ML/HR: 5; .45; .15 INJECTION INTRAVENOUS at 04:00

## 2021-08-28 RX ADMIN — HEPARIN SODIUM 5000 UNITS: 5000 INJECTION INTRAVENOUS; SUBCUTANEOUS at 13:07

## 2021-08-28 RX ADMIN — PANTOPRAZOLE SODIUM 40 MG: 40 INJECTION, POWDER, FOR SOLUTION INTRAVENOUS at 05:28

## 2021-08-28 NOTE — PROGRESS NOTES
Progress Note - formerly [facility] 57 y o  female MRN: [id number]    Unit/Bed#: [facility] 902-01 Encounter: [id number]      Assessment:  56 y/o F w neuroendocrine tumor s/p whipple on 8/24  AVSS  Uop-1  35L  R KAMLA: 40 cc SS  L KAMLA: 174 cc SS    Tbili-2 59 (4 36)  hgb 6 8 (7 2)    Plan:  Advance to clrs/tst/cx  Continue MIVF  OOB/ambulate  Watch hgb for now, hemodynamically stable  DVT ppx  Improving bilirubin continue to monitor  Maintain KAMLA drains monitor output  Continue epidural per APS, likely dc tomorrow vs  monday    Subjective:   Doing well  Pain is better controlled, complaining of itching  No fevers/chills  Tolerated clears  No flatus or bms  No nausea or vomiting  Urinating appropriately  Objective:     Vitals: Blood pressure 145/75, pulse 82, temperature 98 2 °F (36 8 °C), resp  rate 18, height 5' 4" (1 626 m), weight 77 6 kg (171 lb), SpO2 92 %, not currently breastfeeding  ,Body mass index is 29 35 kg/m²  Intake/Output Summary (Last 24 hours) at 8/28/2021 0701  Last data filed at 8/28/2021 0538  Gross per 24 hour   Intake 1894 6 ml   Output 1585 ml   Net 309 6 ml     Physical Exam  General: NAD  HENT: NCAT MMM  Neck: supple, no JVD  CV: nl rate  Lungs: nl wob  No resp distress  ABD: Soft, yamilet-incisional tenderness, nondistended   Incision c/d/i  KAMLA drains x2 with SS output  Extrem: No CCE  Neuro: AAOx3      Scheduled Meds:  Current Facility-Administered Medications   Medication Dose Route Frequency Provider Last Rate    acetaminophen  650 mg Oral Q6H PRN Alina Christensen MD      dextrose 5 % and sodium chloride 0 45 % with KCl 20 mEq/L  100 mL/hr Intravenous Continuous Milagros [facility] PALIZANDRO 100 mL/hr (08/28/21 0400)    diphenhydrAMINE  50 mg Intravenous Q6H PRN Ugo Singh MD      heparin (porcine)  5,000 Units Subcutaneous [facility] Ugo Singh MD      HYDROmorphone  0 5 mg Intravenous Q2H PRN Ugo Singh MD      insulin lispro  1-6 Units Subcutaneous Q6H [address] Fritz Matta Ira Ruth MD      Labetalol HCl  5 mg Intravenous Q4H PRN Christina Hart MD      nalbuphine  10 mg Intravenous Q3H PRN Oleg Lazar MD      ondansetron  4 mg Intravenous Q6H PRN Christina Hart MD      pantoprazole  40 mg Intravenous Q24H [address] Christina Hart MD      phenol  1 spray Mouth/Throat Q2H PRN Christina Hart MD      ropivacaine 0 1% and fentaNYL 2 mcg/mL   Epidural Continuous Christina Hart MD       Continuous Infusions:dextrose 5 % and sodium chloride 0 45 % with KCl 20 mEq/L, 100 mL/hr, Last Rate: 100 mL/hr (08/28/21 0400)  ropivacaine 0 1% and fentaNYL 2 mcg/mL,       PRN Meds:   acetaminophen    diphenhydrAMINE    HYDROmorphone    Labetalol HCl    nalbuphine    ondansetron    phenol      Invasive Devices     Peripheral Intravenous Line            Peripheral IV 08/24/21 Right Antecubital 3 days    Peripheral IV 08/25/21 Right;Upper;Ventral (anterior) Arm 2 days          Epidural Line            Epidural Catheter 08/24/21 3 days          Drain            Closed/Suction Drain Medial;Right; Anterior RUQ Bulb 19 Fr  -- days    Closed/Suction Drain Left; Anterior LUQ Bulb 19 Fr  3 days                Lab, Imaging and other studies: I have personally reviewed pertinent reports      VTE Pharmacologic Prophylaxis: Sequential compression device (Venodyne)   VTE Mechanical Prophylaxis: sequential compression device

## 2021-08-28 NOTE — PROGRESS NOTES
Epidural Follow-up Note - Acute Pain Service    Pamela Lanier 57 y o  female MRN: [id number]  Unit/Bed#: [facility] 902-01 Encounter: [id number]      Assessment:   Principal Problem:    Pancreatic mass      Pamela Lanier is a 57y o  year old female POD #4 s/p whipple procedure with thoracic epidural for postop pain control  This morning she states her pain is 5/10, which is tolerable for her  Her family member is there to help with interpretation  Plan:  Analgesia:  - Continue Thoracic epidural infusion of Ropivacaine 0 1% with fentanyl 2 mcg/mL at 6/4/10/4  - Continue Dilaudid 0 5 mg IV q2hr PRN for breakthrough pain  - Anticipate epidural removal and transition to primarily PO regimen 1-2 days    Bowel Regimen:  - Bowel regimen when appropriate from surgical perspective    APS will continue to follow  Please contact Acute Pain Service - [facility] via [employer] from [phone] with additional questions or concerns  See [employer] or Marissa for additional contacts and after hours information      Pain History  Current pain location(s): abdomen  Pain Scale:   5/10  Quality: cramping  24 hour history: well controlled    PCEA use:  Opioid requirement previous 24 hours: dilaudid 0 5 mg    Meds/Allergies   current meds:   Current Facility-Administered Medications   Medication Dose Route Frequency    acetaminophen (TYLENOL) tablet 650 mg  650 mg Oral Q6H PRN    dextrose 5 % and sodium chloride 0 45 % with KCl 20 mEq/L infusion  75 mL/hr Intravenous Continuous    heparin (porcine) subcutaneous injection 5,000 Units  5,000 Units Subcutaneous Q8H [address]    HYDROmorphone (DILAUDID) injection 0 5 mg  0 5 mg Intravenous Q2H PRN    insulin lispro (HumaLOG) 100 units/mL subcutaneous injection 1-6 Units  1-6 Units Subcutaneous Q6H [address]    Labetalol HCl (NORMODYNE) injection 5 mg  5 mg Intravenous Q4H PRN    nalbuphine (NUBAIN) injection 10 mg  10 mg Intravenous Q3H PRN    ondansetron (ZOFRAN) injection 4 mg  4 mg Intravenous Q6H PRN    pantoprazole (PROTONIX) injection 40 mg  40 mg Intravenous Q24H ABDIFATAH    phenol (CHLORASEPTIC) 1 4 % mucosal liquid 1 spray  1 spray Mouth/Throat Q2H PRN    ropivacaine 0 1% and fentaNYL 2 mcg/mL PCEA   Epidural Continuous       No Known Allergies    Objective     Temp:  [97 9 °F (36 6 °C)-100 5 °F (38 1 °C)] 98 2 °F (36 8 °C)  HR:  [79-89] 82  Resp:  [15-19] 18  BP: (124-152)/() 145/75    Physical Exam  Vitals reviewed  Exam conducted with a chaperone present  Constitutional:       General: She is not in acute distress  Appearance: Normal appearance  She is normal weight  Cardiovascular:      Rate and Rhythm: Normal rate and regular rhythm  Pulses: Normal pulses  Heart sounds: Normal heart sounds  Pulmonary:      Effort: Pulmonary effort is normal       Breath sounds: Normal breath sounds  Musculoskeletal:         General: Normal range of motion  Cervical back: Normal range of motion  Neurological:      General: No focal deficit present  Mental Status: She is alert and oriented to person, place, and time  Psychiatric:         Mood and Affect: Mood normal          Behavior: Behavior normal        Epidural: Site clean/dry/intact, no surrounding erythema/edema/induration, infusion functioning appropriately    Lab Results:   Results from last 7 days   Lab Units 08/28/21  0448   WBC Thousand/uL 7 87   HEMOGLOBIN g/dL 6 8*   HEMATOCRIT % 21 5*   PLATELETS Thousands/uL 188      Results from last 7 days   Lab Units 08/28/21  0448 08/24/21  1757 08/24/21  1704   POTASSIUM mmol/L 4 4  --   --    CHLORIDE mmol/L 109*  --   --    CO2 mmol/L 25  --   --    CO2, I-STAT mmol/L  --   --  23   BUN mg/dL 8  --   --    CREATININE mg/dL 0 93  --   --    CALCIUM mg/dL 8 3  --   --    ALK PHOS U/L 137*   < >  --    ALT U/L 73   < >  --    AST U/L 71*   < >  --    GLUCOSE, ISTAT mg/dl  --   --  156*    < > = values in this interval not displayed            Imaging Studies: I have personally reviewed pertinent reports  EKG, Pathology, and Other Studies: I have personally reviewed pertinent reports  Counseling / Coordination of Care  Total floor / unit time spent today 15 minutes  Greater than 50% of total time was spent with the patient and / or family counseling and / or coordination of care  Please note that the APS provides consultative services regarding pain management only  With the exception of ketamine, peripheral nerve catheters, and epidural infusions (and except when indicated), final decisions regarding starting or changing doses of analgesic medications are at the discretion of the consulting service  Off hours consultation and/or medication management is generally not available      John Pineda MD  Acute Pain Service

## 2021-08-29 LAB
ALBUMIN SERPL BCP-MCNC: 2.1 G/DL (ref 3.5–5)
ALP SERPL-CCNC: 142 U/L (ref 46–116)
ALT SERPL W P-5'-P-CCNC: 59 U/L (ref 12–78)
ANION GAP SERPL CALCULATED.3IONS-SCNC: 3 MMOL/L (ref 4–13)
AST SERPL W P-5'-P-CCNC: 54 U/L (ref 5–45)
ATRIAL RATE: 76 BPM
BASOPHILS # BLD AUTO: 0.04 THOUSANDS/ΜL (ref 0–0.1)
BASOPHILS NFR BLD AUTO: 1 % (ref 0–1)
BILIRUB SERPL-MCNC: 1.36 MG/DL (ref 0.2–1)
BUN SERPL-MCNC: 8 MG/DL (ref 5–25)
CALCIUM ALBUM COR SERPL-MCNC: 9.6 MG/DL (ref 8.3–10.1)
CALCIUM SERPL-MCNC: 8.1 MG/DL (ref 8.3–10.1)
CHLORIDE SERPL-SCNC: 108 MMOL/L (ref 100–108)
CO2 SERPL-SCNC: 25 MMOL/L (ref 21–32)
CREAT SERPL-MCNC: 0.98 MG/DL (ref 0.6–1.3)
EOSINOPHIL # BLD AUTO: 0.11 THOUSAND/ΜL (ref 0–0.61)
EOSINOPHIL NFR BLD AUTO: 1 % (ref 0–6)
ERYTHROCYTE [DISTWIDTH] IN BLOOD BY AUTOMATED COUNT: 15.2 % (ref 11.6–15.1)
ERYTHROCYTE [DISTWIDTH] IN BLOOD BY AUTOMATED COUNT: 15.5 % (ref 11.6–15.1)
GFR SERPL CREATININE-BSD FRML MDRD: 62 ML/MIN/1.73SQ M
GLUCOSE SERPL-MCNC: 103 MG/DL (ref 65–140)
GLUCOSE SERPL-MCNC: 107 MG/DL (ref 65–140)
GLUCOSE SERPL-MCNC: 109 MG/DL (ref 65–140)
GLUCOSE SERPL-MCNC: 122 MG/DL (ref 65–140)
GLUCOSE SERPL-MCNC: 143 MG/DL (ref 65–140)
HCT VFR BLD AUTO: 21.7 % (ref 34.8–46.1)
HCT VFR BLD AUTO: 24.6 % (ref 34.8–46.1)
HGB BLD-MCNC: 6.9 G/DL (ref 11.5–15.4)
HGB BLD-MCNC: 7.8 G/DL (ref 11.5–15.4)
IMM GRANULOCYTES # BLD AUTO: 0.08 THOUSAND/UL (ref 0–0.2)
IMM GRANULOCYTES NFR BLD AUTO: 1 % (ref 0–2)
LYMPHOCYTES # BLD AUTO: 3.11 THOUSANDS/ΜL (ref 0.6–4.47)
LYMPHOCYTES NFR BLD AUTO: 41 % (ref 14–44)
MAGNESIUM SERPL-MCNC: 1.9 MG/DL (ref 1.6–2.6)
MCH RBC QN AUTO: 32.2 PG (ref 26.8–34.3)
MCH RBC QN AUTO: 32.4 PG (ref 26.8–34.3)
MCHC RBC AUTO-ENTMCNC: 31.7 G/DL (ref 31.4–37.4)
MCHC RBC AUTO-ENTMCNC: 31.8 G/DL (ref 31.4–37.4)
MCV RBC AUTO: 101 FL (ref 82–98)
MCV RBC AUTO: 102 FL (ref 82–98)
MONOCYTES # BLD AUTO: 0.96 THOUSAND/ΜL (ref 0.17–1.22)
MONOCYTES NFR BLD AUTO: 13 % (ref 4–12)
NEUTROPHILS # BLD AUTO: 3.32 THOUSANDS/ΜL (ref 1.85–7.62)
NEUTS SEG NFR BLD AUTO: 43 % (ref 43–75)
NRBC BLD AUTO-RTO: 0 /100 WBCS
P AXIS: 49 DEGREES
PLATELET # BLD AUTO: 191 THOUSANDS/UL (ref 149–390)
PLATELET # BLD AUTO: 232 THOUSANDS/UL (ref 149–390)
PMV BLD AUTO: 11.5 FL (ref 8.9–12.7)
PMV BLD AUTO: 11.6 FL (ref 8.9–12.7)
POTASSIUM SERPL-SCNC: 4.2 MMOL/L (ref 3.5–5.3)
PR INTERVAL: 166 MS
PROT SERPL-MCNC: 6.1 G/DL (ref 6.4–8.2)
QRS AXIS: -9 DEGREES
QRSD INTERVAL: 96 MS
QT INTERVAL: 366 MS
QTC INTERVAL: 411 MS
RBC # BLD AUTO: 2.14 MILLION/UL (ref 3.81–5.12)
RBC # BLD AUTO: 2.41 MILLION/UL (ref 3.81–5.12)
SODIUM SERPL-SCNC: 136 MMOL/L (ref 136–145)
T WAVE AXIS: 45 DEGREES
TROPONIN I SERPL-MCNC: <0.02 NG/ML
TROPONIN I SERPL-MCNC: <0.02 NG/ML
VENTRICULAR RATE: 76 BPM
WBC # BLD AUTO: 7.62 THOUSAND/UL (ref 4.31–10.16)
WBC # BLD AUTO: 9.52 THOUSAND/UL (ref 4.31–10.16)

## 2021-08-29 PROCEDURE — 82948 REAGENT STRIP/BLOOD GLUCOSE: CPT

## 2021-08-29 PROCEDURE — 94760 N-INVAS EAR/PLS OXIMETRY 1: CPT

## 2021-08-29 PROCEDURE — C9113 INJ PANTOPRAZOLE SODIUM, VIA: HCPCS | Performed by: SURGERY

## 2021-08-29 PROCEDURE — 99024 POSTOP FOLLOW-UP VISIT: CPT | Performed by: SURGERY

## 2021-08-29 PROCEDURE — 85025 COMPLETE CBC W/AUTO DIFF WBC: CPT | Performed by: SURGERY

## 2021-08-29 PROCEDURE — 93005 ELECTROCARDIOGRAM TRACING: CPT

## 2021-08-29 PROCEDURE — 80053 COMPREHEN METABOLIC PANEL: CPT | Performed by: SURGERY

## 2021-08-29 PROCEDURE — 93010 ELECTROCARDIOGRAM REPORT: CPT | Performed by: INTERNAL MEDICINE

## 2021-08-29 PROCEDURE — 99232 SBSQ HOSP IP/OBS MODERATE 35: CPT | Performed by: ANESTHESIOLOGY

## 2021-08-29 PROCEDURE — 83735 ASSAY OF MAGNESIUM: CPT | Performed by: SURGERY

## 2021-08-29 PROCEDURE — 85027 COMPLETE CBC AUTOMATED: CPT | Performed by: SURGERY

## 2021-08-29 PROCEDURE — 84484 ASSAY OF TROPONIN QUANT: CPT | Performed by: SURGERY

## 2021-08-29 RX ORDER — LABETALOL 20 MG/4 ML (5 MG/ML) INTRAVENOUS SYRINGE
10 EVERY 4 HOURS PRN
Status: DISCONTINUED | OUTPATIENT
Start: 2021-08-29 | End: 2021-09-01 | Stop reason: HOSPADM

## 2021-08-29 RX ORDER — HYDRALAZINE HYDROCHLORIDE 20 MG/ML
5 INJECTION INTRAMUSCULAR; INTRAVENOUS EVERY 6 HOURS PRN
Status: DISCONTINUED | OUTPATIENT
Start: 2021-08-29 | End: 2021-08-30

## 2021-08-29 RX ADMIN — HYDROMORPHONE HYDROCHLORIDE 0.5 MG: 1 INJECTION, SOLUTION INTRAMUSCULAR; INTRAVENOUS; SUBCUTANEOUS at 12:25

## 2021-08-29 RX ADMIN — HYDRALAZINE HYDROCHLORIDE 5 MG: 20 INJECTION, SOLUTION INTRAMUSCULAR; INTRAVENOUS at 21:11

## 2021-08-29 RX ADMIN — HEPARIN SODIUM 5000 UNITS: 5000 INJECTION INTRAVENOUS; SUBCUTANEOUS at 14:00

## 2021-08-29 RX ADMIN — OXYCODONE HYDROCHLORIDE 5 MG: 5 TABLET ORAL at 22:45

## 2021-08-29 RX ADMIN — HYDROMORPHONE HYDROCHLORIDE 0.5 MG: 1 INJECTION, SOLUTION INTRAMUSCULAR; INTRAVENOUS; SUBCUTANEOUS at 14:30

## 2021-08-29 RX ADMIN — DEXTROSE, SODIUM CHLORIDE, AND POTASSIUM CHLORIDE 75 ML/HR: 5; .45; .15 INJECTION INTRAVENOUS at 02:52

## 2021-08-29 RX ADMIN — OXYCODONE HYDROCHLORIDE 5 MG: 5 TABLET ORAL at 17:38

## 2021-08-29 RX ADMIN — HYDROMORPHONE HYDROCHLORIDE 0.5 MG: 1 INJECTION, SOLUTION INTRAMUSCULAR; INTRAVENOUS; SUBCUTANEOUS at 20:19

## 2021-08-29 RX ADMIN — HEPARIN SODIUM 5000 UNITS: 5000 INJECTION INTRAVENOUS; SUBCUTANEOUS at 21:10

## 2021-08-29 RX ADMIN — LABETALOL 20 MG/4 ML (5 MG/ML) INTRAVENOUS SYRINGE 5 MG: at 14:29

## 2021-08-29 RX ADMIN — OXYCODONE HYDROCHLORIDE 5 MG: 5 TABLET ORAL at 11:26

## 2021-08-29 RX ADMIN — PANTOPRAZOLE SODIUM 40 MG: 40 INJECTION, POWDER, FOR SOLUTION INTRAVENOUS at 05:18

## 2021-08-29 RX ADMIN — LABETALOL 20 MG/4 ML (5 MG/ML) INTRAVENOUS SYRINGE 10 MG: at 18:19

## 2021-08-29 RX ADMIN — ACETAMINOPHEN 650 MG: 325 TABLET, FILM COATED ORAL at 12:25

## 2021-08-29 RX ADMIN — DEXTROSE, SODIUM CHLORIDE, AND POTASSIUM CHLORIDE 75 ML/HR: 5; .45; .15 INJECTION INTRAVENOUS at 17:38

## 2021-08-29 RX ADMIN — FENTANYL CITRATE: 50 INJECTION INTRAMUSCULAR; INTRAVENOUS at 02:53

## 2021-08-29 RX ADMIN — ONDANSETRON 4 MG: 2 INJECTION INTRAMUSCULAR; INTRAVENOUS at 14:00

## 2021-08-29 NOTE — PROGRESS NOTES
Epidural Follow-up Note - Acute Pain Service    Gita Fontenot 57 y o  female MRN: [id number]  Unit/Bed#: [facility] 902-01 Encounter: [id number]      Assessment:   Principal Problem:    Pancreatic mass      Gita Fontenot is a 57y o  year old female POD 5 s/p whipple procedure with thoracic epidural for postop pain control  This morning she states her pain is 5/10, which is tolerable for her  Patient had heparin held for epidural removal today  Patient's daughter assisted with translation  Plan:  Analgesia:  - Epidural removed, tip intact, site clean   - Last platelet count   Lab Results   Component Value Date     08/29/2021     - PCEA discontinued    APS will continue to follow    Plan discussed with primary team    Pain History  Current pain location(s): Abdomen  Pain Scale:   5/10  Quality: Sore  24 hour history: See above    Meds/Allergies     No Known Allergies    Objective     Temp:  [97 7 °F (36 5 °C)-99 3 °F (37 4 °C)] 97 7 °F (36 5 °C)  HR:  [73-87] 77  Resp:  [16-20] 16  BP: (129-168)/() 130/71    Physical Exam  Vitals and nursing note reviewed  Constitutional:       Appearance: Normal appearance  HENT:      Head: Normocephalic and atraumatic  Cardiovascular:      Rate and Rhythm: Normal rate and regular rhythm  Pulses: Normal pulses  Heart sounds: Normal heart sounds  Pulmonary:      Effort: Pulmonary effort is normal       Breath sounds: Normal breath sounds  Abdominal:      General: Abdomen is flat  Palpations: Abdomen is soft  Tenderness: There is abdominal tenderness  Musculoskeletal:         General: Normal range of motion  Cervical back: Normal range of motion and neck supple  Skin:     General: Skin is warm and dry  Neurological:      General: No focal deficit present  Mental Status: She is alert and oriented to person, place, and time  Mental status is at baseline     Psychiatric:         Mood and Affect: Mood normal      Epidural: Catheter removed, tip intact, site clean    Lab Results:   Results from last 7 days   Lab Units 08/29/21  0545   WBC Thousand/uL 7 62   HEMOGLOBIN g/dL 6 9*   HEMATOCRIT % 21 7*   PLATELETS Thousands/uL 191      Results from last 7 days   Lab Units 08/29/21  0545 08/24/21  1757 08/24/21  1704   POTASSIUM mmol/L 4 2  --   --    CHLORIDE mmol/L 108  --   --    CO2 mmol/L 25  --   --    CO2, I-STAT mmol/L  --   --  23   BUN mg/dL 8  --   --    CREATININE mg/dL 0 98  --   --    CALCIUM mg/dL 8 1*  --   --    ALK PHOS U/L 142*   < >  --    ALT U/L 59   < >  --    AST U/L 54*   < >  --    GLUCOSE, ISTAT mg/dl  --   --  156*    < > = values in this interval not displayed  Counseling / Coordination of Care  Total floor / unit time spent today 15 minutes  Greater than 50% of total time was spent with the patient and / or family counseling and / or coordination of care  Please note that the APS provides consultative services regarding pain management only  With the exception of ketamine, peripheral nerve catheters, and epidural infusions (and except when indicated), final decisions regarding starting or changing doses of analgesic medications are at the discretion of the consulting service  Off hours consultation and/or medication management is generally not available      Emmie Temple MD  Acute Pain Service

## 2021-08-29 NOTE — RESPIRATORY THERAPY NOTE
RT Protocol Note  [facility] 57 y o  female MRN: [id number]  [facility] Encounter: [id number]    Assessment    Principal Problem:    Pancreatic mass      Home Pulmonary Medications:  na       Past Medical History:   Diagnosis Date    Back ache     Cancer ([address] )     Hyperlipidemia     Hypertension      Social History     Socioeconomic History    Marital status: /Civil Union     Spouse name: Peewee [facility]an[facility]    Number of children: None    Years of education: None    Highest education level: None   Occupational History    Occupation: unemployed   Tobacco Use    Smoking status: Former Smoker     Packs/day: 0 65     Years: 30 00     Pack years: 19 50    Smokeless tobacco: Never Used    Tobacco comment: quit 15 years ago   Vaping Use    Vaping Use: Never used   Substance and Sexual Activity    Alcohol use: Not Currently    Drug use: No    Sexual activity: Not Currently   Other Topics Concern    None   Social History Narrative    None     Social Determinants of Health     Financial Resource Strain: Low Risk     Difficulty of Paying Living Expenses: Not very hard   Food Insecurity: Food Insecurity Present    Worried About Running Out of Food in the Last Year: Sometimes true    Bere of Food in the Last Year: Sometimes true   Transportation Needs: No Transportation Needs    Lack of Transportation (Medical): No    Lack of Transportation (Non-Medical):  No   Physical Activity: Inactive    Days of Exercise per Week: 0 days    Minutes of Exercise per Session: 0 min   Stress:     Feeling of Stress :    Social Connections:     Frequency of Communication with Friends and Family:     Frequency of Social Gatherings with Friends and Family:     Attends Hindu Services:     Active Member of Clubs or Organizations:     Attends Club or Organization Meetings:     Marital Status:    Intimate Partner Violence:     Fear of Current or Ex-Partner:     Emotionally Abused:  Physically Abused:     Sexually Abused:        Subjective         Objective    Physical Exam:   Assessment Type: Assess only  General Appearance: Alert, Awake  Respiratory Pattern: Normal  Chest Assessment: Chest expansion symmetrical  Bilateral Breath Sounds: Clear, Diminished  Cough: None  O2 Device: RA    Vitals:  Blood pressure (!) 199/86, pulse 74, temperature 98 °F (36 7 °C), temperature source Oral, resp  rate 16, height 5' 4" (1 626 m), weight 77 6 kg (171 lb), SpO2 98 %, not currently breastfeeding  Results from last 7 days   Lab Units 08/24/21  1802   PH ART  7 305*   PCO2 ART mm Hg 46 0*   PO2 ART mm Hg 144 4*   HCO3 ART mmol/L 22 4   BASE EXC ART mmol/L -3 9   O2 CONTENT ART mL/dL 13 5*   O2 HGB, ARTERIAL % 97 9*   ABG SOURCE  Line, Arterial       Imaging and other studies: I have personally reviewed pertinent reports  and I have personally reviewed pertinent films in PACS    O2 Device: RA     Plan       Airway Clearance Plan: Incentive Spirometer     Resp Comments: (P) Pt  evaluated at bedside per Airway Clearance protocol  Pt  admitted S/P Whipple procedure  Pt's breath sounds are diminished but clear bilaterally but complains of abdominal pain when taking deeps breaths according to daughter  Pt  instructed on use of IS with help from her daughter and demonstrated use of the device well  Will continue IS W/A as ordered per Airway Clearance protocol

## 2021-08-29 NOTE — PROGRESS NOTES
Patient complained of chest pain  Rashawn Gann notified  EKG and blood work was collected  Will continue to monitor

## 2021-08-29 NOTE — PROGRESS NOTES
Progress Note - [facility] 57 y o  female MRN: [id number]    Unit/Bed#: [facility] 902-01 Encounter: [id number]      Assessment:  58 y/o F w neuroendocrine tumor s/p whipple on 8/24  Afebrile  Vss  abd soft, appropriately tender, non distended  UOP: 1150 cc  adequate  R KAMLA 15 cc serosang  L KAMLA 175 cc serosang    Hgb: 6 8-> 7 3-> 6 9    Plan:  Continue clears, toast crackers, wait return of bowel fcn  Gentle IVF  Anemia, continue to monitor  Ambulate 3x day  Incentive spirometer  Monitor bilirubin  Holding dvt ppx for epidural removal today    Subjective:   Feels well  Denied passing flatus  Pain well controlled  No chest pain or shortness of breath  Objective:     Vitals: Blood pressure 130/71, pulse 77, temperature 97 7 °F (36 5 °C), resp  rate 16, height 5' 4" (1 626 m), weight 77 6 kg (171 lb), SpO2 99 %, not currently breastfeeding  ,Body mass index is 29 35 kg/m²  Intake/Output Summary (Last 24 hours) at 8/29/2021 0821  Last data filed at 8/29/2021 0555  Gross per 24 hour   Intake 2819 85 ml   Output 1340 ml   Net 1479 85 ml       Physical Exam  General: NAD  HEENT: NC/AT  MMM  Cv: RRR     Lungs: normal effort  Ab: Soft, NT/ND  Ex: no CCE  Neuro: AAOx3    Scheduled Meds:  Current Facility-Administered Medications   Medication Dose Route Frequency Provider Last Rate    acetaminophen  650 mg Oral Q6H PRN Levon Hashimoto, MD      dextrose 5 % and sodium chloride 0 45 % with KCl 20 mEq/L  75 mL/hr Intravenous Continuous Varsha Yancey DO 75 mL/hr (08/29/21 0252)    heparin (porcine)  5,000 Units Subcutaneous [facility] Ale Tanner MD      HYDROmorphone  0 5 mg Intravenous Q2H PRN Ale Tanner MD      insulin lispro  1-6 Units Subcutaneous Q6H [address] Ale Tanner MD      Labetalol HCl  5 mg Intravenous Q4H PRN Ale Tanner MD      nalbuphine  10 mg Intravenous Q3H PRN Pro Ley MD      ondansetron  4 mg Intravenous Q6H PRN Ale Tanner MD      oxyCODONE  5 mg Oral Q4H PRN Ron Amos DO      pantoprazole  40 mg Intravenous Q24H [address] Sunita Meier MD      phenol  1 spray Mouth/Throat Q2H PRN Sunita Meier MD      ropivacaine 0 1% and fentaNYL 2 mcg/mL   Epidural Continuous Sunita Meier MD       Continuous Infusions:dextrose 5 % and sodium chloride 0 45 % with KCl 20 mEq/L, 75 mL/hr, Last Rate: 75 mL/hr (08/29/21 0252)  ropivacaine 0 1% and fentaNYL 2 mcg/mL,       PRN Meds:   acetaminophen    HYDROmorphone    Labetalol HCl    nalbuphine    ondansetron    oxyCODONE    phenol      Invasive Devices     Peripheral Intravenous Line            Peripheral IV 08/28/21 Left Forearm <1 day          Epidural Line            Epidural Catheter 08/24/21 4 days          Drain            Closed/Suction Drain Medial;Right; Anterior RUQ Bulb 19 Fr  -- days    Closed/Suction Drain Left; Anterior LUQ Bulb 19 Fr  4 days                Lab, Imaging and other studies: I have personally reviewed pertinent reports      VTE Pharmacologic Prophylaxis: Sequential compression device (Venodyne)   VTE Mechanical Prophylaxis: sequential compression device

## 2021-08-30 LAB
ALBUMIN SERPL BCP-MCNC: 2.3 G/DL (ref 3.5–5)
ALP SERPL-CCNC: 150 U/L (ref 46–116)
ALT SERPL W P-5'-P-CCNC: 51 U/L (ref 12–78)
ANION GAP SERPL CALCULATED.3IONS-SCNC: 4 MMOL/L (ref 4–13)
AST SERPL W P-5'-P-CCNC: 46 U/L (ref 5–45)
BILIRUB SERPL-MCNC: 1.19 MG/DL (ref 0.2–1)
BUN SERPL-MCNC: 6 MG/DL (ref 5–25)
CALCIUM ALBUM COR SERPL-MCNC: 10.2 MG/DL (ref 8.3–10.1)
CALCIUM SERPL-MCNC: 8.8 MG/DL (ref 8.3–10.1)
CHLORIDE SERPL-SCNC: 106 MMOL/L (ref 100–108)
CO2 SERPL-SCNC: 27 MMOL/L (ref 21–32)
CREAT SERPL-MCNC: 0.83 MG/DL (ref 0.6–1.3)
ERYTHROCYTE [DISTWIDTH] IN BLOOD BY AUTOMATED COUNT: 15.6 % (ref 11.6–15.1)
GFR SERPL CREATININE-BSD FRML MDRD: 76 ML/MIN/1.73SQ M
GLUCOSE SERPL-MCNC: 117 MG/DL (ref 65–140)
GLUCOSE SERPL-MCNC: 120 MG/DL (ref 65–140)
GLUCOSE SERPL-MCNC: 125 MG/DL (ref 65–140)
GLUCOSE SERPL-MCNC: 125 MG/DL (ref 65–140)
GLUCOSE SERPL-MCNC: 140 MG/DL (ref 65–140)
HCT VFR BLD AUTO: 23.7 % (ref 34.8–46.1)
HGB BLD-MCNC: 7.5 G/DL (ref 11.5–15.4)
MCH RBC QN AUTO: 32.2 PG (ref 26.8–34.3)
MCHC RBC AUTO-ENTMCNC: 31.6 G/DL (ref 31.4–37.4)
MCV RBC AUTO: 102 FL (ref 82–98)
PLATELET # BLD AUTO: 228 THOUSANDS/UL (ref 149–390)
PMV BLD AUTO: 11.4 FL (ref 8.9–12.7)
POTASSIUM SERPL-SCNC: 3.6 MMOL/L (ref 3.5–5.3)
PROT SERPL-MCNC: 6.7 G/DL (ref 6.4–8.2)
RBC # BLD AUTO: 2.33 MILLION/UL (ref 3.81–5.12)
SODIUM SERPL-SCNC: 137 MMOL/L (ref 136–145)
WBC # BLD AUTO: 8.19 THOUSAND/UL (ref 4.31–10.16)

## 2021-08-30 PROCEDURE — C9113 INJ PANTOPRAZOLE SODIUM, VIA: HCPCS | Performed by: SURGERY

## 2021-08-30 PROCEDURE — 99232 SBSQ HOSP IP/OBS MODERATE 35: CPT | Performed by: NURSE PRACTITIONER

## 2021-08-30 PROCEDURE — 97116 GAIT TRAINING THERAPY: CPT

## 2021-08-30 PROCEDURE — 80053 COMPREHEN METABOLIC PANEL: CPT | Performed by: SURGERY

## 2021-08-30 PROCEDURE — 85027 COMPLETE CBC AUTOMATED: CPT | Performed by: SURGERY

## 2021-08-30 PROCEDURE — 99024 POSTOP FOLLOW-UP VISIT: CPT | Performed by: SURGERY

## 2021-08-30 PROCEDURE — 82948 REAGENT STRIP/BLOOD GLUCOSE: CPT

## 2021-08-30 PROCEDURE — 97530 THERAPEUTIC ACTIVITIES: CPT

## 2021-08-30 PROCEDURE — 97168 OT RE-EVAL EST PLAN CARE: CPT

## 2021-08-30 RX ORDER — AMLODIPINE BESYLATE 10 MG/1
10 TABLET ORAL DAILY
Status: DISCONTINUED | OUTPATIENT
Start: 2021-08-30 | End: 2021-09-01 | Stop reason: HOSPADM

## 2021-08-30 RX ORDER — HYDROXYZINE HYDROCHLORIDE 25 MG/1
25 TABLET, FILM COATED ORAL 3 TIMES DAILY
Status: DISCONTINUED | OUTPATIENT
Start: 2021-08-30 | End: 2021-09-01 | Stop reason: HOSPADM

## 2021-08-30 RX ORDER — HYDRALAZINE HYDROCHLORIDE 20 MG/ML
10 INJECTION INTRAMUSCULAR; INTRAVENOUS EVERY 6 HOURS PRN
Status: DISCONTINUED | OUTPATIENT
Start: 2021-08-30 | End: 2021-09-01 | Stop reason: HOSPADM

## 2021-08-30 RX ORDER — TRAZODONE HYDROCHLORIDE 50 MG/1
25 TABLET ORAL
Status: DISCONTINUED | OUTPATIENT
Start: 2021-08-30 | End: 2021-09-01 | Stop reason: HOSPADM

## 2021-08-30 RX ORDER — DOCUSATE SODIUM 100 MG/1
100 CAPSULE, LIQUID FILLED ORAL 2 TIMES DAILY
Status: DISCONTINUED | OUTPATIENT
Start: 2021-08-30 | End: 2021-09-01 | Stop reason: HOSPADM

## 2021-08-30 RX ORDER — METOCLOPRAMIDE HYDROCHLORIDE 5 MG/ML
10 INJECTION INTRAMUSCULAR; INTRAVENOUS EVERY 6 HOURS PRN
Status: DISCONTINUED | OUTPATIENT
Start: 2021-08-30 | End: 2021-09-01 | Stop reason: HOSPADM

## 2021-08-30 RX ORDER — ATORVASTATIN CALCIUM 40 MG/1
40 TABLET, FILM COATED ORAL EVERY EVENING
Status: DISCONTINUED | OUTPATIENT
Start: 2021-08-30 | End: 2021-09-01 | Stop reason: HOSPADM

## 2021-08-30 RX ORDER — SERTRALINE HYDROCHLORIDE 25 MG/1
25 TABLET, FILM COATED ORAL DAILY
Status: DISCONTINUED | OUTPATIENT
Start: 2021-08-30 | End: 2021-09-01 | Stop reason: HOSPADM

## 2021-08-30 RX ORDER — ACETAMINOPHEN 325 MG/1
650 TABLET ORAL EVERY 6 HOURS SCHEDULED
Status: DISCONTINUED | OUTPATIENT
Start: 2021-08-30 | End: 2021-09-01 | Stop reason: HOSPADM

## 2021-08-30 RX ORDER — ESCITALOPRAM OXALATE 10 MG/1
10 TABLET ORAL EVERY MORNING
Status: DISCONTINUED | OUTPATIENT
Start: 2021-08-30 | End: 2021-09-01 | Stop reason: HOSPADM

## 2021-08-30 RX ORDER — OXYCODONE HYDROCHLORIDE 5 MG/1
7.5 TABLET ORAL EVERY 4 HOURS PRN
Status: DISCONTINUED | OUTPATIENT
Start: 2021-08-30 | End: 2021-09-01 | Stop reason: HOSPADM

## 2021-08-30 RX ORDER — OXYCODONE HYDROCHLORIDE 5 MG/1
5 TABLET ORAL EVERY 4 HOURS PRN
Status: DISCONTINUED | OUTPATIENT
Start: 2021-08-30 | End: 2021-09-01 | Stop reason: HOSPADM

## 2021-08-30 RX ORDER — POTASSIUM CHLORIDE 14.9 MG/ML
20 INJECTION INTRAVENOUS
Status: COMPLETED | OUTPATIENT
Start: 2021-08-30 | End: 2021-08-30

## 2021-08-30 RX ORDER — METHOCARBAMOL 500 MG/1
500 TABLET, FILM COATED ORAL EVERY 8 HOURS SCHEDULED
Status: DISCONTINUED | OUTPATIENT
Start: 2021-08-30 | End: 2021-09-01 | Stop reason: HOSPADM

## 2021-08-30 RX ADMIN — AMLODIPINE BESYLATE 10 MG: 10 TABLET ORAL at 09:40

## 2021-08-30 RX ADMIN — ESCITALOPRAM OXALATE 10 MG: 10 TABLET ORAL at 09:47

## 2021-08-30 RX ADMIN — OXYCODONE HYDROCHLORIDE 5 MG: 5 TABLET ORAL at 02:40

## 2021-08-30 RX ADMIN — HEPARIN SODIUM 5000 UNITS: 5000 INJECTION INTRAVENOUS; SUBCUTANEOUS at 05:08

## 2021-08-30 RX ADMIN — METHOCARBAMOL 500 MG: 500 TABLET, FILM COATED ORAL at 21:19

## 2021-08-30 RX ADMIN — HYDROXYZINE HYDROCHLORIDE 25 MG: 25 TABLET ORAL at 21:19

## 2021-08-30 RX ADMIN — ACETAMINOPHEN 650 MG: 325 TABLET, FILM COATED ORAL at 18:55

## 2021-08-30 RX ADMIN — ONDANSETRON 4 MG: 2 INJECTION INTRAMUSCULAR; INTRAVENOUS at 13:06

## 2021-08-30 RX ADMIN — POTASSIUM CHLORIDE 20 MEQ: 14.9 INJECTION, SOLUTION INTRAVENOUS at 15:45

## 2021-08-30 RX ADMIN — METHOCARBAMOL 500 MG: 500 TABLET, FILM COATED ORAL at 18:55

## 2021-08-30 RX ADMIN — HYDRALAZINE HYDROCHLORIDE 10 MG: 20 INJECTION, SOLUTION INTRAMUSCULAR; INTRAVENOUS at 05:16

## 2021-08-30 RX ADMIN — HYDROMORPHONE HYDROCHLORIDE 0.5 MG: 1 INJECTION, SOLUTION INTRAMUSCULAR; INTRAVENOUS; SUBCUTANEOUS at 15:01

## 2021-08-30 RX ADMIN — PANCRELIPASE 6000 UNITS: 30000; 6000; 19000 CAPSULE, DELAYED RELEASE PELLETS ORAL at 13:06

## 2021-08-30 RX ADMIN — DOCUSATE SODIUM 100 MG: 100 CAPSULE, LIQUID FILLED ORAL at 18:55

## 2021-08-30 RX ADMIN — PANTOPRAZOLE SODIUM 40 MG: 40 INJECTION, POWDER, FOR SOLUTION INTRAVENOUS at 05:08

## 2021-08-30 RX ADMIN — OXYCODONE HYDROCHLORIDE 7.5 MG: 5 TABLET ORAL at 19:05

## 2021-08-30 RX ADMIN — HYDROMORPHONE HYDROCHLORIDE 0.5 MG: 1 INJECTION, SOLUTION INTRAMUSCULAR; INTRAVENOUS; SUBCUTANEOUS at 21:19

## 2021-08-30 RX ADMIN — HYDROMORPHONE HYDROCHLORIDE 0.5 MG: 1 INJECTION, SOLUTION INTRAMUSCULAR; INTRAVENOUS; SUBCUTANEOUS at 05:08

## 2021-08-30 RX ADMIN — PANCRELIPASE 6000 UNITS: 30000; 6000; 19000 CAPSULE, DELAYED RELEASE PELLETS ORAL at 18:55

## 2021-08-30 RX ADMIN — POTASSIUM CHLORIDE 20 MEQ: 14.9 INJECTION, SOLUTION INTRAVENOUS at 13:06

## 2021-08-30 RX ADMIN — DEXTROSE, SODIUM CHLORIDE, AND POTASSIUM CHLORIDE 75 ML/HR: 5; .45; .15 INJECTION INTRAVENOUS at 05:15

## 2021-08-30 RX ADMIN — OXYCODONE HYDROCHLORIDE 5 MG: 5 TABLET ORAL at 11:20

## 2021-08-30 RX ADMIN — DOCUSATE SODIUM 100 MG: 100 CAPSULE, LIQUID FILLED ORAL at 09:41

## 2021-08-30 RX ADMIN — PANCRELIPASE 6000 UNITS: 30000; 6000; 19000 CAPSULE, DELAYED RELEASE PELLETS ORAL at 09:40

## 2021-08-30 RX ADMIN — ATORVASTATIN CALCIUM 40 MG: 40 TABLET, FILM COATED ORAL at 18:55

## 2021-08-30 RX ADMIN — DEXTROSE, SODIUM CHLORIDE, AND POTASSIUM CHLORIDE 75 ML/HR: 5; .45; .15 INJECTION INTRAVENOUS at 20:38

## 2021-08-30 RX ADMIN — HEPARIN SODIUM 5000 UNITS: 5000 INJECTION INTRAVENOUS; SUBCUTANEOUS at 21:20

## 2021-08-30 RX ADMIN — LABETALOL 20 MG/4 ML (5 MG/ML) INTRAVENOUS SYRINGE 10 MG: at 02:43

## 2021-08-30 RX ADMIN — HEPARIN SODIUM 5000 UNITS: 5000 INJECTION INTRAVENOUS; SUBCUTANEOUS at 13:06

## 2021-08-30 RX ADMIN — SERTRALINE HYDROCHLORIDE 25 MG: 25 TABLET ORAL at 09:41

## 2021-08-30 RX ADMIN — ONDANSETRON 4 MG: 2 INJECTION INTRAMUSCULAR; INTRAVENOUS at 00:13

## 2021-08-30 RX ADMIN — HYDROMORPHONE HYDROCHLORIDE 0.5 MG: 1 INJECTION, SOLUTION INTRAMUSCULAR; INTRAVENOUS; SUBCUTANEOUS at 00:12

## 2021-08-30 NOTE — OCCUPATIONAL THERAPY NOTE
Occupational Therapy Re-Evaluation     Patient Name: Susy Sanchez  Today's Date: 8/30/2021  Problem List  Principal Problem:    Pancreatic mass    Past Medical History  Past Medical History:   Diagnosis Date    Back ache     [address] [address]     Hyperlipidemia     Hypertension      Past Surgical History  Past Surgical History:   Procedure Laterality Date    APPENDECTOMY      BILATERAL OOPHORECTOMY      BREAST SURGERY Right     partial mastectomy     COLONOSCOPY      HERNIA REPAIR      HYSTERECTOMY      LAPAROTOMY N/A 8/24/2021    Procedure: LAPAROTOMY EXPLORATORY;  Surgeon: Jase Denny MD;  [facility]: [facility];  Service: Surgical Oncology    MASTECTOMY Right     partial    WHIPPLE PROCEDURE/PANCREATICO-DUODENECTOMY N/A 8/24/2021    Procedure: WHIPPLE PROCEDURE/PANCREATICO-DUODENECTOMY;  Surgeon: Jase Denny MD;  [facility]: [facility];  Service: Surgical Oncology             08/30/21 0929   OT Last Visit   OT Visit Date 08/30/21   Note Type   Note type Re-Evaluation   Restrictions/Precautions   Weight Bearing Precautions Per Order No   Other Precautions Multiple lines; Fall Risk;Pain  (x2 KAMLA drain, PCA )   Pain Assessment   Pain Assessment Tool 0-10   Pain Score 5   Pain Location/Orientation Location: Abdomen; Location: Incision   Hospital Pain Intervention(s) Repositioned; Ambulation/increased activity   Home Living   Additional Comments Please refer to IE  PTA, pt resides in 2STA with 0STE   Prior Function   Level of [place] Independent with ADLs and functional mobility   Lives With Spouse;Friend(s)   ADL Assistance Independent   IADLs Independent   Comments Please refer to IE    Lifestyle   Autonomy pta pt reports I in ADls/IADls/functional mobility   Reciprocal Relationships supportive spouse   Service to Others retired   [address] enjoys going to the [employer]   Psychosocial   Psychosocial ([address]) WDL   ADL   Where Assessed Edge of bed   Eating Assistance 7  Independent Grooming Assistance 5  Supervision/Setup   UB Bathing Assistance 5  Supervision/Setup   LB Bathing Assistance 5  Supervision/Setup   UB Dressing Assistance 5  Supervision/Setup   LB Dressing Assistance 5  Supervision/Setup   LB Dressing Deficit Don/doff R sock; Don/doff L sock; Don/doff R shoe;Don/doff L shoe   Toileting Assistance  4  Minimal Assistance   Bed Mobility   Supine to Sit 5  Supervision   Additional items Assist x 1; Increased time required   Sit to Supine 5  Supervision   Additional items Assist x 1; Increased time required   Transfers   Sit to Stand   (CGA)   Additional items Assist x 1; Increased time required   Stand to Sit   (CGA)   Additional items Assist x 1; Increased time required   Additional Comments Transfers w/o AD    Functional Mobility   Functional Mobility   (CGA)   Additional Comments SHHD   Balance   Static Sitting Fair +   Dynamic Sitting Fair   Static Standing Fair   Dynamic Standing Fair -   Ambulatory Fair -   Activity Tolerance   Activity Tolerance Patient limited by pain   Nurse Made Aware RN clearance for session    RUE Assessment   RUE Assessment WFL   LUE Assessment   LUE Assessment WFL   Hand Function   Gross Motor Coordination Functional   Fine Motor Coordination Functional   Sensation   Light Touch No apparent deficits   Vision - Complex Assessment   Ocular Range of Motion WFL   Head Position WDL   Tracking Able to track stimulus in all quads without difficulty   Perception   Inattention/Neglect Appears intact   Cognition   Overall Cognitive Status [facility]   Arousal/Participation Alert; Responsive; Cooperative   Attention Within functional limits   Orientation Level Oriented X4   Memory Within functional limits   Following Commands Follows all commands and directions without difficulty   Comments Pt pleasant and cooperative t/o session  Egyptian speaking - utilized  yaya during session    Assessment   Assessment Pt seen for OT re-evaluation   Please refer to IE for more detailed information regarding pt's PLOF/social history  PTA, pt was I for ADLs/IADLs  Upon re-evaluation, pt currently requires CGA for transfers and mobility  Pt currently requires i eating and grooming, S UB ADLs, S LB ADLs, and MIN A toileting  Pt is primarily limited by pain at this point  From OT standpoint, recommendation would be to return home with increased social support  No further acute OT needs  D/C OT  Please re-consult if needed  Thank you  Pt was left in bed after session with all current needs met  The patient's raw score on the AM-PAC Daily Activity inpatient short form is 20, standardized score is 42 03, greater than 39 4  Patients at this level are likely to benefit from discharge to home  Please refer to the recommendation of the Occupational Therapist for safe discharge planning     Plan   OT Frequency Eval only   Recommendation   OT Discharge Recommendation No rehabilitation needs  (home with increased social support )   OT - OK to Discharge Yes  (when medically stable )   AM-PAC Daily Activity Inpatient   Lower Body Dressing 3   Bathing 3   Toileting 3   Upper Body Dressing 3   Grooming 4   Eating 4   Daily Activity Raw Score 20   Daily Activity Standardized Score (Calc for Raw Score >=11) 42 03   AM-PAC Applied Cognition Inpatient   Following a Speech/Presentation 4   Understanding Ordinary Conversation 4   Taking Medications 4   Remembering Where Things Are Placed or Put Away 4   Remembering List of 4-5 Errands 4   Taking Care of Complicated Tasks 4   Applied Cognition Raw Score 24   Applied Cognition Standardized Score 62 21     Aftab Subramanian MS, OTR/L

## 2021-08-30 NOTE — PLAN OF CARE
Problem: PHYSICAL THERAPY ADULT  Goal: Performs mobility at highest level of function for planned discharge setting  See evaluation for individualized goals  Description: Treatment/Interventions: Functional transfer training, LE strengthening/ROM, Therapeutic exercise, Elevations, Endurance training, Patient/family training, Equipment eval/education, Bed mobility, Gait training, Spoke to nursing  Equipment Recommended: Zenia Bolaños       See flowsheet documentation for full assessment, interventions and recommendations  Outcome: Progressing  Note: Prognosis: Good  Problem List: Decreased mobility, Pain, Decreased endurance  Assessment: Pt  is limited currently due to pain  pt  able to perform all tasks with no hands on assist  Cues for hand placement for transfers  Pt  reported she was feeling warm and cold and slightly nauseated  Pt  deferred to negotiate steps a second time  Pt  is recommended to DC home with family when medically cleared  Barriers to Discharge: None        PT Discharge Recommendation: No rehabilitation needs     PT - OK to Discharge: Yes    See flowsheet documentation for full assessment

## 2021-08-30 NOTE — PROGRESS NOTES
Progress Note - Jordyn Josue 57 y o  female MRN: [id number]    [facility] Encounter: [id number]      Assessment:  56 y/o F w neuroendocrine tumor s/p whipple on 8/24      Hypertensive since epidural removed yesterday  Vss  Saturating 95% room air  Afebrile since 12pm yesterday, tmax was 101  1    incision is clean, dry, intact  Staples in place  Abdomen is soft, mild tenderness, non distended  R KAMLA: 15cc serosang  L KAMLA: 125cc serosang    Plan:  Advance diet to low residue  Resume home meds  Monitor urine output  Maintain abdominal drains  Continue prn labetalol/ hydralazine for systolic > 597  Continue dvt ppx  Ambulate multiple x day  Work with incentive spirometer    Subjective:   Feels well  Mild nausea has improved  Passing flatus  No fever, chills, chest pain or shortness of breath  Objective:     Vitals: Blood pressure (!) 184/90, pulse 75, temperature 97 9 °F (36 6 °C), resp  rate 16, height 5' 4" (1 626 m), weight 77 6 kg (171 lb), SpO2 91 %, not currently breastfeeding  ,Body mass index is 29 35 kg/m²  Intake/Output Summary (Last 24 hours) at 8/30/2021 0613  Last data filed at 8/30/2021 0515  Gross per 24 hour   Intake 2091 25 ml   Output 140 ml   Net 1951 25 ml       Physical Exam  General: NAD  HEENT: NC/AT  MMM  Cv: RRR  Lungs: normal effort  Ab: Soft, NT/ND  B/l KAMLA in place w serosang   mildine staples in place     Ex: no CCE  Neuro: AAOx3    Scheduled Meds:  Current Facility-Administered Medications   Medication Dose Route Frequency Provider Last Rate    acetaminophen  650 mg Oral Q6H PRN Aurea Dalton MD      dextrose 5 % and sodium chloride 0 45 % with KCl 20 mEq/L  75 mL/hr Intravenous Continuous Mandy Yancey DO 75 mL/hr (08/30/21 0515)    heparin (porcine)  5,000 Units Subcutaneous [facility] Chaz Miller MD      hydrALAZINE  10 mg Intravenous Q6H PRN Flavia White MD      HYDROmorphone  0 5 mg Intravenous Q2H PRN MD Tray Ruffin insulin lispro  1-6 Units Subcutaneous Q6H [address] Zak Mendiola MD      Labetalol HCl  10 mg Intravenous Q4H PRN Edilberto Van MD      nalbuphine  10 mg Intravenous Q3H PRN Milad Valdivia MD      ondansetron  4 mg Intravenous Q6H PRN Zak Mendiola MD      oxyCODONE  5 mg Oral Q4H PRN Nancie Mckeon DO      pantoprazole  40 mg Intravenous Q24H [address] Zak Mendiola MD      phenol  1 spray Mouth/Throat Q2H PRN Zak Mendiola MD       Continuous Infusions:dextrose 5 % and sodium chloride 0 45 % with KCl 20 mEq/L, 75 mL/hr, Last Rate: 75 mL/hr (08/30/21 0515)      PRN Meds:   acetaminophen    hydrALAZINE    HYDROmorphone    Labetalol HCl    nalbuphine    ondansetron    oxyCODONE    phenol      Invasive Devices     Peripheral Intravenous Line            Peripheral IV 08/29/21 Proximal;Right;Ventral (anterior) Forearm <1 day          Epidural Line            Epidural Catheter 08/24/21 5 days          Drain            Closed/Suction Drain Medial;Right; Anterior RUQ Bulb 19 Fr  -- days    Closed/Suction Drain Left; Anterior LUQ Bulb 19 Fr  5 days                Lab, Imaging and other studies: I have personally reviewed pertinent reports      VTE Pharmacologic Prophylaxis: Sequential compression device (Venodyne)   VTE Mechanical Prophylaxis: sequential compression device

## 2021-08-30 NOTE — PHYSICAL THERAPY NOTE
Physical Therapy Treatment Note     08/30/21 1150   PT Last Visit   PT Visit Date 08/30/21   Note Type   Note Type Treatment   Pain Assessment   Pain Assessment Tool 0-10   Pain Score 6   Pain Location/Orientation Location: Abdomen   Restrictions/Precautions   Weight Bearing Precautions Per Order No   Other Precautions Pain;Multiple lines   General   Chart Reviewed Yes   Family/Caregiver Present Yes   Cognition   Overall Cognitive Status WFL   Subjective   Subjective Pt  in bed upon entry  Pt  agreeable reluctantly  Pt  reported she already walked twice in the a m  Daughter present during part of session  Bed Mobility   Supine to Sit 5  Supervision   Additional items Bedrails;HOB elevated;Assist x 1   Sit to Supine 5  Supervision   Additional items Bedrails;HOB elevated;Assist x 1   Transfers   Sit to Stand 5  Supervision   Additional items Verbal cues; Increased time required   Stand to Sit 5  Supervision   Additional items Increased time required   Stand pivot 5  Supervision   Additional items Increased time required   Ambulation/Elevation   Gait pattern Excessively slow; Short stride   Gait Assistance 5  Supervision   Additional items Assist x 1;Verbal cues   Assistive Device Rolling walker   Distance 100ft, 120ft   Stair Management Assistance 5  Supervision   Additional items Assist x 1; Increased time required   Stair Management Technique One rail L;Alternating pattern; Step to pattern; Foreward;Nonreciprocal;Reciprocal   Number of Stairs 6   Balance   Static Sitting Fair +   Ambulatory Fair   Endurance Deficit   Endurance Deficit Yes   Endurance Deficit Description pain   Activity Tolerance   Activity Tolerance Patient tolerated treatment well;Patient limited by pain   Nurse Made Aware Yes   Assessment   Prognosis Good   Problem List Decreased mobility;Pain;Decreased endurance   Assessment Pt  is limited currently due to pain   pt  able to perform all tasks with no hands on assist  Cues for hand placement for transfers  Pt  reported she was feeling warm and cold and slightly nauseated  Pt  deferred to negotiate steps a second time  Pt  is recommended to DC home with family when medically cleared  Barriers to Discharge None   Goals   Patient Goals Have less pain   STG Expiration Date 09/08/21   PT Treatment Day 1   Plan   Treatment/Interventions Functional transfer training;Elevations;Gait training;Bed mobility; Equipment eval/education;Patient/family training;Family;Spoke to nursing   Progress Progressing toward goals   PT Frequency Other (Comment)  (3-5x/week)   Recommendation   PT Discharge Recommendation No rehabilitation needs   Equipment Recommended Paolo Adjutant Package Recommended Wheeled walker   PT - OK to Discharge Yes         Honey Males, PTA

## 2021-08-30 NOTE — PROGRESS NOTES
Progress Note - Acute Pain Service    Jaya 57 y o  female MRN: [id number]  Unit/Bed#: [facility] 902-01 Encounter: [id number]      Assessment:   Principal Problem:    Pancreatic mass    Jaya is a 57 y o  female  Status post exploratory laparotomy and whipple procedure on 08/24/2021  Epidural was placed preoperatively for postop pain control and subsequently removed on 08/29/2021  Plan:   · Add Tylenol 650 mg every 6 hours scheduled  · Discontinue Tylenol as needed  · Add Robaxin 500 mg every 8 hours scheduled  · Change oxycodone to 5 mg every 4 hours as needed for moderate pain  · Add oxycodone 7 5 mg every 4 hours as needed for severe pain   · Dilaudid 0 5 mg IV every 2 hours as needed for breakthrough pain    Bowel regimen per Surgical Services  Treatment recommendations discussed with primary care service  [facility] will continue to follow  Please contact Acute Pain Service - [facility] via [employer] from [phone] with additional questions or concerns  See [employer] or [employer] for additional contacts and after hours information  [facility]  services used for patient encounter; [place]  Pain History  Current pain location(s): abdomen  Pain Scale:  4-9  24 hour history: Patient resting in bed, no acute distress  Reporting moderate to severe abdominal / incision pain  Also reporting poor appetite, chills and nausea  P r n  oxycodone provides mild to moderate pain control that last for about 3 hours  Does not associate increased nausea after oxycodone administration  Overall feels pain is worse today since epidural has been removed      Opioid requirement previous 24 hours: Oxycodone 20mg, IV dilaudid 2mg    Meds/Allergies   all current active meds have been reviewed, current meds:   Current Facility-Administered Medications   Medication Dose Route Frequency    acetaminophen (TYLENOL) tablet 650 mg  650 mg Oral Q6H PRN    amLODIPine (NORVASC) tablet 10 mg  10 mg Oral Daily    atorvastatin (LIPITOR) tablet 40 mg  40 mg Oral QPM    dextrose 5 % and sodium chloride 0 45 % with KCl 20 mEq/L infusion  75 mL/hr Intravenous Continuous    docusate sodium (COLACE) capsule 100 mg  100 mg Oral BID    escitalopram (LEXAPRO) tablet 10 mg  10 mg Oral QAM    heparin (porcine) subcutaneous injection 5,000 Units  5,000 Units Subcutaneous Q8H [facility]    hydrALAZINE (APRESOLINE) injection 10 mg  10 mg Intravenous Q6H PRN    HYDROmorphone (DILAUDID) injection 0 5 mg  0 5 mg Intravenous Q2H PRN    hydrOXYzine HCL (ATARAX) tablet 25 mg  25 mg Oral TID    insulin lispro (HumaLOG) 100 units/mL subcutaneous injection 1-6 Units  1-6 Units Subcutaneous Q6H [facility]    Labetalol HCl (NORMODYNE) injection 10 mg  10 mg Intravenous Q4H PRN    metoclopramide (REGLAN) injection 10 mg  10 mg Intravenous Q6H PRN    nalbuphine (NUBAIN) injection 10 mg  10 mg Intravenous Q3H PRN    ondansetron (ZOFRAN) injection 4 mg  4 mg Intravenous Q6H PRN    oxyCODONE (ROXICODONE) IR tablet 5 mg  5 mg Oral Q4H PRN    pancrelipase (Lip-Prot-Amyl) (CREON) delayed release capsule 6,000 Units  6,000 Units Oral TID With Meals    pantoprazole (PROTONIX) injection 40 mg  40 mg Intravenous Q24H ABDIFATAH    phenol (CHLORASEPTIC) 1 4 % mucosal liquid 1 spray  1 spray Mouth/Throat Q2H PRN    potassium chloride 20 mEq IVPB (premix)  20 mEq Intravenous Q2H    sertraline (ZOLOFT) tablet 25 mg  25 mg Oral Daily    traZODone (DESYREL) tablet 25 mg  25 mg Oral HS PRN    and PTA meds:   Prior to Admission Medications   Prescriptions Last Dose Informant Patient Reported? Taking? amLODIPine (NORVASC) 10 mg tablet 8/11/2021 Self No Yes   Sig: Take 1 tablet (10 mg total) by mouth daily   atorvastatin (LIPITOR) 40 mg tablet 8/10/2021 Self No Yes   Sig: Take 1 tablet (40 mg total) by mouth every evening   clobetasol (TEMOVATE) 0 05 % ointment 8/11/2021 Self No Yes   Sig: Apply twice a day to rash on hands for 2 weeks   Then, apply twice a day from Monday-Friday for another 2 weeks  Avoid the face and groin   dicyclomine (BENTYL) 10 mg capsule More than a month at Unknown time Self No No   Sig: Take 1 capsule (10 mg total) by mouth 4 (four) times a day (before meals and at bedtime)   escitalopram (LEXAPRO) 10 mg tablet 8/23/2021 at Unknown time Self No Yes   Sig: Take 1 tablet (10 mg total) by mouth every morning   hydrOXYzine HCL (ATARAX) 25 mg tablet 8/23/2021 at Unknown time Self Yes Yes   Sig: Take 25 mg by mouth daily at bedtime as needed   lisinopril (ZESTRIL) 20 mg tablet 8/23/2021 at Unknown time Self No Yes   Sig: Take 1 tablet (20 mg total) by mouth daily   omeprazole (PriLOSEC) 40 MG capsule  Self No No   Sig: Take 1 capsule (40 mg total) by mouth daily   ondansetron (ZOFRAN-ODT) 4 mg disintegrating tablet More than a month at Unknown time Self No No   Sig: Take 1 tablet (4 mg total) by mouth every 6 (six) hours as needed for nausea or vomiting   Patient not taking: Reported on 7/12/2021   oxyCODONE (ROXICODONE) 5 mg immediate release tablet Past Month Self No Yes   Sig: Take 1-2 tablets PO Q4H PRN moderate-severe pain   polyethylene glycol (GLYCOLAX) 17 GM/SCOOP powder 8/23/2021 at Unknown time Self No Yes   Sig: At 5pm take 5mgx2 dulcolax  At 6pm 32oz miralax in 64oz gatorade  Drink 8oz glass every 5 mins until 32oz finished  Drink remaining as rec    sertraline (ZOLOFT) 25 mg tablet 8/11/2021 Self Yes Yes   Sig: Take 25 mg by mouth daily   traZODone (DESYREL) 50 mg tablet 8/24/2021 Self Yes Yes   Sig: take 1/2 tablet by mouth at bedtime if needed for insomnia   triamcinolone (KENALOG) 0 1 % ointment 8/23/2021 at Unknown time Self No Yes   Sig: Apply 1 application topically 2 (two) times a day      Facility-Administered Medications: None       No Known Allergies    Objective     Temp:  [97 9 °F (36 6 °C)-98 2 °F (36 8 °C)] 97 9 °F (36 6 °C)  HR:  [74-85] 75  BP: (156-199)/() 156/75    Physical Exam  Vitals reviewed     Constitutional: General: She is not in acute distress  Appearance: She is normal weight  She is not ill-appearing, toxic-appearing or diaphoretic  HENT:      Head: Normocephalic and atraumatic  Nose: Nose normal       Mouth/Throat:      Mouth: Mucous membranes are dry  Eyes:      Extraocular Movements: Extraocular movements intact  Pulmonary:      Effort: Pulmonary effort is normal  No respiratory distress  Abdominal:      Comments: Midline abdominal incision with staples JEAN-CLAUDE   Skin:     Findings: No rash  Neurological:      Mental Status: She is alert and oriented to person, place, and time  Mental status is at baseline  Psychiatric:         Mood and Affect: Mood normal          Behavior: Behavior normal          Lab Results:   Results from last 7 days   Lab Units 08/30/21  0647   WBC Thousand/uL 8 19   HEMOGLOBIN g/dL 7 5*   HEMATOCRIT % 23 7*   PLATELETS Thousands/uL 228      Results from last 7 days   Lab Units 08/30/21  0647 08/24/21  1757 08/24/21  1704   POTASSIUM mmol/L 3 6  --   --    CHLORIDE mmol/L 106  --   --    CO2 mmol/L 27  --   --    CO2, I-STAT mmol/L  --   --  23   BUN mg/dL 6  --   --    CREATININE mg/dL 0 83  --   --    CALCIUM mg/dL 8 8  --   --    ALK PHOS U/L 150*   < >  --    ALT U/L 51   < >  --    AST U/L 46*   < >  --    GLUCOSE, ISTAT mg/dl  --   --  156*    < > = values in this interval not displayed  Please note that the APS provides consultative services regarding pain management only  With the exception of ketamine and epidural infusions and except when indicated, final decisions regarding starting or changing doses of analgesic medications are at the discretion of the consulting service  Off hours consultation and/or medication management is generally not available      HUONG Calderon  Acute Pain Service

## 2021-08-31 LAB
ALBUMIN SERPL BCP-MCNC: 2.7 G/DL (ref 3.5–5)
ALP SERPL-CCNC: 178 U/L (ref 46–116)
ALT SERPL W P-5'-P-CCNC: 51 U/L (ref 12–78)
AMYLASE FLD QL: 6 U/L
AMYLASE FLD QL: 6 U/L
ANION GAP SERPL CALCULATED.3IONS-SCNC: 8 MMOL/L (ref 4–13)
AST SERPL W P-5'-P-CCNC: 51 U/L (ref 5–45)
BASOPHILS # BLD AUTO: 0.06 THOUSANDS/ΜL (ref 0–0.1)
BASOPHILS NFR BLD AUTO: 1 % (ref 0–1)
BILIRUB DIRECT SERPL-MCNC: 0.74 MG/DL (ref 0–0.2)
BILIRUB SERPL-MCNC: 1.14 MG/DL (ref 0.2–1)
BUN SERPL-MCNC: 7 MG/DL (ref 5–25)
CALCIUM SERPL-MCNC: 8.9 MG/DL (ref 8.3–10.1)
CHLORIDE SERPL-SCNC: 103 MMOL/L (ref 100–108)
CO2 SERPL-SCNC: 25 MMOL/L (ref 21–32)
CREAT SERPL-MCNC: 0.88 MG/DL (ref 0.6–1.3)
EOSINOPHIL # BLD AUTO: 0.14 THOUSAND/ΜL (ref 0–0.61)
EOSINOPHIL NFR BLD AUTO: 2 % (ref 0–6)
ERYTHROCYTE [DISTWIDTH] IN BLOOD BY AUTOMATED COUNT: 15.5 % (ref 11.6–15.1)
GFR SERPL CREATININE-BSD FRML MDRD: 71 ML/MIN/1.73SQ M
GLUCOSE SERPL-MCNC: 117 MG/DL (ref 65–140)
GLUCOSE SERPL-MCNC: 122 MG/DL (ref 65–140)
GLUCOSE SERPL-MCNC: 127 MG/DL (ref 65–140)
GLUCOSE SERPL-MCNC: 128 MG/DL (ref 65–140)
GLUCOSE SERPL-MCNC: 93 MG/DL (ref 65–140)
HCT VFR BLD AUTO: 28.6 % (ref 34.8–46.1)
HGB BLD-MCNC: 9.1 G/DL (ref 11.5–15.4)
IMM GRANULOCYTES # BLD AUTO: 0.15 THOUSAND/UL (ref 0–0.2)
IMM GRANULOCYTES NFR BLD AUTO: 2 % (ref 0–2)
LYMPHOCYTES # BLD AUTO: 3.55 THOUSANDS/ΜL (ref 0.6–4.47)
LYMPHOCYTES NFR BLD AUTO: 40 % (ref 14–44)
MAGNESIUM SERPL-MCNC: 1.9 MG/DL (ref 1.6–2.6)
MCH RBC QN AUTO: 32 PG (ref 26.8–34.3)
MCHC RBC AUTO-ENTMCNC: 31.8 G/DL (ref 31.4–37.4)
MCV RBC AUTO: 101 FL (ref 82–98)
MONOCYTES # BLD AUTO: 0.94 THOUSAND/ΜL (ref 0.17–1.22)
MONOCYTES NFR BLD AUTO: 11 % (ref 4–12)
NEUTROPHILS # BLD AUTO: 4.12 THOUSANDS/ΜL (ref 1.85–7.62)
NEUTS SEG NFR BLD AUTO: 44 % (ref 43–75)
NRBC BLD AUTO-RTO: 0 /100 WBCS
PHOSPHATE SERPL-MCNC: 2.8 MG/DL (ref 2.3–4.1)
PLATELET # BLD AUTO: 306 THOUSANDS/UL (ref 149–390)
PMV BLD AUTO: 11.8 FL (ref 8.9–12.7)
POTASSIUM SERPL-SCNC: 4.1 MMOL/L (ref 3.5–5.3)
PROT SERPL-MCNC: 7.8 G/DL (ref 6.4–8.2)
RBC # BLD AUTO: 2.84 MILLION/UL (ref 3.81–5.12)
SODIUM SERPL-SCNC: 136 MMOL/L (ref 136–145)
WBC # BLD AUTO: 8.96 THOUSAND/UL (ref 4.31–10.16)

## 2021-08-31 PROCEDURE — 80048 BASIC METABOLIC PNL TOTAL CA: CPT | Performed by: PHYSICIAN ASSISTANT

## 2021-08-31 PROCEDURE — C9113 INJ PANTOPRAZOLE SODIUM, VIA: HCPCS | Performed by: SURGERY

## 2021-08-31 PROCEDURE — 80076 HEPATIC FUNCTION PANEL: CPT | Performed by: PHYSICIAN ASSISTANT

## 2021-08-31 PROCEDURE — 84100 ASSAY OF PHOSPHORUS: CPT | Performed by: PHYSICIAN ASSISTANT

## 2021-08-31 PROCEDURE — 94760 N-INVAS EAR/PLS OXIMETRY 1: CPT

## 2021-08-31 PROCEDURE — 99024 POSTOP FOLLOW-UP VISIT: CPT | Performed by: SURGERY

## 2021-08-31 PROCEDURE — 85025 COMPLETE CBC W/AUTO DIFF WBC: CPT | Performed by: PHYSICIAN ASSISTANT

## 2021-08-31 PROCEDURE — 99232 SBSQ HOSP IP/OBS MODERATE 35: CPT | Performed by: NURSE PRACTITIONER

## 2021-08-31 PROCEDURE — 83735 ASSAY OF MAGNESIUM: CPT | Performed by: PHYSICIAN ASSISTANT

## 2021-08-31 PROCEDURE — 82150 ASSAY OF AMYLASE: CPT | Performed by: PHYSICIAN ASSISTANT

## 2021-08-31 PROCEDURE — 82948 REAGENT STRIP/BLOOD GLUCOSE: CPT

## 2021-08-31 RX ADMIN — TRAZODONE HYDROCHLORIDE 25 MG: 50 TABLET ORAL at 22:06

## 2021-08-31 RX ADMIN — METHOCARBAMOL 500 MG: 500 TABLET, FILM COATED ORAL at 04:57

## 2021-08-31 RX ADMIN — PANCRELIPASE 6000 UNITS: 30000; 6000; 19000 CAPSULE, DELAYED RELEASE PELLETS ORAL at 09:20

## 2021-08-31 RX ADMIN — HEPARIN SODIUM 5000 UNITS: 5000 INJECTION INTRAVENOUS; SUBCUTANEOUS at 22:15

## 2021-08-31 RX ADMIN — ESCITALOPRAM OXALATE 10 MG: 10 TABLET ORAL at 09:20

## 2021-08-31 RX ADMIN — AMLODIPINE BESYLATE 10 MG: 10 TABLET ORAL at 09:20

## 2021-08-31 RX ADMIN — ONDANSETRON 4 MG: 2 INJECTION INTRAMUSCULAR; INTRAVENOUS at 11:49

## 2021-08-31 RX ADMIN — HYDROXYZINE HYDROCHLORIDE 25 MG: 25 TABLET ORAL at 22:06

## 2021-08-31 RX ADMIN — DOCUSATE SODIUM 100 MG: 100 CAPSULE, LIQUID FILLED ORAL at 09:20

## 2021-08-31 RX ADMIN — ATORVASTATIN CALCIUM 40 MG: 40 TABLET, FILM COATED ORAL at 17:54

## 2021-08-31 RX ADMIN — METHOCARBAMOL 500 MG: 500 TABLET, FILM COATED ORAL at 13:57

## 2021-08-31 RX ADMIN — ACETAMINOPHEN 650 MG: 325 TABLET, FILM COATED ORAL at 04:57

## 2021-08-31 RX ADMIN — HEPARIN SODIUM 5000 UNITS: 5000 INJECTION INTRAVENOUS; SUBCUTANEOUS at 13:57

## 2021-08-31 RX ADMIN — PANCRELIPASE 6000 UNITS: 30000; 6000; 19000 CAPSULE, DELAYED RELEASE PELLETS ORAL at 17:54

## 2021-08-31 RX ADMIN — SERTRALINE HYDROCHLORIDE 25 MG: 25 TABLET ORAL at 09:20

## 2021-08-31 RX ADMIN — ACETAMINOPHEN 650 MG: 325 TABLET, FILM COATED ORAL at 22:06

## 2021-08-31 RX ADMIN — HYDROMORPHONE HYDROCHLORIDE 0.5 MG: 1 INJECTION, SOLUTION INTRAMUSCULAR; INTRAVENOUS; SUBCUTANEOUS at 12:00

## 2021-08-31 RX ADMIN — HEPARIN SODIUM 5000 UNITS: 5000 INJECTION INTRAVENOUS; SUBCUTANEOUS at 04:57

## 2021-08-31 RX ADMIN — DEXTROSE, SODIUM CHLORIDE, AND POTASSIUM CHLORIDE 75 ML/HR: 5; .45; .15 INJECTION INTRAVENOUS at 05:05

## 2021-08-31 RX ADMIN — HYDROMORPHONE HYDROCHLORIDE 0.5 MG: 1 INJECTION, SOLUTION INTRAMUSCULAR; INTRAVENOUS; SUBCUTANEOUS at 04:58

## 2021-08-31 RX ADMIN — DOCUSATE SODIUM 100 MG: 100 CAPSULE, LIQUID FILLED ORAL at 17:54

## 2021-08-31 RX ADMIN — HYDROMORPHONE HYDROCHLORIDE 0.5 MG: 1 INJECTION, SOLUTION INTRAMUSCULAR; INTRAVENOUS; SUBCUTANEOUS at 22:15

## 2021-08-31 RX ADMIN — PANCRELIPASE 6000 UNITS: 30000; 6000; 19000 CAPSULE, DELAYED RELEASE PELLETS ORAL at 11:49

## 2021-08-31 RX ADMIN — ACETAMINOPHEN 650 MG: 325 TABLET, FILM COATED ORAL at 11:49

## 2021-08-31 RX ADMIN — OXYCODONE HYDROCHLORIDE 5 MG: 5 TABLET ORAL at 09:20

## 2021-08-31 RX ADMIN — METHOCARBAMOL 500 MG: 500 TABLET, FILM COATED ORAL at 22:06

## 2021-08-31 RX ADMIN — OXYCODONE HYDROCHLORIDE 5 MG: 5 TABLET ORAL at 17:55

## 2021-08-31 RX ADMIN — PANTOPRAZOLE SODIUM 40 MG: 40 INJECTION, POWDER, FOR SOLUTION INTRAVENOUS at 04:57

## 2021-08-31 RX ADMIN — ACETAMINOPHEN 650 MG: 325 TABLET, FILM COATED ORAL at 17:54

## 2021-08-31 NOTE — PROGRESS NOTES
Progress Note - [facility] 57 y o  female MRN: [id number]    Unit/Bed#: [facility] 902-01 Encounter: [id number]      Assessment:  58 y/o F w neuroendocrine tumor s/p whipple on 8/24  Vss  Afebrile  HTN improved after resuming norvasc  Abdomen is soft, mild tender, non distended  R KAMLA 20 output serosang  L KAMLA 135 output serosang  Uop: 2 6 L    Plan:  Continue post gastrectomy diet  Dc ivf   Continue creon  Check KAMLA amylase today  dvt ppx, SQH    Subjective:   Feels better  Passing flatus  No bowel mvt  Nausea improved  Denied fever, chills, chest pain, shortness of breath, nausea, vomiting, or abdominal pain this morning  Objective:     Vitals: Blood pressure 167/80, pulse 77, temperature 98 1 °F (36 7 °C), resp  rate 18, height 5' 4" (1 626 m), weight 77 6 kg (171 lb), SpO2 98 %, not currently breastfeeding  ,Body mass index is 29 35 kg/m²  Intake/Output Summary (Last 24 hours) at 8/31/2021 0533  Last data filed at 8/31/2021 0505  Gross per 24 hour   Intake 2106 5 ml   Output 3355 ml   Net -1248 5 ml       Physical Exam  General: NAD  HEENT: NC/AT  MMM  Cv: RRR     Lungs: normal effort  Ab: Soft, NT/ND  Ex: no CCE  Neuro: AAOx3    Scheduled Meds:  Current Facility-Administered Medications   Medication Dose Route Frequency Provider Last Rate    acetaminophen  650 mg Oral Q6H [address] Carlos Greenwood PA-C      amLODIPine  10 mg Oral Daily Rachid Ambrosio MD      atorvastatin  40 mg Oral QPM Rachid Ambrosio MD      docusate sodium  100 mg Oral BID Neal Cain MD      escitalopram  10 mg Oral QAM Carlos Gonzales PA-C      heparin (porcine)  5,000 Units Subcutaneous [facility] Rachid Ambrosio MD      hydrALAZINE  10 mg Intravenous Q6H PRN Alfredo Poole MD      HYDROmorphone  0 5 mg Intravenous Q2H PRN Rachid Ambrosio MD      hydrOXYzine HCL  25 mg Oral TID Rachid Ambrosio MD      insulin lispro  1-6 Units Subcutaneous Q6H [address] Rachid Ambrosio MD      Labetalol HCl  10 mg Intravenous Q4H PRN Jessee Mason MD      methocarbamol  500 mg Oral [facility] Destini Ramirez [place], [place]      metoclopramide  10 mg Intravenous Q6H PRN Zita Orellana      nalbuphine  10 mg Intravenous Q3H PRN Gerald Rajput MD      ondansetron  4 mg Intravenous Q6H PRN Edson Watt MD      oxyCODONE  5 mg Oral Q4H PRN Zita Orellana      oxyCODONE  7 5 mg Oral Q4H PRN Palmer Rudolph PA-C      pancrelipase (Lip-Prot-Amyl)  6,000 Units Oral TID With Meals Lj Pinzon MD      pantoprazole  40 mg Intravenous Q24H [address] Edson Watt MD      phenol  1 spray Mouth/Throat Q2H PRN Edson Watt MD      sertraline  25 mg Oral Daily Edson Watt MD      traZODone  25 mg Oral HS PRN Palmer Rudolph PA-C       Continuous Infusions:   PRN Meds: hydrALAZINE    HYDROmorphone    Labetalol HCl    metoclopramide    nalbuphine    ondansetron    oxyCODONE    oxyCODONE    phenol    traZODone      Invasive Devices     Peripheral Intravenous Line            Peripheral IV 08/29/21 Proximal;Right;Ventral (anterior) Forearm 1 day          Epidural Line            Epidural Catheter 08/24/21 6 days          Drain            Closed/Suction Drain Medial;Right; Anterior RUQ Bulb 19 Fr  -- days    Closed/Suction Drain Left; Anterior LUQ Bulb 19 Fr  6 days                Lab, Imaging and other studies: I have personally reviewed pertinent reports      VTE Pharmacologic Prophylaxis: Sequential compression device (Venodyne)   VTE Mechanical Prophylaxis: sequential compression device

## 2021-08-31 NOTE — PROGRESS NOTES
Progress Note - Acute Pain Service    Jaya 57 y o  female MRN: [id number]  Unit/Bed#: [facility] 902-01 Encounter: [id number]      Assessment:   Principal Problem:    Pancreatic mass    Jaya is a 57 y o  female   Status post exploratory laparotomy and whipple procedure on 08/24/2021  Epidural was placed preoperatively for postop pain control and subsequently removed on 08/29/2021  Plan:   · Tylenol 650 mg every 6 hours scheduled   · Robaxin 500 mg every 8 hours scheduled  · Oxycodone 5 mg every 4 hours as needed for moderate pain  · Oxycodone 7 5 mg every 4 hours as needed for severe pain  · Dilaudid 0 5 mg IV every 2 hours as needed for breakthrough pain  · Discontinue IV opioids in the next 24 hours if nausea improved and patient is tolerating oral diet    - Bowel regimen when appropriate from surgical perspective    Pain is controlled on the above medication regimen  Patient overall feels better today compared to yesterday regarding pain  Continues to report nausea  Would continue IV opioids for now due to nausea and plan to discontinue IV opioids once patient is tolerating oral diet  Treatment recommendations discussed with primary care service  At time of discharge would recommend to continue Tylenol, Robaxin and oxycodone 5 mg every 4-6 hours as needed for moderate or severe pain  APS will sign off at this time  Thank you for the consult  All opioids and other analgesics to be written at discretion of primary team  Please contact Acute Pain Service - [facility] via [employer] from [phone] with additional questions or concerns  See Serina or Marissa for additional contacts and after hours information  Pain History  Current pain location(s): abdomen  Pain Scale: 0-7  24 hour history:   Patient is resting in bed, reporting abdominal pain is a 4/10 at present time  Overall states that pain is better today compared to yesterday  P r n  oxycodone is effective    Required IV Dilaudid administration for breakthrough pain  Continues to report nausea, does not feel nausea after oxycodone administration      Opioid requirement previous 24 hours:  IV Dilaudid 1 5 mg, oxycodone 12 5 mg    Meds/Allergies   all current active meds have been reviewed and current meds:   Current Facility-Administered Medications   Medication Dose Route Frequency    acetaminophen (TYLENOL) tablet 650 mg  650 mg Oral Q6H [facility]    amLODIPine (NORVASC) tablet 10 mg  10 mg Oral Daily    atorvastatin (LIPITOR) tablet 40 mg  40 mg Oral QPM    docusate sodium (COLACE) capsule 100 mg  100 mg Oral BID    escitalopram (LEXAPRO) tablet 10 mg  10 mg Oral QAM    heparin (porcine) subcutaneous injection 5,000 Units  5,000 Units Subcutaneous Q8H [facility]    hydrALAZINE (APRESOLINE) injection 10 mg  10 mg Intravenous Q6H PRN    HYDROmorphone (DILAUDID) injection 0 5 mg  0 5 mg Intravenous Q2H PRN    hydrOXYzine HCL (ATARAX) tablet 25 mg  25 mg Oral TID    insulin lispro (HumaLOG) 100 units/mL subcutaneous injection 1-6 Units  1-6 Units Subcutaneous Q6H [facility]    Labetalol HCl (NORMODYNE) injection 10 mg  10 mg Intravenous Q4H PRN    methocarbamol (ROBAXIN) tablet 500 mg  500 mg Oral Q8H [facility]    metoclopramide (REGLAN) injection 10 mg  10 mg Intravenous Q6H PRN    nalbuphine (NUBAIN) injection 10 mg  10 mg Intravenous Q3H PRN    ondansetron (ZOFRAN) injection 4 mg  4 mg Intravenous Q6H PRN    oxyCODONE (ROXICODONE) IR tablet 5 mg  5 mg Oral Q4H PRN    oxyCODONE (ROXICODONE) IR tablet 7 5 mg  7 5 mg Oral Q4H PRN    pancrelipase (Lip-Prot-Amyl) (CREON) delayed release capsule 6,000 Units  6,000 Units Oral TID With Meals    pantoprazole (PROTONIX) injection 40 mg  40 mg Intravenous Q24H ABDIFATAH    phenol (CHLORASEPTIC) 1 4 % mucosal liquid 1 spray  1 spray Mouth/Throat Q2H PRN    sertraline (ZOLOFT) tablet 25 mg  25 mg Oral Daily    traZODone (DESYREL) tablet 25 mg  25 mg Oral HS PRN       No Known Allergies    Objective     Temp: [98 1 °F (36 7 °C)-98 6 °F (37 °C)] 98 6 °F (37 °C)  HR:  [76-86] 86  Resp:  [16-18] 16  BP: (156-174)/(75-80) 174/79    Physical Exam  Vitals reviewed  Constitutional:       General: She is awake  She is not in acute distress  Appearance: She is not ill-appearing, toxic-appearing or diaphoretic  HENT:      Head: Normocephalic and atraumatic  Nose: Nose normal    Pulmonary:      Effort: Pulmonary effort is normal  No tachypnea, bradypnea or respiratory distress  Skin:     Findings: No rash  Neurological:      Mental Status: She is alert and oriented to person, place, and time  Mental status is at baseline  Psychiatric:         Attention and Perception: Attention normal          Mood and Affect: Mood normal  Mood is not anxious  Speech: Speech normal          Behavior: Behavior normal  Behavior is cooperative  Lab Results:   Results from last 7 days   Lab Units 08/31/21  0515   WBC Thousand/uL 8 96   HEMOGLOBIN g/dL 9 1*   HEMATOCRIT % 28 6*   PLATELETS Thousands/uL 306      Results from last 7 days   Lab Units 08/31/21  0515 08/30/21  0647 08/24/21  1757 08/24/21  1704   POTASSIUM mmol/L 4 1 3 6  --   --    CHLORIDE mmol/L 103 106  --   --    CO2 mmol/L 25 27  --   --    CO2, I-STAT mmol/L  --   --   --  23   BUN mg/dL 7 6  --   --    CREATININE mg/dL 0 88 0 83  --   --    CALCIUM mg/dL 8 9 8 8  --   --    ALK PHOS U/L  --  150*   < >  --    ALT U/L  --  51   < >  --    AST U/L  --  46*   < >  --    GLUCOSE, ISTAT mg/dl  --   --   --  156*    < > = values in this interval not displayed  Please note that the APS provides consultative services regarding pain management only  With the exception of ketamine and epidural infusions and except when indicated, final decisions regarding starting or changing doses of analgesic medications are at the discretion of the consulting service  Off hours consultation and/or medication management is generally not available      Ila NORRIS HUONG Zheng  Acute Pain Service

## 2021-08-31 NOTE — UTILIZATION REVIEW
Continued Stay Review    Date: 8/31/21    POD # 7 Whipple                         Current Patient Class: IP  Current Level of Care: MS     HPI:61 y o  female initially admitted on 8/24 for elective surgery - s/p Whipple for neuroendocrine tumor  Assessment/Plan:   Pt is tolerating a diet and having bowel function  Will check drain amylase x 2  Have resumed Norvasc and BP improving  Abd soft, non-tender, not distended  Had 2 KAMLA drains R had 20 cc SS out and L had 135 cc SS out  Good UO  D/c IV fluids, will continue Creon  Nausea improved - used Zofran x 1 today  H/H rebounded w/o transfusion  Remains on 2L NC oxygen  Pain well controlled with combo of parenteral and oral analgesia  IV fluids stopped today       Vital Signs:   08/31/21 15:58:37  98 1 °F (36 7 °C)  78  18  161/78  106  98 %  --  --  --   08/31/21 09:18:44  --  78  18  160/81  107  96 %  --  --  --   08/31/21 08:17:43  98 6 °F (37 °C)  86  16  174/79Abnormal   111  98 %  --  --  --   08/31/21 0807  --  --  --  --  --  98 %  28  2 L/min  Nasal cannula   08/30/21 22:44:51  98 1 °F (36 7 °C)  77  18  167/80  109  98 %  --  --  --   08/30/21 14:51:31  98 1 °F (36 7 °C)  76  16  163/77  106  93 %  --  --  --   08/30/21 0940  --  --  --  156/75  --  --  --  --  --   08/30/21 0930  --  --  --  --  --  --  28  2 L/min  Nasal cannula   08/30/21 05:12:45  97 9 °F (36 6 °C)  75  --  184/90Abnormal   121  91 %  --  --  --   08/30/21 02:42:10  --  79  --  171/107Abnormal   128  95 %  --  --  --   08/30/21 01:01:28  --  78  --  179/86Abnormal   117  94 %  --  --  --   08/29/21 21:08:42  98 2 °F (36 8 °C)  78  --  191/84Abnormal   120  92 %  28  2 L/min  Nasal cannula   08/29/21 17:45:45  --  --  --  196/83Abnormal   121  --  28  2 L/min  Nasal cannula   08/29/21 1645  --  --  --  --  --  98 %  --  --  None (Room air)   08/29/21 15:57:21  --  74  --  199/86Abnormal   124  97 %  --  --  --   08/29/21 13:57:05  --  85  --  192/82Abnormal   119  97 %  --  -- --   08/29/21 1300  98 °F (36 7 °C)  --  --  --  --  --  --  --  --   08/29/21 12:30:19  --  95  --  204/67Abnormal   113  93 %  --  --  --   08/29/21 12:18:08  101 1 °F (38 4 °C)Abnormal   --  --  --  --  --  --  --  --   08/29/21 10:55:24  98 7 °F (37 1 °C)  78  16  136/71  93  91 %  --  --  --   08/29/21 08:15:56  97 7 °F (36 5 °C)  77  16  130/71  91  99 %  --  --  --   08/29/21 07:24:57  98 5 °F (36 9 °C)  73  --  132/105Abnormal   114  99 %  --  --  --   08/29/21 07:23:56  --  77  --  132/105Abnormal   114  97 %  --  --  --   08/29/21 0300  --  76  18  129/65  86  98 %  28  2 L/min  --   08/29/21 02:51:25  98 8 °F (37 1 °C)  78  20  129/65  86  97 %  --  --  --     Pertinent Labs/Diagnostic Results:       Results from last 7 days   Lab Units 08/31/21  0515 08/30/21  0647 08/29/21  1619 08/29/21  0545 08/28/21  1452 08/26/21  0510   WBC Thousand/uL 8 96 8 19 9 52 7 62  --  15 13*   HEMOGLOBIN g/dL 9 1* 7 5* 7 8* 6 9* 7 3* 8 1*   HEMATOCRIT % 28 6* 23 7* 24 6* 21 7* 23 5* 25 9*   PLATELETS Thousands/uL 306 228 232 191  --  158   NEUTROS ABS Thousands/µL 4 12  --   --  3 32  --  10 04*         Results from last 7 days   Lab Units 08/31/21  0515 08/30/21  6233 08/29/21  1619 08/29/21  0545 08/28/21  0448 08/27/21  0458 08/25/21  0427 08/24/21  1757 08/24/21  1704   SODIUM mmol/L 136 137  --  136 138 137 140 141  --    POTASSIUM mmol/L 4 1 3 6  --  4 2 4 4 4 6 3 9 4 1  --    CHLORIDE mmol/L 103 106  --  108 109* 109* 109* 111*  --    CO2 mmol/L 25 27  --  25 25 26 28 21  --    CO2, I-STAT mmol/L  --   --   --   --   --   --   --   --  23   ANION GAP mmol/L 8 4  --  3* 4 2* 3* 9  --    BUN mg/dL 7 6  --  8 8 10 14 13  --    CREATININE mg/dL 0 88 0 83  --  0 98 0 93 1 01 1 09 1 28  --    EGFR ml/min/1 73sq m 71 76  --  62 66 60 55 45  --    CALCIUM mg/dL 8 9 8 8  --  8 1* 8 3 7 8* 7 5* 7 7*  --    CALCIUM, IONIZED mmol/L  --   --   --   --   --   --  1 02* 1 06*  --    CALCIUM, IONIZED, ISTAT mmol/L  --   --   -- --   --   --   --   --  1 10*   MAGNESIUM mg/dL 1 9  --  1 9  --   --   --  2 4 1 6  --    PHOSPHORUS mg/dL 2 8  --   --   --   --   --  2 0* 2 9  --      Results from last 7 days   Lab Units 08/31/21  0515 08/30/21  0647 08/29/21  0545 08/28/21  0448 08/27/21  0458   AST U/L 51* 46* 54* 71* 96*   ALT U/L 51 51 59 73 88*   ALK PHOS U/L 178* 150* 142* 137* 134*   TOTAL PROTEIN g/dL 7 8 6 7 6 1* 6 1* 5 7*   ALBUMIN g/dL 2 7* 2 3* 2 1* 2 1* 2 1*   TOTAL BILIRUBIN mg/dL 1 14* 1 19* 1 36* 2 59* 4 36*   BILIRUBIN DIRECT mg/dL 0 74*  --   --   --   --      Results from last 7 days   Lab Units 08/31/21  1148 08/31/21  0549 08/31/21  0019 08/30/21  1732 08/30/21  1304 08/30/21  0507 08/29/21  2357 08/29/21  1728 08/29/21  1249 08/29/21  0553 08/28/21  2358 08/28/21  2039   POC GLUCOSE mg/dl 128 122 127 117 140 125 125 143* 109 122 103 102     Results from last 7 days   Lab Units 08/31/21  0515 08/30/21  0647 08/29/21  0545 08/28/21  0448 08/27/21  0458 08/26/21  0510 08/25/21  0427 08/24/21  1757   GLUCOSE RANDOM mg/dL 117 120 107 105 106 133 130 152*     Results from last 7 days   Lab Units 08/24/21  1802   PH ART  7 305*   PCO2 ART mm Hg 46 0*   PO2 ART mm Hg 144 4*   HCO3 ART mmol/L 22 4   BASE EXC ART mmol/L -3 9   O2 CONTENT ART mL/dL 13 5*   O2 HGB, ARTERIAL % 97 9*   ABG SOURCE  Line, Arterial         Results from last 7 days   Lab Units 08/24/21  1704   I STAT BASE EXC mmol/L -3*   I STAT O2 SAT % 99*   ISTAT PH ART  7 404   I STAT ART PCO2 mm HG 34 6*   I STAT ART PO2 mm  0*   I STAT ART HCO3 mmol/L 21 6*         Results from last 7 days   Lab Units 08/29/21  1931 08/29/21  1619   TROPONIN I ng/mL <0 02 <0 02     Results from last 7 days   Lab Units 08/25/21  0427 08/25/21  0214 08/24/21  2025 08/24/21  1758   LACTIC ACID mmol/L 2 1* 3 9* 4 3* 5 1*       Medications:   Scheduled Medications:  acetaminophen, 650 mg, Oral, Q6H ABDIFATAH  amLODIPine, 10 mg, Oral, Daily  atorvastatin, 40 mg, Oral, QPM  docusate sodium, 100 mg, Oral, BID  escitalopram, 10 mg, Oral, QAM  heparin (porcine), 5,000 Units, Subcutaneous, Q8H ABDIFATAH  hydrOXYzine HCL, 25 mg, Oral, TID  insulin lispro, 1-6 Units, Subcutaneous, Q6H ABDIFATAH  methocarbamol, 500 mg, Oral, Q8H ABDIFATAH  pancrelipase (Lip-Prot-Amyl), 6,000 Units, Oral, TID With Meals  pantoprazole, 40 mg, Intravenous, Q24H ABDIFATAH  sertraline, 25 mg, Oral, Daily      Continuous IV Infusions:     PRN Meds:  hydrALAZINE, 10 mg, Intravenous, Q6H PRN - x 1 8/30  HYDROmorphone, 0 5 mg, Intravenous, Q2H PRN - x 4 8/30, x 2 8/31  Labetalol HCl, 10 mg, Intravenous, Q4H PRN - x 1 8/30  metoclopramide, 10 mg, Intravenous, Q6H PRN  nalbuphine, 10 mg, Intravenous, Q3H PRN  ondansetron, 4 mg, Intravenous, Q6H PRN - x 2 8/31, x 1 8/31  oxyCODONE, 5 mg, Oral, Q4H PRN -x 2 8/30, x 1 8/31  oxyCODONE, 7 5 mg, Oral, Q4H PRN -x 1 8/30  phenol, 1 spray, Mouth/Throat, Q2H PRN  traZODone, 25 mg, Oral, HS PRN    Discharge Plan: [facility]     Network Utilization Review Department  ATTENTION: Please call with any questions or concerns to [phone] and carefully listen to the prompts so that you are directed to the right person  All voicemails are confidential   Katt Rivera all requests for admission clinical reviews, approved or denied determinations and any other requests to dedicated fax number below belonging to the campus where the patient is receiving treatment   List of dedicated fax numbers for the Facilities:  [address] DENIALS (Administrative/Medical Necessity) [phone]   [address] (Maternity/NICU/Pediatrics) [phone] [address] [phone] [address] Yfn Pickettel Quintin [phone] [place] [address] [facility] Margo [phone] [phone]

## 2021-08-31 NOTE — NUTRITION
08/31/21 1558   Recommendations/Interventions   Summary RD does not have protocol to add oral nutrition supplements   Nutrition Recommendations Continue diet as ordered; Other (Specify)  (recommend ensure compact TID to optimize PO intake)

## 2021-09-01 VITALS
TEMPERATURE: 98.3 F | HEIGHT: 64 IN | WEIGHT: 171 LBS | OXYGEN SATURATION: 97 % | DIASTOLIC BLOOD PRESSURE: 76 MMHG | RESPIRATION RATE: 18 BRPM | BODY MASS INDEX: 29.19 KG/M2 | HEART RATE: 83 BPM | SYSTOLIC BLOOD PRESSURE: 157 MMHG

## 2021-09-01 LAB
GLUCOSE SERPL-MCNC: 93 MG/DL (ref 65–140)
GLUCOSE SERPL-MCNC: 99 MG/DL (ref 65–140)

## 2021-09-01 PROCEDURE — NC001 PR NO CHARGE: Performed by: SURGERY

## 2021-09-01 PROCEDURE — 82948 REAGENT STRIP/BLOOD GLUCOSE: CPT

## 2021-09-01 PROCEDURE — 99024 POSTOP FOLLOW-UP VISIT: CPT | Performed by: SURGERY

## 2021-09-01 PROCEDURE — C9113 INJ PANTOPRAZOLE SODIUM, VIA: HCPCS | Performed by: SURGERY

## 2021-09-01 RX ORDER — PANTOPRAZOLE SODIUM 40 MG/1
40 TABLET, DELAYED RELEASE ORAL
Status: DISCONTINUED | OUTPATIENT
Start: 2021-09-02 | End: 2021-09-01 | Stop reason: HOSPADM

## 2021-09-01 RX ORDER — OXYCODONE HYDROCHLORIDE 5 MG/1
5 TABLET ORAL EVERY 4 HOURS PRN
Qty: 20 TABLET | Refills: 0 | Status: SHIPPED | OUTPATIENT
Start: 2021-09-01 | End: 2021-09-06

## 2021-09-01 RX ADMIN — PANTOPRAZOLE SODIUM 40 MG: 40 INJECTION, POWDER, FOR SOLUTION INTRAVENOUS at 05:29

## 2021-09-01 RX ADMIN — ESCITALOPRAM OXALATE 10 MG: 10 TABLET ORAL at 08:04

## 2021-09-01 RX ADMIN — METHOCARBAMOL 500 MG: 500 TABLET, FILM COATED ORAL at 05:28

## 2021-09-01 RX ADMIN — OXYCODONE HYDROCHLORIDE 5 MG: 5 TABLET ORAL at 05:28

## 2021-09-01 RX ADMIN — SERTRALINE HYDROCHLORIDE 25 MG: 25 TABLET ORAL at 08:04

## 2021-09-01 RX ADMIN — DOCUSATE SODIUM 100 MG: 100 CAPSULE, LIQUID FILLED ORAL at 08:04

## 2021-09-01 RX ADMIN — ACETAMINOPHEN 650 MG: 325 TABLET, FILM COATED ORAL at 05:28

## 2021-09-01 RX ADMIN — AMLODIPINE BESYLATE 10 MG: 10 TABLET ORAL at 08:04

## 2021-09-01 RX ADMIN — HEPARIN SODIUM 5000 UNITS: 5000 INJECTION INTRAVENOUS; SUBCUTANEOUS at 05:29

## 2021-09-01 RX ADMIN — PANCRELIPASE 6000 UNITS: 30000; 6000; 19000 CAPSULE, DELAYED RELEASE PELLETS ORAL at 08:04

## 2021-09-01 NOTE — PROGRESS NOTES
Progress Note - Surgical Oncology  Jorge Loo 57 y o  female MRN: [id number]  Unit/Bed#: [facility] 902-01 Encounter: [id number]    Assessment:  Patient is a 57 y o  female who presented with pancreatic neuroendocrine tumor, now status post Whipple on 8/24  KAMLA in left upper quadrant:  60 serous  KAMLA in right upper quadrant:  10 serous  KAMLA amylase is x2 were both 6  Afebrile,VSS      Plan:  Diet as tolerated  Can DC 1 KAMLA prior to discharge  PT/OT cleared for home  Discharge home today with home VNA        Subjective/Objective     Subjective:   No acute events overnight  Objective:    Blood pressure 155/77, pulse 76, temperature 98 1 °F (36 7 °C), resp  rate 18, height 5' 4" (1 626 m), weight 77 6 kg (171 lb), SpO2 97 %, not currently breastfeeding  ,Body mass index is 29 35 kg/m²  Intake/Output Summary (Last 24 hours) at 9/1/2021 0742  Last data filed at 9/1/2021 0541  Gross per 24 hour   Intake --   Output 1845 ml   Net -1845 ml       Invasive Devices     Peripheral Intravenous Line            Peripheral IV 08/29/21 Proximal;Right;Ventral (anterior) Forearm 2 days          Drain            Closed/Suction Drain Medial;Right; Anterior RUQ Bulb 19 Fr  -- days    Closed/Suction Drain Left; Anterior LUQ Bulb 19 Fr  7 days                Physical Exam  Vitals reviewed  Constitutional:       General: She is not in acute distress  Appearance: She is not ill-appearing or toxic-appearing  HENT:      Head: Normocephalic and atraumatic  Nose: Nose normal       Mouth/Throat:      Mouth: Mucous membranes are moist    Eyes:      Extraocular Movements: Extraocular movements intact  Cardiovascular:      Rate and Rhythm: Normal rate  Pulmonary:      Effort: Pulmonary effort is normal  No respiratory distress  Abdominal:      General: There is no distension  Palpations: Abdomen is soft  Tenderness: There is no abdominal tenderness        Comments: Incisions clean, dry and intact  JPx2 serous Musculoskeletal:      Cervical back: Normal range of motion  Skin:     General: Skin is warm  Neurological:      Mental Status: She is alert and oriented to person, place, and time  Psychiatric:         Mood and Affect: Mood normal          Behavior: Behavior normal          Thought Content:  Thought content normal          Judgment: Judgment normal              Results from last 7 days   Lab Units 08/31/21  0515 08/30/21  0647 08/29/21  1619   WBC Thousand/uL 8 96 8 19 9 52   HEMOGLOBIN g/dL 9 1* 7 5* 7 8*   HEMATOCRIT % 28 6* 23 7* 24 6*   PLATELETS Thousands/uL 306 228 232     Results from last 7 days   Lab Units 08/31/21  0515 08/30/21  0647 08/29/21  0545   POTASSIUM mmol/L 4 1 3 6 4 2   CHLORIDE mmol/L 103 106 108   CO2 mmol/L 25 27 25   BUN mg/dL 7 6 8   CREATININE mg/dL 0 88 0 83 0 98   CALCIUM mg/dL 8 9 8 8 8 1*

## 2021-09-01 NOTE — CASE MANAGEMENT
S/w white surgery for care coordination  They will remove one drain & leave one in  Thinks dtr can help with drain care & daily measurements  TC to dtr Tabitha Hernandez whom states she can help care for pt's drain  Plan is for dc later today  Family will transport

## 2021-09-01 NOTE — NURSING NOTE
Instructions for emptying and resetting KAMLA drain given to daughter, Chary Gonzalez  Chary Gonzalez demonstrated both  She was verbally instructed to keep track of volumes to report to office at next visit  Questions answered to patient and family's satisfaction

## 2021-09-01 NOTE — RESPIRATORY THERAPY NOTE
RT Protocol Note  [facility] 57 [facility]  [facility] MRN: [id number]  [facility] Encounter: [id number]    Assessment    Principal Problem:    Pancreatic mass      Home Pulmonary Medications:     09/01/21 0928   Respiratory Assessment   Assessment Type Assess only   General Appearance Alert; Awake   Respiratory Pattern Normal   Chest Assessment Chest expansion symmetrical   Bilateral Breath Sounds Clear   Resp Comments Patient pulling 1000cc's on I S, good technique, able to perform independently  Will discontinue ACP  Past Medical History:   Diagnosis Date    Back ache     Cancer ([address] )     Hyperlipidemia     Hypertension      Social History     Socioeconomic History    Marital status: /Civil Union     Spouse name: Peewee Villanueva [facility]    Number of children: None    Years of education: None    Highest education level: None   Occupational History    Occupation: unemployed   Tobacco Use    Smoking status: Former Smoker     Packs/day: 0 65     Years: 30 00     Pack years: 19 50    Smokeless tobacco: Never Used    Tobacco comment: quit 15 years ago   Vaping Use    Vaping Use: Never used   Substance and Sexual Activity    Alcohol use: Not Currently    Drug use: No    Sexual activity: Not Currently   Other Topics Concern    None   Social History Narrative    None     Social Determinants of Health     Financial Resource Strain: Low Risk     Difficulty of Paying Living Expenses: Not very hard   Food Insecurity: Food Insecurity Present    Worried About Running Out of Food in the Last Year: Sometimes true    Bere of Food in the Last Year: Sometimes true   Transportation Needs: No Transportation Needs    Lack of Transportation (Medical): No    Lack of Transportation (Non-Medical):  No   Physical Activity: Inactive    Days of Exercise per Week: 0 days    Minutes of Exercise per Session: 0 min   Stress:     Feeling of Stress :    Social Connections:     Frequency of Communication with Friends and Family:     Frequency of Social Gatherings with Friends and Family:     Attends [employer] Services:     Active Member of Clubs or Organizations:     Attends Club or Organization Meetings:     Marital Status:    Intimate Partner Violence:     Fear of Current or Ex-Partner:     Emotionally Abused:     Physically Abused:     Sexually Abused:        Subjective         Objective    Physical Exam:   Assessment Type: Assess only  General Appearance: Alert, Awake  Respiratory Pattern: Normal  Chest Assessment: Chest expansion symmetrical  Bilateral Breath Sounds: Clear    Vitals:  Blood pressure 157/76, pulse 83, temperature 98 3 °F (36 8 °C), resp  rate 18, height 5' 4" (1 626 m), weight 77 6 kg (171 lb), SpO2 97 %, not currently breastfeeding  Imaging and other studies: I have personally reviewed pertinent films in PACS    O2 Device: RA     Plan       Airway Clearance Plan: Incentive Spirometer     Resp Comments: (P) Patient pulling 1000cc's on I S, good technique, able to perform independently  Will discontinue ACP

## 2021-09-01 NOTE — QUICK NOTE
The suture securing the RIGHT KAMLA drain was cut and the drain was removed in one motion  The site was dressed with gauze and secured  The patient tolerated the procedure well  There was no bleeding or significant drainage

## 2021-09-02 ENCOUNTER — TRANSITIONAL CARE MANAGEMENT (OUTPATIENT)
Dept: INTERNAL MEDICINE CLINIC | Facility: CLINIC | Age: 61
End: 2021-09-02

## 2021-09-02 NOTE — UTILIZATION REVIEW
Notification of Discharge   This is a Notification of Discharge from our facility [address]  Please be advised that this patient has been discharge from our facility  Below you will find the admission and discharge date and time including the patients disposition  UTILIZATION REVIEW CONTACT:  Rosanna Beltre  Utilization   Network Utilization Review Department  Phone: [phone] x carefully listen to the prompts  All voicemails are confidential   Email: [email or web address]     PHYSICIAN ADVISORY SERVICES:  FOR YSWB-CP-INSI REVIEW - MEDICAL NECESSITY DENIAL  Phone: [phone]  Fax: [phone]  Email: [email or web address]     PRESENTATION DATE: 8/24/2021  6:16 AM  OBERVATION ADMISSION DATE:   INPATIENT ADMISSION DATE: 8/24/21  5:37 PM   DISCHARGE DATE: 9/1/2021  1:05 PM  DISPOSITION: Home/Self Care Home/Self Care      IMPORTANT INFORMATION:  Send all requests for admission clinical reviews, approved or denied determinations and any other requests to dedicated fax number below belonging to the campus where the patient is receiving treatment   List of dedicated fax numbers:  [address] DENIALS (Administrative/Medical Necessity) [phone]   [address] [place] [address] (Maternity/NICU/Pediatrics) [phone]   Yoan Kim [phone]   Dora Barnes [phone]   Dmitriy Alves [phone]   [facility] [address] [place] [address] [phone]   [facility]  [phone]   2206 [facility], [facility]  24[address] [facility] 1000 [address] [facility] [phone]

## 2021-09-03 NOTE — TELEPHONE ENCOUNTER
Update appreciated, thank you so much  Please ensure her YULIYA is addressed with Dr Brenda Shetty during huddle prior to seeing the patient 
Your patient has been diagnosed with acute kidney injury (YULIYA), based on his/her post nursing home or hospital discharge BMP, using international YULIYA criteria (KDIGO)  Please address this ASAP to prevent worsening renal function  Some effective maneuvers may be allowing for a SBP of 130-140, discontinuing all NSAIDs, temporarily holding ACEs/ARBs and diuretics if clinically appropriate  Please ensure that a follow-up BMP is ordered in one week  Spoke to patient, she was made aware of YULIYA while she was in the hospital  She stated her daughter will take her for BMP lab on 4/21/21  She is scheduled for TCM on 4/22/21 with Dr Nancy Costello and her daughter is calling Nephrology today to schedule  She stated she still has a headache which she had in the hospital as well  Her BP today is 168/82 and her weight is 181 pounds and she is aware to monitor these both daily  She stated she lost 5 pounds since being home because she is not eating very much because she doesn't feel good  She is aware to call me back with any problems 
fyi
Pt c/o abd pain and bloody stools.

## 2021-09-06 NOTE — DISCHARGE SUMMARY
Discharge Summary - Morena Mary 57 y o  female MRN: [id number]    Unit/Bed#: [facility] 902-01 Encounter: [id number]    Admission Date:   Admission Orders (From admission, onward)     Ordered        08/24/21 1737  Inpatient Admission  Once                     Admitting Diagnosis: Neuroendocrine tumor of pancreas [D3A 8]     HPI: Patient is a 28-year-old woman here for follow-up visit status post repeat EUS with biopsy   -Interval History:  She has no complaints to report except for occasional epigastric pain   This has been constant/persistent  [place] Colon is being worked up presently for pancreatic head mass, suspected neuroendocrine tumor  Procedures Performed:   8/24/21 Whipple    Summary of Hospital Course:  Patient underwent elective Whipple procedure on August 24th  The operation was uneventful  She was admitted to the ICU was planned postoperatively, extubated  Her immediate postoperative course was notable for lactic acidosis which cleared appropriately  Patient did have an epidural which EPS managed  Her pain was well controlled  She was downgraded to step-down level of care on postoperative day 2  Clear liquids were started, which she tolerated  She had transient hyperbilirubinemia which was attributed to biliary anastomosis edema  Patient was advanced to toast and crackers on postoperative day 4, which she tolerated  Epidural was removed on postoperative day 5  Diet was advanced to gastrectomy diet on postoperative day 6, Colace and Creon was started  She tolerated her diet, and KAMLA amylase was checked on postoperative day 7, which was normal   Right upper quadrant KAMLA was discontinued on postoperative day 8, the patient was discharged in good condition      Complications: None    Discharge Diagnosis: G1 2 3cm well-differentiated neuroendocrine tumor of pancreas    Medical Problems     Resolved Problems  Date Reviewed: 7/6/2021    None                Condition at Discharge: good         Discharge instructions/Information to patient and family:   See after visit summary for information provided to patient and family  Provisions for Follow-Up Care:  See after visit summary for information related to follow-up care and any pertinent home health orders  PCP: Tamy Dale DO    Disposition: Home    Planned Readmission: No      Discharge Statement   I spent 25 minutes discharging the patient  This time was spent on the day of discharge  I had direct contact with the patient on the day of discharge  Additional documentation is required if more than 30 minutes were spent on discharge  Discharge Medications:  See after visit summary for reconciled discharge medications provided to patient and family

## 2021-09-15 ENCOUNTER — OFFICE VISIT (OUTPATIENT)
Dept: SURGICAL ONCOLOGY | Facility: CLINIC | Age: 61
End: 2021-09-15

## 2021-09-15 VITALS
HEART RATE: 85 BPM | DIASTOLIC BLOOD PRESSURE: 82 MMHG | SYSTOLIC BLOOD PRESSURE: 134 MMHG | BODY MASS INDEX: 29.35 KG/M2 | OXYGEN SATURATION: 99 % | HEIGHT: 64 IN | TEMPERATURE: 98.2 F | RESPIRATION RATE: 18 BRPM

## 2021-09-15 DIAGNOSIS — D3A.8 NEUROENDOCRINE TUMOR OF PANCREAS: Primary | ICD-10-CM

## 2021-09-15 PROCEDURE — 99024 POSTOP FOLLOW-UP VISIT: CPT | Performed by: SURGERY

## 2021-09-15 NOTE — LETTER
September 15, 2021     [address]  [address] [place] [id number]    Patient: Morena Mary   YOB: 1960   Date of Visit: 9/15/2021       Dear Dr Lizy Jenkins: Thank you for referring Morena Mary to me for evaluation  Below are my notes for this consultation  If you have questions, please do not hesitate to call me  I look forward to following your patient along with you  Sincerely,        An Mason MD        CC: MD An Dobbins MD  9/15/2021 12:20 PM  Sign when Signing Visit     Surgical Oncology Follow Up       [address] [address]    Morena Mary  1960  [id number]  [facility] SURGICAL ONCOLOGY [address] [address]    Chief Complaint   Patient presents with    Post-op       Assessment/Plan:    No problem-specific Assessment & Plan notes found for this encounter  Diagnoses and all orders for this visit:    Neuroendocrine tumor of pancreas  -     CT chest abdomen pelvis w contrast; Future  -     Basic metabolic panel; Future  -     Chromogranin A; Future  -     Hemoglobin A1C; Future        Advance Care Planning/Advance Directives:  Discussed disease status, cancer treatment plans and/or cancer treatment goals with the patient  Oncology History   Neuroendocrine tumor of pancreas   6/17/2021 Biopsy    Pancreas, Head Cyst:   Well-differentiated neuroendocrine tumor  8/24/2021 Surgery    Whipple:  - Well differentiated neuroendocrine tumor (2 3 cm ), G1   - Pancreatic resection margin is positive for tumor  - All other margins are negative for tumor   - Seven lymph nodes, negative for malignancy (0/7)  Pancreas new margin, re-excision:  - Adipose tissue with pancreatic microadenoma     - No tumor present            History of Present Illness:   Patient is a 69-year-old woman here for postop check status post Whipple procedure for well-differentiated neuroendocrine tumor, stage I  -Interval History: since surgery, she has done quite well  She is eating normally at this point  She has 1 drain left in place which is putting out very minimal serous output on a daily basis  All-in-all she feels quite well  Review of Systems:  Review of Systems   Constitutional: Negative  Negative for appetite change and fatigue  HENT: Negative  Eyes: Negative  Respiratory: Negative  Cardiovascular: Negative  Gastrointestinal: Negative  Negative for abdominal distention, abdominal pain, anal bleeding, blood in stool, constipation, diarrhea, nausea and vomiting  Endocrine: Negative  Genitourinary: Negative  Musculoskeletal: Negative  Skin: Negative  Allergic/Immunologic: Negative  Neurological: Negative  Hematological: Negative  Psychiatric/Behavioral: Negative          Patient Active Problem List   Diagnosis    Acute low back pain due to trauma    Benign hypertension with CKD (chronic kidney disease), stage II    Vision disturbance    Noncompliance with medications    Stroke-like symptoms    Incidental pulmonary nodule    Hyperlipemia    Tarsal tunnel syndrome, left    Pancreatic mass    Leukocytosis    Depression    Goals of care, counseling/discussion    Stage 2 chronic kidney disease    Neuroendocrine tumor of pancreas     Past Medical History:   Diagnosis Date    Back ache     Cancer ([address] )     Hyperlipidemia     Hypertension      Past Surgical History:   Procedure Laterality Date    APPENDECTOMY      BILATERAL OOPHORECTOMY      BREAST SURGERY Right     partial mastectomy     COLONOSCOPY      HERNIA REPAIR      HYSTERECTOMY      LAPAROTOMY N/A 8/24/2021    Procedure: LAPAROTOMY EXPLORATORY;  Surgeon: Nyla Trevino MD;  [facility]: [facility];  Service: Surgical Oncology    MASTECTOMY Right     partial    WHIPPLE PROCEDURE/PANCREATICO-DUODENECTOMY N/A 8/24/2021    Procedure: WHIPPLE PROCEDURE/PANCREATICO-DUODENECTOMY;  Surgeon: Kody Hemphill MD;  Location[facility];  Service: Surgical Oncology     Family History   Problem Relation Age of Onset    Hypertension Mother     Heart disease Mother     Diabetes Mother     Cancer Father      Social History     Socioeconomic History    Marital status: /Civil Union     Spouse name: Rosa Maria Villanueva [facility]    Number of children: Not on file    Years of education: Not on file    Highest education level: Not on file   Occupational History    Occupation: unemployed   Tobacco Use    Smoking status: Former Smoker     Packs/day: 0 65     Years: 30 00     Pack years: 19 50    Smokeless tobacco: Never Used    Tobacco comment: quit 15 years ago   Vaping Use    Vaping Use: Never used   Substance and Sexual Activity    Alcohol use: Not Currently    Drug use: No    Sexual activity: Not Currently   Other Topics Concern    Not on file   Social History Narrative    Not on file     Social Determinants of Health     Financial Resource Strain: Low Risk     Difficulty of Paying Living Expenses: Not very hard   Food Insecurity: Food Insecurity Present    Worried About Running Out of Food in the Last Year: Sometimes true    Bere of Food in the Last Year: Sometimes true   Transportation Needs: No Transportation Needs    Lack of Transportation (Medical): No    Lack of Transportation (Non-Medical):  No   Physical Activity: Inactive    Days of Exercise per Week: 0 days    Minutes of Exercise per Session: 0 min   Stress:     Feeling of Stress :    Social Connections:     Frequency of Communication with Friends and Family:     Frequency of Social Gatherings with Friends and Family:     Attends Mandaeism Services:     Active Member of Clubs or Organizations:     Attends Club or Organization Meetings:     Marital Status:    Intimate Partner Violence:     Fear of Current or Ex-Partner:     Emotionally Abused:     Physically Abused:     Sexually Abused:        Current Outpatient Medications:     amLODIPine (NORVASC) 10 mg tablet, Take 1 tablet (10 mg total) by mouth daily, Disp: 90 tablet, Rfl: 3    atorvastatin (LIPITOR) 40 mg tablet, Take 1 tablet (40 mg total) by mouth every evening, Disp: 90 tablet, Rfl: 3    clobetasol (TEMOVATE) 0 05 % ointment, Apply twice a day to rash on hands for 2 weeks  Then, apply twice a day from Monday-Friday for another 2 weeks  Avoid the face and groin, Disp: 60 g, Rfl: 0    dicyclomine (BENTYL) 10 mg capsule, Take 1 capsule (10 mg total) by mouth 4 (four) times a day (before meals and at bedtime), Disp: 180 capsule, Rfl: 0    escitalopram (LEXAPRO) 10 mg tablet, Take 1 tablet (10 mg total) by mouth every morning, Disp: 90 tablet, Rfl: 3    hydrOXYzine HCL (ATARAX) 25 mg tablet, Take 25 mg by mouth daily at bedtime as needed, Disp: , Rfl: 0    lisinopril (ZESTRIL) 20 mg tablet, Take 1 tablet (20 mg total) by mouth daily, Disp: 90 tablet, Rfl: 3    pancrelipase, Lip-Prot-Amyl, (CREON) 6,000 units delayed release capsule, Take 1 capsule (6,000 Units total) by mouth 3 (three) times a day with meals, Disp: 270 capsule, Rfl: 0    polyethylene glycol (GLYCOLAX) 17 GM/SCOOP powder, At 5pm take 5mgx2 dulcolax  At 6pm 32oz miralax in 64oz gatorade  Drink 8oz glass every 5 mins until 32oz finished   Drink remaining as rec , Disp: 238 g, Rfl: 0    sertraline (ZOLOFT) 25 mg tablet, Take 25 mg by mouth daily, Disp: , Rfl:     traZODone (DESYREL) 50 mg tablet, take 1/2 tablet by mouth at bedtime if needed for insomnia, Disp: , Rfl:     triamcinolone (KENALOG) 0 1 % ointment, Apply 1 application topically 2 (two) times a day, Disp: 30 g, Rfl: 2    omeprazole (PriLOSEC) 40 MG capsule, Take 1 capsule (40 mg total) by mouth daily, Disp: 30 capsule, Rfl: 0    ondansetron (ZOFRAN-ODT) 4 mg disintegrating tablet, Take 1 tablet (4 mg total) by mouth every 6 (six) hours as needed for nausea or vomiting (Patient not taking: Reported on 7/12/2021), Disp: 30 tablet, Rfl: 0  No Known Allergies  Vitals:    09/15/21 1149   BP: 134/82   Pulse: 85   Resp: 18   Temp: 98 2 °F (36 8 °C)   SpO2: 99%       Physical Exam  Constitutional:       General: She is not in acute distress  Appearance: Normal appearance  She is not ill-appearing or toxic-appearing  HENT:      Head: Normocephalic and atraumatic  Eyes:      Extraocular Movements: Extraocular movements intact  Conjunctiva/sclera: Conjunctivae normal       Pupils: Pupils are equal, round, and reactive to light  Cardiovascular:      Rate and Rhythm: Normal rate and regular rhythm  Heart sounds: Normal heart sounds  Pulmonary:      Effort: Pulmonary effort is normal       Breath sounds: Normal breath sounds  Abdominal:      General: Abdomen is flat  There is no distension  Palpations: Abdomen is soft  There is no mass  Tenderness: There is no abdominal tenderness  There is no guarding or rebound  Hernia: No hernia is present  Musculoskeletal:         General: Normal range of motion  Cervical back: Normal range of motion and neck supple  Skin:     General: Skin is warm and dry  Neurological:      General: No focal deficit present  Mental Status: She is alert and oriented to person, place, and time  Psychiatric:         Mood and Affect: Mood normal          Behavior: Behavior normal          Thought Content:  Thought content normal            Results:  Labs:  Case Report   Surgical Pathology Report                         Case: [id number]                                    Authorizing Provider: Neal Cain MD        Collected:           08/24/2021 0950               Ordering Location:     [address]      Received:            08/24/2021 [address] [place] [address] Operating Room                                                       Pathologist:           Gerry Hawkins MD                                                                  Intraop:               Fredi Osullivan MD                                                    Specimens:   A) - Gallbladder                                                                                     B) - Pancreas, pacreatic duoenedectomy                                                               C) - Pancreas, new margin, purple is new final marfgin                                     Final Diagnosis   A  Gallbladder, cholecystectomy:  - Chronic cholecystitis  - Negative for malignancy      B  Proximal pancrease, duodenum, pancreaticoduodenectomy:  - Well differentiated neuroendocrine tumor (2 3 cm ), G1   - Portion of duodenum with no pathologic abnormality   - Pancreatic resection margin is positive for tumor, see part D for final margin status  - All other margins are negative for tumor   - Seven lymph nodes, negative for malignancy (0/7)  C  Pancreas new margin, re-excision:  - Adipose tissue with pancreatic microadenoma  - No tumor present     Comment: Immunohistochemistry was performed on block B11  The tumor cells are positive for AE1/3, synaptophysin, chromogranin A, CD56; Ki-67 shows mitotic proliferative index is less than 3%; D2-40 and CD31 are also performed to help in the assessment of this case  Electronically signed by Gerry Hawkins MD on 8/30/2021 at 10:10 AM   Comments: This is an appended report  These results have been appended to a previously preliminary verified report        Additional Information    All reported additional testing was performed with appropriately reactive controls   These tests were developed and their performance characteristics determined by [address] [place] [address] or appropriate performing facility, though some tests may be performed on tissues which have not been validated for performance characteristics (such as staining performed on alcohol exposed cell blocks and decalcified tissues)   Results should be interpreted with caution and in the context of the patients clinical condition  These tests may not be cleared or approved by the [place]  Food and Drug Administration, though the FDA has determined that such clearance or approval is not necessary  These tests are used for clinical purposes and they should not be regarded as investigational or for research  This laboratory has been approved by [place] [id number], designated as a high-complexity laboratory and is qualified to perform these tests      Comment: This is an appended report  These results have been appended to a previously preliminary verified report  This is an appended report  These results have been appended to a previously preliminary verified report     Synoptic Checklist   PANCREAS NEUROENDOCRINE TUMOR  8th Edition - Protocol posted: 2/26/2020  PANCREAS (ENDOCRINE): RESECTION - All Specimens  CLINICAL   Clinical History  Not specified    Functional Type  Pancreatic neuroendocrine tumor, functional status unknown    SPECIMEN   Procedure  Pancreaticoduodenectomy (Whipple resection), partial pancreatectomy    TUMOR   Tumor Site  pancreatic head/neck    Histologic Type and Grade  G1: Well-differentiated neuroendocrine tumor         Mitotic Rate  < 2 mitoses / 2 mm2         Ki-67 Labeling Index  < 3%    Tumor Size  Greatest dimension (Centimeters): 2 2 cm   Additional Dimension (Centimeters)  1 8 cm     1 5 cm   Tumor Focality  Unifocal    Tumor Extension  Tumor is limited to the pancreas    Lymphovascular Invasion  Not identified    Perineural Invasion  Present    Tumor Necrosis  Not identified    MARGINS   Margins  All margins are uninvolved by tumor    Margins Examined  Distal pancreatic parenchymal      Bile duct      Proximal (gastric or duodenal)      Distal (distal duodenal or jejunal)    Distance of Tumor from Closest Margin  Cannot be determined: at least 8 mm     Closest Margin Distal pancreatic parenchymal    LYMPH NODES   Number of Lymph Nodes Involved  0    Number of Lymph Nodes Examined  7    PATHOLOGIC STAGE CLASSIFICATION (pTNM, AJCC 8th Edition)   Primary Tumor (pT)  pT2    Regional Lymph Nodes (pN)  pN0    ADDITIONAL FINDINGS   Additional Findings  Atrophy      Adenomatosis (multiple neuroendocrine tumors, each less than 5 mm in greatest dimension)      Intraoperative Consultation       FSB   - Pancreatic neck margin positive for tumor   - Bile duct margin benign      CVytlacil  Interpretation performed at [address] [address]        FSC   - No tumor identified  - Interpretation limited by extremely fatty nature of tissue (poor sectioning)      CVytlacil  Interpretation performed at [facility], [address] [place] [id number]         Imaging  No results found  I reviewed the above laboratory and imaging data  Discussion/Summary:  Stage I neuroendocrine tumor of pancreas post Whipple procedure  Margins clear  Nodes negative  She is doing well clinically  Will plan on follow-up in 4 months with repeat blood work and scans for surveillance  Pathology report given her for records, and discussed with her and her daughter  All questions answered  I advised her to continue Creon until her next visit

## 2021-09-15 NOTE — PROGRESS NOTES
Surgical Oncology Follow Up       [address] [facility] SURGICAL ONCOLOGY [facility] [id number]    Robbin Urbina  1960  [id number]  [facility] SURGICAL ONCOLOGY [place]  [place] [address]    Chief Complaint   Patient presents with    Post-op       Assessment/Plan:    No problem-specific Assessment & Plan notes found for this encounter  Diagnoses and all orders for this visit:    Neuroendocrine tumor of pancreas  -     CT chest abdomen pelvis w contrast; Future  -     Basic metabolic panel; Future  -     Chromogranin A; Future  -     Hemoglobin A1C; Future        Advance Care Planning/Advance Directives:  Discussed disease status, cancer treatment plans and/or cancer treatment goals with the patient  Oncology History   Neuroendocrine tumor of pancreas   6/17/2021 Biopsy    Pancreas, Head Cyst:   Well-differentiated neuroendocrine tumor  8/24/2021 Surgery    Whipple:  - Well differentiated neuroendocrine tumor (2 3 cm ), G1   - Pancreatic resection margin is positive for tumor  - All other margins are negative for tumor   - Seven lymph nodes, negative for malignancy (0/7)  Pancreas new margin, re-excision:  - Adipose tissue with pancreatic microadenoma  - No tumor present            History of Present Illness:   Patient is a 69-year-old woman here for postop check status post Whipple procedure for well-differentiated neuroendocrine tumor, stage I  -Interval History: since surgery, she has done quite well  She is eating normally at this point  She has 1 drain left in place which is putting out very minimal serous output on a daily basis  All-in-all she feels quite well  Review of Systems:  Review of Systems   Constitutional: Negative  Negative for appetite change and fatigue  HENT: Negative  Eyes: Negative  Respiratory: Negative  Cardiovascular: Negative  Gastrointestinal: Negative  Negative for abdominal distention, abdominal pain, anal bleeding, blood in stool, constipation, diarrhea, nausea and vomiting  Endocrine: Negative  Genitourinary: Negative  Musculoskeletal: Negative  Skin: Negative  Allergic/Immunologic: Negative  Neurological: Negative  Hematological: Negative  Psychiatric/Behavioral: Negative  Patient Active Problem List   Diagnosis    Acute low back pain due to trauma    Benign hypertension with CKD (chronic kidney disease), stage II    Vision disturbance    Noncompliance with medications    Stroke-like symptoms    Incidental pulmonary nodule    Hyperlipemia    Tarsal tunnel syndrome, left    Pancreatic mass    Leukocytosis    Depression    Goals of care, counseling/discussion    Stage 2 chronic kidney disease    Neuroendocrine tumor of pancreas     Past Medical History:   Diagnosis Date    Back ache     Cancer ([facility] [address] )     Hyperlipidemia     Hypertension      Past Surgical History:   Procedure Laterality Date    APPENDECTOMY      BILATERAL OOPHORECTOMY      BREAST SURGERY Right     partial mastectomy     COLONOSCOPY      HERNIA REPAIR      HYSTERECTOMY      LAPAROTOMY N/A 8/24/2021    Procedure: LAPAROTOMY EXPLORATORY;  Surgeon: Fer Estrella MD;  [facility]: [facility];  Service: Surgical Oncology    MASTECTOMY Right     partial    WHIPPLE PROCEDURE/PANCREATICO-DUODENECTOMY N/A 8/24/2021    Procedure: WHIPPLE PROCEDURE/PANCREATICO-DUODENECTOMY;  Surgeon: Fer Estrella MD;  Location[facility];  Service: Surgical Oncology     Family History   Problem Relation Age of Onset    Hypertension Mother     Heart disease Mother     Diabetes Mother     Cancer Father      Social History     Socioeconomic History    Marital status: /Civil Union     Spouse name: Rohith Barrera  [address] [place] [address]    Number of children: Not on file    Years of education: Not on file    Highest education level: Not on file   Occupational History    Occupation: unemployed   Tobacco Use    Smoking status: Former Smoker     Packs/day: 0 65     Years: 30 00     Pack years: 19 50    Smokeless tobacco: Never Used    Tobacco comment: quit 15 years ago   Vaping Use    Vaping Use: Never used   Substance and Sexual Activity    Alcohol use: Not Currently    Drug use: No    Sexual activity: Not Currently   Other Topics Concern    Not on file   Social History Narrative    Not on file     Social Determinants of Health     Financial Resource Strain: Low Risk     Difficulty of Paying Living Expenses: Not very hard   Food Insecurity: Food Insecurity Present    Worried About Running Out of Food in the Last Year: Sometimes true    Bere of Food in the Last Year: Sometimes true   Transportation Needs: No Transportation Needs    Lack of Transportation (Medical): No    Lack of Transportation (Non-Medical): No   Physical Activity: Inactive    Days of Exercise per Week: 0 days    Minutes of Exercise per Session: 0 min   Stress:     Feeling of Stress :    Social Connections:     Frequency of Communication with Friends and Family:     Frequency of Social Gatherings with Friends and Family:     Attends Evangelical Services:     Active Member of Clubs or Organizations:     Attends Club or Organization Meetings:     Marital Status:    Intimate Partner Violence:     Fear of Current or Ex-Partner:     Emotionally Abused:     Physically Abused:     Sexually Abused:        Current Outpatient Medications:     amLODIPine (NORVASC) 10 mg tablet, Take 1 tablet (10 mg total) by mouth daily, Disp: 90 tablet, Rfl: 3    atorvastatin (LIPITOR) 40 mg tablet, Take 1 tablet (40 mg total) by mouth every evening, Disp: 90 tablet, Rfl: 3    clobetasol (TEMOVATE) 0 05 % ointment, Apply twice a day to rash on hands for 2 weeks  Then, apply twice a day from Monday-Friday for another 2 weeks   Avoid the face and groin, Disp: 60 g, Rfl: 0   dicyclomine (BENTYL) 10 mg capsule, Take 1 capsule (10 mg total) by mouth 4 (four) times a day (before meals and at bedtime), Disp: 180 capsule, Rfl: 0    escitalopram (LEXAPRO) 10 mg tablet, Take 1 tablet (10 mg total) by mouth every morning, Disp: 90 tablet, Rfl: 3    hydrOXYzine HCL (ATARAX) 25 mg tablet, Take 25 mg by mouth daily at bedtime as needed, Disp: , Rfl: 0    lisinopril (ZESTRIL) 20 mg tablet, Take 1 tablet (20 mg total) by mouth daily, Disp: 90 tablet, Rfl: 3    pancrelipase, Lip-Prot-Amyl, (CREON) 6,000 units delayed release capsule, Take 1 capsule (6,000 Units total) by mouth 3 (three) times a day with meals, Disp: 270 capsule, Rfl: 0    polyethylene glycol (GLYCOLAX) 17 GM/SCOOP powder, At 5pm take 5mgx2 dulcolax  At 6pm 32oz miralax in 64oz gatorade  Drink 8oz glass every 5 mins until 32oz finished  Drink remaining as rec , Disp: 238 g, Rfl: 0    sertraline (ZOLOFT) 25 mg tablet, Take 25 mg by mouth daily, Disp: , Rfl:     traZODone (DESYREL) 50 mg tablet, take 1/2 tablet by mouth at bedtime if needed for insomnia, Disp: , Rfl:     triamcinolone (KENALOG) 0 1 % ointment, Apply 1 application topically 2 (two) times a day, Disp: 30 g, Rfl: 2    omeprazole (PriLOSEC) 40 MG capsule, Take 1 capsule (40 mg total) by mouth daily, Disp: 30 capsule, Rfl: 0    ondansetron (ZOFRAN-ODT) 4 mg disintegrating tablet, Take 1 tablet (4 mg total) by mouth every 6 (six) hours as needed for nausea or vomiting (Patient not taking: Reported on 7/12/2021), Disp: 30 tablet, Rfl: 0  No Known Allergies  Vitals:    09/15/21 1149   BP: 134/82   Pulse: 85   Resp: 18   Temp: 98 2 °F (36 8 °C)   SpO2: 99%       Physical Exam  Constitutional:       General: She is not in acute distress  Appearance: Normal appearance  She is not ill-appearing or toxic-appearing  HENT:      Head: Normocephalic and atraumatic  Eyes:      Extraocular Movements: Extraocular movements intact        Conjunctiva/sclera: Conjunctivae normal       Pupils: Pupils are equal, round, and reactive to light  Cardiovascular:      Rate and Rhythm: Normal rate and regular rhythm  Heart sounds: Normal heart sounds  Pulmonary:      Effort: Pulmonary effort is normal       Breath sounds: Normal breath sounds  Abdominal:      General: Abdomen is flat  There is no distension  Palpations: Abdomen is soft  There is no mass  Tenderness: There is no abdominal tenderness  There is no guarding or rebound  Hernia: No hernia is present  Musculoskeletal:         General: Normal range of motion  Cervical back: Normal range of motion and neck supple  Skin:     General: Skin is warm and dry  Neurological:      General: No focal deficit present  Mental Status: She is alert and oriented to person, place, and time  Psychiatric:         Mood and Affect: Mood normal          Behavior: Behavior normal          Thought Content:  Thought content normal            Results:  Labs:  Case Report   Surgical Pathology Report                         Case: [id number]                                    Authorizing Provider: Jazmine Rebollar MD        Collected:           08/24/2021 0950               Ordering Location:     [address]      Received:            08/24/2021 [address] [place] [address] Operating Room                                                       Pathologist:           Fanny Alarcon MD                                                                  Intraop:               Ej Suarez MD                                                    Specimens:   A) - Gallbladder                                                                                     B) - Pancreas, pacreatic duoenedectomy                                                               C) - Pancreas, new margin, purple is new final marfgin                                     Final Diagnosis   A  Gallbladder, cholecystectomy:  - Chronic cholecystitis  - Negative for malignancy      B  Proximal pancrease, duodenum, pancreaticoduodenectomy:  - Well differentiated neuroendocrine tumor (2 3 cm ), G1   - Portion of duodenum with no pathologic abnormality   - Pancreatic resection margin is positive for tumor, see part D for final margin status  - All other margins are negative for tumor   - Seven lymph nodes, negative for malignancy (0/7)  C  Pancreas new margin, re-excision:  - Adipose tissue with pancreatic microadenoma  - No tumor present     Comment: Immunohistochemistry was performed on block B11  The tumor cells are positive for AE1/3, synaptophysin, chromogranin A, CD56; Ki-67 shows mitotic proliferative index is less than 3%; D2-40 and CD31 are also performed to help in the assessment of this case  Electronically signed by Tano Angel MD on 8/30/2021 at 10:10 AM   Comments: This is an appended report  These results have been appended to a previously preliminary verified report  Additional Information    All reported additional testing was performed with appropriately reactive controls   These tests were developed and their performance characteristics determined by [facility] or appropriate performing facility, though some tests may be performed on tissues which have not been validated for performance characteristics (such as staining performed on alcohol exposed cell blocks and decalcified tissues)   Results should be interpreted with caution and in the context of the patients clinical condition  These tests may not be cleared or approved by the [place]  Food and Drug Administration, though the FDA has determined that such clearance or approval is not necessary  These tests are used for clinical purposes and they should not be regarded as investigational or for research   This laboratory has been approved by CLIA 88, designated as a high-complexity laboratory and is qualified to perform these tests      Comment: This is an appended report  These results have been appended to a previously preliminary verified report  This is an appended report  These results have been appended to a previously preliminary verified report  Synoptic Checklist   PANCREAS NEUROENDOCRINE TUMOR  8th Edition - Protocol posted: 2/26/2020  PANCREAS (ENDOCRINE): RESECTION - All Specimens  CLINICAL   Clinical History  Not specified    Functional Type  Pancreatic neuroendocrine tumor, functional status unknown    SPECIMEN   Procedure  Pancreaticoduodenectomy (Whipple resection), partial pancreatectomy    TUMOR   Tumor Site  pancreatic head/neck    Histologic Type and Grade  G1: Well-differentiated neuroendocrine tumor         Mitotic Rate  < 2 mitoses / 2 mm2         Ki-67 Labeling Index  < 3%    Tumor Size  Greatest dimension (Centimeters): 2 2 cm   Additional Dimension (Centimeters)  1 8 cm     1 5 cm   Tumor Focality  Unifocal    Tumor Extension  Tumor is limited to the pancreas    Lymphovascular Invasion  Not identified    Perineural Invasion  Present    Tumor Necrosis  Not identified    MARGINS   Margins  All margins are uninvolved by tumor    Margins Examined  Distal pancreatic parenchymal      Bile duct      Proximal (gastric or duodenal)      Distal (distal duodenal or jejunal)    Distance of Tumor from Closest Margin  Cannot be determined: at least 8 mm     Closest Margin  Distal pancreatic parenchymal    LYMPH NODES   Number of Lymph Nodes Involved  0    Number of Lymph Nodes Examined  7    PATHOLOGIC STAGE CLASSIFICATION (pTNM, AJCC 8th Edition)   Primary Tumor (pT)  pT2    Regional Lymph Nodes (pN)  pN0    ADDITIONAL FINDINGS   Additional Findings  Atrophy      Adenomatosis (multiple neuroendocrine tumors, each less than 5 mm in greatest dimension)            Intraoperative Consultation       FSB   - Pancreatic neck margin positive for tumor   - Bile duct margin benign      CVytlacil  Interpretation performed at [facility], [address] [place] [id number]        FSC   - No tumor identified  - Interpretation limited by extremely fatty nature of tissue (poor sectioning)      CVytlacil  Interpretation performed at [facility], [address] [place] [id number]         Imaging  No results found  I reviewed the above laboratory and imaging data  Discussion/Summary:  Stage I neuroendocrine tumor of pancreas post Whipple procedure  Margins clear  Nodes negative  She is doing well clinically  Will plan on follow-up in 4 months with repeat blood work and scans for surveillance  Pathology report given her for records, and discussed with her and her daughter  All questions answered  I advised her to continue Creon until her next visit

## 2021-10-12 ENCOUNTER — OFFICE VISIT (OUTPATIENT)
Dept: INTERNAL MEDICINE CLINIC | Facility: CLINIC | Age: 61
End: 2021-10-12

## 2021-10-12 VITALS
SYSTOLIC BLOOD PRESSURE: 151 MMHG | HEIGHT: 64 IN | BODY MASS INDEX: 27.31 KG/M2 | OXYGEN SATURATION: 98 % | DIASTOLIC BLOOD PRESSURE: 77 MMHG | WEIGHT: 160 LBS | TEMPERATURE: 97.8 F | HEART RATE: 85 BPM

## 2021-10-12 DIAGNOSIS — R10.9 ABDOMINAL PAIN, UNSPECIFIED ABDOMINAL LOCATION: Primary | ICD-10-CM

## 2021-10-12 PROCEDURE — 99213 OFFICE O/P EST LOW 20 MIN: CPT | Performed by: INTERNAL MEDICINE

## 2021-10-15 ENCOUNTER — APPOINTMENT (OUTPATIENT)
Dept: LAB | Facility: HOSPITAL | Age: 61
End: 2021-10-15
Attending: INTERNAL MEDICINE
Payer: COMMERCIAL

## 2021-10-15 ENCOUNTER — TELEPHONE (OUTPATIENT)
Dept: NEPHROLOGY | Facility: CLINIC | Age: 61
End: 2021-10-15

## 2021-10-15 DIAGNOSIS — R10.9 ABDOMINAL PAIN, UNSPECIFIED ABDOMINAL LOCATION: ICD-10-CM

## 2021-10-15 DIAGNOSIS — N18.30 BENIGN HYPERTENSION WITH CKD (CHRONIC KIDNEY DISEASE) STAGE III (HCC): ICD-10-CM

## 2021-10-15 DIAGNOSIS — N18.2 STAGE 2 CHRONIC KIDNEY DISEASE: ICD-10-CM

## 2021-10-15 DIAGNOSIS — I12.9 BENIGN HYPERTENSION WITH CKD (CHRONIC KIDNEY DISEASE) STAGE III (HCC): ICD-10-CM

## 2021-10-15 LAB
ALBUMIN SERPL BCP-MCNC: 3.3 G/DL (ref 3.5–5)
ALP SERPL-CCNC: 125 U/L (ref 46–116)
ALT SERPL W P-5'-P-CCNC: 32 U/L (ref 12–78)
ANION GAP SERPL CALCULATED.3IONS-SCNC: 2 MMOL/L (ref 4–13)
AST SERPL W P-5'-P-CCNC: 32 U/L (ref 5–45)
BASOPHILS # BLD AUTO: 0.04 THOUSANDS/ΜL (ref 0–0.1)
BASOPHILS NFR BLD AUTO: 0 % (ref 0–1)
BILIRUB SERPL-MCNC: 0.38 MG/DL (ref 0.2–1)
BUN SERPL-MCNC: 9 MG/DL (ref 5–25)
CALCIUM ALBUM COR SERPL-MCNC: 10.3 MG/DL (ref 8.3–10.1)
CALCIUM SERPL-MCNC: 9.7 MG/DL (ref 8.3–10.1)
CHLORIDE SERPL-SCNC: 108 MMOL/L (ref 100–108)
CO2 SERPL-SCNC: 28 MMOL/L (ref 21–32)
CREAT SERPL-MCNC: 0.88 MG/DL (ref 0.6–1.3)
CREAT UR-MCNC: 40 MG/DL
EOSINOPHIL # BLD AUTO: 0.13 THOUSAND/ΜL (ref 0–0.61)
EOSINOPHIL NFR BLD AUTO: 1 % (ref 0–6)
ERYTHROCYTE [DISTWIDTH] IN BLOOD BY AUTOMATED COUNT: 13.7 % (ref 11.6–15.1)
GFR SERPL CREATININE-BSD FRML MDRD: 71 ML/MIN/1.73SQ M
GLUCOSE P FAST SERPL-MCNC: 109 MG/DL (ref 65–99)
HCT VFR BLD AUTO: 33.4 % (ref 34.8–46.1)
HGB BLD-MCNC: 10.9 G/DL (ref 11.5–15.4)
IMM GRANULOCYTES # BLD AUTO: 0.03 THOUSAND/UL (ref 0–0.2)
IMM GRANULOCYTES NFR BLD AUTO: 0 % (ref 0–2)
LYMPHOCYTES # BLD AUTO: 4.93 THOUSANDS/ΜL (ref 0.6–4.47)
LYMPHOCYTES NFR BLD AUTO: 52 % (ref 14–44)
MAGNESIUM SERPL-MCNC: 1.8 MG/DL (ref 1.6–2.6)
MCH RBC QN AUTO: 30.4 PG (ref 26.8–34.3)
MCHC RBC AUTO-ENTMCNC: 32.6 G/DL (ref 31.4–37.4)
MCV RBC AUTO: 93 FL (ref 82–98)
MICROALBUMIN UR-MCNC: 43.5 MG/L (ref 0–20)
MICROALBUMIN/CREAT 24H UR: 109 MG/G CREATININE (ref 0–30)
MONOCYTES # BLD AUTO: 0.71 THOUSAND/ΜL (ref 0.17–1.22)
MONOCYTES NFR BLD AUTO: 8 % (ref 4–12)
NEUTROPHILS # BLD AUTO: 3.66 THOUSANDS/ΜL (ref 1.85–7.62)
NEUTS SEG NFR BLD AUTO: 39 % (ref 43–75)
NRBC BLD AUTO-RTO: 0 /100 WBCS
PLATELET # BLD AUTO: 322 THOUSANDS/UL (ref 149–390)
PMV BLD AUTO: 10.6 FL (ref 8.9–12.7)
POTASSIUM SERPL-SCNC: 3.6 MMOL/L (ref 3.5–5.3)
PROT SERPL-MCNC: 8.3 G/DL (ref 6.4–8.2)
RBC # BLD AUTO: 3.58 MILLION/UL (ref 3.81–5.12)
SODIUM SERPL-SCNC: 138 MMOL/L (ref 136–145)
WBC # BLD AUTO: 9.5 THOUSAND/UL (ref 4.31–10.16)

## 2021-10-15 PROCEDURE — 85025 COMPLETE CBC W/AUTO DIFF WBC: CPT

## 2021-10-15 PROCEDURE — 82570 ASSAY OF URINE CREATININE: CPT

## 2021-10-15 PROCEDURE — 82043 UR ALBUMIN QUANTITATIVE: CPT

## 2021-10-15 PROCEDURE — 80053 COMPREHEN METABOLIC PANEL: CPT

## 2021-10-15 PROCEDURE — 36415 COLL VENOUS BLD VENIPUNCTURE: CPT

## 2021-10-15 PROCEDURE — 83735 ASSAY OF MAGNESIUM: CPT

## 2021-10-19 ENCOUNTER — HOSPITAL ENCOUNTER (OUTPATIENT)
Dept: RADIOLOGY | Age: 61
Discharge: HOME | End: 2021-10-19
Payer: COMMERCIAL

## 2021-10-19 DIAGNOSIS — R10.9 ABDOMINAL PAIN, UNSPECIFIED ABDOMINAL LOCATION: ICD-10-CM

## 2021-10-19 PROCEDURE — G1004 CDSM NDSC: HCPCS

## 2021-10-19 PROCEDURE — 74177 CT ABD & PELVIS W/CONTRAST: CPT

## 2021-10-19 RX ADMIN — IOHEXOL 100 ML: 350 INJECTION, SOLUTION INTRAVENOUS at 14:41

## 2021-11-01 ENCOUNTER — OFFICE VISIT (OUTPATIENT)
Dept: SURGICAL ONCOLOGY | Facility: CLINIC | Age: 61
End: 2021-11-01

## 2021-11-01 VITALS
HEIGHT: 64 IN | HEART RATE: 88 BPM | OXYGEN SATURATION: 98 % | TEMPERATURE: 97.9 F | WEIGHT: 159 LBS | BODY MASS INDEX: 27.14 KG/M2 | RESPIRATION RATE: 16 BRPM | DIASTOLIC BLOOD PRESSURE: 92 MMHG | SYSTOLIC BLOOD PRESSURE: 140 MMHG

## 2021-11-01 DIAGNOSIS — K86.89 PANCREATIC MASS: ICD-10-CM

## 2021-11-01 DIAGNOSIS — D3A.8 NEUROENDOCRINE TUMOR OF PANCREAS: Primary | ICD-10-CM

## 2021-11-01 PROCEDURE — 99024 POSTOP FOLLOW-UP VISIT: CPT | Performed by: SURGERY

## 2021-11-15 ENCOUNTER — OFFICE VISIT (OUTPATIENT)
Dept: GASTROENTEROLOGY | Facility: CLINIC | Age: 61
End: 2021-11-15
Payer: COMMERCIAL

## 2021-11-15 ENCOUNTER — OFFICE VISIT (OUTPATIENT)
Dept: INTERNAL MEDICINE CLINIC | Facility: CLINIC | Age: 61
End: 2021-11-15

## 2021-11-15 VITALS
DIASTOLIC BLOOD PRESSURE: 87 MMHG | TEMPERATURE: 98.1 F | SYSTOLIC BLOOD PRESSURE: 167 MMHG | OXYGEN SATURATION: 98 % | HEIGHT: 64 IN | BODY MASS INDEX: 26.63 KG/M2 | HEART RATE: 87 BPM | WEIGHT: 156 LBS

## 2021-11-15 VITALS
OXYGEN SATURATION: 98 % | SYSTOLIC BLOOD PRESSURE: 181 MMHG | TEMPERATURE: 97.6 F | BODY MASS INDEX: 28.53 KG/M2 | HEART RATE: 89 BPM | DIASTOLIC BLOOD PRESSURE: 78 MMHG | WEIGHT: 161 LBS | HEIGHT: 63 IN

## 2021-11-15 DIAGNOSIS — D64.9 ANEMIA, UNSPECIFIED TYPE: ICD-10-CM

## 2021-11-15 DIAGNOSIS — Z12.4 CERVICAL CANCER SCREENING: ICD-10-CM

## 2021-11-15 DIAGNOSIS — Z85.3 PERSONAL HISTORY OF BREAST CANCER: ICD-10-CM

## 2021-11-15 DIAGNOSIS — I10 HYPERTENSION: ICD-10-CM

## 2021-11-15 DIAGNOSIS — I16.1 HYPERTENSIVE EMERGENCY: ICD-10-CM

## 2021-11-15 DIAGNOSIS — D3A.8 NEUROENDOCRINE TUMOR OF PANCREAS: Primary | ICD-10-CM

## 2021-11-15 DIAGNOSIS — I12.9 BENIGN HYPERTENSION WITH CKD (CHRONIC KIDNEY DISEASE), STAGE II: ICD-10-CM

## 2021-11-15 DIAGNOSIS — Z23 NEED FOR INFLUENZA VACCINATION: ICD-10-CM

## 2021-11-15 DIAGNOSIS — K86.89 PANCREATIC MASS: Primary | ICD-10-CM

## 2021-11-15 DIAGNOSIS — I10 ESSENTIAL HYPERTENSION: ICD-10-CM

## 2021-11-15 DIAGNOSIS — Z12.31 ENCOUNTER FOR SCREENING MAMMOGRAM FOR MALIGNANT NEOPLASM OF BREAST: ICD-10-CM

## 2021-11-15 DIAGNOSIS — Z00.00 ANNUAL PHYSICAL EXAM: ICD-10-CM

## 2021-11-15 DIAGNOSIS — N18.2 BENIGN HYPERTENSION WITH CKD (CHRONIC KIDNEY DISEASE), STAGE II: ICD-10-CM

## 2021-11-15 PROCEDURE — 1036F TOBACCO NON-USER: CPT | Performed by: INTERNAL MEDICINE

## 2021-11-15 PROCEDURE — 3008F BODY MASS INDEX DOCD: CPT | Performed by: INTERNAL MEDICINE

## 2021-11-15 PROCEDURE — 99214 OFFICE O/P EST MOD 30 MIN: CPT | Performed by: INTERNAL MEDICINE

## 2021-11-15 PROCEDURE — 90682 RIV4 VACC RECOMBINANT DNA IM: CPT | Performed by: INTERNAL MEDICINE

## 2021-11-15 PROCEDURE — 90471 IMMUNIZATION ADMIN: CPT | Performed by: INTERNAL MEDICINE

## 2021-11-15 PROCEDURE — 99213 OFFICE O/P EST LOW 20 MIN: CPT | Performed by: INTERNAL MEDICINE

## 2021-11-15 RX ORDER — AMLODIPINE BESYLATE 10 MG/1
10 TABLET ORAL DAILY
Qty: 90 TABLET | Refills: 3 | Status: SHIPPED | OUTPATIENT
Start: 2021-11-15 | End: 2022-05-13 | Stop reason: SDUPTHER

## 2021-11-15 RX ORDER — LISINOPRIL 20 MG/1
20 TABLET ORAL DAILY
Qty: 90 TABLET | Refills: 3 | Status: SHIPPED | OUTPATIENT
Start: 2021-11-15 | End: 2022-05-13

## 2021-11-15 RX ORDER — CHOLESTYRAMINE 4 G/9G
4 POWDER, FOR SUSPENSION ORAL
Qty: 1074.62 G | Refills: 1 | Status: SHIPPED | OUTPATIENT
Start: 2021-11-15 | End: 2022-05-13 | Stop reason: SDUPTHER

## 2021-11-24 ENCOUNTER — LAB (OUTPATIENT)
Dept: LAB | Facility: HOSPITAL | Age: 61
End: 2021-11-24
Payer: COMMERCIAL

## 2021-11-24 DIAGNOSIS — D64.9 ANEMIA, UNSPECIFIED TYPE: ICD-10-CM

## 2021-11-24 LAB
FERRITIN SERPL-MCNC: 106 NG/ML (ref 8–388)
FOLATE SERPL-MCNC: 15.8 NG/ML (ref 3.1–17.5)
IRON SATN MFR SERPL: 18 % (ref 15–50)
IRON SERPL-MCNC: 40 UG/DL (ref 50–170)
TIBC SERPL-MCNC: 223 UG/DL (ref 250–450)
VIT B12 SERPL-MCNC: 266 PG/ML (ref 100–900)

## 2021-11-24 PROCEDURE — 82746 ASSAY OF FOLIC ACID SERUM: CPT

## 2021-11-24 PROCEDURE — 82607 VITAMIN B-12: CPT

## 2021-11-24 PROCEDURE — 83540 ASSAY OF IRON: CPT

## 2021-11-24 PROCEDURE — 83550 IRON BINDING TEST: CPT

## 2021-11-24 PROCEDURE — 36415 COLL VENOUS BLD VENIPUNCTURE: CPT

## 2021-11-24 PROCEDURE — 82728 ASSAY OF FERRITIN: CPT

## 2021-11-30 DIAGNOSIS — D64.9 ANEMIA, UNSPECIFIED TYPE: Primary | ICD-10-CM

## 2021-12-08 ENCOUNTER — OFFICE VISIT (OUTPATIENT)
Dept: OBGYN CLINIC | Facility: CLINIC | Age: 61
End: 2021-12-08

## 2021-12-08 VITALS
HEART RATE: 74 BPM | BODY MASS INDEX: 29.59 KG/M2 | SYSTOLIC BLOOD PRESSURE: 165 MMHG | DIASTOLIC BLOOD PRESSURE: 81 MMHG | WEIGHT: 167 LBS | HEIGHT: 63 IN

## 2021-12-08 DIAGNOSIS — Z85.3 HISTORY OF BREAST CANCER: ICD-10-CM

## 2021-12-08 DIAGNOSIS — Z12.4 CERVICAL CANCER SCREENING: Primary | ICD-10-CM

## 2021-12-08 DIAGNOSIS — Z01.419 WELL FEMALE EXAM WITH ROUTINE GYNECOLOGICAL EXAM: ICD-10-CM

## 2021-12-08 PROCEDURE — G0145 SCR C/V CYTO,THINLAYER,RESCR: HCPCS | Performed by: OBSTETRICS & GYNECOLOGY

## 2021-12-08 PROCEDURE — 99386 PREV VISIT NEW AGE 40-64: CPT | Performed by: OBSTETRICS & GYNECOLOGY

## 2021-12-08 PROCEDURE — G0476 HPV COMBO ASSAY CA SCREEN: HCPCS | Performed by: OBSTETRICS & GYNECOLOGY

## 2021-12-09 ENCOUNTER — LAB (OUTPATIENT)
Dept: LAB | Facility: HOSPITAL | Age: 61
End: 2021-12-09
Payer: COMMERCIAL

## 2021-12-09 DIAGNOSIS — D64.9 ANEMIA, UNSPECIFIED TYPE: ICD-10-CM

## 2021-12-09 LAB
ALBUMIN SERPL BCP-MCNC: 3.5 G/DL (ref 3.5–5)
ALP SERPL-CCNC: 132 U/L (ref 46–116)
ALT SERPL W P-5'-P-CCNC: 33 U/L (ref 12–78)
ANION GAP SERPL CALCULATED.3IONS-SCNC: 5 MMOL/L (ref 4–13)
AST SERPL W P-5'-P-CCNC: 37 U/L (ref 5–45)
BASOPHILS # BLD AUTO: 0.05 THOUSANDS/ΜL (ref 0–0.1)
BASOPHILS NFR BLD AUTO: 1 % (ref 0–1)
BILIRUB SERPL-MCNC: 0.45 MG/DL (ref 0.2–1)
BUN SERPL-MCNC: 13 MG/DL (ref 5–25)
CALCIUM SERPL-MCNC: 9.9 MG/DL (ref 8.3–10.1)
CHLORIDE SERPL-SCNC: 106 MMOL/L (ref 100–108)
CO2 SERPL-SCNC: 26 MMOL/L (ref 21–32)
CREAT SERPL-MCNC: 1.1 MG/DL (ref 0.6–1.3)
EOSINOPHIL # BLD AUTO: 0.16 THOUSAND/ΜL (ref 0–0.61)
EOSINOPHIL NFR BLD AUTO: 2 % (ref 0–6)
ERYTHROCYTE [DISTWIDTH] IN BLOOD BY AUTOMATED COUNT: 14.2 % (ref 11.6–15.1)
GFR SERPL CREATININE-BSD FRML MDRD: 54 ML/MIN/1.73SQ M
GLUCOSE SERPL-MCNC: 110 MG/DL (ref 65–140)
HCT VFR BLD AUTO: 36.2 % (ref 34.8–46.1)
HGB BLD-MCNC: 11.5 G/DL (ref 11.5–15.4)
HGB RETIC QN AUTO: 33.9 PG (ref 30–38.3)
IMM GRANULOCYTES # BLD AUTO: 0.02 THOUSAND/UL (ref 0–0.2)
IMM GRANULOCYTES NFR BLD AUTO: 0 % (ref 0–2)
IMM RETICS NFR: 11.8 % (ref 0–14)
LYMPHOCYTES # BLD AUTO: 4.52 THOUSANDS/ΜL (ref 0.6–4.47)
LYMPHOCYTES NFR BLD AUTO: 51 % (ref 14–44)
MCH RBC QN AUTO: 29.9 PG (ref 26.8–34.3)
MCHC RBC AUTO-ENTMCNC: 31.8 G/DL (ref 31.4–37.4)
MCV RBC AUTO: 94 FL (ref 82–98)
MONOCYTES # BLD AUTO: 0.65 THOUSAND/ΜL (ref 0.17–1.22)
MONOCYTES NFR BLD AUTO: 8 % (ref 4–12)
NEUTROPHILS # BLD AUTO: 3.26 THOUSANDS/ΜL (ref 1.85–7.62)
NEUTS SEG NFR BLD AUTO: 38 % (ref 43–75)
NRBC BLD AUTO-RTO: 0 /100 WBCS
PLATELET # BLD AUTO: 292 THOUSANDS/UL (ref 149–390)
PMV BLD AUTO: 11.4 FL (ref 8.9–12.7)
POTASSIUM SERPL-SCNC: 4 MMOL/L (ref 3.5–5.3)
PROT SERPL-MCNC: 8.4 G/DL (ref 6.4–8.2)
RBC # BLD AUTO: 3.85 MILLION/UL (ref 3.81–5.12)
RETICS # AUTO: NORMAL 10*3/UL (ref 14097–95744)
RETICS # CALC: 1.32 % (ref 0.37–1.87)
SODIUM SERPL-SCNC: 137 MMOL/L (ref 136–145)
TSH SERPL DL<=0.05 MIU/L-ACNC: 2.27 UIU/ML (ref 0.36–3.74)
WBC # BLD AUTO: 8.66 THOUSAND/UL (ref 4.31–10.16)

## 2021-12-09 PROCEDURE — 84165 PROTEIN E-PHORESIS SERUM: CPT | Performed by: PATHOLOGY

## 2021-12-09 PROCEDURE — 85046 RETICYTE/HGB CONCENTRATE: CPT

## 2021-12-09 PROCEDURE — 84443 ASSAY THYROID STIM HORMONE: CPT

## 2021-12-09 PROCEDURE — 82525 ASSAY OF COPPER: CPT

## 2021-12-09 PROCEDURE — 36415 COLL VENOUS BLD VENIPUNCTURE: CPT

## 2021-12-09 PROCEDURE — 80053 COMPREHEN METABOLIC PANEL: CPT

## 2021-12-09 PROCEDURE — 84165 PROTEIN E-PHORESIS SERUM: CPT

## 2021-12-09 PROCEDURE — 85025 COMPLETE CBC W/AUTO DIFF WBC: CPT

## 2021-12-10 LAB — BLD SMEAR INTERP: NORMAL

## 2021-12-11 ENCOUNTER — IMMUNIZATIONS (OUTPATIENT)
Dept: FAMILY MEDICINE CLINIC | Facility: HOSPITAL | Age: 61
End: 2021-12-11

## 2021-12-11 DIAGNOSIS — Z23 ENCOUNTER FOR IMMUNIZATION: Primary | ICD-10-CM

## 2021-12-11 LAB
ALBUMIN SERPL ELPH-MCNC: 4.09 G/DL (ref 3.5–5)
ALBUMIN SERPL ELPH-MCNC: 50.5 % (ref 52–65)
ALPHA1 GLOB SERPL ELPH-MCNC: 0.37 G/DL (ref 0.1–0.4)
ALPHA1 GLOB SERPL ELPH-MCNC: 4.6 % (ref 2.5–5)
ALPHA2 GLOB SERPL ELPH-MCNC: 0.9 G/DL (ref 0.4–1.2)
ALPHA2 GLOB SERPL ELPH-MCNC: 11.1 % (ref 7–13)
BETA GLOB ABNORMAL SERPL ELPH-MCNC: 0.45 G/DL (ref 0.4–0.8)
BETA1 GLOB SERPL ELPH-MCNC: 5.6 % (ref 5–13)
BETA2 GLOB SERPL ELPH-MCNC: 8.1 % (ref 2–8)
BETA2+GAMMA GLOB SERPL ELPH-MCNC: 0.66 G/DL (ref 0.2–0.5)
GAMMA GLOB ABNORMAL SERPL ELPH-MCNC: 1.63 G/DL (ref 0.5–1.6)
GAMMA GLOB SERPL ELPH-MCNC: 20.1 % (ref 12–22)
IGG/ALB SER: 1.02 {RATIO} (ref 1.1–1.8)
PROT PATTERN SERPL ELPH-IMP: ABNORMAL
PROT SERPL-MCNC: 8.1 G/DL (ref 6.4–8.2)

## 2021-12-11 PROCEDURE — 0001A COVID-19 PFIZER VACC 0.3 ML: CPT

## 2021-12-11 PROCEDURE — 91300 COVID-19 PFIZER VACC 0.3 ML: CPT

## 2021-12-12 LAB — COPPER SERPL-MCNC: 126 UG/DL (ref 80–158)

## 2021-12-13 LAB
HPV HR 12 DNA CVX QL NAA+PROBE: NEGATIVE
HPV16 DNA CVX QL NAA+PROBE: NEGATIVE
HPV18 DNA CVX QL NAA+PROBE: NEGATIVE

## 2021-12-14 ENCOUNTER — TELEPHONE (OUTPATIENT)
Dept: GENETICS | Facility: CLINIC | Age: 61
End: 2021-12-14

## 2021-12-14 LAB
LAB AP GYN PRIMARY INTERPRETATION: NORMAL
Lab: NORMAL

## 2021-12-20 ENCOUNTER — HOSPITAL ENCOUNTER (OUTPATIENT)
Facility: HOSPITAL | Age: 61
Setting detail: OBSERVATION
Discharge: HOME | End: 2021-12-22
Attending: EMERGENCY MEDICINE | Admitting: HOSPITALIST
Payer: COMMERCIAL

## 2021-12-20 ENCOUNTER — TELEPHONE (OUTPATIENT)
Dept: HEMATOLOGY ONCOLOGY | Facility: CLINIC | Age: 61
End: 2021-12-20

## 2021-12-20 DIAGNOSIS — R11.2 NAUSEA AND VOMITING: ICD-10-CM

## 2021-12-20 DIAGNOSIS — K86.89 PANCREATIC INSUFFICIENCY: Primary | ICD-10-CM

## 2021-12-20 DIAGNOSIS — R55 NEAR SYNCOPE: ICD-10-CM

## 2021-12-20 DIAGNOSIS — N17.9 AKI (ACUTE KIDNEY INJURY) (HCC): ICD-10-CM

## 2021-12-20 DIAGNOSIS — D3A.8 NEUROENDOCRINE TUMOR OF PANCREAS: ICD-10-CM

## 2021-12-20 DIAGNOSIS — R19.7 DIARRHEA: ICD-10-CM

## 2021-12-20 PROCEDURE — 99285 EMERGENCY DEPT VISIT HI MDM: CPT

## 2021-12-20 PROCEDURE — 93005 ELECTROCARDIOGRAM TRACING: CPT

## 2021-12-21 ENCOUNTER — APPOINTMENT (EMERGENCY)
Dept: RADIOLOGY | Facility: HOSPITAL | Age: 61
End: 2021-12-21
Payer: COMMERCIAL

## 2021-12-21 ENCOUNTER — APPOINTMENT (OUTPATIENT)
Dept: GASTROENTEROLOGY | Facility: HOSPITAL | Age: 61
End: 2021-12-21
Payer: COMMERCIAL

## 2021-12-21 ENCOUNTER — ANESTHESIA (OUTPATIENT)
Dept: GASTROENTEROLOGY | Facility: HOSPITAL | Age: 61
End: 2021-12-21
Payer: COMMERCIAL

## 2021-12-21 ENCOUNTER — ANESTHESIA EVENT (OUTPATIENT)
Dept: GASTROENTEROLOGY | Facility: HOSPITAL | Age: 61
End: 2021-12-21
Payer: COMMERCIAL

## 2021-12-21 PROBLEM — R55 NEAR SYNCOPE: Status: ACTIVE | Noted: 2021-12-21

## 2021-12-21 PROBLEM — Z90.49 H/O WHIPPLE PROCEDURE: Status: ACTIVE | Noted: 2021-12-21

## 2021-12-21 PROBLEM — R19.7 DIARRHEA: Status: ACTIVE | Noted: 2021-12-21

## 2021-12-21 PROBLEM — I10 HYPERTENSION: Status: ACTIVE | Noted: 2021-12-21

## 2021-12-21 PROBLEM — Z90.410 H/O WHIPPLE PROCEDURE: Status: ACTIVE | Noted: 2021-12-21

## 2021-12-21 LAB
ALBUMIN SERPL BCP-MCNC: 3.5 G/DL (ref 3.5–5)
ALP SERPL-CCNC: 126 U/L (ref 46–116)
ALT SERPL W P-5'-P-CCNC: 28 U/L (ref 12–78)
ANION GAP SERPL CALCULATED.3IONS-SCNC: 6 MMOL/L (ref 4–13)
ANION GAP SERPL CALCULATED.3IONS-SCNC: 6 MMOL/L (ref 4–13)
APTT PPP: 32 SECONDS (ref 23–37)
AST SERPL W P-5'-P-CCNC: 30 U/L (ref 5–45)
ATRIAL RATE: 74 BPM
BASOPHILS # BLD AUTO: 0.04 THOUSANDS/ΜL (ref 0–0.1)
BASOPHILS # BLD MANUAL: 0 THOUSAND/UL (ref 0–0.1)
BASOPHILS NFR BLD AUTO: 0 % (ref 0–1)
BASOPHILS NFR MAR MANUAL: 0 % (ref 0–1)
BILIRUB SERPL-MCNC: 0.22 MG/DL (ref 0.2–1)
BILIRUB UR QL STRIP: NEGATIVE
BUN SERPL-MCNC: 10 MG/DL (ref 5–25)
BUN SERPL-MCNC: 13 MG/DL (ref 5–25)
CALCIUM SERPL-MCNC: 9.4 MG/DL (ref 8.3–10.1)
CALCIUM SERPL-MCNC: 9.9 MG/DL (ref 8.3–10.1)
CHLORIDE SERPL-SCNC: 105 MMOL/L (ref 100–108)
CHLORIDE SERPL-SCNC: 108 MMOL/L (ref 100–108)
CLARITY UR: CLEAR
CO2 SERPL-SCNC: 26 MMOL/L (ref 21–32)
CO2 SERPL-SCNC: 28 MMOL/L (ref 21–32)
COLOR UR: YELLOW
CORTIS SERPL-MCNC: 7 UG/DL
CREAT SERPL-MCNC: 0.77 MG/DL (ref 0.6–1.3)
CREAT SERPL-MCNC: 1 MG/DL (ref 0.6–1.3)
EOSINOPHIL # BLD AUTO: 0.19 THOUSAND/ΜL (ref 0–0.61)
EOSINOPHIL # BLD MANUAL: 0 THOUSAND/UL (ref 0–0.4)
EOSINOPHIL NFR BLD AUTO: 2 % (ref 0–6)
EOSINOPHIL NFR BLD MANUAL: 0 % (ref 0–6)
ERYTHROCYTE [DISTWIDTH] IN BLOOD BY AUTOMATED COUNT: 14.1 % (ref 11.6–15.1)
ERYTHROCYTE [DISTWIDTH] IN BLOOD BY AUTOMATED COUNT: 14.3 % (ref 11.6–15.1)
FLUAV RNA RESP QL NAA+PROBE: NEGATIVE
FLUBV RNA RESP QL NAA+PROBE: NEGATIVE
GFR SERPL CREATININE-BSD FRML MDRD: 60 ML/MIN/1.73SQ M
GFR SERPL CREATININE-BSD FRML MDRD: 83 ML/MIN/1.73SQ M
GLUCOSE SERPL-MCNC: 105 MG/DL (ref 65–140)
GLUCOSE SERPL-MCNC: 88 MG/DL (ref 65–140)
GLUCOSE UR STRIP-MCNC: NEGATIVE MG/DL
HCT VFR BLD AUTO: 29 % (ref 34.8–46.1)
HCT VFR BLD AUTO: 31.7 % (ref 34.8–46.1)
HGB BLD-MCNC: 10.2 G/DL (ref 11.5–15.4)
HGB BLD-MCNC: 9.4 G/DL (ref 11.5–15.4)
HGB UR QL STRIP.AUTO: NEGATIVE
IMM GRANULOCYTES # BLD AUTO: 0.02 THOUSAND/UL (ref 0–0.2)
IMM GRANULOCYTES NFR BLD AUTO: 0 % (ref 0–2)
INR PPP: 1.08 (ref 0.84–1.19)
KETONES UR STRIP-MCNC: NEGATIVE MG/DL
LACTATE SERPL-SCNC: 1.4 MMOL/L (ref 0.5–2)
LEUKOCYTE ESTERASE UR QL STRIP: NEGATIVE
LIPASE SERPL-CCNC: 17 U/L (ref 73–393)
LYMPHOCYTES # BLD AUTO: 2.25 THOUSAND/UL (ref 0.6–4.47)
LYMPHOCYTES # BLD AUTO: 26 % (ref 14–44)
LYMPHOCYTES # BLD AUTO: 4.79 THOUSANDS/ΜL (ref 0.6–4.47)
LYMPHOCYTES NFR BLD AUTO: 48 % (ref 14–44)
MCH RBC QN AUTO: 29.5 PG (ref 26.8–34.3)
MCH RBC QN AUTO: 29.7 PG (ref 26.8–34.3)
MCHC RBC AUTO-ENTMCNC: 32.2 G/DL (ref 31.4–37.4)
MCHC RBC AUTO-ENTMCNC: 32.4 G/DL (ref 31.4–37.4)
MCV RBC AUTO: 91 FL (ref 82–98)
MCV RBC AUTO: 92 FL (ref 82–98)
MONOCYTES # BLD AUTO: 0.52 THOUSAND/UL (ref 0–1.22)
MONOCYTES # BLD AUTO: 0.87 THOUSAND/ΜL (ref 0.17–1.22)
MONOCYTES NFR BLD AUTO: 9 % (ref 4–12)
MONOCYTES NFR BLD: 6 % (ref 4–12)
NEUTROPHILS # BLD AUTO: 4.17 THOUSANDS/ΜL (ref 1.85–7.62)
NEUTROPHILS # BLD MANUAL: 5.28 THOUSAND/UL (ref 1.85–7.62)
NEUTS SEG NFR BLD AUTO: 41 % (ref 43–75)
NEUTS SEG NFR BLD AUTO: 61 % (ref 43–75)
NITRITE UR QL STRIP: NEGATIVE
NRBC BLD AUTO-RTO: 0 /100 WBCS
P AXIS: 57 DEGREES
PH UR STRIP.AUTO: 6 [PH]
PLATELET # BLD AUTO: 279 THOUSANDS/UL (ref 149–390)
PLATELET # BLD AUTO: 309 THOUSANDS/UL (ref 149–390)
PLATELET BLD QL SMEAR: ADEQUATE
PMV BLD AUTO: 11 FL (ref 8.9–12.7)
PMV BLD AUTO: 11.3 FL (ref 8.9–12.7)
POTASSIUM SERPL-SCNC: 3.2 MMOL/L (ref 3.5–5.3)
POTASSIUM SERPL-SCNC: 3.8 MMOL/L (ref 3.5–5.3)
PR INTERVAL: 178 MS
PROCALCITONIN SERPL-MCNC: <0.05 NG/ML
PROCALCITONIN SERPL-MCNC: <0.05 NG/ML
PROT SERPL-MCNC: 8.3 G/DL (ref 6.4–8.2)
PROT UR STRIP-MCNC: NEGATIVE MG/DL
PROTHROMBIN TIME: 13.5 SECONDS (ref 11.6–14.5)
QRS AXIS: -18 DEGREES
QRSD INTERVAL: 98 MS
QT INTERVAL: 380 MS
QTC INTERVAL: 421 MS
RBC # BLD AUTO: 3.19 MILLION/UL (ref 3.81–5.12)
RBC # BLD AUTO: 3.43 MILLION/UL (ref 3.81–5.12)
RBC MORPH BLD: NORMAL
RSV RNA RESP QL NAA+PROBE: NEGATIVE
SARS-COV-2 RNA RESP QL NAA+PROBE: NEGATIVE
SODIUM SERPL-SCNC: 139 MMOL/L (ref 136–145)
SODIUM SERPL-SCNC: 140 MMOL/L (ref 136–145)
SP GR UR STRIP.AUTO: 1.03 (ref 1–1.03)
T WAVE AXIS: 56 DEGREES
UROBILINOGEN UR QL STRIP.AUTO: 0.2 E.U./DL
VARIANT LYMPHS # BLD AUTO: 7 %
VENTRICULAR RATE: 74 BPM
WBC # BLD AUTO: 10.08 THOUSAND/UL (ref 4.31–10.16)
WBC # BLD AUTO: 8.66 THOUSAND/UL (ref 4.31–10.16)

## 2021-12-21 PROCEDURE — 93010 ELECTROCARDIOGRAM REPORT: CPT | Performed by: INTERNAL MEDICINE

## 2021-12-21 PROCEDURE — 85610 PROTHROMBIN TIME: CPT

## 2021-12-21 PROCEDURE — G1004 CDSM NDSC: HCPCS

## 2021-12-21 PROCEDURE — 83605 ASSAY OF LACTIC ACID: CPT

## 2021-12-21 PROCEDURE — 83690 ASSAY OF LIPASE: CPT | Performed by: STUDENT IN AN ORGANIZED HEALTH CARE EDUCATION/TRAINING PROGRAM

## 2021-12-21 PROCEDURE — 86316 IMMUNOASSAY TUMOR OTHER: CPT | Performed by: STUDENT IN AN ORGANIZED HEALTH CARE EDUCATION/TRAINING PROGRAM

## 2021-12-21 PROCEDURE — 43235 EGD DIAGNOSTIC BRUSH WASH: CPT | Performed by: INTERNAL MEDICINE

## 2021-12-21 PROCEDURE — 87040 BLOOD CULTURE FOR BACTERIA: CPT

## 2021-12-21 PROCEDURE — 36415 COLL VENOUS BLD VENIPUNCTURE: CPT

## 2021-12-21 PROCEDURE — 0241U HB NFCT DS VIR RESP RNA 4 TRGT: CPT

## 2021-12-21 PROCEDURE — 86301 IMMUNOASSAY TUMOR CA 19-9: CPT | Performed by: STUDENT IN AN ORGANIZED HEALTH CARE EDUCATION/TRAINING PROGRAM

## 2021-12-21 PROCEDURE — 85007 BL SMEAR W/DIFF WBC COUNT: CPT | Performed by: STUDENT IN AN ORGANIZED HEALTH CARE EDUCATION/TRAINING PROGRAM

## 2021-12-21 PROCEDURE — 96361 HYDRATE IV INFUSION ADD-ON: CPT

## 2021-12-21 PROCEDURE — 99243 OFF/OP CNSLTJ NEW/EST LOW 30: CPT | Performed by: SURGERY

## 2021-12-21 PROCEDURE — 84145 PROCALCITONIN (PCT): CPT

## 2021-12-21 PROCEDURE — 85025 COMPLETE CBC W/AUTO DIFF WBC: CPT

## 2021-12-21 PROCEDURE — 82533 TOTAL CORTISOL: CPT

## 2021-12-21 PROCEDURE — 81003 URINALYSIS AUTO W/O SCOPE: CPT

## 2021-12-21 PROCEDURE — NC001 PR NO CHARGE: Performed by: HOSPITALIST

## 2021-12-21 PROCEDURE — 85730 THROMBOPLASTIN TIME PARTIAL: CPT

## 2021-12-21 PROCEDURE — 99244 OFF/OP CNSLTJ NEW/EST MOD 40: CPT | Performed by: INTERNAL MEDICINE

## 2021-12-21 PROCEDURE — 74177 CT ABD & PELVIS W/CONTRAST: CPT

## 2021-12-21 PROCEDURE — 80053 COMPREHEN METABOLIC PANEL: CPT

## 2021-12-21 PROCEDURE — 99285 EMERGENCY DEPT VISIT HI MDM: CPT | Performed by: EMERGENCY MEDICINE

## 2021-12-21 PROCEDURE — 96374 THER/PROPH/DIAG INJ IV PUSH: CPT

## 2021-12-21 PROCEDURE — 99219 PR INITIAL OBSERVATION CARE/DAY 50 MINUTES: CPT | Performed by: HOSPITALIST

## 2021-12-21 PROCEDURE — 85027 COMPLETE CBC AUTOMATED: CPT | Performed by: STUDENT IN AN ORGANIZED HEALTH CARE EDUCATION/TRAINING PROGRAM

## 2021-12-21 PROCEDURE — 80048 BASIC METABOLIC PNL TOTAL CA: CPT | Performed by: STUDENT IN AN ORGANIZED HEALTH CARE EDUCATION/TRAINING PROGRAM

## 2021-12-21 PROCEDURE — 71045 X-RAY EXAM CHEST 1 VIEW: CPT

## 2021-12-21 RX ORDER — PANTOPRAZOLE SODIUM 40 MG/1
40 TABLET, DELAYED RELEASE ORAL
Status: DISCONTINUED | OUTPATIENT
Start: 2021-12-21 | End: 2021-12-22 | Stop reason: HOSPADM

## 2021-12-21 RX ORDER — POTASSIUM CHLORIDE 14.9 MG/ML
20 INJECTION INTRAVENOUS
Status: DISPENSED | OUTPATIENT
Start: 2021-12-21 | End: 2021-12-21

## 2021-12-21 RX ORDER — LISINOPRIL 20 MG/1
20 TABLET ORAL DAILY
Status: DISCONTINUED | OUTPATIENT
Start: 2021-12-21 | End: 2021-12-22 | Stop reason: HOSPADM

## 2021-12-21 RX ORDER — ONDANSETRON 2 MG/ML
4 INJECTION INTRAMUSCULAR; INTRAVENOUS ONCE
Status: COMPLETED | OUTPATIENT
Start: 2021-12-21 | End: 2021-12-21

## 2021-12-21 RX ORDER — DICYCLOMINE HYDROCHLORIDE 10 MG/1
20 CAPSULE ORAL 3 TIMES DAILY
Status: DISCONTINUED | OUTPATIENT
Start: 2021-12-21 | End: 2021-12-21

## 2021-12-21 RX ORDER — SODIUM CHLORIDE, SODIUM GLUCONATE, SODIUM ACETATE, POTASSIUM CHLORIDE, MAGNESIUM CHLORIDE, SODIUM PHOSPHATE, DIBASIC, AND POTASSIUM PHOSPHATE .53; .5; .37; .037; .03; .012; .00082 G/100ML; G/100ML; G/100ML; G/100ML; G/100ML; G/100ML; G/100ML
125 INJECTION, SOLUTION INTRAVENOUS CONTINUOUS
Status: DISCONTINUED | OUTPATIENT
Start: 2021-12-21 | End: 2021-12-22 | Stop reason: HOSPADM

## 2021-12-21 RX ORDER — POTASSIUM CHLORIDE 14.9 MG/ML
20 INJECTION INTRAVENOUS
Status: COMPLETED | OUTPATIENT
Start: 2021-12-21 | End: 2021-12-21

## 2021-12-21 RX ORDER — ACETAMINOPHEN 160 MG/5ML
650 SUSPENSION, ORAL (FINAL DOSE FORM) ORAL EVERY 4 HOURS PRN
Status: DISCONTINUED | OUTPATIENT
Start: 2021-12-21 | End: 2021-12-22 | Stop reason: HOSPADM

## 2021-12-21 RX ORDER — AMLODIPINE BESYLATE 10 MG/1
10 TABLET ORAL DAILY
Status: DISCONTINUED | OUTPATIENT
Start: 2021-12-21 | End: 2021-12-22 | Stop reason: HOSPADM

## 2021-12-21 RX ORDER — LABETALOL 20 MG/4 ML (5 MG/ML) INTRAVENOUS SYRINGE
10 EVERY 6 HOURS PRN
Status: DISCONTINUED | OUTPATIENT
Start: 2021-12-21 | End: 2021-12-22 | Stop reason: HOSPADM

## 2021-12-21 RX ORDER — CHOLESTYRAMINE LIGHT 4 G/5.7G
4 POWDER, FOR SUSPENSION ORAL
Status: DISCONTINUED | OUTPATIENT
Start: 2021-12-21 | End: 2021-12-22 | Stop reason: HOSPADM

## 2021-12-21 RX ORDER — ONDANSETRON 2 MG/ML
4 INJECTION INTRAMUSCULAR; INTRAVENOUS EVERY 6 HOURS PRN
Status: DISCONTINUED | OUTPATIENT
Start: 2021-12-21 | End: 2021-12-22 | Stop reason: HOSPADM

## 2021-12-21 RX ORDER — DICYCLOMINE HYDROCHLORIDE 10 MG/1
10 CAPSULE ORAL 3 TIMES DAILY
Status: DISCONTINUED | OUTPATIENT
Start: 2021-12-21 | End: 2021-12-22 | Stop reason: HOSPADM

## 2021-12-21 RX ORDER — ATORVASTATIN CALCIUM 40 MG/1
40 TABLET, FILM COATED ORAL EVERY EVENING
Status: DISCONTINUED | OUTPATIENT
Start: 2021-12-21 | End: 2021-12-22 | Stop reason: HOSPADM

## 2021-12-21 RX ORDER — PROPOFOL 10 MG/ML
INJECTION, EMULSION INTRAVENOUS AS NEEDED
Status: DISCONTINUED | OUTPATIENT
Start: 2021-12-21 | End: 2021-12-21

## 2021-12-21 RX ORDER — SODIUM CHLORIDE 9 MG/ML
INJECTION, SOLUTION INTRAVENOUS CONTINUOUS PRN
Status: DISCONTINUED | OUTPATIENT
Start: 2021-12-21 | End: 2021-12-21

## 2021-12-21 RX ORDER — HYDRALAZINE HYDROCHLORIDE 20 MG/ML
10 INJECTION INTRAMUSCULAR; INTRAVENOUS ONCE
Status: COMPLETED | OUTPATIENT
Start: 2021-12-21 | End: 2021-12-21

## 2021-12-21 RX ORDER — LIDOCAINE HYDROCHLORIDE 10 MG/ML
INJECTION, SOLUTION EPIDURAL; INFILTRATION; INTRACAUDAL; PERINEURAL AS NEEDED
Status: DISCONTINUED | OUTPATIENT
Start: 2021-12-21 | End: 2021-12-21

## 2021-12-21 RX ADMIN — PROPOFOL 20 MG: 10 INJECTION, EMULSION INTRAVENOUS at 15:52

## 2021-12-21 RX ADMIN — SODIUM CHLORIDE 1000 ML: 0.9 INJECTION, SOLUTION INTRAVENOUS at 00:30

## 2021-12-21 RX ADMIN — ENOXAPARIN SODIUM 40 MG: 40 INJECTION SUBCUTANEOUS at 08:54

## 2021-12-21 RX ADMIN — SODIUM CHLORIDE, SODIUM GLUCONATE, SODIUM ACETATE, POTASSIUM CHLORIDE, MAGNESIUM CHLORIDE, SODIUM PHOSPHATE, DIBASIC, AND POTASSIUM PHOSPHATE 125 ML/HR: .53; .5; .37; .037; .03; .012; .00082 INJECTION, SOLUTION INTRAVENOUS at 21:57

## 2021-12-21 RX ADMIN — LISINOPRIL 20 MG: 20 TABLET ORAL at 08:54

## 2021-12-21 RX ADMIN — PROPOFOL 50 MG: 10 INJECTION, EMULSION INTRAVENOUS at 15:50

## 2021-12-21 RX ADMIN — DICYCLOMINE HYDROCHLORIDE 10 MG: 10 CAPSULE ORAL at 21:53

## 2021-12-21 RX ADMIN — POTASSIUM CHLORIDE 20 MEQ: 14.9 INJECTION, SOLUTION INTRAVENOUS at 07:57

## 2021-12-21 RX ADMIN — IOHEXOL 100 ML: 350 INJECTION, SOLUTION INTRAVENOUS at 02:20

## 2021-12-21 RX ADMIN — LIDOCAINE HYDROCHLORIDE 100 MG: 10 INJECTION, SOLUTION EPIDURAL; INFILTRATION; INTRACAUDAL; PERINEURAL at 15:49

## 2021-12-21 RX ADMIN — ONDANSETRON 4 MG: 2 INJECTION INTRAMUSCULAR; INTRAVENOUS at 01:25

## 2021-12-21 RX ADMIN — SODIUM CHLORIDE, SODIUM GLUCONATE, SODIUM ACETATE, POTASSIUM CHLORIDE, MAGNESIUM CHLORIDE, SODIUM PHOSPHATE, DIBASIC, AND POTASSIUM PHOSPHATE 125 ML/HR: .53; .5; .37; .037; .03; .012; .00082 INJECTION, SOLUTION INTRAVENOUS at 17:07

## 2021-12-21 RX ADMIN — ACETAMINOPHEN 650 MG: 650 SUSPENSION ORAL at 06:03

## 2021-12-21 RX ADMIN — POTASSIUM CHLORIDE 20 MEQ: 14.9 INJECTION, SOLUTION INTRAVENOUS at 06:09

## 2021-12-21 RX ADMIN — PROPOFOL 50 MG: 10 INJECTION, EMULSION INTRAVENOUS at 15:49

## 2021-12-21 RX ADMIN — ATORVASTATIN CALCIUM 40 MG: 40 TABLET, FILM COATED ORAL at 17:11

## 2021-12-21 RX ADMIN — POTASSIUM CHLORIDE 20 MEQ: 14.9 INJECTION, SOLUTION INTRAVENOUS at 10:41

## 2021-12-21 RX ADMIN — PANTOPRAZOLE SODIUM 40 MG: 40 TABLET, DELAYED RELEASE ORAL at 06:03

## 2021-12-21 RX ADMIN — SODIUM CHLORIDE: 9 INJECTION, SOLUTION INTRAVENOUS at 15:47

## 2021-12-21 RX ADMIN — SODIUM CHLORIDE, SODIUM GLUCONATE, SODIUM ACETATE, POTASSIUM CHLORIDE, MAGNESIUM CHLORIDE, SODIUM PHOSPHATE, DIBASIC, AND POTASSIUM PHOSPHATE 125 ML/HR: .53; .5; .37; .037; .03; .012; .00082 INJECTION, SOLUTION INTRAVENOUS at 05:56

## 2021-12-21 RX ADMIN — AMLODIPINE BESYLATE 10 MG: 10 TABLET ORAL at 08:54

## 2021-12-21 RX ADMIN — HYDRALAZINE HYDROCHLORIDE 10 MG: 20 INJECTION INTRAMUSCULAR; INTRAVENOUS at 08:54

## 2021-12-22 VITALS
WEIGHT: 167 LBS | TEMPERATURE: 98.5 F | HEIGHT: 63 IN | DIASTOLIC BLOOD PRESSURE: 78 MMHG | RESPIRATION RATE: 18 BRPM | OXYGEN SATURATION: 97 % | BODY MASS INDEX: 29.59 KG/M2 | HEART RATE: 77 BPM | SYSTOLIC BLOOD PRESSURE: 144 MMHG

## 2021-12-22 PROBLEM — R55 NEAR SYNCOPE: Status: RESOLVED | Noted: 2021-12-21 | Resolved: 2021-12-22

## 2021-12-22 PROBLEM — K86.89 PANCREATIC INSUFFICIENCY: Status: ACTIVE | Noted: 2021-12-22

## 2021-12-22 PROBLEM — R11.2 NAUSEA AND VOMITING: Status: RESOLVED | Noted: 2018-05-01 | Resolved: 2021-12-22

## 2021-12-22 LAB
ALBUMIN SERPL BCP-MCNC: 2.8 G/DL (ref 3.5–5)
ALP SERPL-CCNC: 114 U/L (ref 46–116)
ALT SERPL W P-5'-P-CCNC: 26 U/L (ref 12–78)
ANION GAP SERPL CALCULATED.3IONS-SCNC: 3 MMOL/L (ref 4–13)
AST SERPL W P-5'-P-CCNC: 31 U/L (ref 5–45)
BASOPHILS # BLD AUTO: 0.03 THOUSANDS/ΜL (ref 0–0.1)
BASOPHILS NFR BLD AUTO: 0 % (ref 0–1)
BILIRUB SERPL-MCNC: 0.7 MG/DL (ref 0.2–1)
BUN SERPL-MCNC: 8 MG/DL (ref 5–25)
CALCIUM ALBUM COR SERPL-MCNC: 9.7 MG/DL (ref 8.3–10.1)
CALCIUM SERPL-MCNC: 8.7 MG/DL (ref 8.3–10.1)
CANCER AG19-9 SERPL-ACNC: 12 U/ML (ref 0–35)
CHLORIDE SERPL-SCNC: 108 MMOL/L (ref 100–108)
CO2 SERPL-SCNC: 29 MMOL/L (ref 21–32)
CREAT SERPL-MCNC: 0.82 MG/DL (ref 0.6–1.3)
EOSINOPHIL # BLD AUTO: 0.12 THOUSAND/ΜL (ref 0–0.61)
EOSINOPHIL NFR BLD AUTO: 2 % (ref 0–6)
ERYTHROCYTE [DISTWIDTH] IN BLOOD BY AUTOMATED COUNT: 14.3 % (ref 11.6–15.1)
GFR SERPL CREATININE-BSD FRML MDRD: 76 ML/MIN/1.73SQ M
GLUCOSE P FAST SERPL-MCNC: 86 MG/DL (ref 65–99)
GLUCOSE SERPL-MCNC: 86 MG/DL (ref 65–140)
HCT VFR BLD AUTO: 29.4 % (ref 34.8–46.1)
HGB BLD-MCNC: 9.5 G/DL (ref 11.5–15.4)
IMM GRANULOCYTES # BLD AUTO: 0.02 THOUSAND/UL (ref 0–0.2)
IMM GRANULOCYTES NFR BLD AUTO: 0 % (ref 0–2)
LYMPHOCYTES # BLD AUTO: 3.43 THOUSANDS/ΜL (ref 0.6–4.47)
LYMPHOCYTES NFR BLD AUTO: 48 % (ref 14–44)
MAGNESIUM SERPL-MCNC: 2 MG/DL (ref 1.6–2.6)
MCH RBC QN AUTO: 29.9 PG (ref 26.8–34.3)
MCHC RBC AUTO-ENTMCNC: 32.3 G/DL (ref 31.4–37.4)
MCV RBC AUTO: 93 FL (ref 82–98)
MONOCYTES # BLD AUTO: 0.69 THOUSAND/ΜL (ref 0.17–1.22)
MONOCYTES NFR BLD AUTO: 10 % (ref 4–12)
NEUTROPHILS # BLD AUTO: 2.87 THOUSANDS/ΜL (ref 1.85–7.62)
NEUTS SEG NFR BLD AUTO: 40 % (ref 43–75)
NRBC BLD AUTO-RTO: 0 /100 WBCS
PHOSPHATE SERPL-MCNC: 2.5 MG/DL (ref 2.3–4.1)
PLATELET # BLD AUTO: 275 THOUSANDS/UL (ref 149–390)
PMV BLD AUTO: 10.9 FL (ref 8.9–12.7)
POTASSIUM SERPL-SCNC: 3.8 MMOL/L (ref 3.5–5.3)
PROT SERPL-MCNC: 6.8 G/DL (ref 6.4–8.2)
RBC # BLD AUTO: 3.18 MILLION/UL (ref 3.81–5.12)
SODIUM SERPL-SCNC: 140 MMOL/L (ref 136–145)
TSH SERPL DL<=0.05 MIU/L-ACNC: 1.04 UIU/ML (ref 0.36–3.74)
WBC # BLD AUTO: 7.16 THOUSAND/UL (ref 4.31–10.16)

## 2021-12-22 PROCEDURE — 99213 OFFICE O/P EST LOW 20 MIN: CPT | Performed by: INTERNAL MEDICINE

## 2021-12-22 PROCEDURE — 97162 PT EVAL MOD COMPLEX 30 MIN: CPT

## 2021-12-22 PROCEDURE — 83735 ASSAY OF MAGNESIUM: CPT | Performed by: STUDENT IN AN ORGANIZED HEALTH CARE EDUCATION/TRAINING PROGRAM

## 2021-12-22 PROCEDURE — 84100 ASSAY OF PHOSPHORUS: CPT | Performed by: STUDENT IN AN ORGANIZED HEALTH CARE EDUCATION/TRAINING PROGRAM

## 2021-12-22 PROCEDURE — 85025 COMPLETE CBC W/AUTO DIFF WBC: CPT | Performed by: STUDENT IN AN ORGANIZED HEALTH CARE EDUCATION/TRAINING PROGRAM

## 2021-12-22 PROCEDURE — 99217 PR OBSERVATION CARE DISCHARGE MANAGEMENT: CPT | Performed by: HOSPITALIST

## 2021-12-22 PROCEDURE — 80053 COMPREHEN METABOLIC PANEL: CPT | Performed by: STUDENT IN AN ORGANIZED HEALTH CARE EDUCATION/TRAINING PROGRAM

## 2021-12-22 PROCEDURE — 84443 ASSAY THYROID STIM HORMONE: CPT | Performed by: STUDENT IN AN ORGANIZED HEALTH CARE EDUCATION/TRAINING PROGRAM

## 2021-12-22 PROCEDURE — 97166 OT EVAL MOD COMPLEX 45 MIN: CPT

## 2021-12-22 RX ORDER — PANTOPRAZOLE SODIUM 40 MG/1
40 TABLET, DELAYED RELEASE ORAL
Qty: 30 TABLET | Refills: 0 | Status: SHIPPED | OUTPATIENT
Start: 2021-12-23 | End: 2022-05-13 | Stop reason: SDUPTHER

## 2021-12-22 RX ORDER — DICYCLOMINE HYDROCHLORIDE 10 MG/1
10 CAPSULE ORAL 3 TIMES DAILY
Qty: 90 CAPSULE | Refills: 0 | Status: SHIPPED | OUTPATIENT
Start: 2021-12-22 | End: 2022-05-13 | Stop reason: ALTCHOICE

## 2021-12-22 RX ADMIN — AMLODIPINE BESYLATE 10 MG: 10 TABLET ORAL at 08:41

## 2021-12-22 RX ADMIN — PANTOPRAZOLE SODIUM 40 MG: 40 TABLET, DELAYED RELEASE ORAL at 05:35

## 2021-12-22 RX ADMIN — PANCRELIPASE 12000 UNITS: 30000; 6000; 19000 CAPSULE, DELAYED RELEASE PELLETS ORAL at 12:05

## 2021-12-22 RX ADMIN — LISINOPRIL 20 MG: 20 TABLET ORAL at 08:41

## 2021-12-22 RX ADMIN — ENOXAPARIN SODIUM 40 MG: 40 INJECTION SUBCUTANEOUS at 08:41

## 2021-12-22 RX ADMIN — PANCRELIPASE 12000 UNITS: 30000; 6000; 19000 CAPSULE, DELAYED RELEASE PELLETS ORAL at 08:41

## 2021-12-22 RX ADMIN — CHOLESTYRAMINE 4 G: 4 POWDER, FOR SUSPENSION ORAL at 08:47

## 2021-12-22 RX ADMIN — SODIUM CHLORIDE, SODIUM GLUCONATE, SODIUM ACETATE, POTASSIUM CHLORIDE, MAGNESIUM CHLORIDE, SODIUM PHOSPHATE, DIBASIC, AND POTASSIUM PHOSPHATE 125 ML/HR: .53; .5; .37; .037; .03; .012; .00082 INJECTION, SOLUTION INTRAVENOUS at 05:36

## 2021-12-22 RX ADMIN — DICYCLOMINE HYDROCHLORIDE 10 MG: 10 CAPSULE ORAL at 08:41

## 2021-12-23 ENCOUNTER — TRANSITIONAL CARE MANAGEMENT (OUTPATIENT)
Dept: INTERNAL MEDICINE CLINIC | Facility: CLINIC | Age: 61
End: 2021-12-23

## 2021-12-23 LAB
CGA SERPL-MCNC: 91.9 NG/ML (ref 0–101.8)
DME PARACHUTE DELIVERY DATE ACTUAL: NORMAL
DME PARACHUTE DELIVERY DATE REQUESTED: NORMAL
DME PARACHUTE ITEM DESCRIPTION: NORMAL
DME PARACHUTE ORDER STATUS: NORMAL
DME PARACHUTE SUPPLIER NAME: NORMAL
DME PARACHUTE SUPPLIER PHONE: NORMAL

## 2021-12-26 LAB
BACTERIA BLD CULT: NORMAL
BACTERIA BLD CULT: NORMAL

## 2021-12-30 ENCOUNTER — APPOINTMENT (OUTPATIENT)
Dept: LAB | Facility: HOSPITAL | Age: 61
End: 2021-12-30
Attending: SURGERY
Payer: COMMERCIAL

## 2021-12-30 DIAGNOSIS — D3A.8 NEUROENDOCRINE TUMOR OF PANCREAS: ICD-10-CM

## 2021-12-30 DIAGNOSIS — N17.9 AKI (ACUTE KIDNEY INJURY) (HCC): ICD-10-CM

## 2021-12-30 LAB
ANION GAP SERPL CALCULATED.3IONS-SCNC: 4 MMOL/L (ref 4–13)
BUN SERPL-MCNC: 13 MG/DL (ref 5–25)
CALCIUM SERPL-MCNC: 9.4 MG/DL (ref 8.3–10.1)
CHLORIDE SERPL-SCNC: 108 MMOL/L (ref 100–108)
CO2 SERPL-SCNC: 28 MMOL/L (ref 21–32)
CREAT SERPL-MCNC: 1.02 MG/DL (ref 0.6–1.3)
EST. AVERAGE GLUCOSE BLD GHB EST-MCNC: 117 MG/DL
GFR SERPL CREATININE-BSD FRML MDRD: 59 ML/MIN/1.73SQ M
GLUCOSE P FAST SERPL-MCNC: 94 MG/DL (ref 65–99)
HBA1C MFR BLD: 5.7 %
POTASSIUM SERPL-SCNC: 3.6 MMOL/L (ref 3.5–5.3)
SODIUM SERPL-SCNC: 140 MMOL/L (ref 136–145)

## 2021-12-30 PROCEDURE — 86316 IMMUNOASSAY TUMOR OTHER: CPT

## 2021-12-30 PROCEDURE — 80048 BASIC METABOLIC PNL TOTAL CA: CPT

## 2021-12-30 PROCEDURE — 83036 HEMOGLOBIN GLYCOSYLATED A1C: CPT

## 2021-12-30 PROCEDURE — 36415 COLL VENOUS BLD VENIPUNCTURE: CPT

## 2022-01-03 LAB — CGA SERPL-MCNC: 74.4 NG/ML (ref 0–101.8)

## 2022-01-15 ENCOUNTER — HOSPITAL ENCOUNTER (OUTPATIENT)
Dept: RADIOLOGY | Facility: HOSPITAL | Age: 62
Discharge: HOME | End: 2022-01-15
Attending: SURGERY
Payer: MEDICARE

## 2022-01-15 DIAGNOSIS — D3A.8 NEUROENDOCRINE TUMOR OF PANCREAS: ICD-10-CM

## 2022-01-15 PROCEDURE — 74177 CT ABD & PELVIS W/CONTRAST: CPT

## 2022-01-15 PROCEDURE — 71260 CT THORAX DX C+: CPT

## 2022-01-15 PROCEDURE — G1004 CDSM NDSC: HCPCS

## 2022-01-15 RX ADMIN — IOHEXOL 100 ML: 350 INJECTION, SOLUTION INTRAVENOUS at 10:21

## 2022-01-19 PROBLEM — Z08 ENCOUNTER FOR FOLLOW-UP SURVEILLANCE OF NEUROENDOCRINE CARCINOMA: Status: ACTIVE | Noted: 2022-01-19

## 2022-01-19 PROBLEM — Z85.89 ENCOUNTER FOR FOLLOW-UP SURVEILLANCE OF NEUROENDOCRINE CARCINOMA: Status: ACTIVE | Noted: 2022-01-19

## 2022-01-19 PROBLEM — Z85.9 ENCOUNTER FOR FOLLOW-UP SURVEILLANCE OF NEUROENDOCRINE CARCINOMA: Status: ACTIVE | Noted: 2021-07-08

## 2022-01-19 PROBLEM — Z08 ENCOUNTER FOR FOLLOW-UP SURVEILLANCE OF NEUROENDOCRINE CARCINOMA: Status: ACTIVE | Noted: 2021-07-08

## 2022-01-19 PROBLEM — Z85.9 ENCOUNTER FOR FOLLOW-UP SURVEILLANCE OF NEUROENDOCRINE CARCINOMA: Status: ACTIVE | Noted: 2022-01-19

## 2022-01-19 PROBLEM — Z85.89 HISTORY OF NEUROENDOCRINE CANCER: Status: ACTIVE | Noted: 2021-07-08

## 2022-01-24 ENCOUNTER — OFFICE VISIT (OUTPATIENT)
Dept: SURGICAL ONCOLOGY | Facility: CLINIC | Age: 62
End: 2022-01-24
Payer: MEDICARE

## 2022-01-24 ENCOUNTER — TELEPHONE (OUTPATIENT)
Dept: GYNECOLOGIC ONCOLOGY | Facility: CLINIC | Age: 62
End: 2022-01-24

## 2022-01-24 VITALS
DIASTOLIC BLOOD PRESSURE: 82 MMHG | WEIGHT: 155 LBS | SYSTOLIC BLOOD PRESSURE: 128 MMHG | TEMPERATURE: 96.3 F | RESPIRATION RATE: 16 BRPM | BODY MASS INDEX: 27.46 KG/M2 | HEIGHT: 63 IN | OXYGEN SATURATION: 99 % | HEART RATE: 72 BPM

## 2022-01-24 DIAGNOSIS — Z85.89 HISTORY OF NEUROENDOCRINE CANCER: ICD-10-CM

## 2022-01-24 DIAGNOSIS — Z08 ENCOUNTER FOR FOLLOW-UP SURVEILLANCE OF NEUROENDOCRINE CARCINOMA: Primary | ICD-10-CM

## 2022-01-24 DIAGNOSIS — Z85.9 ENCOUNTER FOR FOLLOW-UP SURVEILLANCE OF NEUROENDOCRINE CARCINOMA: Primary | ICD-10-CM

## 2022-01-24 PROCEDURE — 99214 OFFICE O/P EST MOD 30 MIN: CPT | Performed by: SURGERY

## 2022-01-24 NOTE — PROGRESS NOTES
Surgical Oncology Follow Up       [address] [facility] [place]  [address] [facility] [id number] [phone]    Dori Cools  1960  [id number]  [address] 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    History of neuroendocrine cancer        Advance Care Planning/Advance Directives:  Discussed disease status, cancer treatment plans and/or cancer treatment goals with the patient  Oncology History   History of neuroendocrine cancer   6/17/2021 Biopsy    Pancreas, Head Cyst:   Well-differentiated neuroendocrine tumor  8/24/2021 Surgery    Whipple:  - Well differentiated neuroendocrine tumor (2 3 cm ), G1   - Pancreatic resection margin is positive for tumor  - All other margins are negative for tumor   - Seven lymph nodes, negative for malignancy (0/7)  Pancreas new margin, re-excision:  - Adipose tissue with pancreatic microadenoma  - No tumor present            History of Present Illness: Patient is a 57year old woman here for surveillance s/p Whipple procedure for NET  -Interval History: She has had issues with eating afterwards, namely diarrhea  CT scan and bloodwork done in anticipation of today's visit  Review of Systems:  Review of Systems   Constitutional: Positive for unexpected weight change  HENT: Negative  Eyes: Negative  Respiratory: Negative  Cardiovascular: Negative  Gastrointestinal: Negative  Endocrine: Negative  Genitourinary: Negative  Musculoskeletal: Negative  Skin: Negative  Allergic/Immunologic: Negative      Neurological: Negative  Hematological: Negative  Psychiatric/Behavioral: Negative  Patient Active Problem List   Diagnosis    Acute low back pain due to trauma    Benign hypertension with CKD (chronic kidney disease), stage II    Vision disturbance    Noncompliance with medications    Stroke-like symptoms    Incidental pulmonary nodule    Hyperlipemia    Tarsal tunnel syndrome, left    Pancreatic mass    Leukocytosis    Depression    Goals of care, counseling/discussion    Stage 2 chronic kidney disease    History of neuroendocrine cancer    Personal history of breast cancer    Anemia    Diarrhea    Hypertension    H/O Whipple procedure    Pancreatic insufficiency    Encounter for follow-up surveillance of neuroendocrine carcinoma     Past Medical History:   Diagnosis Date    Back ache     Cancer ([facility] [address] )     Hyperlipidemia     Hypertension      Past Surgical History:   Procedure Laterality Date    APPENDECTOMY      BILATERAL OOPHORECTOMY      BREAST SURGERY Right     partial mastectomy     COLONOSCOPY      HERNIA REPAIR      HYSTERECTOMY      LAPAROTOMY N/A 8/24/2021    Procedure: LAPAROTOMY EXPLORATORY;  Surgeon: Livan Latif MD;  Location: [facility];  Service: Surgical Oncology    MASTECTOMY Right     partial    WHIPPLE PROCEDURE/PANCREATICO-DUODENECTOMY N/A 8/24/2021    Procedure: WHIPPLE PROCEDURE/PANCREATICO-DUODENECTOMY;  Surgeon: Livan Latif MD;  Location: [facility];  Service: Surgical Oncology     Family History   Problem Relation Age of Onset    Hypertension Mother     Heart disease Mother     Diabetes Mother     Cancer Father      Social History     Socioeconomic History    Marital status: /Civil Union     Spouse name: Nereyda Pitts  [address] [place] [address]    Number of children: Not on file    Years of education: Not on file    Highest education level: Not on file   Occupational History    Occupation: unemployed   Tobacco Use    Smoking status: Former Smoker Packs/day: 0 65     Years: 30 00     Pack years: 19 50    Smokeless tobacco: Never Used    Tobacco comment: quit 15 years ago   Vaping Use    Vaping Use: Never used   Substance and Sexual Activity    Alcohol use: Not Currently    Drug use: No    Sexual activity: Not Currently   Other Topics Concern    Not on file   Social History Narrative    Not on file     Social Determinants of Health     Financial Resource Strain: Low Risk     Difficulty of Paying Living Expenses: Not very hard   Food Insecurity: Food Insecurity Present    Worried About Running Out of Food in the Last Year: Sometimes true    Bere of Food in the Last Year: Sometimes true   Transportation Needs: No Transportation Needs    Lack of Transportation (Medical): No    Lack of Transportation (Non-Medical): No   Physical Activity: Inactive    Days of Exercise per Week: 0 days    Minutes of Exercise per Session: 0 min   Stress: Not on file   Social Connections: Not on file   Intimate Partner Violence: Not on file   Housing Stability: Not on file       Current Outpatient Medications:     amLODIPine (NORVASC) 10 mg tablet, Take 1 tablet (10 mg total) by mouth daily, Disp: 90 tablet, Rfl: 3    atorvastatin (LIPITOR) 40 mg tablet, Take 1 tablet (40 mg total) by mouth every evening, Disp: 90 tablet, Rfl: 3    cholestyramine (QUESTRAN) 4 GM/DOSE powder, Take 1 packet (4 g total) by mouth 3 (three) times a day with meals, Disp: 1074 62 g, Rfl: 1    clobetasol (TEMOVATE) 0 05 % ointment, Apply twice a day to rash on hands for 2 weeks  Then, apply twice a day from Monday-Friday for another 2 weeks   Avoid the face and groin, Disp: 60 g, Rfl: 0    dicyclomine (BENTYL) 10 mg capsule, Take 1 capsule (10 mg total) by mouth 3 (three) times a day, Disp: 90 capsule, Rfl: 0    hydrOXYzine HCL (ATARAX) 25 mg tablet, Take 25 mg by mouth daily at bedtime as needed, Disp: , Rfl: 0    lisinopril (ZESTRIL) 20 mg tablet, Take 1 tablet (20 mg total) by mouth daily, Disp: 90 tablet, Rfl: 3    ondansetron (ZOFRAN-ODT) 4 mg disintegrating tablet, Take 1 tablet (4 mg total) by mouth every 6 (six) hours as needed for nausea or vomiting, Disp: 30 tablet, Rfl: 0    pancrelipase, Lip-Prot-Amyl, (CREON) 6,000 units delayed release capsule, Take 2 capsules (12,000 Units total) by mouth 3 (three) times a day with meals, Disp: 180 capsule, Rfl: 0    pantoprazole (PROTONIX) 40 mg tablet, Take 1 tablet (40 mg total) by mouth daily in the early morning, Disp: 30 tablet, Rfl: 0    escitalopram (LEXAPRO) 10 mg tablet, Take 1 tablet (10 mg total) by mouth every morning (Patient not taking: Reported on 10/12/2021), Disp: 90 tablet, Rfl: 3    sertraline (ZOLOFT) 25 mg tablet, Take 25 mg by mouth daily   (Patient not taking: Reported on 12/21/2021 ), Disp: , Rfl:     traZODone (DESYREL) 50 mg tablet, take 1/2 tablet by mouth at bedtime if needed for insomnia (Patient not taking: Reported on 10/12/2021), Disp: , Rfl:   No Known Allergies  Vitals:    01/24/22 1128   BP: 128/82   Pulse: 72   Resp: 16   Temp: (!) 96 3 °F (35 7 °C)   SpO2: 99%       Physical Exam  Constitutional:       Appearance: Normal appearance  HENT:      Head: Normocephalic and atraumatic  Right Ear: External ear normal       Left Ear: External ear normal    Eyes:      General: No scleral icterus  Extraocular Movements: Extraocular movements intact  Pupils: Pupils are equal, round, and reactive to light  Cardiovascular:      Rate and Rhythm: Normal rate and regular rhythm  Pulses: Normal pulses  Heart sounds: Normal heart sounds  Pulmonary:      Breath sounds: Normal breath sounds  Abdominal:      General: Abdomen is flat  There is no distension  Palpations: Abdomen is soft  There is no mass  Tenderness: There is no abdominal tenderness  There is no guarding or rebound  Hernia: No hernia is present     Musculoskeletal:      Cervical back: Normal range of motion and neck supple  Skin:     General: Skin is warm and dry  Neurological:      General: No focal deficit present  Mental Status: She is alert and oriented to person, place, and time  Results:  Labs:  Component Ref Range & Units 12/30/21  6:49 AM 12/21/21  2:16 PM 4/14/21  4:46 AM   Chromogranin A 0 0 - 101 8 ng/mL 74 4            Imaging  CT chest abdomen pelvis w contrast    Result Date: 1/17/2022  Narrative: CT CHEST, ABDOMEN AND PELVIS WITH IV CONTRAST INDICATION:   D3A 8: Other benign neuroendocrine tumors  Pancreatic neuroendocrine tumor  Status post Whipple procedure  Appendectomy  Hysterectomy  Right breast surgery  COMPARISON:  CT abdomen pelvis 12/21/2021  CT chest 04/13/2021  TECHNIQUE: CT examination of the chest, abdomen and pelvis was performed  Axial, sagittal, and coronal 2D reformatted images were created from the source data and submitted for interpretation  Radiation dose length product (DLP) for this visit:  616 87 mGy-cm   This examination, like all CT scans performed in the [facility], was performed utilizing techniques to minimize radiation dose exposure, including the use of iterative  reconstruction and automated exposure control  IV Contrast:  100 mL of iohexol (OMNIPAQUE) Enteric Contrast: Enteric contrast was administered  FINDINGS: CHEST LUNGS: 3 mm right upper lobe nodule 3/31, stable  3 mm left upper lobe nodule 3/19, stable  3 mm left upper lobe nodule 3/29, stable  3 mm left lower lobe nodule 3/45, stable  There Is no tracheal or endobronchial lesion  PLEURA:  Unremarkable  HEART/GREAT VESSELS: Thoracic aortic, annular, and coronary artery calcification  No thoracic aortic aneurysm  MEDIASTINUM AND LLOYD:  Surgical clips inferior to the left thyroid  CHEST WALL AND LOWER NECK:   Postsurgical changes of the right breast   Stable 9 mm right axillary lymph node and additional smaller subcentimeter right axillary nodes  Right breast calcifications  ABDOMEN Postsurgical changes from Whipple's procedure  LIVER/BILIARY TREE:  Liver is diffusely decreased in density consistent with fatty change  No CT evidence of suspicious hepatic mass  Normal hepatic contours  No biliary dilatation  Right lobe calcified granuloma  Small amounts of central pneumobilia  GALLBLADDER:  Gallbladder is surgically absent  SPLEEN:  Unremarkable  PANCREAS:  Status post Whipple's procedure  Fatty atrophy of the remaining pancreas  No evidence for localized recurrence  ADRENAL GLANDS:  Unremarkable  KIDNEYS/URETERS:  One or more simple renal cyst(s) is noted  Otherwise unremarkable kidneys  No hydronephrosis  STOMACH AND BOWEL:  Gastrojejunostomy  10 x 9 mm central fat density peripheral soft tissue density focus along the lesser curvature of the stomach, 2/58 presumably fat necrosis, less conspicuous from prior  Decreased gastric mucosal enhancement from prior  Similar soft tissue nodularity at the midline abdominal wall incision at the level of the gastrojejunostomy 2/57-66 Colonic diverticulosis, without findings for diverticulitis  APPENDIX:  No findings to suggest appendicitis  ABDOMINOPELVIC CAVITY:  No ascites  No pneumoperitoneum  No lymphadenopathy  10 x 9 mm central fat density peripheral soft tissue density focus along the lesser curvature of the stomach, 2/58 presumably fat necrosis, previously 13 mm, decreased peripheral enhancement     Additional central fat density focus in the right upper quadrant inferior to the liver 2/58, currently 13 x 19 mm, smaller from prior, also likely fat necrosis  Similar haziness of the left lower quadrant omentum  Similar superficial lobular soft tissue density anterior to the right rectus muscle 2/92-96  VESSELS:  Atherosclerotic changes are present  No evidence of aneurysm  PELVIS REPRODUCTIVE ORGANS:  Surgical changes of prior hysterectomy  URINARY BLADDER:  Unremarkable   ABDOMINAL WALL/INGUINAL REGIONS:  Calcifications bilateral gluteal regions, similar from prior  Postsurgical changes anteriorly  OSSEOUS STRUCTURES:  No acute fracture or destructive osseous lesion  Stable sclerotic foci in the right scapula and humerus  Degenerative changes  Stable sclerotic foci in the bones of the pelvis  Impression: Status post Whipple procedure without evidence for localized recurrence  Central fat density focus at the lesser curvature of the stomach and inferior right hepatic margin, smaller from prior likely representing fat necrosis  Similar-appearing nodular soft tissue density along the midline ventral abdominal wall incision likely representing granulation tissue, and similar lobular soft tissue density in the right lower quadrant anterior to the rectus abdominis  Pulmonary nodules are stable  Additional findings as above  Workstation performed: [id number]     I reviewed the above laboratory and imaging data  Discussion/Summary: NET of pancreas s/p Whipple  ASHVIN at this point  I recommended she take 3 rather than 2 Creon pills with each meal   Will plan on seeing in 6 months for continued surveillance

## 2022-01-24 NOTE — LETTER
January 24, 2022     [address]  [address] [address]  [address] [address]    Patient: Rani Dawson   YOB: 1960   Date of Visit: 1/24/2022       Dear Dr Miriam Harrell: Thank you for referring Rani Dawson to me for evaluation  Below are my notes for this consultation  If you have questions, please do not hesitate to call me  I look forward to following your patient along with you  Sincerely,        Kirk Denny MD        CC: MD Kirk Rolon MD  1/24/2022 11:53 AM  Sign when Signing Visit     Surgical Oncology Follow Up       [address] [facility] [place]  [address]  [facility] [address]  [phone]    Rani Dawson  1960  [id number]  [address] [facility] SURGICAL [facility]COL[facility]  [address]  [facility] [address] [phone]    Chief Complaint   Patient presents with    Follow-up       Assessment/Plan:    No problem-specific Assessment & Plan notes found for this encounter  Diagnoses and all orders for this visit:    Encounter for follow-up surveillance of neuroendocrine carcinoma    History of neuroendocrine cancer        Advance Care Planning/Advance Directives:  Discussed disease status, cancer treatment plans and/or cancer treatment goals with the patient  Oncology History   History of neuroendocrine cancer   6/17/2021 Biopsy    Pancreas, Head Cyst:   Well-differentiated neuroendocrine tumor  8/24/2021 Surgery    Whipple:  - Well differentiated neuroendocrine tumor (2 3 cm ), G1   - Pancreatic resection margin is positive for tumor  - All other margins are negative for tumor   - Seven lymph nodes, negative for malignancy (0/7)  Pancreas new margin, re-excision:  - Adipose tissue with pancreatic microadenoma     - No tumor present            History of Present Illness: Patient is a 57year old woman here for surveillance s/p Whipple procedure for NET  -Interval History: She has had issues with eating afterwards, namely diarrhea  CT scan and bloodwork done in anticipation of today's visit  Review of Systems:  Review of Systems   Constitutional: Positive for unexpected weight change  HENT: Negative  Eyes: Negative  Respiratory: Negative  Cardiovascular: Negative  Gastrointestinal: Negative  Endocrine: Negative  Genitourinary: Negative  Musculoskeletal: Negative  Skin: Negative  Allergic/Immunologic: Negative  Neurological: Negative  Hematological: Negative  Psychiatric/Behavioral: Negative  Patient Active Problem List   Diagnosis    Acute low back pain due to trauma    Benign hypertension with CKD (chronic kidney disease), stage II    Vision disturbance    Noncompliance with medications    Stroke-like symptoms    Incidental pulmonary nodule    Hyperlipemia    Tarsal tunnel syndrome, left    Pancreatic mass    Leukocytosis    Depression    Goals of care, counseling/discussion    Stage 2 chronic kidney disease    History of neuroendocrine cancer    Personal history of breast cancer    Anemia    Diarrhea    Hypertension    H/O Whipple procedure    Pancreatic insufficiency    Encounter for follow-up surveillance of neuroendocrine carcinoma     Past Medical History:   Diagnosis Date    Back ache     Cancer ([address] )     Hyperlipidemia     Hypertension      Past Surgical History:   Procedure Laterality Date    APPENDECTOMY      BILATERAL OOPHORECTOMY      BREAST SURGERY Right     partial mastectomy     COLONOSCOPY      HERNIA REPAIR      HYSTERECTOMY      LAPAROTOMY N/A 8/24/2021    Procedure: LAPAROTOMY EXPLORATORY;  Surgeon: Destini Pak MD;  [facility];  Service: Surgical Oncology    MASTECTOMY Right     partial    WHIPPLE PROCEDURE/PANCREATICO-DUODENECTOMY N/A 8/24/2021    Procedure:  WHIPPLE PROCEDURE/PANCREATICO-DUODENECTOMY; Surgeon: Joanne Nazario MD;  [facility];  Service: Surgical Oncology     Family History   Problem Relation Age of Onset    Hypertension Mother     Heart disease Mother     Diabetes Mother     Cancer Father      Social History     Socioeconomic History    Marital status: /Civil Union     [facility]: [facility]    Number of children: Not on file    Years of education: Not on file    Highest education level: Not on file   Occupational History    Occupation: unemployed   Tobacco Use    Smoking status: Former Smoker     Packs/day: 0 65     Years: 30 00     Pack years: 19 50    Smokeless tobacco: Never Used    Tobacco comment: quit 15 years ago   Vaping Use    Vaping Use: Never used   Substance and Sexual Activity    Alcohol use: Not Currently    Drug use: No    Sexual activity: Not Currently   Other Topics Concern    Not on file   Social History Narrative    Not on file     Social Determinants of Health     Financial Resource Strain: Low Risk     Difficulty of Paying Living Expenses: Not very hard   Food Insecurity: Food Insecurity Present    Worried About Running Out of Food in the Last Year: Sometimes true    Bere of Food in the Last Year: Sometimes true   Transportation Needs: No Transportation Needs    Lack of Transportation (Medical): No    Lack of Transportation (Non-Medical):  No   Physical Activity: Inactive    Days of Exercise per Week: 0 days    Minutes of Exercise per Session: 0 min   Stress: Not on file   Social Connections: Not on file   Intimate Partner Violence: Not on file   Housing Stability: Not on file       Current Outpatient Medications:     amLODIPine (NORVASC) 10 mg tablet, Take 1 tablet (10 mg total) by mouth daily, Disp: 90 tablet, Rfl: 3    atorvastatin (LIPITOR) 40 mg tablet, Take 1 tablet (40 mg total) by mouth every evening, Disp: 90 tablet, Rfl: 3    cholestyramine (QUESTRAN) 4 GM/DOSE powder, Take 1 packet (4 g total) by mouth 3 (three) times a day with meals, Disp: 1074 62 g, Rfl: 1    clobetasol (TEMOVATE) 0 05 % ointment, Apply twice a day to rash on hands for 2 weeks  Then, apply twice a day from Monday-Friday for another 2 weeks  Avoid the face and groin, Disp: 60 g, Rfl: 0    dicyclomine (BENTYL) 10 mg capsule, Take 1 capsule (10 mg total) by mouth 3 (three) times a day, Disp: 90 capsule, Rfl: 0    hydrOXYzine HCL (ATARAX) 25 mg tablet, Take 25 mg by mouth daily at bedtime as needed, Disp: , Rfl: 0    lisinopril (ZESTRIL) 20 mg tablet, Take 1 tablet (20 mg total) by mouth daily, Disp: 90 tablet, Rfl: 3    ondansetron (ZOFRAN-ODT) 4 mg disintegrating tablet, Take 1 tablet (4 mg total) by mouth every 6 (six) hours as needed for nausea or vomiting, Disp: 30 tablet, Rfl: 0    pancrelipase, Lip-Prot-Amyl, (CREON) 6,000 units delayed release capsule, Take 2 capsules (12,000 Units total) by mouth 3 (three) times a day with meals, Disp: 180 capsule, Rfl: 0    pantoprazole (PROTONIX) 40 mg tablet, Take 1 tablet (40 mg total) by mouth daily in the early morning, Disp: 30 tablet, Rfl: 0    escitalopram (LEXAPRO) 10 mg tablet, Take 1 tablet (10 mg total) by mouth every morning (Patient not taking: Reported on 10/12/2021), Disp: 90 tablet, Rfl: 3    sertraline (ZOLOFT) 25 mg tablet, Take 25 mg by mouth daily   (Patient not taking: Reported on 12/21/2021 ), Disp: , Rfl:     traZODone (DESYREL) 50 mg tablet, take 1/2 tablet by mouth at bedtime if needed for insomnia (Patient not taking: Reported on 10/12/2021), Disp: , Rfl:   No Known Allergies  Vitals:    01/24/22 1128   BP: 128/82   Pulse: 72   Resp: 16   Temp: (!) 96 3 °F (35 7 °C)   SpO2: 99%       Physical Exam  Constitutional:       Appearance: Normal appearance  HENT:      Head: Normocephalic and atraumatic  Right Ear: External ear normal       Left Ear: External ear normal    Eyes:      General: No scleral icterus  Extraocular Movements: Extraocular movements intact  Pupils: Pupils are equal, round, and reactive to light  Cardiovascular:      Rate and Rhythm: Normal rate and regular rhythm  Pulses: Normal pulses  Heart sounds: Normal heart sounds  Pulmonary:      Breath sounds: Normal breath sounds  Abdominal:      General: Abdomen is flat  There is no distension  Palpations: Abdomen is soft  There is no mass  Tenderness: There is no abdominal tenderness  There is no guarding or rebound  Hernia: No hernia is present  Musculoskeletal:      Cervical back: Normal range of motion and neck supple  Skin:     General: Skin is warm and dry  Neurological:      General: No focal deficit present  Mental Status: She is alert and oriented to person, place, and time  Results:  Labs:  Component Ref Range & Units 12/30/21  6:49 AM 12/21/21  2:16 PM 4/14/21  4:46 AM   Chromogranin A 0 0 - 101 8 ng/mL 74 4            Imaging  CT chest abdomen pelvis w contrast    Result Date: 1/17/2022  Narrative: CT CHEST, ABDOMEN AND PELVIS WITH IV CONTRAST INDICATION:   D3A 8: Other benign neuroendocrine tumors  Pancreatic neuroendocrine tumor  Status post Whipple procedure  Appendectomy  Hysterectomy  Right breast surgery  COMPARISON:  CT abdomen pelvis 12/21/2021  CT chest 04/13/2021  TECHNIQUE: CT examination of the chest, abdomen and pelvis was performed  Axial, sagittal, and coronal 2D reformatted images were created from the source data and submitted for interpretation  Radiation dose length product (DLP) for this visit:  616 87 mGy-cm   This examination, like all CT scans performed in the [facility], was performed utilizing techniques to minimize radiation dose exposure, including the use of iterative  reconstruction and automated exposure control  IV Contrast:  100 mL of iohexol (OMNIPAQUE) Enteric Contrast: Enteric contrast was administered  FINDINGS: CHEST LUNGS: 3 mm right upper lobe nodule 3/31, stable    3 mm left upper lobe nodule 3/19, stable  3 mm left upper lobe nodule 3/29, stable  3 mm left lower lobe nodule 3/45, stable  There Is no tracheal or endobronchial lesion  PLEURA:  Unremarkable  HEART/GREAT VESSELS: Thoracic aortic, annular, and coronary artery calcification  No thoracic aortic aneurysm  MEDIASTINUM AND LLOYD:  Surgical clips inferior to the left thyroid  CHEST WALL AND LOWER NECK:   Postsurgical changes of the right breast   Stable 9 mm right axillary lymph node and additional smaller subcentimeter right axillary nodes  Right breast calcifications  ABDOMEN Postsurgical changes from Whipple's procedure  LIVER/BILIARY TREE:  Liver is diffusely decreased in density consistent with fatty change  No CT evidence of suspicious hepatic mass  Normal hepatic contours  No biliary dilatation  Right lobe calcified granuloma  Small amounts of central pneumobilia  GALLBLADDER:  Gallbladder is surgically absent  SPLEEN:  Unremarkable  PANCREAS:  Status post Whipple's procedure  Fatty atrophy of the remaining pancreas  No evidence for localized recurrence  ADRENAL GLANDS:  Unremarkable  KIDNEYS/URETERS:  One or more simple renal cyst(s) is noted  Otherwise unremarkable kidneys  No hydronephrosis  STOMACH AND BOWEL:  Gastrojejunostomy  10 x 9 mm central fat density peripheral soft tissue density focus along the lesser curvature of the stomach, 2/58 presumably fat necrosis, less conspicuous from prior  Decreased gastric mucosal enhancement from prior  Similar soft tissue nodularity at the midline abdominal wall incision at the level of the gastrojejunostomy 2/57-66 Colonic diverticulosis, without findings for diverticulitis  APPENDIX:  No findings to suggest appendicitis  ABDOMINOPELVIC CAVITY:  No ascites  No pneumoperitoneum  No lymphadenopathy  10 x 9 mm central fat density peripheral soft tissue density focus along the lesser curvature of the stomach, 2/58 presumably fat necrosis, previously 13 mm, decreased peripheral enhancement     Additional central fat density focus in the right upper quadrant inferior to the liver 2/58, currently 13 x 19 mm, smaller from prior, also likely fat necrosis  Similar haziness of the left lower quadrant omentum  Similar superficial lobular soft tissue density anterior to the right rectus muscle 2/92-96  VESSELS:  Atherosclerotic changes are present  No evidence of aneurysm  PELVIS REPRODUCTIVE ORGANS:  Surgical changes of prior hysterectomy  URINARY BLADDER:  Unremarkable  ABDOMINAL WALL/INGUINAL REGIONS:  Calcifications bilateral gluteal regions, similar from prior  Postsurgical changes anteriorly  OSSEOUS STRUCTURES:  No acute fracture or destructive osseous lesion  Stable sclerotic foci in the right scapula and humerus  Degenerative changes  Stable sclerotic foci in the bones of the pelvis  Impression: Status post Whipple procedure without evidence for localized recurrence  Central fat density focus at the lesser curvature of the stomach and inferior right hepatic margin, smaller from prior likely representing fat necrosis  Similar-appearing nodular soft tissue density along the midline ventral abdominal wall incision likely representing granulation tissue, and similar lobular soft tissue density in the right lower quadrant anterior to the rectus abdominis  Pulmonary nodules are stable  Additional findings as above  Workstation performed: [id number]     I reviewed the above laboratory and imaging data  Discussion/Summary: NET of pancreas s/p Whipple  ASHVIN at this point  I recommended she take 3 rather than 2 Creon pills with each meal   Will plan on seeing in 6 months for continued surveillance

## 2022-01-24 NOTE — TELEPHONE ENCOUNTER
Patient's daughter stated that her grandchild was in an accident so they are on their way now  I r/s her 10 am appt to 11:30

## 2022-02-12 DIAGNOSIS — D64.9 ANEMIA, UNSPECIFIED TYPE: Primary | ICD-10-CM

## 2022-02-14 ENCOUNTER — TELEPHONE (OUTPATIENT)
Dept: HEMATOLOGY ONCOLOGY | Facility: CLINIC | Age: 62
End: 2022-02-14

## 2022-02-18 ENCOUNTER — TELEPHONE (OUTPATIENT)
Dept: INTERNAL MEDICINE CLINIC | Facility: CLINIC | Age: 62
End: 2022-02-18

## 2022-02-18 NOTE — TELEPHONE ENCOUNTER
Patient called to make an appt  I read the note from Hematology and thought she wanted the number to their office to make an appt  Actually atient called and wanted to make an appt with PCP to discuss labwork  She wanted to see Dr Elen Perez sooner than 5/2  She stated that this is the reason she wanted to change clinics and doctors  She stated we did not know what we are doing  She stated that she no longer wanted to speak to me and preferred to speak with the medical assistant she spoke to earlier

## 2022-02-22 ENCOUNTER — TELEPHONE (OUTPATIENT)
Dept: HEMATOLOGY ONCOLOGY | Facility: CLINIC | Age: 62
End: 2022-02-22

## 2022-02-22 NOTE — TELEPHONE ENCOUNTER
Called and spoke to patient, per patient she wanted a sooner appt  With Dr Patricia Stover to discuss further her anemia  I informed patient she has an appointment with hematology on 2/25/2022 and her anemia will be discussed at that time  Patient also aware if she has any further questions in regards to her anemia after seeing the hematologist she can discuss at her appt with Dr Jacqueline Muir which is just for a routine follow up  Informed patient someone will be giving her a call to schedule appt with Dr Jacqueline Muir when he has next available       Clerical please schedule patient for an appointment with Dr Jacqueline Muir next available for a routine follow up

## 2022-02-22 NOTE — TELEPHONE ENCOUNTER
New Patient Intake Form   Patient Details:    Melanie Padilla  1960  [id number]    Appointment Information   Who is calling to schedule? [facility]   If not self, what is the caller's name? Please put name of RBC nurse as well  Federica   Referring provider Alvis Closs   What is the diagnosis? Anemia     Is there a confirmed tissue diagnosis? No   Is there a biopsy ordered or pending? Please specify dates   No     Is patient aware of diagnosis? Yes   Have you had any imaging or labs done? If yes, where? (If imaging done outside of [facility], please remind patient to bring a disk ) Yes December 2021     If imaging done at outside facility, did you instruct patient to obtain discs and bring to visit? Have you been seen by another Oncologist/Hematologist?  If so, who and where? No   Are the records in [facility] or Care Everywhere? Yes   Does the patient have records at another facility/hospital? No   If yes, Name of facility, city and state where facility is located  Did you instruct patient to have records faxed to rightx and provide rightfax number? Preferred [place]   Is the patient willing to be seen by another provider?   (This is for breast patients only)    Miscellaneous Information:

## 2022-02-25 ENCOUNTER — CONSULT (OUTPATIENT)
Dept: HEMATOLOGY ONCOLOGY | Facility: CLINIC | Age: 62
End: 2022-02-25
Payer: MEDICARE

## 2022-02-25 VITALS
DIASTOLIC BLOOD PRESSURE: 64 MMHG | BODY MASS INDEX: 27.82 KG/M2 | WEIGHT: 157 LBS | HEIGHT: 63 IN | SYSTOLIC BLOOD PRESSURE: 130 MMHG | TEMPERATURE: 98.1 F

## 2022-02-25 DIAGNOSIS — D64.9 ANEMIA, UNSPECIFIED TYPE: ICD-10-CM

## 2022-02-25 DIAGNOSIS — E53.8 B12 DEFICIENCY: Primary | ICD-10-CM

## 2022-02-25 DIAGNOSIS — M25.50 DIFFUSE ARTHRALGIA: ICD-10-CM

## 2022-02-25 PROCEDURE — 99214 OFFICE O/P EST MOD 30 MIN: CPT | Performed by: PHYSICIAN ASSISTANT

## 2022-02-25 RX ORDER — CYANOCOBALAMIN (VITAMIN B-12) 500 MCG
500 TABLET ORAL DAILY
Qty: 90 TABLET | Refills: 1 | Status: SHIPPED | OUTPATIENT
Start: 2022-02-25 | End: 2022-05-13 | Stop reason: SDUPTHER

## 2022-02-25 NOTE — PROGRESS NOTES
Hematology/Oncology Outpatient Consult  Vishnu Dee 57 y o  female 1960 [id number]    Date:  2/25/2022      Assessment and Plan:  1  Anemia, 2  B12 deficiency  76-year-old female presents for consultation regarding anemia  She had workup for anemia as below  Her vitamin B12 was 266  She would likely benefit from B12  Recommend sublingual B12  This is sent to her pharmacy  Other labs to be completed prior to her follow-up visit in 3 months     - vitamin B-12 (CYANOCOBALAMIN) 500 MCG TABS; Take 1 tablet (500 mcg total) by mouth daily  Dispense: 90 tablet; Refill: 1  - CBC and differential; Standing  - Vitamin B12; Future  - Erythropoietin; Future  - Sedimentation rate, automated; Future  - CBC and differential    3  Diffuse arthralgia  - CASIMIRO Screen w/ Reflex to Titer/Pattern; Future    Her granddaughter provided translation during today's visit  They were offered  but declined  HPI:  57year old female presents for consult  In June 2021 she had bx of pancreas which showed well differentiated neuroendocrine tumor  She then had whipple with surg onc in Aug 2021  Margins negative  All nodes negative  She continues for follow up for this with Dr Imtiaz Cherry for imaging and follow up visits for which this is also up to date  Anemia work up in Dec 2021  SPEP negative  TSH normal   Retic normal  B12 266   Folate 15 8  Iron sat 18%, TIBC 223, iron 40, ferritin 106    She states she had genetic testing personally due to her cancer history  ROS: Review of Systems   Constitutional: Positive for fatigue  Negative for chills and fever  Respiratory: Negative for cough and shortness of breath  Cardiovascular: Negative for chest pain, palpitations and leg swelling  Gastrointestinal: Negative for abdominal pain, constipation, diarrhea, nausea and vomiting  Genitourinary: Negative for difficulty urinating, dysuria and hematuria  Musculoskeletal: Positive for arthralgias  Skin: Negative  Neurological: Negative for dizziness, weakness, light-headedness, numbness and headaches  Hematological: Negative  Psychiatric/Behavioral: Negative  Past Medical History:   Diagnosis Date    Back ache     Cancer ([address] )     Hyperlipidemia     Hypertension        Past Surgical History:   Procedure Laterality Date    APPENDECTOMY      BILATERAL OOPHORECTOMY      BREAST SURGERY Right     partial mastectomy     COLONOSCOPY      HERNIA REPAIR      HYSTERECTOMY      LAPAROTOMY N/A 8/24/2021    Procedure: LAPAROTOMY EXPLORATORY;  Surgeon: Sage Sorto MD;  Location: [facility];  Service: Surgical Oncology    MASTECTOMY Right     partial    WHIPPLE PROCEDURE/PANCREATICO-DUODENECTOMY N/A 8/24/2021    Procedure: WHIPPLE PROCEDURE/PANCREATICO-DUODENECTOMY;  Surgeon: Sage Sorto MD;  Location: [facility];  Service: Surgical Oncology       Social History     Socioeconomic History    Marital status: /Civil Union     Spouse name: Peewee ADAMS  [address] [place] [address]    Number of children: Not on file    Years of education: Not on file    Highest education level: Not on file   Occupational History    Occupation: unemployed   Tobacco Use    Smoking status: Former Smoker     Packs/day: 0 65     Years: 30 00     Pack years: 19 50    Smokeless tobacco: Never Used    Tobacco comment: quit 15 years ago   Vaping Use    Vaping Use: Never used   Substance and Sexual Activity    Alcohol use: Not Currently    Drug use: No    Sexual activity: Not Currently   Other Topics Concern    Not on file   Social History Narrative    Not on file     Social Determinants of Health     Financial Resource Strain: Low Risk     Difficulty of Paying Living Expenses: Not very hard   Food Insecurity: Food Insecurity Present    Worried About Running Out of Food in the Last Year: Sometimes true    Bere of Food in the Last Year: Sometimes true   Transportation Needs: No Transportation Needs    Lack of Transportation (Medical): No    Lack of Transportation (Non-Medical): No   Physical Activity: Inactive    Days of Exercise per Week: 0 days    Minutes of Exercise per Session: 0 min   Stress: Not on file   Social Connections: Not on file   Intimate Partner Violence: Not on file   Housing Stability: Not on file       Family History   Problem Relation Age of Onset    Hypertension Mother     Heart disease Mother     Diabetes Mother     Cancer Father        No Known Allergies      Current Outpatient Medications:     amLODIPine (NORVASC) 10 mg tablet, Take 1 tablet (10 mg total) by mouth daily, Disp: 90 tablet, Rfl: 3    atorvastatin (LIPITOR) 40 mg tablet, Take 1 tablet (40 mg total) by mouth every evening, Disp: 90 tablet, Rfl: 3    cholestyramine (QUESTRAN) 4 GM/DOSE powder, Take 1 packet (4 g total) by mouth 3 (three) times a day with meals, Disp: 1074 62 g, Rfl: 1    clobetasol (TEMOVATE) 0 05 % ointment, Apply twice a day to rash on hands for 2 weeks  Then, apply twice a day from Monday-Friday for another 2 weeks   Avoid the face and groin, Disp: 60 g, Rfl: 0    dicyclomine (BENTYL) 10 mg capsule, Take 1 capsule (10 mg total) by mouth 3 (three) times a day, Disp: 90 capsule, Rfl: 0    escitalopram (LEXAPRO) 10 mg tablet, Take 1 tablet (10 mg total) by mouth every morning (Patient not taking: Reported on 10/12/2021), Disp: 90 tablet, Rfl: 3    hydrOXYzine HCL (ATARAX) 25 mg tablet, Take 25 mg by mouth daily at bedtime as needed, Disp: , Rfl: 0    lisinopril (ZESTRIL) 20 mg tablet, Take 1 tablet (20 mg total) by mouth daily, Disp: 90 tablet, Rfl: 3    ondansetron (ZOFRAN-ODT) 4 mg disintegrating tablet, Take 1 tablet (4 mg total) by mouth every 6 (six) hours as needed for nausea or vomiting, Disp: 30 tablet, Rfl: 0    pancrelipase, Lip-Prot-Amyl, (CREON) 12,000 units capsule, Take 1 capsule (12,000 Units total) by mouth 3 (three) times a day with meals 3 tablets with each meal, Disp: 240 capsule, Rfl: 3    pancrelipase, Lip-Prot-Amyl, (CREON) 6,000 units delayed release capsule, Take 2 capsules (12,000 Units total) by mouth 3 (three) times a day with meals, Disp: 180 capsule, Rfl: 0    pantoprazole (PROTONIX) 40 mg tablet, Take 1 tablet (40 mg total) by mouth daily in the early morning, Disp: 30 tablet, Rfl: 0    sertraline (ZOLOFT) 25 mg tablet, Take 25 mg by mouth daily   (Patient not taking: Reported on 12/21/2021 ), Disp: , Rfl:     traZODone (DESYREL) 50 mg tablet, take 1/2 tablet by mouth at bedtime if needed for insomnia (Patient not taking: Reported on 10/12/2021), Disp: , Rfl:       Physical Exam:  LMP  (LMP Unknown)     Physical Exam  Vitals reviewed  Constitutional:       General: She is not in acute distress  Appearance: She is well-developed  HENT:      Head: Normocephalic and atraumatic  Eyes:      General: No scleral icterus  Conjunctiva/sclera: Conjunctivae normal    Cardiovascular:      Rate and Rhythm: Normal rate and regular rhythm  Heart sounds: Normal heart sounds  No murmur heard  Pulmonary:      Effort: Pulmonary effort is normal  No respiratory distress  Breath sounds: Normal breath sounds  Abdominal:      Palpations: Abdomen is soft  Tenderness: There is no abdominal tenderness  Musculoskeletal:         General: No tenderness  Normal range of motion  Cervical back: Normal range of motion and neck supple  Right lower leg: No edema  Left lower leg: No edema  Lymphadenopathy:      Cervical: No cervical adenopathy  Skin:     General: Skin is warm and dry  Neurological:      Mental Status: She is alert and oriented to person, place, and time  Cranial Nerves: No cranial nerve deficit     Psychiatric:         Mood and Affect: Mood normal          Behavior: Behavior normal            Labs:  Lab Results   Component Value Date    WBC 7 16 12/22/2021    HGB 9 5 (L) 12/22/2021    HCT 29 4 (L) 12/22/2021    MCV 93 12/22/2021  12/22/2021     Lab Results   Component Value Date    K 3 6 12/30/2021     12/30/2021    CO2 28 12/30/2021    BUN 13 12/30/2021    CREATININE 1 02 12/30/2021    GLUCOSE 156 (H) 08/24/2021    GLUF 94 12/30/2021    CALCIUM 9 4 12/30/2021    CORRECTEDCA 9 7 12/22/2021    AST 31 12/22/2021    ALT 26 12/22/2021    ALKPHOS 114 12/22/2021    EGFR 59 12/30/2021       Patient voiced understanding and agreement in the above discussion  Aware to contact our office with questions/symptoms in the interim  This note has been generated by voice recognition software system  Therefore, there may be spelling, grammar, and or syntax errors  Please contact if questions arise

## 2022-03-03 ENCOUNTER — TELEPHONE (OUTPATIENT)
Dept: INTERNAL MEDICINE CLINIC | Facility: CLINIC | Age: 62
End: 2022-03-03

## 2022-03-03 NOTE — TELEPHONE ENCOUNTER
[facility]    Name of Form- Physician Certification Form    Form to be filled out by-Dr Espinal Counts     Form to be Faxed  [phone]    Patient made aware of 10 business day policy

## 2022-03-10 ENCOUNTER — APPOINTMENT (OUTPATIENT)
Dept: LAB | Facility: HOSPITAL | Age: 62
End: 2022-03-10
Payer: MEDICARE

## 2022-03-10 DIAGNOSIS — D64.9 ANEMIA, UNSPECIFIED TYPE: ICD-10-CM

## 2022-03-10 DIAGNOSIS — E53.8 B12 DEFICIENCY: ICD-10-CM

## 2022-03-10 DIAGNOSIS — M25.50 DIFFUSE ARTHRALGIA: ICD-10-CM

## 2022-03-10 LAB
BASOPHILS # BLD AUTO: 0.03 THOUSANDS/ΜL (ref 0–0.1)
BASOPHILS NFR BLD AUTO: 0 % (ref 0–1)
EOSINOPHIL # BLD AUTO: 0.16 THOUSAND/ΜL (ref 0–0.61)
EOSINOPHIL NFR BLD AUTO: 2 % (ref 0–6)
ERYTHROCYTE [DISTWIDTH] IN BLOOD BY AUTOMATED COUNT: 12.9 % (ref 11.6–15.1)
ERYTHROCYTE [SEDIMENTATION RATE] IN BLOOD: 65 MM/HOUR (ref 0–29)
HCT VFR BLD AUTO: 32.6 % (ref 34.8–46.1)
HGB BLD-MCNC: 11.2 G/DL (ref 11.5–15.4)
IMM GRANULOCYTES # BLD AUTO: 0.03 THOUSAND/UL (ref 0–0.2)
IMM GRANULOCYTES NFR BLD AUTO: 0 % (ref 0–2)
LYMPHOCYTES # BLD AUTO: 4.67 THOUSANDS/ΜL (ref 0.6–4.47)
LYMPHOCYTES NFR BLD AUTO: 44 % (ref 14–44)
MCH RBC QN AUTO: 30.6 PG (ref 26.8–34.3)
MCHC RBC AUTO-ENTMCNC: 34.4 G/DL (ref 31.4–37.4)
MCV RBC AUTO: 89 FL (ref 82–98)
MONOCYTES # BLD AUTO: 0.74 THOUSAND/ΜL (ref 0.17–1.22)
MONOCYTES NFR BLD AUTO: 7 % (ref 4–12)
NEUTROPHILS # BLD AUTO: 4.88 THOUSANDS/ΜL (ref 1.85–7.62)
NEUTS SEG NFR BLD AUTO: 47 % (ref 43–75)
NRBC BLD AUTO-RTO: 0 /100 WBCS
PLATELET # BLD AUTO: 289 THOUSANDS/UL (ref 149–390)
PMV BLD AUTO: 10.3 FL (ref 8.9–12.7)
RBC # BLD AUTO: 3.66 MILLION/UL (ref 3.81–5.12)
VIT B12 SERPL-MCNC: 258 PG/ML (ref 100–900)
WBC # BLD AUTO: 10.51 THOUSAND/UL (ref 4.31–10.16)

## 2022-03-10 PROCEDURE — 36415 COLL VENOUS BLD VENIPUNCTURE: CPT

## 2022-03-10 PROCEDURE — 85025 COMPLETE CBC W/AUTO DIFF WBC: CPT

## 2022-03-10 PROCEDURE — 85652 RBC SED RATE AUTOMATED: CPT

## 2022-03-10 PROCEDURE — 82607 VITAMIN B-12: CPT

## 2022-03-10 PROCEDURE — 82668 ASSAY OF ERYTHROPOIETIN: CPT

## 2022-03-10 PROCEDURE — 86038 ANTINUCLEAR ANTIBODIES: CPT

## 2022-03-11 LAB
EPO SERPL-ACNC: 13.4 MIU/ML (ref 2.6–18.5)
RYE IGE QN: NEGATIVE

## 2022-05-02 ENCOUNTER — APPOINTMENT (EMERGENCY)
Dept: RADIOLOGY | Facility: HOSPITAL | Age: 62
End: 2022-05-02
Payer: COMMERCIAL

## 2022-05-02 ENCOUNTER — HOSPITAL ENCOUNTER (EMERGENCY)
Facility: HOSPITAL | Age: 62
Discharge: HOME | End: 2022-05-02
Attending: EMERGENCY MEDICINE | Admitting: EMERGENCY MEDICINE
Payer: COMMERCIAL

## 2022-05-02 VITALS
SYSTOLIC BLOOD PRESSURE: 199 MMHG | TEMPERATURE: 97.9 F | OXYGEN SATURATION: 100 % | HEART RATE: 72 BPM | DIASTOLIC BLOOD PRESSURE: 88 MMHG | RESPIRATION RATE: 18 BRPM

## 2022-05-02 DIAGNOSIS — R55 SYNCOPE: Primary | ICD-10-CM

## 2022-05-02 LAB
2HR DELTA HS TROPONIN: 3 NG/L
ALBUMIN SERPL BCP-MCNC: 3.4 G/DL (ref 3.5–5)
ALP SERPL-CCNC: 160 U/L (ref 46–116)
ALT SERPL W P-5'-P-CCNC: 33 U/L (ref 12–78)
ANION GAP SERPL CALCULATED.3IONS-SCNC: 4 MMOL/L (ref 4–13)
AST SERPL W P-5'-P-CCNC: 44 U/L (ref 5–45)
ATRIAL RATE: 75 BPM
BASOPHILS # BLD MANUAL: 0.1 THOUSAND/UL (ref 0–0.1)
BASOPHILS NFR MAR MANUAL: 1 % (ref 0–1)
BILIRUB SERPL-MCNC: 0.42 MG/DL (ref 0.2–1)
BUN SERPL-MCNC: 17 MG/DL (ref 5–25)
CALCIUM ALBUM COR SERPL-MCNC: 9.9 MG/DL (ref 8.3–10.1)
CALCIUM SERPL-MCNC: 9.4 MG/DL (ref 8.3–10.1)
CARDIAC TROPONIN I PNL SERPL HS: 12 NG/L
CARDIAC TROPONIN I PNL SERPL HS: 9 NG/L
CHLORIDE SERPL-SCNC: 104 MMOL/L (ref 100–108)
CO2 SERPL-SCNC: 28 MMOL/L (ref 21–32)
CREAT SERPL-MCNC: 1.18 MG/DL (ref 0.6–1.3)
EOSINOPHIL # BLD MANUAL: 0.2 THOUSAND/UL (ref 0–0.4)
EOSINOPHIL NFR BLD MANUAL: 2 % (ref 0–6)
ERYTHROCYTE [DISTWIDTH] IN BLOOD BY AUTOMATED COUNT: 13.7 % (ref 11.6–15.1)
FLUAV RNA RESP QL NAA+PROBE: NEGATIVE
FLUBV RNA RESP QL NAA+PROBE: NEGATIVE
GFR SERPL CREATININE-BSD FRML MDRD: 49 ML/MIN/1.73SQ M
GLUCOSE SERPL-MCNC: 102 MG/DL (ref 65–140)
HCT VFR BLD AUTO: 35.6 % (ref 34.8–46.1)
HGB BLD-MCNC: 11.6 G/DL (ref 11.5–15.4)
LYMPHOCYTES # BLD AUTO: 5.23 THOUSAND/UL (ref 0.6–4.47)
LYMPHOCYTES # BLD AUTO: 53 % (ref 14–44)
MCH RBC QN AUTO: 30.6 PG (ref 26.8–34.3)
MCHC RBC AUTO-ENTMCNC: 32.6 G/DL (ref 31.4–37.4)
MCV RBC AUTO: 94 FL (ref 82–98)
MONOCYTES # BLD AUTO: 0.2 THOUSAND/UL (ref 0–1.22)
MONOCYTES NFR BLD: 2 % (ref 4–12)
NEUTROPHILS # BLD MANUAL: 4.14 THOUSAND/UL (ref 1.85–7.62)
NEUTS BAND NFR BLD MANUAL: 4 % (ref 0–8)
NEUTS SEG NFR BLD AUTO: 38 % (ref 43–75)
P AXIS: 49 DEGREES
PLATELET # BLD AUTO: 275 THOUSANDS/UL (ref 149–390)
PLATELET BLD QL SMEAR: ADEQUATE
PMV BLD AUTO: 10.1 FL (ref 8.9–12.7)
POTASSIUM SERPL-SCNC: 3.2 MMOL/L (ref 3.5–5.3)
PR INTERVAL: 174 MS
PROT SERPL-MCNC: 8.4 G/DL (ref 6.4–8.2)
QRS AXIS: -27 DEGREES
QRSD INTERVAL: 92 MS
QT INTERVAL: 380 MS
QTC INTERVAL: 424 MS
RBC # BLD AUTO: 3.79 MILLION/UL (ref 3.81–5.12)
RSV RNA RESP QL NAA+PROBE: NEGATIVE
SARS-COV-2 RNA RESP QL NAA+PROBE: NEGATIVE
SODIUM SERPL-SCNC: 136 MMOL/L (ref 136–145)
T WAVE AXIS: 54 DEGREES
VENTRICULAR RATE: 75 BPM
WBC # BLD AUTO: 9.86 THOUSAND/UL (ref 4.31–10.16)

## 2022-05-02 PROCEDURE — 80053 COMPREHEN METABOLIC PANEL: CPT | Performed by: EMERGENCY MEDICINE

## 2022-05-02 PROCEDURE — 0241U HB NFCT DS VIR RESP RNA 4 TRGT: CPT | Performed by: EMERGENCY MEDICINE

## 2022-05-02 PROCEDURE — 93308 TTE F-UP OR LMTD: CPT | Performed by: EMERGENCY MEDICINE

## 2022-05-02 PROCEDURE — 71045 X-RAY EXAM CHEST 1 VIEW: CPT

## 2022-05-02 PROCEDURE — 70450 CT HEAD/BRAIN W/O DYE: CPT

## 2022-05-02 PROCEDURE — 84484 ASSAY OF TROPONIN QUANT: CPT | Performed by: EMERGENCY MEDICINE

## 2022-05-02 PROCEDURE — 36415 COLL VENOUS BLD VENIPUNCTURE: CPT

## 2022-05-02 PROCEDURE — 85007 BL SMEAR W/DIFF WBC COUNT: CPT | Performed by: EMERGENCY MEDICINE

## 2022-05-02 PROCEDURE — 99285 EMERGENCY DEPT VISIT HI MDM: CPT

## 2022-05-02 PROCEDURE — 85027 COMPLETE CBC AUTOMATED: CPT | Performed by: EMERGENCY MEDICINE

## 2022-05-02 PROCEDURE — 93005 ELECTROCARDIOGRAM TRACING: CPT

## 2022-05-02 PROCEDURE — G1004 CDSM NDSC: HCPCS

## 2022-05-02 PROCEDURE — 99285 EMERGENCY DEPT VISIT HI MDM: CPT | Performed by: EMERGENCY MEDICINE

## 2022-05-02 PROCEDURE — 93010 ELECTROCARDIOGRAM REPORT: CPT | Performed by: INTERNAL MEDICINE

## 2022-05-02 RX ORDER — POTASSIUM CHLORIDE 20 MEQ/1
40 TABLET, EXTENDED RELEASE ORAL ONCE
Status: COMPLETED | OUTPATIENT
Start: 2022-05-02 | End: 2022-05-02

## 2022-05-02 RX ORDER — ACETAMINOPHEN 325 MG/1
975 TABLET ORAL ONCE
Status: COMPLETED | OUTPATIENT
Start: 2022-05-02 | End: 2022-05-02

## 2022-05-02 RX ORDER — MECLIZINE HYDROCHLORIDE 25 MG/1
25 TABLET ORAL ONCE
Status: COMPLETED | OUTPATIENT
Start: 2022-05-02 | End: 2022-05-02

## 2022-05-02 RX ADMIN — ACETAMINOPHEN 975 MG: 325 TABLET ORAL at 18:36

## 2022-05-02 RX ADMIN — POTASSIUM CHLORIDE 40 MEQ: 1500 TABLET, EXTENDED RELEASE ORAL at 15:58

## 2022-05-02 RX ADMIN — MECLIZINE HYDROCHLORIDE 25 MG: 25 TABLET ORAL at 18:36

## 2022-05-02 NOTE — ED PROVIDER NOTES
History  Chief Complaint   Patient presents with    Syncope     Pt brought in by EMS after a witnessed syncopal episode at home  Pt c/o HA and feeling fatigued      Savita Manzo is a 57year-old woman presenting with syncope  She was getting up from the kitchen table with her family, started walking, felt dizzy and flushed, and then syncopsized  Although family was there, they are unsure if she hit her head  She does not take any blood thinners including aspirin  She lost consciousness for about 1-2 minutes and then quickly returned to baseline  No seizure-like activity or confusion reported  She still feels with most movements of her head  Over the last 2 weeks she has felt weak and not herself, with some shortness of breat with activity  This has never happened before  She is accompanied by her bilingual daughter, declined an  as the daughter is comfortable interpreting  No cough, congestion, fevers, chills, chest pain, ear ache, change in vision, focal numbness or weakness, facial droop, slurred speech, abdominal pain, dysuria, hematuria, urinary urgency or frequency, diarrhea, blood in stool, melena  Prior to Admission Medications   Prescriptions Last Dose Informant Patient Reported? Taking? amLODIPine (NORVASC) 10 mg tablet  Self No No   Sig: Take 1 tablet (10 mg total) by mouth daily   atorvastatin (LIPITOR) 40 mg tablet  Self No No   Sig: Take 1 tablet (40 mg total) by mouth every evening   cholestyramine (QUESTRAN) 4 GM/DOSE powder  Self No No   Sig: Take 1 packet (4 g total) by mouth 3 (three) times a day with meals   clobetasol (TEMOVATE) 0 05 % ointment  Self No No   Sig: Apply twice a day to rash on hands for 2 weeks  Then, apply twice a day from Monday-Friday for another 2 weeks   Avoid the face and groin   dicyclomine (BENTYL) 10 mg capsule  Self No No   Sig: Take 1 capsule (10 mg total) by mouth 3 (three) times a day   escitalopram (LEXAPRO) 10 mg tablet  Self No No   Sig: Take 1 tablet (10 mg total) by mouth every morning   Patient not taking: Reported on 10/12/2021   hydrOXYzine HCL (ATARAX) 25 mg tablet  Self Yes No   Sig: Take 25 mg by mouth daily at bedtime as needed   lisinopril (ZESTRIL) 20 mg tablet  Self No No   Sig: Take 1 tablet (20 mg total) by mouth daily   ondansetron (ZOFRAN-ODT) 4 mg disintegrating tablet  Self No No   Sig: Take 1 tablet (4 mg total) by mouth every 6 (six) hours as needed for nausea or vomiting   pancrelipase, Lip-Prot-Amyl, (CREON) 12,000 units capsule   No No   Sig: Take 1 capsule (12,000 Units total) by mouth 3 (three) times a day with meals 3 tablets with each meal   pancrelipase, Lip-Prot-Amyl, (CREON) 6,000 units delayed release capsule  Self No No   Sig: Take 2 capsules (12,000 Units total) by mouth 3 (three) times a day with meals   pantoprazole (PROTONIX) 40 mg tablet  Self No No   Sig: Take 1 tablet (40 mg total) by mouth daily in the early morning   sertraline (ZOLOFT) 25 mg tablet  Self Yes No   Sig: Take 25 mg by mouth daily     Patient not taking: Reported on 12/21/2021    traZODone (DESYREL) 50 mg tablet  Self Yes No   Sig: take 1/2 tablet by mouth at bedtime if needed for insomnia   Patient not taking: Reported on 10/12/2021   vitamin B-12 (CYANOCOBALAMIN) 500 MCG TABS   No No   Sig: Take 1 tablet (500 mcg total) by mouth daily      Facility-Administered Medications: None       Past Medical History:   Diagnosis Date    Back ache     Cancer ([facility] [address] )     Hyperlipidemia     Hypertension        Past Surgical History:   Procedure Laterality Date    APPENDECTOMY      BILATERAL OOPHORECTOMY      BREAST SURGERY Right     partial mastectomy     COLONOSCOPY      HERNIA REPAIR      HYSTERECTOMY      LAPAROTOMY N/A 8/24/2021    Procedure: LAPAROTOMY EXPLORATORY;  Surgeon: Adrian Zapata MD;  Location: [facility];  Service: Surgical Oncology    MASTECTOMY Right     partial    WHIPPLE PROCEDURE/PANCREATICO-DUODENECTOMY N/A 8/24/2021    Procedure: WHIPPLE PROCEDURE/PANCREATICO-DUODENECTOMY;  Surgeon: Joe Osorio MD;  Location: [facility];  Service: Surgical Oncology       Family History   Problem Relation Age of Onset    Hypertension Mother     Heart disease Mother     Diabetes Mother     Cancer Father      I have reviewed and agree with the history as documented  E-Cigarette/Vaping    E-Cigarette Use Never User      E-Cigarette/Vaping Substances    Nicotine No     THC No     CBD No     Flavoring No     Other No     Unknown No      Social History     Tobacco Use    Smoking status: Former Smoker     Packs/day: 0 65     Years: 30 00     Pack years: 19 50    Smokeless tobacco: Never Used    Tobacco comment: quit 15 years ago   Vaping Use    Vaping Use: Never used   Substance Use Topics    Alcohol use: Not Currently    Drug use: No        Review of Systems   All other systems reviewed and are negative  Physical Exam  ED Triage Vitals [05/02/22 1430]   Temperature Pulse Respirations Blood Pressure SpO2   97 9 °F (36 6 °C) 72 18 (!) 199/88 100 %      Temp Source Heart Rate Source Patient Position - Orthostatic VS BP Location FiO2 (%)   Tympanic Monitor Lying Right arm --      Pain Score       6             Orthostatic Vital Signs  Vitals:    05/02/22 1430   BP: (!) 199/88   Pulse: 72   Patient Position - Orthostatic VS: Lying       Physical Exam  Vitals and nursing note reviewed  Constitutional:       General: She is not in acute distress  Appearance: She is not ill-appearing  HENT:      Head: Normocephalic and atraumatic  Right Ear: Tympanic membrane and external ear normal       Left Ear: Tympanic membrane and external ear normal       Nose: Nose normal       Mouth/Throat:      Mouth: Mucous membranes are moist    Eyes:      Conjunctiva/sclera: Conjunctivae normal    Cardiovascular:      Rate and Rhythm: Normal rate and regular rhythm  Pulses: Normal pulses     Pulmonary:      Effort: Pulmonary effort is normal       Breath sounds: Normal breath sounds  Abdominal:      General: There is no distension  Palpations: Abdomen is soft  Tenderness: There is no abdominal tenderness  Musculoskeletal:         General: No deformity  Right lower leg: No edema  Left lower leg: No edema  Skin:     General: Skin is warm and dry  Capillary Refill: Capillary refill takes less than 2 seconds  Neurological:      General: No focal deficit present  Mental Status: She is alert and oriented to person, place, and time  Comments: CN2-12 intact, 5/5 strength and normal sensation in all 4 extremities  Gait slightly weak, needs some assistance ambulating in room  Dizziness provoked with movements of head  No nystagmus  ED Medications  Medications   potassium chloride (K-DUR,KLOR-CON) CR tablet 40 mEq (40 mEq Oral Given 5/2/22 1558)   acetaminophen (TYLENOL) tablet 975 mg (975 mg Oral Given 5/2/22 1836)   meclizine (ANTIVERT) tablet 25 mg (25 mg Oral Given 5/2/22 1836)       Diagnostic Studies  Results Reviewed     Procedure Component Value Units Date/Time    HS Troponin I 2hr [[id number]]  (Normal) Collected: 05/02/22 1805    Lab Status: Final result Specimen: Blood from Arm, Right Updated: 05/02/22 1839     hs TnI 2hr 12 ng/L      Delta 2hr hsTnI 3 ng/L     COVID/FLU/RSV - 2 hour TAT [[id number]]  (Normal) Collected: 05/02/22 1558    Lab Status: Final result Specimen: Nares from Nose Updated: 05/02/22 1648     SARS-CoV-2 Negative     INFLUENZA A PCR Negative     INFLUENZA B PCR Negative     RSV PCR Negative    Narrative:      FOR PEDIATRIC PATIENTS - copy/paste COVID Guidelines URL to browser: [email or web address]    SARS-CoV-2 assay is a Nucleic Acid Amplification assay intended for the  qualitative detection of nucleic acid from SARS-CoV-2 in nasopharyngeal  swabs   Results are for the presumptive identification of SARS-CoV-2 RNA  Positive results are indicative of infection with SARS-CoV-2, the virus  causing COVID-19, but do not rule out bacterial infection or co-infection  with other viruses  Laboratories within the [place] and its  territories are required to report all positive results to the appropriate  public health authorities  Negative results do not preclude SARS-CoV-2  infection and should not be used as the sole basis for treatment or other  patient management decisions  Negative results must be combined with  clinical observations, patient history, and epidemiological information  This test has not been FDA cleared or approved  This test has been authorized by FDA under an Emergency Use Authorization  (EUA)  This test is only authorized for the duration of time the  declaration that circumstances exist justifying the authorization of the  emergency use of an in vitro diagnostic tests for detection of SARS-CoV-2  virus and/or diagnosis of COVID-19 infection under section 564(b)(1) of  the Act, 21 U  S C  461NGM-4(T)(5), unless the authorization is terminated  or revoked sooner  The test has been validated but independent review by FDA  and CLIA is pending  Test performed using [employer] GeneXpert: This RT-PCR assay targets N2,  a region unique to SARS-CoV-2  A conserved region in the E-gene was chosen  for pan-Sarbecovirus detection which includes SARS-CoV-2  CBC and differential [[id number]]  (Abnormal) Collected: 05/02/22 1433    Lab Status: Final result Specimen: Blood from Arm, Right Updated: 05/02/22 1548     WBC 9 86 Thousand/uL      RBC 3 79 Million/uL      Hemoglobin 11 6 g/dL      Hematocrit 35 6 %      MCV 94 fL      MCH 30 6 pg      MCHC 32 6 g/dL      RDW 13 7 %      MPV 10 1 fL      Platelets 866 Thousands/uL     Narrative: This is an appended report  These results have been appended to a previously verified report      Manual Differential(PHLEBS Do Not Order) [[id number]] (Abnormal) Collected: 05/02/22 1433    Lab Status: Final result Specimen: Blood from Arm, Right Updated: 05/02/22 1548     Segmented % 38 %      Bands % 4 %      Lymphocytes % 53 %      Monocytes % 2 %      Eosinophils, % 2 %      Basophils % 1 %      Absolute Neutrophils 4 14 Thousand/uL      Lymphocytes Absolute 5 23 Thousand/uL      Monocytes Absolute 0 20 Thousand/uL      Eosinophils Absolute 0 20 Thousand/uL      Basophils Absolute 0 10 Thousand/uL      Total Counted --     Platelet Estimate Adequate    HS Troponin 0hr (reflex protocol) [[id number]]  (Normal) Collected: 05/02/22 1433    Lab Status: Final result Specimen: Blood from Arm, Right Updated: 05/02/22 1516     hs TnI 0hr 9 ng/L     Comprehensive metabolic panel [[id number]]  (Abnormal) Collected: 05/02/22 1433    Lab Status: Final result Specimen: Blood from Arm, Right Updated: 05/02/22 1513     Sodium 136 mmol/L      Potassium 3 2 mmol/L      Chloride 104 mmol/L      CO2 28 mmol/L      ANION GAP 4 mmol/L      BUN 17 mg/dL      Creatinine 1 18 mg/dL      Glucose 102 mg/dL      Calcium 9 4 mg/dL      Corrected Calcium 9 9 mg/dL      AST 44 U/L      ALT 33 U/L      Alkaline Phosphatase 160 U/L      Total Protein 8 4 g/dL      Albumin 3 4 g/dL      Total Bilirubin 0 42 mg/dL      eGFR 49 ml/min/1 73sq m     Narrative:      [place] guidelines for Chronic Kidney Disease (CKD):     Stage 1 with normal or high GFR (GFR > 90 mL/min/1 73 square meters)    Stage 2 Mild CKD (GFR = 60-89 mL/min/1 73 square meters)    Stage 3A Moderate CKD (GFR = 45-59 mL/min/1 73 square meters)    Stage 3B Moderate CKD (GFR = 30-44 mL/min/1 73 square meters)    Stage 4 Severe CKD (GFR = 15-29 mL/min/1 73 square meters)    Stage 5 End Stage CKD (GFR <15 mL/min/1 73 square meters)  Note: GFR calculation is accurate only with a steady state creatinine                 CT head wo contrast   Final Result by Deonte Roger DO (05/02 1756)      No acute intracranial abnormality  Workstation performed: [id number]         XR chest 1 view portable   ED Interpretation by Caleb Fall DO (05/02 1640)   Chest x-ray interpreted me shows no acute cardiopulmonary disease      Final Result by Lisy Purcell MD (05/02 1652)      No active pulmonary disease  Workstation performed: [id number]               Procedures  Procedures      ED Course  ED Course as of 05/04/22 1711   Mon May 02, 2022   1530 Potassium(!): 3 2  40 oral potassium given  [address] 2hr hsTnI: 3                             SBIRT 22yo+      Most Recent Value   SBIRT (22 yo +)    In order to provide better care to our patients, we are screening all of our patients for alcohol and drug use  Would it be okay to ask you these screening questions? No Filed at: 05/02/2022 1439                [facility]  Number of Diagnoses or Management Options  Syncope  Diagnosis management comments: 58 yo woman presenting with syncope  Possibly vasovagal given mild prodrome, however patient with no history of this  Possibly BPPV given dizziness is provoked by head movements  Will assess for cardiac etiology, hyponatremia, hypokalemia, anemia, of syncope with ECG, CXR, troponin, BMP, CBC  Work up unremarkable  Discussed admission vs discharge with patient, patient prefers discharge  Discharged home, return precautions given, to follow up with PCP  Disposition  Final diagnoses:   Syncope     Time reflects when diagnosis was documented in both MDM as applicable and the Disposition within this note     Time User Action Codes Description Comment    5/2/2022  7:03 PM Alex Martines Add [R55] Syncope       ED Disposition     ED Disposition Condition Date/Time Comment    Discharge Stable Mon May 2, 2022  7:03 PM Lu Hall discharge to home/self care              Follow-up Information     Follow up With Specialties Details Why [address] Tisha Daniel MD Internal Medicine   [phone] [address] [facility]  [phone]            Discharge Medication List as of 5/2/2022  7:05 PM      CONTINUE these medications which have NOT CHANGED    Details   amLODIPine (NORVASC) 10 mg tablet Take 1 tablet (10 mg total) by mouth daily, Starting Mon 11/15/2021, Normal      atorvastatin (LIPITOR) 40 mg tablet Take 1 tablet (40 mg total) by mouth every evening, Starting Wed 10/14/2020, Normal      cholestyramine (QUESTRAN) 4 GM/DOSE powder Take 1 packet (4 g total) by mouth 3 (three) times a day with meals, Starting Mon 11/15/2021, Normal      clobetasol (TEMOVATE) 0 05 % ointment Apply twice a day to rash on hands for 2 weeks  Then, apply twice a day from Monday-Friday for another 2 weeks  Avoid the face and groin, Normal      dicyclomine (BENTYL) 10 mg capsule Take 1 capsule (10 mg total) by mouth 3 (three) times a day, Starting Wed 12/22/2021, Normal      escitalopram (LEXAPRO) 10 mg tablet Take 1 tablet (10 mg total) by mouth every morning, Starting Thu 4/22/2021, Normal      hydrOXYzine HCL (ATARAX) 25 mg tablet Take 25 mg by mouth daily at bedtime as needed, Starting Sat 11/23/2019, Historical Med      lisinopril (ZESTRIL) 20 mg tablet Take 1 tablet (20 mg total) by mouth daily, Starting Mon 11/15/2021, Normal      ondansetron (ZOFRAN-ODT) 4 mg disintegrating tablet Take 1 tablet (4 mg total) by mouth every 6 (six) hours as needed for nausea or vomiting, Starting Fri 4/16/2021, Normal      !! pancrelipase, Lip-Prot-Amyl, (CREON) 12,000 units capsule Take 1 capsule (12,000 Units total) by mouth 3 (three) times a day with meals 3 tablets with each meal, Starting Mon 1/24/2022, Normal      !!  pancrelipase, Lip-Prot-Amyl, (CREON) 6,000 units delayed release capsule Take 2 capsules (12,000 Units total) by mouth 3 (three) times a day with meals, Starting Wed 12/22/2021, Normal      pantoprazole (PROTONIX) 40 mg tablet Take 1 tablet (40 mg total) by mouth daily in the early morning, Starting Thu 12/23/2021, Normal      sertraline (ZOLOFT) 25 mg tablet Take 25 mg by mouth daily  , Starting Fri 4/9/2021, Historical Med      traZODone (DESYREL) 50 mg tablet take 1/2 tablet by mouth at bedtime if needed for insomnia, Historical Med      vitamin B-12 (CYANOCOBALAMIN) 500 MCG TABS Take 1 tablet (500 mcg total) by mouth daily, Starting Fri 2/25/2022, Normal       !! - Potential duplicate medications found  Please discuss with provider  No discharge procedures on file  PDMP Review     None           ED Provider  Attending physically available and evaluated Jaya  I managed the patient along with the ED Attending      Electronically Signed by         Dakota Helton MD  05/04/22 0995

## 2022-05-02 NOTE — ED ATTENDING ATTESTATION
5/2/2022  IHailey DO, saw and evaluated the patient  I have discussed the patient with the resident/non-physician practitioner and agree with the resident's/non-physician practitioner's findings, Plan of Care, and MDM as documented in the resident's/non-physician practitioner's note, except where noted  All available labs and Radiology studies were reviewed  I was present for key portions of any procedure(s) performed by the resident/non-physician practitioner and I was immediately available to provide assistance  At this point I agree with the current assessment done in the Emergency Department  I have conducted an independent evaluation of this patient a history and physical is as follows:    Patient is a 23-year-old female, predominantly Hebrew speaking, accompanied by her bilingual daughter, indicates she would prefer to have the daughter as a  rather than using a  service  Patient says that she remembers getting up from the kitchen table with her family, walking, feeling lightheaded, then waking on the floor  Daughter says she was not there but her family members talked to her and said they saw the patient get up and walk, take several steps, then falling collapse in the living room  They did not believe she hit her head, they did not see any seizure activity, there is no confusion afterwards  Patient says that right now when she sits at rest she feels okay but if she stands up or turns her head very quickly while in the bed she feels vertiginous  No symptoms at rest   Patient normally when she ambulates does not have any sort of vertigo but has noticed over the last 2 or 3 weeks feeling a little more tired overall, some slight dyspnea with exertion as well as slight increasing fatigue with exertion  No cough, no fever, no chills, no numbness or tingling      General:  Patient is well-appearing  Head:  Atraumatic  Eyes:  Conjunctiva pink, Extraocular muscle intact, no periorbital ecchymosis, PERRL, no nystagmus  ENT:  Mucous membranes are moist, no dental malocclusion, no craniofacial instability, no Nunez signs  Neck:  No midline tenderness or step-offs or deformities  Cardiac:  S1-S2, without murmurs, no chest wall tenderness  Lungs:  Clear to auscultation bilaterally  Abdomen:  Soft, nontender, normal bowel sounds, no CVA tenderness, no tympany, no rigidity, no guarding  Extremities:  No bony tenderness to the bilateral bilateral humeral heads, humerus, elbows, radius, ulna, hands, hips, femurs, knees, tibia, fibula, feet  No pain with passive range of motion at the bilateral shoulders, elbows, wrists, hips, knees, or ankles  Back: No midline thoracic, lumbar, sacral tenderness, deformities, or step-offs  Neurologic:  Awake, fluent speech, normal comprehension  AAOx3  Cranial nerves 2-12 are intact, strength is 5/5 in the bilateral upper & lower extremities, no slurred speech, no facial droop, no deficit on finger-to-nose testing, no pronator drift  Sensation to light touch is equal and symmetric throughout the whole body, with the patient times to stand and ambulate she initially seems a little unsteady on her feet right after standing    No wide-based gait  Skin:  Pink warm and dry  Psychiatric:  Alert, pleasant, cooperative            ED Course     XR chest 1 view portable   ED Interpretation   Chest x-ray interpreted me shows no acute cardiopulmonary disease      CT head wo contrast    (Results Pending)       Labs Reviewed   CBC AND DIFFERENTIAL - Abnormal       Result Value Ref Range Status    WBC 9 86  4 31 - 10 16 Thousand/uL Final    RBC 3 79 (*) 3 81 - 5 12 Million/uL Final    Hemoglobin 11 6  11 5 - 15 4 g/dL Final    Hematocrit 35 6  34 8 - 46 1 % Final    MCV 94  82 - 98 fL Final    MCH 30 6  26 8 - 34 3 pg Final    MCHC 32 6  31 4 - 37 4 g/dL Final    RDW 13 7  11 6 - 15 1 % Final    MPV 10 1  8 9 - 12 7 fL Final    Platelets 949  203 - 390 Thousands/uL Final    Narrative: This is an appended report  These results have been appended to a previously verified report  COMPREHENSIVE METABOLIC PANEL - Abnormal    Sodium 136  136 - 145 mmol/L Final    Potassium 3 2 (*) 3 5 - 5 3 mmol/L Final    Chloride 104  100 - 108 mmol/L Final    CO2 28  21 - 32 mmol/L Final    ANION GAP 4  4 - 13 mmol/L Final    BUN 17  5 - 25 mg/dL Final    Creatinine 1 18  0 60 - 1 30 mg/dL Final    Comment: Standardized to IDMS reference method    Glucose 102  65 - 140 mg/dL Final    Comment: If the patient is fasting, the ADA then defines impaired fasting glucose as > 100 mg/dL and diabetes as > or equal to 123 mg/dL  Specimen collection should occur prior to Sulfasalazine administration due to the potential for falsely depressed results  Specimen collection should occur prior to Sulfapyridine administration due to the potential for falsely elevated results  Calcium 9 4  8 3 - 10 1 mg/dL Final    Corrected Calcium 9 9  8 3 - 10 1 mg/dL Final    AST 44  5 - 45 U/L Final    Comment: Specimen collection should occur prior to Sulfasalazine administration due to the potential for falsely depressed results  ALT 33  12 - 78 U/L Final    Comment: Specimen collection should occur prior to Sulfasalazine and/or Sulfapyridine administration due to the potential for falsely depressed results  Alkaline Phosphatase 160 (*) 46 - 116 U/L Final    Total Protein 8 4 (*) 6 4 - 8 2 g/dL Final    Albumin 3 4 (*) 3 5 - 5 0 g/dL Final    Total Bilirubin 0 42  0 20 - 1 00 mg/dL Final    Comment: Use of this assay is not recommended for patients undergoing treatment with eltrombopag due to the potential for falsely elevated results      eGFR 49  ml/min/1 73sq m Final    Narrative:     [place] guidelines for Chronic Kidney Disease (CKD):     Stage 1 with normal or high GFR (GFR > 90 mL/min/1 73 square meters)    Stage 2 Mild CKD (GFR = 60-89 mL/min/1 73 square meters)    Stage 3A Moderate CKD (GFR = 45-59 mL/min/1 73 square meters)    Stage 3B Moderate CKD (GFR = 30-44 mL/min/1 73 square meters)    Stage 4 Severe CKD (GFR = 15-29 mL/min/1 73 square meters)    Stage 5 End Stage CKD (GFR <15 mL/min/1 73 square meters)  Note: GFR calculation is accurate only with a steady state creatinine   MANUAL DIFFERENTIAL(PHLEBS DO NOT ORDER) - Abnormal    Segmented % 38 (*) 43 - 75 % Final    Bands % 4  0 - 8 % Final    Lymphocytes % 53 (*) 14 - 44 % Final    Monocytes % 2 (*) 4 - 12 % Final    Eosinophils, % 2  0 - 6 % Final    Basophils % 1  0 - 1 % Final    Absolute Neutrophils 4 14  1 85 - 7 62 Thousand/uL Final    Lymphocytes Absolute 5 23 (*) 0 60 - 4 47 Thousand/uL Final    Monocytes Absolute 0 20  0 00 - 1 22 Thousand/uL Final    Eosinophils Absolute 0 20  0 00 - 0 40 Thousand/uL Final    Basophils Absolute 0 10  0 00 - 0 10 Thousand/uL Final    Total Counted     Final    Platelet Estimate Adequate  Adequate Final   HS TROPONIN I 0HR - Normal    hs TnI 0hr 9  "Refer to ACS Flowchart"- see link ng/L Final    Comment:                                              Initial (time 0) result  If >=50 ng/L, Myocardial injury suggested ;  Type of myocardial injury and treatment strategy  to be determined  If 5-49 ng/L, a delta result at 2 hours and or 4 hours will be needed to further evaluate  If <4 ng/L, and chest pain has been >3 hours since onset, patient may qualify for discharge based on the HEART score in the ED  If <5 ng/L and <3hours since onset of chest pain, a delta result at 2 hours will be needed to further evaluate  HS Troponin 99th Percentile URL of a Health Population=12 ng/L with a 95% Confidence Interval of 8-18 ng/L  Second Troponin (time 2 hours)  If calculated delta >= 20 ng/L,  Myocardial injury suggested ; Type of myocardial injury and treatment strategy to be determined    If 5-49 ng/L and the calculated delta is 5-19 ng/L, consult medical service for evaluation  Continue evaluation for ischemia on ecg and other possible etiology and repeat hs troponin at 4 hours  If delta is <5 ng/L at 2 hours, consider discharge based on risk stratification via the HEART score (if in ED), or NEO risk score in IP/Observation  HS Troponin 99th Percentile URL of a Health Population=12 ng/L with a 95% Confidence Interval of 8-18 ng/L    COVID19, INFLUENZA A/B, RSV PCR, [facility]   HS TROPONIN I 2HR   HS TROPONIN I 4HR   LIGHT BLUE TOP     Symptom description and exam are suggestive of peripheral vertigo as it is episodic and provoked  Do not believe this represents an acute stroke  Head CT is currently pending to evaluate for the possibility of traumatic injury after the syncopal episode as it is unclear if she truly had head strike or not, as well as any other acute intracranial pathology  If head CT unremarkable, and patient able to ambulate without difficulty and feels more comfortable, would consider discharge  She remains significantly symptomatic, or other concerning findings result, may require admission  Signed out to Dr Araujo Trevor Time  Procedures

## 2022-05-02 NOTE — DISCHARGE INSTRUCTIONS
Please follow up with your primary care doctor within 1 week  If you have any new or worsening symptoms, please return to your nearest ER

## 2022-05-03 LAB
ATRIAL RATE: 71 BPM
P AXIS: 44 DEGREES
PR INTERVAL: 174 MS
QRS AXIS: 38 DEGREES
QRSD INTERVAL: 84 MS
QT INTERVAL: 380 MS
QTC INTERVAL: 412 MS
T WAVE AXIS: 62 DEGREES
VENTRICULAR RATE: 71 BPM

## 2022-05-03 PROCEDURE — 93010 ELECTROCARDIOGRAM REPORT: CPT | Performed by: INTERNAL MEDICINE

## 2022-05-13 ENCOUNTER — OFFICE VISIT (OUTPATIENT)
Dept: INTERNAL MEDICINE CLINIC | Facility: CLINIC | Age: 62
End: 2022-05-13

## 2022-05-13 VITALS
BODY MASS INDEX: 26.96 KG/M2 | SYSTOLIC BLOOD PRESSURE: 163 MMHG | DIASTOLIC BLOOD PRESSURE: 74 MMHG | TEMPERATURE: 97.8 F | WEIGHT: 152.2 LBS | HEART RATE: 85 BPM

## 2022-05-13 DIAGNOSIS — K86.89 PANCREATIC MASS: ICD-10-CM

## 2022-05-13 DIAGNOSIS — I10 HYPERTENSION: ICD-10-CM

## 2022-05-13 DIAGNOSIS — I16.1 HYPERTENSIVE EMERGENCY: ICD-10-CM

## 2022-05-13 DIAGNOSIS — D64.9 ANEMIA, UNSPECIFIED TYPE: ICD-10-CM

## 2022-05-13 DIAGNOSIS — F32.A DEPRESSION, UNSPECIFIED DEPRESSION TYPE: ICD-10-CM

## 2022-05-13 DIAGNOSIS — K86.89 PANCREATIC INSUFFICIENCY: ICD-10-CM

## 2022-05-13 DIAGNOSIS — R01.1 MURMUR: ICD-10-CM

## 2022-05-13 DIAGNOSIS — R29.90 STROKE-LIKE SYMPTOMS: ICD-10-CM

## 2022-05-13 DIAGNOSIS — E87.6 HYPOKALEMIA: ICD-10-CM

## 2022-05-13 DIAGNOSIS — E53.8 B12 DEFICIENCY: ICD-10-CM

## 2022-05-13 DIAGNOSIS — I10 ESSENTIAL HYPERTENSION: Primary | ICD-10-CM

## 2022-05-13 DIAGNOSIS — I35.0 AORTIC STENOSIS, MILD: ICD-10-CM

## 2022-05-13 PROCEDURE — 3078F DIAST BP <80 MM HG: CPT | Performed by: INTERNAL MEDICINE

## 2022-05-13 PROCEDURE — 99214 OFFICE O/P EST MOD 30 MIN: CPT | Performed by: INTERNAL MEDICINE

## 2022-05-13 PROCEDURE — 3077F SYST BP >= 140 MM HG: CPT | Performed by: INTERNAL MEDICINE

## 2022-05-13 RX ORDER — CYANOCOBALAMIN (VITAMIN B-12) 500 MCG
500 TABLET ORAL DAILY
Qty: 90 TABLET | Refills: 1 | Status: SHIPPED | OUTPATIENT
Start: 2022-05-13 | End: 2022-05-26 | Stop reason: SDUPTHER

## 2022-05-13 RX ORDER — SERTRALINE HYDROCHLORIDE 25 MG/1
25 TABLET, FILM COATED ORAL DAILY
Qty: 90 TABLET | Refills: 1 | Status: SHIPPED | OUTPATIENT
Start: 2022-05-13

## 2022-05-13 RX ORDER — ATORVASTATIN CALCIUM 40 MG/1
40 TABLET, FILM COATED ORAL EVERY EVENING
Qty: 90 TABLET | Refills: 3 | Status: SHIPPED | OUTPATIENT
Start: 2022-05-13

## 2022-05-13 RX ORDER — PANTOPRAZOLE SODIUM 40 MG/1
40 TABLET, DELAYED RELEASE ORAL
Qty: 30 TABLET | Refills: 0 | Status: SHIPPED | OUTPATIENT
Start: 2022-05-13 | End: 2022-06-28

## 2022-05-13 RX ORDER — LISINOPRIL 40 MG/1
40 TABLET ORAL DAILY
Qty: 90 TABLET | Refills: 1 | Status: SHIPPED | OUTPATIENT
Start: 2022-05-13

## 2022-05-13 RX ORDER — CHOLESTYRAMINE 4 G/9G
4 POWDER, FOR SUSPENSION ORAL
Qty: 1074.62 G | Refills: 1 | Status: SHIPPED | OUTPATIENT
Start: 2022-05-13

## 2022-05-13 RX ORDER — LISINOPRIL 20 MG/1
20 TABLET ORAL DAILY
Qty: 90 TABLET | Refills: 3 | Status: CANCELLED | OUTPATIENT
Start: 2022-05-13

## 2022-05-13 RX ORDER — AMLODIPINE BESYLATE 10 MG/1
10 TABLET ORAL DAILY
Qty: 90 TABLET | Refills: 3 | Status: SHIPPED | OUTPATIENT
Start: 2022-05-13

## 2022-05-13 RX ORDER — POTASSIUM CHLORIDE 750 MG/1
20 TABLET, EXTENDED RELEASE ORAL DAILY
Qty: 90 TABLET | Refills: 1 | Status: SHIPPED | OUTPATIENT
Start: 2022-05-13

## 2022-05-13 NOTE — PROGRESS NOTES
INTERNAL MEDICINE [address] [place] [address] [address] [place] [address]    NAME: Sabine Ivey  AGE: 57 y o  SEX: female    DATE OF ENCOUNTER: 5/13/2022    Assessment and Plan     1  Essential hypertension    BP elevated at 167/93 and at home its above 150s  Compliant with medications  Will increase lisinopril 20mg to 40mg  Continue amlodipine 10mg  Keep a BP log  Give us a call if BP is >688 systolic    - potassium chloride (K-DUR,KLOR-CON) 10 mEq tablet; Take 2 tablets (20 mEq total) by mouth in the morning  Dispense: 90 tablet; Refill: 1  - lisinopril (ZESTRIL) 40 mg tablet; Take 1 tablet (40 mg total) by mouth in the morning  Dispense: 90 tablet; Refill: 1  - Echo complete w/ contrast if indicated; Future    2  Stroke-like symptoms    - atorvastatin (LIPITOR) 40 mg tablet; Take 1 tablet (40 mg total) by mouth every evening  Dispense: 90 tablet; Refill: 3    3  Hypertension    - amLODIPine (NORVASC) 10 mg tablet; Take 1 tablet (10 mg total) by mouth in the morning  Dispense: 90 tablet; Refill: 3    4  Hypertensive emergency    - amLODIPine (NORVASC) 10 mg tablet; Take 1 tablet (10 mg total) by mouth in the morning  Dispense: 90 tablet; Refill: 3    5  Pancreatic insufficiency    - pantoprazole (PROTONIX) 40 mg tablet; Take 1 tablet (40 mg total) by mouth daily in the early morning  Dispense: 30 tablet; Refill: 0  - DXA bone density spine hip and pelvis; Future    6  Pancreatic mass    - cholestyramine (QUESTRAN) 4 GM/DOSE powder; Take 1 packet (4 g total) by mouth in the morning and 1 packet (4 g total) at noon and 1 packet (4 g total) in the evening  Take with meals  Dispense: 1074 62 g; Refill: 1    7  Anemia, unspecified type    - vitamin B-12 (CYANOCOBALAMIN) 500 MCG TABS; Take 1 tablet (500 mcg total) by mouth in the morning  Dispense: 90 tablet; Refill: 1    8   B12 deficiency    - vitamin B-12 (CYANOCOBALAMIN) 500 MCG TABS; Take 1 tablet (500 mcg total) by mouth in the morning  Dispense: 90 tablet; Refill: 1    9  Hypokalemia    K 3 2  We can work her up for resistant HTN during next visit   Will start her on 10 MEQ of K     10  Depression, unspecified depression type    - sertraline (ZOLOFT) 25 mg tablet; Take 1 tablet (25 mg total) by mouth in the morning  Dispense: 90 tablet; Refill: 1    No orders of the defined types were placed in this encounter  Chief Complaint     Chief Complaint   Patient presents with    Follow-up     ED follow up        History of Present Illness     HPI    Pt is a 56 y/o M with Hx of Pancreatic Insufficiency on Creon, HTN, Neuroendocrine Tumor s/p Whipple, CKD II (BL  8-1) who presents for ED follow up  Pt presented to the ED w/ episode of syncope  LOC but no memory loss or confusion  Lab work showed K of 3 2  Bedside echo was with in normal limits  Unknown headstrike and head CT was negative for a bleed  She did not have any further episode of syncope  She denies any dizziness, prodromal symptoms  She denies any chest pain, SOB, abdominal pain, constipation, diarrhea, nausea, vomiting, fever or chills  We will do refills  Follow up in 6 months  The following portions of the patient's history were reviewed and updated as appropriate: allergies, current medications, past family history, past medical history, past social history, past surgical history and problem list     Review of Systems     Review of Systems   Constitutional: Negative for chills, diaphoresis and unexpected weight change  HENT: Negative for congestion  Eyes: Negative for visual disturbance  Respiratory: Negative for cough, shortness of breath and wheezing  Cardiovascular: Negative for chest pain, palpitations and leg swelling  Gastrointestinal: Negative for abdominal pain, nausea and vomiting  Endocrine: Negative for polydipsia and polyuria  Genitourinary: Negative for dysuria  Neurological: Negative for headaches  Psychiatric/Behavioral: Negative for confusion  Active Problem List     Patient Active Problem List   Diagnosis    Acute low back pain due to trauma    Benign hypertension with CKD (chronic kidney disease), stage II    Vision disturbance    Noncompliance with medications    Stroke-like symptoms    Incidental pulmonary nodule    Hyperlipemia    Tarsal tunnel syndrome, left    Pancreatic mass    Leukocytosis    Depression    Goals of care, counseling/discussion    Stage 2 chronic kidney disease    History of neuroendocrine cancer    Personal history of breast cancer    Anemia    Diarrhea    Hypertension    H/O Whipple procedure    Pancreatic insufficiency    Encounter for follow-up surveillance of neuroendocrine carcinoma       Objective     LMP  (LMP Unknown)     Physical Exam  Constitutional:       General: She is not in acute distress  Appearance: She is obese  She is not ill-appearing, toxic-appearing or diaphoretic  HENT:      Head: Normocephalic and atraumatic  Cardiovascular:      Rate and Rhythm: Normal rate and regular rhythm  Pulses: Normal pulses  Heart sounds: Murmur heard  No friction rub  No gallop  Pulmonary:      Effort: Pulmonary effort is normal       Breath sounds: Normal breath sounds  No rales  Chest:      Chest wall: No tenderness  Abdominal:      General: Bowel sounds are normal  There is no distension  Palpations: Abdomen is soft  There is no mass  Tenderness: There is no abdominal tenderness  There is no right CVA tenderness, left CVA tenderness, guarding or rebound  Hernia: No hernia is present  Musculoskeletal:      Right lower leg: No edema  Left lower leg: No edema  Neurological:      General: No focal deficit present     Psychiatric:         Mood and Affect: Mood normal          Behavior: Behavior normal          Pertinent Laboratory/Diagnostic Studies:  CBC:   Lab Results   Component Value Date/Time    WBC 9 86 05/02/2022 02:33 PM    RBC 3 79 (L) 05/02/2022 02:33 PM    HGB 11 6 05/02/2022 02:33 PM    HCT 35 6 05/02/2022 02:33 PM    MCV 94 05/02/2022 02:33 PM    MCH 30 6 05/02/2022 02:33 PM    MCHC 32 6 05/02/2022 02:33 PM    RDW 13 7 05/02/2022 02:33 PM    MPV 10 1 05/02/2022 02:33 PM     05/02/2022 02:33 PM    NRBC 0 03/10/2022 09:13 AM    NEUTOPHILPCT 47 03/10/2022 09:13 AM    LYMPHOPCT 53 (H) 05/02/2022 02:33 PM    LYMPHOPCT 44 03/10/2022 09:13 AM    MONOPCT 2 (L) 05/02/2022 02:33 PM    MONOPCT 7 03/10/2022 09:13 AM    EOSPCT 2 05/02/2022 02:33 PM    EOSPCT 2 03/10/2022 09:13 AM    BASOPCT 1 05/02/2022 02:33 PM    BASOPCT 0 03/10/2022 09:13 AM    NEUTROABS 4 88 03/10/2022 09:13 AM    LYMPHSABS 4 67 (H) 03/10/2022 09:13 AM    MONOSABS 0 74 03/10/2022 09:13 AM    EOSABS 0 20 05/02/2022 02:33 PM    EOSABS 0 16 03/10/2022 09:13 AM     Chemistry Profile:   Lab Results   Component Value Date/Time    K 3 2 (L) 05/02/2022 02:33 PM     05/02/2022 02:33 PM    CO2 28 05/02/2022 02:33 PM    CO2 23 08/24/2021 05:04 PM    BUN 17 05/02/2022 02:33 PM    CREATININE 1 18 05/02/2022 02:33 PM    GLUC 102 05/02/2022 02:33 PM    GLUF 94 12/30/2021 06:49 AM    GLUCOSE 156 (H) 08/24/2021 05:04 PM    CALCIUM 9 4 05/02/2022 02:33 PM    CORRECTEDCA 9 9 05/02/2022 02:33 PM    MG 2 0 12/22/2021 06:13 AM    PHOS 2 5 12/22/2021 06:13 AM    AST 44 05/02/2022 02:33 PM    ALT 33 05/02/2022 02:33 PM    ALKPHOS 160 (H) 05/02/2022 02:33 PM    EGFR 49 05/02/2022 02:33 PM     Coagulation Studies:   Lab Results   Component Value Date/Time    PROTIME 13 5 12/21/2021 12:28 AM    INR 1 08 12/21/2021 12:28 AM    PTT 32 12/21/2021 12:28 AM     Cardiac Studies:   Lab Results   Component Value Date/Time    TROPONINI <0 02 08/29/2021 07:31 PM     CBC:   Results from Last 12 Months   Lab Units 05/02/22  1433 03/10/22  0913   WBC Thousand/uL 9 86 10 51*   RBC Million/uL 3 79* 3 66*   HEMOGLOBIN g/dL 11 6 11 2*   HEMATOCRIT % 35 6 32 6*   MCV fL 94 89 MCH pg 30 6 30 6   MCHC g/dL 32 6 34 4   RDW % 13 7 12 9   MPV fL 10 1 10 3   PLATELETS Thousands/uL 275 289   NRBC AUTO /100 WBCs  --  0   NEUTROS PCT %  --  47   LYMPHS PCT %  --  44   LYMPHO PCT % 53*  --    MONOS PCT %  --  7   MONO PCT % 2*  --    EOS PCT % 2 2   BASOS PCT % 1 0   NEUTROS ABS Thousands/µL  --  4 88   LYMPHS ABS Thousands/µL  --  4 67*   MONOS ABS Thousand/µL  --  0 74   EOS ABS Thousand/uL 0 20 0 16     Chemistry Profile:   Results from Last 12 Months   Lab Units 05/02/22  1433 12/30/21  0649 12/22/21  0613 08/24/21  1757 08/24/21  1704   POTASSIUM mmol/L 3 2* 3 6 3 8   < >  --    CHLORIDE mmol/L 104 108 108   < >  --    CO2 mmol/L 28 28 29   < >  --    CO2, I-STAT mmol/L  --   --   --   --  23   BUN mg/dL 17 13 8   < >  --    CREATININE mg/dL 1 18 1 02 0 82   < >  --    GLUCOSE FASTING mg/dL  --  94 86   < >  --    GLUCOSE RANDOM mg/dL 102  --  86   < >  --    GLUCOSE, ISTAT mg/dl  --   --   --   --  156*   CALCIUM mg/dL 9 4 9 4 8 7   < >  --    CORRECTED CALCIUM mg/dL 9 9  --  9 7   < >  --    MAGNESIUM mg/dL  --   --  2 0   < >  --    PHOSPHORUS mg/dL  --   --  2 5   < >  --    AST U/L 44  --  31   < >  --    ALT U/L 33  --  26   < >  --    ALK PHOS U/L 160*  --  114   < >  --    EGFR ml/min/1 73sq m 49 59 76   < >  --     < > = values in this interval not displayed  Coagulation Studies:   Results from Last 12 Months   Lab Units 12/21/21  0028   PROTIME seconds 13 5   INR  1 08   PTT seconds 32       Current Medications     Current Outpatient Medications:     amLODIPine (NORVASC) 10 mg tablet, Take 1 tablet (10 mg total) by mouth daily, Disp: 90 tablet, Rfl: 3    atorvastatin (LIPITOR) 40 mg tablet, Take 1 tablet (40 mg total) by mouth every evening, Disp: 90 tablet, Rfl: 3    clobetasol (TEMOVATE) 0 05 % ointment, Apply twice a day to rash on hands for 2 weeks  Then, apply twice a day from Monday-Friday for another 2 weeks   Avoid the face and groin, Disp: 60 g, Rfl: 0   hydrOXYzine HCL (ATARAX) 25 mg tablet, Take 25 mg by mouth daily at bedtime as needed, Disp: , Rfl: 0    lisinopril (ZESTRIL) 20 mg tablet, Take 1 tablet (20 mg total) by mouth daily, Disp: 90 tablet, Rfl: 3    pancrelipase, Lip-Prot-Amyl, (CREON) 12,000 units capsule, Take 1 capsule (12,000 Units total) by mouth 3 (three) times a day with meals 3 tablets with each meal, Disp: 240 capsule, Rfl: 3    cholestyramine (QUESTRAN) 4 GM/DOSE powder, Take 1 packet (4 g total) by mouth 3 (three) times a day with meals (Patient not taking: Reported on 5/13/2022), Disp: 1074 62 g, Rfl: 1    dicyclomine (BENTYL) 10 mg capsule, Take 1 capsule (10 mg total) by mouth 3 (three) times a day (Patient not taking: Reported on 5/13/2022), Disp: 90 capsule, Rfl: 0    escitalopram (LEXAPRO) 10 mg tablet, Take 1 tablet (10 mg total) by mouth every morning (Patient not taking: No sig reported), Disp: 90 tablet, Rfl: 3    ondansetron (ZOFRAN-ODT) 4 mg disintegrating tablet, Take 1 tablet (4 mg total) by mouth every 6 (six) hours as needed for nausea or vomiting (Patient not taking: Reported on 5/13/2022), Disp: 30 tablet, Rfl: 0    pancrelipase, Lip-Prot-Amyl, (CREON) 6,000 units delayed release capsule, Take 2 capsules (12,000 Units total) by mouth 3 (three) times a day with meals, Disp: 180 capsule, Rfl: 0    pantoprazole (PROTONIX) 40 mg tablet, Take 1 tablet (40 mg total) by mouth daily in the early morning, Disp: 30 tablet, Rfl: 0    sertraline (ZOLOFT) 25 mg tablet, Take 25 mg by mouth daily   (Patient not taking: Reported on 12/21/2021 ), Disp: , Rfl:     traZODone (DESYREL) 50 mg tablet, take 1/2 tablet by mouth at bedtime if needed for insomnia (Patient not taking: Reported on 10/12/2021), Disp: , Rfl:     vitamin B-12 (CYANOCOBALAMIN) 500 MCG TABS, Take 1 tablet (500 mcg total) by mouth daily, Disp: 90 tablet, Rfl: 1    Health Maintenance     Health Maintenance   Topic Date Due    DTaP,Tdap,and Td Vaccines (1 - Tdap) 02/08/2018    OT PLAN OF CARE  10/14/2020    COVID-19 Vaccine (4 - Booster for Pfizer series) 04/11/2022    BMI: Followup Plan  11/15/2022    Annual Physical  12/08/2022    BMI: Adult  02/25/2023    Colorectal Cancer Screening  06/16/2026    Cervical Cancer Screening  12/08/2026    HIV Screening  Completed    Hepatitis C Screening  Completed    Influenza Vaccine  Completed    Pneumococcal Vaccine: Pediatrics (0 to 5 Years) and At-Risk Patients (6 to 59 Years)  Aged Out    HIB Vaccine  Aged Out    Hepatitis B Vaccine  Aged Out    IPV Vaccine  Aged Out    Hepatitis A Vaccine  Aged Out    Meningococcal ACWY Vaccine  Aged Out    HPV Vaccine  Aged [employer] History   Administered Date(s) Administered    COVID-19 PFIZER VACCINE 0 3 ML IM 03/26/2021, 04/16/2021, 12/11/2021    INFLUENZA 10/27/2017    Influenza Quadrivalent, 6-35 Months IM 10/27/2017    Influenza, recombinant, quadrivalent,injectable, preservative free 11/25/2019, 10/14/2020, 11/15/2021    Tdap 02/08/2018     Chema Ribeiro MD  PGY 2

## 2022-05-26 ENCOUNTER — OFFICE VISIT (OUTPATIENT)
Dept: HEMATOLOGY ONCOLOGY | Facility: CLINIC | Age: 62
End: 2022-05-26
Payer: COMMERCIAL

## 2022-05-26 VITALS
TEMPERATURE: 97.4 F | BODY MASS INDEX: 26.58 KG/M2 | OXYGEN SATURATION: 99 % | HEART RATE: 84 BPM | RESPIRATION RATE: 17 BRPM | DIASTOLIC BLOOD PRESSURE: 74 MMHG | SYSTOLIC BLOOD PRESSURE: 118 MMHG | WEIGHT: 150 LBS | HEIGHT: 63 IN

## 2022-05-26 DIAGNOSIS — D64.9 ANEMIA, UNSPECIFIED TYPE: ICD-10-CM

## 2022-05-26 DIAGNOSIS — E53.8 B12 DEFICIENCY: Primary | ICD-10-CM

## 2022-05-26 PROCEDURE — 99214 OFFICE O/P EST MOD 30 MIN: CPT | Performed by: PHYSICIAN ASSISTANT

## 2022-05-26 RX ORDER — CYANOCOBALAMIN (VITAMIN B-12) 500 MCG
500 TABLET ORAL DAILY
Qty: 90 TABLET | Refills: 1 | Status: SHIPPED | OUTPATIENT
Start: 2022-05-26

## 2022-05-26 NOTE — PROGRESS NOTES
Hematology/Oncology Outpatient Follow-up  Raymond Fu 57 y o  female 1960 [id number]    Date:  5/26/2022      Assessment and Plan:    1  B12 deficiency, 2  Anemia, unspecified type  65-year-old female presents for follow-up regarding history of anemia  She had labs completed on 3/10/22; epo 13 4, sed rate 65, vitamin B12 258  CBC-D hemoglobin 11 2, platelets normal, WBC 10 5, diff normal except for lymphocytes 4 67   She was in the ED on 5/2/22 hemoglobin 11 6 WBC and platelet normal   She has been taking B12  She is advised to continue  Follow up in 6 months  If labs remain normal at that time, she can f/u with PCP  Her son was with her today and provided translation, though translation services were offered  - CBC and differential; Future  - Vitamin B12; Future  - vitamin B-12 (CYANOCOBALAMIN) 500 MCG TABS; Take 1 tablet (500 mcg total) by mouth daily  Dispense: 90 tablet; Refill: 1    HPI:  57year old female presents for consult       In June 2021 she had bx of pancreas which showed well differentiated neuroendocrine tumor       She then had whipple with surg onc in Aug 2021  Margins negative  All nodes negative  She continues for follow up for this with Dr Lolita Esparza for imaging and follow up visits for which this is also up to date       Anemia work up in Dec 2021  SPEP negative  TSH normal   Retic normal  B12 266   Folate 15 8  Iron sat 18%, TIBC 223, iron 40, ferritin 106     She states she had genetic testing personally due to her cancer history  Interval history:    ROS: Review of Systems   Constitutional: Positive for fatigue  Negative for appetite change, chills, fever and unexpected weight change  Respiratory: Negative for cough and shortness of breath  Cardiovascular: Negative for chest pain, palpitations and leg swelling  Gastrointestinal: Negative for abdominal pain, constipation, diarrhea, nausea and vomiting     Genitourinary: Negative for difficulty urinating, dysuria and hematuria  Musculoskeletal: Positive for arthralgias  Skin: Negative  Neurological: Negative for dizziness, weakness, light-headedness, numbness and headaches  Hematological: Negative  Psychiatric/Behavioral: Negative  Past Medical History:   Diagnosis Date    Back ache     Cancer ([facility] [address] )     Hyperlipidemia     Hypertension        Past Surgical History:   Procedure Laterality Date    APPENDECTOMY      BILATERAL OOPHORECTOMY      BREAST SURGERY Right     partial mastectomy     COLONOSCOPY      HERNIA REPAIR      HYSTERECTOMY      LAPAROTOMY N/A 8/24/2021    Procedure: LAPAROTOMY EXPLORATORY;  Surgeon: Annalisa Olivera MD;  [facility];  Service: Surgical Oncology    MASTECTOMY Right     partial    WHIPPLE PROCEDURE/PANCREATICO-DUODENECTOMY N/A 8/24/2021    Procedure: WHIPPLE PROCEDURE/PANCREATICO-DUODENECTOMY;  Surgeon: Annalisa Olivera MD;  [facility];  Service: Surgical Oncology       Social History     Socioeconomic History    Marital status: /Civil Union     Spouse name: Peewee ADAMS  [address] [place] [address]    Number of children: None    Years of education: None    Highest education level: None   Occupational History    Occupation: unemployed   Tobacco Use    Smoking status: Former Smoker     Packs/day: 0 65     Years: 30 00     Pack years: 19 50    Smokeless tobacco: Never Used    Tobacco comment: quit 15 years ago   Vaping Use    Vaping Use: Never used   Substance and Sexual Activity    Alcohol use: Not Currently    Drug use: No    Sexual activity: Not Currently   Other Topics Concern    None   Social History Narrative    None     Social Determinants of Health     Financial Resource Strain: Low Risk     Difficulty of Paying Living Expenses: Not hard at all   Food Insecurity: No Food Insecurity    Worried About Running Out of Food in the Last Year: Never true    Bere of Food in the Last Year: Never true   Transportation Needs: Not on file Physical Activity: Not on file   Stress: Not on file   Social Connections: Not on file   Intimate Partner Violence: Not on file   Housing Stability: Low Risk     Unable to Pay for Housing in the Last Year: No    Number of Places Lived in the Last Year: 1    Unstable Housing in the Last Year: No       Family History   Problem Relation Age of Onset    Hypertension Mother     Heart disease Mother     Diabetes Mother     Cancer Father        No Known Allergies      Current Outpatient Medications:     amLODIPine (NORVASC) 10 mg tablet, Take 1 tablet (10 mg total) by mouth in the morning , Disp: 90 tablet, Rfl: 3    atorvastatin (LIPITOR) 40 mg tablet, Take 1 tablet (40 mg total) by mouth every evening, Disp: 90 tablet, Rfl: 3    cholestyramine (QUESTRAN) 4 GM/DOSE powder, Take 1 packet (4 g total) by mouth in the morning and 1 packet (4 g total) at noon and 1 packet (4 g total) in the evening  Take with meals  , Disp: 1074 62 g, Rfl: 1    clobetasol (TEMOVATE) 0 05 % ointment, Apply twice a day to rash on hands for 2 weeks  Then, apply twice a day from Monday-Friday for another 2 weeks   Avoid the face and groin, Disp: 60 g, Rfl: 0    hydrOXYzine HCL (ATARAX) 25 mg tablet, Take 25 mg by mouth daily at bedtime as needed, Disp: , Rfl: 0    lisinopril (ZESTRIL) 40 mg tablet, Take 1 tablet (40 mg total) by mouth in the morning , Disp: 90 tablet, Rfl: 1    pancrelipase, Lip-Prot-Amyl, (CREON) 6,000 units delayed release capsule, Take 2 capsules (12,000 Units total) by mouth 3 (three) times a day with meals, Disp: 180 capsule, Rfl: 0    pantoprazole (PROTONIX) 40 mg tablet, Take 1 tablet (40 mg total) by mouth daily in the early morning, Disp: 30 tablet, Rfl: 0    potassium chloride (K-DUR,KLOR-CON) 10 mEq tablet, Take 2 tablets (20 mEq total) by mouth in the morning , Disp: 90 tablet, Rfl: 1    sertraline (ZOLOFT) 25 mg tablet, Take 1 tablet (25 mg total) by mouth in the morning , Disp: 90 tablet, Rfl: 1   traZODone (DESYREL) 50 mg tablet, take 1/2 tablet by mouth at bedtime if needed for insomnia, Disp: , Rfl:     vitamin B-12 (CYANOCOBALAMIN) 500 MCG TABS, Take 1 tablet (500 mcg total) by mouth in the morning , Disp: 90 tablet, Rfl: 1    Physical Exam:  /74 (BP Location: Left arm, Patient Position: Sitting, Cuff Size: Adult)   Pulse 84   Temp (!) 97 4 °F (36 3 °C)   Resp 17   Ht 5' 3" (1 6 m)   Wt 68 kg (150 lb)   LMP  (LMP Unknown)   SpO2 99%   BMI 26 57 kg/m²     Physical Exam  Vitals reviewed  Constitutional:       General: She is not in acute distress  Appearance: She is well-developed  HENT:      Head: Normocephalic and atraumatic  Eyes:      General: No scleral icterus  Conjunctiva/sclera: Conjunctivae normal    Cardiovascular:      Rate and Rhythm: Normal rate and regular rhythm  Heart sounds: Normal heart sounds  No murmur heard  Pulmonary:      Effort: Pulmonary effort is normal  No respiratory distress  Breath sounds: Normal breath sounds  Abdominal:      Palpations: Abdomen is soft  Tenderness: There is no abdominal tenderness  Musculoskeletal:         General: No tenderness  Normal range of motion  Cervical back: Normal range of motion and neck supple  Right lower leg: No edema  Left lower leg: No edema  Lymphadenopathy:      Cervical: No cervical adenopathy  Skin:     General: Skin is warm and dry  Neurological:      Mental Status: She is alert and oriented to person, place, and time  Cranial Nerves: No cranial nerve deficit  Psychiatric:         Mood and Affect: Mood normal          Behavior: Behavior normal        Labs:  Lab Results   Component Value Date    WBC 9 86 05/02/2022    HGB 11 6 05/02/2022    HCT 35 6 05/02/2022    MCV 94 05/02/2022     05/02/2022       Patient voiced understanding and agreement in the above discussion  Aware to contact our office with questions/symptoms in the interim       This note has been generated by voice recognition software system  Therefore, there may be spelling, grammar, and or syntax errors  Please contact if questions arise

## 2022-06-24 ENCOUNTER — TELEPHONE (OUTPATIENT)
Dept: HEMATOLOGY ONCOLOGY | Facility: CLINIC | Age: 62
End: 2022-06-24

## 2022-06-28 ENCOUNTER — APPOINTMENT (OUTPATIENT)
Dept: LAB | Facility: HOSPITAL | Age: 62
End: 2022-06-28
Attending: SURGERY
Payer: COMMERCIAL

## 2022-06-28 DIAGNOSIS — Z08 ENCOUNTER FOR FOLLOW-UP SURVEILLANCE OF NEUROENDOCRINE CARCINOMA: ICD-10-CM

## 2022-06-28 DIAGNOSIS — Z85.89 ENCOUNTER FOR FOLLOW-UP SURVEILLANCE OF NEUROENDOCRINE CARCINOMA: ICD-10-CM

## 2022-06-28 LAB
ANION GAP SERPL CALCULATED.3IONS-SCNC: 5 MMOL/L (ref 4–13)
BUN SERPL-MCNC: 13 MG/DL (ref 5–25)
CALCIUM SERPL-MCNC: 9.7 MG/DL (ref 8.3–10.1)
CHLORIDE SERPL-SCNC: 109 MMOL/L (ref 100–108)
CO2 SERPL-SCNC: 28 MMOL/L (ref 21–32)
CREAT SERPL-MCNC: 1.04 MG/DL (ref 0.6–1.3)
GFR SERPL CREATININE-BSD FRML MDRD: 57 ML/MIN/1.73SQ M
GLUCOSE P FAST SERPL-MCNC: 88 MG/DL (ref 65–99)
POTASSIUM SERPL-SCNC: 4.3 MMOL/L (ref 3.5–5.3)
SODIUM SERPL-SCNC: 142 MMOL/L (ref 136–145)

## 2022-06-28 PROCEDURE — 86316 IMMUNOASSAY TUMOR OTHER: CPT

## 2022-06-28 PROCEDURE — 80048 BASIC METABOLIC PNL TOTAL CA: CPT

## 2022-06-28 PROCEDURE — 36415 COLL VENOUS BLD VENIPUNCTURE: CPT

## 2022-06-30 ENCOUNTER — CLINICAL SUPPORT (OUTPATIENT)
Dept: GENETICS | Facility: CLINIC | Age: 62
End: 2022-06-30
Payer: COMMERCIAL

## 2022-06-30 ENCOUNTER — DOCUMENTATION (OUTPATIENT)
Dept: GENETICS | Facility: CLINIC | Age: 62
End: 2022-06-30

## 2022-06-30 DIAGNOSIS — Z85.89 HISTORY OF NEUROENDOCRINE CANCER: ICD-10-CM

## 2022-06-30 DIAGNOSIS — Z80.41 FAMILY HISTORY OF OVARIAN CANCER: ICD-10-CM

## 2022-06-30 DIAGNOSIS — Z80.3 FAMILY HISTORY OF BREAST CANCER: ICD-10-CM

## 2022-06-30 DIAGNOSIS — Z85.3 HISTORY OF BREAST CANCER: Primary | ICD-10-CM

## 2022-06-30 LAB — CGA SERPL-MCNC: 80 NG/ML (ref 0–101.8)

## 2022-06-30 PROCEDURE — NC001 PR NO CHARGE: Performed by: GENETIC COUNSELOR, MS

## 2022-06-30 PROCEDURE — 36415 COLL VENOUS BLD VENIPUNCTURE: CPT

## 2022-06-30 NOTE — LETTER
2022     Lucinda Cassidy DO  [address]  [address] [address]    Patient: Tiffany Santiago  YOB: 1960  Date of Visit: 2022      Dear Dr Arlen Mills: Thank you for referring Tiffany Santiago to me for evaluation  Below are my notes for this consultation  If you have questions, please do not hesitate to call me  I look forward to following your patient along with you  Sincerely,        Gema Leon President,         CC: No Recipients        Pre-Test Genetic Counseling Consult Note    Patient Name: Tiffany Santiago   /Age: 1960/61 y o  Referring Provider: Lucinda Cassidy DO    Date of Service: 2022  Genetic Counselor: Ana M Mclaughlin MS, [address] [facility]  Interpretation Services: Family Member- Granddaughter Katheryn Sheikh  [facility]      Oksana Scott was referred to the [address] [place] [address] and Genetic Assessment Program due to her personal history of breast cancer and PNET and family history of breast, ovarian and uterine cancer  She presents today to discuss the possibility of a hereditary cancer syndrome, options for genetic testing, and implications for her and her family  Her granddaughter Katheryn Sheikh accompanied her to the appointment and provided translation  Cancer History and Treatment:     Personal History: Personal history of breast cancer at age 44 and PNET at age 60    History of cancer in the right breast at age 44, treated with partial mastectomy     2021  Final Diagnosis    A -B -C  Pancreas, Head Cyst (Thin-prep, smears and cell block preparations):   Neoplastic: other  Well-differentiated neuroendocrine tumor         21  Final Diagnosis   A  Gallbladder, cholecystectomy:  - Chronic cholecystitis  - Negative for malignancy      B   Proximal pancrease, duodenum, pancreaticoduodenectomy:  - Well differentiated neuroendocrine tumor (2 3 cm ), G1   - Portion of duodenum with no pathologic abnormality   - Pancreatic resection margin is positive for tumor, see part D for final margin status  - All other margins are negative for tumor   - Seven lymph nodes, negative for malignancy (0/7)  C  Pancreas new margin, re-excision:  - Adipose tissue with pancreatic microadenoma  - No tumor present     Comment: Immunohistochemistry was performed on block B11  The tumor cells are positive for AE1/3, synaptophysin, chromogranin A, CD56; Ki-67 shows mitotic proliferative index is less than 3%; D2-40 and CD31 are also performed to help in the assessment of this case  Electronically signed by Ivanna Hughes MD on 8/30/2021 at 10:10 AM     Screening Hx:   Breast:  Breast Imaging: No current screening, most recent was approximately 5 years ago     Colon:  Colonoscopy: 6/21/21    Final Diagnosis   A  Polyp, Colorectal, cold snare; ascending polyp:  -Tubular adenoma  -No evidence of high grade dysplasia or malignancy seen        Electronically signed by Diane Zavala MD on 6/21/2021 at 10:27 AM     Gynecologic:  STAN/BSO age 27 d/t uterine fibroids     Skin:  No current screening    Reproductive History  Age at menarche: 4  Age at first live birth: 13y  Menopause: Post Menopausal (34y)  Hormone replacement: None     Medical and Surgical History  Pertinent surgical history:   Past Surgical History:   Procedure Laterality Date    APPENDECTOMY      BILATERAL OOPHORECTOMY      BREAST SURGERY Right     partial mastectomy     COLONOSCOPY      HERNIA REPAIR      HYSTERECTOMY      LAPAROTOMY N/A 8/24/2021    Procedure: LAPAROTOMY EXPLORATORY;  Surgeon: Duc Dowling MD;  [facility];  Service: Surgical Oncology    MASTECTOMY Right     partial    WHIPPLE PROCEDURE/PANCREATICO-DUODENECTOMY N/A 8/24/2021    Procedure:  WHIPPLE PROCEDURE/PANCREATICO-DUODENECTOMY;  Surgeon: Duc Dowling MD;  [facility];  Service: Surgical Oncology      Pertinent medical history:  Past Medical History: Diagnosis Date    Back ache     Cancer [facility] [address]     Hyperlipidemia     Hypertension        Other History:  Height:   Ht Readings from Last 1 Encounters:   05/26/22 5' 3" (1 6 m)     Weight:   Wt Readings from Last 1 Encounters:   05/26/22 68 kg (150 lb)     Relevant Family History   Patient reports no Ashkenazi Confucianist ancestry  Daughter (age 37) with breast cancer at age 43 and ovarian cancer at age 39    Maternal Family History: Mother (d age 64) with breast cancer at age 48  Maternal Half-Aunt (maternal half-sister to mother) with uterine cancer at age 64    Paternal Family History:  Father (d age 48) with throat cancer at age 46; he had a history of alcohol and tobacco use     Please refer to the scanned pedigree in the Media Tab for a complete family history     *All history is reported as provided by the patient; records are not available for review, except where indicated  Assessment:  We discussed sporadic, familial and hereditary cancer  We also discussed the many factors that influence our risk for cancer such as age, environmental exposures, lifestyle choices and family history  We reviewed the indications suggestive of a hereditary predisposition to cancer  Genetic testing is indicated for [facility] based on the following criteria:     Meets NCCN V2 2022 Testing Criteria for High-Penetrance Breast Cancer Susceptibility Genes: Personal history of breast cancer under the age of 39, along with a family history of breast cancer in a daughter and mother  Meets NCCN V2 2022 Testing Criteria for Ovarian Cancer Susceptibility Genes: Family history of ovarian cancer in a daughter  Meets NCCN Neuroendocrine and Adrenal Tumors Testing Criteria V1 2022 which recommends individuals with pancreatic NETconsider genetic testing at any age  Data shows that genetic testing of unselected patients with pancreatic NET identified a mutation in approximately 16-17% of cases      The risks, benefits, and limitations of genetic testing were reviewed with the patient, as well as genetic discrimination laws, and possible test results (positive, negative, variants of uncertain significance) and their clinical implications  If positive for a mutation, options for managing cancer risk including increased surveillance, chemoprevention, and in some cases prophylactic surgery were discussed  Oksana Scott was informed that if a hereditary cancer syndrome was identified in her, first degree relatives (parents, siblings, and children) have a chance of also inheriting the condition  Genetic testing would allow for predictive genetic testing in other relatives, who may also be at risk depending on their degree of relation  Plan: Patient decided to proceed with testing and provided consent  Summary:     Sample Collection:  The patient's blood sample was drawn in the office on 6/30/2022 by medical assistant Jaswant Heaton    Gen[facility]: [facility] (50 genes): APC, LUIS, AXIN2 BARD1, BRCA1, BRCA2, BRIP1, BMPR1A, CDH1, CDK4, CDKN2A, CDKN1B, CHEK2, DICER1, EGLN1, EPCAM, FH, GREM1, HOXB13, KIF1B, MAX, MEN1, MLH1, MSH2, MSH3, MSH6, MUTYH, NBN, NF1, NTHL1, PALB2, PMS2, POLD1, POLE, PTEN, RAD51C, RAD51D, RECQL, RET, SDHA, SDHAF2, SDHB, SDHC, SDHD  SMAD4, SMARCA4, STK11, USFU223, TP53, VHL     Results take approximately 2-3 weeks to complete once test is started  [facility]  Additional recommendations for surveillance/medical management will be made pending genetic test results

## 2022-06-30 NOTE — PROGRESS NOTES
Pre-Test Genetic Counseling Consult Note    Patient Name: Vijay Klein   /Age: 1960/61 y o  Referring Provider: Armando Christina DO    Date of Service: 2022  Genetic Counselor: Jameson Mckee MS, [address] [facility]  Interpretation Services: Family Member- Granddaughter Rena Washington  [facility]: [facility]      Oksana Scott was referred to the [address] [place] [address] and Genetic Assessment Program due to her personal history of breast cancer and PNET and family history of breast, ovarian and uterine cancer  She presents today to discuss the possibility of a hereditary cancer syndrome, options for genetic testing, and implications for her and her family  Her granddaughter Rena Washington accompanied her to the appointment and provided translation  Cancer History and Treatment:     Personal History: Personal history of breast cancer at age 44 and PNET at age 60    History of cancer in the right breast at age 44, treated with partial mastectomy     2021  Final Diagnosis    A -B -C  Pancreas, Head Cyst (Thin-prep, smears and cell block preparations):   Neoplastic: other  Well-differentiated neuroendocrine tumor         21  Final Diagnosis   A  Gallbladder, cholecystectomy:  - Chronic cholecystitis  - Negative for malignancy      B  Proximal pancrease, duodenum, pancreaticoduodenectomy:  - Well differentiated neuroendocrine tumor (2 3 cm ), G1   - Portion of duodenum with no pathologic abnormality   - Pancreatic resection margin is positive for tumor, see part D for final margin status  - All other margins are negative for tumor   - Seven lymph nodes, negative for malignancy (0/7)  C  Pancreas new margin, re-excision:  - Adipose tissue with pancreatic microadenoma  - No tumor present     Comment: Immunohistochemistry was performed on block B11   The tumor cells are positive for AE1/3, synaptophysin, chromogranin A, CD56; Ki-67 shows mitotic proliferative index is less than 3%; D2-40 and CD31 are also performed to help in the assessment of this case  Electronically signed by Migue Wheatley MD on 8/30/2021 at 10:10 AM     Screening Hx:   Breast:  Breast Imaging: No current screening, most recent was approximately 5 years ago     Colon:  Colonoscopy: 6/21/21    Final Diagnosis   A  Polyp, Colorectal, cold snare; ascending polyp:  -Tubular adenoma  -No evidence of high grade dysplasia or malignancy seen        Electronically signed by Amna Pete MD on 6/21/2021 at 10:27 AM     Gynecologic:  STAN/BSO age 27 d/t uterine fibroids     Skin:  No current screening    Reproductive History  Age at menarche: 4  Age at first live birth: 13y  Menopause: Post Menopausal (34y)  Hormone replacement: None     Medical and Surgical History  Pertinent surgical history:   Past Surgical History:   Procedure Laterality Date    APPENDECTOMY      BILATERAL OOPHORECTOMY      BREAST SURGERY Right     partial mastectomy     COLONOSCOPY      HERNIA REPAIR      HYSTERECTOMY      LAPAROTOMY N/A 8/24/2021    Procedure: LAPAROTOMY EXPLORATORY;  Surgeon: Radha Abbott MD;  [facility];  Service: Surgical Oncology    MASTECTOMY Right     partial    WHIPPLE PROCEDURE/PANCREATICO-DUODENECTOMY N/A 8/24/2021    Procedure: WHIPPLE PROCEDURE/PANCREATICO-DUODENECTOMY;  Surgeon: Radha Abbott MD;  [facility];  Service: Surgical Oncology      Pertinent medical history:  Past Medical History:   Diagnosis Date    Back ache     Cancer ([facility] [address]     Hyperlipidemia     Hypertension        Other History:  Height:   Ht Readings from Last 1 Encounters:   05/26/22 5' 3" (1 6 m)     Weight:   Wt Readings from Last 1 Encounters:   05/26/22 68 kg (150 lb)     Relevant Family History   Patient reports no Ashkenazi Yazidism ancestry  Daughter (age 37) with breast cancer at age 43 and ovarian cancer at age 39    Maternal Family History:   Mother (d age 64) with breast cancer at age 48  Maternal Half-Aunt (maternal half-sister to mother) with uterine cancer at age 64    Paternal Family History:  Father (d age 48) with throat cancer at age 46; he had a history of alcohol and tobacco use     Please refer to the scanned pedigree in the Media Tab for a complete family history     *All history is reported as provided by the patient; records are not available for review, except where indicated  Assessment:  We discussed sporadic, familial and hereditary cancer  We also discussed the many factors that influence our risk for cancer such as age, environmental exposures, lifestyle choices and family history  We reviewed the indications suggestive of a hereditary predisposition to cancer  Genetic testing is indicated for Oksana Scott based on the following criteria:     Meets NCCN V2 2022 Testing Criteria for High-Penetrance Breast Cancer Susceptibility Genes: Personal history of breast cancer under the age of 39, along with a family history of breast cancer in a daughter and mother  Meets NCCN V2 2022 Testing Criteria for Ovarian Cancer Susceptibility Genes: Family history of ovarian cancer in a daughter  Meets NCCN Neuroendocrine and Adrenal Tumors Testing Criteria V1 2022 which recommends individuals with pancreatic NETconsider genetic testing at any age  Data shows that genetic testing of unselected patients with pancreatic NET identified a mutation in approximately 16-17% of cases  The risks, benefits, and limitations of genetic testing were reviewed with the patient, as well as genetic discrimination laws, and possible test results (positive, negative, variants of uncertain significance) and their clinical implications  If positive for a mutation, options for managing cancer risk including increased surveillance, chemoprevention, and in some cases prophylactic surgery were discussed   Oksana Scott was informed that if a hereditary cancer syndrome was identified in her, first degree relatives (parents, siblings, and children) have a chance of also inheriting the condition  Genetic testing would allow for predictive genetic testing in other relatives, who may also be at risk depending on their degree of relation  Plan: Patient decided to proceed with testing and provided consent  Summary:     Sample Collection:  The patient's blood sample was drawn in the office on 6/30/2022 by medical assistant Tenisha Sheldon    Genetic Testing Preformed: [facility] (50 genes): APC, LUIS, AXIN2 BARD1, BRCA1, BRCA2, BRIP1, BMPR1A, CDH1, CDK4, CDKN2A, CDKN1B, CHEK2, DICER1, EGLN1, EPCAM, FH, GREM1, HOXB13, KIF1B, MAX, MEN1, MLH1, MSH2, MSH3, MSH6, MUTYH, NBN, NF1, NTHL1, PALB2, PMS2, POLD1, POLE, PTEN, RAD51C, RAD51D, RECQL, RET, SDHA, SDHAF2, SDHB, SDHC, SDHD  SMAD4, SMARCA4, STK11, QUXH305, TP53, VHL     Results take approximately 2-3 weeks to complete once test is started  We will contact [facility] once results are available  Additional recommendations for surveillance/medical management will be made pending genetic test results

## 2022-07-18 ENCOUNTER — HOSPITAL ENCOUNTER (OUTPATIENT)
Dept: RADIOLOGY | Facility: HOSPITAL | Age: 62
Discharge: HOME | End: 2022-07-18
Attending: SURGERY
Payer: COMMERCIAL

## 2022-07-18 DIAGNOSIS — Z85.89 ENCOUNTER FOR FOLLOW-UP SURVEILLANCE OF NEUROENDOCRINE CARCINOMA: ICD-10-CM

## 2022-07-18 DIAGNOSIS — Z08 ENCOUNTER FOR FOLLOW-UP SURVEILLANCE OF NEUROENDOCRINE CARCINOMA: ICD-10-CM

## 2022-07-18 PROCEDURE — 71260 CT THORAX DX C+: CPT

## 2022-07-18 PROCEDURE — 74177 CT ABD & PELVIS W/CONTRAST: CPT

## 2022-07-18 PROCEDURE — G1004 CDSM NDSC: HCPCS

## 2022-07-18 RX ADMIN — IOHEXOL 70 ML: 350 INJECTION, SOLUTION INTRAVENOUS at 10:41

## 2022-07-21 ENCOUNTER — TELEPHONE (OUTPATIENT)
Dept: GENETICS | Facility: CLINIC | Age: 62
End: 2022-07-21

## 2022-07-21 NOTE — TELEPHONE ENCOUNTER
Post-Test Genetic Counseling Consult Note  Today I spoke with Oksana Scott and her daughter Lizzie Lopez (who provided Czech translation) over the phone to review the results of Jen's genetic test for hereditary cancer  We met previously on 6/30/2022 for pre-test counseling  A copy of this consult note and genetic test result will be shared with the patient  SUMMARY:    Test(s): [employer] Cancer (50 genes): APC, LUIS, AXIN2 BARD1, BRCA1, BRCA2, BRIP1, BMPR1A, CDH1, CDK4, CDKN2A, CDKN1B, CHEK2, DICER1, EGLN1, EPCAM, FH, GREM1, HOXB13, KIF1B, MAX, MEN1, MLH1, MSH2, MSH3, MSH6, MUTYH, NBN, NF1, NTHL1, PALB2, PMS2, POLD1, POLE, PTEN, RAD51C, RAD51D, RECQL, RET, SDHA, SDHAF2, SDHB, SDHC, SDHD  SMAD4, SMARCA4, STK11, SIZE250, TP53, VHL     Result: Negative - No Clinically Significant Variants Detected      Assessment:     A negative result significantly reduces the likelihood that Oksana Scott has a hereditary cancer syndrome  However, this testing is unable to completely rule out the presence of hereditary cancer  It remains possible that:  - There is a variant in an area of a gene which was not tested or there is a variant not detectable due to technical limitations of this test      - There is a variant in another gene that was not included in this test or in a gene not known to be linked to cancer or tumors  - A family member has a genetic variant that the patient did not inherit  - The cancer in the family is sporadic and is related to non-hereditary factors  Risks and Testing for Family Members:    Despite a negative result, Ann-Maries first-degree relatives may be at increased risk for the cancers based on the family history  We recommend they discuss screening and management recommendations with their healthcare providers  At this time we do not recommend testing for Oksana Scott 's unaffected children based on her negative test result    Oksana Gus children still need to consider the history of cancer on the other side of their family when determining their risks  If any of Jen's children have a history of cancer, they may still consider genetic testing  Relatives who wish to pursue genetic testing can reach out to the [id number]) to schedule an appointment or visit www [facility] org to identify a local genetic counselor  Plan:   There are no additional recommendations based on Jen's negative result  she should continue cancer screening and medical management as clinically indicated and as determined appropriate by her healthcare providers  Negative Result: Oksana Scott was strongly encouraged to contact us regarding any changes in her personal or family history of cancer as these changes could alter our recommendation regarding genetic testing and/or cancer screening

## 2022-07-22 ENCOUNTER — TELEPHONE (OUTPATIENT)
Dept: HEMATOLOGY ONCOLOGY | Facility: CLINIC | Age: 62
End: 2022-07-22

## 2022-07-25 ENCOUNTER — OFFICE VISIT (OUTPATIENT)
Dept: SURGICAL ONCOLOGY | Facility: CLINIC | Age: 62
End: 2022-07-25
Payer: COMMERCIAL

## 2022-07-25 ENCOUNTER — APPOINTMENT (OUTPATIENT)
Dept: LAB | Facility: HOSPITAL | Age: 62
End: 2022-07-25
Payer: COMMERCIAL

## 2022-07-25 ENCOUNTER — OFFICE VISIT (OUTPATIENT)
Dept: HEMATOLOGY ONCOLOGY | Facility: CLINIC | Age: 62
End: 2022-07-25
Payer: COMMERCIAL

## 2022-07-25 VITALS
WEIGHT: 149.5 LBS | DIASTOLIC BLOOD PRESSURE: 72 MMHG | BODY MASS INDEX: 26.49 KG/M2 | OXYGEN SATURATION: 98 % | TEMPERATURE: 97.9 F | RESPIRATION RATE: 16 BRPM | HEIGHT: 63 IN | HEART RATE: 84 BPM | SYSTOLIC BLOOD PRESSURE: 122 MMHG

## 2022-07-25 DIAGNOSIS — Z85.89 HISTORY OF NEUROENDOCRINE CANCER: ICD-10-CM

## 2022-07-25 DIAGNOSIS — D64.9 ANEMIA, UNSPECIFIED TYPE: ICD-10-CM

## 2022-07-25 DIAGNOSIS — E53.8 B12 DEFICIENCY: ICD-10-CM

## 2022-07-25 DIAGNOSIS — Z85.89 ENCOUNTER FOR FOLLOW-UP SURVEILLANCE OF NEUROENDOCRINE CARCINOMA: Primary | ICD-10-CM

## 2022-07-25 DIAGNOSIS — K86.89 PANCREATIC INSUFFICIENCY: ICD-10-CM

## 2022-07-25 DIAGNOSIS — E53.8 B12 DEFICIENCY: Primary | ICD-10-CM

## 2022-07-25 DIAGNOSIS — Z08 ENCOUNTER FOR FOLLOW-UP SURVEILLANCE OF NEUROENDOCRINE CARCINOMA: Primary | ICD-10-CM

## 2022-07-25 LAB
BASOPHILS # BLD AUTO: 0.04 THOUSANDS/ΜL (ref 0–0.1)
BASOPHILS NFR BLD AUTO: 1 % (ref 0–1)
EOSINOPHIL # BLD AUTO: 0.05 THOUSAND/ΜL (ref 0–0.61)
EOSINOPHIL NFR BLD AUTO: 1 % (ref 0–6)
ERYTHROCYTE [DISTWIDTH] IN BLOOD BY AUTOMATED COUNT: 13.2 % (ref 11.6–15.1)
HCT VFR BLD AUTO: 35.9 % (ref 34.8–46.1)
HGB BLD-MCNC: 11.6 G/DL (ref 11.5–15.4)
IMM GRANULOCYTES # BLD AUTO: 0.03 THOUSAND/UL (ref 0–0.2)
IMM GRANULOCYTES NFR BLD AUTO: 0 % (ref 0–2)
LYMPHOCYTES # BLD AUTO: 3.23 THOUSANDS/ΜL (ref 0.6–4.47)
LYMPHOCYTES NFR BLD AUTO: 42 % (ref 14–44)
MCH RBC QN AUTO: 30.7 PG (ref 26.8–34.3)
MCHC RBC AUTO-ENTMCNC: 32.3 G/DL (ref 31.4–37.4)
MCV RBC AUTO: 95 FL (ref 82–98)
MONOCYTES # BLD AUTO: 1.05 THOUSAND/ΜL (ref 0.17–1.22)
MONOCYTES NFR BLD AUTO: 14 % (ref 4–12)
NEUTROPHILS # BLD AUTO: 3.19 THOUSANDS/ΜL (ref 1.85–7.62)
NEUTS SEG NFR BLD AUTO: 42 % (ref 43–75)
NRBC BLD AUTO-RTO: 0 /100 WBCS
PLATELET # BLD AUTO: 255 THOUSANDS/UL (ref 149–390)
PMV BLD AUTO: 10.5 FL (ref 8.9–12.7)
RBC # BLD AUTO: 3.78 MILLION/UL (ref 3.81–5.12)
VIT B12 SERPL-MCNC: 399 PG/ML (ref 100–900)
WBC # BLD AUTO: 7.59 THOUSAND/UL (ref 4.31–10.16)

## 2022-07-25 PROCEDURE — 99213 OFFICE O/P EST LOW 20 MIN: CPT | Performed by: PHYSICIAN ASSISTANT

## 2022-07-25 PROCEDURE — 99214 OFFICE O/P EST MOD 30 MIN: CPT | Performed by: SURGERY

## 2022-07-25 PROCEDURE — 36415 COLL VENOUS BLD VENIPUNCTURE: CPT

## 2022-07-25 PROCEDURE — 85025 COMPLETE CBC W/AUTO DIFF WBC: CPT

## 2022-07-25 PROCEDURE — 82607 VITAMIN B-12: CPT

## 2022-07-25 NOTE — PROGRESS NOTES
Surgical Oncology Follow Up       [id number] [facility] [address] [place]  [facility] [address] [phone]    Sonia Tilley  1960  [id number]  [address] [facility] [place] [address] [address] [phone]    No chief complaint on file  Assessment/Plan:    No problem-specific Assessment & Plan notes found for this encounter  Diagnoses and all orders for this visit:    Encounter for follow-up surveillance of neuroendocrine carcinoma    History of neuroendocrine cancer        Advance Care Planning/Advance Directives:  Discussed disease status, cancer treatment plans and/or cancer treatment goals with the patient  Oncology History   History of neuroendocrine cancer   6/17/2021 Biopsy    Pancreas, Head Cyst:   Well-differentiated neuroendocrine tumor  8/24/2021 Surgery    Whipple:  - Well differentiated neuroendocrine tumor (2 3 cm ), G1   - Pancreatic resection margin is positive for tumor  - All other margins are negative for tumor   - Seven lymph nodes, negative for malignancy (0/7)  Pancreas new margin, re-excision:  - Adipose tissue with pancreatic microadenoma  - No tumor present            History of Present Illness:  Status post Whipple procedure for neuroendocrine tumor of pancreas  Doing well, here for surveillance visit   -Interval History:  Takes Creon which helps with her bowel habits  Review of Systems:  Review of Systems   Constitutional: Negative  HENT: Negative  Eyes: Negative  Respiratory: Negative  Cardiovascular: Negative  Gastrointestinal: Negative  Endocrine: Negative  Genitourinary: Negative  Musculoskeletal: Negative  Skin: Negative  Allergic/Immunologic: Negative  Neurological: Negative  Hematological: Negative  Psychiatric/Behavioral: Negative          Patient Active Problem List Diagnosis    Acute low back pain due to trauma    Benign hypertension with CKD (chronic kidney disease), stage II    Vision disturbance    Noncompliance with medications    Stroke-like symptoms    Incidental pulmonary nodule    Hyperlipemia    Tarsal tunnel syndrome, left    Pancreatic mass    Leukocytosis    Depression    Goals of care, counseling/discussion    Stage 2 chronic kidney disease    History of neuroendocrine cancer    Personal history of breast cancer    Anemia    Diarrhea    Hypertension    H/O Whipple procedure    Pancreatic insufficiency    Encounter for follow-up surveillance of neuroendocrine carcinoma     Past Medical History:   Diagnosis Date    Back ache     Cancer ([address] )     Hyperlipidemia     Hypertension      Past Surgical History:   Procedure Laterality Date    APPENDECTOMY      BILATERAL OOPHORECTOMY      BREAST SURGERY Right     partial mastectomy     COLONOSCOPY      HERNIA REPAIR      HYSTERECTOMY      LAPAROTOMY N/A 8/24/2021    Procedure: LAPAROTOMY EXPLORATORY;  Surgeon: Laura Iverson MD;  [facility];  Service: Surgical Oncology    MASTECTOMY Right     partial    WHIPPLE PROCEDURE/PANCREATICO-DUODENECTOMY N/A 8/24/2021    Procedure: WHIPPLE PROCEDURE/PANCREATICO-DUODENECTOMY;  Surgeon: Laura Iverson MD;  [facility];  Service: Surgical Oncology     Family History   Problem Relation Age of Onset    Hypertension Mother     Heart disease Mother     Diabetes Mother     Cancer Father      Social History     Socioeconomic History    Marital status: /Civil Union     Spouse name: Javi Flowers  [address] [facility]    Number of children: Not on file    Years of education: Not on file    Highest education level: Not on file   Occupational History    Occupation: unemployed   Tobacco Use    Smoking status: Former Smoker     Packs/day: 0 65     Years: 30 00     Pack years: 19 50    Smokeless tobacco: Never Used    Tobacco comment: quit 15 years ago   Vaping Use    Vaping Use: Never used   Substance and Sexual Activity    Alcohol use: Not Currently    Drug use: No    Sexual activity: Not Currently   Other Topics Concern    Not on file   Social History Narrative    Not on file     Social Determinants of Health     Financial Resource Strain: Low Risk     Difficulty of Paying Living Expenses: Not hard at all   Food Insecurity: No Food Insecurity    Worried About Running Out of Food in the Last Year: Never true    Bere of Food in the Last Year: Never true   Transportation Needs: Not on file   Physical Activity: Not on file   Stress: Not on file   Social Connections: Not on file   Intimate Partner Violence: Not on file   Housing Stability: [address] Unable to Pay for Housing in the Last Year: No    Number of [place] in the Last Year: 1    Unstable Housing in the Last Year: No       Current Outpatient Medications:     amLODIPine (NORVASC) 10 mg tablet, Take 1 tablet (10 mg total) by mouth in the morning , Disp: 90 tablet, Rfl: 3    atorvastatin (LIPITOR) 40 mg tablet, Take 1 tablet (40 mg total) by mouth every evening, Disp: 90 tablet, Rfl: 3    cholestyramine (QUESTRAN) 4 GM/DOSE powder, Take 1 packet (4 g total) by mouth in the morning and 1 packet (4 g total) at noon and 1 packet (4 g total) in the evening  Take with meals  , Disp: 1074 62 g, Rfl: 1    clobetasol (TEMOVATE) 0 05 % ointment, Apply twice a day to rash on hands for 2 weeks  Then, apply twice a day from Monday-Friday for another 2 weeks   Avoid the face and groin, Disp: 60 g, Rfl: 0    hydrOXYzine HCL (ATARAX) 25 mg tablet, Take 25 mg by mouth daily at bedtime as needed, Disp: , Rfl: 0    lisinopril (ZESTRIL) 40 mg tablet, Take 1 tablet (40 mg total) by mouth in the morning , Disp: 90 tablet, Rfl: 1    pancrelipase, Lip-Prot-Amyl, (CREON) 6,000 units delayed release capsule, Take 2 capsules (12,000 Units total) by mouth 3 (three) times a day with meals, Disp: 180 capsule, Rfl: 0    pantoprazole (PROTONIX) 40 mg tablet, take 1 tablet by mouth daily IN THE EARLY MORNING, Disp: 90 tablet, Rfl: 3    potassium chloride (K-DUR,KLOR-CON) 10 mEq tablet, Take 2 tablets (20 mEq total) by mouth in the morning , Disp: 90 tablet, Rfl: 1    sertraline (ZOLOFT) 25 mg tablet, Take 1 tablet (25 mg total) by mouth in the morning , Disp: 90 tablet, Rfl: 1    traZODone (DESYREL) 50 mg tablet, take 1/2 tablet by mouth at bedtime if needed for insomnia, Disp: , Rfl:     vitamin B-12 (CYANOCOBALAMIN) 500 MCG TABS, Take 1 tablet (500 mcg total) by mouth daily, Disp: 90 tablet, Rfl: 1  No Known Allergies  There were no vitals filed for this visit  Physical Exam  Vitals reviewed  Constitutional:       Appearance: Normal appearance  HENT:      Head: Normocephalic and atraumatic  Right Ear: External ear normal       Left Ear: External ear normal       Mouth/Throat:      Mouth: Mucous membranes are dry  Cardiovascular:      Rate and Rhythm: Normal rate and regular rhythm  Pulses: Normal pulses  Heart sounds: Normal heart sounds  Pulmonary:      Breath sounds: Normal breath sounds  Abdominal:      General: Abdomen is flat  Palpations: Abdomen is soft  Musculoskeletal:         General: Normal range of motion  Cervical back: Normal range of motion and neck supple  Skin:     General: Skin is warm and dry  Neurological:      General: No focal deficit present  Mental Status: She is alert and oriented to person, place, and time     Psychiatric:         Mood and Affect: Mood normal          Behavior: Behavior normal            Results:  Labs:  Component Ref Range & Units 6/28/22  9:40 AM 12/30/21  6:49 AM 12/21/21  2:16 PM 4/14/21  4:46 AM   Chromogranin A 0 0 - 101 8 ng/mL 80 0             Imaging  CT chest abdomen pelvis w contrast    Result Date: 7/22/2022  Narrative: CT CHEST, ABDOMEN AND PELVIS WITH IV CONTRAST INDICATION:   Z08: Encounter for follow-up examination after completed treatment for malignant neoplasm Z85 9: Personal history of malignant neoplasm, unspecified     Pancreatic neuroendocrine tumor  Status post Whipple procedure  Appendectomy  Hysterectomy  Right breast surgery  COMPARISON:  Multiple prior examinations, most recently January 15, 2022 TECHNIQUE: CT examination of the chest, abdomen and pelvis was performed  Scanning through the abdomen was performed in arterial, venous and delayed phases according a protocol spefically designed to evaluate upper abdominal viscera  Axial, sagittal, and coronal 2D reformatted images were created from the source data and submitted for interpretation  Radiation dose length product (DLP) for this visit:  1171 12 mGy-cm   This examination, like all CT scans performed in the [facility], was performed utilizing techniques to minimize radiation dose exposure, including the use of iterative reconstruction and automated exposure control  IV Contrast:  70 mL of iohexol (OMNIPAQUE) Enteric Contrast: Enteric contrast was administered  FINDINGS: CHEST LUNGS: Numerous scattered tiny subcentimeter pulmonary nodules are reidentified, not significantly changed from prior examination  -3 mm right upper lobe nodule 3/38, not significantly changed from prior exam   -2 mm left upper lobe nodule 3/21, not significantly changed from prior exam   -2 mm left upper lobe nodule 3/36, not significantly changed from prior exam   -3 mm left lower lobe nodule 3/52, not significantly changed from prior exam   No new or enlarging pulmonary nodule/mass  No consolidative or groundglass airspace opacity  There Is no tracheal or endobronchial lesion  PLEURA:  Unremarkable  HEART/GREAT VESSELS: Thoracic aortic, annular, and coronary artery calcification  No thoracic aortic aneurysm  MEDIASTINUM AND LLOYD:  Surgical clips inferior to the left thyroid   CHEST WALL AND LOWER NECK:   Postsurgical changes again noted associated with the right breast   Right breast calcifications reidentified  No axillary lymphadenopathy  ABDOMEN LIVER/BILIARY TREE:  Liver is mildly but diffusely decreased in density suggesting mild hepatic steatosis  No CT evidence of suspicious hepatic mass  Normal hepatic contours  No significant intrahepatic biliary dilatation  Postsurgical changes of biliary enteric anastomosis in this patient who is undergone prior Whipple procedure  Punctate segment 7 calcified granuloma reidentified  GALLBLADDER:  Gallbladder is surgically absent  SPLEEN:  Unremarkable  PANCREAS:  There has been prior Whipple procedure, and profound fatty atrophy of remaining pancreatic parenchyma is reidentified  No recurrent mass identified  ADRENAL GLANDS:  Unremarkable  KIDNEYS/URETERS:  Cortical and parapelvic left renal cysts are reidentified  No solid renal mass or urinary tract calculus  Right extrarenal pelvis is again seen  No hydronephrosis  STOMACH AND BOWEL: Expected surgical changes of gastrojejunostomy associated with a Whipple procedure reidentified  No abnormal bowel wall thickening  No bowel obstruction  Previously noted nodular focus of fat necrosis posterior to the gastric antrum  is no longer visible  APPENDIX:  No findings to suggest appendicitis  ABDOMINOPELVIC CAVITY:  No ascites  No pneumoperitoneum  No lymphadenopathy  Small focus of fat necrosis in peritoneal/omental fat in the anterolateral left lower quadrant image 201 of series 2 and image 60 of series 601  Otherwise multifocal fat necrosis has resolved  VESSELS:  Atherosclerotic changes are noted  A large penetrating atherosclerotic ulcer is seen on coronal image 72 of series 601, similar from previous examination  PELVIS REPRODUCTIVE ORGANS:  Surgical changes of prior hysterectomy  URINARY BLADDER:  Unremarkable  ABDOMINAL WALL/INGUINAL REGIONS:  Calcifications bilateral gluteal regions, similar from prior    Postsurgical changes including a linear density along the margin of the right rectus abdominal musculature that probably represents granulation tissue/scar  OSSEOUS STRUCTURES:  No acute fracture or destructive osseous lesion  Unchanged sclerotic foci scattered throughout osseous structures  Impression: Scattered tiny pulmonary nodules, unchanged  No CT findings of new or enlarging recurrent or metastatic tumor in the chest, abdomen or pelvis  Surgical changes of prior Whipple procedure reidentified  Multiple small foci of intra-abdominal fat necrosis seen on the previous examination are mostly resolved  Profound fatty atrophy of the remaining pancreatic tissue  Large penetrating atherosclerotic ulcer associated with intrarenal abdominal aorta  Workstation performed: [id number]     I reviewed the above laboratory and imaging data  Discussion/Summary:  Status post Whipple procedure, neuroendocrine tumor of pancreatic head  Doing well  No evidence of disease recurrence  Plan of follow-up in 6 months with blood work and CT at that time

## 2022-07-25 NOTE — LETTER
July 25, 2022     Layla Neal MD  [address] [facility] [address]    Patient: Eula Stephen   YOB: 1960   Date of Visit: 7/25/2022       Dear Dr Santi Palencia: Thank you for referring Eula Stephen to me for evaluation  Below are my notes for this consultation  If you have questions, please do not hesitate to call me  I look forward to following your patient along with you  Sincerely,        Sage Sorto MD        CC: MD Mamadou Hinton MD Nile Lamp, MD  7/25/2022 11:04 AM  Sign when Signing Visit     Surgical Oncology Follow Up       [address] [facility] [address] [id number] [phone]    Eula Stephen  1960  [id number]  [facility]  [address] [place] [id number] [phone]    No chief complaint on file  Assessment/Plan:    No problem-specific Assessment & Plan notes found for this encounter  Diagnoses and all orders for this visit:    Encounter for follow-up surveillance of neuroendocrine carcinoma    History of neuroendocrine cancer        Advance Care Planning/Advance Directives:  Discussed disease status, cancer treatment plans and/or cancer treatment goals with the patient  Oncology History   History of neuroendocrine cancer   6/17/2021 Biopsy    Pancreas, Head Cyst:   Well-differentiated neuroendocrine tumor  8/24/2021 Surgery    Whipple:  - Well differentiated neuroendocrine tumor (2 3 cm ), G1   - Pancreatic resection margin is positive for tumor  - All other margins are negative for tumor   - Seven lymph nodes, negative for malignancy (0/7)  Pancreas new margin, re-excision:  - Adipose tissue with pancreatic microadenoma     - No tumor present            History of Present Illness:  Status post Whipple procedure for neuroendocrine tumor of pancreas  Doing well, here for surveillance visit   -Interval History:  Takes Creon which helps with her bowel habits  Review of Systems:  Review of Systems   Constitutional: Negative  HENT: Negative  Eyes: Negative  Respiratory: Negative  Cardiovascular: Negative  Gastrointestinal: Negative  Endocrine: Negative  Genitourinary: Negative  Musculoskeletal: Negative  Skin: Negative  Allergic/Immunologic: Negative  Neurological: Negative  Hematological: Negative  Psychiatric/Behavioral: Negative  Patient Active Problem List   Diagnosis    Acute low back pain due to trauma    Benign hypertension with CKD (chronic kidney disease), stage II    Vision disturbance    Noncompliance with medications    Stroke-like symptoms    Incidental pulmonary nodule    Hyperlipemia    Tarsal tunnel syndrome, left    Pancreatic mass    Leukocytosis    Depression    Goals of care, counseling/discussion    Stage 2 chronic kidney disease    History of neuroendocrine cancer    Personal history of breast cancer    Anemia    Diarrhea    Hypertension    H/O Whipple procedure    Pancreatic insufficiency    Encounter for follow-up surveillance of neuroendocrine carcinoma     Past Medical History:   Diagnosis Date    Back ache     Cancer ([address] )     Hyperlipidemia     Hypertension      Past Surgical History:   Procedure Laterality Date    APPENDECTOMY      BILATERAL OOPHORECTOMY      BREAST SURGERY Right     partial mastectomy     COLONOSCOPY      HERNIA REPAIR      HYSTERECTOMY      LAPAROTOMY N/A 8/24/2021    Procedure: LAPAROTOMY EXPLORATORY;  Surgeon: Ronit Duarte MD;  [facility];  Service: Surgical Oncology    MASTECTOMY Right     partial    WHIPPLE PROCEDURE/PANCREATICO-DUODENECTOMY N/A 8/24/2021    Procedure:  WHIPPLE PROCEDURE/PANCREATICO-DUODENECTOMY;  Surgeon: Ronit Duarte MD;  [facility];  Service: Surgical Oncology     Family History   Problem Relation Age of Onset    Hypertension Mother     Heart disease Mother     Diabetes Mother     Cancer Father      Social History     Socioeconomic History    Marital status: /Civil Union     Spouse name: Peewee Villanueva [facility]    Number of children: Not on file    Years of education: Not on file    Highest education level: Not on file   Occupational History    Occupation: unemployed   Tobacco Use    Smoking status: Former Smoker     Packs/day: 0 65     Years: 30 00     Pack years: 19 50    Smokeless tobacco: Never Used    Tobacco comment: quit 15 years ago   Vaping Use    Vaping Use: Never used   Substance and Sexual Activity    Alcohol use: Not Currently    Drug use: No    Sexual activity: Not Currently   Other Topics Concern    Not on file   Social History Narrative    Not on file     Social Determinants of Health     Financial Resource Strain: Low Risk     Difficulty of Paying Living Expenses: Not hard at all   Food Insecurity: No Food Insecurity    Worried About Running Out of Food in the Last Year: Never true    Bere of Food in the Last Year: Never true   Transportation Needs: Not on file   Physical Activity: Not on file   Stress: Not on file   Social Connections: Not on file   Intimate Partner Violence: Not on file   Housing Stability: [address] Unable to Pay for Housing in the Last Year: No    Number of [place] in the Last Year: 1    Unstable Housing in the Last Year: No       Current Outpatient Medications:     amLODIPine (NORVASC) 10 mg tablet, Take 1 tablet (10 mg total) by mouth in the morning , Disp: 90 tablet, Rfl: 3    atorvastatin (LIPITOR) 40 mg tablet, Take 1 tablet (40 mg total) by mouth every evening, Disp: 90 tablet, Rfl: 3    cholestyramine (QUESTRAN) 4 GM/DOSE powder, Take 1 packet (4 g total) by mouth in the morning and 1 packet (4 g total) at noon and 1 packet (4 g total) in the evening  Take with meals  , Disp: 1074 62 g, Rfl: 1    clobetasol (TEMOVATE) 0 05 % ointment, Apply twice a day to rash on hands for 2 weeks  Then, apply twice a day from Monday-Friday for another 2 weeks  Avoid the face and groin, Disp: 60 g, Rfl: 0    hydrOXYzine HCL (ATARAX) 25 mg tablet, Take 25 mg by mouth daily at bedtime as needed, Disp: , Rfl: 0    lisinopril (ZESTRIL) 40 mg tablet, Take 1 tablet (40 mg total) by mouth in the morning , Disp: 90 tablet, Rfl: 1    pancrelipase, Lip-Prot-Amyl, (CREON) 6,000 units delayed release capsule, Take 2 capsules (12,000 Units total) by mouth 3 (three) times a day with meals, Disp: 180 capsule, Rfl: 0    pantoprazole (PROTONIX) 40 mg tablet, take 1 tablet by mouth daily IN THE EARLY MORNING, Disp: 90 tablet, Rfl: 3    potassium chloride (K-DUR,KLOR-CON) 10 mEq tablet, Take 2 tablets (20 mEq total) by mouth in the morning , Disp: 90 tablet, Rfl: 1    sertraline (ZOLOFT) 25 mg tablet, Take 1 tablet (25 mg total) by mouth in the morning , Disp: 90 tablet, Rfl: 1    traZODone (DESYREL) 50 mg tablet, take 1/2 tablet by mouth at bedtime if needed for insomnia, Disp: , Rfl:     vitamin B-12 (CYANOCOBALAMIN) 500 MCG TABS, Take 1 tablet (500 mcg total) by mouth daily, Disp: 90 tablet, Rfl: 1  No Known Allergies  There were no vitals filed for this visit  Physical Exam  Vitals reviewed  Constitutional:       Appearance: Normal appearance  HENT:      Head: Normocephalic and atraumatic  Right Ear: External ear normal       Left Ear: External ear normal       Mouth/Throat:      Mouth: Mucous membranes are dry  Cardiovascular:      Rate and Rhythm: Normal rate and regular rhythm  Pulses: Normal pulses  Heart sounds: Normal heart sounds  Pulmonary:      Breath sounds: Normal breath sounds  Abdominal:      General: Abdomen is flat  Palpations: Abdomen is soft  Musculoskeletal:         General: Normal range of motion  Cervical back: Normal range of motion and neck supple     Skin: General: Skin is warm and dry  Neurological:      General: No focal deficit present  Mental Status: She is alert and oriented to person, place, and time  Psychiatric:         Mood and Affect: Mood normal          Behavior: Behavior normal            Results:  Labs:  Component Ref Range & Units 6/28/22  9:40 AM 12/30/21  6:49 AM 12/21/21  2:16 PM 4/14/21  4:46 AM   Chromogranin A 0 0 - 101 8 ng/mL 80 0             Imaging  CT chest abdomen pelvis w contrast    Result Date: 7/22/2022  Narrative: CT CHEST, ABDOMEN AND PELVIS WITH IV CONTRAST INDICATION:   Z08: Encounter for follow-up examination after completed treatment for malignant neoplasm Z85 9: Personal history of malignant neoplasm, unspecified     Pancreatic neuroendocrine tumor  Status post Whipple procedure  Appendectomy  Hysterectomy  Right breast surgery  COMPARISON:  Multiple prior examinations, most recently January 15, 2022 TECHNIQUE: CT examination of the chest, abdomen and pelvis was performed  Scanning through the abdomen was performed in arterial, venous and delayed phases according a protocol spefically designed to evaluate upper abdominal viscera  Axial, sagittal, and coronal 2D reformatted images were created from the source data and submitted for interpretation  Radiation dose length product (DLP) for this visit:  1171 12 mGy-cm   This examination, like all CT scans performed in the [facility], was performed utilizing techniques to minimize radiation dose exposure, including the use of iterative reconstruction and automated exposure control  IV Contrast:  70 mL of iohexol (OMNIPAQUE) Enteric Contrast: Enteric contrast was administered   FINDINGS: CHEST LUNGS: Numerous scattered tiny subcentimeter pulmonary nodules are reidentified, not significantly changed from prior examination  -3 mm right upper lobe nodule 3/38, not significantly changed from prior exam   -2 mm left upper lobe nodule 3/21, not significantly changed from prior exam   -2 mm left upper lobe nodule 3/36, not significantly changed from prior exam   -3 mm left lower lobe nodule 3/52, not significantly changed from prior exam   No new or enlarging pulmonary nodule/mass  No consolidative or groundglass airspace opacity  There Is no tracheal or endobronchial lesion  PLEURA:  Unremarkable  HEART/GREAT VESSELS: Thoracic aortic, annular, and coronary artery calcification  No thoracic aortic aneurysm  MEDIASTINUM AND LLOYD:  Surgical clips inferior to the left thyroid  CHEST WALL AND LOWER NECK:   Postsurgical changes again noted associated with the right breast   Right breast calcifications reidentified  No axillary lymphadenopathy  ABDOMEN LIVER/BILIARY TREE:  Liver is mildly but diffusely decreased in density suggesting mild hepatic steatosis  No CT evidence of suspicious hepatic mass  Normal hepatic contours  No significant intrahepatic biliary dilatation  Postsurgical changes of biliary enteric anastomosis in this patient who is undergone prior Whipple procedure  Punctate segment 7 calcified granuloma reidentified  GALLBLADDER:  Gallbladder is surgically absent  SPLEEN:  Unremarkable  PANCREAS:  There has been prior Whipple procedure, and profound fatty atrophy of remaining pancreatic parenchyma is reidentified  No recurrent mass identified  ADRENAL GLANDS:  Unremarkable  KIDNEYS/URETERS:  Cortical and parapelvic left renal cysts are reidentified  No solid renal mass or urinary tract calculus  Right extrarenal pelvis is again seen  No hydronephrosis  STOMACH AND BOWEL: Expected surgical changes of gastrojejunostomy associated with a Whipple procedure reidentified  No abnormal bowel wall thickening  No bowel obstruction  Previously noted nodular focus of fat necrosis posterior to the gastric antrum  is no longer visible  APPENDIX:  No findings to suggest appendicitis  ABDOMINOPELVIC CAVITY:  No ascites  No pneumoperitoneum    No lymphadenopathy  Small focus of fat necrosis in peritoneal/omental fat in the anterolateral left lower quadrant image 201 of series 2 and image 60 of series 601  Otherwise multifocal fat necrosis has resolved  VESSELS:  Atherosclerotic changes are noted  A large penetrating atherosclerotic ulcer is seen on coronal image 72 of series 601, similar from previous examination  PELVIS REPRODUCTIVE ORGANS:  Surgical changes of prior hysterectomy  URINARY BLADDER:  Unremarkable  ABDOMINAL WALL/INGUINAL REGIONS:  Calcifications bilateral gluteal regions, similar from prior  Postsurgical changes including a linear density along the margin of the right rectus abdominal musculature that probably represents granulation tissue/scar  OSSEOUS STRUCTURES:  No acute fracture or destructive osseous lesion  Unchanged sclerotic foci scattered throughout osseous structures  Impression: Scattered tiny pulmonary nodules, unchanged  No CT findings of new or enlarging recurrent or metastatic tumor in the chest, abdomen or pelvis  Surgical changes of prior Whipple procedure reidentified  Multiple small foci of intra-abdominal fat necrosis seen on the previous examination are mostly resolved  Profound fatty atrophy of the remaining pancreatic tissue  Large penetrating atherosclerotic ulcer associated with intrarenal abdominal aorta  Workstation performed: [id number]     I reviewed the above laboratory and imaging data  Discussion/Summary:  Status post Whipple procedure, neuroendocrine tumor of pancreatic head  Doing well  No evidence of disease recurrence  Plan of follow-up in 6 months with blood work and CT at that time

## 2022-07-25 NOTE — PROGRESS NOTES
Hematology/Oncology Outpatient Follow-up  Abhay Sullivan 57 y o  female 1960 [id number]    Date:  7/25/2022      Assessment and Plan:  1  B12 deficiency, 2  Anemia  27-year-old female presents for follow-up regarding history of anemia  She had labs completed on 3/10/22; epo 13 4, vitamin B12 258  She has been taking B12 by mouth  She did not have labs prior to today's visit  She states she will complete today  She was advised to test for COVID based on her symptoms  Depending on lab results will determine if she will need further follow up visits       - CBC and differential; Future  - Vitamin B12; Future    HPI:  57year old female presents for consult       In June 2021 she had bx of pancreas which showed well differentiated neuroendocrine tumor       She then had whipple with surg onc in Aug 2021  Margins negative  All nodes negative  She continues for follow up for this with Dr Berna Marie for imaging and follow up visits for which this is also up to date       Anemia work up in Dec 2021  SPEP negative  TSH normal   Retic normal  B12 266   Folate 15 8  Iron sat 18%, TIBC 223, iron 40, ferritin 106     She states she had genetic testing personally due to her cancer history  Interval history: fatigue, body aches, feeling warm, sore throat, cough occasional productive x 2 days     ROS: Review of Systems   Constitutional: Negative for appetite change, chills, fever and unexpected weight change  Respiratory: Negative for shortness of breath  Cardiovascular: Negative for chest pain, palpitations and leg swelling  Gastrointestinal: Negative for abdominal pain, constipation, diarrhea, nausea and vomiting  Genitourinary: Negative for difficulty urinating, dysuria and hematuria  Musculoskeletal: Negative for arthralgias  Skin: Negative  Neurological: Negative for dizziness, weakness, light-headedness, numbness and headaches  Hematological: Negative  Psychiatric/Behavioral: Negative  Past Medical History:   Diagnosis Date    Back ache     Cancer ([facility] [address]     Hyperlipidemia     Hypertension        Past Surgical History:   Procedure Laterality Date    APPENDECTOMY      BILATERAL OOPHORECTOMY      BREAST SURGERY Right     partial mastectomy     COLONOSCOPY      HERNIA REPAIR      HYSTERECTOMY      LAPAROTOMY N/A 8/24/2021    Procedure: LAPAROTOMY EXPLORATORY;  Surgeon: Tete Armendariz MD;  [facility];  Service: Surgical Oncology    MASTECTOMY Right     partial    WHIPPLE PROCEDURE/PANCREATICO-DUODENECTOMY N/A 8/24/2021    Procedure: WHIPPLE PROCEDURE/PANCREATICO-DUODENECTOMY;  Surgeon: Tete Armendariz MD;  [facility];  Service: Surgical Oncology       Social History     Socioeconomic History    Marital status: /Civil Union     Spouse name: Peewee Villanueva [facility]    Number of children: None    Years of education: None    Highest education level: None   Occupational History    Occupation: unemployed   Tobacco Use    Smoking status: Former Smoker     Packs/day: 0 65     Years: 30 00     Pack years: 19 50    Smokeless tobacco: Never Used    Tobacco comment: quit 15 years ago   Vaping Use    Vaping Use: Never used   Substance and Sexual Activity    Alcohol use: Not Currently    Drug use: No    Sexual activity: Not Currently   Other Topics Concern    None   Social History Narrative    None     Social Determinants of Health     Financial Resource Strain: Low Risk     Difficulty of Paying Living Expenses: Not hard at all   Food Insecurity: No Food Insecurity    Worried About Running Out of Food in the Last Year: Never true    Bere of Food in the Last Year: Never true   Transportation Needs: Not on file   Physical Activity: Not on file   Stress: Not on file   Social Connections: Not on file   Intimate Partner Violence: Not on file   Housing Stability: [facility] [address] Unable to Pay for Housing in the Last Year: No    Number of [place] in the Last Year: 1    Unstable Housing in the Last Year: No       Family History   Problem Relation Age of Onset    Hypertension Mother     Heart disease Mother     Diabetes Mother     Cancer Father        No Known Allergies      Current Outpatient Medications:     amLODIPine (NORVASC) 10 mg tablet, Take 1 tablet (10 mg total) by mouth in the morning , Disp: 90 tablet, Rfl: 3    atorvastatin (LIPITOR) 40 mg tablet, Take 1 tablet (40 mg total) by mouth every evening, Disp: 90 tablet, Rfl: 3    cholestyramine (QUESTRAN) 4 GM/DOSE powder, Take 1 packet (4 g total) by mouth in the morning and 1 packet (4 g total) at noon and 1 packet (4 g total) in the evening  Take with meals  , Disp: 1074 62 g, Rfl: 1    clobetasol (TEMOVATE) 0 05 % ointment, Apply twice a day to rash on hands for 2 weeks  Then, apply twice a day from Monday-Friday for another 2 weeks   Avoid the face and groin, Disp: 60 g, Rfl: 0    hydrOXYzine HCL (ATARAX) 25 mg tablet, Take 25 mg by mouth daily at bedtime as needed, Disp: , Rfl: 0    lisinopril (ZESTRIL) 40 mg tablet, Take 1 tablet (40 mg total) by mouth in the morning , Disp: 90 tablet, Rfl: 1    pancrelipase, Lip-Prot-Amyl, (CREON) 6,000 units delayed release capsule, Take 2 capsules (12,000 Units total) by mouth 3 (three) times a day with meals, Disp: 180 capsule, Rfl: 0    pantoprazole (PROTONIX) 40 mg tablet, take 1 tablet by mouth daily IN THE EARLY MORNING, Disp: 90 tablet, Rfl: 3    potassium chloride (K-DUR,KLOR-CON) 10 mEq tablet, Take 2 tablets (20 mEq total) by mouth in the morning , Disp: 90 tablet, Rfl: 1    sertraline (ZOLOFT) 25 mg tablet, Take 1 tablet (25 mg total) by mouth in the morning , Disp: 90 tablet, Rfl: 1    traZODone (DESYREL) 50 mg tablet, take 1/2 tablet by mouth at bedtime if needed for insomnia, Disp: , Rfl:     vitamin B-12 (CYANOCOBALAMIN) 500 MCG TABS, Take 1 tablet (500 mcg total) by mouth daily, Disp: 90 tablet, Rfl: 1      Physical Exam:  /72 (BP Location: Right arm, Patient Position: Sitting, Cuff Size: Standard)   Pulse 84   Temp 97 9 °F (36 6 °C) (Temporal)   Resp 16   Ht 5' 3" (1 6 m)   Wt 67 8 kg (149 lb 8 oz)   LMP  (LMP Unknown)   SpO2 98%   BMI 26 48 kg/m²     Physical Exam  Vitals reviewed  Constitutional:       General: She is not in acute distress  Appearance: She is well-developed  She is not ill-appearing  HENT:      Head: Normocephalic and atraumatic  Eyes:      General: No scleral icterus  Conjunctiva/sclera: Conjunctivae normal    Cardiovascular:      Rate and Rhythm: Normal rate and regular rhythm  Heart sounds: Normal heart sounds  No murmur heard  Pulmonary:      Effort: Pulmonary effort is normal  No respiratory distress  Breath sounds: Normal breath sounds  Abdominal:      Palpations: Abdomen is soft  Tenderness: There is no abdominal tenderness  Musculoskeletal:         General: No tenderness  Normal range of motion  Cervical back: Normal range of motion and neck supple  Right lower leg: No edema  Left lower leg: No edema  Lymphadenopathy:      Cervical: No cervical adenopathy  Skin:     General: Skin is warm and dry  Neurological:      Mental Status: She is alert and oriented to person, place, and time  Cranial Nerves: No cranial nerve deficit     Psychiatric:         Mood and Affect: Mood normal          Behavior: Behavior normal        Labs:  Lab Results   Component Value Date    WBC 9 86 05/02/2022    HGB 11 6 05/02/2022    HCT 35 6 05/02/2022    MCV 94 05/02/2022     05/02/2022     Lab Results   Component Value Date    K 4 3 06/28/2022     (H) 06/28/2022    CO2 28 06/28/2022    BUN 13 06/28/2022    CREATININE 1 04 06/28/2022    GLUCOSE 156 (H) 08/24/2021    GLUF 88 06/28/2022    CALCIUM 9 7 06/28/2022    CORRECTEDCA 9 9 05/02/2022    AST 44 05/02/2022    ALT 33 05/02/2022    ALKPHOS 160 (H) 05/02/2022    EGFR 57 06/28/2022       Patient voiced understanding and agreement in the above discussion  Aware to contact our office with questions/symptoms in the interim  This note has been generated by voice recognition software system  Therefore, there may be spelling, grammar, and or syntax errors  Please contact if questions arise

## 2022-07-26 ENCOUNTER — TELEPHONE (OUTPATIENT)
Dept: GENETICS | Facility: CLINIC | Age: 62
End: 2022-07-26

## 2022-07-26 NOTE — TELEPHONE ENCOUNTER
----- Message from aNresh Holm, [facility] sent at 7/22/2022 12:31 PM EDT -----  Regarding: Chart complete  GC Completed Chart     Result Type: Negative    Result Delivery: Mail    Monthly Review: Does not need monthly review- COMPLETE

## 2022-07-28 ENCOUNTER — TELEPHONE (OUTPATIENT)
Dept: HEMATOLOGY ONCOLOGY | Facility: CLINIC | Age: 62
End: 2022-07-28

## 2022-07-28 NOTE — TELEPHONE ENCOUNTER
[facility] in Nepali advising patient that her Vitamin B12 level is good  Patient to continue taking B12 orally indefinitely and follow up with her PCP  [place] number provided for possible call back  ----- Message from Zita Cedillo sent at 7/27/2022 11:37 AM EDT -----  Please let patient know labs are good  She should continue oral B12 indefinitely  Continue follow up with PCP  Patient speaks Nepali

## 2022-08-11 NOTE — PROGRESS NOTES
Progress Note - [facility] 57 y o  female MRN: [id number]    Unit/Bed#: [facility] 688-61 Encounter: [id number]      Assessment:  56 y/o F w pancreatic neuroendocrine tumor s/p whipple procedure on 8/24  Doing well  Vss  Afebrile  Dressing c/d/i  Abdomen soft, mild tender, nondistended  Lactate: 5-> 2 1  Hgb: 8 9-> 8 5  KAMLA L: 170 cc serosang  KAMLA R: 195 cc serosang  NGT: no output  UOP: 1970 cc      Plan:  Continue npo/ ngt  MIVF @ 125  Continue cardenas  Continue epidural analgesia  D/c ivan  Continue abdominal drains, follow output and color  Strict I/Os  SQH dvt ppx  Work with incentive spirometer  Pt/ot  Up in chair, ambulate    Subjective:   Pt reported itching associated with anxiety overnight, given benadryl per icu team x2, with good response  This morning abd pain well controlled  Denied nausea or vomiting  Objective:     Vitals: Blood pressure 140/70, pulse 90, temperature 98 1 °F (36 7 °C), temperature source Oral, resp  rate 13, height 5' 4" (1 626 m), weight 77 6 kg (171 lb), SpO2 95 %, not currently breastfeeding  ,Body mass index is 29 35 kg/m²  Intake/Output Summary (Last 24 hours) at 8/25/2021 0554  Last data filed at 8/25/2021 0500  Gross per 24 hour   Intake 7450 65 ml   Output 2705 ml   Net 4745 65 ml       Physical Exam  General: NAD  HEENT: NC/AT  MMM  Cv: RRR     Lungs: normal effort  Ab: Soft, NT/ND  Ex: no CCE  Neuro: AAOx3    Current Facility-Administered Medications   Medication Dose Route Frequency Provider Last Rate    acetaminophen  650 mg Rectal Q4H PRN Jacinda Jones MD      diphenhydrAMINE  25 mg Intravenous Q6H PRN [facility] Denisse Us, DO      heparin (porcine)  5,000 Units Subcutaneous [facility] Jacinda Jones MD      insulin lispro  1-6 Units Subcutaneous Q6H [address] Magda [place] Magen, DO      Labetalol HCl  10 mg Intravenous Q4H PRN [facility] Ocean Magen, DO      lactated ringers  1,000 mL Intravenous Once PRN Jacinda Jones MD      And    lactated ringers  1,000 mL Intravenous Once PRN Amy Tinajero MD      multi-electrolyte  125 mL/hr Intravenous Continuous Amy Tinajero  mL/hr (08/25/21 0517)    ondansetron  4 mg Intravenous Q6H PRN Amy Tinajero MD      pantoprazole  40 mg Intravenous Q24H [address] Amy Tinajero MD      phenol  1 spray Mouth/Throat Q2H PRN Amy Tinajero MD      ropivacaine 0 1% and fentaNYL 2 mcg/mL   Epidural Continuous Amy Tinajero MD      sodium chloride  1,000 mL Intravenous Once PRN Amy Tinajero MD      And    sodium chloride  1,000 mL Intravenous Once PRN Amy Tinajero MD           Invasive Devices     Peripheral Intravenous Line            Peripheral IV 08/24/21 Left Antecubital <1 day    Peripheral IV 08/24/21 Right Antecubital <1 day          Epidural Line            Epidural Catheter 08/24/21 <1 day          Arterial Line            Arterial Line 08/24/21 Right Radial <1 day          Drain            Closed/Suction Drain Medial;Right; Anterior RUQ Bulb 19 Fr  -- days    Closed/Suction Drain Left; Anterior LUQ Bulb 19 Fr  <1 day    NG/OG/Enteral Tube Nasogastric 14 Fr Right nare <1 day    Urethral Catheter Latex 16 Fr  <1 day                Lab, Imaging and other studies: I have personally reviewed pertinent reports      VTE Pharmacologic Prophylaxis: Sequential compression device (Venodyne)   VTE Mechanical Prophylaxis: sequential compression device none

## 2023-01-25 ENCOUNTER — TELEPHONE (OUTPATIENT)
Dept: SURGICAL ONCOLOGY | Facility: CLINIC | Age: 63
End: 2023-01-25

## 2023-01-25 NOTE — TELEPHONE ENCOUNTER
----- Message from [facility]  sent at 1/25/2023  3:07 PM EST -----  Pt no-showed for her CT today  Please help her set up CT and r/s office visit with me    Thanks

## 2023-01-25 NOTE — TELEPHONE ENCOUNTER
Spoke to patient via  that she would need to call central scheduling to schedule her missed CT  We rescheduled her f/u with HUONG Abbott also  I let her know if she was not able to get a CT before her appt we could reschedule again  All questions were answered

## 2023-02-03 ENCOUNTER — TELEPHONE (OUTPATIENT)
Dept: RADIOLOGY | Facility: HOSPITAL | Age: 63
End: 2023-02-03

## 2023-02-03 ENCOUNTER — TELEPHONE (OUTPATIENT)
Dept: SURGICAL ONCOLOGY | Facility: CLINIC | Age: 63
End: 2023-02-03

## 2023-02-03 ENCOUNTER — APPOINTMENT (OUTPATIENT)
Dept: LAB | Facility: CLINIC | Age: 63
End: 2023-02-03

## 2023-02-03 DIAGNOSIS — D64.9 ANEMIA, UNSPECIFIED TYPE: ICD-10-CM

## 2023-02-03 DIAGNOSIS — E53.8 B12 DEFICIENCY: ICD-10-CM

## 2023-02-03 DIAGNOSIS — Z08 ENCOUNTER FOR FOLLOW-UP SURVEILLANCE OF NEUROENDOCRINE CARCINOMA: ICD-10-CM

## 2023-02-03 DIAGNOSIS — Z85.89 ENCOUNTER FOR FOLLOW-UP SURVEILLANCE OF NEUROENDOCRINE CARCINOMA: ICD-10-CM

## 2023-02-03 LAB
ANION GAP SERPL CALCULATED.3IONS-SCNC: 5 MMOL/L (ref 4–13)
BASOPHILS # BLD AUTO: 0.04 THOUSANDS/ÂΜL (ref 0–0.1)
BASOPHILS NFR BLD AUTO: 1 % (ref 0–1)
BUN SERPL-MCNC: 18 MG/DL (ref 5–25)
CALCIUM SERPL-MCNC: 9.4 MG/DL (ref 8.3–10.1)
CHLORIDE SERPL-SCNC: 104 MMOL/L (ref 96–108)
CO2 SERPL-SCNC: 29 MMOL/L (ref 21–32)
CREAT SERPL-MCNC: 1.28 MG/DL (ref 0.6–1.3)
EOSINOPHIL # BLD AUTO: 0.15 THOUSAND/ÂΜL (ref 0–0.61)
EOSINOPHIL NFR BLD AUTO: 2 % (ref 0–6)
ERYTHROCYTE [DISTWIDTH] IN BLOOD BY AUTOMATED COUNT: 12.6 % (ref 11.6–15.1)
GFR SERPL CREATININE-BSD FRML MDRD: 44 ML/MIN/1.73SQ M
GLUCOSE P FAST SERPL-MCNC: 149 MG/DL (ref 65–99)
HCT VFR BLD AUTO: 37.1 % (ref 34.8–46.1)
HGB BLD-MCNC: 12.4 G/DL (ref 11.5–15.4)
IMM GRANULOCYTES # BLD AUTO: 0.01 THOUSAND/UL (ref 0–0.2)
IMM GRANULOCYTES NFR BLD AUTO: 0 % (ref 0–2)
LYMPHOCYTES # BLD AUTO: 3.89 THOUSANDS/ÂΜL (ref 0.6–4.47)
LYMPHOCYTES NFR BLD AUTO: 47 % (ref 14–44)
MCH RBC QN AUTO: 31.9 PG (ref 26.8–34.3)
MCHC RBC AUTO-ENTMCNC: 33.4 G/DL (ref 31.4–37.4)
MCV RBC AUTO: 95 FL (ref 82–98)
MONOCYTES # BLD AUTO: 0.58 THOUSAND/ÂΜL (ref 0.17–1.22)
MONOCYTES NFR BLD AUTO: 7 % (ref 4–12)
NEUTROPHILS # BLD AUTO: 3.48 THOUSANDS/ÂΜL (ref 1.85–7.62)
NEUTS SEG NFR BLD AUTO: 43 % (ref 43–75)
NRBC BLD AUTO-RTO: 0 /100 WBCS
PLATELET # BLD AUTO: 285 THOUSANDS/UL (ref 149–390)
PMV BLD AUTO: 10.5 FL (ref 8.9–12.7)
POTASSIUM SERPL-SCNC: 3.8 MMOL/L (ref 3.5–5.3)
RBC # BLD AUTO: 3.89 MILLION/UL (ref 3.81–5.12)
SODIUM SERPL-SCNC: 138 MMOL/L (ref 135–147)
VIT B12 SERPL-MCNC: 225 PG/ML (ref 100–900)
WBC # BLD AUTO: 8.15 THOUSAND/UL (ref 4.31–10.16)

## 2023-02-03 NOTE — TELEPHONE ENCOUNTER
Spoke to patient using  #[id number], and let her know that she needs to get BMP and chromogranin A blood work done today as soon as she can, for her CT scan tomorrow  Patient verbalized understanding and states that she will go "as soon as she finishes waking up"

## 2023-02-04 ENCOUNTER — HOSPITAL ENCOUNTER (OUTPATIENT)
Dept: RADIOLOGY | Facility: HOSPITAL | Age: 63
Discharge: HOME | End: 2023-02-04
Attending: SURGERY

## 2023-02-04 DIAGNOSIS — Z85.89 ENCOUNTER FOR FOLLOW-UP SURVEILLANCE OF NEUROENDOCRINE CARCINOMA: ICD-10-CM

## 2023-02-04 DIAGNOSIS — Z08 ENCOUNTER FOR FOLLOW-UP SURVEILLANCE OF NEUROENDOCRINE CARCINOMA: ICD-10-CM

## 2023-02-04 RX ADMIN — IOHEXOL 100 ML: 350 INJECTION, SOLUTION INTRAVENOUS at 15:44

## 2023-02-07 LAB — CGA SERPL-MCNC: 91.1 NG/ML (ref 0–101.8)

## 2023-02-11 ENCOUNTER — APPOINTMENT (INPATIENT)
Dept: RADIOLOGY | Facility: HOSPITAL | Age: 63
End: 2023-02-11

## 2023-02-11 ENCOUNTER — HOSPITAL ENCOUNTER (INPATIENT)
Facility: HOSPITAL | Age: 63
LOS: 3 days | Discharge: HOME HEALTH | End: 2023-02-14
Attending: EMERGENCY MEDICINE | Admitting: ANESTHESIOLOGY

## 2023-02-11 DIAGNOSIS — R29.90 STROKE-LIKE SYMPTOMS: Primary | ICD-10-CM

## 2023-02-11 PROBLEM — I63.50 POSTERIOR CIRCULATION STROKE (HCC): Status: ACTIVE | Noted: 2019-07-18

## 2023-02-11 PROBLEM — I71.9 AORTIC ANEURYSM (HCC): Status: ACTIVE | Noted: 2023-02-11

## 2023-02-11 LAB
ANION GAP SERPL CALCULATED.3IONS-SCNC: 6 MMOL/L (ref 4–13)
APTT PPP: 32 SECONDS (ref 23–37)
ATRIAL RATE: 73 BPM
ATRIAL RATE: 81 BPM
BUN SERPL-MCNC: 21 MG/DL (ref 5–25)
CALCIUM SERPL-MCNC: 8.3 MG/DL (ref 8.3–10.1)
CARDIAC TROPONIN I PNL SERPL HS: 9 NG/L
CHLORIDE SERPL-SCNC: 103 MMOL/L (ref 96–108)
CO2 SERPL-SCNC: 27 MMOL/L (ref 21–32)
CREAT SERPL-MCNC: 1.57 MG/DL (ref 0.6–1.3)
ERYTHROCYTE [DISTWIDTH] IN BLOOD BY AUTOMATED COUNT: 12.7 % (ref 11.6–15.1)
FLUAV RNA RESP QL NAA+PROBE: NEGATIVE
FLUBV RNA RESP QL NAA+PROBE: NEGATIVE
GFR SERPL CREATININE-BSD FRML MDRD: 34 ML/MIN/1.73SQ M
GLUCOSE SERPL-MCNC: 106 MG/DL (ref 65–140)
GLUCOSE SERPL-MCNC: 145 MG/DL (ref 65–140)
HCT VFR BLD AUTO: 30.2 % (ref 34.8–46.1)
HGB BLD-MCNC: 10.1 G/DL (ref 11.5–15.4)
INR PPP: 1.08 (ref 0.84–1.19)
MCH RBC QN AUTO: 31.8 PG (ref 26.8–34.3)
MCHC RBC AUTO-ENTMCNC: 33.4 G/DL (ref 31.4–37.4)
MCV RBC AUTO: 95 FL (ref 82–98)
P AXIS: 116 DEGREES
PLATELET # BLD AUTO: 246 THOUSANDS/UL (ref 149–390)
PMV BLD AUTO: 10.5 FL (ref 8.9–12.7)
POTASSIUM SERPL-SCNC: 3.5 MMOL/L (ref 3.5–5.3)
PR INTERVAL: 186 MS
PROTHROMBIN TIME: 14.2 SECONDS (ref 11.6–14.5)
QRS AXIS: 196 DEGREES
QRS AXIS: 199 DEGREES
QRSD INTERVAL: 90 MS
QRSD INTERVAL: 90 MS
QT INTERVAL: 358 MS
QT INTERVAL: 420 MS
QTC INTERVAL: 415 MS
QTC INTERVAL: 462 MS
RBC # BLD AUTO: 3.18 MILLION/UL (ref 3.81–5.12)
RSV RNA RESP QL NAA+PROBE: NEGATIVE
SARS-COV-2 RNA RESP QL NAA+PROBE: NEGATIVE
SODIUM SERPL-SCNC: 136 MMOL/L (ref 135–147)
T WAVE AXIS: 116 DEGREES
T WAVE AXIS: 142 DEGREES
VENTRICULAR RATE: 73 BPM
VENTRICULAR RATE: 81 BPM
WBC # BLD AUTO: 9.13 THOUSAND/UL (ref 4.31–10.16)

## 2023-02-11 PROCEDURE — 3E03317 INTRODUCTION OF OTHER THROMBOLYTIC INTO PERIPHERAL VEIN, PERCUTANEOUS APPROACH: ICD-10-PCS | Performed by: EMERGENCY MEDICINE

## 2023-02-11 RX ORDER — LABETALOL HYDROCHLORIDE 5 MG/ML
10 INJECTION, SOLUTION INTRAVENOUS EVERY 4 HOURS PRN
Status: DISCONTINUED | OUTPATIENT
Start: 2023-02-11 | End: 2023-02-14 | Stop reason: HOSPADM

## 2023-02-11 RX ORDER — LABETALOL HYDROCHLORIDE 5 MG/ML
INJECTION, SOLUTION INTRAVENOUS
Status: COMPLETED
Start: 2023-02-11 | End: 2023-02-11

## 2023-02-11 RX ORDER — CHLORHEXIDINE GLUCONATE 0.12 MG/ML
15 RINSE ORAL EVERY 12 HOURS SCHEDULED
Status: DISCONTINUED | OUTPATIENT
Start: 2023-02-11 | End: 2023-02-12

## 2023-02-11 RX ORDER — LABETALOL HYDROCHLORIDE 5 MG/ML
20 INJECTION, SOLUTION INTRAVENOUS ONCE
Status: COMPLETED | OUTPATIENT
Start: 2023-02-11 | End: 2023-02-11

## 2023-02-11 RX ORDER — ONDANSETRON 2 MG/ML
4 INJECTION INTRAMUSCULAR; INTRAVENOUS EVERY 6 HOURS PRN
Status: DISCONTINUED | OUTPATIENT
Start: 2023-02-11 | End: 2023-02-14 | Stop reason: HOSPADM

## 2023-02-11 RX ORDER — SERTRALINE HYDROCHLORIDE 25 MG/1
25 TABLET, FILM COATED ORAL DAILY
Status: DISCONTINUED | OUTPATIENT
Start: 2023-02-12 | End: 2023-02-14 | Stop reason: HOSPADM

## 2023-02-11 RX ORDER — ACETAMINOPHEN 325 MG/1
650 TABLET ORAL EVERY 6 HOURS PRN
Status: DISCONTINUED | OUTPATIENT
Start: 2023-02-11 | End: 2023-02-14 | Stop reason: HOSPADM

## 2023-02-11 RX ORDER — PANTOPRAZOLE SODIUM 40 MG/1
40 TABLET, DELAYED RELEASE ORAL
Status: DISCONTINUED | OUTPATIENT
Start: 2023-02-12 | End: 2023-02-14 | Stop reason: HOSPADM

## 2023-02-11 RX ORDER — SODIUM CHLORIDE 9 MG/ML
125 INJECTION, SOLUTION INTRAVENOUS CONTINUOUS
Status: DISCONTINUED | OUTPATIENT
Start: 2023-02-11 | End: 2023-02-12

## 2023-02-11 RX ORDER — ATORVASTATIN CALCIUM 40 MG/1
40 TABLET, FILM COATED ORAL EVERY EVENING
Status: DISCONTINUED | OUTPATIENT
Start: 2023-02-11 | End: 2023-02-14 | Stop reason: HOSPADM

## 2023-02-11 RX ADMIN — NICARDIPINE HYDROCHLORIDE 2.5 MG/HR: 2.5 INJECTION, SOLUTION INTRAVENOUS at 22:08

## 2023-02-11 RX ADMIN — LABETALOL HYDROCHLORIDE 20 MG: 5 INJECTION, SOLUTION INTRAVENOUS at 22:03

## 2023-02-11 RX ADMIN — Medication 18 MG: at 22:05

## 2023-02-11 RX ADMIN — IOHEXOL 85 ML: 350 INJECTION, SOLUTION INTRAVENOUS at 21:09

## 2023-02-11 RX ADMIN — LABETALOL HYDROCHLORIDE 20 MG: 5 INJECTION, SOLUTION INTRAVENOUS at 21:25

## 2023-02-12 ENCOUNTER — APPOINTMENT (INPATIENT)
Dept: NON INVASIVE DIAGNOSTICS | Facility: HOSPITAL | Age: 63
End: 2023-02-12

## 2023-02-12 ENCOUNTER — APPOINTMENT (INPATIENT)
Dept: RADIOLOGY | Facility: HOSPITAL | Age: 63
End: 2023-02-12

## 2023-02-12 LAB
2HR DELTA HS TROPONIN: 2 NG/L
4HR DELTA HS TROPONIN: 6 NG/L
ANION GAP SERPL CALCULATED.3IONS-SCNC: 5 MMOL/L (ref 4–13)
AORTIC ROOT: 2.8 CM
AORTIC VALVE MEAN VELOCITY: 20 M/S
APICAL FOUR CHAMBER EJECTION FRACTION: 56 %
ASCENDING AORTA: 3 CM
AV AREA BY CONTINUOUS VTI: 1.1 CM2
AV AREA PEAK VELOCITY: 1.2 CM2
AV LVOT MEAN GRADIENT: 3 MMHG
AV LVOT PEAK GRADIENT: 5 MMHG
AV MEAN GRADIENT: 17 MMHG
AV PEAK GRADIENT: 28 MMHG
AV VALVE AREA: 1.1 CM2
AV VELOCITY RATIO: 0.41
BASOPHILS # BLD AUTO: 0.04 THOUSANDS/ÂΜL (ref 0–0.1)
BASOPHILS NFR BLD AUTO: 0 % (ref 0–1)
BUN SERPL-MCNC: 20 MG/DL (ref 5–25)
CALCIUM SERPL-MCNC: 8.6 MG/DL (ref 8.3–10.1)
CARDIAC TROPONIN I PNL SERPL HS: 10 NG/L
CARDIAC TROPONIN I PNL SERPL HS: 11 NG/L
CARDIAC TROPONIN I PNL SERPL HS: 15 NG/L
CHLORIDE SERPL-SCNC: 109 MMOL/L (ref 96–108)
CHOLEST SERPL-MCNC: 131 MG/DL
CO2 SERPL-SCNC: 24 MMOL/L (ref 21–32)
CREAT SERPL-MCNC: 1.26 MG/DL (ref 0.6–1.3)
DOP CALC AO PEAK VEL: 2.66 M/S
DOP CALC AO VTI: 63.07 CM
DOP CALC LVOT AREA: 2.83 CM2
DOP CALC LVOT DIAMETER: 1.9 CM
DOP CALC LVOT PEAK VEL VTI: 24.51 CM
DOP CALC LVOT PEAK VEL: 1.08 M/S
DOP CALC LVOT STROKE INDEX: 37.9 ML/M2
DOP CALC LVOT STROKE VOLUME: 69.46 CM3
E WAVE DECELERATION TIME: 283 MS
EOSINOPHIL # BLD AUTO: 0.11 THOUSAND/ÂΜL (ref 0–0.61)
EOSINOPHIL NFR BLD AUTO: 1 % (ref 0–6)
ERYTHROCYTE [DISTWIDTH] IN BLOOD BY AUTOMATED COUNT: 12.9 % (ref 11.6–15.1)
EST. AVERAGE GLUCOSE BLD GHB EST-MCNC: 105 MG/DL
FRACTIONAL SHORTENING: 32 % (ref 28–44)
GFR SERPL CREATININE-BSD FRML MDRD: 45 ML/MIN/1.73SQ M
GLUCOSE SERPL-MCNC: 106 MG/DL (ref 65–140)
HBA1C MFR BLD: 5.3 %
HCT VFR BLD AUTO: 29.2 % (ref 34.8–46.1)
HDLC SERPL-MCNC: 31 MG/DL
HGB BLD-MCNC: 9.6 G/DL (ref 11.5–15.4)
IMM GRANULOCYTES # BLD AUTO: 0.03 THOUSAND/UL (ref 0–0.2)
IMM GRANULOCYTES NFR BLD AUTO: 0 % (ref 0–2)
INTERVENTRICULAR SEPTUM IN DIASTOLE (PARASTERNAL SHORT AXIS VIEW): 1.4 CM
INTERVENTRICULAR SEPTUM: 1.4 CM (ref 0.6–1.1)
LAAS-AP2: 18.6 CM2
LAAS-AP4: 19.2 CM2
LDLC SERPL CALC-MCNC: 71 MG/DL (ref 0–100)
LEFT ATRIUM SIZE: 3.9 CM
LEFT INTERNAL DIMENSION IN SYSTOLE: 2.6 CM (ref 2.1–4)
LEFT VENTRICULAR INTERNAL DIMENSION IN DIASTOLE: 3.8 CM (ref 3.5–6)
LEFT VENTRICULAR POSTERIOR WALL IN END DIASTOLE: 1.3 CM
LEFT VENTRICULAR STROKE VOLUME: 38 ML
LVSV (TEICH): 38 ML
LYMPHOCYTES # BLD AUTO: 4.75 THOUSANDS/ÂΜL (ref 0.6–4.47)
LYMPHOCYTES NFR BLD AUTO: 49 % (ref 14–44)
MCH RBC QN AUTO: 31.9 PG (ref 26.8–34.3)
MCHC RBC AUTO-ENTMCNC: 32.9 G/DL (ref 31.4–37.4)
MCV RBC AUTO: 97 FL (ref 82–98)
MONOCYTES # BLD AUTO: 0.99 THOUSAND/ÂΜL (ref 0.17–1.22)
MONOCYTES NFR BLD AUTO: 10 % (ref 4–12)
MV E'TISSUE VEL-SEP: 6 CM/S
MV PEAK A VEL: 0.91 M/S
MV PEAK E VEL: 71 CM/S
MV STENOSIS PRESSURE HALF TIME: 82 MS
MV VALVE AREA P 1/2 METHOD: 2.68 CM2
NEUTROPHILS # BLD AUTO: 3.95 THOUSANDS/ÂΜL (ref 1.85–7.62)
NEUTS SEG NFR BLD AUTO: 40 % (ref 43–75)
NRBC BLD AUTO-RTO: 0 /100 WBCS
PLATELET # BLD AUTO: 227 THOUSANDS/UL (ref 149–390)
PMV BLD AUTO: 10.5 FL (ref 8.9–12.7)
POTASSIUM SERPL-SCNC: 3.4 MMOL/L (ref 3.5–5.3)
RBC # BLD AUTO: 3.01 MILLION/UL (ref 3.81–5.12)
RIGHT ATRIUM AREA SYSTOLE A4C: 15.1 CM2
RIGHT VENTRICLE ID DIMENSION: 3 CM
SL CV ECHO LV DYNAMIC OBSTRUCTION PEAK GRADIENT (VALSAL: 58 MMHG
SL CV LEFT ATRIUM LENGTH A2C: 5.5 CM
SL CV LV EF: 70
SL CV PED ECHO LEFT VENTRICLE DIASTOLIC VOLUME (MOD BIPLANE) 2D: 63 ML
SL CV PED ECHO LEFT VENTRICLE SYSTOLIC VOLUME (MOD BIPLANE) 2D: 26 ML
SODIUM SERPL-SCNC: 138 MMOL/L (ref 135–147)
TR MAX PG: 22 MMHG
TR PEAK VELOCITY: 2.3 M/S
TRICUSPID ANNULAR PLANE SYSTOLIC EXCURSION: 2.1 CM
TRICUSPID VALVE PEAK REGURGITATION VELOCITY: 2.33 M/S
TRIGL SERPL-MCNC: 147 MG/DL
WBC # BLD AUTO: 9.87 THOUSAND/UL (ref 4.31–10.16)

## 2023-02-12 RX ORDER — AMLODIPINE BESYLATE 5 MG/1
5 TABLET ORAL DAILY
Status: DISCONTINUED | OUTPATIENT
Start: 2023-02-12 | End: 2023-02-12

## 2023-02-12 RX ORDER — POTASSIUM CHLORIDE 20 MEQ/1
40 TABLET, EXTENDED RELEASE ORAL ONCE
Status: COMPLETED | OUTPATIENT
Start: 2023-02-12 | End: 2023-02-12

## 2023-02-12 RX ORDER — AMLODIPINE BESYLATE 10 MG/1
10 TABLET ORAL DAILY
Status: DISCONTINUED | OUTPATIENT
Start: 2023-02-13 | End: 2023-02-14 | Stop reason: HOSPADM

## 2023-02-12 RX ORDER — ASPIRIN 81 MG/1
81 TABLET, CHEWABLE ORAL DAILY
Status: DISCONTINUED | OUTPATIENT
Start: 2023-02-13 | End: 2023-02-14 | Stop reason: HOSPADM

## 2023-02-12 RX ADMIN — Medication 12.5 MG: at 08:37

## 2023-02-12 RX ADMIN — PANCRELIPASE 12000 UNITS: 30000; 6000; 19000 CAPSULE, DELAYED RELEASE PELLETS ORAL at 08:34

## 2023-02-12 RX ADMIN — ACETAMINOPHEN 650 MG: 325 TABLET, FILM COATED ORAL at 01:36

## 2023-02-12 RX ADMIN — CHLORHEXIDINE GLUCONATE 0.12% ORAL RINSE 15 ML: 1.2 LIQUID ORAL at 08:37

## 2023-02-12 RX ADMIN — Medication 12.5 MG: at 01:36

## 2023-02-12 RX ADMIN — CHLORHEXIDINE GLUCONATE 0.12% ORAL RINSE 15 ML: 1.2 LIQUID ORAL at 20:15

## 2023-02-12 RX ADMIN — ATORVASTATIN CALCIUM 40 MG: 40 TABLET, FILM COATED ORAL at 01:36

## 2023-02-12 RX ADMIN — PANTOPRAZOLE SODIUM 40 MG: 40 TABLET, DELAYED RELEASE ORAL at 05:18

## 2023-02-12 RX ADMIN — SODIUM CHLORIDE 125 ML/HR: 0.9 INJECTION, SOLUTION INTRAVENOUS at 01:22

## 2023-02-12 RX ADMIN — PANCRELIPASE 12000 UNITS: 30000; 6000; 19000 CAPSULE, DELAYED RELEASE PELLETS ORAL at 17:35

## 2023-02-12 RX ADMIN — IOHEXOL 85 ML: 350 INJECTION, SOLUTION INTRAVENOUS at 00:12

## 2023-02-12 RX ADMIN — ACETAMINOPHEN 650 MG: 325 TABLET, FILM COATED ORAL at 11:48

## 2023-02-12 RX ADMIN — PANCRELIPASE 12000 UNITS: 30000; 6000; 19000 CAPSULE, DELAYED RELEASE PELLETS ORAL at 11:51

## 2023-02-12 RX ADMIN — POTASSIUM CHLORIDE 40 MEQ: 1500 TABLET, EXTENDED RELEASE ORAL at 08:37

## 2023-02-12 RX ADMIN — ATORVASTATIN CALCIUM 40 MG: 40 TABLET, FILM COATED ORAL at 17:34

## 2023-02-12 RX ADMIN — CHLORHEXIDINE GLUCONATE 0.12% ORAL RINSE 15 ML: 1.2 LIQUID ORAL at 01:36

## 2023-02-12 RX ADMIN — NICARDIPINE HYDROCHLORIDE 5 MG/HR: 2.5 INJECTION, SOLUTION INTRAVENOUS at 01:07

## 2023-02-12 RX ADMIN — SERTRALINE 25 MG: 25 TABLET, FILM COATED ORAL at 08:37

## 2023-02-12 NOTE — ED NOTES
Jeramie Dickerson RN  02/11/23 [facility] Jane Han RN  02/12/23 [facility]

## 2023-02-12 NOTE — ASSESSMENT & PLAN NOTE
Assessment:  Patient is a 41-year-old right-handed Yakut-speaking female with a history of hypertension, hyperlipidemia, neuroendocrine tumor of pancreas that presented as a stroke alert on 2/11/23 at 2055 with an initial NIHSS of 11  Her last known normal was 30 minutes prior to being seen by EMS which is around 2000  Initial blood pressure 212/102  Initial glucose 162  Initial deficits: Status post syncopal episode, left gaze palsy, right upper and right lower extremity motor weakness, right-sided sensory loss (right V1 V2 V3, right upper extremity, right lower extremity)  Initial EKG demonstrated normal sinus rhythm with a rate in the 70s, patient does not take any antiplatelet agents or anticoagulation agents at her baseline  No ongoing contrast CT head was unremarkable  CTA head and neck demonstrated no large vessel occlusion, however a 1 cm saccular aneurysm at the aortic arch  The patient was deemed to be a tenecteplase candidate (patient was with an appropriate time window, demonstrated no obvious contraindications), tenecteplase was administered at 2205  Patient was thusly transferred to ICU for continued care after tenecteplase administration  With current work-up being unrevealing of any etiology of stroke, a routine EEG will be completed to screen for abnormal electrographic activity  Imaging:  - CTA dissection protocol: Penetrating ulcer versus saccular aneurysm as described above in the inferior portion of the aortic arch  Stable tiny pulmonary nodules  Thickening versus underdistention of the gastric wall which may represent gastritis  - MRI Brain wo: No acute intracranial abnormality  Mild to moderate scattered periventricular and subcortical foci of white matter T2 hyperintensity which is nonspecific and most likely related to chronic small vessel ischemic changes  Other less likely etiologies include demyelinating disease, vasculitis, Lyme and migraines    - TTE: Left ventricular cavity size is small  Wall thickness is moderately increased  There is moderate concentric hypertrophy  The left ventricular ejection fraction is 70% by visual estimation  Systolic function is vigorous  Wall motion is normal  Diastolic function is mildly abnormal, consistent with grade I (abnormal) relaxation  There is mid-cavity dynamic obstruction with valsalva with a peak gradient of 58 0 mmHg  Atrial Septum: There is a small and patent foramen ovale confirmed at rest with bidirectional shunting using saline contrast injection  Aortic Valve: The aortic valve is trileaflet  The leaflets are moderately thickened  There is mildly reduced mobility  There is mild stenosis  Compared to prior study dated 07/19/2019, the AS severity is similar; bubble study in present study reveals PFO; Valsalva provocation in present study reveals dynamic mid-cavitary obstructive gradient  EEG: normal 30 minutes awake and drowsy EEG    Scores:   - NIHSS: 11 (L gaze preference, R facial droop, LUE, LLE> RUE, RLE motor weakness, R sided sensory loss, mild dysarthria)  Stroke Modified Aston Score: 0 (No baseline symptoms/disability)   -ASPECT: 10    Studies:  - APTT 32  - PT 14 2, INR 1 08  - 4 Plex negative  - CBC mild anemia  - Potassium 3 4, BUN 20, creatinine 1 26  - Cholesterol 131, triglycerides 147, HDL 31, LDL 71  - Hemoglobin A1c 5 3  - Chromogranin A: 91 1  - Catecholamines fraction urinary pending    Impression: Considering current clinical presentation of continued right-sided V1 V2 V3, right upper extremity, right lower extremity paresthesias (numbness), and imaging thus far not suggestive of an acute infarct or large vessel occlusion a transient ischemic attack would not fit the criteria, stroke (even with negative imaging) cannot be ruled out in the setting that the embolus may have been dissolved with TNK administration however this would not explain residual right hand decided symptoms    CTA head and neck currently not being demonstrative of intracranial source  ABRIL demonstrating an LVEF of 70% with grade 1 diastolic dysfunction, and a small PFO  Thus far work-up has not been demonstrative of an etiology for the vascular event    A routine EEG has been normal  Presenting deficit likely functional     Plan, Stroke pathway sp thrombolysis:  - rEEG - normal  - Goal BP: Normotension  - Continue ASA 81mg PO QD  - Start Vitamin B12 1000mcg PO QD  - Start Thiamine 100mg PO ID  - STAT CTH wo contrast in case of change in mental status, severe headache, acute increase in BP, N/V or if GCS drops 2pts in 1hr   - Continue Neuro checks  - Continue telemetry monitoring  - Continue Normothermia  - Maintain glucose <180, SSI for coverage if indicated  - Hepatin DVT ppx  - PT/OT/Speech/PM&R evals; repeat to assess if pt still qualifies for SNF

## 2023-02-12 NOTE — SPEECH THERAPY NOTE
Consult received and chart reviewed  Per chart review and discussion with RN, pt passed RN dysphagia assessment and is tolerating regular diet with thin liquids with no s/s of aspiration  Speech/Language deficits also denied  Formal speech/swallow evaluation does not appear to be indicated at this time  If new concerns arise, please reconsult  Thank you  Per notes, patient admitted with right side weakness  Patient on stroke pathway  Received TNK and symptoms have totally resolved

## 2023-02-12 NOTE — QUICK NOTE
Stroke Alert- Neurology Overnight Note    Patient seen and examined by the overnight resident as a stroke alert  Preliminary recommendations are as outlined below  Formal consultation to follow in the morning and patient will be seen by the Neurology service  Name: David Miller   Age & Sex: 60 y o  female   MRN: [id number]  Unit/Bed#: ED 21   Encounter: [id number]  Length of Stay: 0    Assessment plan:    No new Assessment & Plan notes have been filed under this hospital service since the last note was generated  Service: Neurology      Recommendations for outpatient neurological follow up have yet to be determined  Pending for discharge: s/p TNK, will require ICU level of care  Subjective:    Reason for Consult / Principal Problem:  stroke alert  Hx and PE limited by: none     HPI:   Ms David Miller is a 60 y o  right handed femalev w/ HTN, HLD, neuroendocrine tumor of pancreas who presented as a stroke alert on 2/11/23 at 8:55PM  LKW 30 mins prior to presentation  Initial deficits L gaze palsy, RUE, RLE motor weakness, R sided sensory loss  Thinners: none  Initial BP: 212/102  Glucose 162  EKG NSR, rate 70s (EMS)  NIH 11 (L gaze preference, R facial droop, L arm drift, L leg drift, R arm drift, R leg drift, sensory loss RUE, RLE, mild dysarthria)  [address] [place] unremarkable  CTA h/n showed no LVO, but a 1cm saccular aneurysm at the aortic arch  Pt was found to be a TNK candidate and w/o obvious contraindication TNK was administered at 10:06 PM      As per discussion w/ her daughter: patient was in her usual state of health at [place] [address] on 2/10/23   She complained of dizziness, occipital headache on 2/10 PM,     Consults    Historical Information   Past Medical History:   Diagnosis Date   • Back ache    • Cancer ([address] )    • Hyperlipidemia    • Hypertension      Past Surgical History:   Procedure Laterality Date   • APPENDECTOMY     • BILATERAL OOPHORECTOMY     • BREAST SURGERY Right     partial mastectomy    • COLONOSCOPY     • HERNIA REPAIR     • HYSTERECTOMY     • LAPAROTOMY N/A 2021    Procedure: LAPAROTOMY EXPLORATORY;  Surgeon: Yana Arias MD;  Location: [facility];  Service: Surgical Oncology   • MASTECTOMY Right     partial   • WHIPPLE PROCEDURE/PANCREATICO-DUODENECTOMY N/A 2021    Procedure: WHIPPLE PROCEDURE/PANCREATICO-DUODENECTOMY;  Surgeon: Yana Arias MD;  Location: [facility];  Service: Surgical Oncology     Social History   Social History     Substance and Sexual Activity   Alcohol Use Not Currently     Social History     Substance and Sexual Activity   Drug Use No     E-Cigarette/Vaping   • E-Cigarette Use Never User      E-Cigarette/Vaping Substances   • Nicotine No    • THC No    • CBD No    • Flavoring No    • Other No    • Unknown No      Social History     Tobacco Use   Smoking Status Former   • Packs/day: 0 65   • Years: 30 00   • Pack years: 19 50   • Types: Cigarettes   Smokeless Tobacco Never   Tobacco Comments    quit 15 years ago     Family History:   Family History   Problem Relation Age of Onset   • Hypertension Mother    • Heart disease Mother    • Diabetes Mother    • Cancer Father      Meds/Allergies   all current active meds have been reviewed  No Known Allergies    Review of previous medical records was completed as available  ROS: see HPI    Objective:     Patient ID: Esperanza Garcia is a 60 y o  female  Vitals:   Vitals:    23 2140 235 23   BP: 161/70 167/74 (!) 174/73    Pulse: 76 74 74 74   Resp: 16 15 17 15   Temp:       TempSrc:       SpO2: 99% 99% 99% 99%   Weight:          Body mass index is 28 24 kg/m²  No intake or output data in the 24 hours ending 23    Temperature:   Temp (24hrs), Av 8 °F (36 6 °C), Min:97 8 °F (36 6 °C), Max:97 8 °F (36 6 °C)    Temperature: 97 8 °F (36 6 °C)    Invasive Devices:    Invasive Devices     Peripheral Intravenous Line  Duration Peripheral IV 23 Distal;Left;Upper;Ventral (anterior) Arm <1 day                Physical Exam     Neurologic Exam       *** Delete this line once neuro exam completed  NIHSS:  1a Level of Consciousness: 0 = Alert   1b  LOC Questions: 0 = Answers both correctly   1c  LOC Commands: 0 = Obeys both correctly   2  Best Gaze: 1 = Partial Gaze Palsy (L gaze preference, can be overcome)   3  Visual: 0 = No visual field loss b/l blink to threat intact    4  Facial Palsy: 2=Partial paralysis (total or near total paralysis of the lower face)- R facial droop   5a  Motor Right Arm: 1=Drift, limb holds 90 (or 45) degrees but drifts down before full 10 seconds: does not hit bed   5b  Motor Left Arm: 2=Some effort against gravity, limb cannot get to or maintain (if cured) 90 (or 45) degrees, drifts down to bed, but has some effort against gravity   6a  Motor Right Le=No drift, limb holds 90 (or 45) degrees for full 10 seconds   6b  Motor Left Leg: 3=No effort against gravity, limb falls   7  Limb Ataxia:  0=Absent   8  Sensory: 1=Mild to moderate sensory loss; patient feels pinprick is less sharp or is dull on the affected side; there is a loss of superficial pain with pinprick but patient is aware She is being touched (decreased on RUE, RLE)    9  Best Language:  0=No aphasia, normal   10  Dysarthria: 1=Mild to moderate, patient slurs at least some words and at worst, can be understood with some difficulty   11   Extinction and Inattention (formerly Neglect): 0=No abnormality   Total Score: 10     Time NIHSS was completed: 9:20 PM    Modified Alton Score:  {Stroke Modified Sequoyah Score:70601}    Labs: {Results Review Statement:83036}  Results from last 7 days   Lab Units 23   WBC Thousand/uL 9 13   HEMOGLOBIN g/dL 10 1*   HEMATOCRIT % 30 2*   PLATELETS Thousands/uL 246      Results from last 7 days   Lab Units 23   POTASSIUM mmol/L 3 5   CHLORIDE mmol/L 103   CO2 mmol/L 27   BUN mg/dL 21 CREATININE mg/dL 1 57*   CALCIUM mg/dL 8 3              Results from last 7 days   Lab Units 02/11/23 2122   INR  1 08   PTT seconds 32               Imaging and diagnostic studies:    Radiology Results: {Results Review Statement:22904}  CTA stroke alert (head/neck)   Final Result by Liya Soto DO (02/11 2153)      No evidence of large vessel occlusion  If symptoms persist MRI should be obtained      Focal saccular aneurysm versus penetrating ulcer of the aortic arch measuring 1 cm  Follow-up with vascular is recommended  I personally discussed this study with Dr Lopez Hood on 2/11/2023 at 9:50 PM                                Workstation performed: [id number]         CT stroke alert brain   Final Result by Liya Soto DO (02/11 2120)      No acute intracranial abnormality  I personally discussed this study with Dr Lopez Hood on 2/11/2023 at 9:18 PM                 Workstation performed: [id number]             Other Diagnostic Testing: I have personally reviewed pertinent reports  and I have personally reviewed pertinent films in PACS    Active medications:  Current Facility-Administered Medications   Medication Dose Route Frequency   • labetalol (NORMODYNE) 5 mg/mL injection **ADS Override Pull**       • labetalol (NORMODYNE) 5 mg/mL injection **ADS Override Pull**       • labetalol (NORMODYNE) injection 20 mg  20 mg Intravenous Once   • niCARdipine (CARDENE) 25 mg (STANDARD CONCENTRATION) in sodium chloride 0 9% 250 mL  1-15 mg/hr Intravenous Titrated   • tenecteplase (TNKase) 50 mg injection **ADS Override Pull**       • tenecteplase (TNKase) injection 18 mg  0 25 mg/kg Intravenous Once       Prior to Admission medications    Medication Sig Start Date End Date Taking? Authorizing Provider   amLODIPine (NORVASC) 10 mg tablet Take 1 tablet (10 mg total) by mouth in the morning   5/13/22   Dipika Daniels MD   atorvastatin (LIPITOR) 40 mg tablet Take 1 tablet (40 mg total) by mouth every evening 5/13/22   Marcel Daniesl MD   cholestyramine Sterling Jones) 4 GM/DOSE powder Take 1 packet (4 g total) by mouth in the morning and 1 packet (4 g total) at noon and 1 packet (4 g total) in the evening  Take with meals  5/13/22   Dipika Daniels MD   clobetasol (TEMOVATE) 0 05 % ointment Apply twice a day to rash on hands for 2 weeks  Then, apply twice a day from Monday-Friday for another 2 weeks  Avoid the face and groin 6/16/21   Ervin Joe MD   hydrOXYzine HCL (ATARAX) 25 mg tablet Take 25 mg by mouth daily at bedtime as needed 11/23/19   Historical Provider, MD   lisinopril (ZESTRIL) 40 mg tablet Take 1 tablet (40 mg total) by mouth in the morning  5/13/22   Dipika Daniels MD   pancrelipase, Lip-Prot-Amyl, (CREON) 12,000 units capsule Take 12,000 units of lipase by mouth 3 (three) times a day with meals 7/25/22   Ruib Lerner MD   pancrelipase, Lip-Prot-Amyl, (CREON) 6,000 units delayed release capsule Take 2 capsules (12,000 Units total) by mouth 3 (three) times a day with meals 7/25/22   Rubi Lerner MD   pantoprazole (PROTONIX) 40 mg tablet take 1 tablet by mouth daily IN THE EARLY MORNING 6/28/22   Marcel Daniels MD   potassium chloride (K-DUR,KLOR-CON) 10 mEq tablet Take 2 tablets (20 mEq total) by mouth in the morning  5/13/22   Marcel Daniels MD   sertraline (ZOLOFT) 25 mg tablet Take 1 tablet (25 mg total) by mouth in the morning   5/13/22   Marcel Daniels MD   traZODone (DESYREL) 50 mg tablet take 1/2 tablet by mouth at bedtime if needed for insomnia 4/9/21   Historical Provider, MD   vitamin B-12 (CYANOCOBALAMIN) 500 MCG TABS Take 1 tablet (500 mcg total) by mouth daily 5/26/22   Zia Agee PA-C         Code status and advanced directives:  Code Status: Prior    ==  MD aCrolee Jiang [facility] Neurology Residency, PGY-II

## 2023-02-12 NOTE — ED NOTES
IV insertion attempted x4, verified c Lala Sapp MD that 1IV is okay     [address], [place]  02/12/23 0004

## 2023-02-12 NOTE — PHYSICAL THERAPY NOTE
Physical Therapy Evaluation     [facility]: [facility]    Admitting Diagnosis  Stroke [facility]) [I63 9]  Stroke-like symptoms [R29 90]    Problem List  Patient Active Problem List   Diagnosis    Acute low back pain due to trauma    Benign hypertension with CKD (chronic kidney disease), stage II    Vision disturbance    Noncompliance with medications    Posterior circulation stroke (HCC)    Incidental pulmonary nodule    Hyperlipemia    Tarsal tunnel syndrome, left    Pancreatic mass    Leukocytosis    Depression    Goals of care, counseling/discussion    Stage 2 chronic kidney disease    History of neuroendocrine cancer    Personal history of breast cancer    Anemia    Diarrhea    Hypertension    H/O Whipple procedure    Pancreatic insufficiency    Encounter for follow-up surveillance of neuroendocrine carcinoma    Aortic aneurysm [facility])       Past Medical History  Past Medical History:   Diagnosis Date    Back ache     Cancer ([address] )     Hyperlipidemia     Hypertension        Past Surgical History  Past Surgical History:   Procedure Laterality Date    APPENDECTOMY      BILATERAL OOPHORECTOMY      BREAST SURGERY Right     partial mastectomy     COLONOSCOPY      HERNIA REPAIR      HYSTERECTOMY      LAPAROTOMY N/A 8/24/2021    Procedure: LAPAROTOMY EXPLORATORY;  Surgeon: Isidro Morel MD;  Location: [facility];  Service: Surgical Oncology    MASTECTOMY Right     partial    WHIPPLE PROCEDURE/PANCREATICO-DUODENECTOMY N/A 8/24/2021    Procedure: WHIPPLE PROCEDURE/PANCREATICO-DUODENECTOMY;  Surgeon: Isidro Morel MD;  [facility]: [facility];  Service: Surgical Oncology          02/12/23 1141   PT Last Visit   PT Visit Date 02/12/23   Note Type   Note type Evaluation   Pain Assessment   Pain Assessment Tool 0-10   Pain Score 4   Pain Location/Orientation Location: Head   Hospital Pain Intervention(s) Repositioned; Ambulation/increased activity; Emotional support   Restrictions/Precautions   Weight Bearing Precautions Per Order No   Other Precautions Cognitive; Bed Alarm; Chair Alarm; Impulsive;Multiple lines; Fall Risk;Pain  (Surinamese speaking)   Home Living   Type of [address] Two level;Bed/bath upstairs  (1-2 SIMÓN)   Bathroom Shower/Tub Tub/shower unit   Bathroom Toilet Standard   Additional Comments pt resides with her  in a 2 SH   is not able to provide assistance, requires 24/7 assist from [facility] per pt's family report   Prior Function   Level of [place] Independent with ADLs; Independent with functional mobility   Lives With Spouse   IADLs Independent with driving; Independent with meal prep; Independent with medication management   Falls in the last 6 months 0   Vocational Retired   General   Family/Caregiver Present Yes  (family present to provide translation)   Cognition   Overall Cognitive Status Impaired   Attention Attends with cues to redirect   Orientation Level Oriented X4   Following Commands Follows one step commands with increased time or repetition   Comments pt with flat affect, cooperative to participate in therapy session  Increased time required to complete all tasks; impulsive with mobility  May have slight R sided inattention   RLE Assessment   RLE Assessment   (functionally 4-/5)   LLE Assessment   LLE Assessment   (functionally 4+/5)   Bed Mobility   Supine to Sit 5  Supervision   Sit to Supine 5  Supervision   Additional Comments pt supine in bed upon arrival  pt returned to supine in bed with all needs wihtin reach   Transfers   Sit to Stand   (CGA)   Additional items Assist x 1; Increased time required;Verbal cues   Stand to Sit   (CGA)   Additional items Assist x 1;Verbal cues   Additional Comments transfers without AD; mild R lateral lean noted   Ambulation/Elevation   Gait pattern Excessively slow; Short stride;Decreased foot clearance   Gait Assistance 4  Minimal assist  (2x lateral LOB to R)   Additional items Assist x 1;Verbal cues   Assistive Device None Distance 80ft   Stair Management Assistance Not tested   Balance   Static Sitting Fair +   Dynamic Sitting Fair +   Static Standing Fair   Dynamic Standing Fair -   Ambulatory Poor +   Activity Tolerance   Activity Tolerance Patient tolerated treatment well   Medical Staff Made Aware OT; co-eval performed this date 2* increased medical complexity and multiple co-morbidities   Nurse Made Aware RN cleared pt to particiapte in therapy session   Assessment   Prognosis Good   Problem List Decreased strength;Decreased endurance; Impaired balance;Decreased mobility; Decreased cognition; Impaired judgement;Decreased safety awareness;Pain   Assessment Pt is a 60 y o  female seen for PT evaluation s/p admit to [facility] on 2/11/2023  Pt was admitted with a primary dx of: posterior circulation stroke with c/o HA  PT now consulted for assessment of mobility and d/c needs  Pt with Up with assistance orders  Pts current comorbidities effecting treatment include: HTN with CKD, depression, hx of neuroendocrine cancer, h/o whipple procedure, aortic aneurysm  Pts current clinical presentation is Unstable/ Unpredictable (high complexity) due to Ongoing medical management for primary dx, Increased reliance on more restrictive AD compared to baseline, Decreased activity tolerance compared to baseline, Fall risk, Increased assistance needed from caregiver at current time, Ongoing telemetry monitoring, Cog status, Trending lab values, Continuous pulse oximetry monitoring   Prior to admission, pt was residing with spouse (who requires 24/7 HHA- unable to provide assistance) in 2 level home with 1 SIMÓN and bed/ bath upstairs; independent at baseline with no DME use  Upon evaluation, pt currently is requiring S level for bed mobility; CGA for transfers; min A for ambulation 80 ft w/ occasional HHA required to correct lateral LOB   Pt presents at PT eval functioning below baseline and currently w/ overall mobility deficits 2* to: RLE weakness, decreased ROM, impaired balance, decreased endurance, impaired coordination, gait deviations, pain, decreased activity tolerance compared to baseline, decreased functional mobility tolerance compared to baseline, decreased safety awareness, impaired judgement, fall risk  Pt currently at a fall risk 2* to impairments listed above  Pt will continue to benefit from skilled acute PT interventions to address stated impairments; to maximize functional mobility; for ongoing pt/ family training; and DME needs  At conclusion of PT session pt returned BTB with phone and call bell within reach  Pt denies any further questions at this time  The patient's AM-PAC Basic Mobility Inpatient Short Form Raw Score is 17  A Raw score of greater than 16 suggests the patient may benefit from discharge to home  Please also refer to the recommendation of the Physical Therapist for safe discharge planning  Recommend acute STR vs Home with OPPT upon hospital D/C  Barriers to Discharge Inaccessible home environment;Decreased caregiver support   Goals   Patient Goals none stated   STG Expiration Date 02/26/23   Short Term Goal #1 STG 1  Pt will be able to perform bed mobility tasks with mod I level in order to improve overall functional mobility and assist in safe d/c  STG 2  Pt with sit EOB for at least 25 minutes at mod I level in order to strengthen abdominal musculature and assist in future transfers/ ambulation  STG 3  Pt will be able to perform functional transfer with mod I level in order to improve overall functional mobility and assist in safe d/c  STG 4  Pt will be able to ambulate at least 250 feet with least restrictive device with mod I level A in order to improve overall functional mobility and assist in safe d/c  STG 5  Pt will improve sitting/standing static/dynamic balance 1/2 grade in order to improve functional mobility and assist in safe d/c  STG 6   Pt will improve LE strength by 1/2 grade in order to improve functional mobility and assist in safe d/c  STG 7  Pt will be able to negotiate at least 12 stairs with least restrictive device with mod I level A in order to improve overall functional mobility and assist in safe d/c    PT Treatment Day 0   Plan   Treatment/Interventions ADL retraining;Functional transfer training;LE strengthening/ROM; Endurance training;Patient/family training;Equipment eval/education; Bed mobility;Gait training;Spoke to nursing;Spoke to case management;OT   PT Frequency 3-5x/wk   Recommendation   PT Discharge Recommendation Post acute rehabilitation services  (acute STR vs OPPT pending continued progress)   AM-PAC Basic Mobility Inpatient   Turning in Flat Bed Without Bedrails 3   Lying on Back to Sitting on Edge of Flat Bed Without Bedrails 3   Moving Bed to Chair 3   Standing Up From Chair Using Arms 3   Walk in Room 3   Climb 3-5 Stairs With Railing 2   Basic Mobility Inpatient Raw Score 17   Basic Mobility Standardized Score 39 67   Highest Level Of Mobility   JH-HLM Goal 5: Stand one or more mins   JH-HLM Achieved 7: Walk 25 feet or more   Modified Alton Scale   Modified Alton Scale 4   Barthel Index   Feeding 5   Bathing 0   Grooming Score 0   Dressing Score 5   Bladder Score 10   Bowels Score 10   Toilet Use Score 5   Transfers (Bed/Chair) Score 10   Mobility (Level Surface) Score 10   Stairs Score 0   Barthel Index Score 55         Homero Chairez, PT DPT

## 2023-02-12 NOTE — CONSULTS
NEUROLOGY RESIDENCY CONSULT NOTE     Name: Jaya   Age & Sex: 60 y o  female   MRN: [id number]  Unit/Bed#: ICU 13   Encounter: [id number]  Length of Stay: 1    Jaya will need follow up in in 4 weeks with neurovascular AP  She will not require outpatient neurological testing at the time of writing    Pending for discharge: Per ICU    ASSESSMENT & PLAN     * Stroke-like symptoms  Assessment & Plan  Assessment:  Patient is a 30-year-old right-handed Occitan-speaking female with a history of hypertension, hyperlipidemia, neuroendocrine tumor of pancreas that presented as a stroke alert on 2/11/23 at 2055 with an initial NIHSS of 11  Her last known normal was 30 minutes prior to being seen by EMS which is around 2000  Initial blood pressure 212/102  Initial glucose 162  Initial deficits: Status post syncopal episode, left gaze palsy, right upper and right lower extremity motor weakness, right-sided sensory loss (right V1 V2 V3, right upper extremity, right lower extremity)  Initial EKG demonstrated normal sinus rhythm with a rate in the 70s, patient does not take any antiplatelet agents or anticoagulation agents at her baseline  No ongoing contrast CT head was unremarkable  CTA head and neck demonstrated no large vessel occlusion, however a 1 cm saccular aneurysm at the aortic arch  The patient was deemed to be a tenecteplase candidate (patient was with an appropriate time window, demonstrated no obvious contraindications), tenecteplase was administered at 2205  Patient was thusly transferred to ICU for continued care after tenecteplase administration  Imaging:  - CTA dissection protocol: Penetrating ulcer versus saccular aneurysm as described above in the inferior portion of the aortic arch  Stable tiny pulmonary nodules  Thickening versus underdistention of the gastric wall which may represent gastritis  - MRI Brain wo: No acute intracranial abnormality   Mild to moderate scattered periventricular and subcortical foci of white matter T2 hyperintensity which is nonspecific and most likely related to chronic small vessel ischemic changes  Other less likely etiologies include demyelinating disease, vasculitis, Lyme and migraines  - TTE Pending    Scores:   - NIHSS: 11 (L gaze preference, R facial droop, LUE, LLE> RUE, RLE motor weakness, R sided sensory loss, mild dysarthria)  Stroke Modified [place] Score: 0 (No baseline symptoms/disability)   -ASPECT: 10    Studies:  - APTT 32  - PT 14 2, INR 1 08  - 4 Plex negative  - CBC mild anemia  - Potassium 3 4, BUN 20, creatinine 1 26  - Cholesterol 131, triglycerides 147, HDL 31, LDL 71  - Hemoglobin A1c 5 3  - VMA urine 24 hours pending  - Catecholamine plus VMA 24 hours urine pending  - Metanephrines fractionated urine 24 hours pending  - 5 HIAA urine quantitative 24 hours pending  - Catecholamines fraction urinary pending    Impression: Considering current clinical presentation of continued right-sided V1 V2 V3, right upper extremity, right lower extremity paresthesias (numbness), and imaging thus far not suggestive of an acute infarct or large vessel occlusion a transient ischemic attack would not fit the criteria, stroke (even with negative imaging) cannot be ruled out in the setting that the embolus may have been dissolved with TNK administration however this would not explain residual right hand decided symptoms  With CTA head and neck currently not being demonstrative of intracranial source of embolus a cardioembolic source needs to be further investigated  Plan, Stroke pathway sp thrombolysis:   - Under ICU care, on stroke pathway  - Goal BP<180/105 for 24 hrs s/p thrombolysis    - No AP, AC agents for 24 hrs s/p thrombolysis  - Repeat CTH 24 hours after TNK   - TTE pending  - STAT CTH wo contrast in case of change in mental status, severe headache, acute increase in BP, N/V or if GCS drops 2pts in 1hr   - Neuro checks as per ICU  - Continue telemetry monitoring   - Maintain glucose <180, SSI for coverage if indicated  - No chemical DVT prophylaxis for 24 hours  SCDs only   - PT/OT/Speech/PM&R evals  - Medical management as per primary team     SUBJECTIVE     Reason for Consult / Principal Problem: Stroke  Hx and PE limited by: None    HPI:  Patient is a 57-year-old right-handed Czech-speaking female with a history of hypertension, hyperlipidemia, neuroendocrine tumor of pancreas who presented as a stroke alert on 2/11/23 at 2055  Her last known well was 30 minutes prior to being seen by EMS which was 2000  Per daughter the patient complained of an occipital headache, dizziness in the evening of 2/10/23 which she attributed to fatigue working all day  The patient subsequently went to bed and was seen to be "completely normal" by the daughter walking around, and cooking on  2/10/23 at 1000, and was seen "normal" per the daughter walking around and cooking at her baseline at 2000 on 2/11/2023  A few minutes past 2100 the patient had a syncopal episode, that prompted the hospital visit  No seizure-like activity was noted  Her initial deficits: Patient was status post syncopal episode, left gaze palsy, right upper extremity, right lower extremity motor weakness, right-sided sensory loss  Initial blood pressure was 212/112  Glucose was 162  EKG was normal sinus rhythm in the 70s per EMS  Noncontrast CT head unremarkable, CTA head and neck demonstrated no LVO however a 1 cm saccular aneurysm at the aortic arch was demonstrated  In the ED the patient remained persistently hypertensive and required a nicardipine drip to maintain blood pressure less than 185/110  The patient was found to be a tenecteplase candidate and without any obvious contraindication tenecteplase was administered at 2205  The patient was seen as a stroke alert    NIHSS 11 (+1 partial gaze palsy, left gaze palsy, Overcome, +2 partial paralysis, total or near-total paralysis of the lower face, right lower facial droop, +1 right arm drift, +2 left arm some effort against gravity, +3 left leg no effort against gravity, +1 mild to moderate sensory loss on right, +1 dysarthria mild to moderate) CT stroke alert brain demonstrated no acute intracranial abnormality  CT stroke alert  Demonstrated no evidence of LVO  A focal saccular aneurysm versus penetrating ulcer of the aortic arch measuring 1 cm was seen  Vascular follow-up is recommended  CTA dissection protocol demonstrated penetrating ulcer versus saccular aneurysm as described in the inferior portion of the aortic arch  Demonstrated stable tiny pulmonary nodules  Demonstrated thickening versus distention of the gastric wall, which may represent gastritis  MRI brain without contrast demonstrated no intracranial abnormality  Mild to moderate scattered periventricular and subcortical foci of white matter on T2 hyperintensity which is nonspecific and most likely related to chronic small vessel ischemic changes  Other less likely etiologies include demyelinating disease, vasculitis, Lyme, migraines  The patient does not take any "thinners" at baseline  PTT 32, PT 14 2, INR 1 08, BUN 21, creatinine 1 57, 4 Plex negative, cholesterol 131, triglycerides 147, HDL 31, LDL 71, hemoglobin A1c 5 3      Inpatient consult to Neurology  Consult performed by: Particia Apley, DO  Consult ordered by: Hellen Schirmer, MD        Historical Information   Past Medical History:   Diagnosis Date   • Back ache    • Cancer [facility])    • Hyperlipidemia    • Hypertension      Past Surgical History:   Procedure Laterality Date   • APPENDECTOMY     • BILATERAL OOPHORECTOMY     • BREAST SURGERY Right     partial mastectomy    • COLONOSCOPY     • HERNIA REPAIR     • HYSTERECTOMY     • LAPAROTOMY N/A 8/24/2021    Procedure: LAPAROTOMY EXPLORATORY;  Surgeon: Ari So MD;  Location: [facility];  Service: Surgical Oncology   • MASTECTOMY Right     partial   • WHIPPLE PROCEDURE/PANCREATICO-DUODENECTOMY N/A 8/24/2021    Procedure:  WHIPPLE PROCEDURE/PANCREATICO-DUODENECTOMY;  Surgeon: Jennifer Chase MD;  Location: [facility];  Service: Surgical Oncology     Social History   Social History     Substance and Sexual Activity   Alcohol Use Not Currently     Social History     Substance and Sexual Activity   Drug Use No     E-Cigarette/Vaping   • E-Cigarette Use Never User      E-Cigarette/Vaping Substances   • Nicotine No    • THC No    • CBD No    • Flavoring No    • Other No    • Unknown No      Social History     Tobacco Use   Smoking Status Former   • Packs/day: 0 65   • Years: 30 00   • Pack years: 19 50   • Types: Cigarettes   Smokeless Tobacco Never   Tobacco Comments    quit 15 years ago     Family History:   Family History   Problem Relation Age of Onset   • Hypertension Mother    • Heart disease Mother    • Diabetes Mother    • Cancer Father      Meds/Allergies   all current active meds have been reviewed, current meds:   Current Facility-Administered Medications   Medication Dose Route Frequency   • acetaminophen (TYLENOL) tablet 650 mg  650 mg Oral Q6H PRN   • atorvastatin (LIPITOR) tablet 40 mg  40 mg Oral QPM   • chlorhexidine (PERIDEX) 0 12 % oral rinse 15 mL  15 mL Mouth/Throat Q12H [facility]   • labetalol (NORMODYNE) injection 10 mg  10 mg Intravenous Q4H PRN   • metoprolol tartrate (LOPRESSOR) partial tablet 12 5 mg  12 5 mg Oral Q12H [facility]   • niCARdipine (CARDENE) 25 mg (STANDARD CONCENTRATION) in sodium chloride 0 9% 250 mL  1-15 mg/hr Intravenous Titrated   • ondansetron (ZOFRAN) injection 4 mg  4 mg Intravenous Q6H PRN   • pancrelipase (Lip-Prot-Amyl) (CREON) delayed release capsule 12,000 Units  12,000 Units Oral TID With Meals   • pantoprazole (PROTONIX) EC tablet 40 mg  40 mg Oral Early Morning   • sertraline (ZOLOFT) tablet 25 mg  25 mg Oral Daily   • sodium chloride 0 9 % infusion  125 mL/hr Intravenous Continuous      and PTA meds:   Prior to Admission Medications   Prescriptions Last Dose Informant Patient Reported? Taking? amLODIPine (NORVASC) 10 mg tablet   No No   Sig: Take 1 tablet (10 mg total) by mouth in the morning  atorvastatin (LIPITOR) 40 mg tablet   No No   Sig: Take 1 tablet (40 mg total) by mouth every evening   cholestyramine (QUESTRAN) 4 GM/DOSE powder   No No   Sig: Take 1 packet (4 g total) by mouth in the morning and 1 packet (4 g total) at noon and 1 packet (4 g total) in the evening  Take with meals  clobetasol (TEMOVATE) 0 05 % ointment   No No   Sig: Apply twice a day to rash on hands for 2 weeks  Then, apply twice a day from Monday-Friday for another 2 weeks  Avoid the face and groin   hydrOXYzine HCL (ATARAX) 25 mg tablet   Yes No   Sig: Take 25 mg by mouth daily at bedtime as needed   lisinopril (ZESTRIL) 40 mg tablet   No No   Sig: Take 1 tablet (40 mg total) by mouth in the morning  pancrelipase, Lip-Prot-Amyl, (CREON) 12,000 units capsule   No No   Sig: Take 12,000 units of lipase by mouth 3 (three) times a day with meals   pancrelipase, Lip-Prot-Amyl, (CREON) 6,000 units delayed release capsule   No No   Sig: Take 2 capsules (12,000 Units total) by mouth 3 (three) times a day with meals   pantoprazole (PROTONIX) 40 mg tablet   No No   Sig: take 1 tablet by mouth daily IN THE EARLY MORNING   potassium chloride (K-DUR,KLOR-CON) 10 mEq tablet   No No   Sig: Take 2 tablets (20 mEq total) by mouth in the morning  sertraline (ZOLOFT) 25 mg tablet   No No   Sig: Take 1 tablet (25 mg total) by mouth in the morning  traZODone (DESYREL) 50 mg tablet   Yes No   Sig: take 1/2 tablet by mouth at bedtime if needed for insomnia   vitamin B-12 (CYANOCOBALAMIN) 500 MCG TABS   No No   Sig: Take 1 tablet (500 mcg total) by mouth daily      Facility-Administered Medications: None     No Known Allergies    Review of previous medical records was completed      Review of Systems   Constitutional: Negative for chills and fever  HENT: Negative for ear pain and sore throat  Eyes: Negative for pain and visual disturbance  Respiratory: Negative for cough and shortness of breath  Cardiovascular: Negative for chest pain and palpitations  Gastrointestinal: Negative for abdominal pain, constipation, diarrhea, nausea and vomiting  Genitourinary: Negative for dysuria and hematuria  Musculoskeletal: Negative for arthralgias, back pain, neck pain and neck stiffness  Skin: Negative for color change and rash  Neurological: Positive for syncope and numbness  Negative for dizziness, tremors, seizures, facial asymmetry, speech difficulty, weakness, light-headedness and headaches  Psychiatric/Behavioral: Negative for agitation, behavioral problems, confusion, decreased concentration, dysphoric mood, hallucinations, self-injury, sleep disturbance and suicidal ideas  The patient is not nervous/anxious and is not hyperactive  OBJECTIVE     Patient ID: Mayra Aguilar is a 60 y o  female  Vitals:   Vitals:    23 1148 23 1200 23 1210 23 1216   BP: 123/67 95/69     BP Location:  Right arm     Pulse: 60 60     Resp: 17 (!) 23     Temp:  98 °F (36 7 °C)     TempSrc:  Oral     SpO2: 99% 99%     Weight:       Height:   5' 3" (1 6 m) 5' 3" (1 6 m)      Body mass index is 26 24 kg/m²  Intake/Output Summary (Last 24 hours) at 2023 1323  Last data filed at 2023 1200  Gross per 24 hour   Intake 770 ml   Output --   Net 770 ml     Temperature:   Temp (24hrs), Av 8 °F (36 6 °C), Min:97 6 °F (36 4 °C), Max:98 °F (36 7 °C)    Temperature: 98 °F (36 7 °C)    Invasive Devices: Invasive Devices     Peripheral Intravenous Line  Duration           Peripheral IV 23 Distal;Left;Upper;Ventral (anterior) Arm <1 day              Physical Exam  Vitals and nursing note reviewed  HENT:      Head: Normocephalic        Nose: Nose normal       Mouth/Throat:      Mouth: Mucous membranes are moist       Pharynx: Oropharynx is clear  No oropharyngeal exudate  Eyes:      Extraocular Movements: Extraocular movements intact and EOM normal       Conjunctiva/sclera: Conjunctivae normal       Pupils: Pupils are equal, round, and reactive to light  Cardiovascular:      Rate and Rhythm: Normal rate  Pulmonary:      Effort: Pulmonary effort is normal    Abdominal:      General: There is no distension  Musculoskeletal:         General: Normal range of motion  Cervical back: Normal range of motion  Skin:     General: Skin is warm  Capillary Refill: Capillary refill takes less than 2 seconds  Neurological:      Mental Status: She is alert  Cranial Nerves: Cranial nerve deficit present  Sensory: Sensory deficit present  Coordination: Coordination normal       Deep Tendon Reflexes: Reflexes normal    Psychiatric:         Mood and Affect: Mood normal          Speech: Speech normal         Neurologic Exam     Mental Status   Oriented to person  Oriented to place  Oriented to year, month and date  Follows 2 step commands  Attention: normal  Concentration: normal    Speech: speech is normal   Level of consciousness: alert    Cranial Nerves     CN II   Right visual field deficit: none  Left visual field deficit: none     CN III, IV, VI   Pupils are equal, round, and reactive to light  Extraocular motions are normal    Right pupil: Size: 3 mm  Shape: regular  Reactivity: brisk  Accommodation: intact  Left pupil: Size: 3 mm  Shape: regular  Reactivity: brisk  Accommodation: intact     CN III: no CN III palsy  CN VI: no CN VI palsy  Nystagmus: none   Diplopia: none    CN V   Right facial sensation deficit: complete  Left facial sensation deficit: none    CN VII   Right facial weakness: none  Left facial weakness: none    CN VIII   Hearing: intact    CN IX, X   Palate: symmetric    CN XI   Right sternocleidomastoid strength: normal  Left sternocleidomastoid strength: normal  Right trapezius strength: normal  Left trapezius strength: normal    CN XII   Tongue: not atrophic  Fasciculations: absent  Tongue deviation: none    Motor Exam   Muscle bulk: normal  Overall muscle tone: normal  Right arm pronator drift: absent  Left arm pronator drift: absent  Right leg tone: normal  Left leg tone: normalPatient was able to elevate all 4 extremities for more than 10 seconds without difficulty  Sensory Exam   Patient reported that on the right V1 V2 V3, entire right upper extremity, entire right lower extremity that sensation is decreased compared to the contralateral side  Gait, Coordination, and Reflexes   Gait examination was deferred  Finger-to-nose and heel-to-shin was normal bilaterally  No resting tremor or action tremor noticed  Reflexes were 2+ bilaterally in brachioradialis, biceps, triceps, patellar, Achilles  Plantar reflexes were downgoing bilaterally no Bingham's was present bilaterally  No clonus was not noticed bilaterally  LABORATORY DATA     Labs: I have personally reviewed pertinent reports  Results from last 7 days   Lab Units 02/12/23 0440 02/11/23 2122   WBC Thousand/uL 9 87 9 13   HEMOGLOBIN g/dL 9 6* 10 1*   HEMATOCRIT % 29 2* 30 2*   PLATELETS Thousands/uL 227 246   NEUTROS PCT % 40*  --    MONOS PCT % 10  --       Results from last 7 days   Lab Units 02/12/23  0440 02/11/23 2122   POTASSIUM mmol/L 3 4* 3 5   CHLORIDE mmol/L 109* 103   CO2 mmol/L 24 27   BUN mg/dL 20 21   CREATININE mg/dL 1 26 1 57*   CALCIUM mg/dL 8 6 8 3              Results from last 7 days   Lab Units 02/11/23 2122   INR  1 08   PTT seconds 32               IMAGING & DIAGNOSTIC TESTING     Radiology Results: I have personally reviewed pertinent reports  MRI brain wo contrast   Final Result by Angely Yang MD (02/12 0141)      No acute intracranial abnormality        Mild to moderate scattered periventricular and subcortical foci of white matter T2 hyperintensity which is nonspecific and most likely related to chronic small vessel ischemic changes  Other less likely etiologies include demyelinating disease,    vasculitis, Lyme and migraines  Workstation performed: [id number]         CTA dissection protocol chest/abdomen/pelvis   Final Result by Aria Zhang DO (02/12 2397)      Penetrating ulcer versus saccular aneurysm as described above in the inferior portion of the aortic arch  Stable tiny pulmonary nodules  Thickening versus underdistention of the gastric wall which may represent gastritis  Workstation performed: [id number]         XR chest portable   Final Result by Kevin Jones MD (02/12 0930)      No acute cardiopulmonary disease  Workstation performed: [id number]         CTA stroke alert (head/neck)   Final Result by Aria Zhang DO (02/11 2153)      No evidence of large vessel occlusion  If symptoms persist MRI should be obtained      Focal saccular aneurysm versus penetrating ulcer of the aortic arch measuring 1 cm  Follow-up with vascular is recommended  I personally discussed this study with Dr Michelle Rojas on 2/11/2023 at 9:50 PM                                Workstation performed: [id number]         CT stroke alert brain   Final Result by Arai Zhang DO (02/11 2120)      No acute intracranial abnormality  I personally discussed this study with Dr Michelle Rojas on 2/11/2023 at 9:18 PM                 Workstation performed: [id number]         CT head wo contrast    (Results Pending)     Other Diagnostic Testing: I have personally reviewed pertinent reports        ACTIVE MEDICATIONS     Current Facility-Administered Medications   Medication Dose Route Frequency   • acetaminophen (TYLENOL) tablet 650 mg  650 mg Oral Q6H PRN   • atorvastatin (LIPITOR) tablet 40 mg  40 mg Oral QPM   • chlorhexidine (PERIDEX) 0 12 % oral rinse 15 mL  15 mL Mouth/Throat Q12H [address]   • labetalol (NORMODYNE) injection 10 mg 10 mg Intravenous Q4H PRN   • metoprolol tartrate (LOPRESSOR) partial tablet 12 5 mg  12 5 mg Oral Q12H [address]   • niCARdipine (CARDENE) 25 mg (STANDARD CONCENTRATION) in sodium chloride 0 9% 250 mL  1-15 mg/hr Intravenous Titrated   • ondansetron (ZOFRAN) injection 4 mg  4 mg Intravenous Q6H PRN   • pancrelipase (Lip-Prot-Amyl) (CREON) delayed release capsule 12,000 Units  12,000 Units Oral TID With Meals   • pantoprazole (PROTONIX) EC tablet 40 mg  40 mg Oral Early Morning   • sertraline (ZOLOFT) tablet 25 mg  25 mg Oral Daily   • sodium chloride 0 9 % infusion  125 mL/hr Intravenous Continuous     Prior to Admission medications    Medication Sig Start Date End Date Taking? Authorizing Provider   amLODIPine (NORVASC) 10 mg tablet Take 1 tablet (10 mg total) by mouth in the morning  5/13/22   Teodoro Daniels MD   atorvastatin (LIPITOR) 40 mg tablet Take 1 tablet (40 mg total) by mouth every evening 5/13/22   Teodoro Daniels MD   cholestyramine [facility]) 4 GM/DOSE powder Take 1 packet (4 g total) by mouth in the morning and 1 packet (4 g total) at noon and 1 packet (4 g total) in the evening  Take with meals  5/13/22   Dipika Daniels MD   clobetasol (TEMOVATE) 0 05 % ointment Apply twice a day to rash on hands for 2 weeks  Then, apply twice a day from Monday-Friday for another 2 weeks  Avoid the face and groin 6/16/21   Vick Lacy MD   hydrOXYzine HCL (ATARAX) 25 mg tablet Take 25 mg by mouth daily at bedtime as needed 11/23/19   Historical Provider, MD   lisinopril (ZESTRIL) 40 mg tablet Take 1 tablet (40 mg total) by mouth in the morning   5/13/22   Dipika Daniels MD   pancrelipase, Lip-Prot-Amyl, (CREON) 12,000 units capsule Take 12,000 units of lipase by mouth 3 (three) times a day with meals 7/25/22   Ari So MD   pancrelipase, Lip-Prot-Amyl, (CREON) 6,000 units delayed release capsule Take 2 capsules (12,000 Units total) by mouth 3 (three) times a day with meals 7/25/22   Ari So MD pantoprazole (PROTONIX) 40 mg tablet take 1 tablet by mouth daily IN THE EARLY MORNING 6/28/22   Juani Daniels MD   potassium chloride (K-DUR,KLOR-CON) 10 mEq tablet Take 2 tablets (20 mEq total) by mouth in the morning  5/13/22   Juani Daniels MD   sertraline (ZOLOFT) 25 mg tablet Take 1 tablet (25 mg total) by mouth in the morning   5/13/22   Juani Daniels MD   traZODone (DESYREL) 50 mg tablet take 1/2 tablet by mouth at bedtime if needed for insomnia 4/9/21   Historical Provider, MD   vitamin B-12 (CYANOCOBALAMIN) 500 MCG TABS Take 1 tablet (500 mcg total) by mouth daily 5/26/22   Mar Denson PA-C     CODE STATUS & ADVANCED DIRECTIVES     Code Status: Level 1 - Full Code  Advance Directive and Living Will:      Power of :    POLST:      VTE Pharmacologic Prophylaxis: Pharmacologic VTE Prophylaxis contraindicatS/P TNKdue to S/P TNK  VTE Mechanical Prophylaxis: sequential compression device    ==  DO Carolee Chilel 72 Neurology Residency, PGY-2

## 2023-02-12 NOTE — QUICK NOTE
Post TNK exam in ICU:   · R facial droop resolved  · No gaze preference  · RUE, LUE: able to hold up for full 10s, no drift  · LUE, RLE: able to hold up for full 5s, no drift  · sensory: RUE, RLE diminished as compared to LUE, LLE  R side described as 90% as sharp to pinprick as L side         LEONORA Patricia , [address] [place] [address] Neurology Residency

## 2023-02-12 NOTE — CASE MANAGEMENT
Case Management Assessment & Discharge Planning Note    Patient name Mayra Aguilar  Location ICU 13/ICU 13 MRN [id number]  : 1960 Date 2023       Current Admission Date: 2023  Current Admission Diagnosis:Posterior circulation stroke [facility])   Patient Active Problem List    Diagnosis Date Noted   • Aortic aneurysm ([facility] [address] ) 2023   • Encounter for follow-up surveillance of neuroendocrine carcinoma 2022   • Pancreatic insufficiency 2021   • Diarrhea 2021   • Hypertension 2021   • H/O Whipple procedure 2021   • Personal history of breast cancer 11/15/2021   • Anemia 11/15/2021   • History of neuroendocrine cancer 2021   • Stage 2 chronic kidney disease 2021   • Pancreatic mass 2021   • Leukocytosis 2021   • Depression 2021   • Goals of care, counseling/discussion 2021   • Tarsal tunnel syndrome, left    • Hyperlipemia 2019   • Posterior circulation stroke ([facility] [address] [facility] ) 2019   • Incidental pulmonary nodule 2019   • Noncompliance with medications 2018   • Acute low back pain due to trauma 2018   • Benign hypertension with CKD (chronic kidney disease), stage II 10/27/2017   • Vision disturbance 10/27/2017      LOS (days): 1  Geometric Mean LOS (GMLOS) (days):   Days to GMLOS:     OBJECTIVE:    Risk of Unplanned Readmission Score: 10 67         Current admission status: Inpatient  Referral Reason: Stroke    Preferred Pharmacy:   [address] [place] [address], [phone] [address]  Phone: [phone] Fax: [phone] [address] [facility] [address]  Phone: [phone] Fax: [phone]    Primary Care Provider: Chuy Zeng MD    Primary Insurance: [address] [place] [address]  Secondary Insurance:     ASSESSMENT:  Daniele Collins Proxies    There are no active Health Care Proxies on file  Advance Directives  Does patient have a Health Care POA?: Yes  Does patient have Advance Directives?: Yes  Advance Directives: Living will, Power of  for health care  Primary Contact: Sally Closs (Daughter)         Readmission Root Cause  30 Day Readmission: No    Patient Information  Admitted from[de-identified] Home  Mental Status: Alert  During Assessment patient was accompanied by: Daughter  Assessment information provided by[de-identified] Daughter  Primary Caregiver: Self  Support Systems: Self, Spouse/significant other, Children, Family members  [place] of Residence: [address] [place] [address] [facility] you [facility]   Select all that apply : Stairs  Number of steps to enter home : 6  Do the steps have railings?: Yes  Type of Current Residence: 2 story home  Upon entering residence, is there a bedroom on the main floor (no further steps)?: No  A bedroom is located on the following floor levels of residence (select all that apply):: 2nd Floor  Upon entering residence, is there a bathroom on the main floor (no further steps)?: No  Indicate which floors of current residence have a bathroom (select all the apply):: 2nd Floor  Number of steps to 2nd floor from main floor: One Flight  In the last 12 months, was there a time when you were not able to pay the mortgage or rent on time?: No  In the last 12 months, how many places have you lived?: 1  In the last 12 months, was there a time when you did not have a steady place to sleep or slept in a shelter (including now)?: No  Homeless/housing insecurity resource given?: N/A  Living Arrangements: Lives w/ Spouse/significant other  Is patient a ?: No    Activities of Daily Living Prior to Admission  Functional Status: Independent  Completes ADLs independently?: Yes  Ambulates independently?: Yes  Does patient use assisted devices?: No  Does patient currently own DME?: No  Does patient have a history of Outpatient Therapy (PT/OT)?: No  Does the patient have a history of Short-Term Rehab?: No  Does patient have a history of HHC?: No  Does patient currently have Burt Cline?: No         Patient Information Continued  Income Source: SSI/SSD  Does patient have prescription coverage?: Yes (No issues with getting or affording her medications)  Within the past 12 months, you worried that your food would run out before you got the money to buy more : Never true  Within the past 12 months, the food you bought just didn't last and you didn't have money to get more : Never true  Food insecurity resource given?: N/A  Does patient receive dialysis treatments?: No  Does patient have a history of substance abuse?: No  Does patient have a history of Mental Health Diagnosis?: Yes (Depression)  Is patient receiving treatment for mental health?: Yes  Has patient received inpatient treatment related to mental health in the last 2 years?: No         Means of Transportation  Means of Transport to Appts[de-identified] Family transport  In the past 12 months, has lack of transportation kept you from medical appointments or from getting medications?: No  In the past 12 months, has lack of transportation kept you from meetings, work, or from getting things needed for daily living?: No  Was application for public transport provided?: N/A        DISCHARGE DETAILS:    Discharge planning discussed with[de-identified] Daughter Ivan Barker and family members  Freedom of Choice: Yes  Comments - Freedom of Choice: Discussed plan for PT/OT to see patient when medically appropriate  CM contacted family/caregiver?: Yes  Were Treatment Team discharge recommendations reviewed with patient/caregiver?: Yes          Contacts  Patient Contacts: Yolanda Gutierrez (Daughter)  Relationship to Patient[de-identified] Family  Contact Method: Phone  Phone Number: [phone]  Reason/Outcome: Discharge Planning, Emergency Contact, Continuity of Care                 CM Assessment completed with daughter Ivan Barker    CM name and role reviewed  Patient lives at home with her  in a 2 story apartment  Prior to admission patient was receving [facility] services for HHA daily from 7am -3pm       Patient has no HX of VNA or STR  Patient is COVID vaccinated X2 and boosted X3  CM explained that we would get recommendations from the entire team (including PT and OT) and make discharge plans at this point  Family expressed understanding of this  CM reviewed discharge planning process including the following: identifying caregivers at home, preference for d/c planning needs, [facility] Meds to Bed program, availability of treatment team to discuss questions or concerns patient and/or family may have regarding diagnosis, plan of care, old or new medications and discharge planning  CM will continue to follow for care coordination and update assessment as necessary

## 2023-02-12 NOTE — ED PROVIDER NOTES
History  Chief Complaint   Patient presents with   • STROKE Alert     Dizzy, CP, R sided numbness/weakness     HPI     79-year-old female with past medical history of hypertension who presents for evaluation of weakness  Patient is primarily Croatian-speaking, history obtained using  and daughter as   Patient states she had a headache earlier today  She states approximately 1 hour ago, she felt very lightheaded and almost passed out  She was noted to have a left-sided facial droop and right arm and leg numbness and weakness per EMS  Initially, patient was unable to answer any questions  History is limited secondary to patient not answering questions and language barrier  Prior to Admission Medications   Prescriptions Last Dose Informant Patient Reported? Taking? amLODIPine (NORVASC) 10 mg tablet   No No   Sig: Take 1 tablet (10 mg total) by mouth in the morning  atorvastatin (LIPITOR) 40 mg tablet   No No   Sig: Take 1 tablet (40 mg total) by mouth every evening   cholestyramine (QUESTRAN) 4 GM/DOSE powder   No No   Sig: Take 1 packet (4 g total) by mouth in the morning and 1 packet (4 g total) at noon and 1 packet (4 g total) in the evening  Take with meals  clobetasol (TEMOVATE) 0 05 % ointment   No No   Sig: Apply twice a day to rash on hands for 2 weeks  Then, apply twice a day from Monday-Friday for another 2 weeks  Avoid the face and groin   hydrOXYzine HCL (ATARAX) 25 mg tablet   Yes No   Sig: Take 25 mg by mouth daily at bedtime as needed   lisinopril (ZESTRIL) 40 mg tablet   No No   Sig: Take 1 tablet (40 mg total) by mouth in the morning     pancrelipase, Lip-Prot-Amyl, (CREON) 12,000 units capsule   No No   Sig: Take 12,000 units of lipase by mouth 3 (three) times a day with meals   pancrelipase, Lip-Prot-Amyl, (CREON) 6,000 units delayed release capsule   No No   Sig: Take 2 capsules (12,000 Units total) by mouth 3 (three) times a day with meals   pantoprazole (PROTONIX) 40 mg tablet   No No   Sig: take 1 tablet by mouth daily IN THE EARLY MORNING   potassium chloride (K-DUR,KLOR-CON) 10 mEq tablet   No No   Sig: Take 2 tablets (20 mEq total) by mouth in the morning  sertraline (ZOLOFT) 25 mg tablet   No No   Sig: Take 1 tablet (25 mg total) by mouth in the morning  traZODone (DESYREL) 50 mg tablet   Yes No   Sig: take 1/2 tablet by mouth at bedtime if needed for insomnia   vitamin B-12 (CYANOCOBALAMIN) 500 MCG TABS   No No   Sig: Take 1 tablet (500 mcg total) by mouth daily      Facility-Administered Medications: None       Past Medical History:   Diagnosis Date   • Back ache    • Cancer ([facility] [address] )    • Hyperlipidemia    • Hypertension        Past Surgical History:   Procedure Laterality Date   • APPENDECTOMY     • BILATERAL OOPHORECTOMY     • BREAST SURGERY Right     partial mastectomy    • COLONOSCOPY     • HERNIA REPAIR     • HYSTERECTOMY     • LAPAROTOMY N/A 8/24/2021    Procedure: LAPAROTOMY EXPLORATORY;  Surgeon: Gloria Levin MD;  Location: [facility];  Service: Surgical Oncology   • MASTECTOMY Right     partial   • WHIPPLE PROCEDURE/PANCREATICO-DUODENECTOMY N/A 8/24/2021    Procedure: WHIPPLE PROCEDURE/PANCREATICO-DUODENECTOMY;  Surgeon: Gloria Levin MD;  Location: [facility];  Service: Surgical Oncology       Family History   Problem Relation Age of Onset   • Hypertension Mother    • Heart disease Mother    • Diabetes Mother    • Cancer Father      I have reviewed and agree with the history as documented      E-Cigarette/Vaping   • E-Cigarette Use Never User      E-Cigarette/Vaping Substances   • Nicotine No    • THC No    • CBD No    • Flavoring No    • Other No    • Unknown No      Social History     Tobacco Use   • Smoking status: Former     Packs/day: 0 65     Years: 30 00     Pack years: 19 50     Types: Cigarettes   • Smokeless tobacco: Never   • Tobacco comments:     quit 15 years ago   Vaping Use   • Vaping Use: Never used   Substance Use Topics   • Alcohol use: Not Currently   • Drug use: No        Review of Systems   Unable to perform ROS: Patient nonverbal       Physical Exam  ED Triage Vitals   Temperature Pulse Respirations Blood Pressure SpO2   02/11/23 2110 02/11/23 2055 02/11/23 2055 02/11/23 2055 02/11/23 2055   97 8 °F (36 6 °C) 80 17 (!) 211/93 100 %      Temp Source Heart Rate Source Patient Position - Orthostatic VS BP Location FiO2 (%)   02/11/23 2110 02/11/23 2055 -- -- --   Oral Monitor         Pain Score       --                    Orthostatic Vital Signs  Vitals:    02/11/23 2140 02/11/23 2145 02/11/23 2150 02/11/23 2155   BP: 161/70 167/74 (!) 174/73    Pulse: 76 74 74 74       Physical Exam  Vitals and nursing note reviewed  Constitutional:       General: She is not in acute distress  Appearance: She is well-developed and normal weight  She is not ill-appearing, toxic-appearing or diaphoretic  HENT:      Head: Normocephalic and atraumatic  Right Ear: External ear normal       Left Ear: External ear normal       Nose: Nose normal       Mouth/Throat:      Mouth: Mucous membranes are moist       Pharynx: Oropharynx is clear  Eyes:      Extraocular Movements: Extraocular movements intact  Conjunctiva/sclera: Conjunctivae normal    Cardiovascular:      Rate and Rhythm: Normal rate and regular rhythm  Pulses: Normal pulses  Heart sounds: Normal heart sounds  No murmur heard  No friction rub  No gallop  Pulmonary:      Effort: Pulmonary effort is normal  No respiratory distress  Breath sounds: Normal breath sounds  No wheezing or rales  Abdominal:      General: There is no distension  Palpations: Abdomen is soft  Tenderness: There is no abdominal tenderness  There is no guarding or rebound  Musculoskeletal:         General: No tenderness  Cervical back: Neck supple  Right lower leg: No edema  Left lower leg: No edema  Skin:     General: Skin is warm and dry        Coloration: Skin is not pale  Findings: No erythema or rash  Neurological:      Mental Status: She is alert  Cranial Nerves: Cranial nerve deficit present  Sensory: Sensory deficit present  Motor: Weakness present  Comments: Drift in right arm and right leg  Some drift in left leg but may be due to effort  Slight left sided facial droop  ED Medications  Medications   tenecteplase (TNKase) 50 mg injection **ADS Override Pull** (has no administration in time range)   labetalol (NORMODYNE) 5 mg/mL injection **ADS Override Pull** (has no administration in time range)   niCARdipine (CARDENE) 25 mg (STANDARD CONCENTRATION) in sodium chloride 0 9% 250 mL (2 5 mg/hr Intravenous New Bag 2/11/23 2208)   tenecteplase (TNKase) injection 18 mg (has no administration in time range)   labetalol (NORMODYNE) injection 20 mg (has no administration in time range)   labetalol (NORMODYNE) 5 mg/mL injection **ADS Override Pull** (has no administration in time range)   iohexol (OMNIPAQUE) 350 MG/ML injection (SINGLE-DOSE) 85 mL (85 mL Intravenous Given 2/11/23 2109)       Diagnostic Studies  Results Reviewed     Procedure Component Value Units Date/Time    HS Troponin I 2hr [[id number]]     Lab Status: No result Specimen: Blood     HS Troponin 0hr (reflex protocol) [[id number]]  (Normal) Collected: 02/11/23 2122    Lab Status: Final result Specimen: Blood from Arm, Left Updated: 02/11/23 2154     hs TnI 0hr 9 ng/L     FLU/RSV/COVID - if FLU/RSV clinically relevant [[id number]] Collected: 02/11/23 2122    Lab Status:  In process Specimen: Nares from Nose Updated: 02/11/23 9321    Basic metabolic panel [[id number]]  (Abnormal) Collected: 02/11/23 2122    Lab Status: Final result Specimen: Blood from Arm, Left Updated: 02/11/23 2145     Sodium 136 mmol/L      Potassium 3 5 mmol/L      Chloride 103 mmol/L      CO2 27 mmol/L      ANION GAP 6 mmol/L      BUN 21 mg/dL      Creatinine 1 57 mg/dL      Glucose 106 mg/dL Calcium 8 3 mg/dL      eGFR 34 ml/min/1 73sq m     Narrative:      [place] guidelines for Chronic Kidney Disease (CKD):   •  Stage 1 with normal or high GFR (GFR > 90 mL/min/1 73 square meters)  •  Stage 2 Mild CKD (GFR = 60-89 mL/min/1 73 square meters)  •  Stage 3A Moderate CKD (GFR = 45-59 mL/min/1 73 square meters)  •  Stage 3B Moderate CKD (GFR = 30-44 mL/min/1 73 square meters)  •  Stage 4 Severe CKD (GFR = 15-29 mL/min/1 73 square meters)  •  Stage 5 End Stage CKD (GFR <15 mL/min/1 73 square meters)  Note: GFR calculation is accurate only with a steady state creatinine    Protime-INR [[id number]]  (Normal) Collected: 02/11/23 2122    Lab Status: Final result Specimen: Blood from Arm, Left Updated: 02/11/23 2142     Protime 14 2 seconds      INR 1 08    APTT [[id number]]  (Normal) Collected: 02/11/23 2122    Lab Status: Final result Specimen: Blood from Arm, Left Updated: 02/11/23 2142     PTT 32 seconds     CBC and Platelet [[id number]]  (Abnormal) Collected: 02/11/23 2122    Lab Status: Final result Specimen: Blood from Arm, Left Updated: 02/11/23 2136     WBC 9 13 Thousand/uL      RBC 3 18 Million/uL      Hemoglobin 10 1 g/dL      Hematocrit 30 2 %      MCV 95 fL      MCH 31 8 pg      MCHC 33 4 g/dL      RDW 12 7 %      Platelets 025 Thousands/uL      MPV 10 5 fL     Fingerstick Glucose (POCT) [[id number]]  (Abnormal) Collected: 02/11/23 2101    Lab Status: Final result Updated: 02/11/23 2116     POC Glucose 145 mg/dl                  CTA stroke alert (head/neck)   Final Result by Armani Mac DO (02/11 2153)      No evidence of large vessel occlusion  If symptoms persist MRI should be obtained      Focal saccular aneurysm versus penetrating ulcer of the aortic arch measuring 1 cm  Follow-up with vascular is recommended               I personally discussed this study with Dr Shane Laird on 2/11/2023 at 9:50 PM                                Workstation performed: [id number]         CT stroke alert brain   Final Result by Garret Diaz DO (02/11 2120)      No acute intracranial abnormality  I personally discussed this study with Dr Keira Herrera on 2/11/2023 at 9:18 PM                 Workstation performed: [id number]               Procedures  Procedures      ED Course                                       MDM     58-year-old female with past medical history of hypertension who presents for evaluation of weakness starting this evening about one hour ago  Patient noted to have right upper and lower extremity drift, she has aphasia and mild right-sided facial droop  Due to new neurologic symptoms onset less than 24 hours ago, prehospital stroke alert activated  Patient went straight to CT scan  CT head noncontrast negative for bleed  CTA negative for large vessel occlusion  Initial NIH 11  Discussed with neurology, given significant symptoms and patient being within window for TNK, will discuss with patient and her daughter about getting TNK  Patient is hypertensive, will give 20 mg labetalol to bring blood pressure below 185/110  Neurology discussed with patient and her daughter about TNK, patient's daughter is agreeable, consent obtained  Patient given TNK  Reviewed labs, patient has mild anemia, hemoglobin 10 1  No need for transfusion at this time  BMP shows mild YULIYA  Discussed with critical care for admission status post thrombolytics  Will place patient on a Cardene drip for blood pressure control, blood pressure goals less than 180/105       Disposition  Final diagnoses:   Stroke-like symptoms     Time reflects when diagnosis was documented in both MDM as applicable and the Disposition within this note     Time User Action Codes Description Comment    2/11/2023  8:55 PM Felice Jack Add [R29 90] Stroke-like symptoms       ED Disposition     None      Follow-up Information    None         Patient's Medications   Discharge Prescriptions    No medications on file     No discharge procedures on file  PDMP Review     None           ED Provider  Attending physically available and evaluated Jaya  I managed the patient along with the ED Attending      Electronically Signed by         Sawyer Galdamez MD  02/17/23 0739

## 2023-02-12 NOTE — QUICK NOTE
Stroke Alert- Neurology Overnight Note    Patient seen and examined by the overnight resident as a stroke alert  Preliminary recommendations are as outlined below  Formal consultation to follow in the morning and patient will be seen by the Neurology service  Name: Jaya   Age & Sex: 60 y o  female   MRN: [id number]  Unit/Bed#: ED 21   Encounter: [id number]  Length of Stay: 0    Assessment plan:    * Posterior circulation stroke [facility])  Assessment & Plan  · 60 y o  R handed Indonesian speaking femalev w/ HTN, HLD, neuroendocrine tumor of pancreas who presented as a stroke alert on 2/11/23 at 8:55PM w/ NIHSS 11  LKW 30 mins prior to being seen by EMS (8PM)  Initial BP: 212/102  Glucose 162  Initial deficits: s/p syncopal episode, L gaze palsy, RUE, RLE motor weakness, R sided sensory loss  · EKG NSR, rate 70s  No APA/AC use at baseline  · [address] [place] unremarkable  CTA h/n showed no LVO, but a 1cm saccular aneurysm at the aortic arch  · Deemed to be a TNK candidate (w/inappropriate time window, no obvious contraindications); TNK was given at 10:05PM      Scores:   - NIHSS: 11 (L gaze preference, R facial droop, LUE, LLE> RUE, RLE motor weakness, R sided sensory loss, mild dysarthria)  Stroke Modified Alton Score: 0 (No baseline symptoms/disability)   -ASPECT: 10    Impression: Patient's syx concerning for posterior circulation stroke, particularly in the rostral medulla  High suspicion for central embolic etiology  Plan, Stroke pathway sp thrombolysis:   • Discussed w/ attending physician, Dr Izaiah Braswell  • Admit to stroke pathway   • Goal BP<180/105 for 24 hrs s/p thrombolysis    • No AP, AC agents for 24 hrs s/p thrombolysis  • Repeat CTH 24 hours after TNK   • STAT CTH wo contrast in case of change in mental status, severe headache, acute increase in BP, N/V or if GCS drops 2pts in 1hr   • Obtain Lipid panel, A1c, echo, MRI brain wo contrast  • Neuro checks- q 15 mins x 2 hours then q 30 mins x 6 hours then q1 hour x 24 hours  • Telemetry monitoring   • Glucose <180, SSI for coverage if indicated  • DVT ppx- none for 24 hours; SCDs only  • PT/OT/Speech/PM&R evals  • Medical management as per primary team       Recommendations for outpatient neurological follow up have yet to be determined  Pending for discharge: ICU level of care, s/p TNK  Subjective:    Reason for Consult / Principal Problem:  stroke alert  Hx and PE limited by: none     HPI:   Ms Kassie Carrera is a 60 y o  right handed Tajik speaking femalev w/ HTN, HLD, neuroendocrine tumor of pancreas who presented as a stroke alert on 2/11/23 at 8:55PM  LKW 30 mins prior to being seen by EMS (8PM)  Initial deficits: s/p syncopal episode, L gaze palsy, RUE, RLE motor weakness, R sided sensory loss  Thinners: none  Initial BP: 212/102  Glucose 162  EKG NSR, rate 70s (EMS)  No APA/AC use at baseline  [address] unremarkable  CTA h/n showed no LVO, but a 1cm saccular aneurysm at the aortic arch  In the ED, patient remained persistently hypertensive and required maximum labetalol, nicardipine drip to maintain blood pressure <185/110  Pt was found to be a TNK candidate and w/o obvious contraindication TNK was administered at 10:05 PM      Patient was noted to be in her usual state of health on 2/10/2023 at 10 AM by daughter and then complained of an occipital headache, dizziness on 2/10 evening and attributed to fatigue due to working all day  She then went to bed and was seen completely normal by her daughter walking around, cooking on 2/11 at 12 PM and then again walking around, cooking and at baseline at 8PM  Few minutes past 8PM, patient had a syncopal episode, prompting hospital visit  No seizure like activity observed in the aforementioned deficits noted after the syncopal event  Patient was seen by neurology as a stroke alert      Historical Information   Past Medical History:   Diagnosis Date   • Back ache    • Cancer [facility])    • Hyperlipidemia    • Hypertension      Past Surgical History:   Procedure Laterality Date   • APPENDECTOMY     • BILATERAL OOPHORECTOMY     • BREAST SURGERY Right     partial mastectomy    • COLONOSCOPY     • HERNIA REPAIR     • HYSTERECTOMY     • LAPAROTOMY N/A 2021    Procedure: LAPAROTOMY EXPLORATORY;  Surgeon: Mary Lou Mcdonald MD;  [facility];  Service: Surgical Oncology   • MASTECTOMY Right     partial   • WHIPPLE PROCEDURE/PANCREATICO-DUODENECTOMY N/A 2021    Procedure: WHIPPLE PROCEDURE/PANCREATICO-DUODENECTOMY;  Surgeon: Mary Lou Mcdonald MD;  [facility];  Service: Surgical Oncology     Social History   Social History     Substance and Sexual Activity   Alcohol Use Not Currently     Social History     Substance and Sexual Activity   Drug Use No     E-Cigarette/Vaping   • E-Cigarette Use Never User      E-Cigarette/Vaping Substances   • Nicotine No    • THC No    • CBD No    • Flavoring No    • Other No    • Unknown No      Social History     Tobacco Use   Smoking Status Former   • Packs/day: 0 65   • Years: 30 00   • Pack years: 19 50   • Types: Cigarettes   Smokeless Tobacco Never   Tobacco Comments    quit 15 years ago     Family History:   Family History   Problem Relation Age of Onset   • Hypertension Mother    • Heart disease Mother    • Diabetes Mother    • Cancer Father      Meds/Allergies   all current active meds have been reviewed  No Known Allergies    Review of previous medical records was completed as available  Objective:     Patient ID: David Miller is a 60 y o  female  Vitals:   Vitals:    23 2250 23 2300 23 2315 23 2330   BP: 130/59 158/70 141/63 162/66   Pulse: 74 74 72 72   Resp: 18 17 16 16   Temp:       TempSrc:       SpO2: 99% 99% 99% 98%   Weight:          Body mass index is 28 24 kg/m²     No intake or output data in the 24 hours ending 23 2335    Temperature:   Temp (24hrs), Av 8 °F (36 6 °C), Min:97 8 °F (36 6 °C), Max:97 8 °F (36 6 °C)    Temperature: 97 8 °F (36 6 °C)    Invasive Devices: Invasive Devices     Peripheral Intravenous Line  Duration           Peripheral IV 02/11/23 Distal;Left;Upper;Ventral (anterior) Arm <1 day              Physical exam:  Vitals reviewed  General Examination: anxious but cooperative  CVS: Hypertensive, tachycardic, regular rhythm  Pulm: no respiratory distress  Neurological exam:    Mental Status  Alert, oriented to self, place, month/year  Follows simple commands  Speech: fluent, repetition intact, mild dysarthria  Cranial Nerves   CN II: Visual fields full to confrontation  CN III, IV, VI: EOMI bilaterally  Normal lids and orbits bilaterally  Pupils equal round and reactive to light bilaterally  No nystagmus  Normal saccades  Normal smooth pursuit  CN V: Facial sensation is normal   CN VII:  Right: Mild facial droop , corrects w/ smiling  Left: There is no facial weakness or asymmetry  CN VIII: Audition grossly intact to voice  CN IX, X: Uvula midline  Palate elevates symmetrically  CN XI:  Right, left SCMs symmetric-able to shrug shoulders bilaterally  CN XII: Tongue midline without atrophy or fasciculations with appropriate movement  Motor  RUE: unable to hold up for 10s, drifts but does not hit bed  RLE: able to hold up for full 5s    LUE: unable to hold up for 10s, drifts and hits bed  LLE : no movement against gravity; feels "heavy" as per patient    Sensory  RUE, RLE: decreased sensation to pinprick, temp as compared to LUE, LLE  Light touch normal b/l    Reflexes: Deferred    Plantars down b/l  Coordination  Finger-to-nose normal b/l     Gait  Deferred     HINTS exam: HI negative, no nystagmus, no vertical skew  NIHSS:  1a Level of Consciousness: 0 = Alert   1b  LOC Questions: 0 = Answers both correctly   1c  LOC Commands: 0 = Obeys both correctly   2  Best Gaze: 1 = Partial Gaze Palsy (L gaze palsy, can be overcome)   3   Visual: 0 = No visual field loss   4  Facial Palsy: 2=Partial paralysis (total or near total paralysis of the lower face)- R lower face droop   5a  Motor Right Arm: 1=Drift, limb holds 90 (or 45) degrees but drifts down before full 10 seconds: does not hit bed   5b  Motor Left Arm: 2=Some effort against gravity, limb cannot get to or maintain (if cured) 90 (or 45) degrees, drifts down to bed, but has some effort against gravity   6a  Motor Right Le=No drift, limb holds 90 (or 45) degrees for full 10 seconds   6b  Motor Left Leg: 3=No effort against gravity, limb falls   7  Limb Ataxia:  0=Absent in UEs/ unable to test in lower extremities due to LLE weakness    8  Sensory: 1=Mild to moderate sensory loss; patient feels pinprick is less sharp or is dull on the affected side; there is a loss of superficial pain with pinprick but patient is aware She is being touched on the Right  9  Best Language:  0=No aphasia, normal   10  Dysarthria: 1=Mild to moderate, patient slurs at least some words and at worst, can be understood with some difficulty   11  Extinction and Inattention (formerly Neglect): 0=No abnormality   Total Score: 11     Time NIHSS was completed: 9: 25 PM    Modified Alton Score:  0 (No baseline symptoms/disability)    Labs: I have personally reviewed pertinent reports  Results from last 7 days   Lab Units 23   WBC Thousand/uL 9 13   HEMOGLOBIN g/dL 10 1*   HEMATOCRIT % 30 2*   PLATELETS Thousands/uL 246      Results from last 7 days   Lab Units 23   POTASSIUM mmol/L 3 5   CHLORIDE mmol/L 103   CO2 mmol/L 27   BUN mg/dL 21   CREATININE mg/dL 1 57*   CALCIUM mg/dL 8 3              Results from last 7 days   Lab Units 23   INR  1 08   PTT seconds 32               Imaging and diagnostic studies:    Radiology Results: I have personally reviewed pertinent reports     and I have personally reviewed pertinent films in PACS  CTA stroke alert (head/neck)   Final Result by Rios Alfonso, DO (02/11 2153)      No evidence of large vessel occlusion  If symptoms persist MRI should be obtained      Focal saccular aneurysm versus penetrating ulcer of the aortic arch measuring 1 cm  Follow-up with vascular is recommended  I personally discussed this study with Dr Clement Pinto on 2/11/2023 at 9:50 PM                                Workstation performed: [id number]         CT stroke alert brain   Final Result by Travon Grimm DO (02/11 2120)      No acute intracranial abnormality  I personally discussed this study with Dr Clement Pinto on 2/11/2023 at 9:18 PM                 Workstation performed: [id number]         CT head wo contrast    (Results Pending)   MRI brain wo contrast    (Results Pending)   XR chest portable    (Results Pending)   CTA dissection protocol chest/abdomen/pelvis    (Results Pending)       Other Diagnostic Testing: I have personally reviewed pertinent reports     and I have personally reviewed pertinent films in PACS    Active medications:  Current Facility-Administered Medications   Medication Dose Route Frequency   • acetaminophen (TYLENOL) tablet 650 mg  650 mg Oral Q6H PRN   • atorvastatin (LIPITOR) tablet 40 mg  40 mg Oral QPM   • chlorhexidine (PERIDEX) 0 12 % oral rinse 15 mL  15 mL Mouth/Throat Q12H [address]   • labetalol (NORMODYNE) injection 10 mg  10 mg Intravenous Q4H PRN   • metoprolol tartrate (LOPRESSOR) partial tablet 12 5 mg  12 5 mg Oral Q12H [address]   • niCARdipine (CARDENE) 25 mg (STANDARD CONCENTRATION) in sodium chloride 0 9% 250 mL  1-15 mg/hr Intravenous Titrated   • ondansetron (ZOFRAN) injection 4 mg  4 mg Intravenous Q6H PRN   • [START ON 2/12/2023] pancrelipase (Lip-Prot-Amyl) (CREON) delayed release capsule 12,000 Units  12,000 Units Oral TID With Meals   • [START ON 2/12/2023] pantoprazole (PROTONIX) EC tablet 40 mg  40 mg Oral Early Morning   • [START ON 2/12/2023] sertraline (ZOLOFT) tablet 25 mg  25 mg Oral Daily   • sodium chloride 0 9 % infusion 100 mL/hr Intravenous Continuous       Prior to Admission medications    Medication Sig Start Date End Date Taking? Authorizing Provider   amLODIPine (NORVASC) 10 mg tablet Take 1 tablet (10 mg total) by mouth in the morning  5/13/22   Robb Daniels MD   atorvastatin (LIPITOR) 40 mg tablet Take 1 tablet (40 mg total) by mouth every evening 5/13/22   Robb Daniels MD   cholestyramine Lennette Ahmet) 4 GM/DOSE powder Take 1 packet (4 g total) by mouth in the morning and 1 packet (4 g total) at noon and 1 packet (4 g total) in the evening  Take with meals  5/13/22   Dipika Daniels MD   clobetasol (TEMOVATE) 0 05 % ointment Apply twice a day to rash on hands for 2 weeks  Then, apply twice a day from Monday-Friday for another 2 weeks  Avoid the face and groin 6/16/21   Darrel Jose MD   hydrOXYzine HCL (ATARAX) 25 mg tablet Take 25 mg by mouth daily at bedtime as needed 11/23/19   Historical Provider, MD   lisinopril (ZESTRIL) 40 mg tablet Take 1 tablet (40 mg total) by mouth in the morning  5/13/22   Dipika Daniels MD   pancrelipase, Lip-Prot-Amyl, (CREON) 12,000 units capsule Take 12,000 units of lipase by mouth 3 (three) times a day with meals 7/25/22   Nel Mullins MD   pancrelipase, Lip-Prot-Amyl, (CREON) 6,000 units delayed release capsule Take 2 capsules (12,000 Units total) by mouth 3 (three) times a day with meals 7/25/22   Nel Mullins MD   pantoprazole (PROTONIX) 40 mg tablet take 1 tablet by mouth daily IN THE EARLY MORNING 6/28/22   Robb Daniels MD   potassium chloride (K-DUR,KLOR-CON) 10 mEq tablet Take 2 tablets (20 mEq total) by mouth in the morning  5/13/22   Robb Daniels MD   sertraline (ZOLOFT) 25 mg tablet Take 1 tablet (25 mg total) by mouth in the morning   5/13/22   Robb Daniels MD   traZODone (DESYREL) 50 mg tablet take 1/2 tablet by mouth at bedtime if needed for insomnia 4/9/21   Historical Provider, MD   vitamin B-12 (CYANOCOBALAMIN) 500 MCG TABS Take 1 tablet (500 mcg total) by mouth daily 5/26/22   Megan Alvarez PA-C         Code status and advanced directives:  Code Status: Level 1 - Full Code    ==  Joeseph Aase, MD [facility], PGY-II

## 2023-02-12 NOTE — H&P
H&P Exam - [address] [facility] 60 y o  female MRN: [id number]  Unit/Bed#: ED 21 Encounter: [id number]      -------------------------------------------------------------------------------------------------------------  Chief Complaint:  R sided weakness    History of Present Illness   HX and PE limited by: ORLY  Viviane Valladares is a 60 y o  female w/ PMHx HPTN, HLD, depression, and neuroendocrine tumor s/p whipple in 08/21 who now presents with stroke like symptoms s/p TNK  Patient reportedly developed right sided weakness and sensory deficits at approx 2300 prompting ED eval  En route to the hopsital developed occipital headache and pleuritic chest pain, she was a pre-hospital stroke alert  CTH negative for hemorrhage CTA negative for LVO  Initial NIHHS 11, given TNK  Patient noted to be hypertensive to 070G systolic, started on cardene for improved control  Upon my eval patient has complaints of ongoing headache that has not changed since presentation  She endorses non-radiating chest pain that worsens when she takes a deep breath       History obtained from chart review and the patient   -------------------------------------------------------------------------------------------------------------  Assessment and Plan:    Neuro:   • Analgesia: PRN tylenol  • Sedation: NA  • Delirium PPX: CAM-ICU, sleep hygiene   • Depression: PTA Zoloft   • Stroke like symptoms: CTH negative for hemorrhage, CTA with no LVO   o S/p TNK @ 2215  o Neurochecks per protocol, STAT CTH with change in exam   o ECHO, MRI B  o a1c and lipid panel ordered  o CTH for 24hrs post TNK ordered  - If CTH w/o hemorrhage will order ASA  - Statin ordered  o BP control goal SBP <180        CV:   • Chest pain: initial troponin 9, awaiting repeat  o EKG w/o ischemic change  o Check CXR  o STAT CTA dissection scan in light of aortic aneurysm  o BP goal 120-130, utilize cardene  • Hypertension: cardene   o Consider resuming home anti-hypertensives in AM   • HLD: statin   • Focal saccular aneurysm vs ulcer of aortic arch: CTA as above  o ECHO  o Outpatient vascular f/u      Pulm:  • No active issues   • IS as able         GI:   •  NPO sips with meds pending nursing bedside speech eval  • Bowel Reg: senna  • GI PPX: NA  • GERD: protonix  • Neuroendocrine tumor s/p whipple: continue creon       :   •  YULIYA on CKD: unclear etiology, now s/p contrast administration   o Start maintenance IVF  o Trend UOP and RFT        F/E/N:   • Electrolytes - Monitor/trend - replete based on deficiencies       Heme/Onc:   •  Hgb stable   • DVT PPX: hold chemo PPX pending repeat CTH at 24hrs post TNK, SCDs        Endo:   • No active issues   • a1c in AM          ID:   • No active issues         MSK/Skin:   •  Turn frequently and reposition   • PT/OT  • PMR        Disposition: Admit to Critical Care   Code Status: Level 1 - Full Code  --------------------------------------------------------------------------------------------------------------  Review of Systems   Constitutional: Positive for fatigue  Cardiovascular: Positive for chest pain  Neurological: Positive for weakness and headaches (occipital)  A 12-point, complete review of systems was reviewed and negative except as stated above     Physical Exam  Constitutional:       Appearance: She is obese  HENT:      Head: Normocephalic  Mouth/Throat:      Mouth: Mucous membranes are moist    Eyes:      Pupils: Pupils are equal, round, and reactive to light  Cardiovascular:      Rate and Rhythm: Normal rate and regular rhythm  Pulmonary:      Effort: Pulmonary effort is normal       Breath sounds: Normal breath sounds  Abdominal:      General: There is distension  Tenderness: There is no abdominal tenderness  Musculoskeletal:      Cervical back: Normal range of motion  Skin:     General: Skin is warm  Capillary Refill: Capillary refill takes less than 2 seconds  Neurological:      Mental Status: She is alert  GCS: GCS eye subscore is 4  GCS verbal subscore is 5  GCS motor subscore is 6  Comments: CATHY 4/5 weakness  Strength intact in remaining 4 limbs         --------------------------------------------------------------------------------------------------------------  Historical Information   Past Medical History:   Diagnosis Date   • Back ache    • Cancer ([facility] [address] )    • Hyperlipidemia    • Hypertension      Past Surgical History:   Procedure Laterality Date   • APPENDECTOMY     • BILATERAL OOPHORECTOMY     • BREAST SURGERY Right     partial mastectomy    • COLONOSCOPY     • HERNIA REPAIR     • HYSTERECTOMY     • LAPAROTOMY N/A 8/24/2021    Procedure: LAPAROTOMY EXPLORATORY;  Surgeon: Michelle Penn MD;  [facility];  Service: Surgical Oncology   • MASTECTOMY Right     partial   • WHIPPLE PROCEDURE/PANCREATICO-DUODENECTOMY N/A 8/24/2021    Procedure:  WHIPPLE PROCEDURE/PANCREATICO-DUODENECTOMY;  Surgeon: Michelle Penn MD;  [facility];  Service: Surgical Oncology     Social History   Social History     Substance and Sexual Activity   Alcohol Use Not Currently     Social History     Substance and Sexual Activity   Drug Use No     Social History     Tobacco Use   Smoking Status Former   • Packs/day: 0 65   • Years: 30 00   • Pack years: 19 50   • Types: Cigarettes   Smokeless Tobacco Never   Tobacco Comments    quit 15 years ago     Exercise History: unknown   Family History:   Family History   Problem Relation Age of Onset   • Hypertension Mother    • Heart disease Mother    • Diabetes Mother    • Cancer Father      I have reviewed this patient's family history and commented on sigificant items within the HPI    Vitals:   Vitals:    02/11/23 2150 02/11/23 2155 02/11/23 2200 02/11/23 2215   BP: (!) 174/73  (!) 204/88 (!) 180/73   Pulse: 74 74 74 74   Resp: 17 15 22 17   Temp:       TempSrc:       SpO2: 99% 99% 99% 100%   Weight:         Temp Min: 97 8 °F (36 6 °C)  Max: 97 8 °F (36 6 °C)        Body mass index is 28 24 kg/m²  N/A    Medications:  Current Facility-Administered Medications   Medication Dose Route Frequency   • acetaminophen (TYLENOL) tablet 650 mg  650 mg Oral Q6H PRN   • atorvastatin (LIPITOR) tablet 40 mg  40 mg Oral QPM   • chlorhexidine (PERIDEX) 0 12 % oral rinse 15 mL  15 mL Mouth/Throat Q12H [address]   • labetalol (NORMODYNE) injection 10 mg  10 mg Intravenous Q4H PRN   • niCARdipine (CARDENE) 25 mg (STANDARD CONCENTRATION) in sodium chloride 0 9% 250 mL  1-15 mg/hr Intravenous Titrated   • ondansetron (ZOFRAN) injection 4 mg  4 mg Intravenous Q6H PRN   • [START ON 2/12/2023] pancrelipase (Lip-Prot-Amyl) (CREON) delayed release capsule 12,000 Units  12,000 Units Oral TID With Meals   • [START ON 2/12/2023] pantoprazole (PROTONIX) EC tablet 40 mg  40 mg Oral Early Morning   • [START ON 2/12/2023] sertraline (ZOLOFT) tablet 25 mg  25 mg Oral Daily     Home medications:  Prior to Admission Medications   Prescriptions Last Dose Informant Patient Reported? Taking? amLODIPine (NORVASC) 10 mg tablet   No No   Sig: Take 1 tablet (10 mg total) by mouth in the morning  atorvastatin (LIPITOR) 40 mg tablet   No No   Sig: Take 1 tablet (40 mg total) by mouth every evening   cholestyramine (QUESTRAN) 4 GM/DOSE powder   No No   Sig: Take 1 packet (4 g total) by mouth in the morning and 1 packet (4 g total) at noon and 1 packet (4 g total) in the evening  Take with meals  clobetasol (TEMOVATE) 0 05 % ointment   No No   Sig: Apply twice a day to rash on hands for 2 weeks  Then, apply twice a day from Monday-Friday for another 2 weeks  Avoid the face and groin   hydrOXYzine HCL (ATARAX) 25 mg tablet   Yes No   Sig: Take 25 mg by mouth daily at bedtime as needed   lisinopril (ZESTRIL) 40 mg tablet   No No   Sig: Take 1 tablet (40 mg total) by mouth in the morning     pancrelipase, Lip-Prot-Amyl, (CREON) 12,000 units capsule   No No   Sig: Take 12,000 units of lipase by mouth 3 (three) times a day with meals   pancrelipase, Lip-Prot-Amyl, (CREON) 6,000 units delayed release capsule   No No   Sig: Take 2 capsules (12,000 Units total) by mouth 3 (three) times a day with meals   pantoprazole (PROTONIX) 40 mg tablet   No No   Sig: take 1 tablet by mouth daily IN THE EARLY MORNING   potassium chloride (K-DUR,KLOR-CON) 10 mEq tablet   No No   Sig: Take 2 tablets (20 mEq total) by mouth in the morning  sertraline (ZOLOFT) 25 mg tablet   No No   Sig: Take 1 tablet (25 mg total) by mouth in the morning  traZODone (DESYREL) 50 mg tablet   Yes No   Sig: take 1/2 tablet by mouth at bedtime if needed for insomnia   vitamin B-12 (CYANOCOBALAMIN) 500 MCG TABS   No No   Sig: Take 1 tablet (500 mcg total) by mouth daily      Facility-Administered Medications: None     Allergies:  No Known Allergies      Laboratory and Diagnostics:  Results from last 7 days   Lab Units 02/11/23 2122   WBC Thousand/uL 9 13   HEMOGLOBIN g/dL 10 1*   HEMATOCRIT % 30 2*   PLATELETS Thousands/uL 246     Results from last 7 days   Lab Units 02/11/23 2122   SODIUM mmol/L 136   POTASSIUM mmol/L 3 5   CHLORIDE mmol/L 103   CO2 mmol/L 27   ANION GAP mmol/L 6   BUN mg/dL 21   CREATININE mg/dL 1 57*   CALCIUM mg/dL 8 3   GLUCOSE RANDOM mg/dL 106          Results from last 7 days   Lab Units 02/11/23 2122   INR  1 08   PTT seconds 32              ABG:    VBG:          Micro:            ------------------------------------------------------------------------------------------------------------  Advance Directive and Living Will:      Power of :    POLST:    ------------------------------------------------------------------------------------------------------------  Anticipated Length of Stay is > 2 midnights    Counseling / Coordination of Care  Total Critical Care time spent 45 minutes excluding procedures, teaching and family updates          Isidra Morel PA-C        Portions of the record may have been created with voice recognition software  Occasional wrong word or "sound a like" substitutions may have occurred due to the inherent limitations of voice recognition software    Read the chart carefully and recognize, using context, where substitutions have occurred

## 2023-02-12 NOTE — OCCUPATIONAL THERAPY NOTE
Occupational Therapy Evaluation     Patient Name: Yulisa Peng  Today's Date: 2/12/2023  Problem List  Principal Problem:    Posterior circulation stroke [facility])  Active Problems:    Benign hypertension with CKD (chronic kidney disease), stage II    Depression    History of neuroendocrine cancer    H/O Whipple procedure    Aortic aneurysm [facility])    Past Medical History  Past Medical History:   Diagnosis Date    Back ache     Cancer ([address] )     Hyperlipidemia     Hypertension      Past Surgical History  Past Surgical History:   Procedure Laterality Date    APPENDECTOMY      BILATERAL OOPHORECTOMY      BREAST SURGERY Right     partial mastectomy     COLONOSCOPY      HERNIA REPAIR      HYSTERECTOMY      LAPAROTOMY N/A 8/24/2021    Procedure: LAPAROTOMY EXPLORATORY;  Surgeon: Roberto Sandoval MD;  [facility]: [facility];  Service: Surgical Oncology    MASTECTOMY Right     partial    WHIPPLE PROCEDURE/PANCREATICO-DUODENECTOMY N/A 8/24/2021    Procedure: WHIPPLE PROCEDURE/PANCREATICO-DUODENECTOMY;  Surgeon: Roberto Sandoval MD;  Location: [facility];  Service: Surgical Oncology        02/12/23 1148   OT Last Visit   OT Visit Date 02/12/23   Note Type   Note type Evaluation   Pain Assessment   Pain Assessment Tool 0-10   Pain Score 4   Pain Location/Orientation Location: Head   Hospital Pain Intervention(s) Emotional support   Restrictions/Precautions   Weight Bearing Precautions Per Order No   Other Precautions Cognitive; Bed Alarm;Multiple lines;Telemetry; Fall Risk  (Czech only, R sided inattn vs field cut)   Home Living   Type of Home House  (1-2STE)   Home Layout Two level;Bed/bath upstairs   Bathroom Shower/Tub Tub/shower unit   Bathroom Toilet Standard   Home Equipment   (none PTA)   Additional Comments Pt resides with her  in a [facility]   is not able to assist, and requires 24/7 assist from [facility]  Prior Function   Level of [place] Independent with ADLs; Independent with functional mobility   Lives With Spouse   IADLs Independent with meal prep; Independent with medication management   Falls in the last 6 months 0   Vocational Retired   Lifestyle   Autonomy Pt is typically completely independent with ADLs, IADLs, and functional mobility without use of DME PTA   Reciprocal Relationships lives with , however he is not able to assist   HHA 24/7 for  but typically they are not able to also assist the pt's spouse   General   Family/Caregiver Present Yes   Additional General Comments Family provides translation and background info   ADL   Eating Assistance 4  Minimal Assistance   Grooming Assistance 4  Minimal Assistance   [place]   Additional Comments With use of non-dominant L UE, could feed self and complete grooming  Weak grasp on R and dysmetric  Bed Mobility   Supine to Sit 6  Modified independent   Sit to Supine 6  Modified independent   Additional Comments Requires inc time but no physical assist   No lateral lean seated EOB   Transfers   Sit to Stand 4  Minimal assistance   Additional items Assist x 1; Increased time required   Stand to Sit 4  Minimal assistance   Additional items Assist x 1; Increased time required   Additional Comments Very mild R lateral lean in stance  Functional Mobility   Functional Mobility 4  Minimal assistance   Additional Comments Ax1 with very mild R lateral lean  Slow with decreased R UE swing during gait    Appears startled but stimuli on R   2 LOB   Balance   Static Sitting Good   Dynamic Sitting Fair   Static Standing Fair -   Dynamic Standing Poor +   Ambulatory Poor +   Activity Tolerance   Activity Tolerance Patient tolerated treatment well   Medical Staff Made Aware PT Deena Jay   Nurse Made Aware Pt cleared by RN for OT evaluation and OOB mobility  RN aware that current neuro assessment is declined compaed to neuro resident documentation of no deficits   RUE Assessment   RUE Assessment   (4-/5 grossly  Weak grasp)   LUE Assessment   LUE Assessment   (5/5 grossly)   Hand Function   Gross Motor Coordination   (R UE dysmetric, (+) dysdiadochokinesia)   Fine Motor Coordination Impaired  (R UE impaired)   Sensation   Light Touch Partial deficits in the RUE;Partial deficits in the RLE   Proprioception   Proprioception No apparent deficits   Vision-Basic Assessment   Current Vision Wears glasses only for reading   Vision - Complex Assessment   Ocular Range of Motion Intact  (but requires inc cues to attend to R and cross midline b/l eyes)   Tracking Intact   Additional Comments Possible visual field deficit on R vs inattn   Psychosocial   Psychosocial (WDL) X   Patient Behaviors/Mood Flat affect   Perception   Inattention/Neglect Cues to maintain midline in standing;Cues to attend right visual field  (inc cues to visually attend to stimuli on R  [place] Cancellation completed in 2:40 34/35 appropriately identified with omitted item on R  Organized scanning pattern but noted to turn head to R to locate items on R   Leaning towards field cut vs inattn)   Cognition   Overall Cognitive Status Impaired   Arousal/Participation Alert; Responsive; Cooperative   Attention Attends with cues to redirect   Orientation Level Oriented X4   Following Commands Follows one step commands with increased time or repetition   Comments Assessment limited by language barrier  Pt presents with flat affect, requires significantly inc time for processing (with directions provided in Taiwanese)  Appears startled by stimuli on R  Would benefit from formal cognitive assessment in Taiwanese   Assessment   Limitation Decreased ADL status; Decreased UE strength;Decreased cognition;Decreased endurance;Decreased sensation;Visual deficit; Decreased fine motor control;Decreased high-level ADLs;Decreased self-care trans   Prognosis Good   Assessment Pt is a R hand dominant 60year old female seen for initial OT evaluation s/p admission to [facility] 2/11/23 with L gaze palsy, R sided weakness and sensory deficits  Pt received TNK  Pt is Khmer speaking only and questionable historian, but her kids are present to provide supplemental info  Pt resides with her  in a [facility] with 1STE and no bathroom on the first floor  Pt is typically completely independent with ADLs, IADLs, and functional mobility without use of DME PTA  Pt is currently demonstrating the following occupational deficits: ADLs with Royce, functional transfers with Royce, and functional mobility with Royce  Factors currently limiting pt's independence in these areas include: R sided weakness, R sided sensory deficits, R sided inattention vs visual field cut on R, R sided dysmetria, dysdiadochokinesia, balance, functional mobility, cognitive deficits, and unsupportive home environment  From an OT standpoint, recommend STR vs home with OP neuro OT (intensive neuro day program) pending progress and availability of additional support (typically HHAs hired for spouse are not able to provide assist to another person)  RECOMMEND PHYSIATRY CONSULT WHILE INPATIENT  Pt is to continue to benefit from skilled occupational therapy services while in the hospital to maximize functioning and independence with daily activities  See below for OT goals to be addressed 3-5x/wk  Goals   Patient Goals none stated   Plan   Treatment Interventions ADL retraining;Visual perceptual retraining;Functional transfer training;UE strengthening/ROM; Endurance training;Cognitive reorientation;Patient/family training;Equipment evaluation/education; Fine motor coordination activities; Neuromuscular reeducation; Compensatory technique education;Continued evaluation; Activityengagement   Goal Expiration Date 02/22/23   OT Treatment Day 1   OT Frequency 3-5x/wk Recommendation   Recommendation (S)  Physiatry Consult   OT Discharge Recommendation Post acute rehabilitation services  (vs home with intensive neuro day program at OP neuro OT  PENDING PROGRESS AND AVAILABILITY OF INC SUPPORT)   Equipment Recommended Shower/Tub chair with back ($)   AM-PAC Daily Activity Inpatient   Lower Body Dressing 3   Bathing 3   Toileting 3   Upper Body Dressing 3   Grooming 3   Eating 3   Daily Activity Raw Score 18   Daily Activity Standardized Score (Calc for Raw Score >=11) 38 66   Barthel Index   Feeding 5   Bathing 0   Grooming Score 0   Dressing Score 5   Bladder Score 10   Bowels Score 10   Toilet Use Score 5   Transfers (Bed/Chair) Score 10   Mobility (Level Surface) Score 10   Stairs Score 0   Barthel Index Score 55     Goals:    Pt will be attentive 100% of the time during ongoing cognitive assessment w/ G participation to assist w/ safe d/c planning/recommendations  Pt will complete all self care tasks w/ Adin with use of DME/AD as appropriate  Pt will improve functional transfers to Mod I on/off all surfaces using DME as needed w/ G balance/safety     Pt will improve functional mobility during ADL/IADL/leisure tasks to Mod I using DME as needed w/ G balance/safety and while avoiding all obstacles on b/l sides of environment    Pt will participate in simulated IADL management task to increase independence to supervision w/ G safety and endurance    Pt will demonstrate G carryover of pt/caregiver education and training as appropriate w/o cues w/ good tolerance to increase safety during functional tasks    Pt will maintain standing upright I'ly for at least 20 minutes while completing a dynamic functional activity with good balance and endurance  Pt will improve UB fx'l use/ROM to [facility] and strength to at least 4/5 grossly R UE via AROM/AAROM/PROM in all planes as tolerated s/p skilled education of HEP w/ mod I w/o cues for carryover      Pt will engage in ongoing  assessments, screens, and activities t/o fx'l I/ADL/leisure tasks w/ G participation and mod I to A w/ adaptation and accomodations or rule out visual perceptual impairments    Antwon Scott, MOT, OTR/L, CSRS

## 2023-02-12 NOTE — PROGRESS NOTES
I have personally seen and examined patient and reviewed all data with FELIZ  Agree with note, assessment and plan  Critical care time 0 min   Please refer to attending comments below  Critical care time does not include procedures, family meeting or teaching  Right weakness- stroke/tia vs seizure vs migraine atypical vs HTN emergency  Syncope- cardiac vs neurologic  HTN emergency  HLD  Pancreatic neuroendocrine mass s/p whipple  Hypokalemia- repelte and recheck     Exam:  Patient is awake and alert and conversing without aphasia, face appears to be symmetric, pupils are equal, extraocular movements intact, 5 out of 5 strength bilateral upper and lower extremities, sensory grossly intact, no difficulty with past-pointing    Goal SBP:  120-180    Last night right weakness and headache  Also had CP described as pressure with deep inspiration  Reportedly patient had syncope event  Prehospital stroke alert  EMS called and transported patient to ED  Cardene initiated for HTN  TNK administered  No LVO on CTA  NIH on presentation 11  Continue with neurologic checks Q 1 hour  Repeat imaging at 24 hours status post thrombolysis, MRI completed  Plan to initiate anti-platelet therapy with aspirin  Statin medication ordered  Continue with stroke protocol with PT/OT and speech evaluation  HbA1C, lipid panel and echo ordered  Neurology consult  Plan to to reimage if patient has decline in neurologic exam or < GCS by 2 points or more  Cardene weaned to off this am      Patient evaluated by speech, po diet ordered    Restart patient's home [facility]  Patient's daughter states that patient had not been taking her medications as she had been in [facility]   She did admit to taking her Protonix    Patient had history neuroendrcrine tumor and have  on presentation with SBP presently off all medications 95  Check urine and serum metanephrines and 5HIAA  Update:     In further conversation with patient's daughter regarding events prior to emergency department arrival   Patient's daughter states that the patient complained of chest pain as well as tightness in her neck prior to having "numbness "in her right upper and lower extremity and having a syncopal event  No seizure-like movements were observed  Patient's daughter did state that her mother did "pass out "  The patient denied having a headache prior to this event  She did admit to having some dizziness  She did admit to feeling heaviness on her chest and having a sensation of dyspnea  She also admitted to having nausea  Continue with stroke and cardiac work-up

## 2023-02-12 NOTE — PLAN OF CARE
Problem: PHYSICAL THERAPY ADULT  Goal: Performs mobility at highest level of function for planned discharge setting  See evaluation for individualized goals  Description: Treatment/Interventions: ADL retraining, Functional transfer training, LE strengthening/ROM, Endurance training, Patient/family training, Equipment eval/education, Bed mobility, Gait training, Spoke to nursing, Spoke to case management, OT          See flowsheet documentation for full assessment, interventions and recommendations  Note: Prognosis: Good  Problem List: Decreased strength, Decreased endurance, Impaired balance, Decreased mobility, Decreased cognition, Impaired judgement, Decreased safety awareness, Pain  Assessment: Pt is a 60 y o  female seen for PT evaluation s/p admit to [facility] on 2/11/2023  Pt was admitted with a primary dx of: posterior circulation stroke with c/o HA  PT now consulted for assessment of mobility and d/c needs  Pt with Up with assistance orders  Pts current comorbidities effecting treatment include: HTN with CKD, depression, hx of neuroendocrine cancer, h/o whipple procedure, aortic aneurysm  Pts current clinical presentation is Unstable/ Unpredictable (high complexity) due to Ongoing medical management for primary dx, Increased reliance on more restrictive AD compared to baseline, Decreased activity tolerance compared to baseline, Fall risk, Increased assistance needed from caregiver at current time, Ongoing telemetry monitoring, Cog status, Trending lab values, Continuous pulse oximetry monitoring   Prior to admission, pt was residing with spouse (who requires 24/7 HHA- unable to provide assistance) in 2 level home with 1 SIMÓN and bed/ bath upstairs; independent at baseline with no DME use  Upon evaluation, pt currently is requiring S level for bed mobility; CGA for transfers; min A for ambulation 80 ft w/ occasional HHA required to correct lateral LOB   Pt presents at PT eval functioning below baseline and currently w/ overall mobility deficits 2* to: RLE weakness, decreased ROM, impaired balance, decreased endurance, impaired coordination, gait deviations, pain, decreased activity tolerance compared to baseline, decreased functional mobility tolerance compared to baseline, decreased safety awareness, impaired judgement, fall risk  Pt currently at a fall risk 2* to impairments listed above  Pt will continue to benefit from skilled acute PT interventions to address stated impairments; to maximize functional mobility; for ongoing pt/ family training; and DME needs  At conclusion of PT session pt returned BTB with phone and call bell within reach  Pt denies any further questions at this time  The patient's AM-PAC Basic Mobility Inpatient Short Form Raw Score is 17  A Raw score of greater than 16 suggests the patient may benefit from discharge to home  Please also refer to the recommendation of the Physical Therapist for safe discharge planning  Recommend acute STR vs Home with OPPT upon hospital D/C  Barriers to Discharge: Inaccessible home environment, Decreased caregiver support     PT Discharge Recommendation: Post acute rehabilitation services (acute STR vs OPPT pending continued progress)    See flowsheet documentation for full assessment

## 2023-02-12 NOTE — PLAN OF CARE
Problem: OCCUPATIONAL THERAPY ADULT  Goal: Performs self-care activities at highest level of function for planned discharge setting  See evaluation for individualized goals  Description: Treatment Interventions: ADL retraining, Visual perceptual retraining, Functional transfer training, UE strengthening/ROM, Endurance training, Cognitive reorientation, Patient/family training, Equipment evaluation/education, Fine motor coordination activities, Neuromuscular reeducation, Compensatory technique education, Continued evaluation, Activityengagement  Equipment Recommended: Shower/Tub chair with back ($)       See flowsheet documentation for full assessment, interventions and recommendations  Note: Limitation: Decreased ADL status, Decreased UE strength, Decreased cognition, Decreased endurance, Decreased sensation, Visual deficit, Decreased fine motor control, Decreased high-level ADLs, Decreased self-care trans  Prognosis: Good  Assessment: Pt is a R hand dominant 60year old female seen for initial OT evaluation s/p admission to [facility] 2/11/23 with L gaze palsy, R sided weakness and sensory deficits  Pt received TNK  Pt is [place] speaking only and questionable historian, but her kids are present to provide supplemental info  Pt resides with her  in a [address] with 1STE and no bathroom on the first floor  Pt is typically completely independent with ADLs, IADLs, and functional mobility without use of DME PTA  Pt is currently demonstrating the following occupational deficits: ADLs with Royce, functional transfers with Royce, and functional mobility with Royce  Factors currently limiting pt's independence in these areas include: R sided weakness, R sided sensory deficits, R sided inattention vs visual field cut on R, R sided dysmetria, dysdiadochokinesia, balance, functional mobility, cognitive deficits, and unsupportive home environment    From an OT standpoint, recommend STR vs home with OP neuro OT (intensive neuro day program) pending progress and availability of additional support (typically HHAs hired for spouse are not able to provide assist to another person)  RECOMMEND PHYSIATRY CONSULT WHILE INPATIENT  Pt is to continue to benefit from skilled occupational therapy services while in the hospital to maximize functioning and independence with daily activities  See below for OT goals to be addressed 3-5x/wk  Recommendation: (S) Physiatry Consult  OT Discharge Recommendation: Post acute rehabilitation services (vs home with intensive neuro day program at OP neuro OT    PENDING PROGRESS AND AVAILABILITY OF INC SUPPORT)

## 2023-02-12 NOTE — PROGRESS NOTES
Daily Progress Note - Critical Care   Ibis Stout 60 y o  female MRN: [id number]  Unit/Bed#: ICU 13 Encounter: [id number]        ----------------------------------------------------------------------------------------  HPI/24hr events: 60 y o  female w/ PMHx HPTN, HLD, depression, and neuroendocrine tumor s/p andrewipple in 08/21 who now presents with stroke like symptoms s/p TNK    Symptoms resolved, [facility] 0  CTA dissection study negative     ---------------------------------------------------------------------------------------  SUBJECTIVE    Review of Systems   Constitutional: Positive for fatigue  Cardiovascular: Negative for chest pain       Review of systems was reviewed and negative unless stated above in HPI/24-hour events   ---------------------------------------------------------------------------------------  Assessment and Plan:    Neuro:   • Analgesia: PRN tylenol   • Sedation: NA  • Delirium PPX: CAM-ICU, Sleep Hygiene   • Depression: Zoloft  • Stroke like symptoms:   o S/p TNK @ 2215 on 2/11  o MRI B completed, awaiting read  o ECHO pending   o a1c and lipid panel   o CTH pending   - Hold AC/AP pending repeat  - Statin       CV:   • Hypertension: cardene for SBP <180  o Resume home norvasc today  • HLD: statin  • Aortic aneurysm: outpatient f/u       Pulm:  • No active issues   • IS as able       GI:   • Start diet  • Bowel reg: senna   • GERD: protonix   • Neuroendocrine tumor s/p andrewipple: creon       :   • YULIYA on CKD: improved  o Continue IVF      F/E/N:   • Saline 125      Heme/Onc:   • Hgb stable   • DVT PPX: hold chemo PPX pending CTH, SCDs      Endo:   • No active issues       ID:   • No active issues       MSK/Skin:   • PT/OT      Disposition: Transfer to Stepdown Level 2  Code Status: Level 1 - Full Code  ---------------------------------------------------------------------------------------  ICU CORE MEASURES    Prophylaxis   VTE Pharmacologic Prophylaxis: Pharmacologic VTE Prophylaxis contraindicated due to TNK  VTE Mechanical Prophylaxis: sequential compression device  Stress Ulcer Prophylaxis: Prophylaxis Not Indicated       Invasive Devices Review  Invasive Devices     Peripheral Intravenous Line  Duration           Peripheral IV 23 Distal;Left;Upper;Ventral (anterior) Arm <1 day              Can any invasive devices be discontinued today? Not applicable  ---------------------------------------------------------------------------------------  OBJECTIVE    Physical Exam  Constitutional:       Appearance: She is obese  HENT:      Head: Normocephalic  Mouth/Throat:      Mouth: Mucous membranes are moist    Eyes:      Pupils: Pupils are equal, round, and reactive to light  Cardiovascular:      Rate and Rhythm: Normal rate and regular rhythm  Pulmonary:      Effort: Pulmonary effort is normal       Breath sounds: Normal breath sounds  Abdominal:      General: There is distension  Palpations: Abdomen is soft  Tenderness: There is no abdominal tenderness  Musculoskeletal:      Cervical back: Normal range of motion  Skin:     General: Skin is warm  Capillary Refill: Capillary refill takes less than 2 seconds  Neurological:      General: No focal deficit present  Mental Status: She is alert  Vitals   Vitals:    23 0430 23 0500 23 0530 23 0600   BP: 124/78 125/51 119/50 134/58   BP Location:       Pulse: 70 66 64 72   Resp: 21 (!) 11 12 (!) 11   Temp:       TempSrc:       SpO2: 99% 98% 97% 98%   Weight:       Height:         Temp (24hrs), Av 8 °F (36 6 °C), Min:97 7 °F (36 5 °C), Max:97 8 °F (36 6 °C)  Current: Temperature: 97 8 °F (36 6 °C)      Invasive/non-invasive ventilation settings   Respiratory    Lab Data (Last 4 hours)    None         O2/Vent Data (Last 4 hours)    None                Height and Weights   Height: 5' 5" (165 1 cm)  IBW (Ideal Body Weight): 57 kg  Body mass index is 24 65 kg/m²    Weight (last 2 days)     Date/Time Weight    02/12/23 0131 67 2 (148 15)    02/11/23 2224 67 2 (148 15)    02/11/23 2055 72 3 (159 39)            Intake and Output  I/O       02/10 0701 02/11 0700 02/11 0701 02/12 0700          Unmeasured Urine Occurrence  1 x            Nutrition       Diet Orders   (From admission, onward)             Start     Ordered    02/11/23 2319  Diet NPO; Sips with meds  Diet effective now        References:    Nutrtion Support Algorithm Enteral vs  Parenteral   Question Answer Comment   Diet Type NPO    NPO Except: Sips with meds    RD to adjust diet per protocol?  Yes        02/11/23 2318                  Laboratory and Diagnostics:  Results from last 7 days   Lab Units 02/12/23 0440 02/11/23 2122   WBC Thousand/uL 9 87 9 13   HEMOGLOBIN g/dL 9 6* 10 1*   HEMATOCRIT % 29 2* 30 2*   PLATELETS Thousands/uL 227 246   NEUTROS PCT % 40*  --    MONOS PCT % 10  --      Results from last 7 days   Lab Units 02/12/23 0440 02/11/23 2122   SODIUM mmol/L 138 136   POTASSIUM mmol/L 3 4* 3 5   CHLORIDE mmol/L 109* 103   CO2 mmol/L 24 27   ANION GAP mmol/L 5 6   BUN mg/dL 20 21   CREATININE mg/dL 1 26 1 57*   CALCIUM mg/dL 8 6 8 3   GLUCOSE RANDOM mg/dL 106 106          Results from last 7 days   Lab Units 02/11/23 2122   INR  1 08   PTT seconds 32              ABG:    VBG:          Micro            Active Medications  Scheduled Meds:  Current Facility-Administered Medications   Medication Dose Route Frequency Provider Last Rate   • acetaminophen  650 mg Oral Q6H PRN Chayito Cyr PA-C     • atorvastatin  40 mg Oral QPM Tommy Tung Swanson PA-C     • chlorhexidine  15 mL Mouth/Throat Q12H [address] Ramya JORDYN Swanson PA-C     • labetalol  10 mg Intravenous Q4H PRN Chayito Cyr PA-C     • metoprolol tartrate  12 5 mg Oral Q12H [address] Ramya R BRAYAN Swanson     • niCARdipine  1-15 mg/hr Intravenous Titrated Tommy Tung Swanson PA-C 2 5 mg/hr (02/12/23 0151)   • ondansetron  4 mg Intravenous Q6H PRN Jodi Nieto JORDYN Swanson PA-C     • pancrelipase (Lip-Prot-Amyl)  12,000 Units Oral TID With Meals Babak Mathew PA-C     • pantoprazole  40 mg Oral Early Morning Tammy Swanson PA-C     • sertraline  25 mg Oral Daily Tammy Swanson PA-C     • sodium chloride  125 mL/hr Intravenous Continuous Babak Mathew PA-C 125 mL/hr (02/12/23 0122)     Continuous Infusions:  niCARdipine, 1-15 mg/hr, Last Rate: 2 5 mg/hr (02/12/23 0151)  sodium chloride, 125 mL/hr, Last Rate: 125 mL/hr (02/12/23 0122)      PRN Meds:   acetaminophen, 650 mg, Q6H PRN  labetalol, 10 mg, Q4H PRN  ondansetron, 4 mg, Q6H PRN        Allergies   No Known Allergies    Advance Directive and Living Will:      Power of :    POLST:        Counseling / Coordination of Care  Total Critical Care time spent 0 minutes excluding procedures, teaching and family updates  Babak Mathew PA-C        Portions of the record may have been created with voice recognition software  Occasional wrong word or "sound a like" substitutions may have occurred due to the inherent limitations of voice recognition software    Read the chart carefully and recognize, using context, where substitutions have occurred

## 2023-02-12 NOTE — QUICK NOTE
Patient's daughter noted that patient has been non-complaint with all of her medications for months  Pharmacy was called which confirmed patient has not picked up medications for many months  When asked directly, patient states that she is compliant with all medications  Family states that is not true, and is a point of contention within the family

## 2023-02-13 ENCOUNTER — APPOINTMENT (INPATIENT)
Dept: NEUROLOGY | Facility: CLINIC | Age: 63
End: 2023-02-13

## 2023-02-13 LAB
ANION GAP SERPL CALCULATED.3IONS-SCNC: 4 MMOL/L (ref 4–13)
ATRIAL RATE: 74 BPM
BUN SERPL-MCNC: 16 MG/DL (ref 5–25)
CALCIUM SERPL-MCNC: 9.4 MG/DL (ref 8.3–10.1)
CHLORIDE SERPL-SCNC: 110 MMOL/L (ref 96–108)
CO2 SERPL-SCNC: 26 MMOL/L (ref 21–32)
CREAT SERPL-MCNC: 1.26 MG/DL (ref 0.6–1.3)
ERYTHROCYTE [DISTWIDTH] IN BLOOD BY AUTOMATED COUNT: 12.9 % (ref 11.6–15.1)
GFR SERPL CREATININE-BSD FRML MDRD: 45 ML/MIN/1.73SQ M
GLUCOSE SERPL-MCNC: 103 MG/DL (ref 65–140)
HCT VFR BLD AUTO: 33.5 % (ref 34.8–46.1)
HGB BLD-MCNC: 10.7 G/DL (ref 11.5–15.4)
MAGNESIUM SERPL-MCNC: 2.2 MG/DL (ref 1.6–2.6)
MCH RBC QN AUTO: 31.5 PG (ref 26.8–34.3)
MCHC RBC AUTO-ENTMCNC: 31.9 G/DL (ref 31.4–37.4)
MCV RBC AUTO: 99 FL (ref 82–98)
P AXIS: 63 DEGREES
PHOSPHATE SERPL-MCNC: 2.9 MG/DL (ref 2.3–4.1)
PLATELET # BLD AUTO: 245 THOUSANDS/UL (ref 149–390)
PMV BLD AUTO: 10.8 FL (ref 8.9–12.7)
POTASSIUM SERPL-SCNC: 4 MMOL/L (ref 3.5–5.3)
PR INTERVAL: 188 MS
QRS AXIS: 9 DEGREES
QRSD INTERVAL: 88 MS
QT INTERVAL: 383 MS
QTC INTERVAL: 425 MS
RBC # BLD AUTO: 3.4 MILLION/UL (ref 3.81–5.12)
SODIUM SERPL-SCNC: 140 MMOL/L (ref 135–147)
T WAVE AXIS: 38 DEGREES
VENTRICULAR RATE: 74 BPM
WBC # BLD AUTO: 7.93 THOUSAND/UL (ref 4.31–10.16)

## 2023-02-13 RX ORDER — HEPARIN SODIUM 5000 [USP'U]/ML
5000 INJECTION, SOLUTION INTRAVENOUS; SUBCUTANEOUS EVERY 8 HOURS SCHEDULED
Status: DISCONTINUED | OUTPATIENT
Start: 2023-02-13 | End: 2023-02-14 | Stop reason: HOSPADM

## 2023-02-13 RX ORDER — HEPARIN SODIUM 5000 [USP'U]/ML
5000 INJECTION, SOLUTION INTRAVENOUS; SUBCUTANEOUS EVERY 8 HOURS SCHEDULED
Status: DISCONTINUED | OUTPATIENT
Start: 2023-02-13 | End: 2023-02-13

## 2023-02-13 RX ORDER — LANOLIN ALCOHOL/MO/W.PET/CERES
100 CREAM (GRAM) TOPICAL DAILY
Status: DISCONTINUED | OUTPATIENT
Start: 2023-02-13 | End: 2023-02-14 | Stop reason: HOSPADM

## 2023-02-13 RX ADMIN — LABETALOL HYDROCHLORIDE 10 MG: 5 INJECTION, SOLUTION INTRAVENOUS at 10:22

## 2023-02-13 RX ADMIN — HEPARIN SODIUM 5000 UNITS: 5000 INJECTION INTRAVENOUS; SUBCUTANEOUS at 14:08

## 2023-02-13 RX ADMIN — ASPIRIN 81 MG: 81 TABLET, CHEWABLE ORAL at 08:28

## 2023-02-13 RX ADMIN — HEPARIN SODIUM 5000 UNITS: 5000 INJECTION INTRAVENOUS; SUBCUTANEOUS at 22:19

## 2023-02-13 RX ADMIN — CYANOCOBALAMIN TAB 500 MCG 1000 MCG: 500 TAB at 10:59

## 2023-02-13 RX ADMIN — ATORVASTATIN CALCIUM 40 MG: 40 TABLET, FILM COATED ORAL at 16:49

## 2023-02-13 RX ADMIN — SERTRALINE 25 MG: 25 TABLET, FILM COATED ORAL at 08:28

## 2023-02-13 RX ADMIN — AMLODIPINE BESYLATE 10 MG: 10 TABLET ORAL at 08:28

## 2023-02-13 RX ADMIN — THIAMINE HCL TAB 100 MG 100 MG: 100 TAB at 10:59

## 2023-02-13 RX ADMIN — PANTOPRAZOLE SODIUM 40 MG: 40 TABLET, DELAYED RELEASE ORAL at 05:00

## 2023-02-13 RX ADMIN — HEPARIN SODIUM 5000 UNITS: 5000 INJECTION INTRAVENOUS; SUBCUTANEOUS at 01:14

## 2023-02-13 RX ADMIN — PANCRELIPASE 12000 UNITS: 30000; 6000; 19000 CAPSULE, DELAYED RELEASE PELLETS ORAL at 11:05

## 2023-02-13 RX ADMIN — PANCRELIPASE 12000 UNITS: 30000; 6000; 19000 CAPSULE, DELAYED RELEASE PELLETS ORAL at 08:38

## 2023-02-13 RX ADMIN — PANCRELIPASE 12000 UNITS: 30000; 6000; 19000 CAPSULE, DELAYED RELEASE PELLETS ORAL at 16:49

## 2023-02-13 NOTE — ASSESSMENT & PLAN NOTE
· EKG and troponin negative x2  · Dissection study negative  · ECHO WNL  · Patient also complaining of anxiety at time of chest pain, possibly related

## 2023-02-13 NOTE — PROGRESS NOTES
[address] [facility]  ICU Transfer  Note - Marine Munoz 1960, 60 y o  female MRN: [id number]  Unit/Bed#: ICU 13 Encounter: [id number]  Primary Care Provider: Zulma Lu MD   Date and time admitted to hospital: 2/11/2023  9:02 PM    * Stroke-like symptoms  Assessment & Plan  · Presented to the ED with complaints of RUE weakness and paresthesias, initial NIHHS 11, CTH w/o hemorrhage, CTA w/o LVO  · Given TNK, 24hr CTH w/o conversion  MRI w/o evidence of ischemia   · ECHO with small PFO  · Continue tele  · ASA, statin     Aortic aneurysm (HCC)  Assessment & Plan  · Known sacular aneurysm, outpatient vascular f/u    Depression  Assessment & Plan  · PTA zoloft    Benign hypertension with CKD (chronic kidney disease), stage II  Assessment & Plan  Lab Results   Component Value Date    EGFR 45 02/12/2023    EGFR 34 02/11/2023    EGFR 44 02/03/2023    CREATININE 1 26 02/12/2023    CREATININE 1 57 (H) 02/11/2023    CREATININE 1 28 02/03/2023     · Monitor RFT and UOP daily  · Was not complaint with amlodipine at home   · Continue amlodipine here  · As patient has hx of neuroendocrine tumor, pheo w/u pending       Code Status: Level 1 - Full Code  POA:    POLST:      Reason for ICU admission:   S/p TNK    Active problems:   Principal Problem:    Stroke-like symptoms  Active Problems:    Benign hypertension with CKD (chronic kidney disease), stage II    Depression    History of neuroendocrine cancer    H/O Whipple procedure    Aortic aneurysm ([address] )  Resolved Problems:    * No resolved hospital problems  *      Consultants:   Neurology     History of Present Illness:   60 y o  female w/ PMHx HPTN, HLD, depression, and neuroendocrine tumor s/p whipple in 08/21 who now presents with stroke like symptoms s/p TNK   Patient reportedly developed right sided weakness and sensory deficits at approx 2300 prompting ED eval  En route to the hopsital developed occipital headache and pleuritic chest pain, she was a pre-hospital stroke alert  CTH negative for hemorrhage CTA negative for LVO  Initial NIHHS 11, given TNK  Patient noted to be hypertensive to 759B systolic, started on cardene for improved control  Upon my eval patient has complaints of ongoing headache that has not changed since presentation  She endorses non-radiating chest pain that worsens when she takes a deep breath  Summary of clinical course:   Symptoms resolved  Chest pain resolved  No active issues  Repeat CTH w/o conversion  Recent or scheduled procedures:   NA    Outstanding/pending diagnostics:   NA    Cultures:   NA       Mobilization Plan:   PT/OT    Nutrition Plan:   Regular diet     Invasive Devices Review  Invasive Devices     Peripheral Intravenous Line  Duration           Peripheral IV 02/11/23 Distal;Left;Upper;Ventral (anterior) Arm 1 day                Rationale for remaining devices: NA    VTE Pharmacologic Prophylaxis: Heparin  VTE Mechanical Prophylaxis: sequential compression device      Spoke with Dr Charles Alas  regarding transfer  Please contact critical care via [employer] with any questions or concerns  Portions of the record may have been created with voice recognition software  Occasional wrong word or "sound a like" substitutions may have occurred due to the inherent limitations of voice recognition software  Read the chart carefully and recognize, using context, where substitutions have occurred

## 2023-02-13 NOTE — UTILIZATION REVIEW
NOTIFICATION OF INPATIENT ADMISSION   AUTHORIZATION REQUEST   SERVICING FACILITY:   [facility]  Address: [address] [place] [address], [address] [place] [address]  Tax ID: [id number]  NPI: [id number] ATTENDING PROVIDER:  Attending Name and NPI#: Alfred Hicks Md [[id number]]  Address: [address] [place] [address], [address] [place] [address]  Phone: [phone]   ADMISSION INFORMATION:  Place of Service: Inpatient [facility]  [address]  Place of Service Code: 21  Inpatient Admission Date/Time: 2/11/23 10:23 PM  Discharge Date/Time: No discharge date for patient encounter  Admitting Diagnosis Code/Description:  Stroke [facility]) [I63 9]  Stroke-like symptoms [R29 90]     UTILIZATION REVIEW CONTACT:  Chancey Landau "Gelene Shell, Utilization   Network Utilization Review Department  Phone: [phone]  Fax: [phone]  Email: [email or web address]  Contact for approvals/pending authorizations, clinical reviews, and discharge  PHYSICIAN ADVISORY SERVICES:  Medical Necessity Denial & [employer] Review  Phone: [phone]  Fax: [phone]  Email: [email or web address]

## 2023-02-13 NOTE — UTILIZATION REVIEW
Initial Clinical Review    Admission: Date/Time/Statement:   Admission Orders (From admission, onward)     Ordered        02/11/23 2223  Inpatient Admission  Once                      Orders Placed This Encounter   Procedures   • Inpatient Admission     Standing Status:   Standing     Number of Occurrences:   1     Order Specific Question:   Level of Care     Answer:   Critical Care [15]     Order Specific Question:   Estimated length of stay     Answer:   More than 2 Midnights     Order Specific Question:   Certification     Answer:   I certify that inpatient services are medically necessary for this patient for a duration of greater than two midnights  See H&P and MD Progress Notes for additional information about the patient's course of treatment  ED Arrival Information     Expected   -    Arrival   2/11/2023 21:02    Acuity   Emergent            Means of arrival   Ambulance    Escorted by   [facility]    Service   Neurology    Admission type   Emergency            Arrival complaint   STROKE ALERT           Chief Complaint   Patient presents with   • [address], CP, R sided numbness/weakness       Initial Presentation: 60 y o  female to ED presents for stroke like symptoms s/p TNK  Pt reportedly developed right sided weakness and sensory deficits at approx 2300 prompting ED eval  En route to the hopsital developed occipital headache and pleuritic chest pain, she was a pre-hospital stroke alert  CTH negative for hemorrhage CTA negative for LVO  Initial NIHHS 11, given TNK  Patient noted to be hypertensive to 887K systolic, started on cardene for improved control  Ongoing headache that has not changed since arrival  Also c/o non-radiating chest pain that worsens when she takes a deep breath  PMH for HPTN, HLD, depression, and neuroendocrine tumor s/p jessica in 08/21  Admit Inpatient level of care for Stroke like symptoms, Chest pain and YULIYA on CKD  S/p TNK @ 2215  Neuro  checks   MRI Brain and Echo  Lipid panel and A1c  Repeat CT Head in 24 hrs psot TNK  Statin  BP control goal SBP <180  Trend troponin  STAT CTA dissection scan in light of aortic aneurysm  BP goal 120-130, Iv Cardene drip  NPO, sips with meds  Creat 1 57 on admit IVFs  On exam; RUE 4/5 weakness  Noted on CTA of the head and neck to have a focal area of ectatic descending aorta consistent with possible ulcerated plaque versus saccular aneurysm  Given insufficient imaging, obtained a CTA for dissection  This shows a stable area of likely penetrating ulcer in that area  Infrarenal aortic changes also stable  No evidence of dissection or bleeding    2/11  Per Neurology; stroke alert on 2/11/23 at 8:55PM w/ NIHSS 11  LKW 30 mins prior to being seen by EMS (8PM)  Initial BP: 212/102  Glucose 162  Initial deficits: s/p syncopal episode, L gaze palsy, RUE, RLE motor weakness, R sided sensory loss  Deemed to be TNK candidate  TNK was given at 10:05 pm  Stroke workup and treat  Goal BP<180/105 for 24 hrs s/p thrombolysis  No AP, AC agents for 24 hrs s/p thrombolysis  Repeat CTH 24 hours after TNK  Neuro checks- q 15 mins x 2 hours then q 30 mins x 6 hours then q1 hour x 24 hours  Telemetry monitoring  Speech eval      Date: 2/12   Day 2:   Progress notes; MRI Brain and Echo pending  CTH pending  Hold AC/AP  Cardene for SBP <180  Resume home [facility] today  Continue IVFs  Continue with neurologic checks Q 1 hour  Plan to initiate anti-platelet therapy with aspirin  Cardene weaned off this am  Po diet per speech  Restart home [facility]    Neurology cons; CTA head and neck was unremarkable  MRI was negative for any acute ischemia, despite ongoing but improved symptom  Echo pending  Low clinical suspicion for TIA given ongoing symptoms and negative head imaging  Instead suspicion for hypertensive urgency +/- migraine features  MA urine 24 hours pending  Catecholamine plus VMA 24 hours urine pending   Metanephrines fractionated urine 24 hours pending  5 HIAA urine quantitative 24 hours pending  Catecholamines fraction urinary pending  Continue tele monitoring  TTE pending  ED Triage Vitals   Temperature Pulse Respirations Blood Pressure SpO2   02/11/23 2110 02/11/23 2055 02/11/23 2055 02/11/23 2055 02/11/23 2055   97 8 °F (36 6 °C) 80 17 (!) 211/93 100 %      Temp Source Heart Rate Source Patient Position - Orthostatic VS BP Location FiO2 (%)   02/11/23 2110 02/11/23 2055 02/12/23 0056 02/12/23 0056 --   Oral Monitor Lying Right arm       Pain Score       02/12/23 0056       5          Wt Readings from Last 1 Encounters:   02/12/23 67 2 kg (148 lb 2 4 oz)     Additional Vital Signs:   02/12/23 0900 -- 66 10   Abnormal  124/53 84 100 % None (Room air) Sitting   02/12/23 0800 97 6 °F (36 4 °C) 64 12 125/52 76 98 % None (Room air) Lying   02/12/23 0700 -- 60 11   Abnormal  129/50 70 99 % -- --   02/12/23 0630 -- 62 14 119/59 80 99 % -- --     02/11/23 2220 -- 74 16 163/67 -- 100 % -- --   02/11/23 2215 -- 74 17 180/73   Abnormal  -- 100 % -- --   02/11/23 2200 -- 74 22 204/88   Abnormal  -- 99 % -- --   02/11/23 2155 -- 74 15 161/72 -- 99 % -- --   02/11/23 2150 -- 74 17 174/73   Abnormal  -- 99 % -- --   02/11/23 2145 -- 74 15 167/74 -- 99 % -- --   02/11/23 2140 -- 76 16 161/70 -- 99 % -- --   02/11/23 2135 -- 74 15 176/79   Abnormal  -- 99 % -- --     Pertinent Labs/Diagnostic Test Results:   CT head wo contrast   Final Result by Macario Hester DO (02/12 2253)      No acute intracranial abnormality  Workstation performed: [id number]         MRI brain wo contrast   Final Result by Joe Boland MD (02/12 0141)      No acute intracranial abnormality  Mild to moderate scattered periventricular and subcortical foci of white matter T2 hyperintensity which is nonspecific and most likely related to chronic small vessel ischemic changes    Other less likely etiologies include demyelinating disease,    vasculitis, Lyme and migraines  Workstation performed: [id number]         CTA dissection protocol chest/abdomen/pelvis   Final Result by Lázaro Cuellar DO (02/12 8005)      Penetrating ulcer versus saccular aneurysm as described above in the inferior portion of the aortic arch  Stable tiny pulmonary nodules  Thickening versus underdistention of the gastric wall which may represent gastritis  Workstation performed: [id number]         XR chest portable   Final Result by Tyrone Sheldon MD (02/12 0930)      No acute cardiopulmonary disease  Workstation performed: [id number]         CTA stroke alert (head/neck)   Final Result by Lázaro Cuellar DO (02/11 2153)      No evidence of large vessel occlusion  If symptoms persist MRI should be obtained      Focal saccular aneurysm versus penetrating ulcer of the aortic arch measuring 1 cm  Follow-up with vascular is recommended  I personally discussed this study with Dr Carmencita Chaudhari on 2/11/2023 at 9:50 PM                                Workstation performed: [id number]         CT stroke alert brain   Final Result by Lázaro Cuellar DO (02/11 2120)      No acute intracranial abnormality  I personally discussed this study with Dr Carmencita Chaudhari on 2/11/2023 at 9:18 PM                 Workstation performed: [id number]           2/12  Echo - Left Ventricle: Left ventricular cavity size is small  Wall thickness is moderately increased  There is moderate concentric hypertrophy  The left ventricular ejection fraction is 70% by visual estimation  Systolic function is vigorous  Wall motion is normal  Diastolic function is mildly abnormal, consistent with grade I (abnormal) relaxation  There is mid-cavity dynamic obstruction with valsalva with a peak gradient of 58 0 mmHg  •  Atrial Septum: There is a small and patent foramen ovale confirmed at rest with bidirectional shunting using saline contrast injection  •  Aortic Valve:  The aortic valve is trileaflet  The leaflets are moderately thickened  There is mildly reduced mobility  There is mild stenosis        Results from last 7 days   Lab Units 02/11/23 2122   SARS-COV-2  Negative     Results from last 7 days   Lab Units 02/13/23 0442 02/12/23 0440 02/11/23 2122   WBC Thousand/uL 7 93 9 87 9 13   HEMOGLOBIN g/dL 10 7* 9 6* 10 1*   HEMATOCRIT % 33 5* 29 2* 30 2*   PLATELETS Thousands/uL 245 227 246   NEUTROS ABS Thousands/µL  --  3 95  --          Results from last 7 days   Lab Units 02/13/23 0442 02/12/23 0440 02/11/23 2122   SODIUM mmol/L 140 138 136   POTASSIUM mmol/L 4 0 3 4* 3 5   CHLORIDE mmol/L 110* 109* 103   CO2 mmol/L 26 24 27   ANION GAP mmol/L 4 5 6   BUN mg/dL 16 20 21   CREATININE mg/dL 1 26 1 26 1 57*   EGFR ml/min/1 73sq m 45 45 34   CALCIUM mg/dL 9 4 8 6 8 3   MAGNESIUM mg/dL 2 2  --   --    PHOSPHORUS mg/dL 2 9  --   --          Results from last 7 days   Lab Units 02/11/23 2101   POC GLUCOSE mg/dl 145*     Results from last 7 days   Lab Units 02/13/23 0442 02/12/23 0440 02/11/23 2122   GLUCOSE RANDOM mg/dL 103 106 106         Results from last 7 days   Lab Units 02/12/23 0440   HEMOGLOBIN A1C % 5 3   EAG mg/dl 105       Results from last 7 days   Lab Units 02/12/23  1848 02/12/23  0143 02/11/23  2321 02/11/23 2122   HS TNI 0HR ng/L 10  --   --  9   HS TNI 2HR ng/L  --   --  11  --    HSTNI D2 ng/L  --   --  2  --    HS TNI 4HR ng/L  --  15  --   --    HSTNI D4 ng/L  --  6  --   --          Results from last 7 days   Lab Units 02/11/23 2122   PROTIME seconds 14 2   INR  1 08   PTT seconds 32         Results from last 7 days   Lab Units 02/11/23 2122   INFLUENZA A PCR  Negative   INFLUENZA B PCR  Negative   RSV PCR  Negative       ED Treatment:   Medication Administration from 02/11/2023 2048 to 02/12/2023 0055       Date/Time Order Dose Route Action     02/11/2023 2109 EST iohexol (OMNIPAQUE) 350 MG/ML injection (SINGLE-DOSE) 85 mL 85 mL Intravenous Given     02/11/2023 2207 EST niCARdipine (CARDENE) 25 mg (STANDARD CONCENTRATION) in sodium chloride 0 9% 250 mL 2 5 mg/hr Intravenous New Bag     02/11/2023 2205 EST tenecteplase (TNKase) injection 18 mg 18 mg Intravenous Given     02/11/2023 2125 EST labetalol (NORMODYNE) injection 20 mg 20 mg Intravenous Given     02/11/2023 2203 EST labetalol (NORMODYNE) injection 20 mg 20 mg Intravenous Given     02/12/2023 0012 EST iohexol (OMNIPAQUE) 350 MG/ML injection (SINGLE-DOSE) 85 mL 85 mL Intravenous Given        Past Medical History:   Diagnosis Date   • Back ache    • Cancer (HCC)    • Hyperlipidemia    • Hypertension      Present on Admission:  • Benign hypertension with CKD (chronic kidney disease), stage II  • Depression  • Stroke-like symptoms      Admitting Diagnosis: Stroke ([facility] [address] ) [I63 9]  Stroke-like symptoms [R29 90]  Age/Sex: 60 y o  female     Admission Orders:  Scheduled Medications:  amLODIPine, 10 mg, Oral, Daily  aspirin, 81 mg, Oral, Daily  atorvastatin, 40 mg, Oral, QPM  vitamin B-12, 1,000 mcg, Oral, Daily  heparin (porcine), 5,000 Units, Subcutaneous, Q8H ABDIFATAH  pancrelipase (Lip-Prot-Amyl), 12,000 Units, Oral, TID With Meals  pantoprazole, 40 mg, Oral, Early Morning  sertraline, 25 mg, Oral, Daily  thiamine, 100 mg, Oral, Daily      Continuous IV Infusions:    niCARdipine (CARDENE) 25 mg (STANDARD CONCENTRATION) in sodium chloride 0 9% 250 mL  Rate:  mL/hr Dose: 1-15 mg/hr  Freq: Titrated Route: IV  Last Dose: Stopped (02/12/23 0800)  Start: 02/11/23 2115 End: 02/12/23 1945    sodium chloride 0 9 % infusion  Rate: 125 mL/hr Dose: 125 mL/hr  Freq: Continuous Route: IV  Last Dose: Stopped (02/12/23 0800)  Start: 02/11/23 2245 End: 02/12/23 2327      PRN Meds:  acetaminophen, 650 mg, Oral, Q6H PRN  labetalol, 10 mg, Intravenous, Q4H PRN  ondansetron, 4 mg, Intravenous, Q6H PRN        IP CONSULT TO NEUROLOGY  IP CONSULT TO PHYSICAL MEDICINE REHAB  IP CONSULT TO CASE MANAGEMENT  IP CONSULT TO [address] [place] [address] Utilization Review Department  ATTENTION: Please call with any questions or concerns to [phone] and carefully listen to the prompts so that you are directed to the right person  All voicemails are confidential   Sherry Workman all requests for admission clinical reviews, approved or denied determinations and any other requests to dedicated fax number below belonging to the campus where the patient is receiving treatment   List of dedicated fax numbers for the Facilities:  [address] DENIALS (Administrative/Medical Necessity) [phone]   [address] (Maternity/NICU/Pediatrics) [phone]   1 Danika Donahue [phone]   [facility] [address] [phone]   [address] [address] [facility] [phone]   [facility] [place] [facility] [address] [facility] [phone]

## 2023-02-13 NOTE — CASE MANAGEMENT
Case Management Discharge Planning Note    Patient name Aleshia Maldonado  Location [address] 703/PPHP 601-51 MRN [id number]  : 1960 Date 2023       Current Admission Date: 2023  Current Admission Diagnosis:Stroke-like symptoms   Patient Active Problem List    Diagnosis Date Noted   • Aortic aneurysm ([address] ) 2023   • Encounter for follow-up surveillance of neuroendocrine carcinoma 2022   • Pancreatic insufficiency 2021   • Diarrhea 2021   • Hypertension 2021   • H/O Whipple procedure 2021   • Personal history of breast cancer 11/15/2021   • Anemia 11/15/2021   • History of neuroendocrine cancer 2021   • Stage 2 chronic kidney disease 2021   • Pancreatic mass 2021   • Leukocytosis 2021   • Depression 2021   • Goals of care, counseling/discussion 2021   • Tarsal tunnel syndrome, left    • Hyperlipemia 2019   • Stroke-like symptoms 2019   • Incidental pulmonary nodule 2019   • Noncompliance with medications 2018   • Chest pain 2018   • Acute low back pain due to trauma 2018   • Benign hypertension with CKD (chronic kidney disease), stage II 10/27/2017   • Vision disturbance 10/27/2017      LOS (days): 2  Geometric Mean LOS (GMLOS) (days):   Days to GMLOS:     OBJECTIVE:  Risk of Unplanned Readmission Score: 12 61         Current admission status: Inpatient   Preferred Pharmacy:   [address] [place] [address], [address] [place] [id number]  Phone: [phone] Fax: [phone]    [address] [place], [place] [address] [facility] [address]  Phone: [phone] Fax: [phone]    Primary Care Provider: Kiera Burnette MD    Primary Insurance: [address] [place] [address]  Secondary Insurance:     DISCHARGE DETAILS:           Additional Comments: CM is aware the pt transferred from ICU to P7 on 2/13  LOS upon service transfer is two days  Previous CM documentation was reviewed and is appreciated  PT/OT evaluated the pt on 2/12, and recommend post acute rehab  CM forwarded proactive SNF referrals to facilities within a 10mile radius of the pt's home address and IRF referrals to [facility] and [facility] located within 10 miles of the pt's home address  CM will introduce self the pt/family, explain supportive disposition planning role and continue to follow for post acute care planning needs

## 2023-02-13 NOTE — PROGRESS NOTES
NEUROLOGY RESIDENCY PROGRESS NOTE     Name: Kp Angulo   Age & Sex: 60 y o  female   MRN: [id number]  Unit/Bed#: [facility] 703-01   Encounter: [id number]    Kp Angulo will need follow up in in 4 weeks with neurovascular AP  She will not require outpatient neurological testing  Pending for discharge: vEEG    ASSESSMENT & PLAN     * Stroke-like symptoms  Assessment & Plan  Assessment:  Patient is a 59-year-old right-handed Bulgarian-speaking female with a history of hypertension, hyperlipidemia, neuroendocrine tumor of pancreas that presented as a stroke alert on 2/11/23 at 2055 with an initial NIHSS of 11  Her last known normal was 30 minutes prior to being seen by EMS which is around 2000  Initial blood pressure 212/102  Initial glucose 162  Initial deficits: Status post syncopal episode, left gaze palsy, right upper and right lower extremity motor weakness, right-sided sensory loss (right V1 V2 V3, right upper extremity, right lower extremity)  Initial EKG demonstrated normal sinus rhythm with a rate in the 70s, patient does not take any antiplatelet agents or anticoagulation agents at her baseline  No ongoing contrast CT head was unremarkable  CTA head and neck demonstrated no large vessel occlusion, however a 1 cm saccular aneurysm at the aortic arch  The patient was deemed to be a tenecteplase candidate (patient was with an appropriate time window, demonstrated no obvious contraindications), tenecteplase was administered at 2205  Patient was thusly transferred to ICU for continued care after tenecteplase administration  With current work-up being unrevealing of any etiology of stroke, a routine EEG will be completed to screen for abnormal electrographic activity  Imaging:  - CTA dissection protocol: Penetrating ulcer versus saccular aneurysm as described above in the inferior portion of the aortic arch  Stable tiny pulmonary nodules   Thickening versus underdistention of the gastric wall which may represent gastritis  - MRI Brain wo: No acute intracranial abnormality  Mild to moderate scattered periventricular and subcortical foci of white matter T2 hyperintensity which is nonspecific and most likely related to chronic small vessel ischemic changes  Other less likely etiologies include demyelinating disease, vasculitis, Lyme and migraines  - TTE: Left ventricular cavity size is small  Wall thickness is moderately increased  There is moderate concentric hypertrophy  The left ventricular ejection fraction is 70% by visual estimation  Systolic function is vigorous  Wall motion is normal  Diastolic function is mildly abnormal, consistent with grade I (abnormal) relaxation  There is mid-cavity dynamic obstruction with valsalva with a peak gradient of 58 0 mmHg  Atrial Septum: There is a small and patent foramen ovale confirmed at rest with bidirectional shunting using saline contrast injection  Aortic Valve: The aortic valve is trileaflet  The leaflets are moderately thickened  There is mildly reduced mobility  There is mild stenosis  Compared to prior study dated 07/19/2019, the AS severity is similar; bubble study in present study reveals PFO; Valsalva provocation in present study reveals dynamic mid-cavitary obstructive gradient       Scores:   - NIHSS: 11 (L gaze preference, R facial droop, LUE, LLE> RUE, RLE motor weakness, R sided sensory loss, mild dysarthria)  Stroke Modified Hancock Score: 0 (No baseline symptoms/disability)   -ASPECT: 10    Studies:  - APTT 32  - PT 14 2, INR 1 08  - 4 Plex negative  - CBC mild anemia  - Potassium 3 4, BUN 20, creatinine 1 26  - Cholesterol 131, triglycerides 147, HDL 31, LDL 71  - Hemoglobin A1c 5 3  - Chromogranin A: 91 1  - Catecholamines fraction urinary pending    Impression: Considering current clinical presentation of continued right-sided V1 V2 V3, right upper extremity, right lower extremity paresthesias (numbness), and imaging thus far not suggestive of an acute infarct or large vessel occlusion a transient ischemic attack would not fit the criteria, stroke (even with negative imaging) cannot be ruled out in the setting that the embolus may have been dissolved with TNK administration however this would not explain residual right hand decided symptoms  CTA head and neck currently not being demonstrative of intracranial source  ABRIL demonstrating an LVEF of 70% with grade 1 diastolic dysfunction, and a small PFO  Thus far work-up has not been demonstrative of an etiology for the vascular event  A routine EEG is ordered as well for continued work-up  Plan, Stroke pathway sp thrombolysis:  - rEEG ordered  - Goal BP: Normotension  - Continue ASA 81mg PO QD  - Start Vitamin B12 1000mcg PO QD  - Start Thiamine 100mg PO ID  - STAT CTH wo contrast in case of change in mental status, severe headache, acute increase in BP, N/V or if GCS drops 2pts in 1hr   - Continue Neuro checks  - Continue telemetry monitoring  - Continue Normothermia  - Maintain glucose <180, SSI for coverage if indicated  - Hepatin DVT ppx  - PT/OT/Speech/PM&R evals    SUBJECTIVE     Patient was seen and examined during visitation and during rounds with attending  The patient did not demonstrate any acute discomfort or pain and did not report any pain, headache, chest pain, shortness of breath, abdominal pain, nausea, vomiting, diarrhea, constipation, bowel bladder incontinence, new changes in sensorium  The patient is able to feed, urinate, defecate, and is unable to ambulate with assistance  During examination today the patient reports that she is feeling much better and near to her usual state of health  Review of Systems   Constitutional: Negative for chills and fever  HENT: Negative for ear pain and sore throat  Eyes: Negative for pain and visual disturbance  Respiratory: Negative for cough and shortness of breath      Cardiovascular: Negative for chest pain and palpitations  Gastrointestinal: Negative for abdominal pain, constipation, diarrhea, nausea and vomiting  Genitourinary: Negative for dysuria and hematuria  Musculoskeletal: Negative for arthralgias, back pain, neck pain and neck stiffness  Skin: Negative for color change and rash  Neurological: Positive for numbness  Negative for dizziness, tremors, seizures, syncope, facial asymmetry, speech difficulty, weakness, light-headedness and headaches  Psychiatric/Behavioral: Negative for agitation, behavioral problems, confusion, decreased concentration, dysphoric mood, hallucinations, self-injury, sleep disturbance and suicidal ideas  The patient is not nervous/anxious and is not hyperactive  OBJECTIVE     Patient ID: Dayanna Mota is a 60 y o  female  Vitals:    23 0500 23 0832 23 1020 23 1100   BP:  (!) 171/67 163/70 156/85   BP Location:       Pulse: 70  65 72   Resp:   17    Temp:       TempSrc:       SpO2: 98%  98% 98%   Weight:       Height:          Temperature:   Temp (24hrs), Av 2 °F (36 8 °C), Min:97 9 °F (36 6 °C), Max:98 6 °F (37 °C)    Temperature: 98 6 °F (37 °C)    Physical Exam  Vitals and nursing note reviewed  HENT:      Head: Normocephalic  Nose: Nose normal       Mouth/Throat:      Mouth: Mucous membranes are moist       Pharynx: Oropharynx is clear  No oropharyngeal exudate  Eyes:      Extraocular Movements: Extraocular movements intact and EOM normal       Conjunctiva/sclera: Conjunctivae normal       Pupils: Pupils are equal, round, and reactive to light  Cardiovascular:      Rate and Rhythm: Normal rate  Pulmonary:      Effort: Pulmonary effort is normal    Abdominal:      General: There is no distension  Musculoskeletal:         General: Normal range of motion  Cervical back: Normal range of motion  Skin:     General: Skin is warm  Capillary Refill: Capillary refill takes less than 2 seconds     Neurological: Mental Status: She is alert  Cranial Nerves: Cranial nerve deficit present  Sensory: Sensory deficit present  Coordination: Coordination normal       Deep Tendon Reflexes: Reflexes normal    Psychiatric:         Mood and Affect: Mood normal          Speech: Speech normal         Neurologic Exam     Mental Status   Oriented to person  Oriented to place  Oriented to year, month and date  Follows 2 step commands  Attention: normal  Concentration: normal    Speech: speech is normal   Level of consciousness: alert  Patient was alert and oriented to person, place, time, and event  The patient was able to follow two-step commands  Patient's attention and concentration was normal   Speech was normal       Cranial Nerves     CN II   Right visual field deficit: none  Left visual field deficit: none     CN III, IV, VI   Pupils are equal, round, and reactive to light  Extraocular motions are normal    Right pupil: Size: 3 mm  Shape: regular  Reactivity: brisk  Accommodation: intact  Left pupil: Size: 3 mm  Shape: regular  Reactivity: brisk  Accommodation: intact  CN III: no CN III palsy  CN VI: no CN VI palsy  Nystagmus: none   Diplopia: none    CN V   Right facial sensation deficit: complete  Left facial sensation deficit: none    CN VII   Right facial weakness: none  Left facial weakness: none    CN VIII   Hearing: intact    CN IX, X   Palate: symmetric    CN XI   Right sternocleidomastoid strength: normal  Left sternocleidomastoid strength: normal  Right trapezius strength: normal  Left trapezius strength: normal    CN XII   Tongue: not atrophic  Fasciculations: absent  Tongue deviation: none    Motor Exam   Muscle bulk: normal  Overall muscle tone: normal  Right arm pronator drift: absent  Left arm pronator drift: absent  Right leg tone: normal  Left leg tone: normal  Patient was able to elevate all 4 extremities for more than 10 seconds without difficulty       Sensory Exam   Patient reported that on the right V1 V2 V3, entire right upper extremity, entire right lower extremity that sensation is decreased compared to the contralateral side  Gait, Coordination, and Reflexes   Gait examination was deferred  Finger-to-nose and heel-to-shin was normal bilaterally  No resting tremor or action tremor noticed  Reflexes were 2+ bilaterally in brachioradialis, biceps, triceps, patellar, Achilles  Plantar reflexes were downgoing bilaterally no Bingham's was present bilaterally  No clonus was not noticed bilaterally  LABORATORY DATA     Labs: I have personally reviewed pertinent reports  Results from last 7 days   Lab Units 02/13/23 0442 02/12/23 0440 02/11/23 2122   WBC Thousand/uL 7 93 9 87 9 13   HEMOGLOBIN g/dL 10 7* 9 6* 10 1*   HEMATOCRIT % 33 5* 29 2* 30 2*   PLATELETS Thousands/uL 245 227 246   NEUTROS PCT %  --  40*  --    MONOS PCT %  --  10  --       Results from last 7 days   Lab Units 02/13/23 0442 02/12/23 0440 02/11/23 2122   SODIUM mmol/L 140 138 136   POTASSIUM mmol/L 4 0 3 4* 3 5   CHLORIDE mmol/L 110* 109* 103   CO2 mmol/L 26 24 27   BUN mg/dL 16 20 21   CREATININE mg/dL 1 26 1 26 1 57*   CALCIUM mg/dL 9 4 8 6 8 3     Results from last 7 days   Lab Units 02/13/23 0442   MAGNESIUM mg/dL 2 2     Results from last 7 days   Lab Units 02/13/23 0442   PHOSPHORUS mg/dL 2 9      Results from last 7 days   Lab Units 02/11/23 2122   INR  1 08   PTT seconds 32               IMAGING & DIAGNOSTIC TESTING     Radiology Results: I have personally reviewed pertinent reports  CT head wo contrast   Final Result by Macario Hester DO (02/12 2253)      No acute intracranial abnormality  Workstation performed: [id number]         MRI brain wo contrast   Final Result by Joe Boland MD (02/12 0141)      No acute intracranial abnormality        Mild to moderate scattered periventricular and subcortical foci of white matter T2 hyperintensity which is nonspecific and most likely related to chronic small vessel ischemic changes  Other less likely etiologies include demyelinating disease,    vasculitis, Lyme and migraines  Workstation performed: [id number]         CTA dissection protocol chest/abdomen/pelvis   Final Result by Aureliano Ayala DO (02/12 5123)      Penetrating ulcer versus saccular aneurysm as described above in the inferior portion of the aortic arch  Stable tiny pulmonary nodules  Thickening versus underdistention of the gastric wall which may represent gastritis  Workstation performed: [id number]         XR chest portable   Final Result by Jessica Yoon MD (02/12 0930)      No acute cardiopulmonary disease  Workstation performed: [id number]         CTA stroke alert (head/neck)   Final Result by Aureliano Ayala DO (02/11 2153)      No evidence of large vessel occlusion  If symptoms persist MRI should be obtained      Focal saccular aneurysm versus penetrating ulcer of the aortic arch measuring 1 cm  Follow-up with vascular is recommended  I personally discussed this study with Dr Candi Saldana on 2/11/2023 at 9:50 PM                                Workstation performed: [id number]         CT stroke alert brain   Final Result by Aureliano Ayala DO (02/11 2120)      No acute intracranial abnormality  I personally discussed this study with Dr Candi Saldana on 2/11/2023 at 9:18 PM                 Workstation performed: [id number]           Other Diagnostic Testing: I have personally reviewed pertinent reports        ACTIVE MEDICATIONS     Current Facility-Administered Medications   Medication Dose Route Frequency   • acetaminophen (TYLENOL) tablet 650 mg  650 mg Oral Q6H PRN   • amLODIPine (NORVASC) tablet 10 mg  10 mg Oral Daily   • aspirin chewable tablet 81 mg  81 mg Oral Daily   • atorvastatin (LIPITOR) tablet 40 mg  40 mg Oral QPM   • cyanocobalamin (VITAMIN B-12) tablet 1,000 mcg  1,000 mcg Oral Daily • heparin (porcine) subcutaneous injection 5,000 Units  5,000 Units Subcutaneous Q8H [address]   • labetalol (NORMODYNE) injection 10 mg  10 mg Intravenous Q4H PRN   • ondansetron (ZOFRAN) injection 4 mg  4 mg Intravenous Q6H PRN   • pancrelipase (Lip-Prot-Amyl) (CREON) delayed release capsule 12,000 Units  12,000 Units Oral TID With Meals   • pantoprazole (PROTONIX) EC tablet 40 mg  40 mg Oral Early Morning   • sertraline (ZOLOFT) tablet 25 mg  25 mg Oral Daily   • thiamine tablet 100 mg  100 mg Oral Daily     Prior to Admission medications    Medication Sig Start Date End Date Taking? Authorizing Provider   amLODIPine (NORVASC) 10 mg tablet Take 1 tablet (10 mg total) by mouth in the morning  5/13/22   Giovana Daniels MD   atorvastatin (LIPITOR) 40 mg tablet Take 1 tablet (40 mg total) by mouth every evening 5/13/22   Giovana Daniels MD   cholestyramine Scharlene Boy) 4 GM/DOSE powder Take 1 packet (4 g total) by mouth in the morning and 1 packet (4 g total) at noon and 1 packet (4 g total) in the evening  Take with meals  5/13/22   Dipika Daniels MD   clobetasol (TEMOVATE) 0 05 % ointment Apply twice a day to rash on hands for 2 weeks  Then, apply twice a day from Monday-Friday for another 2 weeks  Avoid the face and groin 6/16/21   Fina Tavarez MD   hydrOXYzine HCL (ATARAX) 25 mg tablet Take 25 mg by mouth daily at bedtime as needed 11/23/19   Historical Provider, MD   lisinopril (ZESTRIL) 40 mg tablet Take 1 tablet (40 mg total) by mouth in the morning   5/13/22   Dipika Daniels MD   pancrelipase, Lip-Prot-Amyl, (CREON) 12,000 units capsule Take 12,000 units of lipase by mouth 3 (three) times a day with meals 7/25/22   Kala Louise MD   pancrelipase, Lip-Prot-Amyl, (CREON) 6,000 units delayed release capsule Take 2 capsules (12,000 Units total) by mouth 3 (three) times a day with meals 7/25/22   Kala Louise MD   pantoprazole (PROTONIX) 40 mg tablet take 1 tablet by mouth daily IN THE EARLY MORNING 6/28/22 Bob Daniels MD   potassium chloride (K-DUR,KLOR-CON) 10 mEq tablet Take 2 tablets (20 mEq total) by mouth in the morning  5/13/22   Bob Daniels MD   sertraline (ZOLOFT) 25 mg tablet Take 1 tablet (25 mg total) by mouth in the morning   5/13/22   Bob Daniels MD   traZODone (DESYREL) 50 mg tablet take 1/2 tablet by mouth at bedtime if needed for insomnia 4/9/21   Historical Provider, MD   vitamin B-12 (CYANOCOBALAMIN) 500 MCG TABS Take 1 tablet (500 mcg total) by mouth daily 5/26/22   Leah Ibarra PA-C     VTE Pharmacologic Prophylaxis: Heparin  VTE Mechanical Prophylaxis: sequential compression device    ==  DO Carolee Diaz [facility] Neurology Residency, PGY-2

## 2023-02-13 NOTE — ASSESSMENT & PLAN NOTE
· Presented to the ED with complaints of RUE weakness and paresthesias, initial NIHHS 11, CTH w/o hemorrhage, CTA w/o LVO  · Given TNK, 24hr CTH w/o conversion   MRI w/o evidence of ischemia   · ECHO with small PFO  · Continue tele  · ASA, statin

## 2023-02-13 NOTE — PROGRESS NOTES
[address] [facility]  Progress Note - Arvid John Day 1960, 60 y o  female MRN: [id number]  Unit/Bed#: ICU 13 Encounter: [id number]  Primary Care Provider: Cheryle Nodal, MD   Date and time admitted to hospital: 2/11/2023  9:02 PM    * Stroke-like symptoms  Assessment & Plan  · Presented to the ED with complaints of RUE weakness and paresthesias, initial NIHHS 11, CTH w/o hemorrhage, CTA w/o LVO  · Given TNK, 24hr CTH w/o conversion  MRI w/o evidence of ischemia   · ECHO with small PFO  · Continue tele  · ASA, statin     Aortic aneurysm (HCC)  Assessment & Plan  · Known sacular aneurysm, outpatient vascular f/u    Depression  Assessment & Plan  · PTA zoloft    Chest pain  Assessment & Plan  · EKG and troponin negative x2  · Dissection study negative  · ECHO WNL  · Patient also complaining of anxiety at time of chest pain, possibly related    Benign hypertension with CKD (chronic kidney disease), stage II  Assessment & Plan  Lab Results   Component Value Date    EGFR 45 02/12/2023    EGFR 34 02/11/2023    EGFR 44 02/03/2023    CREATININE 1 26 02/12/2023    CREATININE 1 57 (H) 02/11/2023    CREATININE 1 28 02/03/2023     · Monitor RFT and UOP daily  · Was not complaint with amlodipine at home   · Continue amlodipine here  · As patient has hx of neuroendocrine tumor, pheo w/u pending       ----------------------------------------------------------------------------------------  HPI/24hr events: 60 y  o  female w/ PMHx HPTN, HLD, depression, and neuroendocrine tumor s/p whipple in 08/21 who now presents with stroke like symptoms s/p TNK    CTH reviewed, no conversion  Started on ASA and SQH for DVT PPX  Transferred to floor on neurology service, awaiting bed placement    Patient appropriate for transfer out of the ICU today?: Patient does not meet criteria for referral to the ICU Follow-Up Clinic; referral has not been made     Disposition: Transfer to Med-Surg   Code Status: Level 1 - Full Code  ---------------------------------------------------------------------------------------  SUBJECTIVE    Review of Systems   Cardiovascular: Negative for chest pain  Neurological: Negative for headaches  Review of systems was reviewed and negative unless stated above in HPI/24-hour events   ---------------------------------------------------------------------------------------  OBJECTIVE    Vitals   Vitals:    23 2100 23 2103 23 2217 23 2300   BP:  144/61 149/53 162/64   BP Location:       Pulse: 66 72 64 68   Resp: 18 22 16 16   Temp:    98 6 °F (37 °C)   TempSrc:    Oral   SpO2:  98% 99% 99%   Weight:       Height:         Temp (24hrs), Av °F (36 7 °C), Min:97 6 °F (36 4 °C), Max:98 6 °F (37 °C)  Current: Temperature: 98 6 °F (37 °C)          Respiratory:  SpO2: SpO2: 99 %       Invasive/non-invasive ventilation settings   Respiratory    Lab Data (Last 4 hours)    None         O2/Vent Data (Last 4 hours)    None                Physical Exam  Constitutional:       Appearance: She is obese  HENT:      Head: Normocephalic  Mouth/Throat:      Mouth: Mucous membranes are moist    Eyes:      Pupils: Pupils are equal, round, and reactive to light  Cardiovascular:      Rate and Rhythm: Normal rate and regular rhythm  Pulmonary:      Effort: Pulmonary effort is normal       Breath sounds: Normal breath sounds  Abdominal:      General: There is distension  Palpations: Abdomen is soft  Tenderness: There is no abdominal tenderness  Musculoskeletal:      Cervical back: Normal range of motion  Skin:     General: Skin is warm  Capillary Refill: Capillary refill takes less than 2 seconds  Neurological:      General: No focal deficit present  Mental Status: She is alert                 Laboratory and Diagnostics:  Results from last 7 days   Lab Units 23  0440 23  2122   WBC Thousand/uL 9 87 9 13   HEMOGLOBIN g/dL 9 6* 10 1* HEMATOCRIT % 29 2* 30 2*   PLATELETS Thousands/uL 227 246   NEUTROS PCT % 40*  --    MONOS PCT % 10  --      Results from last 7 days   Lab Units 02/12/23  0440 02/11/23 2122   SODIUM mmol/L 138 136   POTASSIUM mmol/L 3 4* 3 5   CHLORIDE mmol/L 109* 103   CO2 mmol/L 24 27   ANION GAP mmol/L 5 6   BUN mg/dL 20 21   CREATININE mg/dL 1 26 1 57*   CALCIUM mg/dL 8 6 8 3   GLUCOSE RANDOM mg/dL 106 106          Results from last 7 days   Lab Units 02/11/23 2122   INR  1 08   PTT seconds 32              ABG:    VBG:          Micro            Intake and Output  I/O       02/11 0701 02/12 0700 02/12 0701 02/13 0700    P  O   720    I V  (mL/kg)  350 (5 2)    Total Intake(mL/kg)  1070 (15 9)    Urine (mL/kg/hr)  950 (0 6)    Total Output  950    Net  +120          Unmeasured Urine Occurrence 2 x 3 x    Unmeasured Stool Occurrence 1 x           Height and Weights   Height: 5' 3" (160 cm)  IBW (Ideal Body Weight): 52 4 kg  Body mass index is 26 24 kg/m²  Weight (last 2 days)     Date/Time Weight    02/12/23 1100 67 2 (148 15)    02/12/23 0131 67 2 (148 15)    02/11/23 2224 67 2 (148 15)    02/11/23 2055 72 3 (159 39)            Nutrition       Diet Orders   (From admission, onward)             Start     Ordered    02/12/23 0617  Diet Regular; Regular House  Diet effective now        References:    Nutrtion Support Algorithm Enteral vs  Parenteral   Question Answer Comment   Diet Type Regular    Regular Regular House    RD to adjust diet per protocol?  Yes        02/12/23 0616                  Active Medications  Scheduled Meds:  Current Facility-Administered Medications   Medication Dose Route Frequency Provider Last Rate   • acetaminophen  650 mg Oral Q6H PRN Severiano Mora PA-C     • amLODIPine  10 mg Oral Daily Fidel Berger MD     • aspirin  81 mg Oral Daily Alice Swanson PA-C     • atorvastatin  40 mg Oral QPM Alice Swanson PA-C     • heparin (porcine)  5,000 Units Subcutaneous [facility] Alice Rose BRAYAN Swanson     • labetalol  10 mg Intravenous Q4H PRN Lissabutch Manzanares PA-C     • ondansetron  4 mg Intravenous Q6H PRN Lissabutch Manzanares PA-C     • pancrelipase (Lip-Prot-Amyl)  12,000 Units Oral TID With Meals Lissa Manzanares PA-C     • pantoprazole  40 mg Oral Early Morning Oneda Dunia BRAYAN Swanson     • sertraline  25 mg Oral Daily Ramya R BRAYAN Swanson       Continuous Infusions:     PRN Meds:   acetaminophen, 650 mg, Q6H PRN  labetalol, 10 mg, Q4H PRN  ondansetron, 4 mg, Q6H PRN        Invasive Devices Review  Invasive Devices     Peripheral Intravenous Line  Duration           Peripheral IV 02/11/23 Distal;Left;Upper;Ventral (anterior) Arm 1 day                Rationale for remaining devices: NA  ---------------------------------------------------------------------------------------  Advance Directive and Living Will:      Power of :    POLST:    ---------------------------------------------------------------------------------------  Care Time Delivered:   No Critical Care time spent       Lissa Manzanares PA-C      Portions of the record may have been created with voice recognition software  Occasional wrong word or "sound a like" substitutions may have occurred due to the inherent limitations of voice recognition software    Read the chart carefully and recognize, using context, where substitutions have occurred

## 2023-02-13 NOTE — PLAN OF CARE
Problem: MOBILITY - ADULT  Goal: Maintain or return to baseline ADL function  Description: INTERVENTIONS:  -  Assess patient's ability to carry out ADLs; assess patient's baseline for ADL function and identify physical deficits which impact ability to perform ADLs (bathing, care of mouth/teeth, toileting, grooming, dressing, etc )  - Assess/evaluate cause of self-care deficits   - Assess range of motion  - Assess patient's mobility; develop plan if impaired  - Assess patient's need for assistive devices and provide as appropriate  - Encourage maximum independence but intervene and supervise when necessary  - Involve family in performance of ADLs  - Assess for home care needs following discharge   - Consider OT consult to assist with ADL evaluation and planning for discharge  - Provide patient education as appropriate  Outcome: Progressing  Goal: Maintains/Returns to pre admission functional level  Description: INTERVENTIONS:  - Perform BMAT or MOVE assessment daily    - Set and communicate daily mobility goal to care team and patient/family/caregiver     - Collaborate with rehabilitation services on mobility goals if consulted  - Out of bed for toileting  - Record patient progress and toleration of activity level   Outcome: Progressing     Problem: Prexisting or High Potential for Compromised Skin Integrity  Goal: Skin integrity is maintained or improved  Description: INTERVENTIONS:  - Identify patients at risk for skin breakdown  - Assess and monitor skin integrity  - Assess and monitor nutrition and hydration status  - Monitor labs   - Assess for incontinence   - Turn and reposition patient  - Assist with mobility/ambulation  - Relieve pressure over bony prominences  - Avoid friction and shearing  - Provide appropriate hygiene as needed including keeping skin clean and dry  - Evaluate need for skin moisturizer/barrier cream  - Collaborate with interdisciplinary team   - Patient/family teaching  - Consider wound care consult   Outcome: Progressing     Problem: Nutrition/Hydration-ADULT  Goal: Nutrient/Hydration intake appropriate for improving, restoring or maintaining nutritional needs  Description: Monitor and assess patient's nutrition/hydration status for malnutrition  Collaborate with interdisciplinary team and initiate plan and interventions as ordered  Monitor patient's weight and dietary intake as ordered or per policy  Utilize nutrition screening tool and intervene as necessary  Determine patient's food preferences and provide high-protein, high-caloric foods as appropriate       INTERVENTIONS:  - Monitor oral intake, urinary output, labs, and treatment plans  - Assess nutrition and hydration status and recommend course of action  - Evaluate amount of meals eaten  - Assist patient with eating if necessary   - Allow adequate time for meals  - Recommend/ encourage appropriate diets, oral nutritional supplements, and vitamin/mineral supplements  - Order, calculate, and assess calorie counts as needed  - Recommend, monitor, and adjust tube feedings and TPN/PPN based on assessed needs  - Assess need for intravenous fluids  - Provide specific nutrition/hydration education as appropriate  - Include patient/family/caregiver in decisions related to nutrition  Outcome: Progressing

## 2023-02-13 NOTE — UTILIZATION REVIEW
NOTIFICATION OF INPATIENT ADMISSION   AUTHORIZATION REQUEST   SERVICING FACILITY:   [facility]  Address: [address] [place] [address], [address] [place], [place] [id number]  Tax ID: [id number]  NPI: [id number] ATTENDING PROVIDER:  Attending Name and NPI#: Lady Pate Md [[id number]]  Address: [address] [place] [address], [address] [place] [address]  Phone: [phone]   ADMISSION INFORMATION:  [facility]patient [facility]  Place of Service Code: 21  Inpatient Admission Date/Time: 2/11/23 10:23 PM  Discharge Date/Time: No discharge date for patient encounter  Admitting Diagnosis Code/Description:  Stroke [facility]) [I63 9]  Stroke-like symptoms [R29 90]     UTILIZATION REVIEW CONTACT:  Keo Cintron Utilization   Network Utilization Review Department  Phone: [phone]  Fax: [phone]  Email: [email or web address]  Contact for approvals/pending authorizations, clinical reviews, and discharge  PHYSICIAN ADVISORY SERVICES:  Medical Necessity Denial & [id number] Review  Phone: [phone]  Fax: [phone]  Email: [email or web address]

## 2023-02-13 NOTE — ASSESSMENT & PLAN NOTE
Lab Results   Component Value Date    EGFR 45 02/12/2023    EGFR 34 02/11/2023    EGFR 44 02/03/2023    CREATININE 1 26 02/12/2023    CREATININE 1 57 (H) 02/11/2023    CREATININE 1 28 02/03/2023     · Monitor RFT and UOP daily  · Was not complaint with amlodipine at home   · Continue amlodipine here  · As patient has hx of neuroendocrine tumor, pheo w/u pending

## 2023-02-14 ENCOUNTER — HOME HEALTH ADMISSION (OUTPATIENT)
Dept: HOME HEALTH SERVICES | Facility: HOME HEALTHCARE | Age: 63
End: 2023-02-14

## 2023-02-14 VITALS
BODY MASS INDEX: 26.68 KG/M2 | HEART RATE: 72 BPM | WEIGHT: 150.57 LBS | DIASTOLIC BLOOD PRESSURE: 79 MMHG | OXYGEN SATURATION: 98 % | HEIGHT: 63 IN | SYSTOLIC BLOOD PRESSURE: 135 MMHG | RESPIRATION RATE: 16 BRPM | TEMPERATURE: 98.6 F

## 2023-02-14 LAB
ALBUMIN SERPL BCP-MCNC: 3.1 G/DL (ref 3.5–5)
ALP SERPL-CCNC: 137 U/L (ref 46–116)
ALT SERPL W P-5'-P-CCNC: 25 U/L (ref 12–78)
ANION GAP SERPL CALCULATED.3IONS-SCNC: 7 MMOL/L (ref 4–13)
AST SERPL W P-5'-P-CCNC: 27 U/L (ref 5–45)
BILIRUB SERPL-MCNC: 0.3 MG/DL (ref 0.2–1)
BUN SERPL-MCNC: 20 MG/DL (ref 5–25)
CALCIUM ALBUM COR SERPL-MCNC: 9.9 MG/DL (ref 8.3–10.1)
CALCIUM SERPL-MCNC: 9.2 MG/DL (ref 8.3–10.1)
CHLORIDE SERPL-SCNC: 107 MMOL/L (ref 96–108)
CO2 SERPL-SCNC: 25 MMOL/L (ref 21–32)
CREAT SERPL-MCNC: 1.16 MG/DL (ref 0.6–1.3)
ERYTHROCYTE [DISTWIDTH] IN BLOOD BY AUTOMATED COUNT: 12.6 % (ref 11.6–15.1)
GFR SERPL CREATININE-BSD FRML MDRD: 50 ML/MIN/1.73SQ M
GLUCOSE SERPL-MCNC: 100 MG/DL (ref 65–140)
HCT VFR BLD AUTO: 32 % (ref 34.8–46.1)
HGB BLD-MCNC: 10.4 G/DL (ref 11.5–15.4)
MCH RBC QN AUTO: 31.4 PG (ref 26.8–34.3)
MCHC RBC AUTO-ENTMCNC: 32.5 G/DL (ref 31.4–37.4)
MCV RBC AUTO: 97 FL (ref 82–98)
PLATELET # BLD AUTO: 238 THOUSANDS/UL (ref 149–390)
PMV BLD AUTO: 10.9 FL (ref 8.9–12.7)
POTASSIUM SERPL-SCNC: 3.7 MMOL/L (ref 3.5–5.3)
PROT SERPL-MCNC: 7.3 G/DL (ref 6.4–8.4)
RBC # BLD AUTO: 3.31 MILLION/UL (ref 3.81–5.12)
SODIUM SERPL-SCNC: 139 MMOL/L (ref 135–147)
WBC # BLD AUTO: 8.04 THOUSAND/UL (ref 4.31–10.16)

## 2023-02-14 RX ORDER — LANOLIN ALCOHOL/MO/W.PET/CERES
100 CREAM (GRAM) TOPICAL DAILY
Qty: 30 TABLET | Refills: 0 | Status: SHIPPED | OUTPATIENT
Start: 2023-02-15 | End: 2023-03-17

## 2023-02-14 RX ORDER — ASPIRIN 81 MG/1
81 TABLET, CHEWABLE ORAL DAILY
Qty: 30 TABLET | Refills: 0 | Status: SHIPPED | OUTPATIENT
Start: 2023-02-15 | End: 2023-02-17 | Stop reason: SDUPTHER

## 2023-02-14 RX ADMIN — AMLODIPINE BESYLATE 10 MG: 10 TABLET ORAL at 08:19

## 2023-02-14 RX ADMIN — THIAMINE HCL TAB 100 MG 100 MG: 100 TAB at 08:19

## 2023-02-14 RX ADMIN — SERTRALINE 25 MG: 25 TABLET, FILM COATED ORAL at 08:19

## 2023-02-14 RX ADMIN — HEPARIN SODIUM 5000 UNITS: 5000 INJECTION INTRAVENOUS; SUBCUTANEOUS at 06:35

## 2023-02-14 RX ADMIN — PANTOPRAZOLE SODIUM 40 MG: 40 TABLET, DELAYED RELEASE ORAL at 06:35

## 2023-02-14 RX ADMIN — CYANOCOBALAMIN TAB 500 MCG 1000 MCG: 500 TAB at 08:19

## 2023-02-14 RX ADMIN — PANCRELIPASE 12000 UNITS: 30000; 6000; 19000 CAPSULE, DELAYED RELEASE PELLETS ORAL at 08:19

## 2023-02-14 RX ADMIN — ASPIRIN 81 MG: 81 TABLET, CHEWABLE ORAL at 08:19

## 2023-02-14 NOTE — PLAN OF CARE
Problem: PHYSICAL THERAPY ADULT  Goal: Performs mobility at highest level of function for planned discharge setting  See evaluation for individualized goals  Description: Treatment/Interventions: ADL retraining, Functional transfer training, LE strengthening/ROM, Endurance training, Patient/family training, Equipment eval/education, Bed mobility, Gait training, Spoke to nursing, Spoke to case management, OT          See flowsheet documentation for full assessment, interventions and recommendations  Outcome: Progressing  Note: Prognosis: Good  Problem List: Decreased strength, Decreased endurance, Impaired balance, Decreased mobility  Assessment: Pt seen for session for setup, bed mob, transfers, gait and stair training w/ rest time, repositioning  Pt cooperative w/ session, notes improvement in symptoms and independence w/ mobility  Do note decreased endurance, but pt also reports that is present at baseline  Note improved balance, and note pt to be approaching baseline re: her mobility status  at this time, given progress,  now recommend d/c home w/ home therapy once medically stable  Barriers to Discharge: Inaccessible home environment, Decreased caregiver support     PT Discharge Recommendation: (S) Home with home health rehabilitation (case mgmt, OT made aware of d/c rec change)    See flowsheet documentation for full assessment

## 2023-02-14 NOTE — PHYSICAL THERAPY NOTE
Physical Therapy Treatment Note       02/14/23 0920   PT Last Visit   PT Visit Date 02/14/23   Note Type   Note Type Treatment   Pain Assessment   Pain Assessment Tool 0-10   Pain Score No Pain   Restrictions/Precautions   Weight Bearing Precautions Per Order No   Other Precautions Impulsive   General   Chart Reviewed Yes   Family/Caregiver Present No   Cognition   Overall Cognitive Status WFL   Arousal/Participation Responsive   Attention Attends with cues to redirect   Orientation Level Oriented X4   Memory Unable to assess   Following Commands Follows one step commands without difficulty   Subjective   Subjective therapy aide present to interpret, OK per pt   reports feeling much better  some fatigue post stairs, which pt reports is normal    Bed Mobility   Supine to Sit 7  Independent   Transfers   Sit to Stand 5  Supervision   Additional items Assist x 1   Stand to Sit 5  Supervision   Additional items Assist x 1   Ambulation/Elevation   Gait pattern   (slow, mild lateral sway w/ out LOB)   Gait Assistance 5  Supervision   Additional items Assist x 1   Assistive Device None   Distance 150'x2, seated rest x 3-4 min after stairs   Stair Management Assistance 5  Supervision   Additional items Assist x 1   Stair Management Technique One rail R;Reciprocal   Number of Stairs 7   Balance   Static Sitting Normal   Dynamic Sitting Good   Static Standing Good   Dynamic Standing Fair +   Ambulatory Fair +   Endurance Deficit   Endurance Deficit Yes   Endurance Deficit Description mild fatigue post stairs   Activity Tolerance   Activity Tolerance Patient tolerated treatment well;Patient limited by fatigue;Treatment limited secondary to medical complications (Comment)   Nurse Made Aware yes   Assessment   Prognosis Good   Problem List Decreased strength;Decreased endurance; Impaired balance;Decreased mobility   Assessment Pt seen for session for setup, bed mob, transfers, gait and stair training w/ rest time, repositioning  Pt cooperative w/ session, notes improvement in symptoms and independence w/ mobility  Do note decreased endurance, but pt also reports that is present at baseline  Note improved balance, and note pt to be approaching baseline re: her mobility status  at this time, given progress,  now recommend d/c home w/ home therapy once medically stable   Goals   Patient Goals to go home   STG Expiration Date 02/26/23   PT Treatment Day 1   Plan   Treatment/Interventions Functional transfer training;LE strengthening/ROM; Therapeutic exercise;Elevations; Endurance training;Patient/family training;Equipment eval/education; Bed mobility;Gait training   Progress Progressing toward goals   PT Frequency 3-5x/wk   Recommendation   PT Discharge Recommendation (S)  Home with home health rehabilitation  (case mgmt, OT made aware of d/c rec change)   AM-PAC Basic Mobility Inpatient   Turning in Flat Bed Without Bedrails 4   Lying on Back to Sitting on Edge of Flat Bed Without Bedrails 4   Moving Bed to Chair 4   Standing Up From Chair Using Arms 4   Walk in Room 4   Climb 3-5 Stairs With Railing 3   Basic Mobility Inpatient Raw Score 23   Basic Mobility Standardized Score 50 88   Highest Level Of Mobility   JH-HLM Goal 7: Walk 25 feet or more   JH-HLM Achieved 8: Walk 250 feet ot more     Jenni Guerrero PT, DPT CSRS

## 2023-02-14 NOTE — RESTORATIVE TECHNICIAN NOTE
Restorative Technician Note      Patient Name: Dayanna Mota     Note Type: Mobility  Patient Position Upon Consult: Supine  Activity Performed: Ambulated; Dangled; Stood  Assistive Device: Other (Comment) (none)  Education Provided: Yes  Patient Position at End of Consult: Supine;  All needs within reach; Bed/Chair alarm activated  Verla Line BS, Restorative Technician, [employer]

## 2023-02-14 NOTE — CONSULTS
PHYSICAL MEDICINE AND REHABILITATION CONSULT NOTE  Willi Hopkins 60 y o  female MRN: [id number]  [facility] Encounter: [id number]    Requested by (Physician/Service): Dean Ventura MD  Reason for Consultation:  Assessment of rehabilitation needs    Assessment:  Rehabilitation Diagnosis:   • Debility   • Impaired mobility and self care  • Impaired cognition     Recommendations:  Rehabilitation Plan:  • Continue PT/OT (SLP) while on acute care  • Recommend sub-acute level of inpatient vs home with therapy pending progression  • Covid-19 Testing: [facility] inpatient rehabilitation units require testing within 48 hours of all potential admissions at this time  *Re-testing is NOT required for patients recovering from COVID-19 infection if isolation has been discontinued per CDC criteria  Medical Co-morbidities Plan:  · Hypertension   · Hyperlipidemia   · Hx of pancreatic neuroendocrine mass s/p whipple  · Hypokalemia   · GERD  · Depression   · DVT ppx: heparin SQ and SCD    Thank you for this consultation  Do not hesitate to contact service with further questions  HUONG Carson  PM&R    Total time spent:  30 minutes with more than 50% spent counseling/coordinating care  Counseling includes extended discussion with patient (+/- family/relevant historian)  re: history, exam, function, mood, rehabilitation management plan, medical co-morbidities plan, and disposition options  Additional time spent with thorough chart review in EMR, reviewing recent medications, labs, imaging, and management plan of primary service  History of Present Illness:  Willi Hopkins is a 60 y o  female with a PMH of hypertension, hyperlipidemia, depression, pancreatic neuroendocrine tumor s/p whipple 8/2021 who presented to the [facility] on 2/11/23 with headache and right sided weakness as well as chest pain and sensory deficits   Initial CT of the head showed no acute abnormality  She was given TNK in the ER  MRI also showed no acute intracranial abnormality  Mild to moderate scattered periventricular and subcortical foci of white matter T2 hyperintensity which is nonspecific and most likely related to chronic small vessel ischemic changes  Other less likely etiologies include demyelinating disease, vasculitis, Lyme and migraines  Routine EEG is pending  Etiology includes post-ictal weakness, hypertensive emergency and functional weakness  PM&R are consulted for rehabilitation recommendations  The patient was seen in her room  She currently denies any weakness, numbness, tingling, dizziness, SOB or chest pain  Review of Systems: 10 point ROS negative except for what is noted in HPI    Function:  Prior level of function and living situation: The patient lives in a two story apartment with her spouse  There are 6 SIM[address]N  Spouse requires 24/7 assistance  Current level of function:  Physical Therapy: Supervision for bed mobility, minimal assist for ambulation going 80 feet  Occupational Therapy: Minimal assist for ADLs    Physical Exam:  /79   Pulse 72   Temp 98 6 °F (37 °C)   Resp 16   Ht 5' 3" (1 6 m)   Wt 68 3 kg (150 lb 9 2 oz)   LMP  (LMP Unknown)   SpO2 98%   BMI 26 67 kg/m²        Intake/Output Summary (Last 24 hours) at 2/14/2023 0839  Last data filed at 2/13/2023 2201  Gross per 24 hour   Intake 250 ml   Output 200 ml   Net 50 ml       Body mass index is 26 67 kg/m²  Physical Exam  Constitutional:       General: She is not in acute distress  Appearance: She is not toxic-appearing  HENT:      Head: Normocephalic and atraumatic  Right Ear: External ear normal       Left Ear: External ear normal       Nose: Nose normal       Mouth/Throat:      Mouth: Mucous membranes are moist       Pharynx: Oropharynx is clear  Pulmonary:      Effort: Pulmonary effort is normal  No respiratory distress     Abdominal:      General: There is no distension  Musculoskeletal:         General: Normal range of motion  Skin:     General: Skin is warm and dry  Neurological:      Mental Status: She is alert and oriented to person, place, and time  Psychiatric:         Mood and Affect: Mood normal         Social History:    Social History     Socioeconomic History   • Marital status: /Civil Union     Spouse name: Peewee Morales   • Number of children: Not on file   • Years of education: Not on file   • Highest education level: Not on file   Occupational History   • Occupation: unemployed   Tobacco Use   • Smoking status: Former     Packs/day: 0 65     Years: 30 00     Pack years: 19 50     Types: Cigarettes   • Smokeless tobacco: Never   • Tobacco comments:     quit 15 years ago   Vaping Use   • Vaping Use: Never used   Substance and Sexual Activity   • Alcohol use: Not Currently   • Drug use: No   • Sexual activity: Not Currently   Other Topics Concern   • Not on file   Social History Narrative   • Not on file     Social Determinants of Health     Financial Resource Strain: Low Risk    • Difficulty of Paying Living Expenses: Not hard at all   Food Insecurity: No Food Insecurity   • Worried About [employer] in the Last Year: Never true   • Ran Out of Food in the Last Year: Never true   Transportation Needs: No Transportation Needs   • Lack of Transportation (Medical): No   • Lack of Transportation (Non-Medical):  No   Physical Activity: Not on file   Stress: Not on file   Social Connections: Not on file   Intimate Partner Violence: Not on file   Housing Stability: Low Risk    • Unable to Pay for Housing in the Last Year: No   • Number of Places Lived in the Last Year: 1   • Unstable Housing in the Last Year: No        Family History:    Family History   Problem Relation Age of Onset   • Hypertension Mother    • Heart disease Mother    • Diabetes Mother    • Cancer Father          Medications:     Current Facility-Administered Medications:   •  acetaminophen (TYLENOL) tablet 650 mg, 650 mg, Oral, Q6H PRN, Fanta Swanson PA-C, 650 mg at 02/12/23 1148  •  amLODIPine (NORVASC) tablet 10 mg, 10 mg, Oral, Daily, Fanta Swanson PA-C, 10 mg at 02/14/23 2271  •  aspirin chewable tablet 81 mg, 81 mg, Oral, Daily, Aleksandra Wharton PA-C, 81 mg at 02/14/23 9176  •  atorvastatin (LIPITOR) tablet 40 mg, 40 mg, Oral, QPM, Ramya Swanson PA-C, 40 mg at 02/13/23 1649  •  cyanocobalamin (VITAMIN B-12) tablet 1,000 mcg, 1,000 mcg, Oral, Daily, Denise Vila DO, 1,000 mcg at 02/14/23 9047  •  heparin (porcine) subcutaneous injection 5,000 Units, 5,000 Units, Subcutaneous, Q8H [facility], Denise Vila DO, 5,000 Units at 02/14/23 8126  •  labetalol (NORMODYNE) injection 10 mg, 10 mg, Intravenous, Q4H PRN, Fanta Swanson PA-C, 10 mg at 02/13/23 1022  •  ondansetron (ZOFRAN) injection 4 mg, 4 mg, Intravenous, Q6H PRN, Fanta Swanson PA-C  •  pancrelipase (Lip-Prot-Amyl) (CREON) delayed release capsule 12,000 Units, 12,000 Units, Oral, TID With Meals, Aleksandra Wharton PA-C, 12,000 Units at 02/14/23 2623  •  pantoprazole (PROTONIX) EC tablet 40 mg, 40 mg, Oral, Early Morning, Fanta Swanson PA-C, 40 mg at 02/14/23 0811  •  sertraline (ZOLOFT) tablet 25 mg, 25 mg, Oral, Daily, Fanta Swanson PA-C, 25 mg at 02/14/23 9622  •  thiamine tablet 100 mg, 100 mg, Oral, Daily, Denise Vila DO, 100 mg at 02/14/23 4234    Past Medical History:     Past Medical History:   Diagnosis Date   • Back ache    • Cancer ([address] )    • Hyperlipidemia    • Hypertension         Past Surgical History:     Past Surgical History:   Procedure Laterality Date   • APPENDECTOMY     • BILATERAL OOPHORECTOMY     • BREAST SURGERY Right     partial mastectomy    • COLONOSCOPY     • HERNIA REPAIR     • HYSTERECTOMY     • LAPAROTOMY N/A 8/24/2021    Procedure: LAPAROTOMY EXPLORATORY;  Surgeon: Jose G Lagunas MD;  Location: [facility];  Service: Surgical Oncology   • MASTECTOMY Right     partial   • WHIPPLE PROCEDURE/PANCREATICO-DUODENECTOMY N/A 8/24/2021    Procedure: WHIPPLE PROCEDURE/PANCREATICO-DUODENECTOMY;  Surgeon: Michelle Penn MD;  Location: [facility];  Service: Surgical Oncology         Allergies:     No Known Allergies        LABORATORY RESULTS:      Lab Results   Component Value Date    HGB 10 4 (L) 02/14/2023    HCT 32 0 (L) 02/14/2023    WBC 8 04 02/14/2023     Lab Results   Component Value Date    BUN 20 02/14/2023    K 3 7 02/14/2023     02/14/2023    GLUCOSE 156 (H) 08/24/2021    CREATININE 1 16 02/14/2023     Lab Results   Component Value Date    PROTIME 14 2 02/11/2023    INR 1 08 02/11/2023        DIAGNOSTIC STUDIES: Reviewed  XR chest portable    Result Date: 2/12/2023  Impression: No acute cardiopulmonary disease  Workstation performed: [id number]     CT head wo contrast    Result Date: 2/12/2023  Impression: No acute intracranial abnormality  Workstation performed: [id number]     MRI brain wo contrast    Result Date: 2/12/2023  Impression: No acute intracranial abnormality  Mild to moderate scattered periventricular and subcortical foci of white matter T2 hyperintensity which is nonspecific and most likely related to chronic small vessel ischemic changes  Other less likely etiologies include demyelinating disease, vasculitis, Lyme and migraines  Workstation performed: [id number]     CT stroke alert brain    Result Date: 2/11/2023  Impression: No acute intracranial abnormality  I personally discussed this study with Dr Candi Saldana on 2/11/2023 at 9:18 PM   Workstation performed: [id number]     CTA dissection protocol chest/abdomen/pelvis    Result Date: 2/12/2023  Impression: Penetrating ulcer versus saccular aneurysm as described above in the inferior portion of the aortic arch  Stable tiny pulmonary nodules  Thickening versus underdistention of the gastric wall which may represent gastritis   Workstation performed: [id number]     CTA stroke alert (head/neck)    Result Date: 2/11/2023  Impression: No evidence of large vessel occlusion  If symptoms persist MRI should be obtained Focal saccular aneurysm versus penetrating ulcer of the aortic arch measuring 1 cm  Follow-up with vascular is recommended    I personally discussed this study with Dr Darin So on 2/11/2023 at 9:50 PM   Workstation performed: [id number]

## 2023-02-14 NOTE — DISCHARGE SUMMARY
NEUROLOGY RESIDENCY - DISCHARGE SUMMARY     Name: Mayra Aguilar   Age & Sex: 60 y o  female   MRN: [id number]  Unit/Bed#: [facility] 703-01   Encounter: [id number]    Discharging Resident Physician: Zachary Hughes MD  Attending: Mariella Gomes MD  PCP: Chuy Zeng MD  Admission Date: 2/11/2023  Discharge Date: 02/14/23    Mayra Aguilar will need follow up in in 4 weeks with neurovascular AP  She will not require outpatient neurological testing  ASSESSMENT & PLAN     * Stroke-like symptoms  Assessment & Plan  Assessment:  Patient is a 56-year-old right-handed Czech-speaking female with a history of hypertension, hyperlipidemia, neuroendocrine tumor of pancreas that presented as a stroke alert on 2/11/23 at 2055 with an initial NIHSS of 11  Her last known normal was 30 minutes prior to being seen by EMS which is around 2000  Initial blood pressure 212/102  Initial glucose 162  Initial deficits: Status post syncopal episode, left gaze palsy, right upper and right lower extremity motor weakness, right-sided sensory loss (right V1 V2 V3, right upper extremity, right lower extremity)  Initial EKG demonstrated normal sinus rhythm with a rate in the 70s, patient does not take any antiplatelet agents or anticoagulation agents at her baseline  No ongoing contrast CT head was unremarkable  CTA head and neck demonstrated no large vessel occlusion, however a 1 cm saccular aneurysm at the aortic arch  The patient was deemed to be a tenecteplase candidate (patient was with an appropriate time window, demonstrated no obvious contraindications), tenecteplase was administered at 2205  Patient was thusly transferred to ICU for continued care after tenecteplase administration  With current work-up being unrevealing of any etiology of stroke, a routine EEG will be completed to screen for abnormal electrographic activity      Imaging:  - CTA dissection protocol: Penetrating ulcer versus saccular aneurysm as described above in the inferior portion of the aortic arch  Stable tiny pulmonary nodules  Thickening versus underdistention of the gastric wall which may represent gastritis  - MRI Brain wo: No acute intracranial abnormality  Mild to moderate scattered periventricular and subcortical foci of white matter T2 hyperintensity which is nonspecific and most likely related to chronic small vessel ischemic changes  Other less likely etiologies include demyelinating disease, vasculitis, Lyme and migraines  - TTE: Left ventricular cavity size is small  Wall thickness is moderately increased  There is moderate concentric hypertrophy  The left ventricular ejection fraction is 70% by visual estimation  Systolic function is vigorous  Wall motion is normal  Diastolic function is mildly abnormal, consistent with grade I (abnormal) relaxation  There is mid-cavity dynamic obstruction with valsalva with a peak gradient of 58 0 mmHg  Atrial Septum: There is a small and patent foramen ovale confirmed at rest with bidirectional shunting using saline contrast injection  Aortic Valve: The aortic valve is trileaflet  The leaflets are moderately thickened  There is mildly reduced mobility  There is mild stenosis  Compared to prior study dated 07/19/2019, the AS severity is similar; bubble study in present study reveals PFO; Valsalva provocation in present study reveals dynamic mid-cavitary obstructive gradient     EEG: normal 30 minutes awake and drowsy EEG    Scores:   - NIHSS: 11 (L gaze preference, R facial droop, LUE, LLE> RUE, RLE motor weakness, R sided sensory loss, mild dysarthria)  Stroke Modified Nuckolls Score: 0 (No baseline symptoms/disability)   -ASPECT: 10    Studies:  - APTT 32  - PT 14 2, INR 1 08  - 4 Plex negative  - CBC mild anemia  - Potassium 3 4, BUN 20, creatinine 1 26  - Cholesterol 131, triglycerides 147, HDL 31, LDL 71  - Hemoglobin A1c 5 3  - Chromogranin A: 91 1  - Catecholamines fraction urinary pending    Impression: Considering current clinical presentation of continued right-sided V1 V2 V3, right upper extremity, right lower extremity paresthesias (numbness), and imaging thus far not suggestive of an acute infarct or large vessel occlusion a transient ischemic attack would not fit the criteria, stroke (even with negative imaging) cannot be ruled out in the setting that the embolus may have been dissolved with TNK administration however this would not explain residual right hand decided symptoms  CTA head and neck currently not being demonstrative of intracranial source  ABRIL demonstrating an LVEF of 70% with grade 1 diastolic dysfunction, and a small PFO  Thus far work-up has not been demonstrative of an etiology for the vascular event  A routine EEG has been normal  Presenting deficit likely functional     Plan, Stroke pathway sp thrombolysis:  - rEEG - normal  - Goal BP: Normotension  - Continue ASA 81mg PO QD  - Start Vitamin B12 1000mcg PO QD  - Start Thiamine 100mg PO ID  - STAT CTH wo contrast in case of change in mental status, severe headache, acute increase in BP, N/V or if GCS drops 2pts in 1hr   - Continue Neuro checks  - Continue telemetry monitoring  - Continue Normothermia  - Maintain glucose <180, SSI for coverage if indicated  - Hepatin DVT ppx  - PT/OT/Speech/PM&R evals; repeat to assess if pt still qualifies for SNF             Disposition:     Home with VNA Services (Reminder: Complete face to face encounter)    Reason for Admission: Stroke like symptoms    Consultations During Hospital Stay:  None    Procedures Performed:     EEG:This is a normal 30 minutes awake and drowsy EEG      Significant Findings / Test Results:     None    Incidental Findings:   None    Test Results Pending at Discharge (will require follow up):   24-hour metanephrines urine; VMA; 5-HIAA     Outpatient Tests Requested:  None    Complications: None    H&P:  Per admitting provider, "Jaya is a 60 y o  female w/ PMHx HPTN, HLD, depression, and neuroendocrine tumor s/p whipple in 08/21 who now presents with stroke like symptoms s/p TNK  Patient reportedly developed right sided weakness and sensory deficits at approx 2300 prompting ED eval  En route to the hopsital developed occipital headache and pleuritic chest pain, she was a pre-hospital stroke alert  CTH negative for hemorrhage CTA negative for LVO  Initial NIHHS 11, given TNK  Patient noted to be hypertensive to 703E systolic, started on cardene for improved control  Upon my eval patient has complaints of ongoing headache that has not changed since presentation  She endorses non-radiating chest pain that worsens when she takes a deep breath   "    Hospital Course:     Post admission, patient underwent imaging  CT brain-no acute intracranial abnormality  CTA with no evidence of large vessel occlusion, focal saccular aneurysm versus penetrating ulcer of the aortic arch measuring 1 cm  MRI brain with no acute intracranial abnormality, mild to moderate scattered periventricular and subcortical foci of white matter T2 hyperintensity which is nonspecific and most likely related to chronic small vessel ischemic changes  She also underwent repeat CT head 24 hours post TNK administration which revealed no acute intracranial abnormality  Also underwent EEG to rule out any seizure etiology which was normal   Patient's presenting deficits likely functional in nature  Evaluated by PT OT during her stay and a recommendation of home PT was made  At discharge, advised to follow-up with urology, advised continuation of aspirin and statin  Condition at Discharge: good     Discharge Day Visit / Exam:     Subjective: No new complaints  No overnight events      Vitals: Blood Pressure: 135/79 (02/14/23 0652)  Pulse: 72 (02/14/23 0652)  Temperature: 98 6 °F (37 °C) (02/14/23 0652)  Temp Source: Oral (02/13/23 9121)  Respirations: 16 (02/14/23 5577)  Height: 5' 3" (160 cm) (02/12/23 1216)  Weight - Scale: 68 3 kg (150 lb 9 2 oz) (02/14/23 0519)  SpO2: 98 % (02/14/23 1041)    Exam:     Physical Exam  Constitutional:       Appearance: Normal appearance  HENT:      Head: Normocephalic and atraumatic  Eyes:      General: No scleral icterus  Right eye: No discharge  Left eye: No discharge  Pupils: Pupils are equal, round, and reactive to light  Cardiovascular:      Rate and Rhythm: Normal rate and regular rhythm  Pulses: Normal pulses  Heart sounds: Normal heart sounds  Pulmonary:      Effort: Pulmonary effort is normal  No respiratory distress  Abdominal:      General: There is no distension  Palpations: Abdomen is soft  Musculoskeletal:         General: No swelling or tenderness  Normal range of motion  Cervical back: Normal range of motion  No muscular tenderness  Skin:     General: Skin is warm and dry  Neurological:      Mental Status: She is alert and oriented to person, place, and time  Mental status is at baseline  Psychiatric:         Mood and Affect: Mood normal          Behavior: Behavior normal          Neurologic Exam     Mental Status   Oriented to person, place, and time  Cranial Nerves     CN III, IV, VI   Pupils are equal, round, and reactive to light  Discussion with Family: Discussed with daughter over phone    Discharge instructions/Information to patient and family:   See after visit summary for information provided to patient and family  Provisions for Follow-Up Care:  See after visit summary for information related to follow-up care and any pertinent home health orders  Planned Readmission: none    Discharge Statement:  I spent 30 minutes discharging the patient  This time was spent on the day of discharge  I had direct contact with the patient on the day of discharge   Greater than 50% of the total time was spent examining patient, answering all patient questions, arranging and discussing plan of care with patient as well as directly providing post-discharge instructions  Additional time then spent on discharge activities  Discharge Medications:  See after visit summary for reconciled discharge medications provided to patient and family        ** Please Note: This note has been constructed using a voice recognition system **      ==  MD Carolee Almaguer 72 Internal Medicine Residency, PGY-3

## 2023-02-14 NOTE — ED ATTENDING ATTESTATION
2/11/2023  IRoderick MD, saw and evaluated the patient  I have discussed the patient with the resident/non-physician practitioner and agree with the resident's/non-physician practitioner's findings, Plan of Care, and MDM as documented in the resident's/non-physician practitioner's note, except where noted  All available labs and Radiology studies were reviewed  I was present for key portions of any procedure(s) performed by the resident/non-physician practitioner and I was immediately available to provide assistance  At this point I agree with the current assessment done in the Emergency Department  I have conducted an independent evaluation of this patient a history and physical is as follows:    Patient is a 30-year-old female with past medical history of hypertension presents for evaluation of weakness  Initial history by EMS patient was normal earlier today however what approximate 1 hour prior to arrival patient became very dizzy and almost had a syncopal event  Family noted the patient had a left-sided facial droop with right arm and leg numbness and weakness  On initial evaluation patient was unable to speak to resident myself to provide any history  Given abrupt onset of weakness a stroke alert was activated patient was taken directly to CT scan for CT CTA as per protocol  Patient was seen and evaluated by neurology service as well upon arrival      Vital signs reviewed  Patient noted to be markedly hypertensive on arrival      NIH stroke scale of 11 left gaze palsy right upper extremity right lower extremity motor weakness right sided sensory loss  Impression: Acute stroke syndrome  ED Course    Labs reviewed: BMP with mild elevation in creatinine  CBC remarkable for mild anemia  PT/INR within normal limits flu RSV and COVID testing within normal limits initial troponin was 9ng/L will trend    Repeat troponin 11    ECG interpreted by me normal sinus rhythm normal rate rightward axis normal intervals no acute ST-T changes  Impression abnormal ECG no acute ischemic changes    Patient was brought back from CT scanner to examination room Case discussed with neurology patient is a TNKase candidate  Delay to [facility] administration secondary to elevated blood pressures and need for better control additionally, patient request that family are involved in decision making to undergo TNKase's treatment  A a family conference call was held between patient's daughter and son via phone prior to committing to to treatment  Case discussed with radiology attending normal noncontrast CT scan of the head CTA did not reveal any LVO however there was comment on possible penetrating ulcer versus aneurysm of the thoracic aorta    CT Imaging results discussed with neurology patient deemed a TNKase candidate - will proceed with TNKase after Bp controlled  Patient received 2 IV boluses of labetalol in ED prior to [facility] administration patient was subsequently started on a Cardene drip to keep blood pressure within acceptable numbers  Case discussed with critical care team who saw and evaluated patient and admit to neuro critical care service  Family updated      Critical Care Time  CriticalCare Time  Performed by: Shweta Gottlieb MD  Authorized by: Shweta Gottlieb MD     Critical care provider statement:     Critical care time (minutes):  35    Critical care time was exclusive of:  Separately billable procedures and treating other patients and teaching time    Critical care was necessary to treat or prevent imminent or life-threatening deterioration of the following conditions:  CNS failure or compromise    Critical care was time spent personally by me on the following activities:  Obtaining history from patient or surrogate, development of treatment plan with patient or surrogate, discussions with consultants, evaluation of patient's response to treatment, examination of patient, ordering and performing treatments and interventions, ordering and review of laboratory studies, ordering and review of radiographic studies and re-evaluation of patient's condition    I assumed direction of critical care for this patient from another provider in my specialty: no

## 2023-02-15 ENCOUNTER — HOME CARE VISIT (OUTPATIENT)
Dept: HOME HEALTH SERVICES | Facility: HOME HEALTHCARE | Age: 63
End: 2023-02-15

## 2023-02-16 ENCOUNTER — TRANSITIONAL CARE MANAGEMENT (OUTPATIENT)
Dept: INTERNAL MEDICINE CLINIC | Facility: CLINIC | Age: 63
End: 2023-02-16

## 2023-02-16 ENCOUNTER — HOME CARE VISIT (OUTPATIENT)
Dept: HOME HEALTH SERVICES | Facility: HOME HEALTHCARE | Age: 63
End: 2023-02-16

## 2023-02-16 NOTE — CASE COMMUNICATION
Spoke to the patient's daughter Rosaura Gaspar and she is agreeable to a PT [facility] visit for tomorrow  Explained that the therapist will call later today to scheduel the visit time for tomorrow  Verifed patient's address

## 2023-02-16 NOTE — CASE COMMUNICATION
This is for informational purposes only  I spoke to the patient's daughter and she is agreeable to a home PT [facility] visit tomorrow  New SOC date will be 2/17/23   Thank you

## 2023-02-17 ENCOUNTER — HOME CARE VISIT (OUTPATIENT)
Dept: HOME HEALTH SERVICES | Facility: HOME HEALTHCARE | Age: 63
End: 2023-02-17

## 2023-02-17 DIAGNOSIS — Z08 ENCOUNTER FOR FOLLOW-UP SURVEILLANCE OF NEUROENDOCRINE CARCINOMA: ICD-10-CM

## 2023-02-17 DIAGNOSIS — F32.A DEPRESSION, UNSPECIFIED DEPRESSION TYPE: ICD-10-CM

## 2023-02-17 DIAGNOSIS — I16.1 HYPERTENSIVE EMERGENCY: ICD-10-CM

## 2023-02-17 DIAGNOSIS — D64.9 ANEMIA, UNSPECIFIED TYPE: ICD-10-CM

## 2023-02-17 DIAGNOSIS — E53.8 B12 DEFICIENCY: ICD-10-CM

## 2023-02-17 DIAGNOSIS — K86.89 PANCREATIC INSUFFICIENCY: ICD-10-CM

## 2023-02-17 DIAGNOSIS — I10 HYPERTENSION: ICD-10-CM

## 2023-02-17 DIAGNOSIS — Z85.89 ENCOUNTER FOR FOLLOW-UP SURVEILLANCE OF NEUROENDOCRINE CARCINOMA: ICD-10-CM

## 2023-02-17 DIAGNOSIS — R29.90 STROKE-LIKE SYMPTOMS: ICD-10-CM

## 2023-02-17 DIAGNOSIS — I10 ESSENTIAL HYPERTENSION: ICD-10-CM

## 2023-02-17 DIAGNOSIS — K86.89 PANCREATIC MASS: ICD-10-CM

## 2023-02-17 LAB
5OH-INDOLEACETATE 24H UR-MCNC: 3.2 MG/L
5OH-INDOLEACETATE 24H UR-MRATE: 5.4 MG/24 HR (ref 0–14.9)
CREAT UR-MCNC: 67.4 MG/DL
DOPAMINE UR-MCNC: 161 UG/L
DOPAMINE/CREAT UR: 239 UG/G CREAT (ref 0–348)
EPINEPH UR-MCNC: 2 UG/L
EPINEPH/CREAT UR: 3 UG/G CREAT (ref 0–19)
METANEPH 24H UR-MRATE: 34 UG/24 HR (ref 36–209)
METANEPHS 24H UR-MCNC: 20 UG/L
NOREPINEPH UR-MCNC: 12 UG/L
NOREPINEPH/CREAT UR: 18 UG/G CREAT (ref 0–111)
NORMETANEPHRINE 24H UR-MCNC: 106 UG/L
NORMETANEPHRINE 24H UR-MRATE: 180 UG/24 HR (ref 131–612)

## 2023-02-20 LAB
VMA 24H UR-MRATE: NORMAL MG/24 HR
VMA UR-MCNC: NORMAL MG/L

## 2023-02-20 RX ORDER — SERTRALINE HYDROCHLORIDE 25 MG/1
25 TABLET, FILM COATED ORAL DAILY
Qty: 90 TABLET | Refills: 1 | Status: SHIPPED | OUTPATIENT
Start: 2023-02-20

## 2023-02-20 RX ORDER — AMLODIPINE BESYLATE 10 MG/1
10 TABLET ORAL DAILY
Qty: 90 TABLET | Refills: 3 | Status: SHIPPED | OUTPATIENT
Start: 2023-02-20

## 2023-02-20 RX ORDER — CHOLESTYRAMINE 4 G/9G
4 POWDER, FOR SUSPENSION ORAL
Qty: 1074.62 G | Refills: 1 | Status: SHIPPED | OUTPATIENT
Start: 2023-02-20

## 2023-02-20 RX ORDER — ASPIRIN 81 MG/1
81 TABLET, CHEWABLE ORAL DAILY
Qty: 30 TABLET | Refills: 0 | Status: SHIPPED | OUTPATIENT
Start: 2023-02-20 | End: 2023-03-22

## 2023-02-20 RX ORDER — POTASSIUM CHLORIDE 750 MG/1
20 TABLET, EXTENDED RELEASE ORAL DAILY
Qty: 90 TABLET | Refills: 1 | Status: SHIPPED | OUTPATIENT
Start: 2023-02-20

## 2023-02-20 RX ORDER — CYANOCOBALAMIN (VITAMIN B-12) 500 MCG
500 TABLET ORAL DAILY
Qty: 90 TABLET | Refills: 1 | Status: SHIPPED | OUTPATIENT
Start: 2023-02-20

## 2023-02-20 RX ORDER — ATORVASTATIN CALCIUM 40 MG/1
40 TABLET, FILM COATED ORAL EVERY EVENING
Qty: 90 TABLET | Refills: 3 | Status: SHIPPED | OUTPATIENT
Start: 2023-02-20

## 2023-02-20 RX ORDER — LISINOPRIL 40 MG/1
40 TABLET ORAL DAILY
Qty: 90 TABLET | Refills: 1 | Status: SHIPPED | OUTPATIENT
Start: 2023-02-20

## 2023-02-20 RX ORDER — PANTOPRAZOLE SODIUM 40 MG/1
40 TABLET, DELAYED RELEASE ORAL
Qty: 90 TABLET | Refills: 3 | Status: SHIPPED | OUTPATIENT
Start: 2023-02-20

## 2023-02-21 NOTE — TELEPHONE ENCOUNTER
Tracee Sosa daughter of the patient who wants to go over the medication that was just refilled 2/20/23 there are [address] just wants to make sure that it's necessary for her mother to take so many     Please check into this and give Tracee Sosa a call back

## 2023-02-22 NOTE — TELEPHONE ENCOUNTER
Talked to [place] regarding patients medications, and all questions were answered   Patient will continue to take all medications as prescribed

## 2023-03-03 ENCOUNTER — TELEPHONE (OUTPATIENT)
Dept: NEUROLOGY | Facility: CLINIC | Age: 63
End: 2023-03-03

## 2023-03-10 ENCOUNTER — OFFICE VISIT (OUTPATIENT)
Dept: NEUROLOGY | Facility: CLINIC | Age: 63
End: 2023-03-10

## 2023-03-10 VITALS
SYSTOLIC BLOOD PRESSURE: 130 MMHG | WEIGHT: 153.5 LBS | RESPIRATION RATE: 16 BRPM | BODY MASS INDEX: 27.19 KG/M2 | TEMPERATURE: 97.5 F | DIASTOLIC BLOOD PRESSURE: 60 MMHG | OXYGEN SATURATION: 100 % | HEART RATE: 73 BPM

## 2023-03-10 DIAGNOSIS — E78.5 HYPERLIPIDEMIA, UNSPECIFIED HYPERLIPIDEMIA TYPE: ICD-10-CM

## 2023-03-10 DIAGNOSIS — I95.1 ORTHOSTATIC HYPOTENSION: ICD-10-CM

## 2023-03-10 DIAGNOSIS — R29.90 STROKE-LIKE SYMPTOMS: Primary | ICD-10-CM

## 2023-03-10 DIAGNOSIS — I12.9 BENIGN HYPERTENSION WITH CKD (CHRONIC KIDNEY DISEASE), STAGE II: ICD-10-CM

## 2023-03-10 DIAGNOSIS — N18.2 BENIGN HYPERTENSION WITH CKD (CHRONIC KIDNEY DISEASE), STAGE II: ICD-10-CM

## 2023-03-10 NOTE — PATIENT INSTRUCTIONS
Orthostatic hypotension:  - Increase fluid intake to 64-72 ounces of water per day, try adding a little bit of salt  - Recommend slight increase to salt intake  Consistently taking blood pressure medication for the time being  May be contributing to dropping her too low  - Recommend slow changes in position, from laying down to sitting up, sitting up to standing give herself about 5 minutes to gather herself  - Compression stockings recommended    Stroke-like symptoms:    - Recommend continue to follow with primary care regarding blood pressure medication recommendations  Continue with current blood pressure monitoring at home   -Will defer monitoring of lipids and hemoglobin A1c to the patient's primary care  Most recent LDL was 71 mg/dL  Patient is currently maintained on atorvastatin 40 mg at this time I believe his the appropriate dosage   -Encourage participation in physical activity   -Educated the patient on sleep hygiene and working to take less naps throughout the day so she is able to sleep more at bedtime   -Talk to the patient about the Mediterranean diet at this time  - For ongoing stroke prevention continue: Aspirin 81 mg once daily, atorvastatin 40 mg once  - Discussed the importance of antiplatelet management with the patient to prevent future strokes  - Recommend to check blood pressure occasionally away from the doctor's office to make sure that those numbers are typically less than 130/80    If they are frequently higher than that, we recommend checking a little more often and to follow up with primary care team   - Will defer to primary care team for monitoring of cholesterol panel and blood sugar numbers with target LDL cholesterol of less than 70 and hemoglobin A1c less than 7%  - Recommend following a low salt, mediterranean diet   - Recommend routine physical exercise as tolerated     We will plan for her to return to the office in 6 months time to see on of the APPs or   Izaiah Braswell but would be happy to see her sooner if the need should arise  If she has any symptoms concerning for TIA or stroke including sudden painless loss of vision or double vision, difficulty speaking or swallowing, vertigo/room spinning that does not quickly resolve, or weakness/numbness/loss of coordination affecting 1 side of the face or body she should proceed by ambulance to the nearest emergency room immediately

## 2023-03-10 NOTE — PROGRESS NOTES
Patient ID: Ibis Stout is a 60 y o  female who presents to the [facility]  Assessment/Plan:    Orthostatic hypotension:  - Increase fluid intake to 64-72 ounces of water per day, try adding a little bit of salt  - Recommend slight increase to salt intake  Consistently taking blood pressure medication for the time being  May be contributing to dropping her too low  - Recommend slow changes in position, from laying down to sitting up, sitting up to standing give herself about 5 minutes to gather herself  - Compression stockings recommended    Stroke-like symptoms:    - Recommend continue to follow with primary care regarding blood pressure medication recommendations  Continue with current blood pressure monitoring at home   -Will defer monitoring of lipids and hemoglobin A1c to the patient's primary care  Most recent LDL was 71 mg/dL  Patient is currently maintained on atorvastatin 40 mg at this time I believe his the appropriate dosage   -Encourage participation in physical activity   -Educated the patient on sleep hygiene and working to take less naps throughout the day so she is able to sleep more at bedtime   -Talk to the patient about the Mediterranean diet at this time  - For ongoing stroke prevention continue: Aspirin 81 mg once daily, atorvastatin 40 mg once  - Discussed the importance of antiplatelet management with the patient to prevent future strokes  - Recommend to check blood pressure occasionally away from the doctor's office to make sure that those numbers are typically less than 130/80    If they are frequently higher than that, we recommend checking a little more often and to follow up with primary care team   - Will defer to primary care team for monitoring of cholesterol panel and blood sugar numbers with target LDL cholesterol of less than 70 and hemoglobin A1c less than 7%  - Recommend following a low salt, mediterranean diet   - Recommend routine physical exercise as tolerated     We will plan for her to return to the office in 6 months time to see on of the APPs or Dr Ely Marques but would be happy to see her sooner if the need should arise  If she has any symptoms concerning for TIA or stroke including sudden painless loss of vision or double vision, difficulty speaking or swallowing, vertigo/room spinning that does not quickly resolve, or weakness/numbness/loss of coordination affecting 1 side of the face or body she should proceed by ambulance to the nearest emergency room immediately  Subjective:    HPI    For Review:  Esperanza Garcia is a 60 y o  female who presents for follow up in regard to her recent stroke-like symptoms  "Patient is a 68-year-old right-handed Martiniquais-speaking female with a history of hypertension, hyperlipidemia, neuroendocrine tumor of pancreas that presented as a stroke alert on 2/11/23 at 2055 with an initial NIHSS of 11  Her last known normal was 30 minutes prior to being seen by EMS which is around 2000  Initial blood pressure 212/102  Initial glucose 162  Initial deficits: Status post syncopal episode, left gaze palsy, right upper and right lower extremity motor weakness, right-sided sensory loss (right V1 V2 V3, right upper extremity, right lower extremity)  Initial EKG demonstrated normal sinus rhythm with a rate in the 70s, patient does not take any antiplatelet agents or anticoagulation agents at her baseline  No ongoing contrast CT head was unremarkable  CTA head and neck demonstrated no large vessel occlusion, however a 1 cm saccular aneurysm at the aortic arch  The patient was deemed to be a tenecteplase candidate (patient was with an appropriate time window, demonstrated no obvious contraindications), tenecteplase was administered at 2205  Patient was thusly transferred to ICU for continued care after tenecteplase administration    With current work-up being unrevealing of any etiology of stroke, a routine EEG will be completed to screen for abnormal electrographic activity      Imaging:  - CTA dissection protocol: Penetrating ulcer versus saccular aneurysm as described above in the inferior portion of the aortic arch  Stable tiny pulmonary nodules  Thickening versus underdistention of the gastric wall which may represent gastritis  - MRI Brain wo: No acute intracranial abnormality  Mild to moderate scattered periventricular and subcortical foci of white matter T2 hyperintensity which is nonspecific and most likely related to chronic small vessel ischemic changes  Other less likely etiologies include demyelinating disease, vasculitis, Lyme and migraines  - TTE: Left ventricular cavity size is small  Wall thickness is moderately increased  There is moderate concentric hypertrophy  The left ventricular ejection fraction is 70% by visual estimation  Systolic function is vigorous  Wall motion is normal  Diastolic function is mildly abnormal, consistent with grade I (abnormal) relaxation  There is mid-cavity dynamic obstruction with valsalva with a peak gradient of 58 0 mmHg  Atrial Septum: There is a small and patent foramen ovale confirmed at rest with bidirectional shunting using saline contrast injection  Aortic Valve: The aortic valve is trileaflet  The leaflets are moderately thickened  There is mildly reduced mobility  There is mild stenosis   Compared to prior study dated 07/19/2019, the AS severity is similar; bubble study in present study reveals PFO; Valsalva provocation in present study reveals dynamic mid-cavitary obstructive gradient       Scores:   - NIHSS: 11 (L gaze preference, R facial droop, LUE, LLE> RUE, RLE motor weakness, R sided sensory loss, mild dysarthria)  Stroke Modified [place] Score: 0 (No baseline symptoms/disability)   -ASPECT: 10     Studies:  - APTT 32  - PT 14 2, INR 1 08  - 4 Plex negative  - CBC mild anemia  - Potassium 3 4, BUN 20, creatinine 1 26  - Cholesterol 131, triglycerides 147, HDL 31, LDL 71  - Hemoglobin A1c 5 3  - Chromogranin A: 91 1  - Catecholamines fraction urinary pending     Impression: Considering current clinical presentation of continued right-sided V1 V2 V3, right upper extremity, right lower extremity paresthesias (numbness), and imaging thus far not suggestive of an acute infarct or large vessel occlusion a transient ischemic attack would not fit the criteria, stroke (even with negative imaging) cannot be ruled out in the setting that the embolus may have been dissolved with TNK administration however this would not explain residual right hand decided symptoms  CTA head and neck currently not being demonstrative of intracranial source  ABRIL demonstrating an LVEF of 70% with grade 1 diastolic dysfunction, and a small PFO  Thus far work-up has not been demonstrative of an etiology for the vascular event  A routine EEG is ordered as well for continued work-up " -per Lugenia Leyden, DO    Outpatient EEG awake and sleep, 02/13/2023: normal 30 minutes awake and drowsy EEG  Residual symptoms include: None    Current treatment including stroke ppx:  Aspirin 81mg and Appropriately dosed statin yes    Stroke risk factors were evaluated including: hyperlipidemia, hypertension    Interval History:    Dizziness upon standing up too quickly  Will have to stay laid down because she will be to dizzy to stand  Not recurrent throughout the day, only occurring when patient stands up  More of a lightheadedness feeling, sometimes room spinning  Around 48 ounces of water per day  On lisinopril and amlodipine to control blood pressure  No new stroke-like symptoms  No residual deficits  No loss of consciousness, no staring spells  No episodes that could be characterized as seizure-like that had initially brought the patient to the hospital   Discussed negative EEG findings for seizures and negative for MRI findings for acute stroke at this time      Doesn't like taking medication but will do it   Currently taking aspirin and atorvastatin for secondary stroke prevention  Has blood pressure cuff at home, recommend to check BP regularly  Blood pressure in the office 130/60  Most recent LDL of 71 mg/dL  No significant bleeding or bruising from aspirin, no blood in stool, urine, or gums  Sleeping all day, not sleeping at night  Sleep schedule messed up  Offered and recommended education about sleep hygiene  Myself and the patient's daughter emphasized that not sleeping at night and taking naps throughout the day is contributing to her inability to fall asleep at night  Diet consistent of anything and everything at this time  Educated and recommended well-balanced diet  Physical activity everyday  No significant depression/anxiety noted however currently on Zoloft 25 mg and hydroxyzine 25 mg daily at bedtime as needed        Lab Results   Component Value Date/Time    CHOLESTEROL 131 02/12/2023 04:40 AM     Lab Results   Component Value Date/Time    TRIG 147 02/12/2023 04:40 AM     Lab Results   Component Value Date/Time    HDL 31 (L) 02/12/2023 04:40 AM     Lab Results   Component Value Date/Time    LDLCALC 71 02/12/2023 04:40 AM       Lab Results   Component Value Date/Time    HGBA1C 5 3 02/12/2023 04:40 AM     Lab Results   Component Value Date/Time     02/12/2023 04:40 AM           Past Medical History:   Diagnosis Date   • Back ache    • Cancer (HCC)    • Hyperlipidemia    • Hypertension        Current Outpatient Medications:   •  amLODIPine (NORVASC) 10 mg tablet, Take 1 tablet (10 mg total) by mouth daily, Disp: 90 tablet, Rfl: 3  •  aspirin 81 mg chewable tablet, Chew 1 tablet (81 mg total) daily, Disp: 30 tablet, Rfl: 0  •  atorvastatin (LIPITOR) 40 mg tablet, Take 1 tablet (40 mg total) by mouth every evening, Disp: 90 tablet, Rfl: 3  •  cholestyramine (QUESTRAN) 4 GM/DOSE powder, Take 1 packet (4 g total) by mouth 3 (three) times a day with meals, Disp: 1074 62 g, Rfl: 1  • clobetasol (TEMOVATE) 0 05 % ointment, Apply twice a day to rash on hands for 2 weeks  Then, apply twice a day from Monday-Friday for another 2 weeks  Avoid the face and groin, Disp: 60 g, Rfl: 0  •  hydrOXYzine HCL (ATARAX) 25 mg tablet, Take 25 mg by mouth daily at bedtime as needed, Disp: , Rfl: 0  •  lisinopril (ZESTRIL) 40 mg tablet, Take 1 tablet (40 mg total) by mouth daily, Disp: 90 tablet, Rfl: 1  •  pancrelipase, Lip-Prot-Amyl, (CREON) 12,000 units capsule, Take 12,000 units of lipase by mouth 3 (three) times a day with meals, Disp: 500 capsule, Rfl: 3  •  pantoprazole (PROTONIX) 40 mg tablet, Take 1 tablet (40 mg total) by mouth daily in the early morning, Disp: 90 tablet, Rfl: 3  •  potassium chloride (K-DUR,KLOR-CON) 10 mEq tablet, Take 2 tablets (20 mEq total) by mouth daily, Disp: 90 tablet, Rfl: 1  •  sertraline (ZOLOFT) 25 mg tablet, Take 1 tablet (25 mg total) by mouth daily, Disp: 90 tablet, Rfl: 1  •  thiamine 100 MG tablet, Take 1 tablet (100 mg total) by mouth daily Do not start before February 15, 2023 , Disp: 30 tablet, Rfl: 0  •  traZODone (DESYREL) 50 mg tablet, take 1/2 tablet by mouth at bedtime if needed for insomnia, Disp: , Rfl:   •  vitamin B-12 (CYANOCOBALAMIN) 500 MCG TABS, Take 1 tablet (500 mcg total) by mouth daily, Disp: 90 tablet, Rfl: 1     Objective:    Physical Exam:                                                                 Vitals:            Constitutional:    /60 (BP Location: Left arm, Patient Position: Sitting, Cuff Size: Standard)   Pulse 73   Temp 97 5 °F (36 4 °C) (Temporal)   Resp 16   Wt 69 6 kg (153 lb 8 oz)   LMP  (LMP Unknown)   SpO2 100%   BMI 27 19 kg/m²   BP Readings from Last 3 Encounters:   03/10/23 130/60   02/14/23 135/79   07/25/22 122/72     Pulse Readings from Last 3 Encounters:   03/10/23 73   02/14/23 72   07/25/22 84         Well developed, well nourished, well groomed  No dysmorphic features         Psychiatric:  Normal behavior and appropriate affect       Neurological Examination:     Mental status/cognitive function:   Orientated to time, place and person  Recent and remote memory intact  Attention span and concentration as well as fund of knowledge are appropriate for age  Normal language and spontaneous speech  Cranial Nerves:  II-visual fields full  III, IV, VI-Pupils were equal, round, and reactive to light and accomodation  Extraocular movements were full and conjugate without nystagmus  Conjugate gaze, normal smooth pursuits, normal saccades   V-facial sensation symmetric  VII-facial expression symmetric, intact forehead wrinkle, strong eye closure, symmetric smile    VIII-hearing grossly intact bilaterally   IX, X-palate elevation symmetric, no dysarthria  XI-shoulder shrug strength intact    XII-tongue protrusion midline  Motor Exam: symmetric bulk and tone throughout, no pronator drift  Power/strength 5/5 bilateral upper and lower extremities, no atrophy, fasciculations or abnormal movements noted  Sensory: grossly intact light touch in all extremities  Reflexes: brachioradialis 2+, biceps 2+, knee 2+ bilaterally  Coordination: Finger nose finger intact bilaterally, no apparent dysmetria, ataxia or tremor noted  Gait: steady casual and tandem gait  ROS:    Review of Systems   Constitutional: Negative  Negative for appetite change and fever  HENT: Negative  Negative for hearing loss, tinnitus, trouble swallowing and voice change  Eyes: Negative  Negative for photophobia, pain and visual disturbance  Respiratory: Negative  Negative for shortness of breath  Cardiovascular: Negative  Negative for palpitations  Gastrointestinal: Negative  Negative for nausea and vomiting  Endocrine: Negative  Negative for cold intolerance  Genitourinary: Negative  Negative for dysuria, frequency and urgency  Musculoskeletal: Negative  Negative for gait problem, myalgias and neck pain  Skin: Negative  Negative for rash  Allergic/Immunologic: Negative  Neurological: Positive for headaches  Negative for dizziness, tremors, seizures, syncope, facial asymmetry, speech difficulty, weakness, light-headedness and numbness  Hematological: Negative  Does not bruise/bleed easily  Psychiatric/Behavioral: Negative  Negative for confusion, hallucinations and sleep disturbance           I have spent 60 minutes today on this case including chart review, performing history and exam, patient counseling, and documentation/communication      Emil Starr PA-C  03/10/2023

## 2023-03-12 PROBLEM — I95.1 ORTHOSTATIC HYPOTENSION: Status: ACTIVE | Noted: 2023-03-12

## 2023-03-15 ENCOUNTER — OFFICE VISIT (OUTPATIENT)
Dept: INTERNAL MEDICINE CLINIC | Facility: CLINIC | Age: 63
End: 2023-03-15

## 2023-03-15 VITALS
OXYGEN SATURATION: 98 % | WEIGHT: 153 LBS | BODY MASS INDEX: 27.11 KG/M2 | SYSTOLIC BLOOD PRESSURE: 126 MMHG | TEMPERATURE: 98 F | DIASTOLIC BLOOD PRESSURE: 71 MMHG | HEART RATE: 73 BPM | HEIGHT: 63 IN

## 2023-03-15 DIAGNOSIS — Z23 NEED FOR INFLUENZA VACCINATION: ICD-10-CM

## 2023-03-15 DIAGNOSIS — N18.31 STAGE 3A CHRONIC KIDNEY DISEASE (HCC): ICD-10-CM

## 2023-03-15 DIAGNOSIS — G89.29 CHRONIC PAIN OF BOTH KNEES: ICD-10-CM

## 2023-03-15 DIAGNOSIS — I10 PRIMARY HYPERTENSION: Primary | ICD-10-CM

## 2023-03-15 DIAGNOSIS — E78.2 MIXED HYPERLIPIDEMIA: ICD-10-CM

## 2023-03-15 DIAGNOSIS — M25.562 CHRONIC PAIN OF BOTH KNEES: ICD-10-CM

## 2023-03-15 DIAGNOSIS — M25.561 CHRONIC PAIN OF BOTH KNEES: ICD-10-CM

## 2023-03-15 DIAGNOSIS — Z23 NEED FOR PNEUMOCOCCAL VACCINATION: ICD-10-CM

## 2023-03-17 PROBLEM — R19.7 DIARRHEA: Status: RESOLVED | Noted: 2021-12-21 | Resolved: 2023-03-17

## 2023-03-17 PROBLEM — G89.11 ACUTE LOW BACK PAIN DUE TO TRAUMA: Status: RESOLVED | Noted: 2018-02-08 | Resolved: 2023-03-17

## 2023-03-17 PROBLEM — M54.50 ACUTE LOW BACK PAIN DUE TO TRAUMA: Status: RESOLVED | Noted: 2018-02-08 | Resolved: 2023-03-17

## 2023-03-17 PROBLEM — Z23 NEED FOR INFLUENZA VACCINATION: Status: ACTIVE | Noted: 2023-03-17

## 2023-03-17 PROBLEM — N18.2 BENIGN HYPERTENSION WITH CKD (CHRONIC KIDNEY DISEASE), STAGE II: Status: RESOLVED | Noted: 2017-10-27 | Resolved: 2023-03-17

## 2023-03-17 PROBLEM — I12.9 BENIGN HYPERTENSION WITH CKD (CHRONIC KIDNEY DISEASE), STAGE II: Status: RESOLVED | Noted: 2017-10-27 | Resolved: 2023-03-17

## 2023-03-17 PROBLEM — K86.89 PANCREATIC MASS: Status: RESOLVED | Noted: 2021-04-12 | Resolved: 2023-03-17

## 2023-03-17 PROBLEM — Z71.89 GOALS OF CARE, COUNSELING/DISCUSSION: Status: RESOLVED | Noted: 2021-04-12 | Resolved: 2023-03-17

## 2023-03-17 PROBLEM — Z91.148 NONCOMPLIANCE WITH MEDICATIONS: Status: RESOLVED | Noted: 2018-05-02 | Resolved: 2023-03-17

## 2023-03-17 PROBLEM — R07.9 CHEST PAIN: Status: RESOLVED | Noted: 2018-05-01 | Resolved: 2023-03-17

## 2023-03-17 PROBLEM — H53.9 VISION DISTURBANCE: Status: RESOLVED | Noted: 2017-10-27 | Resolved: 2023-03-17

## 2023-03-17 PROBLEM — Z91.14 NONCOMPLIANCE WITH MEDICATIONS: Status: RESOLVED | Noted: 2018-05-02 | Resolved: 2023-03-17

## 2023-03-17 PROBLEM — I95.1 ORTHOSTATIC HYPOTENSION: Status: RESOLVED | Noted: 2023-03-12 | Resolved: 2023-03-17

## 2023-03-17 PROBLEM — D72.829 LEUKOCYTOSIS: Status: RESOLVED | Noted: 2021-04-12 | Resolved: 2023-03-17

## 2023-03-17 NOTE — PROGRESS NOTES
Name: Marine Munoz      : 1960      MRN: [id number]  Encounter Provider: Pam Isaacs PA-C  Encounter Date: 3/15/2023   Encounter department: [address] [place] [address]    Assessment & Plan     1  Primary hypertension  Assessment & Plan:  BP Readings from Last 3 Encounters:   03/15/23 126/71   03/10/23 130/60   23 135/79     Controlled  Continue lisinopril 40 mg daily and amlodipine 10 mg daily  Limit sodium and salt intake  Avoid alcohol and caffeine  2  Mixed hyperlipidemia  Assessment & Plan:  Lab Results   Component Value Date    CHOLESTEROL 131 2023    CHOLESTEROL 243 (H) 2021    CHOLESTEROL 230 (H) 10/23/2019     Lab Results   Component Value Date    HDL 31 (L) 2023    HDL 38 (L) 2021    HDL 37 (L) 10/23/2019     Lab Results   Component Value Date    TRIG 147 2023    TRIG 234 (H) 2021    TRIG 308 (H) 10/23/2019     Lab Results   Component Value Date    [place] 205 2021    [place] 193 10/23/2019     Lab Results   Component Value Date    LDLCALC 71 2023     At goal  Continue atorvastatin 40 mg daily  Low fat diet  3  Stage 3a chronic kidney disease [facility])  Assessment & Plan:  Lab Results   Component Value Date    EGFR 50 2023    EGFR 45 2023    EGFR 45 2023    CREATININE 1 16 2023    CREATININE 1 26 2023    CREATININE 1 26 2023     Renal function has been stable  No NSAIDs  4  Chronic pain of both knees  Assessment & Plan:  Likely arthritic in nature  Discussed physical therapy and orthopedic referral  Patient declines PT, but agreeable to ortho referral  Recommend supportive treatment  Warm/cool compresses  Tylenol as needed  Orders:  -     Ambulatory Referral to Orthopedic Surgery; Future    5  Need for influenza vaccination  Assessment & Plan:  Received influenza and [id number] immunizations today  Patient tolerated well       Orders:  -     influenza vaccine, quadrivalent, recombinant, PF, 0 5 mL, for patients 18 yr+ (FLUBLOK)    6  Need for pneumococcal vaccination  -     Pneumococcal Conjugate Vaccine 20-valent (Pcv20)    7  BMI 27 0-27 9,adult  Assessment & Plan:  See BMI counseling for nutrition and exercise recommendations  BMI Counseling: Body mass index is 27 1 kg/m²  The BMI is above normal  Nutrition recommendations include decreasing portion sizes, encouraging healthy choices of fruits and vegetables, decreasing fast food intake, consuming healthier snacks, limiting drinks that contain sugar, moderation in carbohydrate intake, increasing intake of lean protein, reducing intake of saturated and trans fat and reducing intake of cholesterol  Exercise recommendations include moderate physical activity 150 minutes/week, exercising 3-5 times per week and strength training exercises  No pharmacotherapy was ordered  Rationale for BMI follow-up plan is due to patient being overweight or obese  Subjective      Patient is a 60year old female with a PMH of HTN, HLD, CKD, and pancreatic insufficiency presenting for follow up  States she has been compliant with her medications  Denies any medication side effects  She does complain of bilateral knee pain  States this has been ongoing for a long time  The pain is constant, but worse with activity  Denies radiation  Denies any injury or trauma  She has not tried anything or it yet  Denies swelling, redness, or warmth  She offers no other complaints  Review of Systems   Constitutional: Positive for fatigue (chronic)  Negative for appetite change, chills, diaphoresis, fever and unexpected weight change  Eyes: Negative for visual disturbance  Respiratory: Negative for cough, chest tightness, shortness of breath and wheezing  Cardiovascular: Negative for chest pain, palpitations and leg swelling  Gastrointestinal: Negative for abdominal pain, blood in stool, constipation, diarrhea, nausea and vomiting  Endocrine: Negative for cold intolerance, heat intolerance, polydipsia, polyphagia and polyuria  Musculoskeletal: Positive for arthralgias  Skin: Negative for rash  Neurological: Negative for dizziness, tremors, weakness, light-headedness, numbness and headaches  Hematological: Negative for adenopathy  Current Outpatient Medications on File Prior to Visit   Medication Sig   • amLODIPine (NORVASC) 10 mg tablet Take 1 tablet (10 mg total) by mouth daily   • aspirin 81 mg chewable tablet Chew 1 tablet (81 mg total) daily   • atorvastatin (LIPITOR) 40 mg tablet Take 1 tablet (40 mg total) by mouth every evening   • cholestyramine (QUESTRAN) 4 GM/DOSE powder Take 1 packet (4 g total) by mouth 3 (three) times a day with meals   • clobetasol (TEMOVATE) 0 05 % ointment Apply twice a day to rash on hands for 2 weeks  Then, apply twice a day from Monday-Friday for another 2 weeks  Avoid the face and groin   • hydrOXYzine HCL (ATARAX) 25 mg tablet Take 25 mg by mouth daily at bedtime as needed   • lisinopril (ZESTRIL) 40 mg tablet Take 1 tablet (40 mg total) by mouth daily   • pancrelipase, Lip-Prot-Amyl, (CREON) 12,000 units capsule Take 12,000 units of lipase by mouth 3 (three) times a day with meals   • pantoprazole (PROTONIX) 40 mg tablet Take 1 tablet (40 mg total) by mouth daily in the early morning   • potassium chloride (K-DUR,KLOR-CON) 10 mEq tablet Take 2 tablets (20 mEq total) by mouth daily   • sertraline (ZOLOFT) 25 mg tablet Take 1 tablet (25 mg total) by mouth daily   • thiamine 100 MG tablet Take 1 tablet (100 mg total) by mouth daily Do not start before February 15, 2023     • traZODone (DESYREL) 50 mg tablet take 1/2 tablet by mouth at bedtime if needed for insomnia   • vitamin B-12 (CYANOCOBALAMIN) 500 MCG TABS Take 1 tablet (500 mcg total) by mouth daily       Objective     /71   Pulse 73   Temp 98 °F (36 7 °C) (Temporal)   Ht 5' 3" (1 6 m)   Wt 69 4 kg (153 lb)   LMP  (LMP Unknown)   SpO2 98%   BMI 27 10 kg/m²     Physical Exam  Vitals and nursing note reviewed  Constitutional:       General: She is awake  She is not in acute distress  Appearance: Normal appearance  She is well-developed, well-groomed and overweight  She is not ill-appearing  HENT:      Head: Normocephalic and atraumatic  Eyes:      General: No scleral icterus  Conjunctiva/sclera: Conjunctivae normal    Cardiovascular:      Rate and Rhythm: Normal rate and regular rhythm  Pulses: Normal pulses  Heart sounds: Normal heart sounds  No murmur heard  Pulmonary:      Effort: Pulmonary effort is normal  No respiratory distress  Breath sounds: Normal breath sounds and air entry  No decreased air movement  No decreased breath sounds, wheezing, rhonchi or rales  Abdominal:      General: Abdomen is flat  Bowel sounds are normal  There is no distension  Palpations: Abdomen is soft  There is no mass  Tenderness: There is no abdominal tenderness  There is no right CVA tenderness, left CVA tenderness, guarding or rebound  Hernia: No hernia is present  Musculoskeletal:         General: Normal range of motion  Cervical back: Neck supple  Right knee: Crepitus present  No swelling, deformity, effusion, erythema, ecchymosis, lacerations or bony tenderness  Normal range of motion  No tenderness  No LCL laxity, MCL laxity, ACL laxity or PCL laxity  Normal alignment, normal meniscus and normal patellar mobility  Normal pulse  Left knee: Crepitus present  No swelling, deformity, effusion, erythema, ecchymosis, lacerations or bony tenderness  Normal range of motion  No tenderness  No LCL laxity, MCL laxity, ACL laxity or PCL laxity  Normal alignment, normal meniscus and normal patellar mobility  Normal pulse  Right lower leg: No edema  Left lower leg: No edema  Lymphadenopathy:      Cervical: No cervical adenopathy  Skin:     General: Skin is warm        Capillary Refill: Capillary refill takes less than 2 seconds  Coloration: Skin is not jaundiced  Findings: No rash  Neurological:      General: No focal deficit present  Mental Status: She is alert and oriented to person, place, and time  Mental status is at baseline  Psychiatric:         Attention and Perception: Attention normal          Mood and Affect: Mood and affect normal          Speech: Speech normal          Behavior: Behavior normal  Behavior is cooperative           Cognition and Memory: Cognition normal        Eitan Recinos PA-C

## 2023-03-17 NOTE — ASSESSMENT & PLAN NOTE
Lab Results   Component Value Date    CHOLESTEROL 131 02/12/2023    CHOLESTEROL 243 (H) 01/08/2021    CHOLESTEROL 230 (H) 10/23/2019     Lab Results   Component Value Date    HDL 31 (L) 02/12/2023    HDL 38 (L) 01/08/2021    HDL 37 (L) 10/23/2019     Lab Results   Component Value Date    TRIG 147 02/12/2023    TRIG 234 (H) 01/08/2021    TRIG 308 (H) 10/23/2019     Lab Results   Component Value Date    [place] 205 01/08/2021    [place] 193 10/23/2019     Lab Results   Component Value Date    LDLCALC 71 02/12/2023     At goal  Continue atorvastatin 40 mg daily  Low fat diet

## 2023-03-17 NOTE — ASSESSMENT & PLAN NOTE
Likely arthritic in nature  Discussed physical therapy and orthopedic referral  Patient declines PT, but agreeable to ortho referral  Recommend supportive treatment  Warm/cool compresses  Tylenol as needed

## 2023-03-17 NOTE — ASSESSMENT & PLAN NOTE
BP Readings from Last 3 Encounters:   03/15/23 126/71   03/10/23 130/60   02/14/23 135/79     Controlled  Continue lisinopril 40 mg daily and amlodipine 10 mg daily  Limit sodium and salt intake  Avoid alcohol and caffeine

## 2023-03-17 NOTE — ASSESSMENT & PLAN NOTE
Lab Results   Component Value Date    EGFR 50 02/14/2023    EGFR 45 02/13/2023    EGFR 45 02/12/2023    CREATININE 1 16 02/14/2023    CREATININE 1 26 02/13/2023    CREATININE 1 26 02/12/2023     Renal function has been stable  No NSAIDs

## 2023-04-27 ENCOUNTER — TELEPHONE (OUTPATIENT)
Dept: NEUROLOGY | Facility: CLINIC | Age: 63
End: 2023-04-27

## 2023-05-13 DIAGNOSIS — I10 ESSENTIAL HYPERTENSION: ICD-10-CM

## 2023-05-15 RX ORDER — POTASSIUM CHLORIDE 750 MG/1
TABLET, EXTENDED RELEASE ORAL
Qty: 90 TABLET | Refills: 1 | Status: SHIPPED | OUTPATIENT
Start: 2023-05-15

## 2023-06-09 ENCOUNTER — TELEPHONE (OUTPATIENT)
Dept: INTERNAL MEDICINE CLINIC | Facility: CLINIC | Age: 63
End: 2023-06-09

## 2023-06-09 NOTE — TELEPHONE ENCOUNTER
[facility]  [id number] faxed, confirmed, and scanned into chart

## 2023-06-27 ENCOUNTER — TELEPHONE (OUTPATIENT)
Dept: INTERNAL MEDICINE CLINIC | Facility: CLINIC | Age: 63
End: 2023-06-27

## 2023-06-27 NOTE — TELEPHONE ENCOUNTER
Patient called on Friday leaving a VM stating she would like to speak to the  Gustave Nageotte  Patient did not leave on the VM why she would like to speak to Gustave Nageotte         Please review

## 2023-06-29 ENCOUNTER — PATIENT OUTREACH (OUTPATIENT)
Dept: INTERNAL MEDICINE CLINIC | Facility: CLINIC | Age: 63
End: 2023-06-29

## 2023-06-29 NOTE — PROGRESS NOTES
JHONY received an IB request to please contact pt who requested social work follow up  It is not noted what is needed  I attempted to reach tp today using [employer] services,  # [phone] and was unable to reach pt  A message was left to please return [facility] call  [facility] will attempt outreach at a later time

## 2023-07-06 ENCOUNTER — TELEPHONE (OUTPATIENT)
Dept: NEUROLOGY | Facility: CLINIC | Age: 63
End: 2023-07-06

## 2023-08-06 DIAGNOSIS — R29.90 STROKE-LIKE SYMPTOMS: ICD-10-CM

## 2023-08-06 DIAGNOSIS — I10 ESSENTIAL HYPERTENSION: ICD-10-CM

## 2023-08-07 RX ORDER — ASPIRIN 81 MG
TABLET,CHEWABLE ORAL
Qty: 30 TABLET | Refills: 3 | Status: SHIPPED | OUTPATIENT
Start: 2023-08-07

## 2023-08-07 RX ORDER — POTASSIUM CHLORIDE 750 MG/1
TABLET, EXTENDED RELEASE ORAL
Qty: 90 TABLET | Refills: 1 | Status: SHIPPED | OUTPATIENT
Start: 2023-08-07

## 2023-08-10 DIAGNOSIS — E53.8 B12 DEFICIENCY: ICD-10-CM

## 2023-08-10 DIAGNOSIS — I10 ESSENTIAL HYPERTENSION: ICD-10-CM

## 2023-08-10 DIAGNOSIS — D64.9 ANEMIA, UNSPECIFIED TYPE: ICD-10-CM

## 2023-08-10 RX ORDER — ASTRAGALUS ROOT 470 MG
CAPSULE ORAL
Qty: 90 TABLET | Refills: 2 | Status: SHIPPED | OUTPATIENT
Start: 2023-08-10

## 2023-08-10 RX ORDER — LISINOPRIL 40 MG/1
40 TABLET ORAL DAILY
Qty: 90 TABLET | Refills: 1 | Status: SHIPPED | OUTPATIENT
Start: 2023-08-10

## 2023-09-08 ENCOUNTER — TELEPHONE (OUTPATIENT)
Dept: INTERNAL MEDICINE CLINIC | Facility: CLINIC | Age: 63
End: 2023-09-08

## 2023-09-08 DIAGNOSIS — R29.90 CEREBRAL DOMINANCE: Primary | ICD-10-CM

## 2023-09-08 NOTE — TELEPHONE ENCOUNTER
Moises at [address]  Neurology [phone] called to request an ambulatory referral to neurology.    Dx code  R29.90    Patient has appointment 9/11/23 2:45    Please prepare the referral and put in Epic.    thanks

## 2023-10-06 ENCOUNTER — HOSPITAL ENCOUNTER (EMERGENCY)
Facility: HOSPITAL | Age: 63
Discharge: HOME | End: 2023-10-06
Attending: EMERGENCY MEDICINE
Payer: COMMERCIAL

## 2023-10-06 ENCOUNTER — APPOINTMENT (EMERGENCY)
Dept: RADIOLOGY | Facility: HOSPITAL | Age: 63
End: 2023-10-06
Payer: COMMERCIAL

## 2023-10-06 VITALS
DIASTOLIC BLOOD PRESSURE: 75 MMHG | RESPIRATION RATE: 20 BRPM | SYSTOLIC BLOOD PRESSURE: 156 MMHG | TEMPERATURE: 97.9 F | HEART RATE: 85 BPM | OXYGEN SATURATION: 99 %

## 2023-10-06 DIAGNOSIS — M79.652 PAIN OF LEFT THIGH: ICD-10-CM

## 2023-10-06 DIAGNOSIS — M25.552 PAIN OF LEFT HIP: Primary | ICD-10-CM

## 2023-10-06 PROCEDURE — 99283 EMERGENCY DEPT VISIT LOW MDM: CPT

## 2023-10-06 PROCEDURE — 73502 X-RAY EXAM HIP UNI 2-3 VIEWS: CPT

## 2023-10-06 PROCEDURE — 99284 EMERGENCY DEPT VISIT MOD MDM: CPT | Performed by: EMERGENCY MEDICINE

## 2023-10-06 RX ORDER — LIDOCAINE 50 MG/G
1 PATCH TOPICAL ONCE
Status: DISCONTINUED | OUTPATIENT
Start: 2023-10-06 | End: 2023-10-06 | Stop reason: HOSPADM

## 2023-10-06 RX ORDER — ACETAMINOPHEN 325 MG/1
975 TABLET ORAL ONCE
Status: COMPLETED | OUTPATIENT
Start: 2023-10-06 | End: 2023-10-06

## 2023-10-06 RX ORDER — METHOCARBAMOL 500 MG/1
500 TABLET, FILM COATED ORAL EVERY 12 HOURS PRN
Qty: 20 TABLET | Refills: 0 | Status: SHIPPED | OUTPATIENT
Start: 2023-10-06

## 2023-10-06 RX ORDER — METHOCARBAMOL 500 MG/1
500 TABLET, FILM COATED ORAL ONCE
Status: COMPLETED | OUTPATIENT
Start: 2023-10-06 | End: 2023-10-06

## 2023-10-06 RX ADMIN — METHOCARBAMOL 500 MG: 500 TABLET ORAL at 18:58

## 2023-10-06 RX ADMIN — ACETAMINOPHEN 975 MG: 325 TABLET, FILM COATED ORAL at 18:57

## 2023-10-06 RX ADMIN — LIDOCAINE 5% 1 PATCH: 700 PATCH TOPICAL at 18:58

## 2023-10-06 NOTE — ED PROVIDER NOTES
History  Chief Complaint   Patient presents with   • Flank Pain     Left flank pain past 2 days no urination issues. -f/c/n/v/d + HA    • Foot Pain     Left foot numbness that started today. Patient is a 69-year-old female presenting for evaluation of x2 days of left-sided back pain radiates into the hip as well as down the left lower extremity. Also complaining of some associated numbness of the left lower extremity this morning is currently resolved. States that she is taken ibuprofen at home without relief to symptoms denies any trauma to the area or inciting incident or event. Denies any fever chills abdominal pain nausea vomiting diarrhea constipation dysuria hematuria other back pain numbness or weakness anywhere else no saddle anesthesia no urinary retention no urinary or fecal incontinence denies any other complaints. History provided by:  Patient   used: Yes        Prior to Admission Medications   Prescriptions Last Dose Informant Patient Reported? Taking? Aspirin Low Dose 81 MG chewable tablet   No No   Sig: chew and swallow 1 tablet by mouth daily   B-12 Microlozenge 500 MCG SUBL   No No   Sig: PLACE 1 TABLET ON THE TONGUE AND ALLOW TO DISSOLVED DAILY   amLODIPine (NORVASC) 10 mg tablet   No No   Sig: Take 1 tablet (10 mg total) by mouth daily   atorvastatin (LIPITOR) 40 mg tablet   No No   Sig: Take 1 tablet (40 mg total) by mouth every evening   cholestyramine (QUESTRAN) 4 GM/DOSE powder   No No   Sig: Take 1 packet (4 g total) by mouth 3 (three) times a day with meals   clobetasol (TEMOVATE) 0.05 % ointment  Self No No   Sig: Apply twice a day to rash on hands for 2 weeks. Then, apply twice a day from Monday-Friday for another 2 weeks.  Avoid the face and groin   hydrOXYzine HCL (ATARAX) 25 mg tablet  Self Yes No   Sig: Take 25 mg by mouth daily at bedtime as needed   lisinopril (ZESTRIL) 40 mg tablet   No No   Sig: take 1 tablet by mouth once daily   pancrelipase, Lip-Prot-Amyl, (CREON) 12,000 units capsule   No No   Sig: Take 12,000 units of lipase by mouth 3 (three) times a day with meals   pantoprazole (PROTONIX) 40 mg tablet   No No   Sig: Take 1 tablet (40 mg total) by mouth daily in the early morning   potassium chloride (K-DUR,KLOR-CON) 10 mEq tablet   No No   Sig: take 2 tablets by mouth daily   sertraline (ZOLOFT) 25 mg tablet   No No   Sig: Take 1 tablet (25 mg total) by mouth daily   thiamine 100 MG tablet   No No   Sig: Take 1 tablet (100 mg total) by mouth daily Do not start before February 15, 2023. traZODone (DESYREL) 50 mg tablet  Self Yes No   Sig: take 1/2 tablet by mouth at bedtime if needed for insomnia      Facility-Administered Medications: None       Past Medical History:   Diagnosis Date   • Back ache    • Cancer ([address] [facility])    • Hyperlipidemia    • Hypertension    • Pancreatic mass 4/12/2021       Past Surgical History:   Procedure Laterality Date   • APPENDECTOMY     • BILATERAL OOPHORECTOMY     • BREAST SURGERY Right     partial mastectomy    • COLONOSCOPY     • HERNIA REPAIR     • HYSTERECTOMY     • LAPAROTOMY N/A 8/24/2021    Procedure: LAPAROTOMY EXPLORATORY;  Surgeon: Henry Roberson MD;  Location: [facility];  Service: Surgical Oncology   • MASTECTOMY Right     partial   • WHIPPLE PROCEDURE/PANCREATICO-DUODENECTOMY N/A 8/24/2021    Procedure: WHIPPLE PROCEDURE/PANCREATICO-DUODENECTOMY;  Surgeon: Henry Roberson MD;  Location: [facility];  Service: Surgical Oncology       Family History   Problem Relation Age of Onset   • Hypertension Mother    • Heart disease Mother    • Diabetes Mother    • Cancer Father      I have reviewed and agree with the history as documented.     E-Cigarette/Vaping   • E-Cigarette Use Never User      E-Cigarette/Vaping Substances   • Nicotine No    • THC No    • CBD No    • Flavoring No    • Other No    • Unknown No      Social History     Tobacco Use   • Smoking status: Former     Packs/day: 0.65     Years: 30.00 Total pack years: 19.50     Types: Cigarettes   • Smokeless tobacco: Never   • Tobacco comments:     quit 15 years ago   Vaping Use   • Vaping Use: Never used   Substance Use Topics   • Alcohol use: Not Currently   • Drug use: No        Review of Systems   Constitutional: Negative for chills and fever. HENT: Negative for ear pain and sore throat. Eyes: Negative for pain and visual disturbance. Respiratory: Negative for cough and shortness of breath. Cardiovascular: Negative for chest pain and palpitations. Gastrointestinal: Negative for abdominal pain and vomiting. Genitourinary: Negative for dysuria and hematuria. Musculoskeletal: Positive for back pain. Negative for arthralgias. Skin: Negative for color change and rash. Neurological: Positive for numbness (Resolved). Negative for seizures, syncope and weakness. All other systems reviewed and are negative. Physical Exam  ED Triage Vitals [10/06/23 1756]   Temperature Pulse Respirations Blood Pressure SpO2   97.9 °F (36.6 °C) 85 20 156/75 99 %      Temp Source Heart Rate Source Patient Position - Orthostatic VS BP Location FiO2 (%)   Temporal -- -- -- --      Pain Score       8             Orthostatic Vital Signs  Vitals:    10/06/23 1756   BP: 156/75   Pulse: 85       Physical Exam  Vitals and nursing note reviewed. Constitutional:       General: She is not in acute distress. Appearance: She is well-developed. HENT:      Head: Normocephalic and atraumatic. Eyes:      Conjunctiva/sclera: Conjunctivae normal.   Cardiovascular:      Rate and Rhythm: Normal rate and regular rhythm. Heart sounds: No murmur heard. Pulmonary:      Effort: Pulmonary effort is normal. No respiratory distress. Breath sounds: Normal breath sounds. Abdominal:      Palpations: Abdomen is soft. Tenderness: There is no abdominal tenderness. Musculoskeletal:         General: Tenderness present. No swelling or signs of injury.  Normal range of motion. Cervical back: Neck supple. Back:       Right lower leg: No edema. Left lower leg: No edema. Legs:       Comments: No overlying skin changes crepitus or edema to suggest NSTI   Skin:     General: Skin is warm and dry. Capillary Refill: Capillary refill takes less than 2 seconds. Neurological:      Mental Status: She is alert. Psychiatric:         Mood and Affect: Mood normal.         ED Medications  Medications   methocarbamol (ROBAXIN) tablet 500 mg (500 mg Oral Given 10/6/23 1858)   acetaminophen (TYLENOL) tablet 975 mg (975 mg Oral Given 10/6/23 1857)       Diagnostic Studies  Results Reviewed     None                 XR hip/pelv 2-3 vws left   ED Interpretation by Gisela Gonzalez DO (10/06 2012)   Wet read - no fx or dislocation             Procedures  Procedures      ED Course                                       Medical Decision Making  Patient is a 60-year-old female presenting for evaluation of left lower lumbar back pain and left lower extremity pain    Differential: Likely musculoskeletal denying any inciting event but is very tender over the trochanter possible occult fracture although unlikely. No signs of an NSTI patient is not diabetic low risk for this. No red flag symptoms for back pain to suggest cord compression    Plan: Multimodal analgesia, will obtain x-ray of pelvis. Monitor and reassess. Likely discharge    X-ray as above, normal    Patient is hemodynamically stable and cleared for discharge outpatient follow-up with her PCP. We will also send referral to comprehensive spine as well as a prescription for Robaxin to the pharmacy. Return precautions given patient verbalized understanding    Amount and/or Complexity of Data Reviewed  Radiology: ordered and independent interpretation performed. Risk  OTC drugs. Prescription drug management.             Disposition  Final diagnoses:   Pain of left hip   Pain of left thigh     Time reflects when diagnosis was documented in both MDM as applicable and the Disposition within this note     Time User Action Codes Description Comment    10/6/2023  7:48 PM Kmiber Rojas [M25.552] Pain of left hip     10/6/2023  8:13 PM Kimber Rojas [Y71.960] Pain of left thigh       ED Disposition     ED Disposition   Discharge    Condition   Stable    Date/Time   Fri Oct 6, 2023  8:13 PM    Comment   Remy Monroy discharge to home/self care. Follow-up Information     Follow up With Specialties Details Why Contact Info    [facility]  Call in 2 days  [phone]            Discharge Medication List as of 10/6/2023  8:15 PM      START taking these medications    Details   methocarbamol (ROBAXIN) 500 mg tablet Take 1 tablet (500 mg total) by mouth every 12 (twelve) hours as needed for muscle spasms, Starting Fri 10/6/2023, Normal         CONTINUE these medications which have NOT CHANGED    Details   amLODIPine (NORVASC) 10 mg tablet Take 1 tablet (10 mg total) by mouth daily, Starting Mon 2/20/2023, Normal      Aspirin Low Dose 81 MG chewable tablet chew and swallow 1 tablet by mouth daily, Normal      atorvastatin (LIPITOR) 40 mg tablet Take 1 tablet (40 mg total) by mouth every evening, Starting Mon 2/20/2023, Normal      B-12 Microlozenge 500 MCG SUBL PLACE 1 TABLET ON THE TONGUE AND ALLOW TO DISSOLVED DAILY, Normal      cholestyramine (QUESTRAN) 4 GM/DOSE powder Take 1 packet (4 g total) by mouth 3 (three) times a day with meals, Starting Mon 2/20/2023, Normal      clobetasol (TEMOVATE) 0.05 % ointment Apply twice a day to rash on hands for 2 weeks. Then, apply twice a day from Monday-Friday for another 2 weeks.  Avoid the face and groin, Normal      hydrOXYzine HCL (ATARAX) 25 mg tablet Take 25 mg by mouth daily at bedtime as needed, Starting Sat 11/23/2019, Historical Med      lisinopril (ZESTRIL) 40 mg tablet take 1 tablet by mouth once daily, Starting Thu 8/10/2023, Normal      pancrelipase, Lip-Prot-Amyl, (CREON) 12,000 units capsule Take 12,000 units of lipase by mouth 3 (three) times a day with meals, Starting Mon 2/20/2023, Normal      pantoprazole (PROTONIX) 40 mg tablet Take 1 tablet (40 mg total) by mouth daily in the early morning, Starting Mon 2/20/2023, Normal      potassium chloride (K-DUR,KLOR-CON) 10 mEq tablet take 2 tablets by mouth daily, Normal      sertraline (ZOLOFT) 25 mg tablet Take 1 tablet (25 mg total) by mouth daily, Starting Mon 2/20/2023, Normal      thiamine 100 MG tablet Take 1 tablet (100 mg total) by mouth daily Do not start before February 15, 2023., Starting Wed 2/15/2023, Until Fri 3/17/2023, Normal      traZODone (DESYREL) 50 mg tablet take 1/2 tablet by mouth at bedtime if needed for insomnia, Historical Med               PDMP Review     None           ED Provider  Attending physically available and evaluated [place]. I managed the patient along with the ED Attending.     Electronically Signed by         Rohith Vazquez DO  10/06/23 9312

## 2023-10-06 NOTE — ED ATTENDING ATTESTATION
10/6/2023  IRey DO, saw and evaluated the patient. I have discussed the patient with the resident/non-physician practitioner and agree with the resident's/non-physician practitioner's findings, Plan of Care, and MDM as documented in the resident's/non-physician practitioner's note, except where noted. All available labs and Radiology studies were reviewed. I was present for key portions of any procedure(s) performed by the resident/non-physician practitioner and I was immediately available to provide assistance. At this point I agree with the current assessment done in the Emergency Department. I have conducted an independent evaluation of this patient a history and physical is as follows:    Patient is a 59-year-old female history hypertension, CKD, hyperlipidemia, predominantly Eritrean-speaking, accompanied by her bilingual daughter. Patient indicates she preferred to have the daughter as a  rather than using the  service. About 2 days ago she began having some mild pain in her left buttocks and hip area, worse with walking and better with remaining still. Having some occasional tingling pins and needle sensation going down the outside of the leg. Not crossing the knee. No falls, no trauma, no weakness but hurts when she tries to move the left leg. No bladder or bowel incontinence, saddle anesthesia, no IV drug use, no abdominal pain, no fever, no chills, no previous back surgery or back injections. Daughter indicates patient is acting normally, no change in mental status. General:  Patient is well-appearing  Head:  Atraumatic  Eyes:  Conjunctiva pink  ENT:  Mucous membranes are moist  Neck:  Supple  Cardiac:  S1-S2, without murmurs  Lungs:  Clear to auscultation bilaterally  Abdomen:  Soft, nontender, normal bowel sounds, no CVA tenderness  Extremities: Her left leg is visibly atraumatic, she has no warmth or redness or swelling of the hip.   She has some mild tenderness over the left gluteal musculature as well as left trochanter and mild discomfort with passive range of motion of the left hip. She has no palpable abscess or sloughing of the skin. No bony tenderness to the mid and distal femur, knee, tibia, fibula, foot or ankle. DP and PT pulses are intact and symmetric bilaterally, her right leg is atraumatic, nontender with normal, painless range of motion. Back: No warmth or redness to the back. There is no CVA tenderness, no spinal tenderness, no warmth or fluctuance. Neurologic:  Awake, fluent speech, normal comprehension, sensation intact and symmetric in the b/l  legs. Strength is 5/5 at the bilateral hips, knees, ankles, reflexes are 2/4 at the bilateral knees and ankles. Can dorsiflex & plantarflex great toes bilaterally without difficulty, no saddle anesthesia, negative straight leg raise bilaterally. AAOx3  Skin:  Pink warm and dry, no rash  Psychiatric:  Alert, pleasant, cooperative      ED Course     XR hip/pelv 2-3 vws left   ED Interpretation   Wet read - no fx or dislocation           On reassessment after symptomatic management patient feeling more comfortable. At this point the cause the patient's symptoms is unclear but unlikely to represent an acute emergency. She has no evidence of acute neurosurgical emergency, do not believe this represents spinal epidural abscess or that emergent MRI is indicated. No spinal tenderness, do not believe this is discitis. Admission considered for further pain control and work-up however after symptomatic management in the ED, patient said she felt comfortable going home and following up as an outpatient. Supportive care, importance of follow-up and return precautions were discussed with patient and daughter, who expressed understanding.         MEDICAL DECISION MAKING CODING      Chronic conditions affecting care: See above    COLLECTION AND INTERPRETATION OF DATA  Additional history obtained from: Daughter    I ordered each unique test  Tests reviewed personally by me:  Imaging: I independently reviewed the left hip and pelvis x-ray and found no acute pathology. Tests considered but not ordered: See above    RISK  Drugs (OTC, Rx, Controlled substances): Prescription management  All of the patient's current prescription medications should be continued.     Treatment:  Consideration of admission: See above      Surgery  -I considered surgery may be necessary prior to completion of the work up but afterwards there is no indication for immediate surgery    Social Determinants of Health:  Presentation to ED outside of business hours or on night shift        Critical Care Time  Procedures

## 2023-10-07 NOTE — DISCHARGE INSTRUCTIONS
You were seen and evaluated in the emergency department for left back and hip pain. Likely musculoskeletal normal x-ray please follow-up with your PCP within 48 hours. We will send referral to the comprehensive spine program please follow-up with them. We will send medication to pharmacy take every 12 hours as needed for pain do not drive while on this medication. Tylenol Motrin every 6 hours as needed for pain.

## 2023-10-09 ENCOUNTER — NURSE TRIAGE (OUTPATIENT)
Dept: PHYSICAL THERAPY | Facility: OTHER | Age: 63
End: 2023-10-09

## 2023-10-09 DIAGNOSIS — M25.552 HIP PAIN, ACUTE, LEFT: ICD-10-CM

## 2023-10-09 DIAGNOSIS — M54.50 ACUTE LEFT-SIDED LOW BACK PAIN, UNSPECIFIED WHETHER SCIATICA PRESENT: Primary | ICD-10-CM

## 2023-10-09 NOTE — TELEPHONE ENCOUNTER
Croatian SPEAKING, NEEDS IS. Additional Information  • Negative: Is this related to a work injury? • Negative: Is this related to an MVA? • Negative: Are you currently recieving homecare services? Background - Initial Assessment  Clinical complaint: ED visit 10/06/23 due to Left-sided Lower Back that radiates into hips and legs. Hx of back pain in the past, has had PT in 2018. New flare up started 2 days prior ED visit 10/4. Numbness and tingling in both legs. NKI. Not seeing a Dr for this pain. Patient states deshpande is constant "every day, all day". Worse with sitting and walking "my legs suddeling give out". Patient described pain as burning, stabbing and feels pressure I her hips. Date of onset: 10/04/23 ( new flare up)  Frequency of pain: constant  Quality of pain: burning, numb, stabbing and pressure.     Protocols used: SL AMB COMPREHENSIVE SPINE PROGRAM PROTOCOL

## 2023-10-10 NOTE — TELEPHONE ENCOUNTER
Additional Information  • Negative: Has the patient had unexplained weight loss? Patient stated she had weight loss d/t pancreatic issues-Nurse confirmed patient addressed with her provider. RN reworded and explained the question to r/o any urgent issue. • Negative: Does the patient have a fever? • Negative: Is the patient experiencing urine retention? • Negative: Is the patient experiencing blood in sputum? • Negative: Has the patient experienced major trauma? (fall from height, high speed collision, direct blow to spine) and is also experiencing nausea, light-headedness, or loss of consciousness? • Negative: Is the patient experiencing acute drop foot or paralysis? • Negative: Is this a chronic condition? Protocols used: SL AMB COMPREHENSIVE SPINE PROGRAM PROTOCOL    The following encounter was completed with the assistance of Norah TENORIO  ID# [id number]  Nurse completed triage and NO RF s/s present. Referral entered for the [facility]  site and contact/phone number info given to her as well. Patients information was sent to the preferred site and pt made aware clerical would be calling to schedule appointment. Nurse encouraged her to call site if she does not hear from clerical beforehand. Patient Agreed. Patient asked this nurse multiple questions about her pain and vaginal bleed. Nurse discussed the importance of addressing any complaints additional questions or concerns at this time. Patient is aware current complaints, relevant dx, additional referrals and treatment/options will be discussed at the evaluation/consult. All information regarding plan to be evaluated by the therapist was reviewed. Patient is in agreement with nurse's explanation of intended care path discussed today. Patient very appreciative of CB and referral placement for the evaluation with an Advanced Spine Therapist.   Nurse also offered a call from the [address] [place] [address] counselor d/t SBT score. Patient declined.  Patient was pleasant and appropriate during this encounter. Nurse wished her well and referral closed.

## 2023-10-17 NOTE — PROGRESS NOTES
Assessment/Plan:    No problem-specific Assessment & Plan notes found for this encounter. Subjective:      Patient ID: Marilou Patel is a 58 y.o. female.     HPI        Review of Systems      Objective:      LMP  (LMP Unknown)          Physical Exam

## 2023-10-18 ENCOUNTER — OFFICE VISIT (OUTPATIENT)
Dept: INTERNAL MEDICINE CLINIC | Facility: CLINIC | Age: 63
End: 2023-10-18

## 2023-10-18 VITALS
TEMPERATURE: 97.6 F | BODY MASS INDEX: 27.5 KG/M2 | HEART RATE: 76 BPM | HEIGHT: 63 IN | OXYGEN SATURATION: 98 % | DIASTOLIC BLOOD PRESSURE: 74 MMHG | SYSTOLIC BLOOD PRESSURE: 144 MMHG | WEIGHT: 155.2 LBS

## 2023-10-18 DIAGNOSIS — I51.7 LEFT VENTRICULAR HYPERTROPHY: ICD-10-CM

## 2023-10-18 DIAGNOSIS — M54.16 LUMBAR RADICULOPATHY: Primary | ICD-10-CM

## 2023-10-18 DIAGNOSIS — Z23 ENCOUNTER FOR IMMUNIZATION: ICD-10-CM

## 2023-10-18 DIAGNOSIS — Z12.31 ENCOUNTER FOR SCREENING MAMMOGRAM FOR BREAST CANCER: ICD-10-CM

## 2023-10-18 DIAGNOSIS — I71.9 ULCER OF AORTA (HCC): ICD-10-CM

## 2023-10-18 RX ORDER — GABAPENTIN 100 MG/1
100 CAPSULE ORAL
Qty: 30 CAPSULE | Refills: 1 | Status: SHIPPED | OUTPATIENT
Start: 2023-10-18

## 2023-10-18 RX ORDER — METHYLPREDNISOLONE 4 MG/1
TABLET ORAL
Qty: 21 EACH | Refills: 0 | Status: SHIPPED | OUTPATIENT
Start: 2023-10-18

## 2023-10-18 RX ORDER — CLOPIDOGREL BISULFATE 75 MG/1
75 TABLET ORAL DAILY
Qty: 30 TABLET | Refills: 1 | Status: SHIPPED | OUTPATIENT
Start: 2023-10-18

## 2023-10-19 ENCOUNTER — TELEPHONE (OUTPATIENT)
Dept: INTERNAL MEDICINE CLINIC | Facility: CLINIC | Age: 63
End: 2023-10-19

## 2023-10-19 DIAGNOSIS — D64.9 ANEMIA, UNSPECIFIED TYPE: Primary | ICD-10-CM

## 2023-10-19 NOTE — PROGRESS NOTES
Assessment/Plan:    No problem-specific Assessment & Plan notes found for this encounter. Subjective:      Patient ID: Rosita Clark is a 58 y.o. female.     HPI    The following portions of the patient's history were reviewed and updated as appropriate: allergies, current medications, past family history, past medical history, past social history, past surgical history, and problem list.    Review of Systems      Objective:      LMP  (LMP Unknown)          Physical Exam

## 2023-10-19 NOTE — TELEPHONE ENCOUNTER
This patient had a clinic appointment on October 18. We forgot to tell her the importance of the effect of her Questran-cholestyramine on other meds.   Please remind the patient that her Fe Lango can block absorption of all other meds and she needs to take all of her medicines either 1 hour before any dose ofQuestran or 3 hours after any dose of Questran.-Thank you

## 2023-10-23 ENCOUNTER — OFFICE VISIT (OUTPATIENT)
Dept: CARDIOLOGY CLINIC | Facility: CLINIC | Age: 63
End: 2023-10-23
Payer: COMMERCIAL

## 2023-10-23 VITALS
SYSTOLIC BLOOD PRESSURE: 154 MMHG | OXYGEN SATURATION: 98 % | RESPIRATION RATE: 18 BRPM | HEART RATE: 77 BPM | HEIGHT: 63 IN | BODY MASS INDEX: 27.04 KG/M2 | WEIGHT: 152.6 LBS | DIASTOLIC BLOOD PRESSURE: 92 MMHG

## 2023-10-23 DIAGNOSIS — I51.7 LEFT VENTRICULAR HYPERTROPHY: ICD-10-CM

## 2023-10-23 DIAGNOSIS — N18.31 STAGE 3A CHRONIC KIDNEY DISEASE (HCC): ICD-10-CM

## 2023-10-23 DIAGNOSIS — R07.89 CHEST PRESSURE: ICD-10-CM

## 2023-10-23 DIAGNOSIS — I10 HYPERTENSION, UNSPECIFIED TYPE: Primary | ICD-10-CM

## 2023-10-23 DIAGNOSIS — K86.89 PANCREATIC INSUFFICIENCY: ICD-10-CM

## 2023-10-23 DIAGNOSIS — I71.22 ANEURYSM OF AORTIC ARCH WITHOUT RUPTURE (HCC): ICD-10-CM

## 2023-10-23 DIAGNOSIS — R29.90 STROKE-LIKE SYMPTOMS: ICD-10-CM

## 2023-10-23 DIAGNOSIS — D64.9 ANEMIA, UNSPECIFIED TYPE: ICD-10-CM

## 2023-10-23 DIAGNOSIS — E78.2 MIXED HYPERLIPIDEMIA: ICD-10-CM

## 2023-10-23 DIAGNOSIS — I71.9 ULCER OF AORTA (HCC): ICD-10-CM

## 2023-10-23 PROCEDURE — 99203 OFFICE O/P NEW LOW 30 MIN: CPT | Performed by: INTERNAL MEDICINE

## 2023-10-23 PROCEDURE — 93000 ELECTROCARDIOGRAM COMPLETE: CPT | Performed by: INTERNAL MEDICINE

## 2023-10-23 RX ORDER — CARVEDILOL 6.25 MG/1
6.25 TABLET ORAL 2 TIMES DAILY WITH MEALS
Qty: 60 TABLET | Refills: 3 | Status: SHIPPED | OUTPATIENT
Start: 2023-10-23

## 2023-10-23 NOTE — PATIENT INSTRUCTIONS
Please have the test done    Follow up after Cardiac MRI unless symptoms worsen, in which case you can call for follow up sooner.

## 2023-10-23 NOTE — PROGRESS NOTES
Cardiology   Dock Maw 58 y.o. 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, penetrating aortic ulcer in the abdominal aorta  >> History of possible stroke  >> Chronic back pain  >> Anemia: Being worked up by her primary care  >> Partial pancreatectomy for neuroendocrine tumor, 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 female presents for abnormal echocardiogram and with chest pressure. She has been having on and off chest pressure for about a month and a half which occurs with exertion is in the precordial region and is relieved by rest.  She has not had such symptoms before. She has vascular disease with extensive atherosclerosis in her aorta and an episode of strokelike symptoms. Currently she is on antihypertensive medications, Plavix monotherapy for possible TIA, and high intensity statin therapy with atorvastatin.       Denies shortness of breath, palpitations, orthopnea, PND, pedal edema, syncope, presyncope, diaphoresis, nausea/vomiting     FH: No history of cardiac disease in her family    SH: Does not smoke drink or use drugs  NKDA        Physical exam  Objective   Vitals: Blood pressure 154/92, pulse 77, resp. rate 18, height 5' 3" (1.6 m), weight 69.2 kg (152 lb 9.6 oz), SpO2 98 %, not currently breastfeeding. General:  AO x3, no acute distress  Cardiac:  S1-S2 normal  No murmurs, rubs or gallops, JVP: Not seen  Lungs:  Clear to auscultation bilaterally, no wheezing or crackles. Abdomen:  Soft, nontender, nondistended. Extremities:  Warm, well perfused, pulses palpable, no ulcers or rashes. Edema: Absent  Neuro: Grossly nonfocal  Psych:  Normal affect            ======================================================  TREADMILL STRESS  No results found for this or any previous visit.     ----------------------------------------------------------------------------------------------  NUCLEAR STRESS TEST: No results found for this or any previous visit. No results found for this or any previous visit.      --------------------------------------------------------------------------------  CATH:  No results found for this or any previous visit.    --------------------------------------------------------------------------------  ECHO:   Results for orders placed during the hospital encounter of 19    Echo complete with contrast if indicated    Narrative  [id number] [facility] [address]  [address] [place] [address], [address] [place] [address]  [phone]    Transthoracic Echocardiogram  2D, M-mode, Doppler, and Color Doppler    Study date:  2019    Patient: Juanito Corral  MR number: [id number]  Account number: [de-identified]  : 1960  Age: 60 years  Gender: Female  Status: Inpatient  Location: Bedside  Height: 64 in  Weight: 175 lb  BP: 115/ 53 mmHg    Indications: TIA. Diagnoses: G45.9 - Transient cerebral ischemic attack, unspecified    Sonographer:  Christy Peter RDCS  Primary Physician:  Peter Katz DO  Referring Physician:  Mohan Mendez DO  Group:  [facility]  Cardiology Fellow:   Moose Cook MD  Interpreting Physician:  Jeanie Barnett MD    SUMMARY    LEFT VENTRICLE:  Systolic function was hyperdynamic. Ejection fraction was estimated to be 70 %. There were no regional wall motion abnormalities. Wall thickness was increased. Concentric hypertrophy was present. Doppler parameters were consistent with abnormal left ventricular relaxation (grade 1 diastolic dysfunction). AORTIC VALVE:  There was mild stenosis. The peak valve velocity was 261 cm/s. Valve mean gradient was 17 mmHg. HISTORY: PRIOR HISTORY: Hypertension. Cancer. Former smoker. Pulmonary nodule. PROCEDURE: The procedure was performed at the bedside. This was a routine study. The transthoracic approach was used. The study included complete 2D imaging, M-mode, complete spectral Doppler, and color Doppler. The heart rate was 72 bpm,  at the start of the study. Images were obtained from the parasternal, apical, subcostal, and suprasternal notch acoustic windows. Image quality was adequate. LEFT VENTRICLE: Size was normal. Systolic function was hyperdynamic. Ejection fraction was estimated to be 70 %. There were no regional wall motion abnormalities. Wall thickness was increased. Concentric hypertrophy was present. DOPPLER:  There was an increased relative contribution of atrial contraction to ventricular filling. Doppler parameters were consistent with abnormal left ventricular relaxation (grade 1 diastolic dysfunction). RIGHT VENTRICLE: The size was normal. Systolic function was normal.    LEFT ATRIUM: Size was normal.    RIGHT ATRIUM: Size was normal.    MITRAL VALVE: Valve structure was normal. There was normal leaflet separation. DOPPLER: There was no evidence for stenosis. There was no significant regurgitation. AORTIC VALVE: The valve was trileaflet. Leaflets exhibited mildly increased thickness, mild calcification, lower normal cuspal separation, and sclerosis. DOPPLER: There was mild stenosis.  There was no regurgitation. TRICUSPID VALVE: The valve structure was normal. There was normal leaflet separation. DOPPLER: There was no evidence for stenosis. There was no significant regurgitation. PULMONIC VALVE: Leaflets exhibited normal thickness, no calcification, and normal cuspal separation. DOPPLER: The transpulmonic velocity was within the normal range. There was no significant regurgitation. PERICARDIUM: There was no pericardial effusion. AORTA: The root exhibited normal size. MEASUREMENT TABLES    DOPPLER MEASUREMENTS  Aortic valve   (Reference normals)  Peak dorothea   261 cm/s   (--)  Mean dorothea   192 cm/s   (--)  VTI   53 cm   (--)  Peak gradient   27 mmHg   (--)  Mean gradient   17 mmHg   (--)  Valve area   1.4 cmï¾²   (--)    SYSTEM MEASUREMENT TABLES    2D mode  AV Diam: 2.6 cm  AoR Diam; Mean (2D): 2.6 cm  LA Diam (2D): 3.1 cm  LA Dimension; Mean (2D): 3.1 cm  LA/Ao (2D): 1.19  EDV (2D-Cubed): 46.7 cm3  EF (2D-Cubed): 82.9 %  ESV (2D-Cubed): 8 cm3  FS (2D-Cubed): 44.4 %  FS (2D-Teich): 44.4 %  IVS/LVPW (2D): 0.93  IVSd (2D): 1.3 cm  IVSd; Mean chosen (2D): 1.3 cm  LVIDd (2D): 3.6 cm  LVIDd; Mean (2D): 3.6 cm  LVIDs (2D): 2 cm  LVIDs; Mean (2D): 2 cm  LVOT Area (2D): 314 mm2  LVPWd (2D): 1.4 cm  LVPWd; Mean (2D): 1.4 cm  Left Ventricular Ejection Fraction; Teichholz; 2D mode;: 76.7 %  Left Ventricular End Diastolic Volume; Teichholz; 2D mode;: 54.4 cm3  Left Ventricular End Systolic Volume; Teichholz; 2D mode;: 12.7 cm3  SV (2D-Cubed): 38.7 cm3  Stroke Volume; Teichholz; 2D mode;: 41.7 cm3  RVIDd (2D): 3.4 cm  RVIDd; Mean (2D): 3.4 cm  Right and Left Ventricular End Diastolic Diameter Ratio; 2D mode;: 0.94    Apical four chamber  LV MOD Diam; Recent value; End Diastole (A4C): 4.14 cm  LV MOD Diam; Recent value; End Diastole (A4C): 4.35 cm  LV MOD Diam; Recent value; End Diastole (A4C): 4.32 cm  LV MOD Diam; Recent value; End Diastole (A4C): 4.38 cm  LV MOD Diam; Recent value;  End Diastole (A4C): 4.32 cm  LV MOD Diam; Recent value; End Diastole (A4C): 4.21 cm  LV MOD Diam; Recent value; End Diastole (A4C): 4.12 cm  LV MOD Diam; Recent value; End Diastole (A4C): 3.95 cm  LV MOD Diam; Recent value; End Diastole (A4C): 3.86 cm  LV MOD Diam; Recent value; End Diastole (A4C): 3.65 cm  LV MOD Diam; Recent value; End Diastole (A4C): 3.45 cm  LV MOD Diam; Recent value; End Diastole (A4C): 3.02 cm  LV MOD Diam; Recent value; End Diastole (A4C): 2.41 cm  LV MOD Diam; Recent value; End Diastole (A4C): 1.8 cm  LV MOD Diam; Recent value; End Diastole (A4C): 4.03 cm  LV MOD Diam; Recent value; End Diastole (A4C): 3.85 cm  LV MOD Diam; Recent value; End Diastole (A4C): 3.57 cm  LV MOD Diam; Recent value; End Diastole (A4C): 1.71 cm  LV MOD Diam; Recent value; End Diastole (A4C): 4.26 cm  LV MOD Diam; Recent value; End Diastole (A4C): 4.29 cm  LV MOD Diam; Recent value; End Systole (A4C): 1.46 cm  LV MOD Diam; Recent value; End Systole (A4C): 2.47 cm  LV MOD Diam; Recent value; End Systole (A4C): 2.5 cm  LV MOD Diam; Recent value; End Systole (A4C): 2.47 cm  LV MOD Diam; Recent value; End Systole (A4C): 2.44 cm  LV MOD Diam; Recent value; End Systole (A4C): 2.44 cm  LV MOD Diam; Recent value; End Systole (A4C): 2.41 cm  LV MOD Diam; Recent value; End Systole (A4C): 2.41 cm  LV MOD Diam; Recent value; End Systole (A4C): 2.38 cm  LV MOD Diam; Recent value; End Systole (A4C): 2.38 cm  LV MOD Diam; Recent value; End Systole (A4C): 2.35 cm  LV MOD Diam; Recent value; End Systole (A4C): 2.27 cm  LV MOD Diam; Recent value; End Systole (A4C): 2.13 cm  LV MOD Diam; Recent value; End Systole (A4C): 2.01 cm  LV MOD Diam; Recent value; End Systole (A4C): 1.81 cm  LV MOD Diam; Recent value; End Systole (A4C): 1.61 cm  LV MOD Diam; Recent value; End Systole (A4C): 1.46 cm  LV MOD Diam; Recent value; End Systole (A4C): 1.26 cm  LV MOD Diam; Recent value; End Systole (A4C): 1 cm  LV MOD Diam; Recent value;  End Systole (A4C): 0.58 cm  LVEF MOD A4C: 75.2 %  Left Ventricle diastolic major axis; Most recent value chosen; Method of Disks, Single Plane; 2D mode; Apical four chamber;: 7.63 cm  Left Ventricle systolic major axis; Most recent value chosen; Method of Disks, Single Plane; 2D mode; Apical four chamber;: 6.29 cm  Left Ventricular Diastolic Area; Most recent value chosen; Method of Disks, Single Plane; 2D mode; Apical four chamber;: 2840 mm2  Left Ventricular End Diastolic Volume; Most recent value chosen; Method of Disks, Single Plane; 2D mode; Apical four chamber;: 85.3 cm3  Left Ventricular End Systolic Volume; Most recent value chosen; Method of Disks, Single Plane; 2D mode; Apical four chamber;: 21.2 cm3  Left Ventricular Systolic Area; Most recent value chosen; Method of Disks, Single Plane; 2D mode; Apical four chamber;: 1250 mm2  SV MOD A4C: 64.1 cm3    M mode  Tricuspid Annular Plane Systolic Excursion; Mean; Mean value chosen; Tricuspid Annulus; M mode;: 2.67 cm  Tricuspid Annular Plane Systolic Excursion; Tricuspid Annulus; M mode;: 2.67 cm    Tissue Doppler Imaging  LV Peak Early Tello Tissue Clark; Medial MA (TDI): 50 mm/s  Left Ventricular Peak Early Diastolic Tissue Velocity; Mean; Mean value chosen; Medial Mitral Annulus; Tissue Doppler Imaging;: 50 mm/s    Unspecified Scan Mode  Aortic Valve Velocity Ratio;: 0.39  Mean Grad; Antegrade Flow: 17 mm Hg  Mean Grad; Antegrade Flow: 13 mm Hg  Mean Grad; Mean value; Antegrade Flow: 15 mm Hg  Mean Clark; Antegrade Flow: 1700 mm/s  Mean Clark; Antegrade Flow: 1920 mm/s  Mean Clark; Mean value; Antegrade Flow: 1810 mm/s  Peak Grad; Mean; Antegrade Flow: 26 mm Hg  VTI; Antegrade Flow: 52.5 cm  VTI; Antegrade Flow: 50.1 cm  VTI; Mean; Antegrade Flow: 51.3 cm  Valve Area Peak Clark: 122 mm2  Valve Area VTI: 138 mm2  Vmax; Antegrade Flow: 2510 mm/s  Vmax; Antegrade Flow: 2610 mm/s  Vmax; Mean;  Antegrade Flow: 2560 mm/s  LVOT CV Orifice Diam; Mean: 2 cm  LVOT Diam: 2 cm  LVOT Mean Grad: 2 mm Hg  LVOT Mean Grad: 3 mm Hg  LVOT Mean Grad; Mean value: 3 mm Hg  LVOT Mean Clark: 728 mm/s  LVOT Mean Clark: 777 mm/s  LVOT Mean Clark; Mean value: 753 mm/s  LVOT Peak Grad; Mean: 4 mm Hg  LVOT VTI: 22.8 cm  LVOT VTI: 22.1 cm  LVOT VTI; Mean: 22.5 cm  LVOT Vmax: 1030 mm/s  LVOT Vmax: 953 mm/s  LVOT Vmax; Mean: 992 mm/s  MV Peak Clark/LV Peak Tissue Clark E-Wave; Medial MA: 13.7  SV (LVOT): 71 cm3  DT; Antegrade Flow: 254 ms  DT; Mean; Antegrade Flow: 254 ms  Dec [place]; Antegrade Flow: 2700 mm/s2  Dec [place]; Mean; Antegrade Flow: 2700 mm/s2  MV A Clark: 926 mm/s  MV E Clark: 684 mm/s  MV E/A Ratio: 0.7  MV Peak A Clark: 926 mm/s  MV Peak E Clark; Mean; Antegrade Flow: 684 mm/s  MVA (PHT): 297 mm2  PHT: 74 ms  PHT; Mean: 74 ms    [facility] Accredited Echocardiography Laboratory    Prepared and electronically signed by    Betty Lovelace MD  Signed 19-Jul-2019 14:21:58    No results found for this or any previous visit.    --------------------------------------------------------------------------------  HOLTER  No results found for this or any previous visit.    --------------------------------------------------------------------------------  CAROTIDS  No results found for this or any previous visit. Diagnoses and all orders for this visit:    Hypertension, unspecified type  -     POCT ECG  -     carvedilol (COREG) 6.25 mg tablet; Take 1 tablet (6.25 mg total) by mouth 2 (two) times a day with meals    Left ventricular hypertrophy  -     Ambulatory Referral to Cardiology  -     MRI cardiac  w wo contrast; Future  -     carvedilol (COREG) 6.25 mg tablet; Take 1 tablet (6.25 mg total) by mouth 2 (two) times a day with meals    Ulcer of aorta ([address])  -     Ambulatory Referral to Cardiology  -     Ambulatory referral to Cardiovascular Surgery; Future    Chest pressure  -     NM myocardial perfusion spect (stress and/or rest);  Future    Aneurysm of aortic arch without rupture (HCC)    Anemia, unspecified type    Pancreatic insufficiency    Stage 3a chronic kidney disease (HCC)    Mixed hyperlipidemia    Stroke-like symptoms       ======================================================          Review of Systems  ROS as noted above, otherwise 12 point review of systems was performed and is negative. Historical Information   Past Medical History:   Diagnosis Date    Back ache     Cancer ([address])     Hyperlipidemia     Hypertension     Pancreatic mass 4/12/2021     Past Surgical History:   Procedure Laterality Date    APPENDECTOMY      BILATERAL OOPHORECTOMY      BREAST SURGERY Right     partial mastectomy     COLONOSCOPY      HERNIA REPAIR      HYSTERECTOMY      LAPAROTOMY N/A 8/24/2021    Procedure: LAPAROTOMY EXPLORATORY;  Surgeon: Lowell Lim MD;  Location: [facility];  Service: Surgical Oncology    MASTECTOMY Right     partial    WHIPPLE PROCEDURE/PANCREATICO-DUODENECTOMY N/A 8/24/2021    Procedure: WHIPPLE PROCEDURE/PANCREATICO-DUODENECTOMY;  Surgeon: Lowell Lim MD;  Location: [facility];  Service: Surgical Oncology     Social History     Substance and Sexual Activity   Alcohol Use Not Currently     Social History     Substance and Sexual Activity   Drug Use No     Social History     Tobacco Use   Smoking Status Former    Packs/day: 0.65    Years: 30.00    Total pack years: 19.50    Types: Cigarettes   Smokeless Tobacco Never   Tobacco Comments    quit 15 years ago     Family History   Problem Relation Age of Onset    Hypertension Mother     Heart disease Mother     Diabetes Mother     Cancer Father        Meds/Allergies   Hospital Medications:   No current facility-administered medications for this visit. Home Medications: (Not in a hospital admission)      No Known Allergies      Portions of the record may have been created with voice recognition software. Occasional wrong words or "sound a like" substitutions may have occurred due to the inherent limitations of voice recognition software.   Read the chart carefully and recognize, using context, where substitutions have occurred.

## 2023-10-23 NOTE — LETTER
October 23, 2023     Kailey Amos MD  [address] [facility]  [address] [place] [id number]    Patient: Gonzalez Phan   YOB: 1960   Date of Visit: 10/23/2023       Dear Dr. Chiki Ryan: Thank you for referring Gonzalez Phan to me for evaluation. Below are my notes for this consultation. If you have questions, please do not hesitate to call me. I look forward to following your patient along with you. Sincerely,        Martinez Javier MD        CC: MD Martinez Hoffman MD  10/23/2023  5:26 PM  Sign when Signing Visit  Cardiology   Gonzalez Phan 58 y.o. 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, penetrating aortic ulcer in the abdominal aorta  >> History of possible stroke  >> Chronic back pain  >> Anemia: Being worked up by her primary care  >> Partial pancreatectomy for neuroendocrine tumor, 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 female presents for abnormal echocardiogram and with chest pressure.   She has been having on and off chest pressure for about a month and a half which occurs with exertion is in the precordial region and is relieved by rest.  She has not had such symptoms before. She has vascular disease with extensive atherosclerosis in her aorta and an episode of strokelike symptoms. Currently she is on antihypertensive medications, Plavix monotherapy for possible TIA, and high intensity statin therapy with atorvastatin. Denies shortness of breath, palpitations, orthopnea, PND, pedal edema, syncope, presyncope, diaphoresis, nausea/vomiting     FH: No history of cardiac disease in her family    SH: Does not smoke drink or use drugs  NKDA        Physical exam  Objective  Vitals: Blood pressure 154/92, pulse 77, resp. rate 18, height 5' 3" (1.6 m), weight 69.2 kg (152 lb 9.6 oz), SpO2 98 %, not currently breastfeeding. General:  AO x3, no acute distress  Cardiac:  S1-S2 normal  No murmurs, rubs or gallops, JVP: Not seen  Lungs:  Clear to auscultation bilaterally, no wheezing or crackles. Abdomen:  Soft, nontender, nondistended. Extremities:  Warm, well perfused, pulses palpable, no ulcers or rashes. Edema: Absent  Neuro: Grossly nonfocal  Psych:  Normal affect            ======================================================  TREADMILL STRESS  No results found for this or any previous visit.     ----------------------------------------------------------------------------------------------  NUCLEAR STRESS TEST: No results found for this or any previous visit.     No results found for this or any previous visit.      --------------------------------------------------------------------------------  CATH:  No results found for this or any previous visit.    --------------------------------------------------------------------------------  ECHO:   Results for orders placed during the hospital encounter of 07/18/19    Echo complete with contrast if indicated    Narrative  [id number] [facility] [address]  [address] [facility], [address] [facility]  [phone]    Transthoracic Echocardiogram  2D, M-mode, Doppler, and Color Doppler    Study date:  2019    Patient: Reshma Perry  MR number: [id number]  Account number: [de-identified]  : 1960  Age: 60 years  Gender: Female  Status: Inpatient  Location: Bedside  Height: 64 in  Weight: 175 lb  BP: 115/ 53 mmHg    Indications: TIA. Diagnoses: G45.9 - Transient cerebral ischemic attack, unspecified    Sonographer:  Jacobo Weir RDCS  Primary Physician:  Nova Whitley DO  Referring Physician:  Gallito Fry DO  Group:  [facility]  Cardiology Fellow: Mingo Pineda MD  Interpreting Physician:  Win Sumner MD    SUMMARY    LEFT VENTRICLE:  Systolic function was hyperdynamic. Ejection fraction was estimated to be 70 %. There were no regional wall motion abnormalities. Wall thickness was increased. Concentric hypertrophy was present. Doppler parameters were consistent with abnormal left ventricular relaxation (grade 1 diastolic dysfunction). AORTIC VALVE:  There was mild stenosis. The peak valve velocity was 261 cm/s. Valve mean gradient was 17 mmHg. HISTORY: PRIOR HISTORY: Hypertension. Cancer. Former smoker. Pulmonary nodule. PROCEDURE: The procedure was performed at the bedside. This was a routine study. The transthoracic approach was used. The study included complete 2D imaging, M-mode, complete spectral Doppler, and color Doppler. The heart rate was 72 bpm,  at the start of the study. Images were obtained from the parasternal, apical, subcostal, and suprasternal notch acoustic windows. Image quality was adequate. LEFT VENTRICLE: Size was normal. Systolic function was hyperdynamic. Ejection fraction was estimated to be 70 %. There were no regional wall motion abnormalities. Wall thickness was increased. Concentric hypertrophy was present. DOPPLER:  There was an increased relative contribution of atrial contraction to ventricular filling.  Doppler parameters were consistent with abnormal left ventricular relaxation (grade 1 diastolic dysfunction). RIGHT VENTRICLE: The size was normal. Systolic function was normal.    LEFT ATRIUM: Size was normal.    RIGHT ATRIUM: Size was normal.    MITRAL VALVE: Valve structure was normal. There was normal leaflet separation. DOPPLER: There was no evidence for stenosis. There was no significant regurgitation. AORTIC VALVE: The valve was trileaflet. Leaflets exhibited mildly increased thickness, mild calcification, lower normal cuspal separation, and sclerosis. DOPPLER: There was mild stenosis. There was no regurgitation. TRICUSPID VALVE: The valve structure was normal. There was normal leaflet separation. DOPPLER: There was no evidence for stenosis. There was no significant regurgitation. PULMONIC VALVE: Leaflets exhibited normal thickness, no calcification, and normal cuspal separation. DOPPLER: The transpulmonic velocity was within the normal range. There was no significant regurgitation. PERICARDIUM: There was no pericardial effusion. AORTA: The root exhibited normal size. MEASUREMENT TABLES    DOPPLER MEASUREMENTS  Aortic valve   (Reference normals)  Peak dorothea   261 cm/s   (--)  Mean dorothea   192 cm/s   (--)  VTI   53 cm   (--)  Peak gradient   27 mmHg   (--)  Mean gradient   17 mmHg   (--)  Valve area   1.4 cmï¾²   (--)    SYSTEM MEASUREMENT TABLES    2D mode  AV Diam: 2.6 cm  AoR Diam; Mean (2D): 2.6 cm  LA Diam (2D): 3.1 cm  LA Dimension; Mean (2D): 3.1 cm  LA/Ao (2D): 1.19  EDV (2D-Cubed): 46.7 cm3  EF (2D-Cubed): 82.9 %  ESV (2D-Cubed): 8 cm3  FS (2D-Cubed): 44.4 %  FS (2D-Teich): 44.4 %  IVS/LVPW (2D): 0.93  IVSd (2D): 1.3 cm  IVSd; Mean chosen (2D): 1.3 cm  LVIDd (2D): 3.6 cm  LVIDd; Mean (2D): 3.6 cm  LVIDs (2D): 2 cm  LVIDs; Mean (2D): 2 cm  LVOT Area (2D): 314 mm2  LVPWd (2D): 1.4 cm  LVPWd; Mean (2D): 1.4 cm  Left Ventricular Ejection Fraction;  Teichholz; 2D mode;: 76.7 %  Left Ventricular End Diastolic Volume; Teichholz; 2D mode;: 54.4 cm3  Left Ventricular End Systolic Volume; Teichholz; 2D mode;: 12.7 cm3  SV (2D-Cubed): 38.7 cm3  Stroke Volume; Teichholz; 2D mode;: 41.7 cm3  RVIDd (2D): 3.4 cm  RVIDd; Mean (2D): 3.4 cm  Right and Left Ventricular End Diastolic Diameter Ratio; 2D mode;: 0.94    Apical four chamber  LV MOD Diam; Recent value; End Diastole (A4C): 4.14 cm  LV MOD Diam; Recent value; End Diastole (A4C): 4.35 cm  LV MOD Diam; Recent value; End Diastole (A4C): 4.32 cm  LV MOD Diam; Recent value; End Diastole (A4C): 4.38 cm  LV MOD Diam; Recent value; End Diastole (A4C): 4.32 cm  LV MOD Diam; Recent value; End Diastole (A4C): 4.21 cm  LV MOD Diam; Recent value; End Diastole (A4C): 4.12 cm  LV MOD Diam; Recent value; End Diastole (A4C): 3.95 cm  LV MOD Diam; Recent value; End Diastole (A4C): 3.86 cm  LV MOD Diam; Recent value; End Diastole (A4C): 3.65 cm  LV MOD Diam; Recent value; End Diastole (A4C): 3.45 cm  LV MOD Diam; Recent value; End Diastole (A4C): 3.02 cm  LV MOD Diam; Recent value; End Diastole (A4C): 2.41 cm  LV MOD Diam; Recent value; End Diastole (A4C): 1.8 cm  LV MOD Diam; Recent value; End Diastole (A4C): 4.03 cm  LV MOD Diam; Recent value; End Diastole (A4C): 3.85 cm  LV MOD Diam; Recent value; End Diastole (A4C): 3.57 cm  LV MOD Diam; Recent value; End Diastole (A4C): 1.71 cm  LV MOD Diam; Recent value; End Diastole (A4C): 4.26 cm  LV MOD Diam; Recent value; End Diastole (A4C): 4.29 cm  LV MOD Diam; Recent value; End Systole (A4C): 1.46 cm  LV MOD Diam; Recent value; End Systole (A4C): 2.47 cm  LV MOD Diam; Recent value; End Systole (A4C): 2.5 cm  LV MOD Diam; Recent value; End Systole (A4C): 2.47 cm  LV MOD Diam; Recent value; End Systole (A4C): 2.44 cm  LV MOD Diam; Recent value; End Systole (A4C): 2.44 cm  LV MOD Diam; Recent value; End Systole (A4C): 2.41 cm  LV MOD Diam; Recent value; End Systole (A4C): 2.41 cm  LV MOD Diam; Recent value;  End Systole (A4C): 2.38 cm  LV MOD Diam; Recent value; End Systole (A4C): 2.38 cm  LV MOD Diam; Recent value; End Systole (A4C): 2.35 cm  LV MOD Diam; Recent value; End Systole (A4C): 2.27 cm  LV MOD Diam; Recent value; End Systole (A4C): 2.13 cm  LV MOD Diam; Recent value; End Systole (A4C): 2.01 cm  LV MOD Diam; Recent value; End Systole (A4C): 1.81 cm  LV MOD Diam; Recent value; End Systole (A4C): 1.61 cm  LV MOD Diam; Recent value; End Systole (A4C): 1.46 cm  LV MOD Diam; Recent value; End Systole (A4C): 1.26 cm  LV MOD Diam; Recent value; End Systole (A4C): 1 cm  LV MOD Diam; Recent value; End Systole (A4C): 0.58 cm  LVEF MOD A4C: 75.2 %  Left Ventricle diastolic major axis; Most recent value chosen; Method of Disks, Single Plane; 2D mode; Apical four chamber;: 7.63 cm  Left Ventricle systolic major axis; Most recent value chosen; Method of Disks, Single Plane; 2D mode; Apical four chamber;: 6.29 cm  Left Ventricular Diastolic Area; Most recent value chosen; Method of Disks, Single Plane; 2D mode; Apical four chamber;: 2840 mm2  Left Ventricular End Diastolic Volume; Most recent value chosen; Method of Disks, Single Plane; 2D mode; Apical four chamber;: 85.3 cm3  Left Ventricular End Systolic Volume; Most recent value chosen; Method of Disks, Single Plane; 2D mode; Apical four chamber;: 21.2 cm3  Left Ventricular Systolic Area; Most recent value chosen; Method of Disks, Single Plane; 2D mode; Apical four chamber;: 1250 mm2  SV MOD A4C: 64.1 cm3    M mode  Tricuspid Annular Plane Systolic Excursion; Mean; Mean value chosen; Tricuspid Annulus; M mode;: 2.67 cm  Tricuspid Annular Plane Systolic Excursion; Tricuspid Annulus; M mode;: 2.67 cm    Tissue Doppler Imaging  LV Peak Early Tello Tissue Clark; Medial MA (TDI): 50 mm/s  Left Ventricular Peak Early Diastolic Tissue Velocity; Mean; Mean value chosen; Medial Mitral Annulus; Tissue Doppler Imaging;: 50 mm/s    Unspecified Scan Mode  Aortic Valve Velocity Ratio;: 0.39  Mean Grad;  Antegrade Flow: 17 mm Hg  Mean Grad; Antegrade Flow: 13 mm Hg  Mean Grad; Mean value; Antegrade Flow: 15 mm Hg  Mean Clark; Antegrade Flow: 1700 mm/s  Mean Clark; Antegrade Flow: 1920 mm/s  Mean Clark; Mean value; Antegrade Flow: 1810 mm/s  Peak Grad; Mean; Antegrade Flow: 26 mm Hg  VTI; Antegrade Flow: 52.5 cm  VTI; Antegrade Flow: 50.1 cm  VTI; Mean; Antegrade Flow: 51.3 cm  Valve Area Peak Clark: 122 mm2  Valve Area VTI: 138 mm2  Vmax; Antegrade Flow: 2510 mm/s  Vmax; Antegrade Flow: 2610 mm/s  Vmax; Mean; Antegrade Flow: 2560 mm/s  LVOT CV Orifice Diam; Mean: 2 cm  LVOT Diam: 2 cm  LVOT Mean Grad: 2 mm Hg  LVOT Mean Grad: 3 mm Hg  LVOT Mean Grad; Mean value: 3 mm Hg  LVOT Mean Clark: 728 mm/s  LVOT Mean Clark: 777 mm/s  LVOT Mean Clark; Mean value: 753 mm/s  LVOT Peak Grad; Mean: 4 mm Hg  LVOT VTI: 22.8 cm  LVOT VTI: 22.1 cm  LVOT VTI; Mean: 22.5 cm  LVOT Vmax: 1030 mm/s  LVOT Vmax: 953 mm/s  LVOT Vmax; Mean: 992 mm/s  MV Peak Clark/LV Peak Tissue Clark E-Wave; Medial MA: 13.7  SV (LVOT): 71 cm3  DT; Antegrade Flow: 254 ms  DT; Mean; Antegrade Flow: 254 ms  Dec Newberry; Antegrade Flow: 2700 mm/s2  Dec Newberry; Mean; Antegrade Flow: 2700 mm/s2  MV A Clark: 926 mm/s  MV E Clark: 684 mm/s  MV E/A Ratio: 0.7  MV Peak A Clark: 926 mm/s  MV Peak E Clark; Mean; Antegrade Flow: 684 mm/s  MVA (PHT): 297 mm2  PHT: 74 ms  PHT; Mean: 74 ms    [facility] Accredited Echocardiography Laboratory    Prepared and electronically signed by    Domenic Rojas MD  Signed 19-Jul-2019 14:21:58    No results found for this or any previous visit.    --------------------------------------------------------------------------------  HOLTER  No results found for this or any previous visit.    --------------------------------------------------------------------------------  CAROTIDS  No results found for this or any previous visit. Diagnoses and all orders for this visit:    Hypertension, unspecified type  -     POCT ECG  -     carvedilol (COREG) 6.25 mg tablet;  Take 1 tablet (6.25 mg total) by mouth 2 (two) times a day with meals    Left ventricular hypertrophy  -     Ambulatory Referral to Cardiology  -     MRI cardiac  w wo contrast; Future  -     carvedilol (COREG) 6.25 mg tablet; Take 1 tablet (6.25 mg total) by mouth 2 (two) times a day with meals    Ulcer of aorta ([address])  -     Ambulatory Referral to Cardiology  -     Ambulatory referral to Cardiovascular Surgery; Future    Chest pressure  -     NM myocardial perfusion spect (stress and/or rest); Future    Aneurysm of aortic arch without rupture (HCC)    Anemia, unspecified type    Pancreatic insufficiency    Stage 3a chronic kidney disease (HCC)    Mixed hyperlipidemia    Stroke-like symptoms       ======================================================          Review of Systems  ROS as noted above, otherwise 12 point review of systems was performed and is negative. Historical Information  Past Medical History:   Diagnosis Date   • Back ache    • Cancer ([address])    • Hyperlipidemia    • Hypertension    • Pancreatic mass 4/12/2021     Past Surgical History:   Procedure Laterality Date   • APPENDECTOMY     • BILATERAL OOPHORECTOMY     • BREAST SURGERY Right     partial mastectomy    • COLONOSCOPY     • HERNIA REPAIR     • HYSTERECTOMY     • LAPAROTOMY N/A 8/24/2021    Procedure: LAPAROTOMY EXPLORATORY;  Surgeon: Lowell Lim MD;  Location: [facility];  Service: Surgical Oncology   • MASTECTOMY Right     partial   • WHIPPLE PROCEDURE/PANCREATICO-DUODENECTOMY N/A 8/24/2021    Procedure:  WHIPPLE PROCEDURE/PANCREATICO-DUODENECTOMY;  Surgeon: Lowell Lim MD;  Location: [facility];  Service: Surgical Oncology     Social History     Substance and Sexual Activity   Alcohol Use Not Currently     Social History     Substance and Sexual Activity   Drug Use No     Social History     Tobacco Use   Smoking Status Former   • Packs/day: 0.65   • Years: 30.00   • Total pack years: 19.50   • Types: Cigarettes   Smokeless Tobacco Never   Tobacco Comments    quit 15 years ago     Family History   Problem Relation Age of Onset   • Hypertension Mother    • Heart disease Mother    • Diabetes Mother    • Cancer Father        Meds/Allergies  Hospital Medications:   No current facility-administered medications for this visit. Home Medications: (Not in a hospital admission)      No Known Allergies      Portions of the record may have been created with voice recognition software. Occasional wrong words or "sound a like" substitutions may have occurred due to the inherent limitations of voice recognition software. Read the chart carefully and recognize, using context, where substitutions have occurred.

## 2023-10-25 ENCOUNTER — HOSPITAL ENCOUNTER (EMERGENCY)
Facility: HOSPITAL | Age: 63
Discharge: HOME | End: 2023-10-25
Attending: EMERGENCY MEDICINE
Payer: COMMERCIAL

## 2023-10-25 ENCOUNTER — APPOINTMENT (EMERGENCY)
Dept: RADIOLOGY | Facility: HOSPITAL | Age: 63
End: 2023-10-25
Payer: COMMERCIAL

## 2023-10-25 ENCOUNTER — EVALUATION (OUTPATIENT)
Dept: PHYSICAL THERAPY | Facility: REHABILITATION | Age: 63
End: 2023-10-25
Payer: COMMERCIAL

## 2023-10-25 VITALS
DIASTOLIC BLOOD PRESSURE: 65 MMHG | HEART RATE: 66 BPM | RESPIRATION RATE: 13 BRPM | TEMPERATURE: 97.5 F | OXYGEN SATURATION: 97 % | SYSTOLIC BLOOD PRESSURE: 139 MMHG

## 2023-10-25 DIAGNOSIS — M25.552 HIP PAIN, ACUTE, LEFT: ICD-10-CM

## 2023-10-25 DIAGNOSIS — R07.9 CHEST PAIN, UNSPECIFIED: ICD-10-CM

## 2023-10-25 DIAGNOSIS — M54.50 ACUTE LEFT-SIDED LOW BACK PAIN, UNSPECIFIED WHETHER SCIATICA PRESENT: ICD-10-CM

## 2023-10-25 DIAGNOSIS — G43.909 MIGRAINE HEADACHE: Primary | ICD-10-CM

## 2023-10-25 LAB
2HR DELTA HS TROPONIN: -1 NG/L
ANION GAP SERPL CALCULATED.3IONS-SCNC: 9 MMOL/L
APTT PPP: 28 SECONDS (ref 23–37)
ATRIAL RATE: 71 BPM
BUN SERPL-MCNC: 24 MG/DL (ref 5–25)
CALCIUM SERPL-MCNC: 8.4 MG/DL (ref 8.4–10.2)
CARDIAC TROPONIN I PNL SERPL HS: 7 NG/L
CARDIAC TROPONIN I PNL SERPL HS: 8 NG/L
CHLORIDE SERPL-SCNC: 105 MMOL/L (ref 96–108)
CO2 SERPL-SCNC: 24 MMOL/L (ref 21–32)
CREAT SERPL-MCNC: 1.27 MG/DL (ref 0.6–1.3)
ERYTHROCYTE [DISTWIDTH] IN BLOOD BY AUTOMATED COUNT: 13 % (ref 11.6–15.1)
GFR SERPL CREATININE-BSD FRML MDRD: 44 ML/MIN/1.73SQ M
GLUCOSE SERPL-MCNC: 101 MG/DL (ref 65–140)
HCT VFR BLD AUTO: 30.8 % (ref 34.8–46.1)
HGB BLD-MCNC: 10.1 G/DL (ref 11.5–15.4)
INR PPP: 1.1 (ref 0.84–1.19)
LYMPHOCYTES # BLD AUTO: 4.93 THOUSAND/UL (ref 0.6–4.47)
LYMPHOCYTES # BLD AUTO: 44 % (ref 14–44)
MCH RBC QN AUTO: 32.5 PG (ref 26.8–34.3)
MCHC RBC AUTO-ENTMCNC: 32.8 G/DL (ref 31.4–37.4)
MCV RBC AUTO: 99 FL (ref 82–98)
MONOCYTES # BLD AUTO: 0.22 THOUSAND/UL (ref 0–1.22)
MONOCYTES NFR BLD: 2 % (ref 4–12)
NEUTROPHILS # BLD MANUAL: 5.94 THOUSAND/UL (ref 1.85–7.62)
NEUTS SEG NFR BLD AUTO: 53 % (ref 43–75)
P AXIS: 53 DEGREES
PLATELET # BLD AUTO: 262 THOUSANDS/UL (ref 149–390)
PLATELET BLD QL SMEAR: ADEQUATE
PMV BLD AUTO: 9.8 FL (ref 8.9–12.7)
POTASSIUM SERPL-SCNC: 3.4 MMOL/L (ref 3.5–5.3)
PR INTERVAL: 176 MS
PROTHROMBIN TIME: 14.1 SECONDS (ref 11.6–14.5)
QRS AXIS: -18 DEGREES
QRSD INTERVAL: 88 MS
QT INTERVAL: 376 MS
QTC INTERVAL: 408 MS
RBC # BLD AUTO: 3.11 MILLION/UL (ref 3.81–5.12)
RBC MORPH BLD: NORMAL
SMUDGE CELLS BLD QL SMEAR: PRESENT
SODIUM SERPL-SCNC: 138 MMOL/L (ref 135–147)
T WAVE AXIS: 55 DEGREES
TOTAL CELLS COUNTED SPEC: 100
VARIANT LYMPHS # BLD AUTO: 1 %
VENTRICULAR RATE: 71 BPM
WBC # BLD AUTO: 11.2 THOUSAND/UL (ref 4.31–10.16)

## 2023-10-25 PROCEDURE — 70496 CT ANGIOGRAPHY HEAD: CPT

## 2023-10-25 PROCEDURE — 71045 X-RAY EXAM CHEST 1 VIEW: CPT

## 2023-10-25 PROCEDURE — 85027 COMPLETE CBC AUTOMATED: CPT

## 2023-10-25 PROCEDURE — 93010 ELECTROCARDIOGRAM REPORT: CPT | Performed by: INTERNAL MEDICINE

## 2023-10-25 PROCEDURE — 97163 PT EVAL HIGH COMPLEX 45 MIN: CPT | Performed by: PHYSICAL THERAPIST

## 2023-10-25 PROCEDURE — 85007 BL SMEAR W/DIFF WBC COUNT: CPT

## 2023-10-25 PROCEDURE — 96366 THER/PROPH/DIAG IV INF ADDON: CPT

## 2023-10-25 PROCEDURE — 99285 EMERGENCY DEPT VISIT HI MDM: CPT | Performed by: EMERGENCY MEDICINE

## 2023-10-25 PROCEDURE — 70498 CT ANGIOGRAPHY NECK: CPT

## 2023-10-25 PROCEDURE — 97112 NEUROMUSCULAR REEDUCATION: CPT | Performed by: PHYSICAL THERAPIST

## 2023-10-25 PROCEDURE — 93005 ELECTROCARDIOGRAM TRACING: CPT

## 2023-10-25 PROCEDURE — 85610 PROTHROMBIN TIME: CPT

## 2023-10-25 PROCEDURE — 99285 EMERGENCY DEPT VISIT HI MDM: CPT

## 2023-10-25 PROCEDURE — 80048 BASIC METABOLIC PNL TOTAL CA: CPT

## 2023-10-25 PROCEDURE — 36415 COLL VENOUS BLD VENIPUNCTURE: CPT

## 2023-10-25 PROCEDURE — 96375 TX/PRO/DX INJ NEW DRUG ADDON: CPT

## 2023-10-25 PROCEDURE — 85730 THROMBOPLASTIN TIME PARTIAL: CPT

## 2023-10-25 PROCEDURE — 96365 THER/PROPH/DIAG IV INF INIT: CPT

## 2023-10-25 PROCEDURE — 84484 ASSAY OF TROPONIN QUANT: CPT

## 2023-10-25 RX ORDER — METOCLOPRAMIDE HYDROCHLORIDE 5 MG/ML
10 INJECTION INTRAMUSCULAR; INTRAVENOUS ONCE
Status: COMPLETED | OUTPATIENT
Start: 2023-10-25 | End: 2023-10-25

## 2023-10-25 RX ORDER — MAGNESIUM SULFATE HEPTAHYDRATE 40 MG/ML
2 INJECTION, SOLUTION INTRAVENOUS ONCE
Status: COMPLETED | OUTPATIENT
Start: 2023-10-25 | End: 2023-10-25

## 2023-10-25 RX ORDER — ACETAMINOPHEN 325 MG/1
650 TABLET ORAL ONCE
Status: COMPLETED | OUTPATIENT
Start: 2023-10-25 | End: 2023-10-25

## 2023-10-25 RX ORDER — MORPHINE SULFATE 4 MG/ML
4 INJECTION, SOLUTION INTRAMUSCULAR; INTRAVENOUS ONCE
Status: COMPLETED | OUTPATIENT
Start: 2023-10-25 | End: 2023-10-25

## 2023-10-25 RX ORDER — KETOROLAC TROMETHAMINE 30 MG/ML
15 INJECTION, SOLUTION INTRAMUSCULAR; INTRAVENOUS ONCE
Status: COMPLETED | OUTPATIENT
Start: 2023-10-25 | End: 2023-10-25

## 2023-10-25 RX ADMIN — METOCLOPRAMIDE 10 MG: 5 INJECTION, SOLUTION INTRAMUSCULAR; INTRAVENOUS at 14:59

## 2023-10-25 RX ADMIN — KETOROLAC TROMETHAMINE 15 MG: 30 INJECTION, SOLUTION INTRAMUSCULAR; INTRAVENOUS at 14:59

## 2023-10-25 RX ADMIN — MORPHINE SULFATE 4 MG: 4 INJECTION, SOLUTION INTRAMUSCULAR; INTRAVENOUS at 14:30

## 2023-10-25 RX ADMIN — IOHEXOL 100 ML: 350 INJECTION, SOLUTION INTRAVENOUS at 14:20

## 2023-10-25 RX ADMIN — ACETAMINOPHEN 650 MG: 325 TABLET, FILM COATED ORAL at 14:30

## 2023-10-25 RX ADMIN — SODIUM CHLORIDE 1000 ML: 0.9 INJECTION, SOLUTION INTRAVENOUS at 14:21

## 2023-10-25 RX ADMIN — MAGNESIUM SULFATE HEPTAHYDRATE 2 G: 40 INJECTION, SOLUTION INTRAVENOUS at 14:30

## 2023-10-25 NOTE — DISCHARGE INSTRUCTIONS
No acute intracranial abnormalities, and there is no cardiac stress. Return to the ED with any new/concerning issues. Follow up with PCP in 2-3 days for re-evaluation of symptoms.

## 2023-10-25 NOTE — ED ATTENDING ATTESTATION
Final Diagnoses:     1. Migraine headache    2. Chest pain, unspecified      ED Course as of 10/25/23 1818   Wed Oct 25, 2023   1620 Atypical Lymphocytes %(!): 1   1620 WBC(!): 11.20   1620 Hemoglobin(!): 10.1   1620 Platelet Count: 907   1818 Prior to DC, she had complete resolution of her headache, weakness. Kushal Whipple MD, saw and evaluated the patient. All available labs and X-rays were ordered by me or the resident / non-physician and have been reviewed by myself. I discussed the patient with the resident / non-physician and agree with the resident's / non-physician practitioner's findings and plan as documented in the resident's / non-physician practicitioner's note, except where noted. At this point, I agree with the current assessment done in the ED. I was present during key portions of all procedures performed unless otherwise stated. Nursing Triage:     Chief Complaint   Patient presents with    Chest Pain     Sudden onset radiating down L arm. + Nausea and HA       HPI:   This is a 58 y.o. female presenting for evaluation of chest pain, weakness, headache. About 4 hours ago, she started to have a headache. Says it was sudden onset, and going to the neck. To me, she said bilaterally. To the resident, said LEFT sided. Denies numbness/tingling but feels weak all over. Also felt dizzy when she stood and had to go down. No LOC. +CP and boialteral neck pain persistently. No speech chagnes   No neurologic deficits. ASSESSMENT + PLAN:   - Will get an EKG for evaluation of dysrrhythmia, AF. Troponin for strain. - Will get CBC for anemia; check electrolytes as alternative cause for AMS  - Will check urine for infection. - I think getting a CTH for r/o bleed would be reasonable. - I think getting CTA for r/o LVO would be reasonable as well. - The patient does NOT need initiation of IV thrombolytics: NIHSS Score = 0  - Concerned more for bleed (SAD) > LEFT vertebral dissection. Very low low suspicion for dissection.   - Doubt stroke though given bilateral symptoms.   - Still, migraine cocktail, re-evaluate. Physical:   General: VS reviewed  Appears in NAD  awake, alert. Well-nourished, well-developed. Appears stated age. Speaking normally in full sentences. Head: Normocephalic, atraumatic  Eyes: EOM-I. No diplopia. No hyphema. No subconjunctival hemorrhages. Symmetrical lids. ENT: Atraumatic external nose and ears. Dry MM  No malocclusion. No stridor. Normal phonation. No drooling. Normal swallowing. Neck: No JVD. CV: No pallor noted  Lungs:   No tachypnea  No respiratory distress  Abd: soft nt nd no rebound/guarding  MSK:   FROM spontaneously  Skin: Dry, intact. Neuro: Awake, alert, GCS15, CN II-XII grossly intact. Motor grossly intact. EHL/FHL/PF/DF/KF/KE/HF/HE 5/5 though slightly poor effort with LEFT hip flexion, still was able to maintain upwards against pressure x5 seconds  No saddle anesthesia. SLR negative. M/U/R/A nerve sensation bilateral intact and equal.   strength 5/5. Good capillary refill. 2+ radial pulses, bilaterally equal.   BICEPS/TRICEPS 5/5. Shoulder abduction/adduction 5/5. No pronator drift. No aphasia/dysarthria. CN 2-12 intact. No nystagmus but poor effort. Wouldn't track my finger, but tracked light normally. No facial droop. NIH Stroke Scale Score = 0  Psychiatric/Behavioral: interacting normally; appropriate mood/affect.  Exam: deferred    Vitals:    10/25/23 1341 10/25/23 1555   BP: 149/63 139/65   BP Location: Right arm Right arm   Pulse: 75 66   Resp: 18 13   Temp: 97.5 °F (36.4 °C)    TempSrc: Oral    SpO2: 100% 97%     - There are no obvious limitations to social determinants of care. - Nursing note reviewed. - Vitals reviewed. - Orders placed by myself and/or advanced practitioner / resident.    - Previous chart was reviewed  - No language barrier; [place] language barrier.  Daughter translating.   - History obtained from patient, daughter  - There are no limitations to the history obtained:     Past Medical:    has a past medical history of Back ache, Cancer ([address]), Hyperlipidemia, Hypertension, and Pancreatic mass (2021). Past Surgical:    has a past surgical history that includes Appendectomy; Hernia repair; Bilateral oophorectomy; Colonoscopy; Breast surgery (Right); Mastectomy (Right); Hysterectomy; LAPAROTOMY (N/A, 2021); and WHIPPLE PROCEDURE/PANCREATICO-DUODENECTOMY (N/A, 2021). Social:     Social History     Substance and Sexual Activity   Alcohol Use Not Currently     Social History     Tobacco Use   Smoking Status Former    Packs/day: 0.65    Years: 30.00    Total pack years: 19.50    Types: Cigarettes   Smokeless Tobacco Never   Tobacco Comments    quit 15 years ago     Social History     Substance and Sexual Activity   Drug Use No       Echo:   Results for orders placed during the hospital encounter of 19    Echo complete with contrast if indicated    Narrative  [address] [place] [address], [address] [place] [address]  [phone]    Transthoracic Echocardiogram  2D, M-mode, Doppler, and Color Doppler    Study date:  2019    Patient: Kasandra Chacon  MR number: [id number]  Account number: [de-identified]  : 1960  Age: 60 years  Gender: Female  Status: Inpatient  Location: Bedside  Height: 64 in  Weight: 175 lb  BP: 115/ 53 mmHg    Indications: TIA. Diagnoses: G45.9 - Transient cerebral ischemic attack, unspecified    Sonographer:  Mannie Hu RDCS  Primary Physician:  Percy Carrera DO  Referring Physician:  Valentina Rey DO  Group:  [facility]  Cardiology Fellow: Car Macdonald MD  Interpreting Physician:  Izzy Vance MD    SUMMARY    LEFT VENTRICLE:  Systolic function was hyperdynamic. Ejection fraction was estimated to be 70 %. There were no regional wall motion abnormalities.   Wall thickness was increased. Concentric hypertrophy was present. Doppler parameters were consistent with abnormal left ventricular relaxation (grade 1 diastolic dysfunction). AORTIC VALVE:  There was mild stenosis. The peak valve velocity was 261 cm/s. Valve mean gradient was 17 mmHg. HISTORY: PRIOR HISTORY: Hypertension. Cancer. Former smoker. Pulmonary nodule. PROCEDURE: The procedure was performed at the bedside. This was a routine study. The transthoracic approach was used. The study included complete 2D imaging, M-mode, complete spectral Doppler, and color Doppler. The heart rate was 72 bpm,  at the start of the study. Images were obtained from the parasternal, apical, subcostal, and suprasternal notch acoustic windows. Image quality was adequate. LEFT VENTRICLE: Size was normal. Systolic function was hyperdynamic. Ejection fraction was estimated to be 70 %. There were no regional wall motion abnormalities. Wall thickness was increased. Concentric hypertrophy was present. DOPPLER:  There was an increased relative contribution of atrial contraction to ventricular filling. Doppler parameters were consistent with abnormal left ventricular relaxation (grade 1 diastolic dysfunction). RIGHT VENTRICLE: The size was normal. Systolic function was normal.    LEFT ATRIUM: Size was normal.    RIGHT ATRIUM: Size was normal.    MITRAL VALVE: Valve structure was normal. There was normal leaflet separation. DOPPLER: There was no evidence for stenosis. There was no significant regurgitation. AORTIC VALVE: The valve was trileaflet. Leaflets exhibited mildly increased thickness, mild calcification, lower normal cuspal separation, and sclerosis. DOPPLER: There was mild stenosis. There was no regurgitation. TRICUSPID VALVE: The valve structure was normal. There was normal leaflet separation. DOPPLER: There was no evidence for stenosis. There was no significant regurgitation.     PULMONIC VALVE: Leaflets exhibited normal thickness, no calcification, and normal cuspal separation. DOPPLER: The transpulmonic velocity was within the normal range. There was no significant regurgitation. PERICARDIUM: There was no pericardial effusion. AORTA: The root exhibited normal size. MEASUREMENT TABLES    DOPPLER MEASUREMENTS  Aortic valve   (Reference normals)  Peak dorothea   261 cm/s   (--)  Mean dorothea   192 cm/s   (--)  VTI   53 cm   (--)  Peak gradient   27 mmHg   (--)  Mean gradient   17 mmHg   (--)  Valve area   1.4 cmï¾²   (--)    SYSTEM MEASUREMENT TABLES    2D mode  AV Diam: 2.6 cm  AoR Diam; Mean (2D): 2.6 cm  LA Diam (2D): 3.1 cm  LA Dimension; Mean (2D): 3.1 cm  LA/Ao (2D): 1.19  EDV (2D-Cubed): 46.7 cm3  EF (2D-Cubed): 82.9 %  ESV (2D-Cubed): 8 cm3  FS (2D-Cubed): 44.4 %  FS (2D-Teich): 44.4 %  IVS/LVPW (2D): 0.93  IVSd (2D): 1.3 cm  IVSd; Mean chosen (2D): 1.3 cm  LVIDd (2D): 3.6 cm  LVIDd; Mean (2D): 3.6 cm  LVIDs (2D): 2 cm  LVIDs; Mean (2D): 2 cm  LVOT Area (2D): 314 mm2  LVPWd (2D): 1.4 cm  LVPWd; Mean (2D): 1.4 cm  Left Ventricular Ejection Fraction; Teichholz; 2D mode;: 76.7 %  Left Ventricular End Diastolic Volume; Teichholz; 2D mode;: 54.4 cm3  Left Ventricular End Systolic Volume; Teichholz; 2D mode;: 12.7 cm3  SV (2D-Cubed): 38.7 cm3  Stroke Volume; Teichholz; 2D mode;: 41.7 cm3  RVIDd (2D): 3.4 cm  RVIDd; Mean (2D): 3.4 cm  Right and Left Ventricular End Diastolic Diameter Ratio; 2D mode;: 0.94    Apical four chamber  LV MOD Diam; Recent value; End Diastole (A4C): 4.14 cm  LV MOD Diam; Recent value; End Diastole (A4C): 4.35 cm  LV MOD Diam; Recent value; End Diastole (A4C): 4.32 cm  LV MOD Diam; Recent value; End Diastole (A4C): 4.38 cm  LV MOD Diam; Recent value; End Diastole (A4C): 4.32 cm  LV MOD Diam; Recent value; End Diastole (A4C): 4.21 cm  LV MOD Diam; Recent value; End Diastole (A4C): 4.12 cm  LV MOD Diam; Recent value; End Diastole (A4C): 3.95 cm  LV MOD Diam; Recent value;  End Diastole (A4C): 3.86 cm  LV MOD Diam; Recent value; End Diastole (A4C): 3.65 cm  LV MOD Diam; Recent value; End Diastole (A4C): 3.45 cm  LV MOD Diam; Recent value; End Diastole (A4C): 3.02 cm  LV MOD Diam; Recent value; End Diastole (A4C): 2.41 cm  LV MOD Diam; Recent value; End Diastole (A4C): 1.8 cm  LV MOD Diam; Recent value; End Diastole (A4C): 4.03 cm  LV MOD Diam; Recent value; End Diastole (A4C): 3.85 cm  LV MOD Diam; Recent value; End Diastole (A4C): 3.57 cm  LV MOD Diam; Recent value; End Diastole (A4C): 1.71 cm  LV MOD Diam; Recent value; End Diastole (A4C): 4.26 cm  LV MOD Diam; Recent value; End Diastole (A4C): 4.29 cm  LV MOD Diam; Recent value; End Systole (A4C): 1.46 cm  LV MOD Diam; Recent value; End Systole (A4C): 2.47 cm  LV MOD Diam; Recent value; End Systole (A4C): 2.5 cm  LV MOD Diam; Recent value; End Systole (A4C): 2.47 cm  LV MOD Diam; Recent value; End Systole (A4C): 2.44 cm  LV MOD Diam; Recent value; End Systole (A4C): 2.44 cm  LV MOD Diam; Recent value; End Systole (A4C): 2.41 cm  LV MOD Diam; Recent value; End Systole (A4C): 2.41 cm  LV MOD Diam; Recent value; End Systole (A4C): 2.38 cm  LV MOD Diam; Recent value; End Systole (A4C): 2.38 cm  LV MOD Diam; Recent value; End Systole (A4C): 2.35 cm  LV MOD Diam; Recent value; End Systole (A4C): 2.27 cm  LV MOD Diam; Recent value; End Systole (A4C): 2.13 cm  LV MOD Diam; Recent value; End Systole (A4C): 2.01 cm  LV MOD Diam; Recent value; End Systole (A4C): 1.81 cm  LV MOD Diam; Recent value; End Systole (A4C): 1.61 cm  LV MOD Diam; Recent value; End Systole (A4C): 1.46 cm  LV MOD Diam; Recent value; End Systole (A4C): 1.26 cm  LV MOD Diam; Recent value; End Systole (A4C): 1 cm  LV MOD Diam; Recent value; End Systole (A4C): 0.58 cm  LVEF MOD A4C: 75.2 %  Left Ventricle diastolic major axis; Most recent value chosen; Method of Disks, Single Plane; 2D mode; Apical four chamber;: 7.63 cm  Left Ventricle systolic major axis;  Most recent value chosen; Method of Disks, Single Plane; 2D mode; Apical four chamber;: 6.29 cm  Left Ventricular Diastolic Area; Most recent value chosen; Method of Disks, Single Plane; 2D mode; Apical four chamber;: 2840 mm2  Left Ventricular End Diastolic Volume; Most recent value chosen; Method of Disks, Single Plane; 2D mode; Apical four chamber;: 85.3 cm3  Left Ventricular End Systolic Volume; Most recent value chosen; Method of Disks, Single Plane; 2D mode; Apical four chamber;: 21.2 cm3  Left Ventricular Systolic Area; Most recent value chosen; Method of Disks, Single Plane; 2D mode; Apical four chamber;: 1250 mm2  SV MOD A4C: 64.1 cm3    M mode  Tricuspid Annular Plane Systolic Excursion; Mean; Mean value chosen; Tricuspid Annulus; M mode;: 2.67 cm  Tricuspid Annular Plane Systolic Excursion; Tricuspid Annulus; M mode;: 2.67 cm    Tissue Doppler Imaging  LV Peak Early Tello Tissue Clark; Medial MA (TDI): 50 mm/s  Left Ventricular Peak Early Diastolic Tissue Velocity; Mean; Mean value chosen; Medial Mitral Annulus; Tissue Doppler Imaging;: 50 mm/s    Unspecified Scan Mode  Aortic Valve Velocity Ratio;: 0.39  Mean Grad; Antegrade Flow: 17 mm[Hg]  Mean Grad; Antegrade Flow: 13 mm[Hg]  Mean Grad; Mean value; Antegrade Flow: 15 mm[Hg]  Mean Clark; Antegrade Flow: 1700 mm/s  Mean Clark; Antegrade Flow: 1920 mm/s  Mean Clark; Mean value; Antegrade Flow: 1810 mm/s  Peak Grad; Mean; Antegrade Flow: 26 mm[Hg]  VTI; Antegrade Flow: 52.5 cm  VTI; Antegrade Flow: 50.1 cm  VTI; Mean; Antegrade Flow: 51.3 cm  Valve Area Peak Clark: 122 mm2  Valve Area VTI: 138 mm2  Vmax; Antegrade Flow: 2510 mm/s  Vmax; Antegrade Flow: 2610 mm/s  Vmax; Mean;  Antegrade Flow: 2560 mm/s  LVOT CV Orifice Diam; Mean: 2 cm  LVOT Diam: 2 cm  LVOT Mean Grad: 2 mm[Hg]  LVOT Mean Grad: 3 mm[Hg]  LVOT Mean Grad; Mean value: 3 mm[Hg]  LVOT Mean Clark: 728 mm/s  LVOT Mean Clark: 777 mm/s  LVOT Mean Clark; Mean value: 753 mm/s  LVOT Peak Grad; Mean: 4 mm[Hg]  LVOT VTI: 22.8 cm  LVOT VTI: 22.1 cm  LVOT VTI; Mean: 22.5 cm  LVOT Vmax: 1030 mm/s  LVOT Vmax: 953 mm/s  LVOT Vmax; Mean: 992 mm/s  MV Peak Clark/LV Peak Tissue Clark E-Wave; Medial MA: 13.7  SV (LVOT): 71 cm3  DT; Antegrade Flow: 254 ms  DT; Mean; Antegrade Flow: 254 ms  Dec Beau[place]; Antegrade Flow: 2700 mm/s2  Dec [place]; Mean; Antegrade Flow: 2700 mm/s2  MV A Clark: 926 mm/s  MV E Clark: 684 mm/s  MV E/A Ratio: 0.7  MV Peak A Clark: 926 mm/s  MV Peak E Clark; Mean; Antegrade Flow: 684 mm/s  MVA (PHT): 297 mm2  PHT: 74 ms  PHT; Mean: 74 ms    [facility] Accredited Echocardiography Laboratory    Prepared and electronically signed by    Gabriela Zheng MD  Signed 19-Jul-2019 14:21:58    No results found for this or any previous visit. Cath:    No results found for this or any previous visit. Code Status: Prior  Advance Directive and Living Will:      Power of :    POLST:    Medications   sodium chloride 0.9 % bolus 1,000 mL (0 mL Intravenous Stopped 10/25/23 1521)   iohexol (OMNIPAQUE) 350 MG/ML injection (MULTI-DOSE) 100 mL (100 mL Intravenous Given 10/25/23 1420)   magnesium sulfate 2 g/50 mL IVPB (premix) 2 g (0 g Intravenous Stopped 10/25/23 1630)   acetaminophen (TYLENOL) tablet 650 mg (650 mg Oral Given 10/25/23 1430)   morphine injection 4 mg (4 mg Intravenous Given 10/25/23 1430)   ketorolac (TORADOL) injection 15 mg (15 mg Intravenous Given 10/25/23 1459)   metoclopramide (REGLAN) injection 10 mg (10 mg Intravenous Given 10/25/23 1459)     XR chest 1 view portable   ED Interpretation   No acute cardiopulmonary disease as interpreted by me        CTA head and neck with and without contrast   Final Result      1. CT brain:  No acute intracranial abnormality      2. CTA head: Negative for large vessel intracranial occlusion . Mild to moderate luminal stenosis along the bilateral cavernous and supraclinoid ICA segments. 3. CTA neck: Moderate right extracranial ICA stenosis.  The cervical vertebral arteries are patent, with moderate left origin stenosis. 4. Stable focal inferiorly oriented saccular aneurysm versus penetrating ulcer of the aortic arch measuring 0.7 x 0.9 cm. Workstation performed: [id number]           Orders Placed This Encounter   Procedures    CTA head and neck with and without contrast    XR chest 1 view portable    CBC and differential    Protime-INR    APTT    Basic metabolic panel    HS Troponin 0hr (reflex protocol)    HS Troponin I 2hr    HS Troponin I 4hr    Nursing Communication Can go without labs for CTA. Insert peripheral IV    Continuous pulse oximetry    Continuous cardiac monitoring    Urine dip analyzer    ECG 12 lead     Labs Reviewed   CBC AND DIFFERENTIAL - Abnormal       Result Value Ref Range Status    WBC 11.20 (*) 4.31 - 10.16 Thousand/uL Final    RBC 3.11 (*) 3.81 - 5.12 Million/uL Final    Hemoglobin 10.1 (*) 11.5 - 15.4 g/dL Final    Hematocrit 30.8 (*) 34.8 - 46.1 % Final    MCV 99 (*) 82 - 98 fL Final    MCH 32.5  26.8 - 34.3 pg Final    MCHC 32.8  31.4 - 37.4 g/dL Final    RDW 13.0  11.6 - 15.1 % Final    MPV 9.8  8.9 - 12.7 fL Final    Platelets 654  890 - 390 Thousands/uL Final   BASIC METABOLIC PANEL - Abnormal    Sodium 138  135 - 147 mmol/L Final    Potassium 3.4 (*) 3.5 - 5.3 mmol/L Final    Chloride 105  96 - 108 mmol/L Final    CO2 24  21 - 32 mmol/L Final    ANION GAP 9  mmol/L Final    BUN 24  5 - 25 mg/dL Final    Creatinine 1.27  0.60 - 1.30 mg/dL Final    Comment: Standardized to IDMS reference method    Glucose 101  65 - 140 mg/dL Final    Comment: If the patient is fasting, the ADA then defines impaired fasting glucose as > 100 mg/dL and diabetes as > or equal to 123 mg/dL.     Calcium 8.4  8.4 - 10.2 mg/dL Final    eGFR 44  ml/min/1.73sq m Final    Narrative:     [place] guidelines for Chronic Kidney Disease (CKD):     Stage 1 with normal or high GFR (GFR > 90 mL/min/1.73 square meters)    Stage 2 Mild CKD (GFR = 60-89 mL/min/1.73 square meters)    Stage 3A Moderate CKD (GFR = 45-59 mL/min/1.73 square meters)    Stage 3B Moderate CKD (GFR = 30-44 mL/min/1.73 square meters)    Stage 4 Severe CKD (GFR = 15-29 mL/min/1.73 square meters)    Stage 5 End Stage CKD (GFR <15 mL/min/1.73 square meters)  Note: GFR calculation is accurate only with a steady state creatinine   MANUAL DIFFERENTIAL(PHLEBS DO NOT ORDER) - Abnormal    Segmented % 53  43 - 75 % Final    Lymphocytes % 44  14 - 44 % Final    Monocytes % 2 (*) 4 - 12 % Final    Atypical Lymphocytes % 1 (*) <=0 % Final    Absolute Neutrophils 5.94  1.85 - 7.62 Thousand/uL Final    Lymphocytes Absolute 4.93 (*) 0.60 - 4.47 Thousand/uL Final    Monocytes Absolute 0.22  0.00 - 1.22 Thousand/uL Final    Total Counted 100   Final    Smudge Cells Present   Final    RBC Morphology Normal   Final    Platelet Estimate Adequate  Adequate Final   PROTIME-INR - Normal    Protime 14.1  11.6 - 14.5 seconds Final    INR 1.10  0.84 - 1.19 Final   APTT - Normal    PTT 28  23 - 37 seconds Final    Comment: Therapeutic Heparin Range =  60-90 seconds   HS TROPONIN I 0HR - Normal    hs TnI 0hr 8  "Refer to ACS Flowchart"- see link ng/L Final    Comment:                                              Initial (time 0) result  If >=50 ng/L, Myocardial injury suggested ;  Type of myocardial injury and treatment strategy  to be determined. If 5-49 ng/L, a delta result at 2 hours and or 4 hours will be needed to further evaluate. If <4 ng/L, and chest pain has been >3 hours since onset, patient may qualify for discharge based on the HEART score in the ED. If <5 ng/L and <3hours since onset of chest pain, a delta result at 2 hours will be needed to further evaluate. HS Troponin 99th Percentile URL of a Health Population=12 ng/L with a 95% Confidence Interval of 8-18 ng/L. Second Troponin (time 2 hours)  If calculated delta >= 20 ng/L,  Myocardial injury suggested ; Type of myocardial injury and treatment strategy to be determined.   If 5-49 ng/L and the calculated delta is 5-19 ng/L, consult medical service for evaluation. Continue evaluation for ischemia on ecg and other possible etiology and repeat hs troponin at 4 hours. If delta is <5 ng/L at 2 hours, consider discharge based on risk stratification via the HEART score (if in ED), or NEO risk score in IP/Observation. HS Troponin 99th Percentile URL of a Health Population=12 ng/L with a 95% Confidence Interval of 8-18 ng/L.   HS TROPONIN I 2HR - Normal    hs TnI 2hr 7  "Refer to ACS Flowchart"- see link ng/L Final    Comment:                                              Initial (time 0) result  If >=50 ng/L, Myocardial injury suggested ;  Type of myocardial injury and treatment strategy  to be determined. If 5-49 ng/L, a delta result at 2 hours and or 4 hours will be needed to further evaluate. If <4 ng/L, and chest pain has been >3 hours since onset, patient may qualify for discharge based on the HEART score in the ED. If <5 ng/L and <3hours since onset of chest pain, a delta result at 2 hours will be needed to further evaluate. HS Troponin 99th Percentile URL of a Health Population=12 ng/L with a 95% Confidence Interval of 8-18 ng/L. Second Troponin (time 2 hours)  If calculated delta >= 20 ng/L,  Myocardial injury suggested ; Type of myocardial injury and treatment strategy to be determined. If 5-49 ng/L and the calculated delta is 5-19 ng/L, consult medical service for evaluation. Continue evaluation for ischemia on ecg and other possible etiology and repeat hs troponin at 4 hours. If delta is <5 ng/L at 2 hours, consider discharge based on risk stratification via the HEART score (if in ED), or NEO risk score in IP/Observation. HS Troponin 99th Percentile URL of a Health Population=12 ng/L with a 95% Confidence Interval of 8-18 ng/L.     Delta 2hr hsTnI -1  <20 ng/L Final   HS TROPONIN I 4HR     Time reflects when diagnosis was documented in both MDM as applicable and the Disposition within this note       Time User Action Codes Description Comment    10/25/2023  4:52 PM Shahla Indira Add [R58.771] Migraine headache     10/25/2023  4:52 PM Shahla Lizandrosarah Add [R07.9] Chest pain, unspecified           ED Disposition       ED Disposition   Discharge    Condition   Stable    Date/Time   Wed Oct 25, 2023  4:52 PM    Comment   Tierney Howard discharge to home/self care. Follow-up Information       Follow up With Specialties Details Why Contact Info        Follow up with PCP in 2-3 days          Discharge Medication List as of 10/25/2023  4:53 PM        CONTINUE these medications which have NOT CHANGED    Details   amLODIPine (NORVASC) 10 mg tablet Take 1 tablet (10 mg total) by mouth daily, Starting Mon 2/20/2023, Normal      atorvastatin (LIPITOR) 40 mg tablet Take 1 tablet (40 mg total) by mouth every evening, Starting Mon 2/20/2023, Normal      B-12 Microlozenge 500 MCG SUBL PLACE 1 TABLET ON THE TONGUE AND ALLOW TO DISSOLVED DAILY, Normal      carvedilol (COREG) 6.25 mg tablet Take 1 tablet (6.25 mg total) by mouth 2 (two) times a day with meals, Starting Mon 10/23/2023, Normal      cholestyramine (QUESTRAN) 4 GM/DOSE powder Take 1 packet (4 g total) by mouth 3 (three) times a day with meals, Starting Mon 2/20/2023, Normal      clobetasol (TEMOVATE) 0.05 % ointment Apply twice a day to rash on hands for 2 weeks. Then, apply twice a day from Monday-Friday for another 2 weeks.  Avoid the face and groin, Normal      clopidogrel (Plavix) 75 mg tablet Take 1 tablet (75 mg total) by mouth daily, Starting Wed 10/18/2023, Normal      gabapentin (Neurontin) 100 mg capsule Take 1 capsule (100 mg total) by mouth daily at bedtime If no improvement, increase to 200 mg, Starting Wed 10/18/2023, Normal      hydrOXYzine HCL (ATARAX) 25 mg tablet Take 25 mg by mouth daily at bedtime as needed, Starting Sat 11/23/2019, Historical Med      lisinopril (ZESTRIL) 40 mg tablet take 1 tablet by mouth once daily, Starting Thu 8/10/2023, Normal      methocarbamol (ROBAXIN) 500 mg tablet Take 1 tablet (500 mg total) by mouth every 12 (twelve) hours as needed for muscle spasms, Starting Fri 10/6/2023, Normal      methylPREDNISolone 4 MG tablet therapy pack Use as directed on package, Normal      pancrelipase, Lip-Prot-Amyl, (CREON) 12,000 units capsule Take 12,000 units of lipase by mouth 3 (three) times a day with meals, Starting Mon 2/20/2023, Normal      pantoprazole (PROTONIX) 40 mg tablet Take 1 tablet (40 mg total) by mouth daily in the early morning, Starting Mon 2/20/2023, Normal      potassium chloride (K-DUR,KLOR-CON) 10 mEq tablet take 2 tablets by mouth daily, Normal      sertraline (ZOLOFT) 25 mg tablet Take 1 tablet (25 mg total) by mouth daily, Starting Mon 2/20/2023, Normal      thiamine 100 MG tablet Take 1 tablet (100 mg total) by mouth daily Do not start before February 15, 2023., Starting Wed 2/15/2023, Until Mon 10/23/2023, Normal      traZODone (DESYREL) 50 mg tablet take 1/2 tablet by mouth at bedtime if needed for insomnia, Historical Med           No discharge procedures on file. Prior to Admission Medications   Prescriptions Last Dose Informant Patient Reported? Taking? B-12 Microlozenge 500 MCG SUBL   No No   Sig: PLACE 1 TABLET ON THE TONGUE AND ALLOW TO DISSOLVED DAILY   amLODIPine (NORVASC) 10 mg tablet   No No   Sig: Take 1 tablet (10 mg total) by mouth daily   atorvastatin (LIPITOR) 40 mg tablet   No No   Sig: Take 1 tablet (40 mg total) by mouth every evening   carvedilol (COREG) 6.25 mg tablet   No No   Sig: Take 1 tablet (6.25 mg total) by mouth 2 (two) times a day with meals   cholestyramine (QUESTRAN) 4 GM/DOSE powder   No No   Sig: Take 1 packet (4 g total) by mouth 3 (three) times a day with meals   clobetasol (TEMOVATE) 0.05 % ointment  Self No No   Sig: Apply twice a day to rash on hands for 2 weeks.  Then, apply twice a day from Monday-Friday for another 2 weeks. Avoid the face and groin   clopidogrel (Plavix) 75 mg tablet   No No   Sig: Take 1 tablet (75 mg total) by mouth daily   gabapentin (Neurontin) 100 mg capsule   No No   Sig: Take 1 capsule (100 mg total) by mouth daily at bedtime If no improvement, increase to 200 mg   hydrOXYzine HCL (ATARAX) 25 mg tablet  Self Yes No   Sig: Take 25 mg by mouth daily at bedtime as needed   lisinopril (ZESTRIL) 40 mg tablet   No No   Sig: take 1 tablet by mouth once daily   methocarbamol (ROBAXIN) 500 mg tablet   No No   Sig: Take 1 tablet (500 mg total) by mouth every 12 (twelve) hours as needed for muscle spasms   methylPREDNISolone 4 MG tablet therapy pack   No No   Sig: Use as directed on package   pancrelipase, Lip-Prot-Amyl, (CREON) 12,000 units capsule   No No   Sig: Take 12,000 units of lipase by mouth 3 (three) times a day with meals   pantoprazole (PROTONIX) 40 mg tablet   No No   Sig: Take 1 tablet (40 mg total) by mouth daily in the early morning   potassium chloride (K-DUR,KLOR-CON) 10 mEq tablet   No No   Sig: take 2 tablets by mouth daily   sertraline (ZOLOFT) 25 mg tablet   No No   Sig: Take 1 tablet (25 mg total) by mouth daily   thiamine 100 MG tablet   No No   Sig: Take 1 tablet (100 mg total) by mouth daily Do not start before February 15, 2023. traZODone (DESYREL) 50 mg tablet  Self Yes No   Sig: take 1/2 tablet by mouth at bedtime if needed for insomnia      Facility-Administered Medications: None                        Portions of the record may have been created with voice recognition software. Occasional wrong word or "sound a like" substitutions may have occurred due to the inherent limitations of voice recognition software. Read the chart carefully and recognize, using context, where substitutions have occurred.     Electronically signed by:  Lamar Loera

## 2023-10-25 NOTE — PROGRESS NOTES
PT Evaluation     Today's date: 10/25/2023  Patient name: Britta Cole  : 1960  MRN: [id number]  Referring provider: Emelia Boyer PT  Dx:   Encounter Diagnosis     ICD-10-CM    1. Acute left-sided low back pain, unspecified whether sciatica present  M54.50 Ambulatory referral to PT spine      2. Hip pain, acute, left  M25.552 Ambulatory referral to PT spine                     Assessment  Assessment details: Problem List:  1) right lateral shift  2) activity intolerance/high symptom irritability     Britta Cole is a pleasant 58 y.o. female who presents with acute, left sided low back pain with radicular symptoms. She states her pain started about 3 weeks ago insidiously, but does do a lot of activity around her home, including cooking and cleaning. States it is hard for her to sit or stand, walk, bend, lift, carry, and do daily activity because of her pain. Reports it is very bad on the left side of her low back, and will travel down the back of her left leg, towards the front of the shin. Upon her movement exam, she was unable to tolerate any movements of the lumbar spine, as she had increased pain and peripheralization with all testing. Minor lateral shift was present. As PT attempted to correct shift, increased peripheralization was the response to the point that the patient had to sit because of the pain. At this time, a further referral is warranted with [facility] due to heightened irritability, inability to tolerate movement, and increased pain with all movements. The patient and her daughter were educated on this plan today, and agreed to proceed with a consult at [address] [place] [address] and [facility]. Contact information was given for the patient today to contact the [facility] office, as well as the physical therapists contact information if she had further questions or concerns.  The patient was also educated on some self management techniques she could attempt on her own until her consult, and encouraged to continue being active with certain modifactions (avoiding heavy lifting/repetitive bending), as this helps with the recovery process. Once the patient's irritability has been reduced, she then would benefit from skilled physical therapy to address her below listed impairments, improve her quality of life, and restore her PLOF. Impairments: abnormal coordination, abnormal gait, abnormal muscle firing, abnormal muscle tone, abnormal or restricted ROM, abnormal movement, activity intolerance, difficulty understanding, impaired balance, impaired physical strength, lacks appropriate home exercise program, pain with function, weight-bearing intolerance, poor posture  and poor body mechanics    Symptom irritability: high  Plan  Plan details: Physical therapy will be held until consult with [facility]. Therapy options: Consult with [facility]. Planned therapy interventions: self care and patient education        Subjective Evaluation    History of Present Illness  Mechanism of injury: I have been having some pain in my low back for about 3 weeks. 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 I have not had treatment for my low back before.      Reason for onset: Insidious     Location of symptoms: Left side of low back, down the back of leg to the ankle region     Aggravating Factors: walking long distances, lifting, carrying,     Alleviating Factors: Rest, change in position, medications, hot/cold water, heating pad     Symptom Progression: A little better     Red Flags:   Disturbed Sleep: Yes   History of Cancer: Yes (breast cancer; had surgery with treatment after)    Recent Infection: No   Bowel/Bladder Issues: No   Saddle Anesthesia: No   Unexplained Weight Loss: No     Past Medical History: HBP, heart conditions, pancreatic surgery (tumor removal),    Living Situation: With  and daughter    Goals/Expectations: I do not want to depend on others to do things that I need/want to do.  This mostly includes cleaning, cooking, and other things around the home   Pain  Current pain ratin  At best pain ratin  At worst pain rating: 10  Quality: tight, radiating, discomfort and dull ache          Objective     General Comments:      Lumbar Comments  Postural Observation   Sitting: weight shifted towards right side  Standing: lordotic  Lateral Shift: Left    Myotomes - reproduced pain with myotomal testing of left lower extremity  Right Hip Flexion (L1-3): (5/5)  Left Hip Flexion (L1-3): (4/5)  R Knee Ext (L3-4): (5/5)  L Knee Ext (L3-4): (4/5)  R DF (L4): (5/5)  L DF (L4): (4/5)  R EDL (L5): (5/5)  L EDL (L5): (4/5)  R PF (S1): (5/5)  L PF (S1): (4/5)  R Knee Flex (S1-2): (5/5)  L Knee Flex (S1-2): (4/5)      Dermatomes  Sensation intact bilaterally    Reflexes  R Patellar Reflex: (2+)  L Patellar Reflex: (1+)  R Achilles Reflex: (2+)  L Achilles Reflex: (1+)      Lumbar Active Range of Motion  Movement Loss Symptoms Lauri Mod Min Nil Symptoms  Flexion     X   Goward sign and pain increased      Extension   X    Increased pain in low back and LLE      R SG      x     L SG    X    Increased pain in low back and LLE     Other         Judd Assessment  Rep R SGIS: Increased/Worse    Static Tests  Prone Lying: Increased/Worse             Precautions: History of breast cancer, pancreatic tumor removal, HBP       Manuals 10/25                                                                Neuro Re-Ed             Patient Education '                                                                                          Ther Ex                                                                                                                     Ther Activity                                       Gait Training                                       Modalities

## 2023-10-25 NOTE — ED PROVIDER NOTES
History  Chief Complaint   Patient presents with    Chest Pain     Sudden onset radiating down L arm. + Nausea and HA     HPI    Patient is a 58year old female with PMHx HTN, HLD, depression, CKD, anemia, presenting to the ED for evaluation of syncope, chest discomfort, and headache. Patient states that she was at home when reports rather sudden onset of severe diffuse headache with pain radiating down to the left side of the neck. Patient denies paresthesias, but endorses generalized weakness and fatigue today. Patient notes feeling dizzy with onset of headache. Daughter at bedside was with her and when patient attempted to stand, she felt very lightheaded and had near syncope. Daughter notes patient did not lose consciousness and there was no head strike. Patient also endorses mild chest discomfort, no shortness of breath, diaphoresis. Patient denies focal weakness in extremities, only feels weak all over. Speech is normal per daughter. Prior to Admission Medications   Prescriptions Last Dose Informant Patient Reported? Taking? B-12 Microlozenge 500 MCG SUBL   No No   Sig: PLACE 1 TABLET ON THE TONGUE AND ALLOW TO DISSOLVED DAILY   amLODIPine (NORVASC) 10 mg tablet   No No   Sig: Take 1 tablet (10 mg total) by mouth daily   atorvastatin (LIPITOR) 40 mg tablet   No No   Sig: Take 1 tablet (40 mg total) by mouth every evening   carvedilol (COREG) 6.25 mg tablet   No No   Sig: Take 1 tablet (6.25 mg total) by mouth 2 (two) times a day with meals   cholestyramine (QUESTRAN) 4 GM/DOSE powder   No No   Sig: Take 1 packet (4 g total) by mouth 3 (three) times a day with meals   clobetasol (TEMOVATE) 0.05 % ointment  Self No No   Sig: Apply twice a day to rash on hands for 2 weeks. Then, apply twice a day from Monday-Friday for another 2 weeks.  Avoid the face and groin   clopidogrel (Plavix) 75 mg tablet   No No   Sig: Take 1 tablet (75 mg total) by mouth daily   gabapentin (Neurontin) 100 mg capsule   No No   Sig: Take 1 capsule (100 mg total) by mouth daily at bedtime If no improvement, increase to 200 mg   hydrOXYzine HCL (ATARAX) 25 mg tablet  Self Yes No   Sig: Take 25 mg by mouth daily at bedtime as needed   lisinopril (ZESTRIL) 40 mg tablet   No No   Sig: take 1 tablet by mouth once daily   methocarbamol (ROBAXIN) 500 mg tablet   No No   Sig: Take 1 tablet (500 mg total) by mouth every 12 (twelve) hours as needed for muscle spasms   methylPREDNISolone 4 MG tablet therapy pack   No No   Sig: Use as directed on package   pancrelipase, Lip-Prot-Amyl, (CREON) 12,000 units capsule   No No   Sig: Take 12,000 units of lipase by mouth 3 (three) times a day with meals   pantoprazole (PROTONIX) 40 mg tablet   No No   Sig: Take 1 tablet (40 mg total) by mouth daily in the early morning   potassium chloride (K-DUR,KLOR-CON) 10 mEq tablet   No No   Sig: take 2 tablets by mouth daily   sertraline (ZOLOFT) 25 mg tablet   No No   Sig: Take 1 tablet (25 mg total) by mouth daily   thiamine 100 MG tablet   No No   Sig: Take 1 tablet (100 mg total) by mouth daily Do not start before February 15, 2023. traZODone (DESYREL) 50 mg tablet  Self Yes No   Sig: take 1/2 tablet by mouth at bedtime if needed for insomnia      Facility-Administered Medications: None       Past Medical History:   Diagnosis Date    Back ache     Cancer ([address])     Hyperlipidemia     Hypertension     Pancreatic mass 4/12/2021       Past Surgical History:   Procedure Laterality Date    APPENDECTOMY      BILATERAL OOPHORECTOMY      BREAST SURGERY Right     partial mastectomy     COLONOSCOPY      HERNIA REPAIR      HYSTERECTOMY      LAPAROTOMY N/A 8/24/2021    Procedure: LAPAROTOMY EXPLORATORY;  Surgeon: Sarai Kelly MD;  Location: [facility];  Service: Surgical Oncology    MASTECTOMY Right     partial    WHIPPLE PROCEDURE/PANCREATICO-DUODENECTOMY N/A 8/24/2021    Procedure:  WHIPPLE PROCEDURE/PANCREATICO-DUODENECTOMY;  Surgeon: Sarai Kelly MD;  Location: [facility];  Service: Surgical Oncology       Family History   Problem Relation Age of Onset    Hypertension Mother     Heart disease Mother     Diabetes Mother     Cancer Father      I have reviewed and agree with the history as documented. E-Cigarette/Vaping    E-Cigarette Use Never User      E-Cigarette/Vaping Substances    Nicotine No     THC No     CBD No     Flavoring No     Other No     Unknown No      Social History     Tobacco Use    Smoking status: Former     Packs/day: 0.65     Years: 30.00     Total pack years: 19.50     Types: Cigarettes    Smokeless tobacco: Never    Tobacco comments:     quit 15 years ago   Vaping Use    Vaping Use: Never used   Substance Use Topics    Alcohol use: Not Currently    Drug use: No        Review of Systems   All other systems reviewed and are negative. Physical Exam  ED Triage Vitals   Temperature Pulse Respirations Blood Pressure SpO2   10/25/23 1341 10/25/23 1341 10/25/23 1341 10/25/23 1341 10/25/23 1341   97.5 °F (36.4 °C) 75 18 149/63 100 %      Temp Source Heart Rate Source Patient Position - Orthostatic VS BP Location FiO2 (%)   10/25/23 1341 10/25/23 1555 10/25/23 1341 10/25/23 1341 --   Oral Monitor Lying Right arm       Pain Score       10/25/23 1341       4             Orthostatic Vital Signs  Vitals:    10/25/23 1341 10/25/23 1555   BP: 149/63 139/65   Pulse: 75 66   Patient Position - Orthostatic VS: Lying Lying       Physical Exam  Vitals and nursing note reviewed. Constitutional:       General: She is not in acute distress. Appearance: She is well-developed and normal weight. She is not toxic-appearing. Comments: Patient appears tired, intermittently grimacing from L sided neck pain     HENT:      Head: Normocephalic and atraumatic. Eyes:      Conjunctiva/sclera: Conjunctivae normal.   Neck:      Vascular: No JVD. Cardiovascular:      Rate and Rhythm: Normal rate and regular rhythm.       Pulses:           Radial pulses are 2+ on the right side and 2+ on the left side. Heart sounds: Normal heart sounds. No murmur heard. Pulmonary:      Effort: Pulmonary effort is normal. No tachypnea or respiratory distress. Breath sounds: Normal breath sounds. No decreased breath sounds, wheezing, rhonchi or rales. Chest:      Chest wall: No mass or tenderness. Abdominal:      Palpations: Abdomen is soft. Tenderness: There is no abdominal tenderness. Musculoskeletal:         General: No swelling. Normal range of motion. Cervical back: Neck supple. Right lower leg: No tenderness. No edema. Left lower leg: No tenderness. No edema. Skin:     General: Skin is warm and dry. Capillary Refill: Capillary refill takes less than 2 seconds. Findings: No erythema. Neurological:      General: No focal deficit present. Mental Status: She is alert and oriented to person, place, and time. GCS: GCS eye subscore is 4. GCS verbal subscore is 5. GCS motor subscore is 6. Cranial Nerves: No cranial nerve deficit, dysarthria or facial asymmetry. Sensory: Sensation is intact. Motor: No weakness, tremor, abnormal muscle tone, seizure activity or pronator drift. Coordination: Coordination is intact.  Finger-Nose-Finger Test normal.   Psychiatric:         Mood and Affect: Mood normal.         ED Medications  Medications   sodium chloride 0.9 % bolus 1,000 mL (0 mL Intravenous Stopped 10/25/23 1521)   iohexol (OMNIPAQUE) 350 MG/ML injection (MULTI-DOSE) 100 mL (100 mL Intravenous Given 10/25/23 1420)   magnesium sulfate 2 g/50 mL IVPB (premix) 2 g (2 g Intravenous New Bag 10/25/23 1430)   acetaminophen (TYLENOL) tablet 650 mg (650 mg Oral Given 10/25/23 1430)   morphine injection 4 mg (4 mg Intravenous Given 10/25/23 1430)   ketorolac (TORADOL) injection 15 mg (15 mg Intravenous Given 10/25/23 1459)   metoclopramide (REGLAN) injection 10 mg (10 mg Intravenous Given 10/25/23 1459)       Diagnostic Studies  Results Reviewed       Procedure Component Value Units Date/Time    HS Troponin I 2hr [[id number]]  (Normal) Collected: 10/25/23 1550    Lab Status: Final result Specimen: Blood from Arm, Left Updated: 10/25/23 1632     hs TnI 2hr 7 ng/L      Delta 2hr hsTnI -1 ng/L     HS Troponin I 4hr [[id number]]     Lab Status: No result Specimen: Blood     HS Troponin 0hr (reflex protocol) [[id number]]  (Normal) Collected: 10/25/23 1403    Lab Status: Final result Specimen: Blood from Arm, Left Updated: 10/25/23 1544     hs TnI 0hr 8 ng/L     CBC and differential [[id number]]  (Abnormal) Collected: 10/25/23 1403    Lab Status: Final result Specimen: Blood from Arm, Left Updated: 10/25/23 1538     WBC 11.20 Thousand/uL      RBC 3.11 Million/uL      Hemoglobin 10.1 g/dL      Hematocrit 30.8 %      MCV 99 fL      MCH 32.5 pg      MCHC 32.8 g/dL      RDW 13.0 %      MPV 9.8 fL      Platelets 777 Thousands/uL     Manual Differential(PHLEBS Do Not Order) [[id number]]  (Abnormal) Collected: 10/25/23 1403    Lab Status: Final result Specimen: Blood from Arm, Left Updated: 10/25/23 1538     Segmented % 53 %      Lymphocytes % 44 %      Monocytes % 2 %      Atypical Lymphocytes % 1 %      Absolute Neutrophils 5.94 Thousand/uL      Lymphocytes Absolute 4.93 Thousand/uL      Monocytes Absolute 0.22 Thousand/uL      Total Counted 100     Smudge Cells Present     RBC Morphology Normal     Platelet Estimate Adequate    Basic metabolic panel [[id number]]  (Abnormal) Collected: 10/25/23 1403    Lab Status: Final result Specimen: Blood from Arm, Left Updated: 10/25/23 1516     Sodium 138 mmol/L      Potassium 3.4 mmol/L      Chloride 105 mmol/L      CO2 24 mmol/L      ANION GAP 9 mmol/L      BUN 24 mg/dL      Creatinine 1.27 mg/dL      Glucose 101 mg/dL      Calcium 8.4 mg/dL      eGFR 44 ml/min/1.73sq m     Narrative:      [facility] for Chronic Kidney Disease (CKD):     Stage 1 with normal or high GFR (GFR > 90 mL/min/1.73 square meters)    Stage 2 Mild CKD (GFR = 60-89 mL/min/1.73 square meters)    Stage 3A Moderate CKD (GFR = 45-59 mL/min/1.73 square meters)    Stage 3B Moderate CKD (GFR = 30-44 mL/min/1.73 square meters)    Stage 4 Severe CKD (GFR = 15-29 mL/min/1.73 square meters)    Stage 5 End Stage CKD (GFR <15 mL/min/1.73 square meters)  Note: GFR calculation is accurate only with a steady state creatinine    Protime-INR [[id number]]  (Normal) Collected: 10/25/23 1403    Lab Status: Final result Specimen: Blood from Arm, Left Updated: 10/25/23 1456     Protime 14.1 seconds      INR 1.10    APTT [[id number]]  (Normal) Collected: 10/25/23 1403    Lab Status: Final result Specimen: Blood from Arm, Left Updated: 10/25/23 1456     PTT 28 seconds                    XR chest 1 view portable   ED Interpretation by Alfredito Sanford DO (10/25 4604)   No acute cardiopulmonary disease as interpreted by me        CTA head and neck with and without contrast   Final Result by Dieter Bell MD (10/25 2603)      1. CT brain:  No acute intracranial abnormality      2. CTA head: Negative for large vessel intracranial occlusion . Mild to moderate luminal stenosis along the bilateral cavernous and supraclinoid ICA segments. 3. CTA neck: Moderate right extracranial ICA stenosis. The cervical vertebral arteries are patent, with moderate left origin stenosis. 4. Stable focal inferiorly oriented saccular aneurysm versus penetrating ulcer of the aortic arch measuring 0.7 x 0.9 cm. Workstation performed: [id number]               Procedures  Procedures      ED Course  ED Course as of 10/25/23 1654   Wed Oct 25, 2023   1359 EKG: NSR at 71 bpm, LAD, no acute ST elevations or depressions as interpreted by me.    1548 hs TnI 0hr: 8  Will follow with delta troponin     1549 Hemoglobin(!): 10.1  Baseline     1648 Repeat troponin negative at 7       Patient is a 58year old female presenting to the ED for evaluation of severe headache, chest discomfort, and near syncope. Ddx includes subarachnoid, vertebral artery dissection, ACS, electrolyte disturbance. Will order Labs r/o anemia, electrolyte disturbance, troponin for cardiac stress, EKG for arrrhythmia, CTA head and neck r/o above, UA for infection. Patient given fluids, Toradol, Morphine, Reglan for headache. CTA reviewed. EKG as above. Will await lab results. CXR negative. Delta troponin negative. After medication administration, patient feels overall improved. No chest pain and no headache. Hemodynamics stable. Patient able to ambulate. I reviewed all testing with the patient:   I gave oral return precautions for what to return for in addition to the written return precautions. The patient (and any family present: daughter) verbalized understanding of the discharge instructions and warnings that would necessitate return to the Emergency Department. I specifically highlighted areas of special concern regarding the written and verbal discharge instructions and return precautions. All questions were answered prior to discharge. SBIRT 20yo+      Flowsheet Row Most Recent Value   Initial Alcohol Screen: US AUDIT-C     1. How often do you have a drink containing alcohol? 0 Filed at: 10/25/2023 1343   2. How many drinks containing alcohol do you have on a typical day you are drinking? 0 Filed at: 10/25/2023 1343   3a. Male UNDER 65: How often do you have five or more drinks on one occasion? 0 Filed at: 10/25/2023 1343   3b. FEMALE Any Age, or MALE 65+: How often do you have 4 or more drinks on one occassion? 0 Filed at: 10/25/2023 1343   Audit-C Score 0 Filed at: 10/25/2023 1343   HAYLEE: How many times in the past year have you. .. Used an illegal drug or used a prescription medication for non-medical reasons? Never Filed at: 10/25/2023 1343                  Medical Decision Making  Amount and/or Complexity of Data Reviewed  Labs: ordered. Decision-making details documented in ED Course. Radiology: ordered and independent interpretation performed. Risk  OTC drugs. Prescription drug management. Disposition  Final diagnoses:   Migraine headache   Chest pain, unspecified     Time reflects when diagnosis was documented in both MDM as applicable and the Disposition within this note       Time User Action Codes Description Comment    10/25/2023  4:52 PM Kate Rojas [G43.909] Migraine headache     10/25/2023  4:52 PM Kate Taylor Add [R07.9] Chest pain, unspecified           ED Disposition       ED Disposition   Discharge    Condition   Stable    Date/Time   Wed Oct 25, 2023 [address] discharge to home/self care. Follow-up Information       Follow up With Specialties Details Why Contact Info        Follow up with PCP in 2-3 days            Patient's Medications   Discharge Prescriptions    No medications on file     No discharge procedures on file. PDMP Review       None             ED Provider  Attending physically available and evaluated Alexsandra. I managed the patient along with the ED Attending.     Electronically Signed by           Christine Bianchi DO  10/25/23 9458

## 2023-10-26 ENCOUNTER — TELEPHONE (OUTPATIENT)
Dept: INTERNAL MEDICINE CLINIC | Facility: CLINIC | Age: 63
End: 2023-10-26

## 2023-10-26 NOTE — TELEPHONE ENCOUNTER
This patient was scheduled for Lumbar Spine MRI. The information I submitted was not enough to get it approved, so [facility] is requesting a [id number]. If you are able to do so, the number is below. If you wish to withdraw this request let us know so we can call central scheduling to cancel their upcoming appointment.     CYNTHIA: [phone]  Tracking Number: [id number]   Insurance: [facility]   ID#: [id number]     Attending: MALIK DAILEY   NIP: [id number]         Rosa Jovel

## 2023-10-27 ENCOUNTER — CONSULT (OUTPATIENT)
Dept: PAIN MEDICINE | Facility: CLINIC | Age: 63
End: 2023-10-27
Payer: COMMERCIAL

## 2023-10-27 VITALS
HEIGHT: 63 IN | BODY MASS INDEX: 26.93 KG/M2 | SYSTOLIC BLOOD PRESSURE: 146 MMHG | DIASTOLIC BLOOD PRESSURE: 72 MMHG | WEIGHT: 152 LBS

## 2023-10-27 DIAGNOSIS — M54.42 ACUTE LEFT-SIDED LOW BACK PAIN WITH LEFT-SIDED SCIATICA: Primary | ICD-10-CM

## 2023-10-27 DIAGNOSIS — M79.18 MYOFASCIAL PAIN SYNDROME: ICD-10-CM

## 2023-10-27 PROCEDURE — 99244 OFF/OP CNSLTJ NEW/EST MOD 40: CPT | Performed by: ANESTHESIOLOGY

## 2023-10-27 PROCEDURE — 20553 NJX 1/MLT TRIGGER POINTS 3/>: CPT | Performed by: ANESTHESIOLOGY

## 2023-10-27 RX ORDER — METHYLPREDNISOLONE ACETATE 40 MG/ML
40 INJECTION, SUSPENSION INTRA-ARTICULAR; INTRALESIONAL; INTRAMUSCULAR; SOFT TISSUE ONCE
Status: COMPLETED | OUTPATIENT
Start: 2023-10-27 | End: 2023-10-27

## 2023-10-27 RX ORDER — BUPIVACAINE HYDROCHLORIDE 2.5 MG/ML
5 INJECTION, SOLUTION EPIDURAL; INFILTRATION; INTRACAUDAL ONCE
Status: COMPLETED | OUTPATIENT
Start: 2023-10-27 | End: 2023-10-27

## 2023-10-27 RX ADMIN — METHYLPREDNISOLONE ACETATE 40 MG: 40 INJECTION, SUSPENSION INTRA-ARTICULAR; INTRALESIONAL; INTRAMUSCULAR; SOFT TISSUE at 08:00

## 2023-10-27 RX ADMIN — BUPIVACAINE HYDROCHLORIDE 5 ML: 2.5 INJECTION, SOLUTION EPIDURAL; INFILTRATION; INTRACAUDAL at 08:00

## 2023-10-27 NOTE — PROGRESS NOTES
Procedure Note  Indication: back pain  Preoperative diagnosis: Myofascial pain  Postoperative diagnosis: Same  Procedure: trigger point injections    Muscles injections: left lumbar paraspinal, erector spinae, gluteus moon muscles (3 sites total)    Medications: 6mL used of 10mL mixture containing 40mg Depomedrol with 5mL 0.25% bupivacaine     After discussing the risks, benefits, and alternatives to the procedure, the patient expressed understanding and wished to proceed. The patient was placed in sitting position. A procedural pause was conducted to verify: Correct patient identity, procedure to be performed and as applicable, correct side and site, correct patient position, and availability of implants, special equipment or special requirements. Following this, the area was prepped with alcohol. A 1.5 inch 27 gauge hypodermic needle was advanced into the identified trigger points. After negative aspiration of blood, air, or bodily fluids; 2mL of the above injectate was injected into each trigger point. The patient tolerated the procedure well and there were no apparent complications. After an appropriate amount of observation, the patient was dismissed from thein good condition under their own power.

## 2023-10-27 NOTE — PROGRESS NOTES
Assessment  1. Acute left-sided low back pain with left-sided sciatica    2. Myofascial pain syndrome        Plan  Patient presenting with acute left low back pain with radicular leg symptoms for 3+ weeks. Pain is consistent with lumbar radicular pain, myofascial pain. Symptoms are accompanied by pain >7/10 on the pain scale with inability to participate in IADLs. Has been taking Robaxin, gabapentin, oral steroids with modest benefit. Denies any bowel or bladder incontinence, saddle anesthesia. Patient had her PT evaluation 10/25 and was unable to tolerate meaningful session. She was then referred to [facility]. On exam she has a positive straight leg raise and significant trigger points in the lumbar paraspinal, erector spinae, gluteus maximum muscles. I discussed performing trigger point injections to help reduce the acute myofascial component of her pain and to reattempt physical therapy. If she cannot tolerate further PT, would obtain lumbar MRI for further evaluation. Will follow up in 4 weeks. Recommend to continue with gabapentin, Robaxin, and/or Tylenol for pain symptoms. Risks, benefits, and alternatives to trigger point injections thoroughly discussed with patient. Complete risks and benefits including bleeding, infection, tissue reaction, nerve injury and allergic reaction were discussed. Written and verbal consent was obtained. Reviewed external notes from the relevant aspects of the patient's medical record, specifically PCP, ED notes in regards to current and prior treatments tried (as mentioned in history of present illness). Reviewed pertinent laboratory studies, specifically renal function, hemoglobin A1c, CBC, coagulation studies, prior to recommending medication therapies/interventional treatment options. [place] Prescription Drug Monitoring Program report was reviewed and was appropriate.      My impressions and treatment recommendations were discussed in detail with the patient who verbalized understanding and had no further questions. Discharge instructions were provided. I personally saw and examined the patient and I agree with the above discussed plan of care. No orders of the defined types were placed in this encounter. New Medications Ordered This Visit   Medications    methylPREDNISolone acetate (DEPO-MEDROL) injection 40 mg    bupivacaine (PF) (MARCAINE) 0.25 % injection 5 mL       History of Present Illness    Humberto Dimas is a 58 y.o. female presenting for consultation at [address] [place] [address] and [facility] for exam and evaluation of acute left low back and radiating leg pain for 3 weeks. Pain started without any precipitating injury or trauma. Over the past month, the intensity of pain has been Moderate to severe. Pain is currently 5/10. Pain does interfere with age appropriate activities of daily living. Pain is nearly constant, with no typical pattern throughout the day. Pain is described as cramping, sharp. Patient denies weakness in the lower extremities. Assistance device used: None. Pain is increased with standing, bending, sitting, walking, exercise. Pain is decreased with lying down. Treatments tried:   PT: reports unable to tolerate due to pain, had evaluation   Injections: no   Anticoagulation: yes, Plavix  Previous spine surgery at affected area: No    Medications tried:   Oral steroids, Robaxin, gabapentin    I have personally reviewed and/or updated the patient's past medical history, past surgical history, family history, social history, current medications, allergies, and vital signs today. Review of Systems   Constitutional:  Negative for chills and fever. HENT:  Negative for ear pain and sore throat. Eyes:  Negative for pain and visual disturbance. Respiratory:  Negative for cough and shortness of breath. Cardiovascular:  Negative for chest pain and palpitations.    Gastrointestinal:  Negative for abdominal pain and vomiting. Genitourinary:  Negative for dysuria and hematuria. Musculoskeletal:  Positive for back pain, gait problem and myalgias. Negative for arthralgias. Skin:  Negative for color change and rash. Neurological:  Positive for weakness and numbness. Negative for seizures and syncope. All other systems reviewed and are negative. Patient Active Problem List   Diagnosis    Stroke-like symptoms    Incidental pulmonary nodule    Hyperlipemia    Tarsal tunnel syndrome, left    Depression    Stage 3a chronic kidney disease (HCC)    History of neuroendocrine cancer    Personal history of breast cancer    Anemia    Hypertension    H/O Whipple procedure    Pancreatic insufficiency    Encounter for follow-up surveillance of neuroendocrine carcinoma    Aortic aneurysm ([facility])    Chronic pain of both knees    Need for influenza vaccination    BMI 27.0-27.9,adult       Past Medical History:   Diagnosis Date    Back ache     Cancer ([facility])     Hyperlipidemia     Hypertension     Pancreatic mass 4/12/2021       Past Surgical History:   Procedure Laterality Date    APPENDECTOMY      BILATERAL OOPHORECTOMY      BREAST SURGERY Right     partial mastectomy     COLONOSCOPY      HERNIA REPAIR      HYSTERECTOMY      LAPAROTOMY N/A 8/24/2021    Procedure: LAPAROTOMY EXPLORATORY;  Surgeon: Rashad Alvares MD;  [facility];  Service: Surgical Oncology    MASTECTOMY Right     partial    WHIPPLE PROCEDURE/PANCREATICO-DUODENECTOMY N/A 8/24/2021    Procedure:  WHIPPLE PROCEDURE/PANCREATICO-DUODENECTOMY;  Surgeon: Rashad Alvares MD;  [facility];  Service: Surgical Oncology       Family History   Problem Relation Age of Onset    Hypertension Mother     Heart disease Mother     Diabetes Mother     Cancer Father        Social History     Occupational History    Occupation: unemployed   Tobacco Use    Smoking status: Former     Packs/day: 0.65     Years: 30.00     Total pack years: 19.50     Types: Cigarettes    Smokeless tobacco: Never    Tobacco comments:     quit 15 years ago   Vaping Use    Vaping Use: Never used   Substance and Sexual Activity    Alcohol use: Not Currently    Drug use: No    Sexual activity: Not Currently       Current Outpatient Medications on File Prior to Visit   Medication Sig    amLODIPine (NORVASC) 10 mg tablet Take 1 tablet (10 mg total) by mouth daily    atorvastatin (LIPITOR) 40 mg tablet Take 1 tablet (40 mg total) by mouth every evening    B-12 Microlozenge 500 MCG SUBL PLACE 1 TABLET ON THE TONGUE AND ALLOW TO DISSOLVED DAILY    carvedilol (COREG) 6.25 mg tablet Take 1 tablet (6.25 mg total) by mouth 2 (two) times a day with meals    cholestyramine (QUESTRAN) 4 GM/DOSE powder Take 1 packet (4 g total) by mouth 3 (three) times a day with meals    clobetasol (TEMOVATE) 0.05 % ointment Apply twice a day to rash on hands for 2 weeks. Then, apply twice a day from Monday-Friday for another 2 weeks.  Avoid the face and groin    clopidogrel (Plavix) 75 mg tablet Take 1 tablet (75 mg total) by mouth daily    gabapentin (Neurontin) 100 mg capsule Take 1 capsule (100 mg total) by mouth daily at bedtime If no improvement, increase to 200 mg    hydrOXYzine HCL (ATARAX) 25 mg tablet Take 25 mg by mouth daily at bedtime as needed    lisinopril (ZESTRIL) 40 mg tablet take 1 tablet by mouth once daily    methocarbamol (ROBAXIN) 500 mg tablet Take 1 tablet (500 mg total) by mouth every 12 (twelve) hours as needed for muscle spasms    methylPREDNISolone 4 MG tablet therapy pack Use as directed on package    pancrelipase, Lip-Prot-Amyl, (CREON) 12,000 units capsule Take 12,000 units of lipase by mouth 3 (three) times a day with meals    pantoprazole (PROTONIX) 40 mg tablet Take 1 tablet (40 mg total) by mouth daily in the early morning    potassium chloride (K-DUR,KLOR-CON) 10 mEq tablet take 2 tablets by mouth daily    sertraline (ZOLOFT) 25 mg tablet Take 1 tablet (25 mg total) by mouth daily traZODone (DESYREL) 50 mg tablet take 1/2 tablet by mouth at bedtime if needed for insomnia    thiamine 100 MG tablet Take 1 tablet (100 mg total) by mouth daily Do not start before February 15, 2023. No current facility-administered medications on file prior to visit. No Known Allergies    Physical Exam    /72   Ht 5' 3" (1.6 m)   Wt 68.9 kg (152 lb)   LMP  (LMP Unknown)   BMI 26.93 kg/m²     Constitutional: normal, well developed, well nourished, alert, in no distress and non-toxic and no overt pain behavior. Eyes: anicteric  HEENT: grossly intact  Neck: supple, symmetric, trachea midline and no masses   Pulmonary:even and unlabored  Cardiovascular:No edema or pitting edema present  Skin:Normal without rashes or lesions and well hydrated  Psychiatric:Mood and affect appropriate  Neurologic: Motor function is grossly intact with no focal neurologic deficits. 4+/5 strength with left foot dorsiflexion  Musculoskeletal: Gait is slow, antalgic. Positive left SLR. ++TTP left lumbar paraspinal, erector spinae, gluteus maximum muscles consistent with trigger points.     Imaging

## 2023-10-31 ENCOUNTER — OFFICE VISIT (OUTPATIENT)
Dept: PHYSICAL THERAPY | Facility: REHABILITATION | Age: 63
End: 2023-10-31
Payer: COMMERCIAL

## 2023-10-31 DIAGNOSIS — M54.50 ACUTE LEFT-SIDED LOW BACK PAIN, UNSPECIFIED WHETHER SCIATICA PRESENT: Primary | ICD-10-CM

## 2023-10-31 DIAGNOSIS — M25.552 HIP PAIN, ACUTE, LEFT: ICD-10-CM

## 2023-10-31 PROCEDURE — 97110 THERAPEUTIC EXERCISES: CPT

## 2023-10-31 PROCEDURE — 97112 NEUROMUSCULAR REEDUCATION: CPT

## 2023-10-31 NOTE — PROGRESS NOTES
Daily Note     Today's date: 10/31/2023  Patient name: Mauricio Hadley  : 1960  MRN: [id number]  Referring provider: Dea Pickens, PT  Dx:   Encounter Diagnosis     ICD-10-CM    1. Acute left-sided low back pain, unspecified whether sciatica present  M54.50       2. Hip pain, acute, left  M25.552                      Subjective: Pt notes overall improvement with injections from 10/27, states minor improvement overall. Pt states pain continues to be located on L lumbar. Objective: See treatment diary below      Assessment: Tolerated treatment well. Patient would benefit from continued PT.  Pt. able to complete all exercises with no increase in pain during or after session, noted minor pain overall at conclusion of session. Pt would benefit from continued PT to address current deficits and improve QOL. Pt. 1:1 with PTA for entirety. Plan: Continue per plan of care.       Precautions: History of breast cancer, pancreatic tumor removal, HBP       Manuals 10/25 10/31                                                               Neuro Re-Ed             Patient Education '            [address]  10x           Seated Pallof  15x b/l otb           Supine marches  2x10 alt b/l                                     Ther Ex             Lower Trunk Rotations  10x b/l           Seated Lumbar Flexion - Towards Right  10x10" pball to R           NuStep  8' L4           TA [employer] - Standing (Can try seated if not)                                                                Ther Activity                                       Gait Training                                       Modalities

## 2023-11-01 ENCOUNTER — OFFICE VISIT (OUTPATIENT)
Dept: INTERNAL MEDICINE CLINIC | Facility: CLINIC | Age: 63
End: 2023-11-01

## 2023-11-01 VITALS
SYSTOLIC BLOOD PRESSURE: 187 MMHG | DIASTOLIC BLOOD PRESSURE: 78 MMHG | HEART RATE: 76 BPM | TEMPERATURE: 97.9 F | WEIGHT: 153 LBS | BODY MASS INDEX: 27.1 KG/M2

## 2023-11-01 DIAGNOSIS — M54.9 CHRONIC BACK PAIN, UNSPECIFIED BACK LOCATION, UNSPECIFIED BACK PAIN LATERALITY: Primary | ICD-10-CM

## 2023-11-01 DIAGNOSIS — G89.29 CHRONIC BACK PAIN, UNSPECIFIED BACK LOCATION, UNSPECIFIED BACK PAIN LATERALITY: Primary | ICD-10-CM

## 2023-11-01 DIAGNOSIS — I10 PRIMARY HYPERTENSION: ICD-10-CM

## 2023-11-01 PROCEDURE — 3078F DIAST BP <80 MM HG: CPT | Performed by: STUDENT IN AN ORGANIZED HEALTH CARE EDUCATION/TRAINING PROGRAM

## 2023-11-01 PROCEDURE — 3077F SYST BP >= 140 MM HG: CPT | Performed by: STUDENT IN AN ORGANIZED HEALTH CARE EDUCATION/TRAINING PROGRAM

## 2023-11-01 PROCEDURE — 99213 OFFICE O/P EST LOW 20 MIN: CPT | Performed by: STUDENT IN AN ORGANIZED HEALTH CARE EDUCATION/TRAINING PROGRAM

## 2023-11-01 RX ORDER — LIDOCAINE 4 G/G
1 PATCH TOPICAL DAILY
Qty: 14 PATCH | Refills: 0 | Status: SHIPPED | OUTPATIENT
Start: 2023-11-01 | End: 2023-11-15

## 2023-11-01 NOTE — PROGRESS NOTES
[facility]  INTERNAL MEDICINE OFFICE VISIT     PATIENT INFORMATION     Dock Fermin   58 y.o. female   MRN: [id number]    ASSESSMENT/PLAN     Diagnoses and all orders for this visit:    Chronic back pain, unspecified back location, unspecified back pain laterality  -     Lidocaine 4 % PTCH; Apply 1 patch topically in the morning for 14 days    Primary hypertension      Chronic Back Pain  -Patient with chronic back pain in the setting of lumbar radiculopathy.  -Patient had steroid injections with some improvement of pain, however, still has a lot of pain with motion of the left leg.  -Previously notable to have positive straight leg raise, unable to perform full straight leg raise test on exam today limited by pain.  -Patient currently working with physical therapy but says that the pain is limiting her participation. Plan:  -Will prescribe lidoderm patch.   -Continue with physical therapy.  -Patient continues to have significant pain on exam, would continue to recommend lumbar MRI however insurance has denied.  -Follow up in 2 weeks. Hypertension  -Patient's blood pressure was 181/80 on presentation, 187/78 on recheck.  -Patient endorses having taken all her medications today.  -On Norvasc 10 mg daily, Lisinopril 40 mg daily, Carvedilol 6.25 BID. -Patient has no symptoms including headache, dizziness, blurry vision in the office. Plan:  -Blood pressure notably elevated even on recheck. Asymptomatic at this time.  -Patient endorses taking her medications as prescribed.  -Home BP cuff given. -Instructed patient to log home blood pressures and bring to next appointment.  -Instructed patient and daughter that if patient has elevated BP in the 180s or higher with symptoms such as headache, visual changes, patient should present to the ED.  -Follow up in 2 weeks for annual physical + med rec.     Chest Pressure  -Patient was following with cardiology for chest pressure, cardiology had ordered cardiac MRI however was not able to get covered by insurance.  -Would continue to follow up with cardiology at this time. Schedule a follow-up appointment in 3 months. HEALTH MAINTENANCE     Immunization History   Administered Date(s) Administered    COVID-19 PFIZER VACCINE 0.3 ML IM 03/26/2021, 04/16/2021, 12/11/2021    INFLUENZA 10/27/2017, 03/15/2023    Influenza Quadrivalent, 6-35 Months IM 10/27/2017    Influenza, injectable, quadrivalent, preservative free 0.5 mL 10/18/2023    Influenza, recombinant, quadrivalent,injectable, preservative free 11/25/2019, 10/14/2020, 11/15/2021, 03/15/2023    Pneumococcal Conjugate Vaccine 20-valent (Pcv20), Polysace 03/15/2023    Tdap 02/08/2018     CHIEF COMPLAINT     Chief Complaint   Patient presents with    Follow-up     Discuss getting MRI of back and testing for her heart      HISTORY OF PRESENT ILLNESS     Patient is a 57 y/o female with PMH including hypertension, aortic aneurysm, 3A CKD, HLD, depression who presents today for discussion of MRI. Patient is Occitan speaking, history was obtained with the help of an . Patient notes that her insurance denied her lumbar MRI and cardiac MRI. Patient notes that she got steroid injections 10/27 that improved her symptoms, however, she is still having a lot of pain with movement of her left leg. Patient otherwise feels well with no other acute symptoms at this time. Patient's blood pressure was noted to be elevated in the office. She denies symptoms including headache, dizziness, blurry vision. REVIEW OF SYSTEMS     Review of Systems   Constitutional:  Negative for chills, fatigue and fever. HENT:  Negative for hearing loss and sore throat. Eyes:  Negative for visual disturbance. Respiratory:  Negative for cough and shortness of breath. Cardiovascular:  Negative for chest pain, palpitations and leg swelling. Gastrointestinal:  Positive for constipation.  Negative for abdominal distention, diarrhea, nausea and vomiting. Endocrine: Negative for polyuria. Genitourinary:  Negative for hematuria. Musculoskeletal:  Negative for arthralgias and back pain. Skin:  Negative for rash and wound. Neurological:  Negative for dizziness, seizures, weakness, light-headedness and headaches. Psychiatric/Behavioral:  The patient is not nervous/anxious. OBJECTIVE     Vitals:    11/01/23 1504 11/01/23 1553   BP: (!) 181/80 (!) 187/78   BP Location: Right arm Left arm   Patient Position: Sitting Sitting   Cuff Size: Large Standard   Pulse: 76    Temp: 97.9 °F (36.6 °C)    TempSrc: Temporal    Weight: 69.4 kg (153 lb)      Physical Exam  Vitals reviewed. Constitutional:       Appearance: Normal appearance. HENT:      Head: Normocephalic and atraumatic. Right Ear: External ear normal.      Left Ear: External ear normal.      Nose: Nose normal.      Mouth/Throat:      Mouth: Mucous membranes are moist.   Eyes:      Extraocular Movements: Extraocular movements intact. Pupils: Pupils are equal, round, and reactive to light. Cardiovascular:      Rate and Rhythm: Normal rate and regular rhythm. Pulses: Normal pulses. Heart sounds: Normal heart sounds. Pulmonary:      Effort: Pulmonary effort is normal.      Breath sounds: Normal breath sounds. Abdominal:      General: Abdomen is flat. Bowel sounds are normal.      Palpations: Abdomen is soft. Musculoskeletal:         General: Tenderness present. Cervical back: No rigidity or tenderness. Right lower leg: No edema. Left lower leg: No edema. Comments: Patient has severe pain with attempted straight leg raise of LLE. ROM exercises limited by pain. Area of back tenderness has no erythema, swelling or fluctuance. Patient able to ambulate on extremity with pain. Skin:     General: Skin is warm. Capillary Refill: Capillary refill takes less than 2 seconds.    Neurological:      General: No focal deficit present. Mental Status: She is alert and oriented to person, place, and time. CURRENT MEDICATIONS     Current Outpatient Medications:     amLODIPine (NORVASC) 10 mg tablet, Take 1 tablet (10 mg total) by mouth daily, Disp: 90 tablet, Rfl: 3    atorvastatin (LIPITOR) 40 mg tablet, Take 1 tablet (40 mg total) by mouth every evening, Disp: 90 tablet, Rfl: 3    B-12 Microlozenge 500 MCG SUBL, PLACE 1 TABLET ON THE TONGUE AND ALLOW TO DISSOLVED DAILY, Disp: 90 tablet, Rfl: 2    carvedilol (COREG) 6.25 mg tablet, Take 1 tablet (6.25 mg total) by mouth 2 (two) times a day with meals, Disp: 60 tablet, Rfl: 3    cholestyramine (QUESTRAN) 4 GM/DOSE powder, Take 1 packet (4 g total) by mouth 3 (three) times a day with meals, Disp: 1074.62 g, Rfl: 1    clobetasol (TEMOVATE) 0.05 % ointment, Apply twice a day to rash on hands for 2 weeks. Then, apply twice a day from Monday-Friday for another 2 weeks.  Avoid the face and groin, Disp: 60 g, Rfl: 0    clopidogrel (Plavix) 75 mg tablet, Take 1 tablet (75 mg total) by mouth daily, Disp: 30 tablet, Rfl: 1    gabapentin (Neurontin) 100 mg capsule, Take 1 capsule (100 mg total) by mouth daily at bedtime If no improvement, increase to 200 mg, Disp: 30 capsule, Rfl: 1    hydrOXYzine HCL (ATARAX) 25 mg tablet, Take 25 mg by mouth daily at bedtime as needed, Disp: , Rfl: 0    Lidocaine 4 % PTCH, Apply 1 patch topically in the morning for 14 days, Disp: 14 patch, Rfl: 0    lisinopril (ZESTRIL) 40 mg tablet, take 1 tablet by mouth once daily, Disp: 90 tablet, Rfl: 1    methocarbamol (ROBAXIN) 500 mg tablet, Take 1 tablet (500 mg total) by mouth every 12 (twelve) hours as needed for muscle spasms, Disp: 20 tablet, Rfl: 0    pancrelipase, Lip-Prot-Amyl, (CREON) 12,000 units capsule, Take 12,000 units of lipase by mouth 3 (three) times a day with meals, Disp: 500 capsule, Rfl: 3    pantoprazole (PROTONIX) 40 mg tablet, Take 1 tablet (40 mg total) by mouth daily in the early morning, Disp: 90 tablet, Rfl: 3    potassium chloride (K-DUR,KLOR-CON) 10 mEq tablet, take 2 tablets by mouth daily, Disp: 90 tablet, Rfl: 1    sertraline (ZOLOFT) 25 mg tablet, Take 1 tablet (25 mg total) by mouth daily, Disp: 90 tablet, Rfl: 1    thiamine 100 MG tablet, Take 1 tablet (100 mg total) by mouth daily Do not start before February 15, 2023., Disp: 30 tablet, Rfl: 0    traZODone (DESYREL) 50 mg tablet, take 1/2 tablet by mouth at bedtime if needed for insomnia, Disp: , Rfl:     PAST MEDICAL & SURGICAL HISTORY     Past Medical History:   Diagnosis Date    Back ache     Cancer ([address])     Hyperlipidemia     Hypertension     Pancreatic mass 4/12/2021     Past Surgical History:   Procedure Laterality Date    APPENDECTOMY      BILATERAL OOPHORECTOMY      BREAST SURGERY Right     partial mastectomy     COLONOSCOPY      HERNIA REPAIR      HYSTERECTOMY      LAPAROTOMY N/A 8/24/2021    Procedure: LAPAROTOMY EXPLORATORY;  Surgeon: Shelley Blanco MD;  [facility];  Service: Surgical Oncology    MASTECTOMY Right     partial    WHIPPLE PROCEDURE/PANCREATICO-DUODENECTOMY N/A 8/24/2021    Procedure: WHIPPLE PROCEDURE/PANCREATICO-DUODENECTOMY;  Surgeon: Shelley Blanco MD;  [facility];  Service: Surgical Oncology     SOCIAL & FAMILY HISTORY     Social History     Socioeconomic History    Marital status: /Civil Union     Spouse name: Samuel Castaneda    Number of children: Not on file    Years of education: Not on file    Highest education level: Not on file   Occupational History    Occupation: unemployed   Tobacco Use    Smoking status: Former     Packs/day: 0.65     Years: 30.00     Total pack years: 19.50     Types: Cigarettes    Smokeless tobacco: Never    Tobacco comments:     quit 15 years ago   Vaping Use    Vaping Use: Never used   Substance and Sexual Activity    Alcohol use: Not Currently    Drug use: No    Sexual activity: Not Currently   Other Topics Concern    Not on file   Social History Narrative    Not on file     Social Determinants of Health     Financial Resource Strain: Low Risk  (3/15/2023)    Overall Financial Resource Strain (CARDIA)     Difficulty of Paying Living Expenses: Not hard at all   Food Insecurity: No Food Insecurity (2/12/2023)    Hunger Vital Sign     Worried About Running Out of Food in the Last Year: Never true     Ran Out of Food in the Last Year: Never true   Transportation Needs: No Transportation Needs (2/12/2023)    PRAPARE - Transportation     Lack of Transportation (Medical): No     Lack of Transportation (Non-Medical): No   Physical Activity: Inactive (4/22/2021)    Exercise Vital Sign     Days of Exercise per Week: 0 days     Minutes of Exercise per Session: 0 min   Stress: Not on file   Social Connections: Not on file   Intimate Partner Violence: Not on file   Housing Stability: Low Risk  (2/12/2023)    Housing Stability Vital Sign     Unable to Pay for Housing in the Last Year: No     Number of Places Lived in the Last Year: 1     Unstable Housing in the Last Year: No     Social History     Substance and Sexual Activity   Alcohol Use Not Currently       Social History     Substance and Sexual Activity   Drug Use No     Social History     Tobacco Use   Smoking Status Former    Packs/day: 0.65    Years: 30.00    Total pack years: 19.50    Types: Cigarettes   Smokeless Tobacco Never   Tobacco Comments    quit 15 years ago     Family History   Problem Relation Age of Onset    Hypertension Mother     Heart disease Mother     Diabetes Mother     Cancer Father              ==  Jerman Ribeiro MD PGY2  [address], [address] [place] [address] Road  Office: [phone]  Fax: [phone]

## 2023-11-01 NOTE — PATIENT INSTRUCTIONS
Please take your blood pressure a few times a day and bring logs of your blood pressure to your next appointment. Follow up in 2 weeks for annual physical. Please bring all of your medications to that appointment.

## 2023-11-02 ENCOUNTER — HOSPITAL ENCOUNTER (OUTPATIENT)
Dept: RADIOLOGY | Age: 63
Discharge: HOME | End: 2023-11-02
Payer: COMMERCIAL

## 2023-11-02 DIAGNOSIS — Z12.31 ENCOUNTER FOR SCREENING MAMMOGRAM FOR BREAST CANCER: ICD-10-CM

## 2023-11-02 PROCEDURE — 77063 BREAST TOMOSYNTHESIS BI: CPT

## 2023-11-02 PROCEDURE — 77067 SCR MAMMO BI INCL CAD: CPT

## 2023-11-08 ENCOUNTER — APPOINTMENT (OUTPATIENT)
Dept: PHYSICAL THERAPY | Facility: REHABILITATION | Age: 63
End: 2023-11-08
Payer: MEDICARE

## 2023-11-08 NOTE — PROGRESS NOTES
Daily Note     Today's date: 2023  Patient name: Luis Hernández  : 1960  MRN: [id number]  Referring provider: Dakotah Guerra PT  Dx: No diagnosis found. Subjective: ***      Objective: See treatment diary below      Assessment: Tolerated treatment {Tolerated treatment :[id number]}. Patient {assessment:[id number]}      Plan: Continue per plan of care.       POC expires Auth Expiration date PT/OT + Visit Limit?   2023 24        Visit/Unit Tracking  AUTH Status:  Date         Approved Used 3         Remaining  21           Pertinent Findings:      POC End Date: 2023                                                                                          Test / Measure  9/15/2022   FOTO (Predicted 54) 40   Lumbar AROM Most restricted into flexion with left-sided radicular symptoms   Supine to Sit Transition Max assist with lumbar pain       Precautions: History of breast cancer, pancreatic tumor removal, HBP       Manuals 10/25 10/31 11/8                                                              Neuro Re-Ed             Patient Education 25'            Glute Set             Bridge  10x 10x          Seated Pallof  15x b/l otb 15x B gtb          Supine marches  2x10 alt b/l 2x10 alt B                                    Ther Ex             Lower Trunk Rotations  10x b/l 2x10          Seated Lumbar Flexion - Towards Right  10x10" pball to R 10x10"           NuStep  8' L4 8' L5          TA [employer] - Standing (Can try seated if not)                                                                Ther Activity                                       Gait Training                                       Modalities

## 2023-11-10 ENCOUNTER — HOSPITAL ENCOUNTER (OUTPATIENT)
Dept: NON INVASIVE DIAGNOSTICS | Facility: CLINIC | Age: 63
Discharge: HOME | End: 2023-11-10
Payer: MEDICARE

## 2023-11-10 DIAGNOSIS — R07.89 CHEST PRESSURE: ICD-10-CM

## 2023-11-10 LAB
CHEST PAIN STATEMENT: NORMAL
MAX DIASTOLIC BP: 68 MMHG
MAX PREDICTED HEART RATE: 156 BPM
NUC STRESS EJECTION FRACTION: 68 %
PROTOCOL NAME: NORMAL
RATE PRESSURE PRODUCT: NORMAL
REASON FOR TERMINATION: NORMAL
SL CV REST NUCLEAR ISOTOPE DOSE: 10 MCI
SL CV STRESS NUCLEAR ISOTOPE DOSE: 31.5 MCI
SL CV STRESS RECOVERY BP: NORMAL MMHG
SL CV STRESS RECOVERY HR: 78 BPM
SL CV STRESS RECOVERY O2 SAT: 100 %
STRESS BASELINE BP: NORMAL MMHG
STRESS BASELINE HR: 73 BPM
STRESS O2 SAT REST: 100 %
STRESS PEAK HR: 106 BPM
STRESS POST EXERCISE DUR MIN: 6 MIN
STRESS POST EXERCISE DUR SEC: 16 SEC
STRESS POST O2 SAT PEAK: 100 %
STRESS POST PEAK BP: 174 MMHG
STRESS POST PEAK HR: 106 BPM
STRESS POST PEAK SYSTOLIC BP: 174 MMHG
STRESS/REST PERFUSION RATIO: 1.2
TARGET HR FORMULA: NORMAL
TEST INDICATION: NORMAL

## 2023-11-10 PROCEDURE — 78452 HT MUSCLE IMAGE SPECT MULT: CPT | Performed by: INTERNAL MEDICINE

## 2023-11-10 PROCEDURE — G1004 CDSM NDSC: HCPCS

## 2023-11-10 PROCEDURE — A9502 TC99M TETROFOSMIN: HCPCS

## 2023-11-10 PROCEDURE — 93017 CV STRESS TEST TRACING ONLY: CPT

## 2023-11-10 PROCEDURE — 78452 HT MUSCLE IMAGE SPECT MULT: CPT

## 2023-11-10 PROCEDURE — 93018 CV STRESS TEST I&R ONLY: CPT | Performed by: INTERNAL MEDICINE

## 2023-11-10 PROCEDURE — 93016 CV STRESS TEST SUPVJ ONLY: CPT | Performed by: INTERNAL MEDICINE

## 2023-11-10 RX ORDER — REGADENOSON 0.08 MG/ML
0.4 INJECTION, SOLUTION INTRAVENOUS ONCE
Status: CANCELLED | OUTPATIENT
Start: 2023-11-10 | End: 2023-11-10

## 2023-11-10 RX ORDER — REGADENOSON 0.08 MG/ML
0.4 INJECTION, SOLUTION INTRAVENOUS ONCE
Status: COMPLETED | OUTPATIENT
Start: 2023-11-10 | End: 2023-11-10

## 2023-11-10 RX ADMIN — REGADENOSON 0.4 MG: 0.08 INJECTION, SOLUTION INTRAVENOUS at 14:06

## 2023-11-13 ENCOUNTER — OFFICE VISIT (OUTPATIENT)
Dept: PHYSICAL THERAPY | Facility: REHABILITATION | Age: 63
End: 2023-11-13
Payer: MEDICARE

## 2023-11-13 DIAGNOSIS — M54.50 ACUTE LEFT-SIDED LOW BACK PAIN, UNSPECIFIED WHETHER SCIATICA PRESENT: Primary | ICD-10-CM

## 2023-11-13 PROCEDURE — 97112 NEUROMUSCULAR REEDUCATION: CPT | Performed by: PHYSICAL THERAPIST

## 2023-11-13 PROCEDURE — 97110 THERAPEUTIC EXERCISES: CPT | Performed by: PHYSICAL THERAPIST

## 2023-11-13 NOTE — PROGRESS NOTES
Daily Note     Today's date: 2023  Patient name: Lillian Hale  : 1960  MRN: [id number]  Referring provider: Shelia Mitchell, PT  Dx:   Encounter Diagnosis     ICD-10-CM    1. Acute left-sided low back pain, unspecified whether sciatica present  M54.50                      Subjective: My back has been feeling much better. Objective: See treatment diary below      Assessment: Tolerated treatment well. Much improved response to exercise with less symptom irritability. Challenged at current therapeutic volume and intensity. Patient would benefit from continued PT      Plan: Continue per plan of care.       POC expires Auth Expiration date PT/OT + Visit Limit?   2023 24        Visit/Unit Tracking  AUTH Status:  Date        Approved Used 3 4        Remaining            Pertinent Findings:      POC End Date: 2023                                                                                          Test / Measure  9/15/2022   FOTO (Predicted 54) 40   Lumbar AROM Most restricted into flexion with left-sided radicular symptoms   Supine to Sit Transition Max assist with lumbar pain       Precautions: History of breast cancer, pancreatic tumor removal, HBP       Manuals 10/25 10/31 11/13                                                              Neuro Re-Ed             Patient Education 25'            Glute Set             Bridge  10x 10x          Seated Pallof  15x b/l otb 15x B gtb          Supine marches  2x10 alt b/l 2x10 alt B                                    Ther Ex             Lower Trunk Rotations  10x b/l 2x10          Seated Lumbar Flexion - Towards Right  10x10" pball to R 10x10"           NuStep  8' L4 8' L5          TA [employer] - Standing (Can try seated if not)                                                                Ther Activity                                       Gait Training                                       Modalities

## 2023-11-14 ENCOUNTER — HOSPITAL ENCOUNTER (OUTPATIENT)
Dept: MRI IMAGING | Facility: HOSPITAL | Age: 63
Discharge: HOME | End: 2023-11-14
Attending: INTERNAL MEDICINE
Payer: COMMERCIAL

## 2023-11-14 DIAGNOSIS — I51.7 LEFT VENTRICULAR HYPERTROPHY: ICD-10-CM

## 2023-11-14 PROCEDURE — G1004 CDSM NDSC: HCPCS

## 2023-11-14 PROCEDURE — 75561 CARDIAC MRI FOR MORPH W/DYE: CPT

## 2023-11-14 PROCEDURE — A9585 GADOBUTROL INJECTION: HCPCS | Performed by: INTERNAL MEDICINE

## 2023-11-14 RX ORDER — GADOBUTROL 604.72 MG/ML
14 INJECTION INTRAVENOUS
Status: COMPLETED | OUTPATIENT
Start: 2023-11-14 | End: 2023-11-14

## 2023-11-14 RX ADMIN — GADOBUTROL 14 ML: 604.72 INJECTION INTRAVENOUS at 21:10

## 2023-11-15 ENCOUNTER — OFFICE VISIT (OUTPATIENT)
Dept: PHYSICAL THERAPY | Facility: REHABILITATION | Age: 63
End: 2023-11-15
Payer: MEDICARE

## 2023-11-15 DIAGNOSIS — M54.50 ACUTE LEFT-SIDED LOW BACK PAIN, UNSPECIFIED WHETHER SCIATICA PRESENT: Primary | ICD-10-CM

## 2023-11-15 DIAGNOSIS — M25.552 HIP PAIN, ACUTE, LEFT: ICD-10-CM

## 2023-11-15 PROCEDURE — 97112 NEUROMUSCULAR REEDUCATION: CPT | Performed by: PHYSICAL THERAPIST

## 2023-11-15 PROCEDURE — 97110 THERAPEUTIC EXERCISES: CPT | Performed by: PHYSICAL THERAPIST

## 2023-11-15 NOTE — PROGRESS NOTES
Daily Note     Today's date: 11/15/2023  Patient name: Cristino Pineda  : 1960  MRN: [id number]  Referring provider: Randee Brock, PT  Dx:   Encounter Diagnosis     ICD-10-CM    1. Acute left-sided low back pain, unspecified whether sciatica present  M54.50       2. Hip pain, acute, left  M25.552                      Subjective: No change in status upon arrival.       Objective: See treatment diary below      Assessment: Tolerated treatment well. Patient continues to be appropriately challenged at current therapeutic volume and intensity. Intermittent symptoms throughout session but improving tolerance to movement. Patient would benefit from continued PT      Plan: Continue per plan of care.       POC expires Auth Expiration date PT/OT + Visit Limit?   2023 24        Visit/Unit Tracking  AUTH Status:  Date 11/8 11/13 11/15      Approved Used 3 4 5       Remaining  21 20 19         Pertinent Findings:      POC End Date: 2023                                                                                          Test / Measure  9/15/2022   FOTO (Predicted 54) 40   Lumbar AROM Most restricted into flexion with left-sided radicular symptoms   Supine to Sit Transition Max assist with lumbar pain       Precautions: History of breast cancer, pancreatic tumor removal, HBP       Manuals 10/25 10/31 11/13 11/15                                                             Neuro Re-Ed             Patient Education 25'            [address]  10x 10x 10x         Seated Pallof  15x b/l otb 15x B gtb 15x B gtb         Supine marches  2x10 alt b/l 2x10 alt B 2x10 alt B         Row    2x10 8#         LPD    2x10 5#         Ther Ex             Lower Trunk Rotations  10x b/l 2x10 2x10         Seated Lumbar Flexion - Towards Right  10x10" pball to R 10x10"  10x10"         NuStep  8' L4 8' L5 8' L5         TA [employer] - Standing (Can try seated if not) Ther Activity                                       Gait Training                                       Modalities

## 2023-11-20 ENCOUNTER — OFFICE VISIT (OUTPATIENT)
Dept: PHYSICAL THERAPY | Facility: REHABILITATION | Age: 63
End: 2023-11-20
Payer: MEDICARE

## 2023-11-20 DIAGNOSIS — M54.50 ACUTE LEFT-SIDED LOW BACK PAIN, UNSPECIFIED WHETHER SCIATICA PRESENT: Primary | ICD-10-CM

## 2023-11-20 DIAGNOSIS — M25.552 HIP PAIN, ACUTE, LEFT: ICD-10-CM

## 2023-11-20 PROCEDURE — 97112 NEUROMUSCULAR REEDUCATION: CPT

## 2023-11-20 PROCEDURE — 97110 THERAPEUTIC EXERCISES: CPT

## 2023-11-20 NOTE — PROGRESS NOTES
Daily Note     Today's date: 2023  Patient name: Tamiko Christensen  : 1960  MRN: [id number]  Referring provider: Tamika Cabrera, PT  Dx:   Encounter Diagnosis     ICD-10-CM    1. Acute left-sided low back pain, unspecified whether sciatica present  M54.50       2. Hip pain, acute, left  M25.552                      Subjective: Pt notes minor improvement upon arrival, has some pain b/l in low back. Pt noted minor resolution of pain by end of session. Objective: See treatment diary below      Assessment: Tolerated treatment well. Patient would benefit from continued PT.  Pt. able to complete all exercises with no increase in pain during or after session. Pt requires some cuing throughout session for form, proper rests. Pt able to progress exercises this session without complaint. Pt 1:1 with PTA [id number], IEP for remainder. Plan: Continue per plan of care.       POC expires Auth Expiration date PT/OT + Visit Limit?   2023 24        Visit/Unit Tracking  AUTH Status:  Date 11/8 11/13 11/15 11/20     Approved Used 3 4 5 6      Remaining  21 20 19 18        Pertinent Findings:      POC End Date: 2023                                                                                          Test / Measure  9/15/2022   FOTO (Predicted 54) 40   Lumbar AROM Most restricted into flexion with left-sided radicular symptoms   Supine to Sit Transition Max assist with lumbar pain       Precautions: History of breast cancer, pancreatic tumor removal, HBP       Manuals 10/25 10/31 11/13 11/15 11/20                                                            Neuro Re-Ed             Patient Education '            [address]  10x 10x 10x 2x10        Seated Pallof  15x b/l otb 15x B gtb 15x B gtb 2x10 b/l 6#        Supine marches  2x10 alt b/l 2x10 alt B 2x10 alt B 2x10 alt b/l        Row    2x10 8# 2x10 8#        LPD    2x10 5# 2x10 5#        Ther Ex Lower Trunk Rotations  10x b/l 2x10 2x10 2x10 b/l        Seated Lumbar Flexion - Towards Right  10x10" pball to R 10x10"  10x10" 10x10" w/pball        NuStep  8' L4 8' L5 8' L5 8' L6        TA [employer] - Standing (Can try seated if not)                                                                Ther Activity                                       Gait Training                                       Modalities

## 2023-11-22 ENCOUNTER — OFFICE VISIT (OUTPATIENT)
Dept: PHYSICAL THERAPY | Facility: REHABILITATION | Age: 63
End: 2023-11-22
Payer: MEDICARE

## 2023-11-22 DIAGNOSIS — M54.50 ACUTE LEFT-SIDED LOW BACK PAIN, UNSPECIFIED WHETHER SCIATICA PRESENT: Primary | ICD-10-CM

## 2023-11-22 PROCEDURE — 97110 THERAPEUTIC EXERCISES: CPT | Performed by: PHYSICAL THERAPIST

## 2023-11-22 PROCEDURE — 97112 NEUROMUSCULAR REEDUCATION: CPT | Performed by: PHYSICAL THERAPIST

## 2023-11-22 NOTE — PROGRESS NOTES
Daily Note     Today's date: 2023  Patient name: Luis Hernández  : 1960  MRN: [id number]  Referring provider: Dakotah Guerra, PT  Dx:   Encounter Diagnosis     ICD-10-CM    1. Acute left-sided low back pain, unspecified whether sciatica present  M54.50                      Subjective: Continuing to notice I am feeling better. Objective: See treatment diary below      Assessment: Tolerated treatment well. Patient would benefit from continued PT. Patient 1:1 with PT for entirety of treatment. Plan: Continue per plan of care.       POC expires Auth Expiration date PT/OT + Visit Limit?   2023 24        Visit/Unit Tracking  AUTH Status:  Date 11/8 11/13 11/15 11/20 11/22    Approved Used 3 4 5 6 7     Remaining  21  19 18 17       Pertinent Findings:      POC End Date: 2023                                                                                          Test / Measure  9/15/2022   FOTO (Predicted 54) 40   Lumbar AROM Most restricted into flexion with left-sided radicular symptoms   Supine to Sit Transition Max assist with lumbar pain       Precautions: History of breast cancer, pancreatic tumor removal, HBP       Manuals 10/25 10/31 11/13 11/15 11/20 11/22                                                           Neuro Re-Ed             Patient Education 25'            [address]  10x 10x 10x 2x10 2x10       Seated Pallof  15x b/l otb 15x B gtb 15x B gtb 2x10 b/l 6# 2x10 B 6#       Supine marches  2x10 alt b/l 2x10 alt B 2x10 alt B 2x10 alt b/l 2x10 alt B       Row    2x10 8# 2x10 8# 2x10 8#       LPD    2x10 5# 2x10 5# 2x10 5#       Ther Ex             Lower Trunk Rotations  10x b/l 2x10 2x10 2x10 b/l 2x10 B       Seated Lumbar Flexion - Towards Right  10x10" pball to R 10x10"  10x10" 10x10" w/pball 10x10" with pball       NuStep  8' L4 8' L5 8' L5 8' L6 8' L6       TA [employer] - Standing (Can try seated if not) Ther Activity                                       Gait Training                                       Modalities

## 2023-11-24 ENCOUNTER — OFFICE VISIT (OUTPATIENT)
Dept: PAIN MEDICINE | Facility: CLINIC | Age: 63
End: 2023-11-24
Payer: COMMERCIAL

## 2023-11-24 VITALS
DIASTOLIC BLOOD PRESSURE: 88 MMHG | BODY MASS INDEX: 27.11 KG/M2 | SYSTOLIC BLOOD PRESSURE: 162 MMHG | HEIGHT: 63 IN | WEIGHT: 153 LBS

## 2023-11-24 DIAGNOSIS — M79.18 MYOFASCIAL PAIN SYNDROME: Primary | ICD-10-CM

## 2023-11-24 PROCEDURE — 99213 OFFICE O/P EST LOW 20 MIN: CPT | Performed by: ANESTHESIOLOGY

## 2023-11-24 NOTE — PROGRESS NOTES
Assessment:  1. Myofascial pain syndrome        Patient presenting for follow up. She was previously evaluated for acute left low back pain with radicular leg symptoms for 3+ weeks. Pain is consistent with myofascial pain. Symptoms are accompanied by pain >7/10 on the pain scale with inability to participate in IADLs. Has been taking Robaxin, gabapentin, oral steroids with modest benefit. Denies any bowel or bladder incontinence, saddle anesthesia. Patient had her PT evaluation 10/25 and was unable to tolerate meaningful session. She was then referred to [facility]. She previously had TPIs on 10/27/23 in the lumbar paraspinal, erector spinae, gluteus maximum muscleswith significant results and has been continuing with physical therapy with improvement. .      Today she reports significant improvement in her symptoms. We will follow up on an as needed basis if symptoms worsen in the future. Recommend to continue with gabapentin, Robaxin, and/or Tylenol for significant pain symptoms. Reviewed external notes from the relevant aspects of the patient's medical record, specifically PCP, ED notes in regards to current and prior treatments tried (as mentioned in history of present illness). Reviewed pertinent laboratory studies, specifically renal function, hemoglobin A1c, CBC, coagulation studies, prior to recommending medication therapies/interventional treatment options. [place] Prescription Drug Monitoring Program report was reviewed and was appropriate. My impressions and treatment recommendations were discussed in detail with the patient who verbalized understanding and had no further questions. Discharge instructions were provided. I personally saw and examined the patient and I agree with the above discussed plan of care. No orders of the defined types were placed in this encounter. No orders of the defined types were placed in this encounter.       History of Present Illness:  Mihai Holder is a 58 y.o. female who presents for a follow up office visit in regards to Back Pain. The patient’s current symptoms include improved low back pain symptoms. Pain is rated a 2/10 and described as an occasional sharp, cramping pain with pins and needles worse in the evening. I have personally reviewed and/or updated the patient's past medical history, past surgical history, family history, social history, current medications, allergies, and vital signs today. Review of Systems   Constitutional:  Negative for chills and fever. HENT:  Negative for ear pain and sore throat. Eyes:  Negative for pain and visual disturbance. Respiratory:  Negative for cough and shortness of breath. Cardiovascular:  Negative for chest pain and palpitations. Gastrointestinal:  Positive for constipation. Negative for abdominal pain and vomiting. Genitourinary:  Negative for dysuria and hematuria. Musculoskeletal:  Positive for back pain and gait problem. Negative for arthralgias. Skin:  Negative for color change and rash. Neurological:  Negative for seizures and syncope. All other systems reviewed and are negative.       Patient Active Problem List   Diagnosis    Stroke-like symptoms    Incidental pulmonary nodule    Hyperlipemia    Tarsal tunnel syndrome, left    Depression    Stage 3a chronic kidney disease ([address])    History of neuroendocrine cancer    Personal history of breast cancer    Anemia    Hypertension    H/O Whipple procedure    Pancreatic insufficiency    Encounter for follow-up surveillance of neuroendocrine carcinoma    Aortic aneurysm ([address])    Chronic pain of both knees    Need for influenza vaccination    BMI 27.0-27.9,adult       Past Medical History:   Diagnosis Date    Back ache     Cancer ([address])     Hyperlipidemia     Hypertension     Pancreatic mass 4/12/2021       Past Surgical History:   Procedure Laterality Date    APPENDECTOMY      BILATERAL OOPHORECTOMY BREAST SURGERY Right     partial mastectomy     COLONOSCOPY      HERNIA REPAIR      HYSTERECTOMY      LAPAROTOMY N/A 8/24/2021    Procedure: LAPAROTOMY EXPLORATORY;  Surgeon: Yohannes Christian MD;  Location: [facility];  Service: Surgical Oncology    MASTECTOMY Right     partial    WHIPPLE PROCEDURE/PANCREATICO-DUODENECTOMY N/A 8/24/2021    Procedure: WHIPPLE PROCEDURE/PANCREATICO-DUODENECTOMY;  Surgeon: Yohannes Christian MD;  Location: [facility];  Service: Surgical Oncology       Family History   Problem Relation Age of Onset    Hypertension Mother     Heart disease Mother     Diabetes Mother     Esophageal cancer Father     Uterine cancer Maternal Grandmother     Liver cancer Brother     Uterine cancer Maternal Aunt        Social History     Occupational History    Occupation: unemployed   Tobacco Use    Smoking status: Former     Packs/day: 0.65     Years: 30.00     Total pack years: 19.50     Types: Cigarettes    Smokeless tobacco: Never    Tobacco comments:     quit 15 years ago   Vaping Use    Vaping Use: Never used   Substance and Sexual Activity    Alcohol use: Not Currently    Drug use: No    Sexual activity: Not Currently       Current Outpatient Medications on File Prior to Visit   Medication Sig    amLODIPine (NORVASC) 10 mg tablet Take 1 tablet (10 mg total) by mouth daily    atorvastatin (LIPITOR) 40 mg tablet Take 1 tablet (40 mg total) by mouth every evening    B-12 Microlozenge 500 MCG SUBL PLACE 1 TABLET ON THE TONGUE AND ALLOW TO DISSOLVED DAILY    carvedilol (COREG) 6.25 mg tablet Take 1 tablet (6.25 mg total) by mouth 2 (two) times a day with meals    cholestyramine (QUESTRAN) 4 GM/DOSE powder Take 1 packet (4 g total) by mouth 3 (three) times a day with meals    clobetasol (TEMOVATE) 0.05 % ointment Apply twice a day to rash on hands for 2 weeks. Then, apply twice a day from Monday-Friday for another 2 weeks.  Avoid the face and groin    clopidogrel (Plavix) 75 mg tablet Take 1 tablet (75 mg total) by mouth daily    gabapentin (Neurontin) 100 mg capsule Take 1 capsule (100 mg total) by mouth daily at bedtime If no improvement, increase to 200 mg    hydrOXYzine HCL (ATARAX) 25 mg tablet Take 25 mg by mouth daily at bedtime as needed    lisinopril (ZESTRIL) 40 mg tablet take 1 tablet by mouth once daily    methocarbamol (ROBAXIN) 500 mg tablet Take 1 tablet (500 mg total) by mouth every 12 (twelve) hours as needed for muscle spasms    pancrelipase, Lip-Prot-Amyl, (CREON) 12,000 units capsule Take 12,000 units of lipase by mouth 3 (three) times a day with meals    pantoprazole (PROTONIX) 40 mg tablet Take 1 tablet (40 mg total) by mouth daily in the early morning    potassium chloride (K-DUR,KLOR-CON) 10 mEq tablet take 2 tablets by mouth daily    sertraline (ZOLOFT) 25 mg tablet Take 1 tablet (25 mg total) by mouth daily    traZODone (DESYREL) 50 mg tablet take 1/2 tablet by mouth at bedtime if needed for insomnia    Lidocaine 4 % PTCH Apply 1 patch topically in the morning for 14 days    thiamine 100 MG tablet Take 1 tablet (100 mg total) by mouth daily Do not start before February 15, 2023. No current facility-administered medications on file prior to visit. No Known Allergies    Physical Exam:    /88   Ht 5' 3" (1.6 m)   Wt 69.4 kg (153 lb)   LMP  (LMP Unknown)   BMI 27.10 kg/m²     Constitutional:normal, well developed, well nourished, alert, in no distress and non-toxic and no overt pain behavior. Eyes:anicteric  HEENT:grossly intact  Neck:supple, symmetric, trachea midline and no masses   Pulmonary:even and unlabored  Cardiovascular:No edema or pitting edema present  Skin:Normal without rashes or lesions and well hydrated  Psychiatric:Mood and affect appropriate  Neurologic: Motor function is grossly intact with no focal neurologic deficits   Musculoskeletal: Gait is nonantalgic.     Imaging

## 2023-11-27 ENCOUNTER — TELEPHONE (OUTPATIENT)
Dept: INTERNAL MEDICINE CLINIC | Facility: CLINIC | Age: 63
End: 2023-11-27

## 2023-11-27 ENCOUNTER — OFFICE VISIT (OUTPATIENT)
Dept: PHYSICAL THERAPY | Facility: REHABILITATION | Age: 63
End: 2023-11-27
Payer: MEDICARE

## 2023-11-27 DIAGNOSIS — M25.552 HIP PAIN, ACUTE, LEFT: ICD-10-CM

## 2023-11-27 DIAGNOSIS — M54.50 ACUTE LEFT-SIDED LOW BACK PAIN, UNSPECIFIED WHETHER SCIATICA PRESENT: Primary | ICD-10-CM

## 2023-11-27 PROCEDURE — 97112 NEUROMUSCULAR REEDUCATION: CPT | Performed by: PHYSICAL THERAPIST

## 2023-11-27 PROCEDURE — 97110 THERAPEUTIC EXERCISES: CPT | Performed by: PHYSICAL THERAPIST

## 2023-11-27 NOTE — PROGRESS NOTES
Daily Note     Today's date: 2023  Patient name: Kike Maza  : 1960  MRN: [id number]  Referring provider: Tyra Johnson, PT  Dx:   Encounter Diagnosis     ICD-10-CM    1. Acute left-sided low back pain, unspecified whether sciatica present  M54.50       2. Hip pain, acute, left  M25.552           Start Time: 1215  Stop Time: 1300  Total time in clinic (min): 45 minutes    Subjective: Continuing to feel better. Objective: See treatment diary below      Assessment: Tolerated treatment well. Patient continues to be appropriately challenged at current therapeutic volume and intensity. Patient would benefit from continued PT      Plan: Continue per plan of care.       POC expires Auth Expiration date PT/OT + Visit Limit?   2023 24        Visit/Unit Tracking  AUTH Status:  Date 11/8 11/13 11/15 11/20 11/22 11/27   Approved Used 3 4 5 6 7 8    Remaining  21 20 19 18 17 16      Pertinent Findings:      POC End Date: 2023                                                                                          Test / Measure  9/15/2022   FOTO (Predicted 54) 40   Lumbar AROM Most restricted into flexion with left-sided radicular symptoms   Supine to Sit Transition Max assist with lumbar pain       Precautions: History of breast cancer, pancreatic tumor removal, HBP       Manuals 10/25 10/31 11/13 11/15 11/20 11/22 11/27                                                          Neuro Re-Ed             Patient Education '            [address]  10x 10x 10x 2x10 2x10 2x10      Seated Pallof  15x b/l otb 15x B gtb 15x B gtb 2x10 b/l 6# 2x10 B 6# 2x10 B 6#      Supine marches  2x10 alt b/l 2x10 alt B 2x10 alt B 2x10 alt b/l 2x10 alt B 2x10 alt B      Row    2x10 8# 2x10 8# 2x10 8# 2x10 8#      LPD    2x10 5# 2x10 5# 2x10 5# 2x10 5#      Ther Ex             Lower Trunk Rotations  10x b/l 2x10 2x10 2x10 b/l 2x10 B 2x10 B      Seated Lumbar Flexion - Towards Right  10x10" pball to R 10x10"  10x10" 10x10" w/pball 10x10" with pball 10x10" with pball      NuStep  8' L4 8' L5 8' L5 8' L6 8' L6 8' L6      TA Ball Press - Standing (Can try seated if not)                                                                Ther Activity                                       Gait Training                                       Modalities

## 2023-11-29 ENCOUNTER — APPOINTMENT (OUTPATIENT)
Dept: LAB | Facility: CLINIC | Age: 63
End: 2023-11-29
Payer: MEDICARE

## 2023-11-29 ENCOUNTER — TELEPHONE (OUTPATIENT)
Dept: INTERNAL MEDICINE CLINIC | Facility: CLINIC | Age: 63
End: 2023-11-29

## 2023-11-29 ENCOUNTER — CONSULT (OUTPATIENT)
Dept: PULMONOLOGY | Facility: CLINIC | Age: 63
End: 2023-11-29
Payer: COMMERCIAL

## 2023-11-29 VITALS
RESPIRATION RATE: 18 BRPM | HEART RATE: 70 BPM | TEMPERATURE: 98.5 F | WEIGHT: 150 LBS | BODY MASS INDEX: 26.58 KG/M2 | DIASTOLIC BLOOD PRESSURE: 78 MMHG | OXYGEN SATURATION: 99 % | SYSTOLIC BLOOD PRESSURE: 140 MMHG | HEIGHT: 63 IN

## 2023-11-29 DIAGNOSIS — R91.8 LUNG NODULES: ICD-10-CM

## 2023-11-29 DIAGNOSIS — D64.9 ANEMIA, UNSPECIFIED TYPE: ICD-10-CM

## 2023-11-29 DIAGNOSIS — I10 PRIMARY HYPERTENSION: ICD-10-CM

## 2023-11-29 DIAGNOSIS — N18.31 STAGE 3A CHRONIC KIDNEY DISEASE (HCC): ICD-10-CM

## 2023-11-29 DIAGNOSIS — R06.09 DYSPNEA ON EXERTION: ICD-10-CM

## 2023-11-29 DIAGNOSIS — Z01.818 PRE-OP EXAM: Primary | ICD-10-CM

## 2023-11-29 LAB
FERRITIN SERPL-MCNC: 87 NG/ML (ref 11–307)
IRON SATN MFR SERPL: 26 % (ref 15–50)
IRON SERPL-MCNC: 59 UG/DL (ref 50–212)
TIBC SERPL-MCNC: 227 UG/DL (ref 250–450)
UIBC SERPL-MCNC: 168 UG/DL (ref 155–355)

## 2023-11-29 PROCEDURE — 82728 ASSAY OF FERRITIN: CPT

## 2023-11-29 PROCEDURE — 99204 OFFICE O/P NEW MOD 45 MIN: CPT | Performed by: INTERNAL MEDICINE

## 2023-11-29 PROCEDURE — 36415 COLL VENOUS BLD VENIPUNCTURE: CPT

## 2023-11-29 PROCEDURE — 83550 IRON BINDING TEST: CPT

## 2023-11-29 PROCEDURE — 83540 ASSAY OF IRON: CPT

## 2023-11-29 NOTE — PROGRESS NOTES
Pulmonary Consultation   [place] 58 y.o. female MRN: [id number]  11/29/2023      Reason for Consultation: Dyspnea on exertion    Requested by: Pain management    Assessment/Plan:    Diagnoses and all orders for this visit:    Dyspnea on exertion  This is only reported with maximal exertion. She feels short of breath and "something stuck in her throat" after going up flights of stairs. Likely in the setting of deconditioning, CKD, and anemia. No further pulmonary workup required. The patient reports being able to walk long distances on flat ground without dyspnea. No further workup necessary. Patient encouraged to continue to follow-up with PCP for chronic conditions. Lung nodules  Patient noted to have stable very small lung nodules on CTA in February 2023. No indication for further follow-up. Patient is low risk given her non-smoking history, no exposure history,     Pre-op exam  Patient very low risk for pulmonary complications if any surgery is indicated in the future. ARISCAT at 1.6% risk of in-hospital postoperative pulmonary complications. Elex Null at 0.1% risk of prolonged mechanical ventilation or postoperative pneumonia. May proceed with any surgery or procedure from a pulmonary standpoint. Stage 3a chronic kidney disease (HCC)  Patient's baseline creatinine around 1.2 with an EGFR around 44. Patient reports compliance with hypertensive medications, but is taking a lot of ibuprofen given her chronic back pain. Had an extensive conversation with the patient about the dangers of not controlling her blood pressure, and NSAID use on her kidneys. She understands and will try to use Tylenol in the future. She also has a history of borderline fasting blood glucoses and A1c's, encouraged to decrease the amount of sugary drinks she has an increased amount of water she is drinking.     Primary hypertension  Patient's blood pressure somewhat uncontrolled with systolics in the 761Q on exam today. Patient's pain is likely contributing. Also, her chronic NSAID use. Patient was counseled on the importance of getting her blood pressure under control in the setting of her chronic kidney disease. Plans to follow-up with PCP. Anemia, unspecified type  Patient has history of chronic normocytic/macrocytic anemia. Still needs further workup with an iron panel. Patient reports that she will make sure she goes to the lab for the blood work. Also likely in the setting of digestive issues status post Whipple procedure. History of Present Illness   HPI:  Britta Cole is a 58 y.o. female with a past medical history of hypertension, hyperlipidemia, chronic back pain, aortic stenosis, CKD stage IIIa, hypertrophic cardiomyopathy, chronic anemia, and pancreatic insufficiency/neuroendocrine tumor s/p Whipple in 2021 that presents for consult for BOWMAN and examination prior to possible surgery on her back. She is accompanied by her daughter who was interpreting. The patient reports only some shortness of breath when exerting herself maximally. She states that she feels like she can't breathe and that something "feels stuck in her throat."    She reports using a lot of ibuprofen for her back pain. She reports drinking a lot of sugary drinks. She reports compliance with her medications. Never smoker. No occupational exposures. No family history of lung disease. No exotic pets at home. Previously from [facility]. Has been in the area for 8 years. PCP note reviewed. Imaging personally reviewed. Review of Systems   Constitutional:  Negative for chills and fatigue. HENT:  Negative for congestion, sinus pressure, sinus pain and sneezing. Respiratory:  Positive for shortness of breath. Negative for cough. Gastrointestinal:  Negative for abdominal distention and abdominal pain. Genitourinary:  Negative for dysuria. Musculoskeletal:  Positive for back pain.    Neurological:  Negative for dizziness and headaches. Psychiatric/Behavioral:  Negative for agitation and confusion. Historical Information   Past Medical History:   Diagnosis Date    Back ache     Cancer ([address])     Hyperlipidemia     Hypertension     Pancreatic mass 4/12/2021     Past Surgical History:   Procedure Laterality Date    APPENDECTOMY      BILATERAL OOPHORECTOMY      BREAST SURGERY Right     partial mastectomy     COLONOSCOPY      HERNIA REPAIR      HYSTERECTOMY      LAPAROTOMY N/A 8/24/2021    Procedure: LAPAROTOMY EXPLORATORY;  Surgeon: Elizabeth Sheehan MD;  Location: [facility];  Service: Surgical Oncology    MASTECTOMY Right     partial    WHIPPLE PROCEDURE/PANCREATICO-DUODENECTOMY N/A 8/24/2021    Procedure:  WHIPPLE PROCEDURE/PANCREATICO-DUODENECTOMY;  Surgeon: Elizabeth Sheehan MD;  Location: [facility];  Service: Surgical Oncology     Family History   Problem Relation Age of Onset    Hypertension Mother     Heart disease Mother     Diabetes Mother     Esophageal cancer Father     Uterine cancer Maternal Grandmother     Liver cancer Brother     Uterine cancer Maternal Aunt        Occupational History: Retired    Social History: Never smoker  Social History     Tobacco Use   Smoking Status Never   Smokeless Tobacco Never   Tobacco Comments    quit 15 years ago // PT IS STATING SHE NEVER SMOKED 11/2023       Meds/Allergies     Current Outpatient Medications:     amLODIPine (NORVASC) 10 mg tablet, Take 1 tablet (10 mg total) by mouth daily, Disp: 90 tablet, Rfl: 3    atorvastatin (LIPITOR) 40 mg tablet, Take 1 tablet (40 mg total) by mouth every evening, Disp: 90 tablet, Rfl: 3    B-12 Microlozenge 500 MCG SUBL, PLACE 1 TABLET ON THE TONGUE AND ALLOW TO DISSOLVED DAILY, Disp: 90 tablet, Rfl: 2    carvedilol (COREG) 6.25 mg tablet, Take 1 tablet (6.25 mg total) by mouth 2 (two) times a day with meals, Disp: 60 tablet, Rfl: 3    cholestyramine (QUESTRAN) 4 GM/DOSE powder, Take 1 packet (4 g total) by mouth 3 (three) times a day with meals, Disp: 1074.62 g, Rfl: 1    clobetasol (TEMOVATE) 0.05 % ointment, Apply twice a day to rash on hands for 2 weeks. Then, apply twice a day from Monday-Friday for another 2 weeks. Avoid the face and groin, Disp: 60 g, Rfl: 0    clopidogrel (Plavix) 75 mg tablet, Take 1 tablet (75 mg total) by mouth daily, Disp: 30 tablet, Rfl: 1    gabapentin (Neurontin) 100 mg capsule, Take 1 capsule (100 mg total) by mouth daily at bedtime If no improvement, increase to 200 mg, Disp: 30 capsule, Rfl: 1    hydrOXYzine HCL (ATARAX) 25 mg tablet, Take 25 mg by mouth daily at bedtime as needed, Disp: , Rfl: 0    Lidocaine 4 % PTCH, Apply 1 patch topically in the morning for 14 days, Disp: 14 patch, Rfl: 0    lisinopril (ZESTRIL) 40 mg tablet, take 1 tablet by mouth once daily, Disp: 90 tablet, Rfl: 1    methocarbamol (ROBAXIN) 500 mg tablet, Take 1 tablet (500 mg total) by mouth every 12 (twelve) hours as needed for muscle spasms, Disp: 20 tablet, Rfl: 0    pancrelipase, Lip-Prot-Amyl, (CREON) 12,000 units capsule, Take 12,000 units of lipase by mouth 3 (three) times a day with meals, Disp: 500 capsule, Rfl: 3    pantoprazole (PROTONIX) 40 mg tablet, Take 1 tablet (40 mg total) by mouth daily in the early morning, Disp: 90 tablet, Rfl: 3    potassium chloride (K-DUR,KLOR-CON) 10 mEq tablet, take 2 tablets by mouth daily, Disp: 90 tablet, Rfl: 1    sertraline (ZOLOFT) 25 mg tablet, Take 1 tablet (25 mg total) by mouth daily, Disp: 90 tablet, Rfl: 1    thiamine 100 MG tablet, Take 1 tablet (100 mg total) by mouth daily Do not start before February 15, 2023., Disp: 30 tablet, Rfl: 0    traZODone (DESYREL) 50 mg tablet, take 1/2 tablet by mouth at bedtime if needed for insomnia, Disp: , Rfl:   No Known Allergies    Vitals: Blood pressure 140/78, pulse 70, temperature 98.5 °F (36.9 °C), temperature source Tympanic, resp.  rate 18, height 5' 3" (1.6 m), weight 68 kg (150 lb), SpO2 99 %, not currently breastfeeding., Body mass index is 26.57 kg/m². Oxygen Therapy  SpO2: 99 %  Oxygen Therapy: None (Room air)    Physical Exam  Physical Exam  Vitals reviewed. Constitutional:       General: She is not in acute distress. HENT:      Head: Normocephalic and atraumatic. Right Ear: External ear normal.      Left Ear: External ear normal.      Nose: Nose normal.      Mouth/Throat:      Mouth: Mucous membranes are moist.      Pharynx: Oropharynx is clear. Eyes:      Extraocular Movements: Extraocular movements intact. Pupils: Pupils are equal, round, and reactive to light. Cardiovascular:      Rate and Rhythm: Normal rate and regular rhythm. Pulses: Normal pulses. Heart sounds: Normal heart sounds. Pulmonary:      Effort: Pulmonary effort is normal.      Breath sounds: Normal breath sounds. Abdominal:      General: Abdomen is flat. Bowel sounds are normal. There is no distension. Tenderness: There is no abdominal tenderness. Musculoskeletal:      Right lower leg: No edema. Left lower leg: No edema. Skin:     General: Skin is warm and dry. Capillary Refill: Capillary refill takes less than 2 seconds. Neurological:      General: No focal deficit present. Mental Status: She is alert and oriented to person, place, and time. Psychiatric:         Mood and Affect: Mood normal.         Behavior: Behavior normal.         Labs: I have personally reviewed pertinent lab results.   Lab Results   Component Value Date    WBC 11.20 (H) 10/25/2023    HGB 10.1 (L) 10/25/2023    HCT 30.8 (L) 10/25/2023    MCV 99 (H) 10/25/2023     10/25/2023     Lab Results   Component Value Date    GLUCOSE 156 (H) 08/24/2021    CALCIUM 8.4 10/25/2023    K 3.4 (L) 10/25/2023    CO2 24 10/25/2023     10/25/2023    BUN 24 10/25/2023    CREATININE 1.27 10/25/2023     No results found for: "IGE"  Lab Results   Component Value Date    ALT 25 02/14/2023    AST 27 02/14/2023    ALKPHOS 137 (H) 02/14/2023     Preventive Influenza vaccine: UTD  COVID-19 vaccination: Needed  Pneumonia vaccine: UTD  Lung Cancer screening: N/A      Imaging and other studies: I have personally reviewed pertinent reports. MRI cardiac  w wo contrast    Result Date: 11/16/2023  Impression: Impression: 1. Low normal left ventricular size with normal systolic function. Moderately thickened interventricular septum measuring up to 15 mm with the remainder of the myocardium normal to top normal in thickness. Findings suggest hypertrophic cardiomyopathy, although there are no other features, such as VIOLA or LVOT obstruction. Given the top normal thickness of other left ventricular walls, findings could also represent asymmetric concentric hypertrophy, especially with findings on this study suggesting aortic stenosis and if there is a history of longstanding hypertension. Correlate with patient history and consider genetic testing. 2. Normal right ventricular size and systolic function. 3. Normal bilateral atria. Findings suggesting aortic stenosis. 4. Foci of mid wall fibrosis in the basal inferolateral wall, which could be related to the hypertrophic process or sequela of prior myocarditis. Workstation performed: [id number]         Pulmonary function testing: No results found for: "FEV1", "FVC", "YVX7MTM", "TLC", "DLCO"        EKG, Pathology, and Other Studies: I have personally reviewed pertinent reports. Disclaimer: Portions of the record may have been created with voice recognition software. Occasional wrong word or "sound a like" substitutions may have occurred due to the inherent limitations of voice recognition software. Careful consideration should be taken to recognize, using context, where substitutions have occurred.     Linda Gibson DO, MS   Pulmonary & Critical Care Medicine Fellow PGY-IV  [facility]

## 2023-11-29 NOTE — ASSESSMENT & PLAN NOTE
No significant parenchymal pulmonary abnormality on CTA chest 2/2023. Mild dyspnea on exertion likely in the setting cardiovascular comorbidities, anemia, deconditioning. No further work up needed at this time from pulmonary standpoint. No wheezing, chronic cough, fevers. Nonsmoker.

## 2023-11-29 NOTE — ASSESSMENT & PLAN NOTE
Multiple tiny nodules stable from 7/2022 to 2/2023 CT. Likely benign and she is low risk, non smoker.   No follow up imaging necessary

## 2023-11-29 NOTE — ASSESSMENT & PLAN NOTE
Low risk for pulmonary periop complications. ARISCAT 1.6% risk of inhospital postoperative pulmonary complications. Non smoker. No significant lung disease seen on CT Chest on 3/2023. No further testing needed from pulmonary standpoint for proceed for back surgery. She should be monitored in the perioperative setting by anesthesia providers. She should be extubated by anesthesia providers in a monitored setting. To reduce pulmonary complications in a postoperative setting, I recommend judicious use of pain medications to prevent atelectasis or oversedation. I recommend venothromboembolism prophylaxis as indicated by the surgery team.  I recommend being out of bed and ambulatory if possible after surgery. I recommend the use of incentive spirometry.

## 2023-11-30 ENCOUNTER — APPOINTMENT (OUTPATIENT)
Dept: PHYSICAL THERAPY | Facility: REHABILITATION | Age: 63
End: 2023-11-30
Payer: MEDICARE

## 2023-11-30 ENCOUNTER — OFFICE VISIT (OUTPATIENT)
Dept: CARDIAC SURGERY | Facility: CLINIC | Age: 63
End: 2023-11-30
Payer: COMMERCIAL

## 2023-11-30 VITALS
HEIGHT: 63 IN | SYSTOLIC BLOOD PRESSURE: 142 MMHG | DIASTOLIC BLOOD PRESSURE: 64 MMHG | WEIGHT: 150 LBS | OXYGEN SATURATION: 100 % | BODY MASS INDEX: 26.58 KG/M2 | HEART RATE: 69 BPM

## 2023-11-30 DIAGNOSIS — I71.9 ULCER OF AORTA (HCC): Primary | ICD-10-CM

## 2023-11-30 PROCEDURE — 99245 OFF/OP CONSLTJ NEW/EST HI 55: CPT | Performed by: THORACIC SURGERY (CARDIOTHORACIC VASCULAR SURGERY)

## 2023-11-30 NOTE — PROGRESS NOTES
Daily Note     Today's date: 2023  Patient name: Johanne Boxer  : 1960  MRN: [id number]  Referring provider: Dennise Hickey PT  Dx: No diagnosis found. Subjective:       Objective: See treatment diary below      Assessment: Tolerated treatment well. Patient would benefit from continued PT      Plan: Continue per plan of care.       POC expires Auth Expiration date PT/OT + Visit Limit?   2023 24        Visit/Unit Tracking  AUTH Status:  Date    Approved Used 8 9    Remaining  16 15      Pertinent Findings:      POC End Date: 2023                                                                                          Test / Measure  9/15/2022   FOTO (Predicted 54) 40   Lumbar AROM Most restricted into flexion with left-sided radicular symptoms   Supine to Sit Transition Max assist with lumbar pain       Precautions: History of breast cancer, pancreatic tumor removal, HBP       Manuals                                          Neuro Re-Ed         Patient Education         Glute Set         Bridge 2x10 2x10 2x10 2x10 5#     Seated Pallof 2x10 b/l 6# 2x10 B 6# 2x10 B 6# 2x10 B 7#     Supine marches 2x10 alt b/l 2x10 alt B 2x10 alt B 2x10 alt B     Row 2x10 8# 2x10 8# 2x10 8# 2x10 8.5#     LPD 2x10 5# 2x10 5# 2x10 5# 2x10 5.5#     Ther Ex         Lower Trunk Rotations 2x10 b/l 2x10 B 2x10 B 2x10 B     Seated Lumbar Flexion - Towards Right 10x10" w/pball 10x10" with pball 10x10" with pball 10x10"     NuStep 8' L6 8' L6 8' L6 8' L6     TA [employer] - Standing                                             Ther Activity                           Gait Training                           Modalities

## 2023-11-30 NOTE — LETTER
November 30, 2023     Neelam Bennett MD  [address]  [address] [facility]    Patient: Humberto Dimas   YOB: 1960   Date of Visit: 11/30/2023       Dear Dr. Lorenzo Nelson: Thank you for referring Humberto Dimas to me for evaluation. Below are my notes for this consultation. If you have questions, please do not hesitate to call me. I look forward to following your patient along with you. Sincerely,        Eric Chen MD        CC: MD Verena Ann CRNP  11/30/2023 12:02 PM  Attested  [facility]  Humberto Dimas 58 y.o. female MRN: [id number]    Physician Requesting Consult: Dr. 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 This finding was first noted on CTA H&N 2/11/23 when patient presented to the hospital with stroke-like symptoms after a syncopal event. She was hypertensive upon arrival with BP at 212/102. CTA chest performed the following day, 2/12/23, confirmed a 1 cm saccular aneurysm vs LUCIANO of the inferior aortic arch. Patient underwent repeat CTA H&N 10/25/23 which demonstrates stable appearance of this saccular aneurysm. Patient's PMHx is notable for HTN, HLD, hypertrophic cardiomyopathy (cMRI), TIA, CKD3a, lung nodules, h/o pancreatic NE tumor s/p Whipple (8/2021), h/o breast cancer s/p R partial mastectomy/chemo/XRT (>15 yr ago - [place]) , LUCIANO infrarenal aorta and medical non-compliance. Patient is accompanied by her daughter Bryon Long with whom she resides. Patient is Occitan speaking and her daughter assists with translation. Patient denies chest pain or SOB. She reports dizziness and states she is currently dizzy now.  Sh also reports palpitations in the form or rapid heart beat that wakes her at night and also with exertion. Patient checks her BP at home and generally gets readings 140-150/80. She did not take her medications this AM and according to her daughter she is not compliant with her medications on a daily basis. Patient/daughter reports an episode of hemoptysis this AM, dark blood mixed with her saliva and states she had one other similar episode in the past, but not reported to any other  provider. She admits to having cold-like symptoms recently. No current tobacco, alcohol or drug use. Past Medical History:  Past Medical History:   Diagnosis Date   • Back ache    • Cancer ([address])    • Hyperlipidemia    • Hypertension    • Pancreatic mass 4/12/2021         Past Surgical History:   Past Surgical History:   Procedure Laterality Date   • APPENDECTOMY     • BILATERAL OOPHORECTOMY     • BREAST SURGERY Right     partial mastectomy    • COLONOSCOPY  06/17/2021   • HERNIA REPAIR     • HYSTERECTOMY     • LAPAROTOMY N/A 08/24/2021    Procedure: LAPAROTOMY EXPLORATORY;  Surgeon: Mary Zhao MD;  Location: [facility];  Service: Surgical Oncology   • MASTECTOMY Right     partial   • WHIPPLE PROCEDURE/PANCREATICO-DUODENECTOMY N/A 08/24/2021    Procedure:  WHIPPLE PROCEDURE/PANCREATICO-DUODENECTOMY;  Surgeon: Mary Zhao MD;  Location: [facility];  Service: Surgical Oncology         Family History:  Family History   Problem Relation Age of Onset   • Hypertension Mother    • Heart disease Mother    • Diabetes Mother    • Esophageal cancer Father    • Uterine cancer Maternal Grandmother    • Liver cancer Brother    • Uterine cancer Maternal Aunt          Social History:    Social History     Substance and Sexual Activity   Alcohol Use Not Currently     Social History     Substance and Sexual Activity   Drug Use No     Social History     Tobacco Use   Smoking Status Never   Smokeless Tobacco Never   Tobacco Comments    quit 15 years ago // PT IS STATING SHE NEVER SMOKED 11/2023         Home Medications:   Prior to Admission medications    Medication Sig Start Date End Date Taking? Authorizing Provider   amLODIPine (NORVASC) 10 mg tablet Take 1 tablet (10 mg total) by mouth daily 2/20/23  Yes Cherelle Kim MD   atorvastatin (LIPITOR) 40 mg tablet Take 1 tablet (40 mg total) by mouth every evening 2/20/23  Yes Cherelle Kim MD   B-12 Microlozenge 500 MCG SUBL PLACE 1 TABLET ON THE TONGUE AND ALLOW TO DISSOLVED DAILY 8/10/23  Yes Ismael Aranda PA-C   carvedilol (COREG) 6.25 mg tablet Take 1 tablet (6.25 mg total) by mouth 2 (two) times a day with meals 10/23/23  Yes Geovany Newman MD   cholestyramine Mary Grace Mathias) 4 GM/DOSE powder Take 1 packet (4 g total) by mouth 3 (three) times a day with meals 2/20/23  Yes Cherelle Kim MD   clobetasol (TEMOVATE) 0.05 % ointment Apply twice a day to rash on hands for 2 weeks. Then, apply twice a day from Monday-Friday for another 2 weeks.  Avoid the face and groin 6/16/21  Yes Rashmi Diego MD   clopidogrel (Plavix) 75 mg tablet Take 1 tablet (75 mg total) by mouth daily 10/18/23  Yes Davide Green MD   gabapentin (Neurontin) 100 mg capsule Take 1 capsule (100 mg total) by mouth daily at bedtime If no improvement, increase to 200 mg 10/18/23  Yes Davide Green MD   hydrOXYzine HCL (ATARAX) 25 mg tablet Take 25 mg by mouth daily at bedtime as needed 11/23/19  Yes Historical Provider, MD   Lidocaine 4 % PTCH Apply 1 patch topically in the morning for 14 days 11/1/23 11/30/23 Yes Enrique Demarco MD   lisinopril (ZESTRIL) 40 mg tablet take 1 tablet by mouth once daily 8/10/23  Yes Ismael Aranda PA-C   methocarbamol (ROBAXIN) 500 mg tablet Take 1 tablet (500 mg total) by mouth every 12 (twelve) hours as needed for muscle spasms 10/6/23  Yes Jasmina Fall,    pancrelipase, Lip-Prot-Amyl, (CREON) 12,000 units capsule Take 12,000 units of lipase by mouth 3 (three) times a day with meals 2/20/23  Yes Ben Dean MD Kahlil   pantoprazole (PROTONIX) 40 mg tablet Take 1 tablet (40 mg total) by mouth daily in the early morning 2/20/23  Yes Niko Lemus MD   potassium chloride (K-DUR,KLOR-CON) 10 mEq tablet take 2 tablets by mouth daily 8/7/23  Yes Tara Aguillon PA-C   sertraline (ZOLOFT) 25 mg tablet Take 1 tablet (25 mg total) by mouth daily 2/20/23  Yes Niko Lemus MD   thiamine 100 MG tablet Take 1 tablet (100 mg total) by mouth daily Do not start before February 15, 2023. 2/15/23 11/30/23 Yes Arrie Sicard, MD   traZODone (DESYREL) 50 mg tablet take 1/2 tablet by mouth at bedtime if needed for insomnia 4/9/21  Yes Historical Provider, MD       Allergies:  No Known Allergies    Review of Systems:   Review of Systems - History obtained from chart review and the patient  General ROS: negative  Psychological ROS: negative  Ophthalmic ROS: negative  ENT ROS: negative  Allergy and Immunology ROS: negative  Hematological and Lymphatic ROS: negative  Endocrine ROS: negative  Breast ROS: negative  Respiratory ROS: positive for - hemoptysis  negative for - cough, orthopnea, pleuritic pain, shortness of breath, tachypnea, or wheezing  Cardiovascular ROS: positive for - dyspnea on exertion, palpitations, and rapid heart rate  negative for - chest pain, edema, irregular heartbeat, loss of consciousness, orthopnea, or paroxysmal nocturnal dyspnea  Gastrointestinal ROS: no abdominal pain, change in bowel habits, or black or bloody stools  Genito-Urinary ROS: no dysuria, trouble voiding, or hematuria  Musculoskeletal ROS: negative  Neurological ROS: positive for - dizziness  Dermatological ROS: negative    Vital Signs:   Vitals:    11/30/23 1017 11/30/23 1018   BP: 155/68 142/64   BP Location: Left arm Right arm   Patient Position: Sitting Sitting   Cuff Size: Standard Standard   Pulse: 69    SpO2: 100%    Weight: 68 kg (150 lb)    Height: 5' 3" (1.6 m)        Physical Exam:  General: Alert, oriented, well developed, no acute distress  HEENT/NECK:  PERRLA. No jugular venous distention. Cardiac:Regular rate and rhythm, I/VI systolic murmur. Carotid arteries: 2+ pulses, no bruits  Pulmonary:  Breath sounds clear bilaterally. Abdomen:  Non-tender, Non-distended. Positive bowel sounds. Upper extremities: 2+ radial pulses; brisk capillary refill  Lower extremities: Extremities warm/dry. PT/DP pulses 2+ bilaterally. No edema B/L  Neuro: Alert and oriented X 3. Sensation is grossly intact. No focal deficits. Musculoskeletal: MAEE, stable gait  Skin: Warm/Dry, without rashes or lesions. Lab Results:     Lab Results   Component Value Date    WBC 11.20 (H) 10/25/2023    HGB 10.1 (L) 10/25/2023    HCT 30.8 (L) 10/25/2023    MCV 99 (H) 10/25/2023     10/25/2023     Lab Results   Component Value Date    SODIUM 138 10/25/2023    K 3.4 (L) 10/25/2023     10/25/2023    CO2 24 10/25/2023    BUN 24 10/25/2023    CREATININE 1.27 10/25/2023    GLUC 101 10/25/2023    CALCIUM 8.4 10/25/2023     Lab Results   Component Value Date    HGBA1C 5.3 02/12/2023     Lab Results   Component Value Date    CKTOTAL 118 01/07/2020    TROPONINI <0.02 08/29/2021       Imaging Studies:     CTA Chest: 2/12/23    AORTA:  There is a 1 cm penetrating ulcer versus saccular aneurysm of the inferior portion of the aortic arch. There is a unchanged penetrating infrarenal aortic ulcer similar to prior examination. The celiac artery, SMA, renal arteries and the YASMIN are   patent. CTA H&N: 10/25/23    Stable focal inferiorly oriented saccular aneurysm versus penetrating ulcer of the aortic arch measuring 0.7 x 0.9 cm. Echocardiogram:  2/23/23       •  Left Ventricle: Left ventricular cavity size is small. Wall thickness is moderately increased. There is moderate concentric hypertrophy. The left ventricular ejection fraction is 70% by visual estimation. Systolic function is vigorous.  Wall motion is normal. Diastolic function is mildly abnormal, consistent with grade I (abnormal) relaxation. There is mid-cavity dynamic obstruction with valsalva with a peak gradient of 58.0 mmHg. •  Atrial Septum: There is a small and patent foramen ovale confirmed at rest with bidirectional shunting using saline contrast injection. •  Aortic Valve: The aortic valve is trileaflet. The leaflets are moderately thickened. There is mildly reduced mobility. There is mild stenosis. Compared to prior study dated 07/19/2019, the AS severity is similar; bubble study in present study reveals PFO; Valsalva provocation in present study reveals dynamic mid-cavitary obstructive gradient. Findings    Left Ventricle Left ventricular cavity size is small. Wall thickness is moderately increased. There is moderate concentric hypertrophy. The left ventricular ejection fraction is 70% by visual estimation. Systolic function is vigorous. Wall motion is normal. Diastolic function is mildly abnormal, consistent with grade I (abnormal) relaxation. There is mid-cavity dynamic obstruction with valsalva with a peak gradient of 58.0 mmHg. Right Ventricle Right ventricular cavity size is normal. Systolic function is normal.   Left Atrium The atrium is normal in size. Right Atrium The atrium is normal in size. Atrial Septum There is a small and patent foramen ovale confirmed at rest with bidirectional shunting using saline contrast injection. Aortic Valve The aortic valve is trileaflet. The leaflets are moderately thickened. There is mildly reduced mobility. There is no evidence of regurgitation. There is mild stenosis. Mitral Valve There is mild regurgitation. There is no evidence of stenosis. Tricuspid Valve There is trace regurgitation. There is no evidence of stenosis. Pulmonic Valve There is trace regurgitation. There is no evidence of stenosis. Ascending Aorta The aortic root is normal in size. The ascending aorta is normal in size.    IVC/SVC The inferior vena cava is normal in size. Pericardium There is no pericardial effusion.      Left Ventricle Measurements    Function/Volumes   A4C EF 56 %         LVOT stroke volume 69.46 cm3         LVOT stroke volume index 37.9 ml/m2         Dimensions   LVIDd 3.8 cm         LVIDS 2.6 cm         IVSd 1.4 cm         LVPWd 1.3 cm         LVOT area 2.83 cm2         FS 32 %         Diastolic Filling   MV E' Tissue Velocity Septal 6 cm/s         E wave deceleration time 283 ms         MV Peak E Clark 71 cm/s         MV Peak A Clark 0.91 m/s          Report Measurements   AV LVOT peak gradient 5 mmHg         Other Measurements   Peak Gradient (Valsalva) 58 mmHg              Interventricular Septum Measurements    Shunt Ratio   LVOT peak VTI 24.51 cm         LVOT peak clark 1.08 m/s              Right Ventricle Measurements    Dimensions   RVID d 3 cm         Tricuspid annular plane systolic excursion 2.1 cm               Left Atrium Measurements    Dimensions   LA size 3.9 cm         LA length (A2C) 5.5 cm               Right Atrium Measurements    Dimensions   RAA A4C 15.1 cm2               Atrial Septum Measurements    Shunt Ratio   LVOT peak VTI 24.51 cm         LVOT peak clark 1.08 m/s               Aortic Valve Measurements    Stenosis   Aortic valve peak velocity 2.66 m/s         LVOT peak clark 1.08 m/s         Ao VTI 63.07 cm         LVOT peak VTI 24.51 cm         AV mean gradient 17 mmHg         LVOT mn grad 3 mmHg         AV peak gradient 28 mmHg         AV LVOT peak gradient 5 mmHg         Area/Dimensions   DVI 0.41         AV valve area 1.1 cm2         AV area by cont VTI 1.1 cm2         AV area peak clark 1.2 cm2         LVOT diameter 1.9 cm         LVOT area 2.83 cm2               Mitral Valve Measurements    Stenosis   MV stenosis pressure 1/2 time 82 ms         MV valve area p 1/2 method 2.68 cm2               Tricuspid Valve Measurements    RVSP Parameters   TR Peak Clark 2.3 m/s         Triscuspid Valve Regurgitation Peak Gradient 22 mmHg               Aorta Measurements    Aortic Dimensions   Ao root 2.8 cm         Asc Ao 3 cm                 NM myocardial perfusion stress test:  11/10/23    Stress ECG: The stress ECG is negative for ischemia after pharmacologic vasodilation, without reproduction of symptoms. •  Perfusion: There are no perfusion defects. •  Stress Function: Left ventricular function post-stress is normal. Stress ejection fraction is   68 %. Cardiac MRI: 11/14/23    Impression:  1. Low normal left ventricular size with normal systolic function. Moderately thickened interventricular septum measuring up to 15 mm with the remainder of the myocardium normal to top normal in thickness. Findings suggest hypertrophic cardiomyopathy,   although there are no other features, such as VIOLA or LVOT obstruction. Given the top normal thickness of other left ventricular walls, findings could also represent asymmetric concentric hypertrophy, especially with findings on this study suggesting   aortic stenosis and if there is a history of longstanding hypertension. Correlate with patient history and consider genetic testing. 2. Normal right ventricular size and systolic function. 3. Normal bilateral atria. Findings suggesting aortic stenosis. 4. Foci of mid wall fibrosis in the basal inferolateral wall, which could be related to the hypertrophic process or sequela of prior myocarditis.     I have personally reviewed pertinent films in PACS    Cardiology notes reviewed    Assessment:  Patient Active Problem List    Diagnosis Date Noted   • Lung nodules 11/29/2023   • Pre-op exam 11/29/2023   • Dyspnea on exertion 11/29/2023   • Need for influenza vaccination 03/17/2023   • BMI 27.0-27.9,adult 03/17/2023   • Chronic pain of both knees 03/15/2023   • Aortic aneurysm ([address]) 02/11/2023   • Encounter for follow-up surveillance of neuroendocrine carcinoma 01/19/2022   • Pancreatic insufficiency 12/22/2021   • Hypertension 12/21/2021 • H/O Whipple procedure 12/21/2021   • Personal history of breast cancer 11/15/2021   • Anemia 11/15/2021   • History of neuroendocrine cancer 07/08/2021   • Stage 3a chronic kidney disease ([address]) 07/06/2021   • Depression 04/12/2021   • Tarsal tunnel syndrome, left    • Hyperlipemia 07/25/2019   • Stroke-like symptoms 07/18/2019   • Incidental pulmonary nodule 07/18/2019       Impression/Plan:    1 cm Penetrating aortic ulcer vs saccular aneurysm, inferior aortic arch- stable    Surgical intervention not indicated. Continue surveillance with next f/u in aortic clinic in 1 year with chest CTA    Optimal medial management of BP & HLD. F/U with PCP and/or pulmonary medicine if hemoptysis recurs. CTA imaging reviewed and findings were confirmed and shared with the patient  and daughter today. [place] and daughter Lawernce Shallow were comfortable with our recommendations, and their questions were answered to their satisfaction. Thank you for allowing us to participate in the care of this patient. The patient recently had a screening colonoscopy in 6/17/21. Therefore GI referral is not indicated at this time. SIGNATURE: HUONG Marie  DATE: November 30, 2023  TIME: 12:00 PM  Attestation signed by Timbo Sykes MD at 11/30/2023  1:14 PM:  Patient seen and evaluated with [place] / BRAYAN. I agree with the above assessment and plan with the following additions. The patient is a 78-year-old female referred for evaluation of an asymptomatic aortic arch aneurysm. This is an incidental finding on a CTA of the neck and chest back in February 2023. I have personally review all the diagnostic images, there are several CTs in the chart, CT on April 2021, January 2022 and July 2022 shows no evidence of aneurysm or LUCIANO, there is some arch calcification and atheroma.   CT on February 2023 shows a saccular aneurysm/LUCIANO in the lesser curvature of the aortic arch, maximum diameter at that level is 27 mm.  CTA of the head and neck on October 2023 shows no changes. I explained the diagnosis to the patient and her daughter, there is no indication for surgery at this time. I explained to them the importance of maintaining adequate blood pressure control, avoid tobacco products, avoid sustained straining. She was instructed to go to the emergency department if she were to experience severe chest pain or severe upper back pain. I would like to see her back in 1 year with a CT of the chest.    This note was completed in part utilizing [employer] voice recognition software. Grammatical errors, random word insertion, spelling mistakes, and incomplete sentences may be an occasional consequence of the system secondary to software limitations, ambient noise and hardware issues. At the time of dictation, efforts were made to edit, clarify and /or correct errors. Please read the chart carefully and recognize, using context, where substitutions have occurred. If you have any questions or concerns about the context, text or information contained within the body of this dictation, please contact myself, the provider, for further clarification.

## 2023-11-30 NOTE — PROGRESS NOTES
[facility]  Kenton Gomes 58 y.o. female MRN: [id number]    Physician Requesting Consult: Dr. 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 This finding was first noted on CTA H&N 2/11/23 when patient presented to the hospital with stroke-like symptoms after a syncopal event. She was hypertensive upon arrival with BP at 212/102. CTA chest performed the following day, 2/12/23, confirmed a 1 cm saccular aneurysm vs LUCIANO of the inferior aortic arch. Patient underwent repeat CTA H&N 10/25/23 which demonstrates stable appearance of this saccular aneurysm. Patient's PMHx is notable for HTN, HLD, hypertrophic cardiomyopathy (cMRI), TIA, CKD3a, lung nodules, h/o pancreatic NE tumor s/p Whipple (8/2021), h/o breast cancer s/p R partial mastectomy/chemo/XRT (>15 yr ago - [place]) , LUCIANO infrarenal aorta and medical non-compliance. Patient is accompanied by her daughter Yg Mckee with whom she resides. Patient is Burmese speaking and her daughter assists with translation. Patient denies chest pain or SOB. She reports dizziness and states she is currently dizzy now. Sh also reports palpitations in the form or rapid heart beat that wakes her at night and also with exertion. Patient checks her BP at home and generally gets readings 140-150/80. She did not take her medications this AM and according to her daughter she is not compliant with her medications on a daily basis. Patient/daughter reports an episode of hemoptysis this AM, dark blood mixed with her saliva and states she had one other similar episode in the past, but not reported to any other  provider. She admits to having cold-like symptoms recently. No current tobacco, alcohol or drug use.       Past Medical History:  Past Medical History:   Diagnosis Date    Back ache     Cancer ([address])     Hyperlipidemia     Hypertension     Pancreatic mass 4/12/2021         Past Surgical History:   Past Surgical History:   Procedure Laterality Date    APPENDECTOMY      BILATERAL OOPHORECTOMY      BREAST SURGERY Right     partial mastectomy     COLONOSCOPY  06/17/2021    HERNIA REPAIR      HYSTERECTOMY      LAPAROTOMY N/A 08/24/2021    Procedure: LAPAROTOMY EXPLORATORY;  Surgeon: Kacie Hraper MD;  Location: [facility];  Service: Surgical Oncology    MASTECTOMY Right     partial    WHIPPLE PROCEDURE/PANCREATICO-DUODENECTOMY N/A 08/24/2021    Procedure: WHIPPLE PROCEDURE/PANCREATICO-DUODENECTOMY;  Surgeon: Kacie Harper MD;  Location: [facility];  Service: Surgical Oncology         Family History:  Family History   Problem Relation Age of Onset    Hypertension Mother     Heart disease Mother     Diabetes Mother     Esophageal cancer Father     Uterine cancer Maternal Grandmother     Liver cancer Brother     Uterine cancer Maternal Aunt          Social History:    Social History     Substance and Sexual Activity   Alcohol Use Not Currently     Social History     Substance and Sexual Activity   Drug Use No     Social History     Tobacco Use   Smoking Status Never   Smokeless Tobacco Never   Tobacco Comments    quit 15 years ago // PT IS STATING SHE NEVER SMOKED 11/2023         Home Medications:   Prior to Admission medications    Medication Sig Start Date End Date Taking?  Authorizing Provider   amLODIPine (NORVASC) 10 mg tablet Take 1 tablet (10 mg total) by mouth daily 2/20/23  Yes Trisha Laguna MD   atorvastatin (LIPITOR) 40 mg tablet Take 1 tablet (40 mg total) by mouth every evening 2/20/23  Yes Trisha Laguna MD   B-12 Microlozenge 500 MCG SUBL PLACE 1 TABLET ON THE TONGUE AND ALLOW TO DISSOLVED DAILY 8/10/23  Yes Amrita Thomas PA-C   carvedilol (COREG) 6.25 mg tablet Take 1 tablet (6.25 mg total) by mouth 2 (two) times a day with meals 10/23/23  Yes Cristy Kat MD   cholestyramine [place]) 4 GM/DOSE powder Take 1 packet (4 g total) by mouth 3 (three) times a day with meals 2/20/23  Yes Kathye Schilder, MD   clobetasol (TEMOVATE) 0.05 % ointment Apply twice a day to rash on hands for 2 weeks. Then, apply twice a day from Monday-Friday for another 2 weeks.  Avoid the face and groin 6/16/21  Yes Mayuri España MD   clopidogrel (Plavix) 75 mg tablet Take 1 tablet (75 mg total) by mouth daily 10/18/23  Yes Nancy Fields MD   gabapentin (Neurontin) 100 mg capsule Take 1 capsule (100 mg total) by mouth daily at bedtime If no improvement, increase to 200 mg 10/18/23  Yes Nancy Fields MD   hydrOXYzine HCL (ATARAX) 25 mg tablet Take 25 mg by mouth daily at bedtime as needed 11/23/19  Yes Linette Peralta MD   Lidocaine 4 % PTCH Apply 1 patch topically in the morning for 14 days 11/1/23 11/30/23 Yes Debbie Nowak MD   lisinopril (ZESTRIL) 40 mg tablet take 1 tablet by mouth once daily 8/10/23  Yes Kristina Cummins PA-C   methocarbamol (ROBAXIN) 500 mg tablet Take 1 tablet (500 mg total) by mouth every 12 (twelve) hours as needed for muscle spasms 10/6/23  Yes Bethanne Blizzard, DO   pancrelipase, Lip-Prot-Amyl, (CREON) 12,000 units capsule Take 12,000 units of lipase by mouth 3 (three) times a day with meals 2/20/23  Yes Kathye Schilder, MD   pantoprazole (PROTONIX) 40 mg tablet Take 1 tablet (40 mg total) by mouth daily in the early morning 2/20/23  Yes Kathye Schilder, MD   potassium chloride (K-DUR,KLOR-CON) 10 mEq tablet take 2 tablets by mouth daily 8/7/23  Yes Kristina Cummins PA-C   sertraline (ZOLOFT) 25 mg tablet Take 1 tablet (25 mg total) by mouth daily 2/20/23  Yes Kathye Schilder, MD   thiamine 100 MG tablet Take 1 tablet (100 mg total) by mouth daily Do not start before February 15, 2023. 2/15/23 11/30/23 Yes Donta Crespo MD   traZODone (DESYREL) 50 mg tablet take 1/2 tablet by mouth at bedtime if needed for insomnia 4/9/21  Yes Historical Provider, MD       Allergies:  No Known Allergies    Review of Systems:   Review of Systems - History obtained from chart review and the patient  General ROS: negative  Psychological ROS: negative  Ophthalmic ROS: negative  ENT ROS: negative  Allergy and Immunology ROS: negative  Hematological and Lymphatic ROS: negative  Endocrine ROS: negative  Breast ROS: negative  Respiratory ROS: positive for - hemoptysis  negative for - cough, orthopnea, pleuritic pain, shortness of breath, tachypnea, or wheezing  Cardiovascular ROS: positive for - dyspnea on exertion, palpitations, and rapid heart rate  negative for - chest pain, edema, irregular heartbeat, loss of consciousness, orthopnea, or paroxysmal nocturnal dyspnea  Gastrointestinal ROS: no abdominal pain, change in bowel habits, or black or bloody stools  Genito-Urinary ROS: no dysuria, trouble voiding, or hematuria  Musculoskeletal ROS: negative  Neurological ROS: positive for - dizziness  Dermatological ROS: negative    Vital Signs:   Vitals:    11/30/23 1017 11/30/23 1018   BP: 155/68 142/64   BP Location: Left arm Right arm   Patient Position: Sitting Sitting   Cuff Size: Standard Standard   Pulse: 69    SpO2: 100%    Weight: 68 kg (150 lb)    Height: 5' 3" (1.6 m)        Physical Exam:  General: Alert, oriented, well developed, no acute distress  HEENT/NECK:  PERRLA. No jugular venous distention. Cardiac:Regular rate and rhythm, I/VI systolic murmur. Carotid arteries: 2+ pulses, no bruits  Pulmonary:  Breath sounds clear bilaterally. Abdomen:  Non-tender, Non-distended. Positive bowel sounds. Upper extremities: 2+ radial pulses; brisk capillary refill  Lower extremities: Extremities warm/dry. PT/DP pulses 2+ bilaterally. No edema B/L  Neuro: Alert and oriented X 3. Sensation is grossly intact. No focal deficits.   Musculoskeletal: MAEE, stable gait  Skin: Warm/Dry, without rashes or lesions. Lab Results:     Lab Results   Component Value Date    WBC 11.20 (H) 10/25/2023    HGB 10.1 (L) 10/25/2023    HCT 30.8 (L) 10/25/2023    MCV 99 (H) 10/25/2023     10/25/2023     Lab Results   Component Value Date    SODIUM 138 10/25/2023    K 3.4 (L) 10/25/2023     10/25/2023    CO2 24 10/25/2023    BUN 24 10/25/2023    CREATININE 1.27 10/25/2023    GLUC 101 10/25/2023    CALCIUM 8.4 10/25/2023     Lab Results   Component Value Date    HGBA1C 5.3 02/12/2023     Lab Results   Component Value Date    CKTOTAL 118 01/07/2020    TROPONINI <0.02 08/29/2021       Imaging Studies:     CTA Chest: 2/12/23    AORTA:  There is a 1 cm penetrating ulcer versus saccular aneurysm of the inferior portion of the aortic arch. There is a unchanged penetrating infrarenal aortic ulcer similar to prior examination. The celiac artery, SMA, renal arteries and the YASMIN are   patent. CTA H&N: 10/25/23    Stable focal inferiorly oriented saccular aneurysm versus penetrating ulcer of the aortic arch measuring 0.7 x 0.9 cm. Echocardiogram:  2/23/23         Left Ventricle: Left ventricular cavity size is small. Wall thickness is moderately increased. There is moderate concentric hypertrophy. The left ventricular ejection fraction is 70% by visual estimation. Systolic function is vigorous. Wall motion is normal. Diastolic function is mildly abnormal, consistent with grade I (abnormal) relaxation. There is mid-cavity dynamic obstruction with valsalva with a peak gradient of 58.0 mmHg. Atrial Septum: There is a small and patent foramen ovale confirmed at rest with bidirectional shunting using saline contrast injection. Aortic Valve: The aortic valve is trileaflet. The leaflets are moderately thickened. There is mildly reduced mobility. There is mild stenosis.      Compared to prior study dated 07/19/2019, the AS severity is similar; bubble study in present study reveals PFO; Valsalva provocation in present study reveals dynamic mid-cavitary obstructive gradient. Findings    Left Ventricle Left ventricular cavity size is small. Wall thickness is moderately increased. There is moderate concentric hypertrophy. The left ventricular ejection fraction is 70% by visual estimation. Systolic function is vigorous. Wall motion is normal. Diastolic function is mildly abnormal, consistent with grade I (abnormal) relaxation. There is mid-cavity dynamic obstruction with valsalva with a peak gradient of 58.0 mmHg. Right Ventricle Right ventricular cavity size is normal. Systolic function is normal.   Left Atrium The atrium is normal in size. Right Atrium The atrium is normal in size. Atrial Septum There is a small and patent foramen ovale confirmed at rest with bidirectional shunting using saline contrast injection. Aortic Valve The aortic valve is trileaflet. The leaflets are moderately thickened. There is mildly reduced mobility. There is no evidence of regurgitation. There is mild stenosis. Mitral Valve There is mild regurgitation. There is no evidence of stenosis. Tricuspid Valve There is trace regurgitation. There is no evidence of stenosis. Pulmonic Valve There is trace regurgitation. There is no evidence of stenosis. Ascending Aorta The aortic root is normal in size. The ascending aorta is normal in size. IVC/SVC The inferior vena cava is normal in size. Pericardium There is no pericardial effusion.      Left Ventricle Measurements    Function/Volumes   A4C EF 56 %         LVOT stroke volume 69.46 cm3         LVOT stroke volume index 37.9 ml/m2         Dimensions   LVIDd 3.8 cm         LVIDS 2.6 cm         IVSd 1.4 cm         LVPWd 1.3 cm         LVOT area 2.83 cm2         FS 32 %         Diastolic Filling   MV E' Tissue Velocity Septal 6 cm/s         E wave deceleration time 283 ms         MV Peak E Clark 71 cm/s         MV Peak A Clark 0.91 m/s          Report Measurements   AV LVOT peak gradient 5 mmHg Other Measurements   Peak Gradient (Valsalva) 58 mmHg              Interventricular Septum Measurements    Shunt Ratio   LVOT peak VTI 24.51 cm         LVOT peak clark 1.08 m/s              Right Ventricle Measurements    Dimensions   RVID d 3 cm         Tricuspid annular plane systolic excursion 2.1 cm               Left Atrium Measurements    Dimensions   LA size 3.9 cm         LA length (A2C) 5.5 cm               Right Atrium Measurements    Dimensions   RAA A4C 15.1 cm2               Atrial Septum Measurements    Shunt Ratio   LVOT peak VTI 24.51 cm         LVOT peak clark 1.08 m/s               Aortic Valve Measurements    Stenosis   Aortic valve peak velocity 2.66 m/s         LVOT peak clark 1.08 m/s         Ao VTI 63.07 cm         LVOT peak VTI 24.51 cm         AV mean gradient 17 mmHg         LVOT mn grad 3 mmHg         AV peak gradient 28 mmHg         AV LVOT peak gradient 5 mmHg         Area/Dimensions   DVI 0.41         AV valve area 1.1 cm2         AV area by cont VTI 1.1 cm2         AV area peak clark 1.2 cm2         LVOT diameter 1.9 cm         LVOT area 2.83 cm2               Mitral Valve Measurements    Stenosis   MV stenosis pressure 1/2 time 82 ms         MV valve area p 1/2 method 2.68 cm2               Tricuspid Valve Measurements    RVSP Parameters   TR Peak Clark 2.3 m/s         Triscuspid Valve Regurgitation Peak Gradient 22 mmHg               Aorta Measurements    Aortic Dimensions   Ao root 2.8 cm         Asc Ao 3 cm                 NM myocardial perfusion stress test:  11/10/23    Stress ECG: The stress ECG is negative for ischemia after pharmacologic vasodilation, without reproduction of symptoms. Perfusion: There are no perfusion defects. Stress Function: Left ventricular function post-stress is normal. Stress ejection fraction is   68 %. Cardiac MRI: 11/14/23    Impression:  1. Low normal left ventricular size with normal systolic function.  Moderately thickened interventricular septum measuring up to 15 mm with the remainder of the myocardium normal to top normal in thickness. Findings suggest hypertrophic cardiomyopathy,   although there are no other features, such as VIOLA or LVOT obstruction. Given the top normal thickness of other left ventricular walls, findings could also represent asymmetric concentric hypertrophy, especially with findings on this study suggesting   aortic stenosis and if there is a history of longstanding hypertension. Correlate with patient history and consider genetic testing. 2. Normal right ventricular size and systolic function. 3. Normal bilateral atria. Findings suggesting aortic stenosis. 4. Foci of mid wall fibrosis in the basal inferolateral wall, which could be related to the hypertrophic process or sequela of prior myocarditis. I have personally reviewed pertinent films in PACS    Cardiology notes reviewed    Assessment:  Patient Active Problem List    Diagnosis Date Noted    Lung nodules 11/29/2023    Pre-op exam 11/29/2023    Dyspnea on exertion 11/29/2023    Need for influenza vaccination 03/17/2023    BMI 27.0-27.9,adult 03/17/2023    Chronic pain of both knees 03/15/2023    Aortic aneurysm ([address]) 02/11/2023    Encounter for follow-up surveillance of neuroendocrine carcinoma 01/19/2022    Pancreatic insufficiency 12/22/2021    Hypertension 12/21/2021    H/O Whipple procedure 12/21/2021    Personal history of breast cancer 11/15/2021    Anemia 11/15/2021    History of neuroendocrine cancer 07/08/2021    Stage 3a chronic kidney disease ([address]) 07/06/2021    Depression 04/12/2021    Tarsal tunnel syndrome, left     Hyperlipemia 07/25/2019    Stroke-like symptoms 07/18/2019    Incidental pulmonary nodule 07/18/2019       Impression/Plan:    1 cm Penetrating aortic ulcer vs saccular aneurysm, inferior aortic arch- stable    Surgical intervention not indicated.     Continue surveillance with next f/u in aortic clinic in 1 year with chest CTA    Optimal medial management of BP & HLD. F/U with PCP and/or pulmonary medicine if hemoptysis recurs. CTA imaging reviewed and findings were confirmed and shared with the patient  and daughter today. Alexsandra and daughter Nicol Juarez were comfortable with our recommendations, and their questions were answered to their satisfaction. Thank you for allowing us to participate in the care of this patient. The patient recently had a screening colonoscopy in 6/17/21. Therefore GI referral is not indicated at this time.      SIGNATURE: HUONG Grande  DATE: November 30, 2023  TIME: 12:00 PM

## 2024-03-05 DIAGNOSIS — I51.7 LEFT VENTRICULAR HYPERTROPHY: ICD-10-CM

## 2024-03-05 DIAGNOSIS — I16.1 HYPERTENSIVE EMERGENCY: ICD-10-CM

## 2024-03-05 DIAGNOSIS — K86.89 PANCREATIC INSUFFICIENCY: ICD-10-CM

## 2024-03-05 DIAGNOSIS — I10 ESSENTIAL HYPERTENSION: ICD-10-CM

## 2024-03-05 DIAGNOSIS — I10 HYPERTENSION: ICD-10-CM

## 2024-03-05 DIAGNOSIS — I10 HYPERTENSION, UNSPECIFIED TYPE: ICD-10-CM

## 2024-03-05 RX ORDER — CARVEDILOL 6.25 MG/1
6.25 TABLET ORAL 2 TIMES DAILY WITH MEALS
Qty: 60 TABLET | Refills: 3 | Status: SHIPPED | OUTPATIENT
Start: 2024-03-05

## 2024-03-05 RX ORDER — PANTOPRAZOLE SODIUM 40 MG/1
40 TABLET, DELAYED RELEASE ORAL
Qty: 90 TABLET | Refills: 3 | Status: SHIPPED | OUTPATIENT
Start: 2024-03-05

## 2024-03-05 RX ORDER — AMLODIPINE BESYLATE 10 MG/1
10 TABLET ORAL DAILY
Qty: 90 TABLET | Refills: 3 | Status: SHIPPED | OUTPATIENT
Start: 2024-03-05

## 2024-03-05 RX ORDER — LISINOPRIL 40 MG/1
40 TABLET ORAL DAILY
Qty: 90 TABLET | Refills: 3 | Status: SHIPPED | OUTPATIENT
Start: 2024-03-05

## 2024-03-05 NOTE — TELEPHONE ENCOUNTER
Received call from patient requesting medication refill.     Contacted patient using . Patient requesting blood pressure medications, amlodipine, carvedilol and lisinopril filled, also protonix.     Scripts sent to [facility] pharmacy off [address] due to [facility] being closed.

## 2024-03-12 DIAGNOSIS — Z85.89 ENCOUNTER FOR FOLLOW-UP SURVEILLANCE OF NEUROENDOCRINE CARCINOMA: ICD-10-CM

## 2024-03-12 DIAGNOSIS — M54.16 LUMBAR RADICULOPATHY: ICD-10-CM

## 2024-03-12 DIAGNOSIS — Z08 ENCOUNTER FOR FOLLOW-UP SURVEILLANCE OF NEUROENDOCRINE CARCINOMA: ICD-10-CM

## 2024-03-12 NOTE — PRE-PROCEDURE INSTRUCTIONS
Pre-Surgery Instructions:   Medication Instructions    amLODIPine (NORVASC) 10 mg tablet Instructed patient per Anesthesia Guidelines  continue    atorvastatin (LIPITOR) 40 mg tablet Instructed patient per Anesthesia Guidelines  continue    clobetasol (TEMOVATE) 0 05 % ointment Instructed patient per Anesthesia Guidelines  hold after surgical shower    escitalopram (LEXAPRO) 10 mg tablet Instructed patient per Anesthesia Guidelines  continue    hydrOXYzine HCL (ATARAX) 25 mg tablet Instructed patient per Anesthesia Guidelines  conitnue    lisinopril (ZESTRIL) 20 mg tablet Instructed patient per Anesthesia Guidelines  HOLD 8/23, 8/24    oxyCODONE (ROXICODONE) 5 mg immediate release tablet Instructed patient per Anesthesia Guidelines  prn    sertraline (ZOLOFT) 25 mg tablet Instructed patient per Anesthesia Guidelines  continue    traZODone (DESYREL) 50 mg tablet Instructed patient per Anesthesia Guidelines   triamcinolone (KENALOG) 0 1 % ointment Instructed patient per Anesthesia Guidelines  hold after surgical shower   Pre procedure instructions reviewed verbalizes understanding  NPO after MN  Bathing reviewed  Morning meds with water  No NSAIDS  Stop supplement and vitamins 
smoking about 2 years ago. Her smoking use included cigarettes. She quit smokeless tobacco use about 2 years ago.. Discussed with patient the risks and benefits of screening, including over-diagnosis, false positive rate, and total radiation exposure.  The patient currently exhibits no signs or symptoms suggestive of lung cancer.  Discussed with patient the importance of compliance with yearly annual lung cancer screenings and willingness to undergo diagnosis and treatment if screening scan is positive.  In addition, the patient was counseled regarding the importance of remaining smoke free and/or total smoking cessation.    Also reviewed the following if the patient has Medicare that as of February 10, 2022, Medicare only covers LDCT screening in patients aged 50-77 with at least a 20 pack-year smoking history who currently smoke or have quit in the last 15 years

## 2024-03-13 RX ORDER — GABAPENTIN 100 MG/1
100 CAPSULE ORAL
Qty: 30 CAPSULE | Refills: 3 | Status: SHIPPED | OUTPATIENT
Start: 2024-03-13

## 2024-04-29 NOTE — ASSESSMENT & PLAN NOTE
Creatinine 1 7 from 0 9  Currently at baseline  Multifactorial; prerenal in the setting of poor p o  Intake and Lasix/TONI in the setting of recent contrast load on 4/12 and 4/13      Plan:  Complete IV fluids  Hold home lisinopril, restart on discharge Spontaneous, unlabored and symmetrical

## 2024-05-03 DIAGNOSIS — Z85.89 ENCOUNTER FOR FOLLOW-UP SURVEILLANCE OF NEUROENDOCRINE CARCINOMA: ICD-10-CM

## 2024-05-03 DIAGNOSIS — Z08 ENCOUNTER FOR FOLLOW-UP SURVEILLANCE OF NEUROENDOCRINE CARCINOMA: ICD-10-CM

## 2024-05-03 NOTE — TELEPHONE ENCOUNTER
Whitney Andrea   58 year old    Patient Care Team:  Laila Reeves MD as PCP - General (Family Practice)  Corrina Polk MD as Radiation Oncology (Radiation Oncology)  Jayne Yee MD as Referring Provider (Surgery - Surgical Oncology)  Sydney Maddox MD (Hematology & Oncology)   No ref. provider found    Chief Complaint   Patient presents with   • Office Visit     6 months post op    Ms. Andrea is a 57 year old female 6 months s/p right mastectomy and SLNB for ILC grade 1, ER/KS positive, HER2 non amplified, pT3N1a. Completed adjuvant radiation in [id number]. On Anastrozole tolerating well, c/o dry eyes. Patient here for exam.     Patient denies any palpable mass, discharge, pain, or skin changes to left breast at this time. Right mastectomy site no complaints, denies swelling to right upper extremity.    GENERAL BREAST HISTORY:  Age at menarche: 11  Last menstrual period: post menopausal; 51   : 1. Para: 1  Age at first completed pregnancy: 30.  Breast-feeding history: no.  History of oral contraceptives: yes.  History of hormone replacement therapy or fertility assistance: no.  Previous Breast Biopsies or Surgeries: Right breast mastectomy for ILC.  Ethnicity:  .      BREAST IMAGING:      Received call from patient. Interpretor ID [id number]. Introduced self and role. Patient stated she needs a refill of her Creon to [facility] pharmacy off [address].     Patient requesting to make an appointment. Patient aware office will reach out for her MAW appointment.     All questions/concerns answered at this time.

## 2024-07-10 ENCOUNTER — OFFICE VISIT (OUTPATIENT)
Dept: CARDIOLOGY CLINIC | Facility: CLINIC | Age: 64
End: 2024-07-10
Payer: COMMERCIAL

## 2024-07-10 VITALS
BODY MASS INDEX: 25.59 KG/M2 | HEIGHT: 63 IN | OXYGEN SATURATION: 98 % | WEIGHT: 144.4 LBS | DIASTOLIC BLOOD PRESSURE: 100 MMHG | HEART RATE: 67 BPM | SYSTOLIC BLOOD PRESSURE: 190 MMHG

## 2024-07-10 DIAGNOSIS — E78.5 HYPERLIPIDEMIA, UNSPECIFIED HYPERLIPIDEMIA TYPE: ICD-10-CM

## 2024-07-10 DIAGNOSIS — R29.90 STROKE-LIKE SYMPTOMS: Primary | ICD-10-CM

## 2024-07-10 DIAGNOSIS — I10 HYPERTENSION, UNSPECIFIED TYPE: ICD-10-CM

## 2024-07-10 DIAGNOSIS — N18.31 STAGE 3A CHRONIC KIDNEY DISEASE (HCC): ICD-10-CM

## 2024-07-10 DIAGNOSIS — I10 HYPERTENSION: ICD-10-CM

## 2024-07-10 DIAGNOSIS — I16.1 HYPERTENSIVE EMERGENCY: ICD-10-CM

## 2024-07-10 DIAGNOSIS — I10 ESSENTIAL HYPERTENSION: ICD-10-CM

## 2024-07-10 DIAGNOSIS — I51.7 LEFT VENTRICULAR HYPERTROPHY: ICD-10-CM

## 2024-07-10 DIAGNOSIS — I71.9 ULCER OF AORTA (HCC): ICD-10-CM

## 2024-07-10 PROCEDURE — 99215 OFFICE O/P EST HI 40 MIN: CPT | Performed by: INTERNAL MEDICINE

## 2024-07-10 PROCEDURE — 93000 ELECTROCARDIOGRAM COMPLETE: CPT | Performed by: INTERNAL MEDICINE

## 2024-07-10 RX ORDER — LISINOPRIL 40 MG/1
40 TABLET ORAL DAILY
Qty: 90 TABLET | Refills: 3 | Status: SHIPPED | OUTPATIENT
Start: 2024-07-10

## 2024-07-10 RX ORDER — CARVEDILOL 6.25 MG/1
6.25 TABLET ORAL 2 TIMES DAILY WITH MEALS
Qty: 60 TABLET | Refills: 3 | Status: SHIPPED | OUTPATIENT
Start: 2024-07-10

## 2024-07-10 RX ORDER — ATORVASTATIN CALCIUM 40 MG/1
40 TABLET, FILM COATED ORAL EVERY EVENING
Qty: 90 TABLET | Refills: 3 | Status: SHIPPED | OUTPATIENT
Start: 2024-07-10

## 2024-07-10 RX ORDER — AMLODIPINE BESYLATE 10 MG/1
10 TABLET ORAL DAILY
Qty: 90 TABLET | Refills: 3 | Status: SHIPPED | OUTPATIENT
Start: 2024-07-10

## 2024-07-10 NOTE — PROGRESS NOTES
Cardiology   Jen Crawford 63 y.o. female MRN: [id number]  Unit/Bed#:  Encounter: [id number]        Assessment/Plan:    >> Chest pressure: Occurs with exertion and relieved by rest.  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  The differentials could be HCM versus hypertensive heart disease.  She does have uncontrolled hypertension.  -Will consider exercise echocardiogram in the future if symptoms are persistent after adequately controlling blood pressure.  -Nuclear stress test was unremarkable  -She saw cardiac surgery who did not recommend any immediate treatment for her saccular aortic aneurysm, they recommended close follow-up with CT  -Start carvedilol 6.25 mg twice daily for blood pressure/chest pressure symptoms.  She was previously prescribed this but has not been taking  -Continue amlodipine and lisinopril for blood pressure for now.  She has been noncompliant with these medications.  I emphasized the importance of compliance and potential consequences of uncontrolled hypertension including heart attack, stroke, aortic aneurysm rupture, worsening kidney disease etc.  -Return to office in 1 week for blood pressure check and to calibrate home blood pressure machine  -Refer to HCM clinic for further evaluation/genetic workup  -Return to office in 6 months.      7/10/2024: Patient presents for follow-up.  She is very hypertensive in the office today.  She  "admits to being inconsistent with her medication usage.  She often forgets to take her blood pressure medicine.  She has no symptoms denies chest pain, shortness of breath, palpitations, orthopnea, PND, pedal edema, syncope, presyncope, diaphoresis, nausea/vomiting       CC: Referred for abnormal echo and chest pressure    HPI  63 y.o. female presents for abnormal echocardiogram and with chest pressure.  She has been having on and off chest pressure for about a month and a half which occurs with exertion is in the precordial region and is relieved by rest.  She has not had such symptoms before.  She has vascular disease with extensive atherosclerosis in her aorta and an episode of strokelike symptoms.  Currently she is on antihypertensive medications, Plavix monotherapy for possible TIA, and high intensity statin therapy with atorvastatin.      Denies shortness of breath, palpitations, orthopnea, PND, pedal edema, syncope, presyncope, diaphoresis, nausea/vomiting     FH: No history of cardiac disease in her family    SH: Does not smoke drink or use drugs  NKDA        Physical exam  Objective   Vitals: Blood pressure (!) 190/100, pulse 67, height 5' 3\" (1.6 m), weight 65.5 kg (144 lb 6.4 oz), SpO2 98%, not currently breastfeeding.    General:  AO x3, no acute distress  Cardiac:  S1-S2 normal  No murmurs, rubs or gallops, JVP: Not seen  Lungs:  Clear to auscultation bilaterally, no wheezing or crackles.  Abdomen:  Soft, nontender, nondistended.  Extremities:  Warm, well perfused, pulses palpable, no ulcers or rashes. Edema: Absent  Neuro: Grossly nonfocal  Psych:  Normal affect            ======================================================  TREADMILL STRESS  No results found for this or any previous visit.     ----------------------------------------------------------------------------------------------  NUCLEAR STRESS TEST: No results found for this or any previous visit.    No results found for this or any " previous visit.      --------------------------------------------------------------------------------  CATH:  No results found for this or any previous visit.    --------------------------------------------------------------------------------  ECHO:   Results for orders placed during the hospital encounter of 19    Echo complete with [place] [address]  [phone]    Transthoracic Echocardiogram  2D, M-mode, Doppler, and Color Doppler    Study date:  2019    Patient: TOÑO MCMULLEN  MR number: [id number]  Account number: [id number]  : 1960  Age: 58 years  Gender: Female  Status: Inpatient  Location: Bedside  Height: 64 in  Weight: 175 lb  BP: 115/ 53 mmHg    Indications: TIA.    Diagnoses: G45.9 - Transient cerebral ischemic attack, unspecified    Sonographer:  Naida Padgett RDCS  Primary Physician:  Faraz Castaneda DO  Referring Physician:  Aleshia Osborn DO  Group:  [facility]  Cardiology Fellow:  Brice De Oliveira MD  Interpreting Physician:  Jessica Sarkar MD    SUMMARY    LEFT VENTRICLE:  Systolic function was hyperdynamic. Ejection fraction was estimated to be 70 %.  There were no regional wall motion abnormalities.  Wall thickness was increased.  Concentric hypertrophy was present.  Doppler parameters were consistent with abnormal left ventricular relaxation (grade 1 diastolic dysfunction).    AORTIC VALVE:  There was mild stenosis.  The peak valve velocity was 261 cm/s.  Valve mean gradient was 17 mmHg.    HISTORY: PRIOR HISTORY: Hypertension. Cancer. Former smoker. Pulmonary nodule.    PROCEDURE: The procedure was performed at the bedside. This was a routine study. The transthoracic approach was used. The study included complete 2D imaging, M-mode, complete spectral Doppler, and color Doppler. The heart rate was 72 bpm,  at the start of the study. Images were  obtained from the parasternal, apical, subcostal, and suprasternal notch acoustic windows. Image quality was adequate.    LEFT VENTRICLE: Size was normal. Systolic function was hyperdynamic. Ejection fraction was estimated to be 70 %. There were no regional wall motion abnormalities. Wall thickness was increased. Concentric hypertrophy was present. DOPPLER:  There was an increased relative contribution of atrial contraction to ventricular filling. Doppler parameters were consistent with abnormal left ventricular relaxation (grade 1 diastolic dysfunction).    RIGHT VENTRICLE: The size was normal. Systolic function was normal.    LEFT ATRIUM: Size was normal.    RIGHT ATRIUM: Size was normal.    MITRAL VALVE: Valve structure was normal. There was normal leaflet separation. DOPPLER: There was no evidence for stenosis. There was no significant regurgitation.    AORTIC VALVE: The valve was trileaflet. Leaflets exhibited mildly increased thickness, mild calcification, lower normal cuspal separation, and sclerosis. DOPPLER: There was mild stenosis. There was no regurgitation.    TRICUSPID VALVE: The valve structure was normal. There was normal leaflet separation. DOPPLER: There was no evidence for stenosis. There was no significant regurgitation.    PULMONIC VALVE: Leaflets exhibited normal thickness, no calcification, and normal cuspal separation. DOPPLER: The transpulmonic velocity was within the normal range. There was no significant regurgitation.    PERICARDIUM: There was no pericardial effusion.    AORTA: The root exhibited normal size.    MEASUREMENT TABLES    DOPPLER MEASUREMENTS  Aortic valve   (Reference normals)  Peak dorothea   261 cm/s   (--)  Mean dorothea   192 cm/s   (--)  VTI   53 cm   (--)  Peak gradient   27 mmHg   (--)  Mean gradient   17 mmHg   (--)  Valve area   1.4 cmï¾²   (--)    SYSTEM MEASUREMENT TABLES    2D mode  AV Diam: 2.6 cm  AoR Diam; Mean (2D): 2.6 cm  LA Diam (2D): 3.1 cm  LA Dimension; Mean (2D):  3.1 cm  LA/Ao (2D): 1.19  EDV (2D-Cubed): 46.7 cm3  EF (2D-Cubed): 82.9 %  ESV (2D-Cubed): 8 cm3  FS (2D-Cubed): 44.4 %  FS (2D-Teich): 44.4 %  IVS/LVPW (2D): 0.93  IVSd (2D): 1.3 cm  IVSd; Mean chosen (2D): 1.3 cm  LVIDd (2D): 3.6 cm  LVIDd; Mean (2D): 3.6 cm  LVIDs (2D): 2 cm  LVIDs; Mean (2D): 2 cm  LVOT Area (2D): 314 mm2  LVPWd (2D): 1.4 cm  LVPWd; Mean (2D): 1.4 cm  Left Ventricular Ejection Fraction; Teichholz; 2D mode;: 76.7 %  Left Ventricular End Diastolic Volume; Teichholz; 2D mode;: 54.4 cm3  Left Ventricular End Systolic Volume; Teichholz; 2D mode;: 12.7 cm3  SV (2D-Cubed): 38.7 cm3  Stroke Volume; Teichholz; 2D mode;: 41.7 cm3  RVIDd (2D): 3.4 cm  RVIDd; Mean (2D): 3.4 cm  Right and Left Ventricular End Diastolic Diameter Ratio; 2D mode;: 0.94    Apical four chamber  LV MOD Diam; Recent value; End Diastole (A4C): 4.14 cm  LV MOD Diam; Recent value; End Diastole (A4C): 4.35 cm  LV MOD Diam; Recent value; End Diastole (A4C): 4.32 cm  LV MOD Diam; Recent value; End Diastole (A4C): 4.38 cm  LV MOD Diam; Recent value; End Diastole (A4C): 4.32 cm  LV MOD Diam; Recent value; End Diastole (A4C): 4.21 cm  LV MOD Diam; Recent value; End Diastole (A4C): 4.12 cm  LV MOD Diam; Recent value; End Diastole (A4C): 3.95 cm  LV MOD Diam; Recent value; End Diastole (A4C): 3.86 cm  LV MOD Diam; Recent value; End Diastole (A4C): 3.65 cm  LV MOD Diam; Recent value; End Diastole (A4C): 3.45 cm  LV MOD Diam; Recent value; End Diastole (A4C): 3.02 cm  LV MOD Diam; Recent value; End Diastole (A4C): 2.41 cm  LV MOD Diam; Recent value; End Diastole (A4C): 1.8 cm  LV MOD Diam; Recent value; End Diastole (A4C): 4.03 cm  LV MOD Diam; Recent value; End Diastole (A4C): 3.85 cm  LV MOD Diam; Recent value; End Diastole (A4C): 3.57 cm  LV MOD Diam; Recent value; End Diastole (A4C): 1.71 cm  LV MOD Diam; Recent value; End Diastole (A4C): 4.26 cm  LV MOD Diam; Recent value; End Diastole (A4C): 4.29 cm  LV MOD Diam; Recent value; End Systole  (A4C): 1.46 cm  LV MOD Diam; Recent value; End Systole (A4C): 2.47 cm  LV MOD Diam; Recent value; End Systole (A4C): 2.5 cm  LV MOD Diam; Recent value; End Systole (A4C): 2.47 cm  LV MOD Diam; Recent value; End Systole (A4C): 2.44 cm  LV MOD Diam; Recent value; End Systole (A4C): 2.44 cm  LV MOD Diam; Recent value; End Systole (A4C): 2.41 cm  LV MOD Diam; Recent value; End Systole (A4C): 2.41 cm  LV MOD Diam; Recent value; End Systole (A4C): 2.38 cm  LV MOD Diam; Recent value; End Systole (A4C): 2.38 cm  LV MOD Diam; Recent value; End Systole (A4C): 2.35 cm  LV MOD Diam; Recent value; End Systole (A4C): 2.27 cm  LV MOD Diam; Recent value; End Systole (A4C): 2.13 cm  LV MOD Diam; Recent value; End Systole (A4C): 2.01 cm  LV MOD Diam; Recent value; End Systole (A4C): 1.81 cm  LV MOD Diam; Recent value; End Systole (A4C): 1.61 cm  LV MOD Diam; Recent value; End Systole (A4C): 1.46 cm  LV MOD Diam; Recent value; End Systole (A4C): 1.26 cm  LV MOD Diam; Recent value; End Systole (A4C): 1 cm  LV MOD Diam; Recent value; End Systole (A4C): 0.58 cm  LVEF MOD A4C: 75.2 %  Left Ventricle diastolic major axis; Most recent value chosen; Method of Disks, Single Plane; 2D mode; Apical four chamber;: 7.63 cm  Left Ventricle systolic major axis; Most recent value chosen; Method of Disks, Single Plane; 2D mode; Apical four chamber;: 6.29 cm  Left Ventricular Diastolic Area; Most recent value chosen; Method of Disks, Single Plane; 2D mode; Apical four chamber;: 2840 mm2  Left Ventricular End Diastolic Volume; Most recent value chosen; Method of Disks, Single Plane; 2D mode; Apical four chamber;: 85.3 cm3  Left Ventricular End Systolic Volume; Most recent value chosen; Method of Disks, Single Plane; 2D mode; Apical four chamber;: 21.2 cm3  Left Ventricular Systolic Area; Most recent value chosen; Method of Disks, Single Plane; 2D mode; Apical four chamber;: 1250 mm2  SV MOD A4C: 64.1 cm3    M mode  Tricuspid Annular Plane Systolic  Excursion; Mean; Mean value chosen; Tricuspid Annulus; M mode;: 2.67 cm  Tricuspid Annular Plane Systolic Excursion; Tricuspid Annulus; M mode;: 2.67 cm    Tissue Doppler Imaging  LV Peak Early Tello Tissue Clark; Medial MA (TDI): 50 mm/s  Left Ventricular Peak Early Diastolic Tissue Velocity; Mean; Mean value chosen; Medial Mitral Annulus; Tissue Doppler Imaging;: 50 mm/s    Unspecified Scan Mode  Aortic Valve Velocity Ratio;: 0.39  Mean Grad; Antegrade Flow: 17 mm Hg  Mean Grad; Antegrade Flow: 13 mm Hg  Mean Grad; Mean value; Antegrade Flow: 15 mm Hg  Mean Clark; Antegrade Flow: 1700 mm/s  Mean Clark; Antegrade Flow: 1920 mm/s  Mean Clark; Mean value; Antegrade Flow: 1810 mm/s  Peak Grad; Mean; Antegrade Flow: 26 mm Hg  VTI; Antegrade Flow: 52.5 cm  VTI; Antegrade Flow: 50.1 cm  VTI; Mean; Antegrade Flow: 51.3 cm  Valve Area Peak Clark: 122 mm2  Valve Area VTI: 138 mm2  Vmax; Antegrade Flow: 2510 mm/s  Vmax; Antegrade Flow: 2610 mm/s  Vmax; Mean; Antegrade Flow: 2560 mm/s  LVOT CV Orifice Diam; Mean: 2 cm  LVOT Diam: 2 cm  LVOT Mean Grad: 2 mm Hg  LVOT Mean Grad: 3 mm Hg  LVOT Mean Grad; Mean value: 3 mm Hg  LVOT Mean Clark: 728 mm/s  LVOT Mean Clark: 777 mm/s  LVOT Mean Clark; Mean value: 753 mm/s  LVOT Peak Grad; Mean: 4 mm Hg  LVOT VTI: 22.8 cm  LVOT VTI: 22.1 cm  LVOT VTI; Mean: 22.5 cm  LVOT Vmax: 1030 mm/s  LVOT Vmax: 953 mm/s  LVOT Vmax; Mean: 992 mm/s  MV Peak Clark/LV Peak Tissue Clark E-Wave; Medial MA: 13.7  SV (LVOT): 71 cm3  DT; Antegrade Flow: 254 ms  DT; Mean; Antegrade Flow: 254 ms  Dec Bandera; Antegrade Flow: 2700 mm/s2  Dec Bandera; Mean; Antegrade Flow: 2700 mm/s2  MV A Clark: 926 mm/s  MV E Clark: 684 mm/s  MV E/A Ratio: 0.7  MV Peak A Clark: 926 mm/s  MV Peak E Clark; Mean; Antegrade Flow: 684 mm/s  MVA (PHT): 297 mm2  PHT: 74 ms  PHT; Mean: 74 ms    [employer] Accredited Echocardiography Laboratory    Prepared and electronically signed by    Jessica Sarkar MD  Signed 19-Jul-2019 14:21:58    No results found for  this or any previous visit.    --------------------------------------------------------------------------------  HOLTER  No results found for this or any previous visit.    --------------------------------------------------------------------------------  CAROTIDS  No results found for this or any previous visit.       Diagnoses and all orders for this visit:    Stroke-like symptoms  -     POCT ECG  -     atorvastatin (LIPITOR) 40 mg tablet; Take 1 tablet (40 mg total) by mouth every evening    Hyperlipidemia, unspecified hyperlipidemia type  -     POCT ECG  -     Lipid Panel With Direct LDL; Future    Hypertension, unspecified type  -     POCT ECG  -     carvedilol (COREG) 6.25 mg tablet; Take 1 tablet (6.25 mg total) by mouth 2 (two) times a day with meals    Ulcer of aorta (HCC)    Stage 3a chronic kidney disease (HCC)    Hypertension  -     amLODIPine (NORVASC) 10 mg tablet; Take 1 tablet (10 mg total) by mouth daily    Hypertensive emergency  -     amLODIPine (NORVASC) 10 mg tablet; Take 1 tablet (10 mg total) by mouth daily    Left ventricular hypertrophy  -     carvedilol (COREG) 6.25 mg tablet; Take 1 tablet (6.25 mg total) by mouth 2 (two) times a day with meals    Essential hypertension  -     lisinopril (ZESTRIL) 40 mg tablet; Take 1 tablet (40 mg total) by mouth daily       ======================================================          Review of Systems  ROS as noted above, otherwise 12 point review of systems was performed and is negative.     Historical Information   Past Medical History:   Diagnosis Date    Back ache     Cancer (HCC)     Hyperlipidemia     Hypertension     Pancreatic mass 4/12/2021     Past Surgical History:   Procedure Laterality Date    APPENDECTOMY      BILATERAL OOPHORECTOMY      BREAST SURGERY Right     partial mastectomy     COLONOSCOPY  06/17/2021    HERNIA REPAIR      HYSTERECTOMY      LAPAROTOMY N/A 08/24/2021    Procedure: LAPAROTOMY EXPLORATORY;  Surgeon: René Gurrola  "MD;  Location: [facility];  Service: Surgical Oncology    MASTECTOMY Right     partial    WHIPPLE PROCEDURE/PANCREATICO-DUODENECTOMY N/A 08/24/2021    Procedure: WHIPPLE PROCEDURE/PANCREATICO-DUODENECTOMY;  Surgeon: René Gurrola MD;  Location: [facility];  Service: Surgical Oncology     Social History     Substance and Sexual Activity   Alcohol Use Not Currently     Social History     Substance and Sexual Activity   Drug Use No     Social History     Tobacco Use   Smoking Status Never   Smokeless Tobacco Never   Tobacco Comments    quit 15 years ago // PT IS STATING SHE NEVER SMOKED 11/2023     Family History   Problem Relation Age of Onset    Hypertension Mother     Heart disease Mother     Diabetes Mother     Esophageal cancer Father     Uterine cancer Maternal Grandmother     Liver cancer Brother     Uterine cancer Maternal Aunt        Meds/Allergies   Hospital Medications:   No current facility-administered medications for this visit.     Home Medications: Not in a hospital admission.      No Known Allergies      Portions of the record may have been created with voice recognition software.  Occasional wrong words or \"sound a like\" substitutions may have occurred due to the inherent limitations of voice recognition software.  Read the chart carefully and recognize, using context, where substitutions have occurred.          I spent > 40 mins examining and interviewing the patient, coordinating care, reviewing the chart, reviewing testing and writing the note.                 "

## 2024-07-29 ENCOUNTER — CLINICAL SUPPORT (OUTPATIENT)
Dept: CARDIOLOGY CLINIC | Facility: CLINIC | Age: 64
End: 2024-07-29
Payer: COMMERCIAL

## 2024-07-29 VITALS
BODY MASS INDEX: 25.81 KG/M2 | OXYGEN SATURATION: 98 % | HEART RATE: 67 BPM | SYSTOLIC BLOOD PRESSURE: 140 MMHG | DIASTOLIC BLOOD PRESSURE: 78 MMHG | WEIGHT: 145.7 LBS

## 2024-07-29 DIAGNOSIS — I51.7 LEFT VENTRICULAR HYPERTROPHY: ICD-10-CM

## 2024-07-29 DIAGNOSIS — I10 HYPERTENSION, UNSPECIFIED TYPE: Primary | ICD-10-CM

## 2024-07-29 DIAGNOSIS — I10 HYPERTENSION, UNSPECIFIED TYPE: ICD-10-CM

## 2024-07-29 PROCEDURE — 99211 OFF/OP EST MAY X REQ PHY/QHP: CPT | Performed by: INTERNAL MEDICINE

## 2024-07-29 RX ORDER — CARVEDILOL 6.25 MG/1
6.25 TABLET ORAL 2 TIMES DAILY WITH MEALS
Qty: 60 TABLET | Refills: 3 | Status: SHIPPED | OUTPATIENT
Start: 2024-07-29

## 2024-07-29 NOTE — PROGRESS NOTES
Patient is here under the direction of Dr. Sommer for a BP check. Patient states she is taking Lisinopril 40, Amlodipine 10, and Carvedilol 6.25 bid. She is also monitoring her pressure at home daily. The patient did not bring in her bp monitor, or readings taken .

## 2024-07-30 ENCOUNTER — TELEPHONE (OUTPATIENT)
Dept: INTERNAL MEDICINE CLINIC | Facility: CLINIC | Age: 64
End: 2024-07-30

## 2024-08-28 ENCOUNTER — OFFICE VISIT (OUTPATIENT)
Dept: INTERNAL MEDICINE CLINIC | Facility: CLINIC | Age: 64
End: 2024-08-28

## 2024-08-28 VITALS
DIASTOLIC BLOOD PRESSURE: 80 MMHG | SYSTOLIC BLOOD PRESSURE: 151 MMHG | TEMPERATURE: 98.1 F | WEIGHT: 150.13 LBS | HEIGHT: 63 IN | BODY MASS INDEX: 26.6 KG/M2 | HEART RATE: 80 BPM | OXYGEN SATURATION: 99 %

## 2024-08-28 DIAGNOSIS — G89.29 CHRONIC BACK PAIN, UNSPECIFIED BACK LOCATION, UNSPECIFIED BACK PAIN LATERALITY: ICD-10-CM

## 2024-08-28 DIAGNOSIS — K86.89 PANCREATIC MASS: ICD-10-CM

## 2024-08-28 DIAGNOSIS — I10 HYPERTENSION, UNSPECIFIED TYPE: ICD-10-CM

## 2024-08-28 DIAGNOSIS — I51.7 LEFT VENTRICULAR HYPERTROPHY: ICD-10-CM

## 2024-08-28 DIAGNOSIS — M54.9 CHRONIC BACK PAIN, UNSPECIFIED BACK LOCATION, UNSPECIFIED BACK PAIN LATERALITY: ICD-10-CM

## 2024-08-28 DIAGNOSIS — K86.1 OTHER CHRONIC PANCREATITIS (HCC): ICD-10-CM

## 2024-08-28 DIAGNOSIS — M54.16 LUMBAR RADICULOPATHY: ICD-10-CM

## 2024-08-28 DIAGNOSIS — R10.13 EPIGASTRIC PAIN: ICD-10-CM

## 2024-08-28 DIAGNOSIS — Z85.89 HISTORY OF NEUROENDOCRINE CANCER: ICD-10-CM

## 2024-08-28 DIAGNOSIS — L40.9 PSORIASIS: Primary | ICD-10-CM

## 2024-08-28 PROCEDURE — 99213 OFFICE O/P EST LOW 20 MIN: CPT | Performed by: INTERNAL MEDICINE

## 2024-08-28 PROCEDURE — G0439 PPPS, SUBSEQ VISIT: HCPCS | Performed by: INTERNAL MEDICINE

## 2024-08-28 PROCEDURE — 3077F SYST BP >= 140 MM HG: CPT | Performed by: INTERNAL MEDICINE

## 2024-08-28 PROCEDURE — 3079F DIAST BP 80-89 MM HG: CPT | Performed by: INTERNAL MEDICINE

## 2024-08-28 RX ORDER — CHOLESTYRAMINE 4 G/9G
4 POWDER, FOR SUSPENSION ORAL
Qty: 1074.62 G | Refills: 1 | Status: SHIPPED | OUTPATIENT
Start: 2024-08-28 | End: 2024-08-29

## 2024-08-28 RX ORDER — LIDOCAINE 4 G/G
1 PATCH TOPICAL DAILY
Qty: 14 PATCH | Refills: 0 | Status: SHIPPED | OUTPATIENT
Start: 2024-08-28 | End: 2024-09-11

## 2024-08-28 RX ORDER — GABAPENTIN 100 MG/1
300 CAPSULE ORAL 2 TIMES DAILY
Qty: 30 CAPSULE | Refills: 3 | Status: SHIPPED | OUTPATIENT
Start: 2024-08-28

## 2024-08-28 RX ORDER — CLOPIDOGREL BISULFATE 75 MG/1
75 TABLET ORAL DAILY
Qty: 30 TABLET | Refills: 1 | Status: SHIPPED | OUTPATIENT
Start: 2024-08-28

## 2024-08-28 RX ORDER — CARVEDILOL 6.25 MG/1
12.5 TABLET ORAL 2 TIMES DAILY WITH MEALS
Qty: 60 TABLET | Refills: 3 | Status: SHIPPED | OUTPATIENT
Start: 2024-08-28

## 2024-08-28 NOTE — PROGRESS NOTES
Ambulatory Visit  Name: Jen Crawford      : 1960      MRN: [id number]  Encounter Provider: Ginny Guillory MD  Encounter Date: 2024   Encounter department: [facility]    Assessment & Plan   1. Psoriasis  Notes multiple psoriatic plaques on her bilateral wrists and back.  Have previously been on clobetasol with no help.  Does not have a dermatologist.  Will refer to dermatology for further evaluation of psoriasis.  -     Ambulatory Referral to Dermatology; Future  2. Chronic back pain, unspecified back location, unspecified back pain laterality  Back pain with radiculopathy symptoms, no neurologic deficits.  Will get a x-ray to evaluate and will send to spine and pain management.  Prescribed lidocaine and volraten gel and will increase gabapentin dosage to 300mg   -     Ambulatory referral to Spine & Pain Management; Future  -     Lidocaine 4 % PTCH; Apply 1 patch topically in the morning for 14 days  -     Diclofenac Sodium (VOLTAREN) 1 %; Apply 2 g topically 4 (four) times a day for 10 days  -     XR spine lumbar 2 or 3 views injury; Future; Expected date: 2024  3. Lumbar radiculopathy  -     gabapentin (Neurontin) 100 mg capsule; Take 3 capsules (300 mg total) by mouth 2 (two) times a day If no improvement, increase to 200 mg  4. Hypertension, unspecified type  Blood pressure noted to be 151/80 today with elevated blood pressures at home as well on amlodipine carvedilol and lisinopril.  Will increase carvedilol dosage to 12.5 mg  -     carvedilol (COREG) 6.25 mg tablet; Take 2 tablets (12.5 mg total) by mouth 2 (two) times a day with meals  -     CBC and differential; Future  -     Comprehensive metabolic panel; Future  -     Lipid Panel with Direct LDL reflex; Future  5. Left ventricular hypertrophy  -     carvedilol (COREG) 6.25 mg tablet; Take 2 tablets (12.5 mg total) by mouth 2 (two) times a day with meals  -     clopidogrel (Plavix) 75 mg tablet;  Take 1 tablet (75 mg total) by mouth daily  6. History of neuroendocrine cancer  -     Hemoglobin A1C; Future  7. Epigastric pain  8. Other chronic pancreatitis (HCC)  -     Hemoglobin A1C; Future  9. Pancreatic mass  Neuroendocrine tumor status post Whipple in 2021.  Has been having some epigastric pain associated when eating and has been having worsening diarrhea with fat in the stool. Recently stopped taking questran. Has not seen GI or surg onc in years.   -     Ambulatory Referral to Gastroenterology; Future  -     CT abdomen pelvis w contrast; Future; Expected date: 08/28/2024  -     Chromogranin A; Future  -     cholestyramine (QUESTRAN) 4 GM/DOSE powder; Take 1 packet (4 g total) by mouth 3 (three) times a day with meals       Preventive health issues were discussed with patient, and age appropriate screening tests were ordered as noted in patient's After Visit Summary. Personalized health advice and appropriate referrals for health education or preventive services given if needed, as noted in patient's After Visit Summary.    History of Present Illness     HPI   63-year-old female with history of neuroendocrine tumor status post Whipple, hypertension, aortic aneurysm, aortic ulcer and TIA on Plavix presenting for Medicare annual wellness visit.  Patient is mostly complaining of new onset back pain after lifting her  out of bed.  She states that she has a disc herniation but she was found in [place] many years ago and sometimes has aggravation of her back with certain maneuvers.  She recently was seen a year ago for back pain and at that time got trigger point injections of the back that helped significantly with her pain and would like for that to be done again.  She has not been taking any medications for her back pain and states that her pain is worse today than it had been in the past.  She does have radiculopathy symptoms but no weakness in her legs or numbness.  No bowel or bladder  incontinence.  No fevers chills nausea vomiting.  Does note to have some epigastric abdominal pain after she eats and has been having diarrhea every time she eats with some fat in her stool.  She did stop taking the Questran 3 months ago because she hated the taste of it but understands that that will help with her symptoms.  She has not been seeing GI or surgeon unk in a while.  Of note patient has had longstanding hypertension and is on amlodipine carvedilol and lisinopril and has been taking her blood pressures at home but have been elevated while on those medications.  Denies chest pain    Patient Care Team:  Ginny Guillory MD as PCP - General (Internal Medicine)  Suraj Yao MD as PCP - PCP-[facility] ([facility])  René Gurrola MD as Surgeon (Surgical Oncology)  Papo Cleaning MD (Gastroenterology)    Review of Systems   Constitutional:  Negative for chills and fever.   HENT:  Negative for ear pain and sore throat.    Eyes:  Negative for pain and visual disturbance.   Respiratory:  Negative for cough and shortness of breath.    Cardiovascular:  Negative for chest pain and palpitations.   Gastrointestinal:  Positive for abdominal pain and diarrhea. Negative for abdominal distention, blood in stool, constipation, rectal pain and vomiting.   Genitourinary:  Negative for dysuria and hematuria.   Musculoskeletal:  Positive for back pain. Negative for arthralgias.   Skin:  Positive for rash. Negative for color change.   Neurological:  Negative for seizures and syncope.   All other systems reviewed and are negative.        Medical History Reviewed by provider this encounter:  Meds       Annual Wellness Visit Questionnaire       Health Risk Assessment:   Patient rates overall health as fair. Patient feels that their physical health rating is much better. Patient is satisfied with their life. Eyesight was rated as slightly worse. Hearing was rated as same. Patient feels that their emotional and mental health  rating is slightly better. Patients states they are sometimes angry. Patient states they are always unusually tired/fatigued. Pain experienced in the last 7 days has been a lot. Patient's pain rating has been 8/10. Patient states that she has experienced weight loss or gain in last 6 months.     Depression Screening:   PHQ-9 Score: 0      Fall Risk Screening:   In the past year, patient has experienced: no history of falling in past year      Urinary Incontinence Screening:   Patient has not leaked urine accidently in the last six months.     Home Safety:  Patient has trouble with stairs inside or outside of their home. Patient has working smoke alarms and has working carbon monoxide detector. Home safety hazards include: none.     Nutrition:   Current diet is Regular.     Medications:   Patient is not currently taking any over-the-counter supplements. Patient is able to manage medications.     Activities of Daily Living (ADLs)/Instrumental Activities of Daily Living (IADLs):   Walk and transfer into and out of bed and chair?: Yes  Dress and groom yourself?: Yes    Bathe or shower yourself?: Yes    Feed yourself? Yes  Do your laundry/housekeeping?: Yes  Manage your money, pay your bills and track your expenses?: Yes  Make your own meals?: Yes    Do your own shopping?: Yes    Previous Hospitalizations:   Any hospitalizations or ED visits within the last 12 months?: No      PREVENTIVE SCREENINGS      Cardiovascular Screening:    General: Screening Not Indicated and History Lipid Disorder      Diabetes Screening:     General: Screening Current      Colorectal Cancer Screening:     General: Screening Current      Breast Cancer Screening:     General: Screening Current      Cervical Cancer Screening:    General: Screening Not Indicated      Lung Cancer Screening:     General: Screening Not Indicated      Hepatitis C Screening:    General: Screening Current    Screening, Brief Intervention, and Referral to Treatment  "(SBIRT)    Screening  Typical number of drinks in a day: 0  Typical number of drinks in a week: 0  Interpretation: Low risk drinking behavior.    Single Item Drug Screening:  How often have you used an illegal drug (including marijuana) or a prescription medication for non-medical reasons in the past year? never    Single Item Drug Screen Score: 0  Interpretation: Negative screen for possible drug use disorder    SDOH Risk Assessment  Social determinants of health (SDOH) risk assesment tool was completed. The tool at a minimum covered housing stability, food insecurity, transportation needs, and utility difficulty. Patient had at risk responses for the following SDOH domains: housing stability.     Social Determinants of Health     Financial Resource Strain: Low Risk  (8/28/2024)    Overall Financial Resource Strain (CARDIA)     Difficulty of Paying Living Expenses: Not very hard   Food Insecurity: No Food Insecurity (8/28/2024)    Hunger Vital Sign     Worried About Running Out of Food in the Last Year: Never true     Ran Out of Food in the Last Year: Never true   Transportation Needs: No Transportation Needs (8/28/2024)    PRAPARE - Transportation     Lack of Transportation (Medical): No     Lack of Transportation (Non-Medical): No   Housing Stability: High Risk (8/28/2024)    Housing Stability Vital Sign     Unable to Pay for Housing in the Last Year: Yes     Number of Times Moved in the Last Year: 1     Homeless in the Last Year: No   Utilities: Not At Risk (8/28/2024)    [facility] Utilities     Threatened with loss of utilities: No     No results found.    Objective     /80 (BP Location: Left arm, Patient Position: Sitting, Cuff Size: Standard)   Pulse 80   Temp 98.1 °F (36.7 °C) (Temporal)   Ht 5' 3\" (1.6 m)   Wt 68.1 kg (150 lb 2 oz)   LMP  (LMP Unknown)   SpO2 99%   BMI 26.59 kg/m²     Physical Exam  Vitals and nursing note reviewed.   Constitutional:       General: She is not in acute distress.     " Appearance: She is well-developed.   HENT:      Head: Normocephalic and atraumatic.   Eyes:      Conjunctiva/sclera: Conjunctivae normal.   Cardiovascular:      Rate and Rhythm: Normal rate and regular rhythm.      Heart sounds: No murmur heard.  Pulmonary:      Effort: Pulmonary effort is normal. No respiratory distress.      Breath sounds: Normal breath sounds.   Abdominal:      Palpations: Abdomen is soft.      Tenderness: There is no abdominal tenderness.   Musculoskeletal:         General: No swelling.      Cervical back: Neck supple.   Skin:     General: Skin is warm and dry.      Capillary Refill: Capillary refill takes less than 2 seconds.   Neurological:      General: No focal deficit present.      Mental Status: She is alert and oriented to person, place, and time.   Psychiatric:         Mood and Affect: Mood normal.

## 2024-08-29 RX ORDER — CHOLESTYRAMINE 4 G/9G
4 POWDER, FOR SUSPENSION ORAL 2 TIMES DAILY WITH MEALS
Qty: 180 PACKET | Refills: 0 | Status: SHIPPED | OUTPATIENT
Start: 2024-08-29 | End: 2024-11-27

## 2024-09-06 ENCOUNTER — HOSPITAL ENCOUNTER (OUTPATIENT)
Dept: RADIOLOGY | Facility: HOSPITAL | Age: 64
Discharge: HOME | End: 2024-09-06

## 2024-09-16 ENCOUNTER — APPOINTMENT (OUTPATIENT)
Dept: LAB | Facility: CLINIC | Age: 64
End: 2024-09-16
Payer: COMMERCIAL

## 2024-09-16 DIAGNOSIS — Z85.89 HISTORY OF NEUROENDOCRINE CANCER: ICD-10-CM

## 2024-09-16 DIAGNOSIS — I10 HYPERTENSION, UNSPECIFIED TYPE: ICD-10-CM

## 2024-09-16 DIAGNOSIS — K86.89 PANCREATIC MASS: ICD-10-CM

## 2024-09-16 DIAGNOSIS — K86.1 OTHER CHRONIC PANCREATITIS (HCC): ICD-10-CM

## 2024-09-16 LAB
BASOPHILS # BLD AUTO: 0.04 THOUSANDS/ΜL (ref 0–0.1)
BASOPHILS NFR BLD AUTO: 1 % (ref 0–1)
EOSINOPHIL # BLD AUTO: 0.1 THOUSAND/ΜL (ref 0–0.61)
EOSINOPHIL NFR BLD AUTO: 1 % (ref 0–6)
ERYTHROCYTE [DISTWIDTH] IN BLOOD BY AUTOMATED COUNT: 12.7 % (ref 11.6–15.1)
HCT VFR BLD AUTO: 32.4 % (ref 34.8–46.1)
HGB BLD-MCNC: 11.1 G/DL (ref 11.5–15.4)
IMM GRANULOCYTES # BLD AUTO: 0.02 THOUSAND/UL (ref 0–0.2)
IMM GRANULOCYTES NFR BLD AUTO: 0 % (ref 0–2)
LYMPHOCYTES # BLD AUTO: 3.33 THOUSANDS/ΜL (ref 0.6–4.47)
LYMPHOCYTES NFR BLD AUTO: 44 % (ref 14–44)
MCH RBC QN AUTO: 32.5 PG (ref 26.8–34.3)
MCHC RBC AUTO-ENTMCNC: 34.3 G/DL (ref 31.4–37.4)
MCV RBC AUTO: 95 FL (ref 82–98)
MONOCYTES # BLD AUTO: 0.61 THOUSAND/ΜL (ref 0.17–1.22)
MONOCYTES NFR BLD AUTO: 8 % (ref 4–12)
NEUTROPHILS # BLD AUTO: 3.5 THOUSANDS/ΜL (ref 1.85–7.62)
NEUTS SEG NFR BLD AUTO: 46 % (ref 43–75)
NRBC BLD AUTO-RTO: 0 /100 WBCS
PLATELET # BLD AUTO: 265 THOUSANDS/UL (ref 149–390)
PMV BLD AUTO: 10.8 FL (ref 8.9–12.7)
RBC # BLD AUTO: 3.42 MILLION/UL (ref 3.81–5.12)
WBC # BLD AUTO: 7.6 THOUSAND/UL (ref 4.31–10.16)

## 2024-09-16 PROCEDURE — 86316 IMMUNOASSAY TUMOR OTHER: CPT

## 2024-09-16 PROCEDURE — 85025 COMPLETE CBC W/AUTO DIFF WBC: CPT

## 2024-09-16 PROCEDURE — 83036 HEMOGLOBIN GLYCOSYLATED A1C: CPT

## 2024-09-16 PROCEDURE — 36415 COLL VENOUS BLD VENIPUNCTURE: CPT

## 2024-09-17 ENCOUNTER — APPOINTMENT (OUTPATIENT)
Dept: LAB | Facility: CLINIC | Age: 64
End: 2024-09-17
Payer: COMMERCIAL

## 2024-09-17 DIAGNOSIS — I10 HYPERTENSION, UNSPECIFIED TYPE: ICD-10-CM

## 2024-09-17 DIAGNOSIS — E78.5 HYPERLIPIDEMIA, UNSPECIFIED HYPERLIPIDEMIA TYPE: ICD-10-CM

## 2024-09-17 LAB
ALBUMIN SERPL BCG-MCNC: 4 G/DL (ref 3.5–5)
ALP SERPL-CCNC: 134 U/L (ref 34–104)
ALT SERPL W P-5'-P-CCNC: 18 U/L (ref 7–52)
ANION GAP SERPL CALCULATED.3IONS-SCNC: 8 MMOL/L (ref 4–13)
AST SERPL W P-5'-P-CCNC: 23 U/L (ref 13–39)
BILIRUB SERPL-MCNC: 0.39 MG/DL (ref 0.2–1)
BUN SERPL-MCNC: 18 MG/DL (ref 5–25)
CALCIUM SERPL-MCNC: 9.2 MG/DL (ref 8.4–10.2)
CHLORIDE SERPL-SCNC: 104 MMOL/L (ref 96–108)
CHOLEST SERPL-MCNC: 161 MG/DL
CO2 SERPL-SCNC: 25 MMOL/L (ref 21–32)
CREAT SERPL-MCNC: 1.17 MG/DL (ref 0.6–1.3)
EST. AVERAGE GLUCOSE BLD GHB EST-MCNC: 108 MG/DL
GFR SERPL CREATININE-BSD FRML MDRD: 49 ML/MIN/1.73SQ M
GLUCOSE P FAST SERPL-MCNC: 136 MG/DL (ref 65–99)
HBA1C MFR BLD: 5.4 %
HDLC SERPL-MCNC: 38 MG/DL
LDLC SERPL CALC-MCNC: 92 MG/DL (ref 0–100)
POTASSIUM SERPL-SCNC: 3.8 MMOL/L (ref 3.5–5.3)
PROT SERPL-MCNC: 7.5 G/DL (ref 6.4–8.4)
SODIUM SERPL-SCNC: 137 MMOL/L (ref 135–147)
TRIGL SERPL-MCNC: 156 MG/DL

## 2024-09-17 PROCEDURE — 36415 COLL VENOUS BLD VENIPUNCTURE: CPT

## 2024-09-17 PROCEDURE — 80053 COMPREHEN METABOLIC PANEL: CPT

## 2024-09-17 PROCEDURE — 80061 LIPID PANEL: CPT

## 2024-09-18 LAB — CGA SERPL-MCNC: 166 NG/ML (ref 0–101.8)

## 2024-09-23 ENCOUNTER — HOSPITAL ENCOUNTER (OUTPATIENT)
Dept: RADIOLOGY | Age: 64
Discharge: HOME | End: 2024-09-23
Payer: COMMERCIAL

## 2024-09-23 DIAGNOSIS — K86.89 PANCREATIC MASS: ICD-10-CM

## 2024-09-23 PROCEDURE — 74177 CT ABD & PELVIS W/CONTRAST: CPT

## 2024-09-23 RX ADMIN — IOHEXOL 100 ML: 350 INJECTION, SOLUTION INTRAVENOUS at 14:36

## 2024-09-25 ENCOUNTER — TELEPHONE (OUTPATIENT)
Dept: INTERNAL MEDICINE CLINIC | Facility: CLINIC | Age: 64
End: 2024-09-25

## 2024-09-25 NOTE — TELEPHONE ENCOUNTER
Patient requesting  I called our    Services.     [id number] assisted with conversation.Patient completed The CT  Scan on 9/13/24 Report is in.I let patient know soon as the report is reviewed by a Provider he will call you    She has an appt on 10/23/24 she is very upset regarding Because she was told to do the CT scan soon as possible which she did but know one as called to talk with her about results. Please can someone reach out to her

## 2024-09-30 ENCOUNTER — OFFICE VISIT (OUTPATIENT)
Dept: INTERNAL MEDICINE CLINIC | Facility: CLINIC | Age: 64
End: 2024-09-30

## 2024-09-30 VITALS
HEART RATE: 74 BPM | TEMPERATURE: 97.9 F | WEIGHT: 150 LBS | BODY MASS INDEX: 26.57 KG/M2 | DIASTOLIC BLOOD PRESSURE: 78 MMHG | SYSTOLIC BLOOD PRESSURE: 158 MMHG

## 2024-09-30 DIAGNOSIS — I71.9 ULCER OF AORTA (HCC): Primary | ICD-10-CM

## 2024-09-30 DIAGNOSIS — D3A.8 NEUROENDOCRINE TUMOR OF PANCREAS: ICD-10-CM

## 2024-09-30 DIAGNOSIS — M25.512 ACUTE PAIN OF LEFT SHOULDER: Primary | ICD-10-CM

## 2024-09-30 PROCEDURE — 99213 OFFICE O/P EST LOW 20 MIN: CPT | Performed by: INTERNAL MEDICINE

## 2024-09-30 PROCEDURE — G2211 COMPLEX E/M VISIT ADD ON: HCPCS | Performed by: INTERNAL MEDICINE

## 2024-09-30 RX ORDER — IBUPROFEN 200 MG
200 TABLET ORAL EVERY 6 HOURS PRN
Qty: 28 TABLET | Refills: 0 | Status: SHIPPED | OUTPATIENT
Start: 2024-09-30 | End: 2024-09-30 | Stop reason: CLARIF

## 2024-09-30 RX ORDER — LIDOCAINE 50 MG/G
1 PATCH TOPICAL DAILY
Qty: 30 PATCH | Refills: 1 | Status: SHIPPED | OUTPATIENT
Start: 2024-09-30

## 2024-09-30 RX ORDER — METHOCARBAMOL 500 MG/1
1000 TABLET, FILM COATED ORAL 4 TIMES DAILY
Qty: 40 TABLET | Refills: 0 | Status: SHIPPED | OUTPATIENT
Start: 2024-09-30 | End: 2024-10-01

## 2024-09-30 RX ORDER — TRAMADOL HYDROCHLORIDE 50 MG/1
50 TABLET ORAL EVERY 6 HOURS PRN
Qty: 10 TABLET | Refills: 0 | Status: SHIPPED | OUTPATIENT
Start: 2024-09-30

## 2024-09-30 NOTE — PROGRESS NOTES
INTERNAL MEDICINE FOLLOW-UP OFFICE VISIT  [facility]  [address]  [place] [address]    NAME: Jen Crawford  AGE: 64 y.o. SEX: female    DATE OF ENCOUNTER: 9/30/2024    Assessment and Plan     1. Acute pain of left shoulder  Patient presenting with L shoulder pain x4d noticed after attempting to lift family member up. No falls or other injuries reported.   L shoulder swelling on inspection though no obvious deformities; passive ROM limited 2/2 pain, unable to fully abduct L shoulder beyond 30 degrees, subacromial ttp, Marie's/Neer test positive, strength 4/5, sensations intact. No midline ttp however does have paraspinal cervical ttp, cervical ROM intact   Etiology likely rotator cuff injury though may have component of upper trap muscular strain.   Plan:   Will prescribe five day course Tramadol 50mg bid for pain relief, tropical antiinflammatories including Voltaren gel, Lidocaine patch for further pain relief; oral NSAID's deferred due to hx of HTN on Lisinopril 40mg and GFR.     Will prescribe five day course Robaxin 1000mg qid for muscle relaxant properties.   Advised rest/ice/heat therapy, instructed to avoid heavy lifting, L shoulder overuse, and overarm activities until pain subsides. Will also refer to PT for physical rehabilitation   Follow up in two weeks for further evaluation if symptoms do not improve on above noted management     Orders Placed This Encounter   Procedures   • Ambulatory Referral to Physical Therapy       - Counseling Documentation: patient was counseled regarding: diagnostic results, instructions for management, risk factor reductions, prognosis, patient and family education, impressions, risks and benefits of treatment options, and importance of compliance with treatment  - Counseling Time: not applicable    BMI Counseling: Body mass index is 26.57 kg/m². The BMI is above normal. Nutrition recommendations include reducing  portion sizes, decreasing overall calorie intake, 3-5 servings of fruits/vegetables daily, reducing fast food intake, consuming healthier snacks, and moderation in carbohydrate intake. Exercise recommendations include moderate aerobic physical activity for 150 minutes/week.     Chief Complaint     Chief Complaint   Patient presents with   • Arm Pain     Left arm pain x 5 days after helping  out of bed       History of Present Illness     Patient presenting with L shoulder pain x4d noticed after attempting to lift family member up. Presenting as same day. No falls or other injuries reported.  L shoulder swelling on inspection though no obvious deformities. Pain rated 10/10. Has taken OTC Tylenol without relief.     The following portions of the patient's history were reviewed and updated as appropriate: allergies, current medications, past family history, past medical history, past social history, past surgical history and problem list.    Review of Systems     Review of Systems  All ROS negative unless otherwise stated in the HPI    Active Problem List     Patient Active Problem List   Diagnosis   • Stroke-like symptoms   • Incidental pulmonary nodule   • Hyperlipemia   • Tarsal tunnel syndrome, left   • Depression   • Stage 3a chronic kidney disease (HCC)   • History of neuroendocrine cancer   • Personal history of breast cancer   • Anemia   • Hypertension   • H/O Whipple procedure   • Pancreatic insufficiency   • Encounter for follow-up surveillance of neuroendocrine carcinoma   • Aortic aneurysm (HCC)   • Chronic pain of both knees   • Need for influenza vaccination   • BMI 27.0-27.9,adult   • Lung nodules   • Pre-op exam   • Dyspnea on exertion   • Neuroendocrine tumor of pancreas       Objective     /78 (BP Location: Right arm, Patient Position: Sitting, Cuff Size: Large)   Pulse 74   Temp 97.9 °F (36.6 °C) (Temporal)   Wt 68 kg (150 lb)   LMP  (LMP Unknown)   BMI 26.57 kg/m²     Physical  Exam  Constitutional:       General: She is not in acute distress.     Appearance: Normal appearance. She is not ill-appearing or toxic-appearing.   HENT:      Nose: Nose normal.      Mouth/Throat:      Mouth: Mucous membranes are moist.   Eyes:      Conjunctiva/sclera: Conjunctivae normal.   Cardiovascular:      Rate and Rhythm: Normal rate and regular rhythm.      Pulses: Normal pulses.      Heart sounds: Normal heart sounds. No murmur heard.     No friction rub. No gallop.   Pulmonary:      Effort: Pulmonary effort is normal.      Breath sounds: Normal breath sounds. No wheezing or rhonchi.   Abdominal:      General: There is no distension.      Palpations: Abdomen is soft.      Tenderness: There is no abdominal tenderness.   Musculoskeletal:         General: Swelling and tenderness present.      Right lower leg: No edema.      Left lower leg: No edema.      Comments: Passive ROM limited 2/2 pain, unable to fully abduct L shoulder beyond 30 degrees, subacromial ttp, Marie's/Neer test positive, strength 4/5, sensations intact. No midline ttp however does have paraspinal cervical ttp, cervical ROM intact    Skin:     Capillary Refill: Capillary refill takes less than 2 seconds.      Findings: No rash.   Neurological:      Mental Status: She is alert and oriented to person, place, and time. Mental status is at baseline.     Pertinent Laboratory/Diagnostic Studies:  CBC:   Lab Results   Component Value Date/Time    WBC 7.60 09/16/2024 03:15 PM    RBC 3.42 (L) 09/16/2024 03:15 PM    HGB 11.1 (L) 09/16/2024 03:15 PM    HCT 32.4 (L) 09/16/2024 03:15 PM    MCV 95 09/16/2024 03:15 PM    MCH 32.5 09/16/2024 03:15 PM    MCHC 34.3 09/16/2024 03:15 PM    RDW 12.7 09/16/2024 03:15 PM    MPV 10.8 09/16/2024 03:15 PM     09/16/2024 03:15 PM    NRBC 0 09/16/2024 03:15 PM    NEUTOPHILPCT 46 09/16/2024 03:15 PM    LYMPHOPCT 44 09/16/2024 03:15 PM    MONOPCT 8 09/16/2024 03:15 PM    EOSPCT 1 09/16/2024 03:15 PM    BASOPCT 1  "09/16/2024 03:15 PM    NEUTROABS 3.50 09/16/2024 03:15 PM    LYMPHSABS 3.33 09/16/2024 03:15 PM    MONOSABS 0.61 09/16/2024 03:15 PM    EOSABS 0.10 09/16/2024 03:15 PM     Chemistry Profile:   Lab Results   Component Value Date/Time    K 3.8 09/17/2024 07:45 AM     09/17/2024 07:45 AM    CO2 25 09/17/2024 07:45 AM    CO2 23 08/24/2021 05:04 PM    BUN 18 09/17/2024 07:45 AM    CREATININE 1.17 09/17/2024 07:45 AM    GLUC 101 10/25/2023 02:03 PM    GLUF 136 (H) 09/17/2024 07:45 AM    GLUCOSE 156 (H) 08/24/2021 05:04 PM    CALCIUM 9.2 09/17/2024 07:45 AM    CORRECTEDCA 9.9 02/14/2023 04:41 AM    MG 2.2 02/13/2023 04:42 AM    PHOS 2.9 02/13/2023 04:42 AM    AST 23 09/17/2024 07:45 AM    ALT 18 09/17/2024 07:45 AM    ALKPHOS 134 (H) 09/17/2024 07:45 AM    EGFR 49 09/17/2024 07:45 AM     Endocrine Studies:   Lab Results   Component Value Date/Time    HGBA1C 5.4 09/16/2024 03:15 PM    QNO6CJUKKCED 1.040 12/22/2021 06:13 AM    TRIG 156 (H) 09/17/2024 07:45 AM    CHOLESTEROL 161 09/17/2024 07:45 AM    HDL 38 (L) 09/17/2024 07:45 AM    LDLCALC 92 09/17/2024 07:45 AM    NONHDLC 205 01/08/2021 08:36 AM    [id number] 7.4 (L) 01/08/2021 08:36 AM     Health Maintenance:   Lab Results   Component Value Date/Time    HEPCAB Non-reactive 01/10/2018 10:19 AM     Health Maintenance: No results for input(s): \"PSA\", \"HEPCAB\" in the last 8784 hours.    Current Medications     Current Outpatient Medications:   •  Diclofenac Sodium (VOLTAREN) 1 %, Apply 2 g topically 4 (four) times a day, Disp: 100 g, Rfl: 1  •  lidocaine (Lidoderm) 5 %, Apply 1 patch topically over 12 hours daily Remove & Discard patch within 12 hours or as directed by MD, Disp: 30 patch, Rfl: 1  •  methocarbamol (ROBAXIN) 500 mg tablet, Take 2 tablets (1,000 mg total) by mouth 4 (four) times a day for 5 days, Disp: 40 tablet, Rfl: 0  •  traMADol (Ultram) 50 mg tablet, Take 1 tablet (50 mg total) by mouth every 6 (six) hours as needed for moderate pain, Disp: 10 " tablet, Rfl: 0  •  amLODIPine (NORVASC) 10 mg tablet, Take 1 tablet (10 mg total) by mouth daily, Disp: 90 tablet, Rfl: 3  •  carvedilol (COREG) 6.25 mg tablet, Take 2 tablets (12.5 mg total) by mouth 2 (two) times a day with meals, Disp: 60 tablet, Rfl: 3  •  cholestyramine (QUESTRAN) 4 GM/DOSE powder, Take 1 packet (4 g total) by mouth 2 (two) times a day with meals, Disp: 180 packet, Rfl: 0  •  clopidogrel (Plavix) 75 mg tablet, Take 1 tablet (75 mg total) by mouth daily, Disp: 30 tablet, Rfl: 1  •  gabapentin (Neurontin) 100 mg capsule, Take 3 capsules (300 mg total) by mouth 2 (two) times a day If no improvement, increase to 200 mg, Disp: 30 capsule, Rfl: 3  •  lisinopril (ZESTRIL) 40 mg tablet, Take 1 tablet (40 mg total) by mouth daily, Disp: 90 tablet, Rfl: 3  •  pancrelipase, Lip-Prot-Amyl, (CREON) 12,000 units capsule, Take 12,000 units of lipase by mouth 3 (three) times a day with meals, Disp: 500 capsule, Rfl: 3  •  pantoprazole (PROTONIX) 40 mg tablet, Take 1 tablet (40 mg total) by mouth daily in the early morning, Disp: 90 tablet, Rfl: 3  •  potassium chloride (K-DUR,KLOR-CON) 10 mEq tablet, take 2 tablets by mouth daily, Disp: 90 tablet, Rfl: 1    Health Maintenance     Health Maintenance   Topic Date Due   • Osteoporosis Screening  Never done   • Zoster Vaccine (1 of 2) Never done   • RSV Vaccine Age 60+ Years (1 - 1-dose 60+ series) Never done   • OT PLAN OF CARE  10/14/2020   • BMI: Followup Plan  03/15/2024   • COVID-19 Vaccine (4 - 2023-24 season) 09/01/2024   • Influenza Vaccine (1) 09/01/2024   • Breast Cancer Screening: Mammogram  11/02/2024   • Fall Risk  08/28/2025   • Depression Screening  08/28/2025   • Urinary Incontinence Screening  08/28/2025   • Medicare Annual Wellness Visit (AWV)  08/28/2025   • BMI: Adult  09/30/2025   • Colorectal Cancer Screening  06/16/2026   • HIV Screening  Completed   • Hepatitis C Screening  Completed   • Pneumococcal Vaccine: 65+ Years  Completed   • RSV  Vaccine age 0-20 Months  Aged Out   • HIB Vaccine  Aged Out   • IPV Vaccine  Aged Out   • Hepatitis A Vaccine  Aged Out   • Meningococcal ACWY Vaccine  Aged Out   • HPV Vaccine  Aged Out   • Cervical Cancer Screening  Discontinued     Immunization History   Administered Date(s) Administered   • COVID-19 PFIZER VACCINE 0.3 ML IM 03/26/2021, 04/16/2021, 12/11/2021   • INFLUENZA 10/27/2017, 03/15/2023, 10/18/2023   • Influenza Quadrivalent, 6-35 Months IM 10/27/2017   • Influenza, injectable, quadrivalent, preservative free 0.5 mL 10/18/2023   • Influenza, recombinant, quadrivalent,injectable, preservative free 11/25/2019, 10/14/2020, 11/15/2021, 03/15/2023   • Pneumococcal Conjugate Vaccine 20-valent (Pcv20), Polysace 03/15/2023   • Tdap 02/08/2018         ----------------------------------------------------  Reese Loco DO, PGY-3  Internal Medicine Residency   [facility] [place] [place]

## 2024-10-01 DIAGNOSIS — M25.512 ACUTE PAIN OF LEFT SHOULDER: Primary | ICD-10-CM

## 2024-10-01 RX ORDER — TIZANIDINE HYDROCHLORIDE 2 MG/1
2 CAPSULE, GELATIN COATED ORAL 3 TIMES DAILY PRN
Qty: 15 CAPSULE | Refills: 0 | Status: SHIPPED | OUTPATIENT
Start: 2024-10-01 | End: 2024-10-06

## 2024-10-07 DIAGNOSIS — M25.512 ACUTE PAIN OF LEFT SHOULDER: ICD-10-CM

## 2024-10-11 ENCOUNTER — TELEPHONE (OUTPATIENT)
Age: 64
End: 2024-10-11

## 2024-10-11 NOTE — TELEPHONE ENCOUNTER
10/11/24    Patient Daughter Miss. Martins with [facility] [employer] [facility] called office to verify if she had an appt, due to a Text message that Patient Received to confirm VIRTUAL Appt.    Patient did not have an appt, Message was intended to Patient .   haves a VIRTUAL APPT Today 10/11/24, and appt was confirmed.      Any questions, please contact Patient or Daughter.   Thank You.

## 2024-12-11 ENCOUNTER — CONSULT (OUTPATIENT)
Dept: MULTI SPECIALTY CLINIC | Facility: CLINIC | Age: 64
End: 2024-12-11

## 2024-12-11 DIAGNOSIS — L28.0 LICHEN SIMPLEX CHRONICUS: Primary | ICD-10-CM

## 2024-12-11 DIAGNOSIS — L40.9 PSORIASIS: ICD-10-CM

## 2024-12-11 PROCEDURE — NC001 PR NO CHARGE: Performed by: SPECIALIST

## 2024-12-11 RX ORDER — CLOBETASOL PROPIONATE 0.5 MG/G
CREAM TOPICAL 2 TIMES DAILY
Qty: 45 G | Refills: 2 | Status: SHIPPED | OUTPATIENT
Start: 2024-12-11

## 2024-12-11 NOTE — PROGRESS NOTES
[facility] Note     Patient Name: Jen Crawford  Encounter Date: 12/11/2024     Have you been cared for by a [facility] Dermatologist in the last 3 years and, if so, which description applies to you?      Whom besides the patient is providing clinical information about today's encounter?   Other:  , daughter     Physical Exam and Assessment/Plan by Diagnosis:      LICHEN SIMPLEX CHRONICUS  Physical Exam:  Anatomic Location Affected:  well-defined, symmetric purple plaques with lichenification on bilateral wrists   Morphological Description:    Pertinent Positives:  Pertinent Negatives:    Additional History of Present Condition:  Present for years, rash itches. Occasionally applies topical corticosteroids and vaseline to wrists with relief. Denies any jewelry ever worn at the sites.     Assessment and Plan:  Based on a thorough discussion of this condition and the management approach to it (including a comprehensive discussion of the known risks, side effects and potential benefits of treatment), the patient (family) agrees to implement the following specific plan:  Start clobetasol 0.05% cream BID for 2 weeks, with 1-week break between every 2 weeks.  Follow up as needed      MELANOCYTIC NEVUS     -Relevant exam: on the right third digit there is a brown macule with darker central area. Patient reports lesion is not new, present for years, and no changes in appearance.   - Exam and clinical history consistent with melanocytic nevus  - Counseled to return to clinic prior to scheduled appointment should any of these lesions change or should any new lesions of concern arise  - Monitor lesion for changes         Brook Lara MD   PGY-2 Dermatology Resident

## 2024-12-29 DIAGNOSIS — I51.7 LEFT VENTRICULAR HYPERTROPHY: ICD-10-CM

## 2024-12-30 RX ORDER — CLOPIDOGREL BISULFATE 75 MG/1
75 TABLET ORAL DAILY
Qty: 30 TABLET | Refills: 1 | Status: SHIPPED | OUTPATIENT
Start: 2024-12-30

## 2025-01-05 DIAGNOSIS — K86.89 PANCREATIC MASS: ICD-10-CM

## 2025-01-06 RX ORDER — CHOLESTYRAMINE 4 G/9G
POWDER, FOR SUSPENSION ORAL
Qty: 180 PACKET | Refills: 1 | Status: SHIPPED | OUTPATIENT
Start: 2025-01-06

## 2025-01-10 ENCOUNTER — TELEPHONE (OUTPATIENT)
Dept: CARDIOLOGY CLINIC | Facility: CLINIC | Age: 65
End: 2025-01-10

## 2025-01-22 ENCOUNTER — TELEPHONE (OUTPATIENT)
Dept: INTERNAL MEDICINE CLINIC | Facility: CLINIC | Age: 65
End: 2025-01-22

## 2025-01-28 ENCOUNTER — TELEPHONE (OUTPATIENT)
Dept: INTERNAL MEDICINE CLINIC | Facility: CLINIC | Age: 65
End: 2025-01-28

## 2025-01-28 NOTE — TELEPHONE ENCOUNTER
Patient has been having shoulder to chest pain on her left side. Has an appt for tomorrow at 1pm. Triaged call. Please call patient [phone] she speaks Greenlandic

## 2025-01-29 ENCOUNTER — TELEPHONE (OUTPATIENT)
Dept: INTERNAL MEDICINE CLINIC | Facility: CLINIC | Age: 65
End: 2025-01-29

## 2025-01-29 ENCOUNTER — OFFICE VISIT (OUTPATIENT)
Dept: INTERNAL MEDICINE CLINIC | Facility: CLINIC | Age: 65
End: 2025-01-29

## 2025-01-29 VITALS
BODY MASS INDEX: 26.22 KG/M2 | HEIGHT: 63 IN | WEIGHT: 148 LBS | SYSTOLIC BLOOD PRESSURE: 151 MMHG | HEART RATE: 74 BPM | DIASTOLIC BLOOD PRESSURE: 72 MMHG | TEMPERATURE: 98 F

## 2025-01-29 DIAGNOSIS — I42.2 HYPERTROPHIC CARDIOMYOPATHY (HCC): ICD-10-CM

## 2025-01-29 DIAGNOSIS — M25.512 ACUTE PAIN OF LEFT SHOULDER: ICD-10-CM

## 2025-01-29 DIAGNOSIS — I71.22 ANEURYSM OF AORTIC ARCH WITHOUT RUPTURE (HCC): ICD-10-CM

## 2025-01-29 DIAGNOSIS — I10 HYPERTENSION, UNSPECIFIED TYPE: ICD-10-CM

## 2025-01-29 DIAGNOSIS — R07.9 CHEST PAIN, UNSPECIFIED TYPE: Primary | ICD-10-CM

## 2025-01-29 DIAGNOSIS — Z85.89 HISTORY OF NEUROENDOCRINE CANCER: ICD-10-CM

## 2025-01-29 DIAGNOSIS — Z12.31 ENCOUNTER FOR SCREENING MAMMOGRAM FOR BREAST CANCER: ICD-10-CM

## 2025-01-29 DIAGNOSIS — Z13.820 OSTEOPOROSIS SCREENING: ICD-10-CM

## 2025-01-29 PROCEDURE — 99214 OFFICE O/P EST MOD 30 MIN: CPT | Performed by: INTERNAL MEDICINE

## 2025-01-29 PROCEDURE — G2211 COMPLEX E/M VISIT ADD ON: HCPCS | Performed by: INTERNAL MEDICINE

## 2025-01-29 PROCEDURE — 93000 ELECTROCARDIOGRAM COMPLETE: CPT | Performed by: INTERNAL MEDICINE

## 2025-01-29 RX ORDER — CARVEDILOL 25 MG/1
25 TABLET ORAL 2 TIMES DAILY WITH MEALS
Qty: 60 TABLET | Refills: 2 | Status: SHIPPED | OUTPATIENT
Start: 2025-01-29 | End: 2025-04-29

## 2025-01-29 RX ORDER — LIDOCAINE 50 MG/G
1 PATCH TOPICAL DAILY
Qty: 30 PATCH | Refills: 1 | Status: SHIPPED | OUTPATIENT
Start: 2025-01-29

## 2025-01-29 RX ORDER — MENTHOL 1.4 %
ADHESIVE PATCH, MEDICATED TOPICAL
Qty: 57 G | Refills: 2 | Status: SHIPPED | OUTPATIENT
Start: 2025-01-29 | End: 2025-02-12

## 2025-01-29 NOTE — TELEPHONE ENCOUNTER
Daughter Lakshmi who is on the med comm consent wanted to speak to Dr Gilmore. She is very upset that her mom said she was taking her medications. Daughter thought the doctor wanted to stop her pantoprazole to see if that changes her results. But the daughter stated mom hasn't been taking that medication or any for that matter. She said she has only taken 11 Pantoprazole pills in 3 months. Daughter is very upset and worried that the cancerous cells are not because of the medication.   Please call daughter [phone]

## 2025-01-29 NOTE — TELEPHONE ENCOUNTER
Late documentation: 1/29/25 at 11am    Called and spoke to patient using  #[phone] Per patient she is having constant chest pain with shortness of breath. Patient states it started yesterday 1/28/25. Patient states her left arm also feels funny. I advised patient with these symptoms she needs to go to ED to be evaluated. Patient refuses ED visit and wants to be seen in office by a provider. I informed patient multiple times with those symptoms she should go to ED but patient still verbalized refusal and wants to be seen in office.

## 2025-01-30 NOTE — ASSESSMENT & PLAN NOTE
Patient with history of pancreatic neuroendocrine tumor s/p whipple  Chromogranin A found to be elevated late last year and repeat imaging without concerns for recurrent tumor  Pantoprazole is linked with elevated chromogranin levels    Plan  Advised patient to hold pantoprazole for 2 weeks and then obtain repeat chromogranin levels

## 2025-01-30 NOTE — PROGRESS NOTES
Name: Jen Crawford      : 1960      MRN: [id number]  Encounter Provider: Suraj Feuntes MD  Encounter Date: 2025   Encounter department: [facility]    Assessment & Plan  Acute pain of left shoulder    Chest pain, unspecified type  Patient with complains of central chest pain and left upper back/shoulder pain since shoveling snow 3 days ago  No associated shortness of breath, palpitations, diaphoresis, weakness, nausea, vomiting, dizziness, radiation to left arm  No worsening with exertion / improvement with radiation; mentions constant pain since shoveling snow    On exam reproducible pain at left 4th costochondral joint and significant tenderness over the left trapezius muscle  EKG with baseline normal sinus rhythm, left axis deviation and LVH by voltage criteria    Impression : musculoskeletal pain    Plan  Bengay and lidocaine patches  Tylenol round the clock    Hypertension, unspecified type  Uncontrolled hypertension on amlodipine 10 mg OD, liai and Coreg 12.5 mg BID    BP today 151/72; consistent after recheck  Patient mentions 140s/80s at home    Plan  Increase coreg to 25 mg BID  Continue amlodipine 10 mg OD  Monitor each visit    Hypertrophic cardiomyopathy (HCC)  Patient with concern for hypertrophic cardiomyopathy  Unclear if obstructive  Unclear if genetic component vs secondary to uncontrolled hypertension  Referred to HCM specialist for further evaluation but patient missed appointment    Plan  Encouraged to follow up with HCM specialist  Continue cardiology follow up  History of neuroendocrine cancer  Patient with history of pancreatic neuroendocrine tumor s/p whipple  Chromogranin A found to be elevated late last year and repeat imaging without concerns for recurrent tumor  Pantoprazole is linked with elevated chromogranin levels    Plan  Advised patient to hold pantoprazole for 2 weeks and then obtain repeat chromogranin levels  Encounter for  screening mammogram for breast cancer    Orders:    Mammo screening bilateral w 3d and cad; Future    Osteoporosis screening    Orders:    DXA bone density spine hip and pelvis; Future    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 Pertinent negatives include no abdominal pain, cough, fever, palpitations, shortness of breath or vomiting.   Pertinent negatives for past medical history include no seizures.     Review of Systems   Constitutional:  Negative for chills and fever.   HENT:  Negative for ear pain and sore throat.    Eyes:  Negative for pain and visual disturbance.   Respiratory:  Negative for cough and shortness of breath.    Cardiovascular:  Positive for chest pain. Negative for palpitations.   Gastrointestinal:  Negative for abdominal pain and vomiting.   Genitourinary:  Negative for dysuria and hematuria.   Musculoskeletal:  Positive for back pain. Negative for arthralgias.   Skin:  Negative for color change and rash.   Neurological:  Negative for seizures and syncope.   All other systems reviewed and are negative.    Past Medical History:   Diagnosis Date    Back ache     Cancer (HCC)     Hyperlipidemia     Hypertension     Pancreatic mass 4/12/2021     Past Surgical History:   Procedure Laterality Date    APPENDECTOMY      BILATERAL OOPHORECTOMY      BREAST SURGERY Right     partial mastectomy     COLONOSCOPY  06/17/2021    HERNIA REPAIR      HYSTERECTOMY      LAPAROTOMY N/A 08/24/2021    Procedure: LAPAROTOMY EXPLORATORY;   Surgeon: René Gurrola MD;  Location: [facility];  Service: Surgical Oncology    MASTECTOMY Right     partial    WHIPPLE PROCEDURE/PANCREATICO-DUODENECTOMY N/A 08/24/2021    Procedure: WHIPPLE PROCEDURE/PANCREATICO-DUODENECTOMY;  Surgeon: René Gurrola MD;  Location: [facility];  Service: Surgical Oncology     Family History   Problem Relation Age of Onset    Hypertension Mother     Heart disease Mother     Diabetes Mother     Esophageal cancer Father     Uterine cancer Maternal Grandmother     Liver cancer Brother     Uterine cancer Maternal Aunt      Social History     Tobacco Use    Smoking status: Never    Smokeless tobacco: Never    Tobacco comments:     quit 15 years ago // PT IS STATING SHE NEVER SMOKED 11/2023   Vaping Use    Vaping status: Never Used   Substance and Sexual Activity    Alcohol use: Not Currently    Drug use: No    Sexual activity: Not Currently     Current Outpatient Medications on File Prior to Visit   Medication Sig    amLODIPine (NORVASC) 10 mg tablet Take 1 tablet (10 mg total) by mouth daily    cholestyramine (QUESTRAN) 4 g packet TAKE 1 PACKET BY MOUTH 2 TIMES A DAY WITH MEALS.    clobetasol (TEMOVATE) 0.05 % cream Apply topically 2 (two) times a day Apply to wrists. Take a 1-week break between each 2-week application.    clopidogrel (PLAVIX) 75 mg tablet TAKE 1 TABLET BY MOUTH EVERY DAY    Diclofenac Sodium (VOLTAREN) 1 % APPLY 2 G TOPICALLY 4 (FOUR) TIMES A DAY FOR 10 DAYS    gabapentin (Neurontin) 100 mg capsule Take 3 capsules (300 mg total) by mouth 2 (two) times a day If no improvement, increase to 200 mg    lisinopril (ZESTRIL) 40 mg tablet Take 1 tablet (40 mg total) by mouth daily    pancrelipase, Lip-Prot-Amyl, (CREON) 12,000 units capsule Take 12,000 units of lipase by mouth 3 (three) times a day with meals    potassium chloride (K-DUR,KLOR-CON) 10 mEq tablet take 2 tablets by mouth daily    [DISCONTINUED] amoxicillin (AMOXIL) 500 mg capsule Take 500 mg by mouth 3  "(three) times a day    [DISCONTINUED] carvedilol (COREG) 6.25 mg tablet Take 2 tablets (12.5 mg total) by mouth 2 (two) times a day with meals    [DISCONTINUED] ibuprofen (MOTRIN) 600 mg tablet Take 600 mg by mouth every 8 (eight) hours as needed    [DISCONTINUED] lidocaine (Lidoderm) 5 % Apply 1 patch topically over 12 hours daily Remove & Discard patch within 12 hours or as directed by MD    [DISCONTINUED] pantoprazole (PROTONIX) 40 mg tablet Take 1 tablet (40 mg total) by mouth daily in the early morning    [DISCONTINUED] TiZANidine (ZANAFLEX) 2 MG capsule Take 1 capsule (2 mg total) by mouth 3 (three) times a day as needed for muscle spasms for up to 5 days    [DISCONTINUED] traMADol (Ultram) 50 mg tablet Take 1 tablet (50 mg total) by mouth every 6 (six) hours as needed for moderate pain     No Known Allergies  Immunization History   Administered Date(s) Administered    COVID-19 PFIZER VACCINE 0.3 ML IM 03/26/2021, 04/16/2021, 12/11/2021    INFLUENZA 10/27/2017, 03/15/2023, 10/18/2023    Influenza Quadrivalent, 6-35 Months IM 10/27/2017    Influenza, injectable, quadrivalent, preservative free 0.5 mL 10/18/2023    Influenza, recombinant, quadrivalent,injectable, preservative free 11/25/2019, 10/14/2020, 11/15/2021, 03/15/2023    Pneumococcal Conjugate Vaccine 20-valent (Pcv20), Polysace 03/15/2023    Tdap 02/08/2018     Objective   /72 (BP Location: Right arm, Patient Position: Sitting, Cuff Size: Adult)   Pulse 74   Temp 98 °F (36.7 °C) (Temporal)   Ht 5' 3\" (1.6 m)   Wt 67.1 kg (148 lb)   LMP  (LMP Unknown)   BMI 26.22 kg/m²     Physical Exam  Vitals and nursing note reviewed.   Constitutional:       General: She is not in acute distress.     Appearance: She is well-developed.   HENT:      Head: Normocephalic and atraumatic.   Eyes:      Conjunctiva/sclera: Conjunctivae normal.   Cardiovascular:      Rate and Rhythm: Normal rate and regular rhythm.      Heart sounds: Murmur (systolic) heard. "   Pulmonary:      Effort: Pulmonary effort is normal. No respiratory distress.      Breath sounds: Normal breath sounds.   Abdominal:      Palpations: Abdomen is soft.      Tenderness: There is no abdominal tenderness.   Musculoskeletal:         General: Tenderness (left upper back) present. No swelling.      Cervical back: Neck supple. Tenderness (left 4th costochondral joint) present.   Skin:     General: Skin is warm and dry.      Capillary Refill: Capillary refill takes less than 2 seconds.   Neurological:      Mental Status: She is alert.   Psychiatric:         Mood and Affect: Mood normal.       Suraj Fuentes MD  PGY-3, Internal Medicine  [facility]

## 2025-01-30 NOTE — ASSESSMENT & PLAN NOTE
Patient with concern for hypertrophic cardiomyopathy  Unclear if obstructive  Unclear if genetic component vs secondary to uncontrolled hypertension  Referred to HCM specialist for further evaluation but patient missed appointment    Plan  Encouraged to follow up with HCM specialist  Continue cardiology follow up

## 2025-01-30 NOTE — ASSESSMENT & PLAN NOTE
Found in 2023 and seen by thoracic surgery who recommended no indication for surgical intervention  Due for follow up CT chest and thoracic surgery visit    Plan  To address next visit  Continue plavix 75 mg OD

## 2025-01-30 NOTE — ASSESSMENT & PLAN NOTE
Uncontrolled hypertension on amlodipine 10 mg OD, liai and Coreg 12.5 mg BID    BP today 151/72; consistent after recheck  Patient mentions 140s/80s at home    Plan  Increase coreg to 25 mg BID  Continue amlodipine 10 mg OD  Monitor each visit

## 2025-02-03 ENCOUNTER — TELEPHONE (OUTPATIENT)
Dept: CARDIAC SURGERY | Facility: CLINIC | Age: 65
End: 2025-02-03

## 2025-02-03 NOTE — TELEPHONE ENCOUNTER
Called, unable to leave a message.    Patient needs an aneurysm follow up appointment w/ Dr. Barnes.  Alos needs to have a Ct chest done prior to appointment.  CT chest order is in the chart.    Once this is scheduled , we can offer the patient an appointment in aneurysm clinic.

## 2025-02-17 ENCOUNTER — APPOINTMENT (OUTPATIENT)
Dept: LAB | Facility: CLINIC | Age: 65
End: 2025-02-17
Payer: COMMERCIAL

## 2025-02-17 DIAGNOSIS — Z85.89 HISTORY OF NEUROENDOCRINE CANCER: ICD-10-CM

## 2025-02-17 PROCEDURE — 86316 IMMUNOASSAY TUMOR OTHER: CPT

## 2025-02-19 LAB — CGA SERPL-MCNC: 93.1 NG/ML (ref 0–101.8)

## 2025-02-20 ENCOUNTER — TELEPHONE (OUTPATIENT)
Dept: INTERNAL MEDICINE CLINIC | Facility: CLINIC | Age: 65
End: 2025-02-20

## 2025-02-21 ENCOUNTER — APPOINTMENT (EMERGENCY)
Dept: RADIOLOGY | Facility: HOSPITAL | Age: 65
DRG: 312 | End: 2025-02-21
Payer: COMMERCIAL

## 2025-02-21 ENCOUNTER — HOSPITAL ENCOUNTER (INPATIENT)
Facility: HOSPITAL | Age: 65
LOS: 2 days | Discharge: HOME | DRG: 312 | End: 2025-02-24
Attending: INTERNAL MEDICINE | Admitting: HOSPITALIST
Payer: COMMERCIAL

## 2025-02-21 DIAGNOSIS — R55 SYNCOPE AND COLLAPSE: Primary | ICD-10-CM

## 2025-02-21 DIAGNOSIS — I10 HYPERTENSION, UNSPECIFIED TYPE: ICD-10-CM

## 2025-02-21 DIAGNOSIS — R29.90 STROKE-LIKE SYMPTOMS: ICD-10-CM

## 2025-02-21 PROBLEM — R42 VERTIGO: Status: ACTIVE | Noted: 2025-02-21
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
## 2025-02-21 LAB
2HR DELTA HS TROPONIN: 0 NG/L
4HR DELTA HS TROPONIN: 0 NG/L
ANION GAP SERPL CALCULATED.3IONS-SCNC: 10 MMOL/L (ref 4–13)
APTT PPP: 27 SECONDS (ref 23–34)
ATRIAL RATE: 64 BPM
BASE EXCESS BLDA CALC-SCNC: 1 MMOL/L (ref -2–3)
BUN SERPL-MCNC: 17 MG/DL (ref 5–25)
CA-I BLD-SCNC: 1.25 MMOL/L (ref 1.12–1.32)
CALCIUM SERPL-MCNC: 10.1 MG/DL (ref 8.4–10.2)
CARDIAC TROPONIN I PNL SERPL HS: 8 NG/L (ref ?–50)
CHLORIDE SERPL-SCNC: 101 MMOL/L (ref 96–108)
CO2 SERPL-SCNC: 27 MMOL/L (ref 21–32)
CREAT SERPL-MCNC: 1.31 MG/DL (ref 0.6–1.3)
ERYTHROCYTE [DISTWIDTH] IN BLOOD BY AUTOMATED COUNT: 12.3 % (ref 11.6–15.1)
GFR SERPL CREATININE-BSD FRML MDRD: 42 ML/MIN/1.73SQ M
GLUCOSE SERPL-MCNC: 148 MG/DL (ref 65–140)
GLUCOSE SERPL-MCNC: 153 MG/DL (ref 65–140)
HCO3 BLDA-SCNC: 25.7 MMOL/L (ref 24–30)
HCT VFR BLD AUTO: 37.3 % (ref 34.8–46.1)
HCT VFR BLD CALC: 38 % (ref 34.8–46.1)
HGB BLD-MCNC: 12.7 G/DL (ref 11.5–15.4)
HGB BLDA-MCNC: 12.9 G/DL (ref 11.5–15.4)
INR PPP: 1.04 (ref 0.85–1.19)
MCH RBC QN AUTO: 32.2 PG (ref 26.8–34.3)
MCHC RBC AUTO-ENTMCNC: 34 G/DL (ref 31.4–37.4)
MCV RBC AUTO: 94 FL (ref 82–98)
P AXIS: 64 DEGREES
PCO2 BLD: 27 MMOL/L (ref 21–32)
PCO2 BLD: 40.8 MM HG (ref 42–50)
PH BLD: 7.41 [PH] (ref 7.3–7.4)
PLATELET # BLD AUTO: 268 THOUSANDS/UL (ref 149–390)
PMV BLD AUTO: 10.5 FL (ref 8.9–12.7)
PO2 BLD: 41 MM HG (ref 35–45)
POTASSIUM BLD-SCNC: 4.3 MMOL/L (ref 3.5–5.3)
POTASSIUM SERPL-SCNC: 4.2 MMOL/L (ref 3.5–5.3)
PR INTERVAL: 204 MS
PROTHROMBIN TIME: 13.9 SECONDS (ref 12.3–15)
QRS AXIS: -13 DEGREES
QRSD INTERVAL: 92 MS
QT INTERVAL: 414 MS
QTC INTERVAL: 427 MS
RBC # BLD AUTO: 3.95 MILLION/UL (ref 3.81–5.12)
SAO2 % BLD FROM PO2: 77 % (ref 60–85)
SODIUM BLD-SCNC: 139 MMOL/L (ref 136–145)
SODIUM SERPL-SCNC: 138 MMOL/L (ref 135–147)
SPECIMEN SOURCE: ABNORMAL
T WAVE AXIS: 38 DEGREES
VENTRICULAR RATE: 64 BPM
WBC # BLD AUTO: 10.26 THOUSAND/UL (ref 4.31–10.16)

## 2025-02-21 PROCEDURE — 99205 OFFICE O/P NEW HI 60 MIN: CPT | Performed by: STUDENT IN AN ORGANIZED HEALTH CARE EDUCATION/TRAINING PROGRAM

## 2025-02-21 PROCEDURE — EDAIR PR ED AIR: Performed by: EMERGENCY MEDICINE

## 2025-02-21 PROCEDURE — 85610 PROTHROMBIN TIME: CPT | Performed by: STUDENT IN AN ORGANIZED HEALTH CARE EDUCATION/TRAINING PROGRAM

## 2025-02-21 PROCEDURE — 99204 OFFICE O/P NEW MOD 45 MIN: CPT | Performed by: STUDENT IN AN ORGANIZED HEALTH CARE EDUCATION/TRAINING PROGRAM

## 2025-02-21 PROCEDURE — 96375 TX/PRO/DX INJ NEW DRUG ADDON: CPT

## 2025-02-21 PROCEDURE — 84295 ASSAY OF SERUM SODIUM: CPT

## 2025-02-21 PROCEDURE — 36415 COLL VENOUS BLD VENIPUNCTURE: CPT | Performed by: STUDENT IN AN ORGANIZED HEALTH CARE EDUCATION/TRAINING PROGRAM

## 2025-02-21 PROCEDURE — 85730 THROMBOPLASTIN TIME PARTIAL: CPT | Performed by: STUDENT IN AN ORGANIZED HEALTH CARE EDUCATION/TRAINING PROGRAM

## 2025-02-21 PROCEDURE — 84132 ASSAY OF SERUM POTASSIUM: CPT

## 2025-02-21 PROCEDURE — 85027 COMPLETE CBC AUTOMATED: CPT | Performed by: STUDENT IN AN ORGANIZED HEALTH CARE EDUCATION/TRAINING PROGRAM

## 2025-02-21 PROCEDURE — 96374 THER/PROPH/DIAG INJ IV PUSH: CPT

## 2025-02-21 PROCEDURE — 82330 ASSAY OF CALCIUM: CPT

## 2025-02-21 PROCEDURE — 93005 ELECTROCARDIOGRAM TRACING: CPT

## 2025-02-21 PROCEDURE — 99285 EMERGENCY DEPT VISIT HI MDM: CPT

## 2025-02-21 PROCEDURE — 70498 CT ANGIOGRAPHY NECK: CPT

## 2025-02-21 PROCEDURE — 80048 BASIC METABOLIC PNL TOTAL CA: CPT | Performed by: STUDENT IN AN ORGANIZED HEALTH CARE EDUCATION/TRAINING PROGRAM

## 2025-02-21 PROCEDURE — 70496 CT ANGIOGRAPHY HEAD: CPT

## 2025-02-21 PROCEDURE — 71045 X-RAY EXAM CHEST 1 VIEW: CPT

## 2025-02-21 PROCEDURE — 82947 ASSAY GLUCOSE BLOOD QUANT: CPT

## 2025-02-21 PROCEDURE — 85014 HEMATOCRIT: CPT

## 2025-02-21 PROCEDURE — 82803 BLOOD GASES ANY COMBINATION: CPT

## 2025-02-21 PROCEDURE — 93010 ELECTROCARDIOGRAM REPORT: CPT | Performed by: INTERNAL MEDICINE

## 2025-02-21 PROCEDURE — 84484 ASSAY OF TROPONIN QUANT: CPT | Performed by: STUDENT IN AN ORGANIZED HEALTH CARE EDUCATION/TRAINING PROGRAM

## 2025-02-21 PROCEDURE — 84484 ASSAY OF TROPONIN QUANT: CPT

## 2025-02-21 RX ORDER — ACETAMINOPHEN 325 MG/1
975 TABLET ORAL ONCE
Status: COMPLETED | OUTPATIENT
Start: 2025-02-21 | End: 2025-02-21

## 2025-02-21 RX ORDER — ATORVASTATIN CALCIUM 40 MG/1
40 TABLET, FILM COATED ORAL
Status: DISCONTINUED | OUTPATIENT
Start: 2025-02-21 | End: 2025-02-24 | Stop reason: HOSPADM

## 2025-02-21 RX ORDER — CARVEDILOL 25 MG/1
25 TABLET ORAL 2 TIMES DAILY WITH MEALS
Status: DISCONTINUED | OUTPATIENT
Start: 2025-02-21 | End: 2025-02-22

## 2025-02-21 RX ORDER — CARVEDILOL 25 MG/1
25 TABLET ORAL 2 TIMES DAILY WITH MEALS
Status: CANCELLED | OUTPATIENT
Start: 2025-02-21

## 2025-02-21 RX ORDER — CLOPIDOGREL BISULFATE 75 MG/1
75 TABLET ORAL DAILY
Status: DISCONTINUED | OUTPATIENT
Start: 2025-02-22 | End: 2025-02-21

## 2025-02-21 RX ORDER — AMLODIPINE BESYLATE 5 MG/1
10 TABLET ORAL DAILY
Status: CANCELLED | OUTPATIENT
Start: 2025-02-22

## 2025-02-21 RX ORDER — ASPIRIN 81 MG/1
81 TABLET, CHEWABLE ORAL DAILY
Status: DISCONTINUED | OUTPATIENT
Start: 2025-02-21 | End: 2025-02-24 | Stop reason: HOSPADM

## 2025-02-21 RX ORDER — DIAZEPAM 5 MG/1
5 TABLET ORAL EVERY 6 HOURS PRN
Status: DISCONTINUED | OUTPATIENT
Start: 2025-02-21 | End: 2025-02-22

## 2025-02-21 RX ORDER — CLOPIDOGREL BISULFATE 75 MG/1
75 TABLET ORAL DAILY
Status: DISCONTINUED | OUTPATIENT
Start: 2025-02-21 | End: 2025-02-23

## 2025-02-21 RX ORDER — LISINOPRIL 20 MG/1
40 TABLET ORAL DAILY
Status: DISCONTINUED | OUTPATIENT
Start: 2025-02-22 | End: 2025-02-24 | Stop reason: HOSPADM

## 2025-02-21 RX ORDER — DIAZEPAM 10 MG/2ML
2.5 INJECTION, SOLUTION INTRAMUSCULAR; INTRAVENOUS ONCE
Status: DISCONTINUED | OUTPATIENT
Start: 2025-02-21 | End: 2025-02-22

## 2025-02-21 RX ORDER — SODIUM CHLORIDE, SODIUM GLUCONATE, SODIUM ACETATE, POTASSIUM CHLORIDE, MAGNESIUM CHLORIDE, SODIUM PHOSPHATE, DIBASIC, AND POTASSIUM PHOSPHATE .53; .5; .37; .037; .03; .012; .00082 G/100ML; G/100ML; G/100ML; G/100ML; G/100ML; G/100ML; G/100ML
1000 INJECTION, SOLUTION INTRAVENOUS ONCE
Status: COMPLETED | OUTPATIENT
Start: 2025-02-21 | End: 2025-02-21

## 2025-02-21 RX ORDER — AMLODIPINE BESYLATE 10 MG/1
10 TABLET ORAL DAILY
Status: DISCONTINUED | OUTPATIENT
Start: 2025-02-22 | End: 2025-02-23

## 2025-02-21 RX ORDER — ENOXAPARIN SODIUM 100 MG/ML
40 INJECTION SUBCUTANEOUS DAILY
Status: DISCONTINUED | OUTPATIENT
Start: 2025-02-22 | End: 2025-02-24 | Stop reason: HOSPADM

## 2025-02-21 RX ORDER — CHOLESTYRAMINE LIGHT 4 G/5.7G
POWDER, FOR SUSPENSION ORAL
Status: CANCELLED | OUTPATIENT
Start: 2025-02-21

## 2025-02-21 RX ORDER — POTASSIUM CHLORIDE 1500 MG/1
20 TABLET, EXTENDED RELEASE ORAL DAILY
Status: DISCONTINUED | OUTPATIENT
Start: 2025-02-22 | End: 2025-02-24 | Stop reason: HOSPADM

## 2025-02-21 RX ORDER — ONDANSETRON 2 MG/ML
INJECTION INTRAMUSCULAR; INTRAVENOUS
Status: COMPLETED
Start: 2025-02-21 | End: 2025-02-21

## 2025-02-21 RX ORDER — FENTANYL CITRATE 50 UG/ML
INJECTION, SOLUTION INTRAMUSCULAR; INTRAVENOUS CODE/TRAUMA/SEDATION MEDICATION
Status: COMPLETED | OUTPATIENT
Start: 2025-02-21 | End: 2025-02-21

## 2025-02-21 RX ORDER — CLOBETASOL PROPIONATE 0.5 MG/G
CREAM TOPICAL 2 TIMES DAILY
Status: DISCONTINUED | OUTPATIENT
Start: 2025-02-22 | End: 2025-02-24 | Stop reason: HOSPADM

## 2025-02-21 RX ORDER — ONDANSETRON 2 MG/ML
INJECTION INTRAMUSCULAR; INTRAVENOUS CODE/TRAUMA/SEDATION MEDICATION
Status: COMPLETED | OUTPATIENT
Start: 2025-02-21 | End: 2025-02-21

## 2025-02-21 RX ORDER — ASPIRIN 81 MG/1
81 TABLET, CHEWABLE ORAL DAILY
Status: DISCONTINUED | OUTPATIENT
Start: 2025-02-22 | End: 2025-02-21

## 2025-02-21 RX ADMIN — ACETAMINOPHEN 975 MG: 325 TABLET, FILM COATED ORAL at 15:35

## 2025-02-21 RX ADMIN — SODIUM CHLORIDE, SODIUM GLUCONATE, SODIUM ACETATE, POTASSIUM CHLORIDE, MAGNESIUM CHLORIDE, SODIUM PHOSPHATE, DIBASIC, AND POTASSIUM PHOSPHATE 1000 ML: .53; .5; .37; .037; .03; .012; .00082 INJECTION, SOLUTION INTRAVENOUS at 17:32

## 2025-02-21 RX ADMIN — ONDANSETRON 4 MG: 2 INJECTION INTRAMUSCULAR; INTRAVENOUS at 13:36

## 2025-02-21 RX ADMIN — DICLOFENAC SODIUM 2 G: 10 GEL TOPICAL at 17:31

## 2025-02-21 RX ADMIN — IOHEXOL 85 ML: 350 INJECTION, SOLUTION INTRAVENOUS at 13:45

## 2025-02-21 RX ADMIN — DICLOFENAC SODIUM 2 G: 10 GEL TOPICAL at 21:09

## 2025-02-21 RX ADMIN — FENTANYL CITRATE 50 MCG: 50 INJECTION INTRAMUSCULAR; INTRAVENOUS at 13:35

## 2025-02-21 RX ADMIN — ONDANSETRON 4 MG: 2 INJECTION INTRAMUSCULAR; INTRAVENOUS at 13:53

## 2025-02-21 NOTE — QUICK NOTE
Cervical Collar Clearance:    The patient had a CT scan of the cervical spine demonstrating no acute injury. On exam, the patient had no midline point tenderness or paresthesias/numbness/weakness in the extremities. The patient had full range of motion (was then able to flex, extend, and rotate head laterally) without pain. There were no distracting injuries and the patient was not intoxicated.      The patient's cervical spine was cleared radiologically and clinically. Cervical collar removed at this time.     Monique Love PA-C  2/21/2025 2:42 PM

## 2025-02-21 NOTE — H&P
H&P - Internal Medicine   Name: Jen Crawford 64 y.o. female I MRN: [id number]  Unit/Bed#: ED 02 I Date of Admission: 2/21/2025   Date of Service: 2/21/2025 I Hospital Day: 0     Assessment & Plan  Syncope and collapse  Assessment:  -Patient with history of syncope and presyncope presenting for syncopal episode as prodromal lightheadedness/dizziness and nausea   -CT head and CTA negative for ischemic stroke and hemorrhage   -Patient experiencing nausea with multiple episodes of vomiting in the ED    Plan:  -Orthostatics ordered  -Monitor post IV fluids bolus  -Follow-up neurology recommendations with stroke pathway  -Allow permissive hypertension and hold home antihypertensive  -DAPT, Lipitor  -Valium as needed for vertigo  Stage 3a chronic kidney disease (HCC)  Lab Results   Component Value Date    EGFR 42 02/21/2025    EGFR 49 09/17/2024    EGFR 44 10/25/2023    CREATININE 1.31 (H) 02/21/2025    CREATININE 1.17 09/17/2024    CREATININE 1.27 10/25/2023   Assessment and plan:  -Baseline creatinine appears to be around 1.2  -Monitor with daily metabolic panel  -Monitor response to Isolyte 1 L bolus  Hypertension    Pancreatic insufficiency  Assessment and plan:   -History of Whipple for PNET in 2021  -continue home pancrelipase 80275 units TID with meals   -Monitor I/Os   Hypertrophic cardiomyopathy (HCC)  Assessment and plan:  -Evidence of LVH on EKG on this admission   -2/2023 TTE: moderate concentric hypertrophy. The left ventricular ejection fraction is 70% by visual estimation. Systolic function is vigorous. Wall motion is normal. Diastolic function is mildly abnormal, consistent with grade I (abnormal) relaxation   -Redemonstrated 11/2023 cardiac MRI: Moderately thickened interventricular septum measuring up to 15 mm   -Continue Coreg 25 mg daily   -Monitor on telemetry     Vertigo      Code Status: Level 3 - DNAR and DNI  Admission Status: INPATIENT   Disposition: Patient requires Med Surg with  Telemetry    Admit to team: SOD TEAM A    History of Present Illness   The patient is a 64-year-old female with a history of hypertension, hyperlipidemia, hypertrophic cardiomyopathy, CKD 3A, aortic aneurysm, anemia, breast cancer, pancreatic neuroendocrine tumor s/p 2021 whipple, who presents after a ground level fall from standing position at home during which she hit her head and lost consciousness.  She says that for about 15 minutes before the fall she was experiencing dizziness, lightheadedness and nausea, however she did not have any vomiting.  She denies any sensation of chest pain, heart palpitations, or warmth or flushing prior to the fall.  She has never had a similar experience before.  In the ED she had multiple episodes of vomiting. She endorses continued pain in the back of her head ongoing nausea.  She denies having any fevers or chills, cough or shortness of breath, abdominal pain, constipation or diarrhea.     In the ED the patient received a CT stroke which revealed no evidence of acute infarct or parenchymal hemorrhage and a CTA brain significant intracranial stenosis, large vessel occlusions or aneurysm.  Allopurinol chest and trauma x-rays revealed no acute pulmonary processes or obvious displaced fractures.  EKG revealed normal sinus rhythm and potential LVH.  CBC was significant for a mildly elevated WBC count of 10.26 and BMP revealed a mildly elevated creatinine of 1.31 (baseline approximately 1.2).  Glucose was elevated at 153 the patient received a dose of IV fentanyl as well as IV Zofran for nausea.      Review of Systems   Constitutional:  Positive for fatigue. Negative for chills and fever.   Respiratory:  Negative for cough and shortness of breath.    Gastrointestinal:  Positive for nausea. Negative for abdominal pain, constipation and diarrhea.   Genitourinary:  Negative for dysuria.   Neurological:  Positive for dizziness, light-headedness and headaches.     Medical History Review:  I have reviewed the patient's PMH, PSH, Social History, Family History, Meds, and Allergies     Objective :  Temp:  [97.9 °F (36.6 °C)] 97.9 °F (36.6 °C)  HR:  [60-69] 69  BP: (125-204)/(57-98) 128/58  Resp:  [16-18] 18  SpO2:  [98 %-100 %] 100 %  O2 Device: Nasal cannula    Intake & Output:  I/O       None          Weights:        Body mass index is 25.74 kg/m².  Weight (last 2 days)       Date/Time Weight    02/21/25 1349 65.9 (145.28)          Physical Exam  Constitutional:       General: She is not in acute distress.  Neck:      Comments: Jugular vein visible bilaterally does not appear to be distended  Cardiovascular:      Rate and Rhythm: Normal rate and regular rhythm.      Heart sounds: No murmur heard.  Pulmonary:      Effort: Pulmonary effort is normal. No respiratory distress.      Breath sounds: Normal breath sounds. No wheezing or rales.   Abdominal:      General: Abdomen is flat. Bowel sounds are normal. There is no distension.      Palpations: Abdomen is soft.   Musculoskeletal:      Right lower leg: No edema.      Left lower leg: No edema.   Skin:     General: Skin is warm and dry.      Capillary Refill: Capillary refill takes less than 2 seconds.      Coloration: Skin is not jaundiced.   Neurological:      Mental Status: She is alert and oriented to person, place, and time.           Lab Results: I have reviewed the following results:  Recent Labs     02/21/25  1343 02/21/25  1346 02/21/25  1756   WBC  --  10.26*  --    HGB 12.9 12.7  --    HCT 38 37.3  --    PLT  --  268  --    SODIUM  --  138  --    K  --  4.2  --    CL  --  101  --    CO2 27 27  --    BUN  --  17  --    CREATININE  --  1.31*  --    GLUC  --  153*  --    CAIONIZED 1.25  --   --    PTT  --  27  --    INR  --  1.04  --    HSTNI0  --  8  --    HSTNI2  --   --  8     Micro:  Lab Results   Component Value Date    BLOODCX No Growth After 5 Days. 12/21/2021    BLOODCX No Growth After 5 Days. 12/21/2021       Imaging Results Review: I  "reviewed radiology reports from this admission including: chest xray and CT head.  Other Study Results Review: EKG was reviewed.     Currently Ordered Meds:   Current Facility-Administered Medications:     [Held by provider] amLODIPine (NORVASC) tablet 10 mg, Daily    aspirin chewable tablet 81 mg, Daily    atorvastatin (LIPITOR) tablet 40 mg, Daily With Dinner    carvedilol (COREG) tablet 25 mg, BID With Meals    [START ON 2/22/2025] clobetasol (TEMOVATE) 0.05 % cream, BID    clopidogrel (PLAVIX) tablet 75 mg, Daily    diazepam (VALIUM) injection 2.5 mg, Once    diazepam (VALIUM) tablet 5 mg, Q6H PRN    Diclofenac Sodium (VOLTAREN) 1 % topical gel 2 g, 4x Daily    [START ON 2/22/2025] enoxaparin (LOVENOX) subcutaneous injection 40 mg, Daily    [Held by provider] lisinopril (ZESTRIL) tablet 40 mg, Daily    pancrelipase (Lip-Prot-Amyl) (CREON) delayed release capsule 12,000 Units, TID With Meals    [START ON 2/22/2025] potassium chloride (Klor-Con M20) CR tablet 20 mEq, Daily  VTE Pharmacologic Prophylaxis: Enoxaparin (Lovenox)  VTE Mechanical Prophylaxis: sequential compression device    Administrative Statements     Portions of the record may have been created with voice recognition software.  Occasional wrong word or \"sound a like\" substitutions may have occurred due to the inherent limitations of voice recognition software.  Read the chart carefully and recognize, using context, where substitutions have occurred.  ==  Be Parsih MD  [facility]  Internal Medicine Residency PGY-1  "

## 2025-02-21 NOTE — ASSESSMENT & PLAN NOTE
Assessment and plan:   -History of Whipple for PNET in 2021  -continue home pancrelipase 13977 units TID with meals   -Monitor I/Os

## 2025-02-21 NOTE — ED PROVIDER NOTES
Emergency Department Airway Evaluation and Management Form    History  Obtained from: patient  Review of patient's allergies indicates no known allergies.    Chief Complaint:  Trauma Alert    HPI: Pt is a 64 y.o. female presents s/p fall with head strike on clopidogrel      I have reviewed and agree with the history as documented.  ROS is unobtainable secondary to critical status of the patient as a result of the trauma.    Physical Exam    Vitals:    02/21/25 1322   BP: 125/90     Supplemental Oxygen: RA    GCS: 15  Airway: patent  Breathing and Pulmonary exam: bilateral BS  Cardiac and Circulation: RRR  Neurologic exam: moving all extremities  C spine and Neck exam: In collar      Monitor:  SR      ED Medications    No current facility-administered medications for this encounter.    Current Outpatient Medications:     amLODIPine (NORVASC) 10 mg tablet, Take 1 tablet (10 mg total) by mouth daily, Disp: 90 tablet, Rfl: 3    carvedilol (COREG) 25 mg tablet, Take 1 tablet (25 mg total) by mouth 2 (two) times a day with meals, Disp: 60 tablet, Rfl: 2    cholestyramine (QUESTRAN) 4 g packet, TAKE 1 PACKET BY MOUTH 2 TIMES A DAY WITH MEALS., Disp: 180 packet, Rfl: 1    clobetasol (TEMOVATE) 0.05 % cream, Apply topically 2 (two) times a day Apply to wrists. Take a 1-week break between each 2-week application., Disp: 45 g, Rfl: 2    clopidogrel (PLAVIX) 75 mg tablet, TAKE 1 TABLET BY MOUTH EVERY DAY, Disp: 30 tablet, Rfl: 1    Diclofenac Sodium (VOLTAREN) 1 %, APPLY 2 G TOPICALLY 4 (FOUR) TIMES A DAY FOR 10 DAYS, Disp: 100 g, Rfl: 1    gabapentin (Neurontin) 100 mg capsule, Take 3 capsules (300 mg total) by mouth 2 (two) times a day If no improvement, increase to 200 mg, Disp: 30 capsule, Rfl: 3    lidocaine (Lidoderm) 5 %, Apply 1 patch topically over 12 hours daily Remove & Discard patch within 12 hours or as directed by MD, Disp: 30 patch, Rfl: 1    lisinopril (ZESTRIL) 40 mg tablet, Take 1 tablet (40 mg total) by mouth  "daily, Disp: 90 tablet, Rfl: 3    Menthol-Methyl Salicylate (JAK MARAVILLA GREASELESS) 10-15 % greaseless cream, Apply topically daily at bedtime for 14 days, Disp: 57 g, Rfl: 2    pancrelipase, Lip-Prot-Amyl, (CREON) 12,000 units capsule, Take 12,000 units of lipase by mouth 3 (three) times a day with meals, Disp: 500 capsule, Rfl: 3    potassium chloride (K-DUR,KLOR-CON) 10 mEq tablet, take 2 tablets by mouth daily, Disp: 90 tablet, Rfl: 1    Medical Decision Makin. Fall with head strike on clopidogrel      Portions of the record may have been created with voice recognition software. Occasional wrong word or \"sound a like\" substitutions may have occurred due to the inherent limitations of voice recognition software.         Tommy Lewis MD  25 1327    "

## 2025-02-21 NOTE — PROGRESS NOTES
INTERNAL MEDICINE RESIDENCY SENIOR ADMISSION NOTE     Name: Jen Crawford   Age & Sex: 64 y.o. female   MRN: [id number]  Unit/Bed#: ED 02   Encounter: [id number]  Primary Care Provider: Ginny Guillory MD    Admit to team: SOD Team A    Patient seen and examined. Reviewed H&P per Dr. Parish . Agree with the assessment and plan with any exception/addition as noted below:    Principal Problem:    Syncope and collapse  Active Problems:    Stage 3a chronic kidney disease (HCC)    Hypertension    Pancreatic insufficiency    Hypertrophic cardiomyopathy (HCC)    64-year-old female, PMH HTN, CKD, CHF, pancreatic neuroendocrine tumor status post Whipple procedure.  She presents to the ED as a trauma alert after syncopal episode.  Trauma workup was negative, but stroke alert was called.  Patient was not given TNK as symptoms had resolved.  She states that she was walking out of a store when she began to feel lightheaded and subsequently collapsed.  This has never happened previously.  She felt nauseous and vomited multiple times afterward, but denies any other associated symptoms.  She denies recent illness, sick contacts, travel.  On arrival she was hypertensive but otherwise hemodynamically stable.  Labs significant for creatinine 1.31 (baseline 1.1-1.2), WBC 10.  CT/CTA head did not show any acute findings.  NSR on EKG.  Physical exam on admission overall unremarkable without any focal deficits.    A/P:  Unclear etiology of syncope at this time.  Differential would include orthostasis given prodrome of lightheadedness.  Will continue workup for CVA as ordered by neurology team - echo, MRI brain, DAPT, telemetry, hold home antihypertensives, permissive hypertension, as needed Valium for vertigo.  Check orthostatic vitals.    Code Status: Level 3 - DNAR and DNI  Admission Status: OBSERVATION  Disposition: Patient requires Med/Surg with Telemetry  Expected Length of Stay: <2 midnights

## 2025-02-21 NOTE — ASSESSMENT & PLAN NOTE
Assessment and plan:  -Evidence of LVH on EKG on this admission   -2/2023 TTE: moderate concentric hypertrophy. The left ventricular ejection fraction is 70% by visual estimation. Systolic function is vigorous. Wall motion is normal. Diastolic function is mildly abnormal, consistent with grade I (abnormal) relaxation   -Redemonstrated 11/2023 cardiac MRI: Moderately thickened interventricular septum measuring up to 15 mm   -Continue Coreg 25 mg daily   -Monitor on telemetry

## 2025-02-21 NOTE — ASSESSMENT & PLAN NOTE
Initial NIHSS 0  Presenting deficits were: vertigo, no associated nystagmus or skew. Unable to complete full HINTS exam due to C-collar to determine central vs peripheral etiology.    Workup:  Lab Results   Component Value Date    HGBA1C 5.4 09/16/2024    CHOLESTEROL 161 09/17/2024    LDLCALC 92 09/17/2024    TRIG 156 (H) 09/17/2024    INR 1.04 02/21/2025      CTH: No acute intracranial abnormality. Chronic microangiopathy.   CTA: No significant stenosis or dissection of the cervical carotid or vertebral arteries. No significant intracranial stenosis, large vessel occlusion or aneurysm. 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  MRI: pending  TTE/ABRIL: pending    Impression: suspect peripheral vertigo triggered by change in position, but unable to confirm if peripheral on exam. Patient was recently transitioned from DAPT to monotherapy AP for aortic arch aneurysm. Per chart patient was supposed to remain on plavix but patient remains on aspirin. Will complete stroke workup as patient has not experienced symptoms like this before. Patient did not respond well to zofran and is unlikely to properly keep po medication down at this time.     Discussed with neurology attending, Dr. Ellis  Pending: MRI brain, TTE, Lipid panel, A1c pending  BP: Permissive HTN <220/120 for first 24 hours, and then gradual return to normotension  Bedside swallow eval  AC/AP: continue home aspirin, restart plavix  Atorvastatin 40mg QHS  Valium 2.5 mg once  When tolerating po, consider meclizine 25 mg  Glucose <180   Neuro checks- Every 1 hour x 4 hours, then every 2 hours x 4 hours, then every 4 hours x 72 hours     Stat CT Head for change in neuro status.  Monitor on telemetry  DVT ppx per primary team, SCDs  PT/OT/ST/PM&R evaluations   Medical management as per primary team appreciated.

## 2025-02-21 NOTE — ASSESSMENT & PLAN NOTE
Lab Results   Component Value Date    EGFR 42 02/21/2025    EGFR 49 09/17/2024    EGFR 44 10/25/2023    CREATININE 1.31 (H) 02/21/2025    CREATININE 1.17 09/17/2024    CREATININE 1.27 10/25/2023   Assessment and plan:  -Baseline creatinine appears to be around 1.2  -Monitor with daily metabolic panel  -Monitor response to Isolyte 1 L bolus

## 2025-02-21 NOTE — H&P
H&P - Trauma   Name: Jen Crawford 64 y.o. female I MRN: [id number]  Unit/Bed#: TR 02 I Date of Admission: 2/21/2025   Date of Service: 2/21/2025 I Hospital Day: 0     Assessment & Plan  Syncope and collapse  - Ground level syncopal fall  - CTA head and neck  - FAST exam negative    Trauma Alert: Level B   Model of Arrival: Self    Trauma Team: Attending Le, Fellow William, and AP Ivan  Consultants:     Other: {Neurology - STAT consult; arrived at 1345;     History of Present Illness   Chief Complaint: dizziness, nausea and vomiting  Mechanism:Fall     Jen Crawford is a 64 y.o. female who presents status post fall. Per patient, she had a ground level fall while walking into her daughter's house with positive head strike. Positive LOC. She also admits to presyncopal symptoms including dizziness and lightheadedness. She takes ASA and Plavix daily. She complains of cervical spine tenderness. Noted vomiting while in the trauma bay.     Review of Systems   Constitutional:  Negative for chills and fever.   HENT:  Negative for ear pain and sore throat.    Eyes:  Negative for pain and visual disturbance.   Respiratory:  Negative for cough and shortness of breath.    Cardiovascular:  Negative for chest pain and palpitations.   Gastrointestinal:  Positive for nausea and vomiting. Negative for abdominal pain.   Genitourinary:  Negative for dysuria and hematuria.   Musculoskeletal:  Positive for neck pain. Negative for arthralgias and back pain.   Skin:  Negative for color change and rash.   Neurological:  Negative for seizures and syncope.   All other systems reviewed and are negative.    Medical History Review: I have reviewed the patient's PMH, PSH, Social History, Family History, Meds, and Allergies   Immunization History   Administered Date(s) Administered    COVID-19 PFIZER VACCINE 0.3 ML IM 03/26/2021, 04/16/2021, 12/11/2021    INFLUENZA 10/27/2017, 03/15/2023, 10/18/2023    Influenza Quadrivalent, 6-35  Months IM 10/27/2017    Influenza, injectable, quadrivalent, preservative free 0.5 mL 10/18/2023    Influenza, recombinant, quadrivalent,injectable, preservative free 11/25/2019, 10/14/2020, 11/15/2021, 03/15/2023    Pneumococcal Conjugate Vaccine 20-valent (Pcv20), Polysace 03/15/2023    Tdap 02/08/2018     Last Tetanus: 2018    1. Before the illness or injury that brought you to the Emergency, did you need someone to help you on a regular basis? 0=No   2. Since the illness or injury that brought you to the Emergency, have you needed more help than usual to take care of yourself? 0=No   3. Have you been hospitalized for one or more nights during the past 6 months (excluding a stay in the Emergency Department)? 0=No   4. In general, do you see well? 0=Yes   5. In general, do you have serious problems with your memory? 0=No   6. Do you take more than three different medications everyday? 1=Yes   TOTAL   1     Did you order a geriatric consult if the score was 2 or greater?: n/a       Objective :       Initial Vitals:        Primary Survey:   Airway:        Status: patent;        Pre-hospital Interventions: none        Hospital Interventions: none  Breathing:        Pre-hospital Interventions: none       Effort: normal       Right breath sounds: normal       Left breath sounds: normal  Circulation:        Rhythm: regular       Rate: regular   Right Pulses Left Pulses    R radial: 2+  R femoral: 2+  R pedal: 2+     L radial: 2+  L femoral: 2+  L pedal: 2+       Disability:        GCS: Eye: 4; Verbal: 5 Motor: 6 Total: 15       Right Pupil: round;  reactive         Left Pupil:  round;  reactive      R Motor Strength L Motor Strength    R : 5/5  R dorsiflex: 5/5  R plantarflex: 5/5 L : 5/5  L dorsiflex: 5/5  L plantarflex: 5/5        Sensory:  No sensory deficit  Exposure:       Completed: Yes      Secondary Survey:  Physical Exam  Constitutional:       General: She is not in acute distress.     Interventions:  "Cervical collar in place.   HENT:      Head: Normocephalic.      Comments: Occipital hematoma      Right Ear: External ear normal.      Left Ear: External ear normal.      Nose: Nose normal.      Mouth/Throat:      Mouth: Mucous membranes are moist.   Eyes:      Extraocular Movements: Extraocular movements intact.      Pupils: Pupils are equal, round, and reactive to light.   Cardiovascular:      Rate and Rhythm: Normal rate and regular rhythm.      Pulses: Normal pulses.      Heart sounds: Normal heart sounds.   Pulmonary:      Effort: Pulmonary effort is normal. No respiratory distress.      Breath sounds: Normal breath sounds.   Chest:      Chest wall: No tenderness.   Abdominal:      General: Abdomen is flat. There is no distension.      Palpations: Abdomen is soft.      Tenderness: There is no abdominal tenderness. There is no guarding.   Musculoskeletal:         General: Normal range of motion.      Cervical back: No deformity or tenderness.      Thoracic back: No deformity or tenderness.      Lumbar back: No deformity or tenderness.   Skin:     General: Skin is warm and dry.      Capillary Refill: Capillary refill takes less than 2 seconds.   Neurological:      General: No focal deficit present.      Mental Status: She is alert and oriented to person, place, and time. Mental status is at baseline.      Sensory: No sensory deficit.      Motor: No weakness.   Psychiatric:         Mood and Affect: Mood normal.         Behavior: Behavior normal.             Lab Results: I have reviewed the following results:  No results for input(s): \"WBC\", \"HGB\", \"HCT\", \"PLT\", \"BANDSPCT\", \"SODIUM\", \"K\", \"CL\", \"CO2\", \"BUN\", \"CREATININE\", \"GLUC\", \"CAIONIZED\", \"MG\", \"PHOS\", \"AST\", \"ALT\", \"ALB\", \"TBILI\", \"DBILI\", \"ALKPHOS\", \"PTT\", \"INR\", \"HSTNI0\", \"HSTNI2\", \"BNP\", \"LACTICACID\" in the last 72 hours.    Imaging Results: I have personally reviewed pertinent images saved in PACS. CT scan findings (and other pertinent positive findings " on images) were discussed with radiology. My interpretation of the images/reports are as follows:  Chest Xray(s): negative for acute findings   FAST exam(s): negative for acute findings   CT Scan(s): negative for acute findings   Additional Xray(s): N/A     Other Studies: Other Study Results Review: No additional pertinent studies reviewed.

## 2025-02-21 NOTE — CONSULTS
Consultation - Neurology   Name: Jen Crawford 64 y.o. female I MRN: [id number]  Unit/Bed#: ED 02 I Date of Admission: 2/21/2025   Date of Service: 2/21/2025 I Hospital Day: 0   Consult to Neurology  Consult performed by: Rylee Tirado MD  Consult ordered by: Liang Lujan DO      Physician Requesting Evaluation: Anahi Elaine MD   Reason for Evaluation / Principal Problem: stroke alert    Assessment & Plan  Vertigo  Initial NIHSS 0  Presenting deficits were: vertigo, no associated nystagmus or skew. Unable to complete full HINTS exam due to C-collar to determine central vs peripheral etiology.    Workup:  Lab Results   Component Value Date    HGBA1C 5.4 09/16/2024    CHOLESTEROL 161 09/17/2024    LDLCALC 92 09/17/2024    TRIG 156 (H) 09/17/2024    INR 1.04 02/21/2025      CTH: No acute intracranial abnormality. Chronic microangiopathy.   CTA: No significant stenosis or dissection of the cervical carotid or vertebral arteries. No significant intracranial stenosis, large vessel occlusion or aneurysm. 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  MRI: pending  TTE/ABRIL: pending    Impression: suspect peripheral vertigo triggered by change in position, but unable to confirm if peripheral on exam. Patient was recently transitioned from DAPT to monotherapy AP for aortic arch aneurysm. Per chart patient was supposed to remain on plavix but patient remains on aspirin. Will complete stroke workup as patient has not experienced symptoms like this before. Patient did not respond well to zofran and is unlikely to properly keep po medication down at this time.     Discussed with neurology attending, Dr. Ellis  Pending: MRI brain, TTE, Lipid panel, A1c pending  BP: Permissive HTN <220/120 for first 24 hours, and then gradual return to normotension  Bedside swallow eval  AC/AP: continue home aspirin, restart plavix  Atorvastatin 40mg QHS  Valium 2.5 mg once  When  tolerating po, consider meclizine 25 mg  Glucose <180   Neuro checks- Every 1 hour x 4 hours, then every 2 hours x 4 hours, then every 4 hours x 72 hours     Stat CT Head for change in neuro status.  Monitor on telemetry  DVT ppx per primary team, SCDs  PT/OT/ST/PM&R evaluations   Medical management as per primary team appreciated.    I have discussed the above management plan in detail with the primary service.   Neurology service will follow.    Thrombolytic Decision: Patient not a candidate. Symptoms resolved/clearly non disabling. and Recent significant trauma/stroke.    Recommendations for outpatient neurological follow up have yet to be determined.    History of Present Illness   Hx and PE limited by: [place]ian-speaking, nausea/vomiting  Patient last known well: 11:30 am  Stroke alert called: 1:37 pm  Neurology time of arrival: immediate  HPI: Jen Crawford is a 64 y.o. right handed female who presents with constant nausea and room spinning. Patient was in her normal state of health this morning and called her daughter that she was on the way to her around 11:30. When she arrived at her daughter's house shortly afterwards, she stood up, immediately began experiencing constant spinning sensation, and fell down hitting the back of her head. There was no loss of consciousness. Patient came in as a trauma due to head strike and was called as a stroke alert due to new onset constant vertigo that she reports has never occurred before. She denies any preceding triggers such as illness. She has not had any changes in hearing either. She reports that she is no longer taking plavix and was instructed last month to continue aspirin only. At this time, her symptoms have settled down as long as she remains still. She did not experience much relief from zofran.    Review of Systems   Gastrointestinal:  Positive for nausea and vomiting.   Neurological:  Positive for dizziness. Negative for tremors, seizures, syncope,  facial asymmetry, speech difficulty, weakness, light-headedness, numbness and headaches.     Medical History Review: I have reviewed the patient's PMH, PSH, Social History, Family History, Meds, and Allergies     Objective :  Temp:  [97.9 °F (36.6 °C)] 97.9 °F (36.6 °C)  HR:  [60-66] 60  BP: (125-204)/(57-98) 144/57  Resp:  [16-18] 18  SpO2:  [98 %-100 %] 100 %  O2 Device: None (Room air)    Physical Exam  Constitutional:       General: She is awake.   Eyes:      Extraocular Movements: Extraocular movements intact.      Pupils: Pupils are equal, round, and reactive to light.   Neck:      Comments: In c-collar  Neurological:      Motor: Motor strength is normal.  Psychiatric:         Speech: Speech normal.     Neurological Exam  Mental Status  Awake. Oriented to person, place and time. Speech is normal. Language is fluent with no aphasia.    Cranial Nerves  CN II: Visual fields full to confrontation.  CN III, IV, VI: Extraocular movements intact bilaterally. Pupils equal round and reactive to light bilaterally. No nystagmus, no skew.  CN V: Facial sensation is normal.  CN VII: Full and symmetric facial movement.  CN VIII: Hearing is normal.  CN XI: Shoulder shrug strength is normal.  CN XII: Tongue midline without atrophy or fasciculations.    Motor   Strength is 5/5 throughout all four extremities.    Sensory  Light touch is normal in upper and lower extremities. Pinprick is normal in upper and lower extremities.     Coordination  Right: Finger-to-nose normal.Left: Finger-to-nose normal.      NIHSS:  1a.Level of Consciousness: 0 = Alert   1b. LOC Questions: 0 = Answers both correctly   1c. LOC Commands: 0 = Obeys both correctly   2. Best Gaze: 0 = Normal   3. Visual: 0 = No visual field loss   4. Facial Palsy: 0=Normal symmetric movement   5a. Motor Right Arm: 0=No drift, limb holds 90 (or 45) degrees for full 10 seconds   5b. Motor Left Arm: 0=No drift, limb holds 90 (or 45) degrees for full 10 seconds   6a. Motor  Right Le=No drift, limb holds 90 (or 45) degrees for full 10 seconds   6b. Motor Left Le=No drift, limb holds 90 (or 45) degrees for full 10 seconds   7. Limb Ataxia:  0=Absent   8. Sensory: 0=Normal; no sensory loss   9. Best Language:  0=No aphasia, normal   10. Dysarthria: 0=Normal articulation   11. Extinction and Inattention (formerly Neglect): 0=No abnormality   Total Score: 0     Time NIHSS was completed: 2 pm    Modified Alton Score:  0 (No baseline symptoms/disability)      Lab Results: I have reviewed the following results:    Imaging Results Review: I reviewed radiology reports from this admission including: CT head.      VTE Prophylaxis: VTE covered by:  [START ON 2025] enoxaparin, Subcutaneous    and Sequential compression device (Venodyne)     Code Status: Prior  Advance Directive and Living Will:      Power of :    POLST:

## 2025-02-21 NOTE — ASSESSMENT & PLAN NOTE
Assessment:  -Patient with history of syncope and presyncope presenting for syncopal episode as prodromal lightheadedness/dizziness and nausea   -CT head and CTA negative for ischemic stroke and hemorrhage   -          Plan:

## 2025-02-22 ENCOUNTER — APPOINTMENT (INPATIENT)
Dept: NON INVASIVE DIAGNOSTICS | Facility: HOSPITAL | Age: 65
DRG: 312 | End: 2025-02-22
Payer: COMMERCIAL

## 2025-02-22 PROBLEM — R11.0 NAUSEA: Status: ACTIVE | Noted: 2018-05-01

## 2025-02-22 LAB
ANION GAP SERPL CALCULATED.3IONS-SCNC: 9 MMOL/L (ref 4–13)
BASOPHILS # BLD AUTO: 0.02 THOUSANDS/ΜL (ref 0–0.1)
BASOPHILS NFR BLD AUTO: 0 % (ref 0–1)
BUN SERPL-MCNC: 24 MG/DL (ref 5–25)
CALCIUM SERPL-MCNC: 9 MG/DL (ref 8.4–10.2)
CHLORIDE SERPL-SCNC: 103 MMOL/L (ref 96–108)
CHOLEST SERPL-MCNC: 160 MG/DL (ref ?–200)
CO2 SERPL-SCNC: 26 MMOL/L (ref 21–32)
CREAT SERPL-MCNC: 1.32 MG/DL (ref 0.6–1.3)
EOSINOPHIL # BLD AUTO: 0.11 THOUSAND/ΜL (ref 0–0.61)
EOSINOPHIL NFR BLD AUTO: 1 % (ref 0–6)
ERYTHROCYTE [DISTWIDTH] IN BLOOD BY AUTOMATED COUNT: 12.4 % (ref 11.6–15.1)
EST. AVERAGE GLUCOSE BLD GHB EST-MCNC: 111 MG/DL
GFR SERPL CREATININE-BSD FRML MDRD: 42 ML/MIN/1.73SQ M
GLUCOSE SERPL-MCNC: 108 MG/DL (ref 65–140)
HBA1C MFR BLD: 5.5 %
HCT VFR BLD AUTO: 33 % (ref 34.8–46.1)
HDLC SERPL-MCNC: 31 MG/DL
HGB BLD-MCNC: 11 G/DL (ref 11.5–15.4)
IMM GRANULOCYTES # BLD AUTO: 0.02 THOUSAND/UL (ref 0–0.2)
IMM GRANULOCYTES NFR BLD AUTO: 0 % (ref 0–2)
LDLC SERPL CALC-MCNC: 99 MG/DL (ref 0–100)
LYMPHOCYTES # BLD AUTO: 3.46 THOUSANDS/ΜL (ref 0.6–4.47)
LYMPHOCYTES NFR BLD AUTO: 41 % (ref 14–44)
MCH RBC QN AUTO: 31.9 PG (ref 26.8–34.3)
MCHC RBC AUTO-ENTMCNC: 33.3 G/DL (ref 31.4–37.4)
MCV RBC AUTO: 96 FL (ref 82–98)
MONOCYTES # BLD AUTO: 0.75 THOUSAND/ΜL (ref 0.17–1.22)
MONOCYTES NFR BLD AUTO: 9 % (ref 4–12)
NEUTROPHILS # BLD AUTO: 4.03 THOUSANDS/ΜL (ref 1.85–7.62)
NEUTS SEG NFR BLD AUTO: 49 % (ref 43–75)
NRBC BLD AUTO-RTO: 0 /100 WBCS
PLATELET # BLD AUTO: 217 THOUSANDS/UL (ref 149–390)
PMV BLD AUTO: 10.3 FL (ref 8.9–12.7)
POTASSIUM SERPL-SCNC: 4.1 MMOL/L (ref 3.5–5.3)
RBC # BLD AUTO: 3.45 MILLION/UL (ref 3.81–5.12)
SODIUM SERPL-SCNC: 138 MMOL/L (ref 135–147)
TRIGL SERPL-MCNC: 149 MG/DL (ref ?–150)
WBC # BLD AUTO: 8.39 THOUSAND/UL (ref 4.31–10.16)

## 2025-02-22 PROCEDURE — 83036 HEMOGLOBIN GLYCOSYLATED A1C: CPT

## 2025-02-22 PROCEDURE — 85025 COMPLETE CBC W/AUTO DIFF WBC: CPT

## 2025-02-22 PROCEDURE — 97166 OT EVAL MOD COMPLEX 45 MIN: CPT

## 2025-02-22 PROCEDURE — 80048 BASIC METABOLIC PNL TOTAL CA: CPT

## 2025-02-22 PROCEDURE — 99232 SBSQ HOSP IP/OBS MODERATE 35: CPT | Performed by: STUDENT IN AN ORGANIZED HEALTH CARE EDUCATION/TRAINING PROGRAM

## 2025-02-22 PROCEDURE — 97163 PT EVAL HIGH COMPLEX 45 MIN: CPT

## 2025-02-22 PROCEDURE — 80061 LIPID PANEL: CPT

## 2025-02-22 RX ORDER — ONDANSETRON 2 MG/ML
4 INJECTION INTRAMUSCULAR; INTRAVENOUS EVERY 6 HOURS PRN
Status: DISCONTINUED | OUTPATIENT
Start: 2025-02-22 | End: 2025-02-24 | Stop reason: HOSPADM

## 2025-02-22 RX ORDER — SODIUM CHLORIDE, SODIUM GLUCONATE, SODIUM ACETATE, POTASSIUM CHLORIDE, MAGNESIUM CHLORIDE, SODIUM PHOSPHATE, DIBASIC, AND POTASSIUM PHOSPHATE .53; .5; .37; .037; .03; .012; .00082 G/100ML; G/100ML; G/100ML; G/100ML; G/100ML; G/100ML; G/100ML
100 INJECTION, SOLUTION INTRAVENOUS CONTINUOUS
Status: DISCONTINUED | OUTPATIENT
Start: 2025-02-22 | End: 2025-02-23

## 2025-02-22 RX ORDER — CARVEDILOL 12.5 MG/1
12.5 TABLET ORAL 2 TIMES DAILY WITH MEALS
Status: DISCONTINUED | OUTPATIENT
Start: 2025-02-22 | End: 2025-02-23

## 2025-02-22 RX ADMIN — DICLOFENAC SODIUM 2 G: 10 GEL TOPICAL at 21:17

## 2025-02-22 RX ADMIN — ASPIRIN 81 MG: 81 TABLET, CHEWABLE ORAL at 08:12

## 2025-02-22 RX ADMIN — DICLOFENAC SODIUM 2 G: 10 GEL TOPICAL at 17:10

## 2025-02-22 RX ADMIN — PANCRELIPASE 12000 UNITS: 30000; 6000; 19000 CAPSULE, DELAYED RELEASE PELLETS ORAL at 09:58

## 2025-02-22 RX ADMIN — CARVEDILOL 25 MG: 25 TABLET, FILM COATED ORAL at 08:12

## 2025-02-22 RX ADMIN — DICLOFENAC SODIUM 2 G: 10 GEL TOPICAL at 11:45

## 2025-02-22 RX ADMIN — DICLOFENAC SODIUM 2 G: 10 GEL TOPICAL at 08:33

## 2025-02-22 RX ADMIN — PANCRELIPASE 12000 UNITS: 30000; 6000; 19000 CAPSULE, DELAYED RELEASE PELLETS ORAL at 16:35

## 2025-02-22 RX ADMIN — CLOPIDOGREL BISULFATE 75 MG: 75 TABLET, FILM COATED ORAL at 08:12

## 2025-02-22 RX ADMIN — POTASSIUM CHLORIDE 20 MEQ: 1500 TABLET, EXTENDED RELEASE ORAL at 08:12

## 2025-02-22 RX ADMIN — ATORVASTATIN CALCIUM 40 MG: 40 TABLET, FILM COATED ORAL at 16:34

## 2025-02-22 RX ADMIN — ENOXAPARIN SODIUM 40 MG: 40 INJECTION SUBCUTANEOUS at 08:12

## 2025-02-22 RX ADMIN — CLOBETASOL PROPIONATE: 0.5 CREAM TOPICAL at 17:10

## 2025-02-22 RX ADMIN — SODIUM CHLORIDE, SODIUM GLUCONATE, SODIUM ACETATE, POTASSIUM CHLORIDE, MAGNESIUM CHLORIDE, SODIUM PHOSPHATE, DIBASIC, AND POTASSIUM PHOSPHATE 75 ML/HR: .53; .5; .37; .037; .03; .012; .00082 INJECTION, SOLUTION INTRAVENOUS at 09:59

## 2025-02-22 RX ADMIN — ONDANSETRON 4 MG: 2 INJECTION INTRAMUSCULAR; INTRAVENOUS at 08:28

## 2025-02-22 RX ADMIN — CLOBETASOL PROPIONATE: 0.5 CREAM TOPICAL at 11:45

## 2025-02-22 RX ADMIN — CARVEDILOL 12.5 MG: 12.5 TABLET, FILM COATED ORAL at 16:34

## 2025-02-22 NOTE — QUICK NOTE
Discussed pt current condition with pt daughter at bedside. Pt daughter is fluent in english and prefers to translate for mother. States that her mother is very inconsistent with home medications. Denies taking any anti-depressants for months. Was not taking any medication (blood pressure meds) when she saw PCP late January. States that she has restarted medications since with includes the increased coreg dose. Pt daughter states that pt will state she takes medications to pcp when she is not and has led to her getting more and more medication. Pt is frustrated to not remember all medications. Discussed benefits of bubble pack that should be considered at discharge. Attending made aware.    Minerva Burris DO  02/22/25 12:04 PM

## 2025-02-22 NOTE — ASSESSMENT & PLAN NOTE
Initial NIHSS 0  Presenting deficits were: vertigo, no associated nystagmus or skew. Unable to complete full HINTS exam due to C-collar to determine central vs peripheral etiology.    Workup:  Lab Results   Component Value Date    HGBA1C 5.4 09/16/2024    CHOLESTEROL 160 02/22/2025    LDLCALC 99 02/22/2025    TRIG 149 02/22/2025    INR 1.04 02/21/2025      CTH: No acute intracranial abnormality. Chronic microangiopathy.   CTA: No significant stenosis or dissection of the cervical carotid or vertebral arteries. No significant intracranial stenosis, large vessel occlusion or aneurysm. 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  MRI: pending  TTE/ABRIL: pending  LDL 99    Impression: suspect peripheral vertigo triggered by change in position. Per charts, patient was supposed to remain on plavix but patient remains on aspirin. Will complete stroke workup.     Discussed with neurology attending, Dr. Ellis  Pending: MRI brain, TTE, A1c pending  BP: Permissive HTN <220/120 for first 24 hours, and then gradual return to normotension  AC/AP: continue home aspirin, + plavix until MRIB, if nl ok for ASA only  Atorvastatin 40mg QHS  Consider meclizine 25 mg  Glucose <180   Neuro checks- Every 1 hour x 4 hours, then every 2 hours x 4 hours, then every 4 hours x 72 hours     Stat CT Head for change in neuro status.  Monitor on telemetry  DVT ppx per primary team, SCDs  PT/OT/ST/PM&R evaluations   Medical management as per primary team appreciated.

## 2025-02-22 NOTE — ASSESSMENT & PLAN NOTE
Assessment and plan:  -Evidence of LVH on EKG on this admission   -2/2023 TTE: moderate concentric hypertrophy. The left ventricular ejection fraction is 70% by visual estimation. Systolic function is vigorous. Wall motion is normal. Diastolic function is mildly abnormal, consistent with grade I (abnormal) relaxation   -Redemonstrated 11/2023 cardiac MRI: Moderately thickened interventricular septum measuring up to 15 mm   -Continue Coreg 12.5mg bid

## 2025-02-22 NOTE — ASSESSMENT & PLAN NOTE
Assessment and plan:   -History of Whipple for PNET in 2021  -continue home pancrelipase 52758 units TID with meals   -Monitor I/Os

## 2025-02-22 NOTE — ASSESSMENT & PLAN NOTE
Assessment:  -Patient with history of syncope and presyncope presenting for syncopal episode as prodromal lightheadedness/dizziness and nausea   -CT head and CTA negative for ischemic stroke and hemorrhage   -Patient experiencing nausea with multiple episodes of vomiting in the ED    Plan:  -Orthostatics ordered  -Monitor post IV fluids bolus  -Follow-up neurology recommendations with stroke pathway  -Allow permissive hypertension and hold home antihypertensive  -DAPT, Lipitor  -Valium as needed for vertigo

## 2025-02-22 NOTE — PROGRESS NOTES
Progress Note - Trauma [facility]   Mat Piña 57 y.o. male [id number]   Unit/Bed#: [facility] 608-01 Encounter: [id number]     Assessment & Plan   Summary of Diagnosed Injuries: None    PLAN:  Syncope and collapse  - Ground level syncopal fall  - FAST exam negative  - CT scan showed no traumatic injuries.   - Admitted to Medicine for syncope workup    VTE Prophylaxis:VTE covered by:  enoxaparin, Subcutaneous      Disposition: Inpatient management    Code status:  Level 1 - Full Code    Consultants: IP CONSULT TO UROLOGY  IP CONSULT TO CASE MANAGEMENT  IP CONSULT TO OPHTHALMOLOGY     Subjective     Mechanism of Injury:Fall     Chief Complaint: Fall    HPI/Last 24 hour events: 1 episode of orthostatic hypotension 84/47 resolved with out intervention, rest of VS stable on room air.  No complaints.  No pain.  Tolerating diet.  Passing bowel movement or flatus.  No nausea vomiting fevers chills chest pain or shortness of breath     Objective   Vitals:   Temp:  [97.3 °F (36.3 °C)-100.1 °F (37.8 °C)] 100.1 °F (37.8 °C)  HR:  [79-97] 87  Resp:  [16-22] 16  BP: (106-149)/(74-91) 111/80    I/O         12/12 0701  12/13 0700 12/13 0701  12/14 0700 12/14 0701  12/15 0700    P.O.  400 545    I.V. (mL/kg)  1300 (14.5) 60 (0.7)    IV Piggyback  1500 300    Total Intake(mL/kg)  3200 (35.6) 905 (10.2)    Urine (mL/kg/hr)  1925 2200 (2.7)    Drains   0.3    Total Output  1925 2200.3    Net  +1275 -1295.3                      Physical Exam:  General: No acute distress  Neuro: Awake, Alert and Oriented x 3  HEENT:  Normocephalic, atraumatic, moist mucous membranes  CV: Regular rate and rhythm  Lungs: Normal work of breathing, no increased respiratory effort  Abdomen: Soft, non-tender, non-distended.   Extremities: No edema, clubbing or cyanosis  Skin: Warm, dry     Invasive Devices       Peripheral Intravenous Line  Duration             Peripheral IV 12/14/23 Left;Ventral (anterior) Forearm <1 day              Drain  Duration              Closed/Suction Drain Right Groin Bulb <1 day                            Lab Results: Results: CBC/BMP:   .     02/21/25  1343 02/21/25  1346   WBC  --  10.26*   HGB 12.9 12.7   HCT 38 37.3   PLT  --  268   SODIUM  --  138   K  --  4.2   CL  --  101   CO2 27 27   BUN  --  17   CREATININE  --  1.31*   GLUC  --  153*   CAIONIZED 1.25  --     , BMP/CMP:   Lab Results   Component Value Date    SODIUM 138 02/21/2025    K 4.2 02/21/2025     02/21/2025    CO2 27 02/21/2025    CO2 27 02/21/2025    BUN 17 02/21/2025    CREATININE 1.31 (H) 02/21/2025    GLUCOSE 148 (H) 02/21/2025    CALCIUM 10.1 02/21/2025    EGFR 42 02/21/2025   , and CBC:   Lab Results   Component Value Date    WBC 10.26 (H) 02/21/2025    HGB 12.7 02/21/2025    HCT 37.3 02/21/2025    MCV 94 02/21/2025     02/21/2025    RBC 3.95 02/21/2025    MCH 32.2 02/21/2025    MCHC 34.0 02/21/2025    RDW 12.3 02/21/2025    MPV 10.5 02/21/2025       Imaging Results: Results Review Statement: No pertinent imaging studies reviewed.  Chest Xray(s): N/A   FAST exam(s): negative for acute findings   CT Scan(s): negative for acute findings   Additional Xray(s): N/A     Other Studies: N/A

## 2025-02-22 NOTE — ASSESSMENT & PLAN NOTE
Lab Results   Component Value Date    EGFR 42 02/22/2025    EGFR 42 02/21/2025    EGFR 49 09/17/2024    CREATININE 1.32 (H) 02/22/2025    CREATININE 1.31 (H) 02/21/2025    CREATININE 1.17 09/17/2024   Assessment and plan:  -Baseline creatinine appears to be around 1.2  -Monitor with daily metabolic panel  -Monitor response to Isolyte 1 L bolus

## 2025-02-22 NOTE — ASSESSMENT & PLAN NOTE
Initial NIHSS 0  Presenting deficits were: vertigo, no associated nystagmus or skew. Unable to complete full HINTS exam due to C-collar to determine central vs peripheral etiology.    Workup:  Lab Results   Component Value Date    HGBA1C 5.5 02/22/2025    CHOLESTEROL 160 02/22/2025    LDLCALC 99 02/22/2025    TRIG 149 02/22/2025    INR 1.04 02/21/2025      CTH: No acute intracranial abnormality. Chronic microangiopathy.   CTA: No significant stenosis or dissection of the cervical carotid or vertebral arteries. No significant intracranial stenosis, large vessel occlusion or aneurysm. 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  MRI: pending  TTE/ABRIL: pending  LDL 99    Impression: suspect peripheral vertigo triggered by change in position. Per charts, patient was supposed to remain on plavix but patient remains on aspirin. Will complete stroke workup.     Discussed with neurology attending, Dr. Ellis  Pending: MRI brain, TTE, A1c pending  No further need of permissive htn, may resume BP meds for normotension goal  AC/AP: continue home aspirin, + plavix until MRIB, if nl ok for ASA only  Atorvastatin 40mg QHS  Consider meclizine 25 mg  Glucose <180   Neuro checks- Every 1 hour x 4 hours, then every 2 hours x 4 hours, then every 4 hours x 72 hours     Stat CT Head for change in neuro status.  Monitor on telemetry  DVT ppx per primary team, SCDs  PT/OT/ST/PM&R evaluations   Medical management as per primary team appreciated.

## 2025-02-22 NOTE — OCCUPATIONAL THERAPY NOTE
Occupational Therapy Evaluation     Patient Name: Jen Crawford  Today's Date: 2/22/2025  Problem List  Principal Problem:    Syncope and collapse  Active Problems:    Nausea    Stage 3a chronic kidney disease (HCC)    Hypertension    Pancreatic insufficiency    Hypertrophic cardiomyopathy (HCC)    Vertigo    Past Medical History  Past Medical History:   Diagnosis Date    Back ache     Cancer (HCC)     Hyperlipidemia     Hypertension     Pancreatic mass 4/12/2021     Past Surgical History  Past Surgical History:   Procedure Laterality Date    APPENDECTOMY      BILATERAL OOPHORECTOMY      BREAST SURGERY Right     partial mastectomy     COLONOSCOPY  06/17/2021    HERNIA REPAIR      HYSTERECTOMY      LAPAROTOMY N/A 08/24/2021    Procedure: LAPAROTOMY EXPLORATORY;  Surgeon: René Gurrola MD;  [facility];  Service: Surgical Oncology    MASTECTOMY Right     partial    WHIPPLE PROCEDURE/PANCREATICO-DUODENECTOMY N/A 08/24/2021    Procedure: WHIPPLE PROCEDURE/PANCREATICO-DUODENECTOMY;  Surgeon: René Gurrola MD;  [facility];  Service: Surgical Oncology           02/22/25 1035   Note Type   Note type Evaluation   Pain Assessment   Pain Assessment Tool 0-10   Pain Score No Pain   Restrictions/Precautions   Weight Bearing Precautions Per Order No   Other Precautions Multiple lines;Fall Risk   Home Living   Type of Home House   Home Layout Two level;Bed/bath upstairs   Bathroom Shower/Tub Tub/shower unit   Bathroom Toilet Standard   Bathroom Accessibility Accessible   Home Equipment Walker   Prior Function   Level of [place] Independent with ADLs;Independent with functional mobility;Independent with IADLS   Lives With Daughter   Receives Help From Family   IADLs Independent with driving;Independent with meal prep   Falls in the last 6 months 1 to 4  (1 admitting fall)   Vocational Retired   Lifestyle   Autonomy Pt I w/ ADLs, IADLs, and mobility. No DME use.   Reciprocal Relationships  Supportive dtr   Service to Others retired   Intrinsic Gratification being active   ADL   Where Assessed Edge of bed   Eating Assistance 7  Independent   Grooming Assistance 5  Supervision/Setup   UB Bathing Assistance 5  Supervision/Setup   LB Bathing Assistance 5  Supervision/Setup   UB Dressing Assistance 5  Supervision/Setup   LB Dressing Assistance 5  Supervision/Setup   Toileting Assistance  5  Supervision/Setup   Functional Assistance 5  Supervision/Setup   Additional Comments increased time   Bed Mobility   Supine to Sit 5  Supervision   Additional items Increased time required   Sit to Supine 5  Supervision   Additional items Increased time required   Transfers   Sit to Stand 5  Supervision   Additional items Increased time required   Stand to Sit 5  Supervision   Additional items Increased time required   Additional Comments no AD   Balance   Static Sitting Good   Dynamic Sitting Fair +   Static Standing Fair +   Dynamic Standing Fair   Activity Tolerance   Activity Tolerance Patient tolerated treatment well   Medical Staff Made Aware PT   Nurse Made Aware Cleared w/ RN   RUE Assessment   RUE Assessment WFL   LUE Assessment   LUE Assessment WFL   Hand Function   Gross Motor Coordination Functional   Fine Motor Coordination Functional   Cognition   Overall Cognitive Status WFL   Arousal/Participation Responsive;Alert;Cooperative   Attention Within functional limits   Orientation Level Oriented X4   Memory Within functional limits   Following Commands Follows all commands and directions without difficulty   Comments Pt pleasant and agreeable to session. Upper sorbian speaking but dtr present and provided translation   Assessment   Limitation Decreased endurance;Decreased high-level ADLs   Prognosis Good   Assessment Pt is a 64 y.o. female who was admitted to [facility] on [facility]/21/20[facility] with Syncope and collapse. Patient  has a past medical history of Back ache, Cancer (HCC), Hyperlipidemia, Hypertension, and  Pancreatic mass. At baseline pt was I w/ ADLs, IADLs, and mobility. Pt lives w/ dtr in 2 . Currently pt requires S for overall ADLS and S for functional mobility/transfers. Pt currently presents with impairments in the following categories -difficulty performing IADLS  activity tolerance. These impairments, as well as pt's fatigue  limit pt's ability to safely engage in all baseline areas of occupation, including house maintenance.   The patient's raw score on the AM-PAC Daily Activity Inpatient Short Form is 24. A raw score of greater than or equal to 19 suggests the patient may benefit from discharge to home. Please refer to the recommendation of the Occupational Therapist for safe discharge planning. From OT standpoint, recommend home w/ increased support as needed upon D/C. No further acute OT needs warranted at this time. D/C OT.   Goals   Patient Goals to walk   Plan   OT Frequency Eval only   Discharge Recommendation   Rehab Resource Intensity Level, OT No post-acute rehabilitation needs   AM-PAC Daily Activity Inpatient   Lower Body Dressing 4   Bathing 4   Toileting 4   Upper Body Dressing 4   Grooming 4   Eating 4   Daily Activity Raw Score 24   Daily Activity Standardized Score (Calc for Raw Score >=11) 57.54       Michaela Hernandez, OTR/L

## 2025-02-22 NOTE — PHYSICAL THERAPY NOTE
Physical Therapy Evaluation     Patient's Name: Jen Crawford    Admitting Diagnosis  Syncope and collapse [R55]  Head injury [S09.90XA]    Problem List  Patient Active Problem List   Diagnosis    Nausea    Stroke-like symptoms    Incidental pulmonary nodule    Hyperlipemia    Tarsal tunnel syndrome, left    Depression    Stage 3a chronic kidney disease (HCC)    History of neuroendocrine cancer    Personal history of breast cancer    Anemia    Hypertension    H/O Whipple procedure    Pancreatic insufficiency    Encounter for follow-up surveillance of neuroendocrine carcinoma    Aortic aneurysm (HCC)    Chronic pain of both knees    Need for influenza vaccination    BMI 27.0-27.9,adult    Lung nodules    Pre-op exam    Dyspnea on exertion    Neuroendocrine tumor of pancreas    Hypertrophic cardiomyopathy (HCC)    Fall down steps    Syncope and collapse    Vertigo       Past Medical History  Past Medical History:   Diagnosis Date    Back ache     Cancer (HCC)     Hyperlipidemia     Hypertension     Pancreatic mass 4/12/2021       Past Surgical History  Past Surgical History:   Procedure Laterality Date    APPENDECTOMY      BILATERAL OOPHORECTOMY      BREAST SURGERY Right     partial mastectomy     COLONOSCOPY  06/17/2021    HERNIA REPAIR      HYSTERECTOMY      LAPAROTOMY N/A 08/24/2021    Procedure: LAPAROTOMY EXPLORATORY;  Surgeon: René Gurrola MD;  [facility];  Service: Surgical Oncology    MASTECTOMY Right     partial    WHIPPLE PROCEDURE/PANCREATICO-DUODENECTOMY N/A 08/24/2021    Procedure: WHIPPLE PROCEDURE/PANCREATICO-DUODENECTOMY;  Surgeon: René Gurrola MD;  [facility];  Service: Surgical Oncology          02/22/25 1040   PT Last Visit   PT Visit Date 02/22/25   Note Type   Note type Evaluation   Pain Assessment   Pain Assessment Tool 0-10   Pain Score No Pain   Restrictions/Precautions   Weight Bearing Precautions Per Order No   Other Precautions Multiple lines;Fall Risk   Home  Living   Type of Home House   Home Layout Two level;Bed/bath upstairs  (1 SIMÓN)   Bathroom Shower/Tub Tub/shower unit   Bathroom Toilet Standard   Bathroom Accessibility Accessible   Home Equipment Walker   Prior Function   Level of [place] Independent with ADLs;Independent with functional mobility;Independent with IADLS   Lives With Daughter   Receives Help From Family   IADLs Independent with meal prep;Independent with medication management;Family/Friend/Other provides transportation   Falls in the last 6 months 1 to 4  (1 leading to this admission)   Vocational Retired   General   Family/Caregiver Present Yes  (Daughter)   Cognition   Orientation Level Oriented X4   Following Commands Follows one step commands without difficulty   RLE Assessment   RLE Assessment WFL   LLE Assessment   LLE Assessment WFL   Bed Mobility   Supine to Sit 5  Supervision   Additional items Increased time required   Sit to Supine 5  Supervision   Additional items Increased time required;HOB elevated   Transfers   Sit to Stand 5  Supervision   Additional items Increased time required   Stand to Sit 5  Supervision   Additional items Increased time required   Additional Comments no AD   Ambulation/Elevation   Gait pattern Wide MARISEL;Forward Flexion;Step through pattern   Gait Assistance 5  Supervision   Assistive Device None   Distance 150'   Balance   Static Sitting Good   Dynamic Sitting Fair +   Static Standing Fair +   Dynamic Standing Fair   Ambulatory Fair   Activity Tolerance   Activity Tolerance Patient tolerated treatment well   Medical Staff Made Aware OT   Nurse Made Aware RN cleared and updated   Assessment   Prognosis Good   Problem List Impaired balance;Decreased mobility   Assessment Pt seen for PT evaluation. Pt with active PT eval/treat orders. Pt is a 64 y.o. female who was admitted to [facility] on 2/21/25. Pt's current dx/ problem list include: syncope and collapse. Comorbidities affecting pt's physical  performance at time of assessment are as follows:  has a past medical history of Back ache, Cancer (HCC), Hyperlipidemia, Hypertension, and Pancreatic mass. Personal factors affecting pt at time of initial examination include: steps to enter environment, limited home support, past experience, inability to perform IADLs, inability to perform ADLs, inability to ambulate household distances. Due to acute medical issues, ongoing medical workup for primary dx; fall risk, decreased activity tolerance compared to baseline, increased assistance needed from caregiver at current time, multiple lines, decline in overall functional mobility status; health management issues. At baseline, pt resides in a 2  with 1 SIMÓN and was independent with ADLs/ IADLs. Currently, upon initial examination, pt is requiring  supervision for supine to sit, sit to stand and ambulation. No further acute PT needs at this time. Patient should continue to progress with mobility with restorative and nursing staff.  At the end of evaluation, pt was left supine with all needs in reach. Recommend no post acute rehabilitation needs. Please also refer to the recommendation of the Physical Therapist for safe discharge planning.   Barriers to Discharge None   Goals   Patient Goals To get up   Plan   PT Frequency Other (Comment)  (PT EVAL ONLY)   Discharge Recommendation   Rehab Resource Intensity Level, PT No post-acute rehabilitation needs   AM-PAC Basic Mobility Inpatient   Turning in Flat Bed Without Bedrails 4   Lying on Back to Sitting on Edge of Flat Bed Without Bedrails 4   Moving Bed to Chair 3   Standing Up From Chair Using Arms 3   Walk in Room 3   Climb 3-5 Stairs With Railing 3   Basic Mobility Inpatient Raw Score 20   Basic Mobility Standardized Score 43.99   [facility] Highest Level Of Mobility   -HLM Goal 6: Walk 10 steps or more   -HLM Achieved 7: Walk 25 feet or more         Darlene Brady, PT

## 2025-02-22 NOTE — PLAN OF CARE
Problem: PHYSICAL THERAPY ADULT  Goal: Performs mobility at highest level of function for planned discharge setting.  See evaluation for individualized goals.  Description:            See flowsheet documentation for full assessment, interventions and recommendations.  Outcome: Completed  Note: Prognosis: Good  Problem List: Impaired balance, Decreased mobility  Assessment: Pt seen for PT evaluation. Pt with active PT eval/treat orders. Pt is a 64 y.o. female who was admitted to [facility] on 2/21/25. Pt's current dx/ problem list include: syncope and collapse. Comorbidities affecting pt's physical performance at time of assessment are as follows:  has a past medical history of Back ache, Cancer (HCC), Hyperlipidemia, Hypertension, and Pancreatic mass. Personal factors affecting pt at time of initial examination include: steps to enter environment, limited home support, past experience, inability to perform IADLs, inability to perform ADLs, inability to ambulate household distances. Due to acute medical issues, ongoing medical workup for primary dx; fall risk, decreased activity tolerance compared to baseline, increased assistance needed from caregiver at current time, multiple lines, decline in overall functional mobility status; health management issues. At baseline, pt resides in a 2  with 1 SIMÓN and was independent with ADLs/ IADLs. Currently, upon initial examination, pt is requiring  supervision for supine to sit, sit to stand and ambulation. No further acute PT needs at this time. Patient should continue to progress with mobility with restorative and nursing staff.  At the end of evaluation, pt was left supine with all needs in reach. Recommend no post acute rehabilitation needs. Please also refer to the recommendation of the Physical Therapist for safe discharge planning.  Barriers to Discharge: None     Rehab Resource Intensity Level, PT: No post-acute rehabilitation needs    See flowsheet  documentation for full assessment.

## 2025-02-22 NOTE — ASSESSMENT & PLAN NOTE
Assessment:  -Patient with history of syncope and presyncope presenting for syncopal episode as prodromal lightheadedness/dizziness and nausea   -CT head and CTA negative for ischemic stroke and hemorrhage   -Patient experiencing nausea with multiple episodes of vomiting in the ED    Given positive orthostatics and recent changes to oupt BP meds likely vasovagal / hypotension and some possible GI illness that could have caused syncope.   No evidence of nystagmus of other neuro deficits on exam however given prior hx concerning for CVA should rule out neuro cause.     Per chart review, only new changes to medication was increase of coreg from 12.5 to 25mg bid outpt. Pt per chart review had setraline and trazodone dc in August as was not taking.     Plan:  -Orthostatics ---positive 110/56 sitting to 84/47 standing  -s/p 1L isolyte on admission; start isolyte 100ml/hr for 24hrs  -Follow-up neurology recommendations with stroke pathway: echo mri statin, asa, plavix  - dc tele as no overnight events  -Allow permissive hypertension and hold home antihypertensive  -DAPT, Lipitor  -Valium as needed for vertigo

## 2025-02-22 NOTE — ASSESSMENT & PLAN NOTE
Assessment and plan:   -History of Whipple for PNET in 2021  -continue home pancrelipase [id number] units TID with meals   -Monitor I/Os

## 2025-02-22 NOTE — PROGRESS NOTES
Progress Note - Neurology   Name: Jen Crawford 64 y.o. female I MRN: [id number]  Unit/Bed#: CW2 211-01 I Date of Admission: 2/21/2025   Date of Service: 2/22/2025 I Hospital Day: 0    Assessment & Plan  Vertigo  Initial NIHSS 0  Presenting deficits were: vertigo, no associated nystagmus or skew. Unable to complete full HINTS exam due to C-collar to determine central vs peripheral etiology.    Workup:  Lab Results   Component Value Date    HGBA1C 5.4 09/16/2024    CHOLESTEROL 160 02/22/2025    LDLCALC 99 02/22/2025    TRIG 149 02/22/2025    INR 1.04 02/21/2025      CTH: No acute intracranial abnormality. Chronic microangiopathy.   CTA: No significant stenosis or dissection of the cervical carotid or vertebral arteries. No significant intracranial stenosis, large vessel occlusion or aneurysm. 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  MRI: pending  TTE/ABRIL: pending  LDL 99    Impression: suspect peripheral vertigo triggered by change in position. Per charts, patient was supposed to remain on plavix but patient remains on aspirin. Will complete stroke workup.     Discussed with neurology attending, Dr. Ellis  Pending: MRI brain, TTE, A1c pending  BP: Permissive HTN <220/120 for first 24 hours, and then gradual return to normotension  AC/AP: continue home aspirin, + plavix until MRIB, if nl ok for ASA only  Atorvastatin 40mg QHS  Consider meclizine 25 mg  Glucose <180   Neuro checks- Every 1 hour x 4 hours, then every 2 hours x 4 hours, then every 4 hours x 72 hours     Stat CT Head for change in neuro status.  Monitor on telemetry  DVT ppx per primary team, SCDs  PT/OT/ST/PM&R evaluations   Medical management as per primary team appreciated.      Recommendations for outpatient neurological follow up have yet to be determined.  I have discussed with Dr. Ellis the above plan to treat pt. He agrees with the plan.  Please contact the SecureChat role for the Neurology service with  any questions/concerns.    Subjective   Pt reports nausea when eating & some dizziness when moving head but sxs resolved when doing neither of those activities, NAD, NAEO, no new complaints.    Review of Systems   Gastrointestinal:  Positive for nausea.   Neurological:  Positive for dizziness.       Objective :  Temp:  [97.9 °F (36.6 °C)-99 °F (37.2 °C)] 99 °F (37.2 °C)  HR:  [60-71] 71  BP: ()/(47-98) 172/85  Resp:  [16-21] 18  SpO2:  [98 %-100 %] 98 %  O2 Device: None (Room air)    Physical Exam  Vitals and nursing note reviewed. Exam conducted with a chaperone present.   Constitutional:       General: She is not in acute distress.     Appearance: She is not ill-appearing, toxic-appearing or diaphoretic.   HENT:      Head: Normocephalic and atraumatic.      Right Ear: External ear normal.      Left Ear: External ear normal.      Nose: Nose normal.      Mouth/Throat:      Pharynx: Oropharynx is clear.   Eyes:      General: Lids are normal. No scleral icterus.        Right eye: No discharge.         Left eye: No discharge.      Extraocular Movements: Extraocular movements intact.      Conjunctiva/sclera: Conjunctivae normal.      Pupils: Pupils are equal, round, and reactive to light.   Neurological:      Mental Status: She is alert.      Cranial Nerves: No cranial nerve deficit.      Coordination: Coordination is intact. Coordination normal.      Gait: Gait normal.      Deep Tendon Reflexes: Reflexes normal.   Psychiatric:         Speech: Speech normal.     Neurological Exam  Mental Status  Alert. Oriented to person, place, time and situation. Speech is normal. Language is fluent with no aphasia.    Cranial Nerves  CN I: Sense of smell is normal.  CN II: Visual acuity is normal. Visual fields full to confrontation.  CN III, IV, VI: Extraocular movements intact bilaterally. Normal lids and orbits bilaterally. Pupils equal round and reactive to light bilaterally.  CN V: Facial sensation is normal.  CN VII: Full  and symmetric facial movement.  CN VIII: Hearing is normal.  CN IX, X: Palate elevates symmetrically. Normal gag reflex.  CN XI: Shoulder shrug strength is normal.  CN XII: Tongue midline without atrophy or fasciculations.    Motor  Normal muscle bulk throughout. No fasciculations present. Normal muscle tone. No abnormal involuntary movements. Strength is 5/5 in all four extremities except as noted.    Sensory  Light touch is normal in upper and lower extremities.     Reflexes  Deep tendon reflexes are 2+ and symmetric except as noted.    Coordination    Finger-to-nose, rapid alternating movements and heel-to-shin normal bilaterally without dysmetria.    Gait   Normal gait.  Defer.            VTE Pharmacologic Prophylaxis: VTE covered by:  enoxaparin, Subcutaneous, 40 mg at 02/22/25 0812       Administrative Statements   I have spent a total time of 30 minutes in caring for this patient on the day of the visit/encounter including Impressions, Counseling / Coordination of care, Documenting in the medical record, Reviewing/placing orders in the medical record (including tests, medications, and/or procedures), Obtaining or reviewing history  , and Communicating with other healthcare professionals .

## 2025-02-22 NOTE — UTILIZATION REVIEW
Initial Clinical Review    OBSERVATION 2/21  CHANGED TO  IP ADMISSIOn 2/22  @  0810   pending stroke work-up, will likely need to be inpt given duration     Admission: Date/Time/Statement:   Admission Orders (From admission, onward)       Ordered        02/22/25 0810  INPATIENT ADMISSION  Once            02/21/25 1504  Place in Observation  Once                          Orders Placed This Encounter   Procedures    Place in Observation     Standing Status:   Standing     Number of Occurrences:   1     Level of Care:   Med Surg [16]    INPATIENT ADMISSION     Standing Status:   Standing     Number of Occurrences:   1     Level of Care:   Med Surg [16]     Estimated length of stay:   More than 2 Midnights     Certification:   I certify that inpatient services are medically necessary for this patient for a duration of greater than two midnights. See H&P and MD Progress Notes for additional information about the patient's course of treatment.     ED Arrival Information       Expected   -    Arrival   2/21/2025 13:06    Acuity   Immediate              Means of arrival   Walk-In    Escorted by   Family Member    Service   [facility]    Admission type   Emergency              Arrival complaint   vomiting/Fall head injury             Chief Complaint   Patient presents with    Fall     Pt became dizzy then fell at home. Stroke alert after trauma eval. +xarelto, +HS       Initial Presentation: 64 y.o. female presents to ED from  home after a ground level fall from a standing position at home,  hit head  and had  LOC.  Felt dizzy, lightheaded with nausea about  15 minutes prior to falling, no vomiting.  Denies chest pain, palpitations or warmth/flushing.  No prior  incidents.  Multiple episodes of vomiting in ED, has continued pain  in back of head, ongoing nausea.   Ct scan shows  no evidence of infarct.  Cta brain shows significant intracranial stenosis, large vessel occlusions  or aneurysm.  CXR  NAD.  PMH   is  HTN,   HCM, CKD, AA, anemia, breast cancer  and pancreatic neuroendocrine tumor, S/P whipple   2021.  Admit  Observation with  Syncope/Collapse  and plan is  orthostatic  vital signs, S/P  IVF  bolus, monitor labs, neuro consult, hold antihypertensives  and valium as needed for vertigo.    Neuro consult  NIHSS  0.  TTE/ABRIL pending.Needs  PT/OT.   MRI brain pending.  Monitor on tele.  Continue  neuro checks. Unclear  etiology  of syncope at present.    2/22  IP ADMISSION  FAST EXAM  negative.  Had  1  episode of   orthostatic  hypotension,  84/47,  resolved w/o intervention.  Starting   IVF  100/hr  X  24 hrs.   Needs  PT/OT.    Feels  nausea and  zofran ordered.  Not  completely  compliant with home meds.  Wait  additional work up.    Date:   2/23  Day 3: Has surpassed a 2nd midnight with active treatments and services.  + orthostatics, recent changes  in OP  BP meds, likely  vasovagal/hypotension, possible  GI  illness  which possibly caused  syncope  vs  peripheral vertigo.   Imaging negative  for acute  CVA.   Can resume  home aspirin, d/c plavix.           ED Treatment-Medication Administration from 02/21/2025 1306 to 02/21/2025 2101         Date/Time Order Dose Route Action     02/21/2025 1335 fentaNYL injection 50 mcg Intravenous Given     02/21/2025 1336 ondansetron (ZOFRAN) injection 4 mg Intravenous Given     02/21/2025 1353 ondansetron (ZOFRAN) injection 4 mg Intravenous Given     02/21/2025 1345 iohexol (OMNIPAQUE) 350 MG/ML injection (MULTI-DOSE) 85 mL 85 mL Intravenous Given     02/21/2025 1356 ondansetron (ZOFRAN) 4 mg/2 mL injection **ADS Override Pull** --  Override Pull     02/21/2025 1535 acetaminophen (TYLENOL) tablet 975 mg 975 mg Oral Given     02/21/2025 1731 Diclofenac Sodium (VOLTAREN) 1 % topical gel 2 g 2 g Topical Given     02/21/2025 1732 multi-electrolyte (ISOLYTE-S PH 7.4) bolus 1,000 mL 1,000 mL Intravenous New Bag            Scheduled Medications:  [Held by provider] amLODIPine, 10 mg, Oral,  Daily  aspirin, 81 mg, Oral, Daily  atorvastatin, 40 mg, Oral, Daily With Dinner  carvedilol, 12.5 mg, Oral, BID With Meals  clobetasol, , Topical, BID  clopidogrel, 75 mg, Oral, Daily  Diclofenac Sodium, 2 g, Topical, 4x Daily  enoxaparin, 40 mg, Subcutaneous, Daily  [Held by provider] lisinopril, 40 mg, Oral, Daily  pancrelipase (Lip-Prot-Amyl), 12,000 Units, Oral, TID With Meals  potassium chloride, 20 mEq, Oral, Daily      Continuous IV Infusions:  multi-electrolyte, 100 mL/hr, Intravenous, Continuous      PRN Meds:  ondansetron, 4 mg, Intravenous, Q6H PRN    Neuro checks  Dysphagia eval      ED Triage Vitals   Temperature Pulse Respirations Blood Pressure SpO2 Pain Score   02/21/25 1400 02/21/25 1322 02/21/25 1322 02/21/25 1322 02/21/25 1322 02/21/25 1535   97.9 °F (36.6 °C) 65 16 125/90 98 % 6     Weight (last 2 days)       Date/Time Weight    02/21/25 1349 65.9 (145.28)            Vital Signs (last 3 days)       Date/Time Temp Pulse Resp BP MAP (mmHg) SpO2 O2 Device Patient Position - Orthostatic VS Yaya Coma Scale Score Pain    02/22/25 11:21:19 99 °F (37.2 °C) 71 18 172/85 114 98 % -- -- -- --    02/22/25 1040 -- -- -- -- -- -- -- -- -- No Pain    02/22/25 07:56:17 -- 68 -- 148/68 95 -- -- -- -- --    02/21/25 2114 -- -- -- -- -- -- None (Room air) -- 15 No Pain    02/21/25 21:07:13 -- 65 20 141/56 84 99 % -- -- -- --    02/21/25 2049 -- -- -- 84/47 -- -- -- Standing - Orthostatic VS -- --    02/21/25 2048 -- -- -- 110/56 -- -- -- Sitting - Orthostatic VS -- --    02/21/25 2046 -- -- -- 112/54 -- -- -- Lying - Orthostatic VS -- --    02/21/25 2015 -- 68 20 122/58 84 100 % None (Room air) Sitting -- --    02/21/25 2000 -- 68 21 116/56 80 100 % None (Room air) Sitting 15 --    02/21/25 1535 -- 69 18 128/58 -- 100 % Nasal cannula -- -- 6    02/21/25 14:45:11 -- 60 18 144/57 -- 100 % None (Room air) Sitting 15 --    02/21/25 14:30:39 -- 60 18 151/69 -- 100 % None (Room air) Sitting 15 --    02/21/25 14:15:36  -- 62 18 141/64 -- 100 % None (Room air) Sitting 15 --    02/21/25 14:00:51 97.9 °F (36.6 °C) 64 18 176/74 -- 100 % None (Room air) -- 15 --    02/21/25 13:33:11 -- 66 18 204/98 -- 99 % -- -- 15 --    02/21/25 13:22:55 -- 65 16 125/90 -- 98 % -- -- 15 --              Pertinent Labs/Diagnostic Test Results:   Radiology:  CTA stroke alert (head/neck)   Final Interpretation by E. Alec Schoenberger, MD (02/21 1408)      No significant stenosis or dissection of the cervical carotid or vertebral arteries.   No significant intracranial stenosis, large vessel occlusion or aneurysm.   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         No traumatic injury to the cervical spine.         Findings were directly discussed with Shaka Ellis at 1:56 p.m.      I personally discussed this study with Liang Lujan on 2/21/2025 158 PM.         Workstation performed: [id number]         CT stroke alert brain   Final Interpretation by E. Alec Schoenberger, MD (02/21 1718)      No acute intracranial abnormality. Chronic microangiopathy.      Findings were directly discussed with Shaka Ellis at 1:56 p.m.      I personally discussed this study with Liang Lujan on 2/21/2025 158 PM.      Workstation performed: [id number]         XR Trauma multiple (SLB/SLRA trauma bay ONLY)   Final Interpretation by Ashley Urena MD (02/21 3036)      No acute pulmonary pathology.      No obvious displaced fractures within limitation of supine AP chest technique.                  Workstation performed: [id number]         XR chest 1 view    (Results Pending)   MRI brain wo contrast    (Results Pending)     Cardiology:  ECG 12 lead   Final Result by Waldo Roberts MD (02/21 2020)   Normal sinus rhythm   Minimal voltage criteria for LVH, may be normal variant   Borderline ECG   When compared with ECG of 25-Oct-2023 13:50,   No significant change was found   Confirmed by Waldo Roberts ([id number]) on 2/21/2025 3:04:54  PM        GI:    Results from last 7 days   Lab Units 02/22/25  0846 02/21/25  1346 02/21/25  1343   WBC Thousand/uL 8.39 10.26*  --    HEMOGLOBIN g/dL 11.0* 12.7  --    I STAT HEMOGLOBIN g/dl  --   --  12.9   HEMATOCRIT % 33.0* 37.3  --    HEMATOCRIT, ISTAT %  --   --  38   PLATELETS Thousands/uL 217 268  --    TOTAL NEUT ABS Thousands/µL 4.03  --   --          Results from last 7 days   Lab Units 02/22/25  0541 02/21/25  1346 02/21/25  1343   SODIUM mmol/L 138 138  --    POTASSIUM mmol/L 4.1 4.2  --    CHLORIDE mmol/L 103 101  --    CO2 mmol/L 26 27  --    CO2, I-STAT mmol/L  --   --  27   ANION GAP mmol/L 9 10  --    BUN mg/dL 24 17  --    CREATININE mg/dL 1.32* 1.31*  --    EGFR ml/min/1.73sq m 42 42  --    CALCIUM mg/dL 9.0 10.1  --    CALCIUM, IONIZED, ISTAT mmol/L  --   --  1.25             Results from last 7 days   Lab Units 02/22/25  0541 02/21/25  1346   GLUCOSE RANDOM mg/dL 108 153*           Results from last 7 days   Lab Units 02/21/25  1343   PH, TIM I-STAT  7.408*   PCO2, TIM ISTAT mm HG 40.8*   PO2, TIM ISTAT mm HG 41.0   HCO3, TIM ISTAT mmol/L 25.7   I STAT BASE EXC mmol/L 1   I STAT O2 SAT % 77         Results from last 7 days   Lab Units 02/21/25  1846 02/21/25  1756 02/21/25  1346   HS TNI 0HR ng/L  --   --  8   HS TNI 2HR ng/L  --  8  --    HSTNI D2 ng/L  --  0  --    HS TNI 4HR ng/L 8  --   --    HSTNI D4 ng/L 0  --   --          Results from last 7 days   Lab Units 02/21/25  1346   PROTIME seconds 13.9   INR  1.04   PTT seconds 27                 Present on Admission:   Stage 3a chronic kidney disease (HCC)   Pancreatic insufficiency   Hypertension   Hypertrophic cardiomyopathy (HCC)   Nausea      Admitting Diagnosis: Syncope and collapse [R55]  Head injury [S09.90XA]  Age/Sex: 64 y.o. female    Network Utilization Review Department  ATTENTION: Please call with any questions or concerns to [phone] and carefully listen to the prompts so that you are directed to the right person. All  voicemails are confidential.   For Discharge needs, contact Care Management DC Support Team at [phone] opt. 2  Send all requests for admission clinical reviews, approved or denied determinations and any other requests to dedicated fax number below belonging to the campus where the patient is receiving treatment. List of dedicated fax numbers for the Facilities:  FACILITY NAME UR FAX NUMBER   ADMISSION DENIALS (Administrative/Medical Necessity) [phone]   DISCHARGE SUPPORT TEAM (NETWORK) [phone]   PARENT CHILD HEALTH (Maternity/NICU/Pediatrics) [phone]   [facility] [phone]   [facility] [phone]   [facility] [phone]   [facility] [phone]   [facility] [phone]   General acute hospital [phone]   [facility] [phone]   [facility] [phone]   [facility] [phone]   [facility] [phone]   [facility] [phone]   [facility] [phone]

## 2025-02-22 NOTE — PROGRESS NOTES
Progress Note - Internal Medicine   Name: Jen Crawford 64 y.o. female I MRN: [id number]  Unit/Bed#: CW2 211-01 I Date of Admission: 2/21/2025   Date of Service: 2/22/2025 I Hospital Day: 0     Assessment & Plan  Syncope and collapse  Assessment:  -Patient with history of syncope and presyncope presenting for syncopal episode as prodromal lightheadedness/dizziness and nausea   -CT head and CTA negative for ischemic stroke and hemorrhage   -Patient experiencing nausea with multiple episodes of vomiting in the ED    Given positive orthostatics and recent changes to oupt BP meds likely vasovagal / hypotension and some possible GI illness that could have caused syncope.   No evidence of nystagmus of other neuro deficits on exam however given prior hx concerning for CVA should rule out neuro cause.     Per chart review, only new changes to medication was increase of coreg from 12.5 to 25mg bid outpt. Pt per chart review had setraline and trazodone dc in August as was not taking.     Plan:  -Orthostatics ---positive 110/56 sitting to 84/47 standing  -s/p 1L isolyte on admission; start isolyte 100ml/hr for 24hrs  -Follow-up neurology recommendations with stroke pathway: echo mri statin, asa, plavix  - dc tele as no overnight events  -Allow permissive hypertension and hold home antihypertensive  -DAPT, Lipitor  -Valium as needed for vertigo  Stage 3a chronic kidney disease (HCC)  Lab Results   Component Value Date    EGFR 42 02/22/2025    EGFR 42 02/21/2025    EGFR 49 09/17/2024    CREATININE 1.32 (H) 02/22/2025    CREATININE 1.31 (H) 02/21/2025    CREATININE 1.17 09/17/2024   Assessment and plan:  -Baseline creatinine appears to be around 1.2  -Monitor with daily metabolic panel  -Monitor response to Isolyte 1 L bolus  Hypertension  Assessment and plan:  -Patient was elevated as high as 204/98 on admission, down to 120s over 50s most recently   -Holding home amlodipine and lisinopril in setting of symptoms  -decrease  Coreg 12.5 mg twice daily  Pancreatic insufficiency  Assessment and plan:   -History of Whipple for PNET in 2021  -continue home pancrelipase 82152 units TID with meals   -Monitor I/Os   Hypertrophic cardiomyopathy (HCC)  Assessment and plan:  -Evidence of LVH on EKG on this admission   -2/2023 TTE: moderate concentric hypertrophy. The left ventricular ejection fraction is 70% by visual estimation. Systolic function is vigorous. Wall motion is normal. Diastolic function is mildly abnormal, consistent with grade I (abnormal) relaxation   -Redemonstrated 11/2023 cardiac MRI: Moderately thickened interventricular septum measuring up to 15 mm   -Continue Coreg 12.5mg bid    Vertigo  Assessment and plan:  -Follow-up stroke pathway  -dc Valium   Nausea  No abd pain or recent illness. No diarrhea. Nausea worse with standing. Was able to eat breakfast 2/22 prior to return of nausea.  IV fluids as above  zofran      Disposition: pending stroke work-up, will likely need to be inpt given duration     Team: [facility] TEAM A    Subjective   Patient seen and examined. No acute events overnight. Feeling nausea this am. Zofran ordered. Pt states that she is somewhat compliant with medication believes one of her medications was recently increased but unsure if coreg vs setraline. Believes she still takes sertraline.  Felt that dizziness was more room spinning. Prior to collapse. Has not had abd pain or diarrhea.     Objective :  Temp:  [97.9 °F (36.6 °C)] 97.9 °F (36.6 °C)  HR:  [60-69] 68  BP: ()/(47-98) 148/68  Resp:  [16-21] 20  SpO2:  [98 %-100 %] 99 %  O2 Device: None (Room air)    I/O         02/20 0701  02/21 0700 02/21 0701  02/22 0700 02/22 0701  02/23 0700    I.V. (mL/kg)  1000 (15.2)     Total Intake(mL/kg)  1000 (15.2)     Net  +1000            Unmeasured Urine Occurrence  1 x           Weights:        Body mass index is 25.74 kg/m².  Weight (last 2 days)       Date/Time Weight    02/21/25 1349 65.9 (145.28)             Physical Exam  Vitals and nursing note reviewed.   Constitutional:       General: She is not in acute distress.     Appearance: She is well-developed.   HENT:      Head: Normocephalic and atraumatic.   Eyes:      Conjunctiva/sclera: Conjunctivae normal.   Cardiovascular:      Rate and Rhythm: Normal rate and regular rhythm.      Heart sounds: Murmur heard.      Comments: Systolic murmur present  Pulmonary:      Effort: Pulmonary effort is normal. No respiratory distress.      Breath sounds: Normal breath sounds.   Abdominal:      Palpations: Abdomen is soft.      Tenderness: There is no abdominal tenderness.   Musculoskeletal:         General: No swelling.      Cervical back: Neck supple.   Skin:     General: Skin is warm and dry.      Capillary Refill: Capillary refill takes less than 2 seconds.   Neurological:      Mental Status: She is alert and oriented to person, place, and time. Mental status is at baseline.      Cranial Nerves: No cranial nerve deficit.      Motor: No weakness.      Comments: Unsteady gait dt feeling lightheaded  Neg romburgs  Strength 5/5     Psychiatric:         Mood and Affect: Mood normal.           Lab Results: I have reviewed the following results:  Recent Labs     02/21/25  1343 02/21/25  1346 02/21/25  1346 02/21/25  1756 02/22/25  0541   WBC  --  10.26*  --   --   --    HGB 12.9 12.7  --   --   --    HCT 38 37.3  --   --   --    PLT  --  268  --   --   --    SODIUM  --  138   < >  --  138   K  --  4.2   < >  --  4.1   CL  --  101   < >  --  103   CO2 27 27   < >  --  26   BUN  --  17   < >  --  24   CREATININE  --  1.31*   < >  --  1.32*   GLUC  --  153*   < >  --  108   CAIONIZED 1.25  --   --   --   --    PTT  --  27  --   --   --    INR  --  1.04  --   --   --    HSTNI0  --  8  --   --   --    HSTNI2  --   --   --  8  --     < > = values in this interval not displayed.             Currently Ordered Meds:   Current Facility-Administered Medications:     [Held by provider]  amLODIPine (NORVASC) tablet 10 mg, Daily    aspirin chewable tablet 81 mg, Daily    atorvastatin (LIPITOR) tablet 40 mg, Daily With Dinner    carvedilol (COREG) tablet 25 mg, BID With Meals    clobetasol (TEMOVATE) 0.05 % cream, BID    clopidogrel (PLAVIX) tablet 75 mg, Daily    diazepam (VALIUM) injection 2.5 mg, Once    diazepam (VALIUM) tablet 5 mg, Q6H PRN    Diclofenac Sodium (VOLTAREN) 1 % topical gel 2 g, 4x Daily    enoxaparin (LOVENOX) subcutaneous injection 40 mg, Daily    [Held by provider] lisinopril (ZESTRIL) tablet 40 mg, Daily    pancrelipase (Lip-Prot-Amyl) (CREON) delayed release capsule 12,000 Units, TID With Meals    potassium chloride (Klor-Con M20) CR tablet 20 mEq, Daily  VTE Pharmacologic Prophylaxis: VTE covered by:  enoxaparin, Subcutaneous, 40 mg at 02/22/25 0812     VTE Mechanical Prophylaxis: sequential compression device    Administrative Statements     Portions of the record may have been created with voice recognition software.

## 2025-02-22 NOTE — PLAN OF CARE
Problem: CARDIOVASCULAR - ADULT  Goal: Maintains optimal cardiac output and hemodynamic stability  Description: INTERVENTIONS:  - Monitor I/O, vital signs and rhythm  - Monitor for S/S and trends of decreased cardiac output  - Administer and titrate ordered vasoactive medications to optimize hemodynamic stability  - Assess quality of pulses, skin color and temperature  - Assess for signs of decreased coronary artery perfusion  - Instruct patient to report change in severity of symptoms  Outcome: Progressing  Goal: Absence of cardiac dysrhythmias or at baseline rhythm  Description: INTERVENTIONS:  - Continuous cardiac monitoring, vital signs, obtain 12 lead EKG if ordered  - Administer antiarrhythmic and heart rate control medications as ordered  - Monitor electrolytes and administer replacement therapy as ordered  Outcome: Progressing     Problem: GASTROINTESTINAL - ADULT  Goal: Minimal or absence of nausea and/or vomiting  Description: INTERVENTIONS:  - Administer IV fluids if ordered to ensure adequate hydration  - Maintain NPO status until nausea and vomiting are resolved  - Nasogastric tube if ordered  - Administer ordered antiemetic medications as needed  - Provide nonpharmacologic comfort measures as appropriate  - Advance diet as tolerated, if ordered  - Consider nutrition services referral to assist patient with adequate nutrition and appropriate food choices  Outcome: Progressing  Goal: Maintains adequate nutritional intake  Description: INTERVENTIONS:  - Monitor percentage of each meal consumed  - Identify factors contributing to decreased intake, treat as appropriate  - Assist with meals as needed  - Monitor I&O, weight, and lab values if indicated  - Obtain nutrition services referral as needed  Outcome: Progressing

## 2025-02-22 NOTE — ASSESSMENT & PLAN NOTE
Assessment and plan:  -Patient was elevated as high as 204/98 on admission, down to 120s over 50s most recently   -Holding home amlodipine and lisinopril in setting of symptoms  -decrease Coreg 12.5 mg twice daily

## 2025-02-23 ENCOUNTER — APPOINTMENT (INPATIENT)
Dept: NON INVASIVE DIAGNOSTICS | Facility: HOSPITAL | Age: 65
DRG: 312 | End: 2025-02-23
Payer: COMMERCIAL

## 2025-02-23 ENCOUNTER — APPOINTMENT (INPATIENT)
Dept: RADIOLOGY | Facility: HOSPITAL | Age: 65
DRG: 312 | End: 2025-02-23
Payer: COMMERCIAL
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
## 2025-02-23 LAB
ANION GAP SERPL CALCULATED.3IONS-SCNC: 6 MMOL/L (ref 4–13)
AORTIC ROOT: 2.6 CM
AORTIC VALVE MEAN VELOCITY: 16.2 M/S
ASCENDING AORTA: 3.2 CM
ATRIAL RATE: 60 BPM
AV AREA BY CONTINUOUS VTI: 1.2 CM2
AV AREA PEAK VELOCITY: 1.1 CM2
AV LVOT MEAN GRADIENT: 2 MMHG
AV LVOT PEAK GRADIENT: 3 MMHG
AV MEAN PRESS GRAD SYS DOP V1V2: 12 MMHG
AV ORIFICE AREA US: 1.21 CM2
AV PEAK GRADIENT: 21 MMHG
AV VELOCITY RATIO: 0.43
AV VMAX SYS DOP: 2.27 M/S
BASOPHILS # BLD AUTO: 0.02 THOUSANDS/ÂΜL (ref 0–0.1)
BASOPHILS NFR BLD AUTO: 0 % (ref 0–1)
BSA FOR ECHO PROCEDURE: 1.69 M2
BUN SERPL-MCNC: 16 MG/DL (ref 5–25)
CALCIUM SERPL-MCNC: 9.2 MG/DL (ref 8.4–10.2)
CHLORIDE SERPL-SCNC: 105 MMOL/L (ref 96–108)
CO2 SERPL-SCNC: 27 MMOL/L (ref 21–32)
CREAT SERPL-MCNC: 1.07 MG/DL (ref 0.6–1.3)
DOP CALC AO VTI: 51.25 CM
DOP CALC LVOT AREA: 2.83 CM2
DOP CALC LVOT CARDIAC INDEX: 2.41 L/MIN/M2
DOP CALC LVOT CARDIAC OUTPUT: 4.06 L/MIN
DOP CALC LVOT DIAMETER: 1.9 CM
DOP CALC LVOT PEAK VEL VTI: 21.87 CM
DOP CALC LVOT PEAK VEL: 0.91 M/S
DOP CALC LVOT STROKE INDEX: 36.7 ML/M2
DOP CALC LVOT STROKE VOLUME: 62
E WAVE DECELERATION TIME: 259 MS
E/A RATIO: 0.68
EOSINOPHIL # BLD AUTO: 0.13 THOUSAND/ÂΜL (ref 0–0.61)
EOSINOPHIL NFR BLD AUTO: 2 % (ref 0–6)
ERYTHROCYTE [DISTWIDTH] IN BLOOD BY AUTOMATED COUNT: 12.4 % (ref 11.6–15.1)
FRACTIONAL SHORTENING: 34 (ref 28–44)
GFR SERPL CREATININE-BSD FRML MDRD: 54 ML/MIN/1.73SQ M
GLUCOSE SERPL-MCNC: 91 MG/DL (ref 65–140)
HCT VFR BLD AUTO: 32.1 % (ref 34.8–46.1)
HGB BLD-MCNC: 10.7 G/DL (ref 11.5–15.4)
IMM GRANULOCYTES # BLD AUTO: 0.01 THOUSAND/UL (ref 0–0.2)
IMM GRANULOCYTES NFR BLD AUTO: 0 % (ref 0–2)
INTERVENTRICULAR SEPTUM IN DIASTOLE (PARASTERNAL SHORT AXIS VIEW): 1.6 CM
INTERVENTRICULAR SEPTUM: 1.6 CM (ref 0.6–1.1)
LAAS-AP2: 16.1 CM2
LAAS-AP4: 13.9 CM2
LEFT ATRIUM SIZE: 3.8 CM
LEFT ATRIUM VOLUME (MOD BIPLANE): 38 ML
LEFT ATRIUM VOLUME INDEX (MOD BIPLANE): 22.5 ML/M2
LEFT INTERNAL DIMENSION IN SYSTOLE: 2.3 CM (ref 2.1–4)
LEFT VENTRICULAR INTERNAL DIMENSION IN DIASTOLE: 3.5 CM (ref 3.5–6)
LEFT VENTRICULAR POSTERIOR WALL IN END DIASTOLE: 1.5 CM
LEFT VENTRICULAR STROKE VOLUME: 31 ML
LV EF US.2D.A4C+ESTIMATED: 68 %
LVSV (TEICH): 31 ML
LYMPHOCYTES # BLD AUTO: 3.88 THOUSANDS/ÂΜL (ref 0.6–4.47)
LYMPHOCYTES NFR BLD AUTO: 55 % (ref 14–44)
MAGNESIUM SERPL-MCNC: 1.7 MG/DL (ref 1.9–2.7)
MCH RBC QN AUTO: 31.8 PG (ref 26.8–34.3)
MCHC RBC AUTO-ENTMCNC: 33.3 G/DL (ref 31.4–37.4)
MCV RBC AUTO: 95 FL (ref 82–98)
MONOCYTES # BLD AUTO: 0.6 THOUSAND/ÂΜL (ref 0.17–1.22)
MONOCYTES NFR BLD AUTO: 8 % (ref 4–12)
MV E'TISSUE VEL-LAT: 8 CM/S
MV E'TISSUE VEL-SEP: 6 CM/S
MV PEAK A VEL: 1 M/S
MV PEAK E VEL: 68 CM/S
MV STENOSIS PRESSURE HALF TIME: 75 MS
MV VALVE AREA P 1/2 METHOD: 2.9
NEUTROPHILS # BLD AUTO: 2.47 THOUSANDS/ÂΜL (ref 1.85–7.62)
NEUTS SEG NFR BLD AUTO: 35 % (ref 43–75)
NRBC BLD AUTO-RTO: 0 /100 WBCS
P AXIS: 63 DEGREES
PLATELET # BLD AUTO: 212 THOUSANDS/UL (ref 149–390)
PMV BLD AUTO: 10.4 FL (ref 8.9–12.7)
POTASSIUM SERPL-SCNC: 4.1 MMOL/L (ref 3.5–5.3)
PR INTERVAL: 214 MS
QRS AXIS: -28 DEGREES
QRSD INTERVAL: 94 MS
QT INTERVAL: 396 MS
QTC INTERVAL: 396 MS
RBC # BLD AUTO: 3.37 MILLION/UL (ref 3.81–5.12)
RIGHT ATRIAL 2D VOLUME: 18 ML
RIGHT ATRIUM AREA SYSTOLE A4C: 9.4 CM2
RIGHT VENTRICLE ID DIMENSION: 2.9 CM
SL CV LEFT ATRIUM LENGTH A2C: 5.3 CM
SL CV LV EF: 70
SL CV PED ECHO LEFT VENTRICLE DIASTOLIC VOLUME (MOD BIPLANE) 2D: 50 ML
SL CV PED ECHO LEFT VENTRICLE SYSTOLIC VOLUME (MOD BIPLANE) 2D: 18 ML
SODIUM SERPL-SCNC: 138 MMOL/L (ref 135–147)
T WAVE AXIS: 42 DEGREES
TR MAX PG: 18 MMHG
TR PEAK VELOCITY: 2.1 M/S
TRICUSPID ANNULAR PLANE SYSTOLIC EXCURSION: 1.7 CM
TRICUSPID VALVE PEAK REGURGITATION VELOCITY: 2.09 M/S
VENTRICULAR RATE: 60 BPM
WBC # BLD AUTO: 7.11 THOUSAND/UL (ref 4.31–10.16)

## 2025-02-23 PROCEDURE — 99232 SBSQ HOSP IP/OBS MODERATE 35: CPT | Performed by: NURSE PRACTITIONER

## 2025-02-23 PROCEDURE — 93010 ELECTROCARDIOGRAM REPORT: CPT | Performed by: INTERNAL MEDICINE

## 2025-02-23 PROCEDURE — 85025 COMPLETE CBC W/AUTO DIFF WBC: CPT

## 2025-02-23 PROCEDURE — 93005 ELECTROCARDIOGRAM TRACING: CPT

## 2025-02-23 PROCEDURE — 93306 TTE W/DOPPLER COMPLETE: CPT | Performed by: INTERNAL MEDICINE

## 2025-02-23 PROCEDURE — 83735 ASSAY OF MAGNESIUM: CPT

## 2025-02-23 PROCEDURE — 93306 TTE W/DOPPLER COMPLETE: CPT

## 2025-02-23 PROCEDURE — 70551 MRI BRAIN STEM W/O DYE: CPT

## 2025-02-23 PROCEDURE — 80048 BASIC METABOLIC PNL TOTAL CA: CPT

## 2025-02-23 RX ORDER — AMLODIPINE BESYLATE 2.5 MG/1
2.5 TABLET ORAL DAILY
Status: DISCONTINUED | OUTPATIENT
Start: 2025-02-23 | End: 2025-02-24

## 2025-02-23 RX ORDER — CARVEDILOL 25 MG/1
25 TABLET ORAL 2 TIMES DAILY WITH MEALS
Status: DISCONTINUED | OUTPATIENT
Start: 2025-02-23 | End: 2025-02-23

## 2025-02-23 RX ORDER — CARVEDILOL 12.5 MG/1
12.5 TABLET ORAL 2 TIMES DAILY WITH MEALS
Status: DISCONTINUED | OUTPATIENT
Start: 2025-02-23 | End: 2025-02-24 | Stop reason: HOSPADM

## 2025-02-23 RX ADMIN — DICLOFENAC SODIUM 2 G: 10 GEL TOPICAL at 18:33

## 2025-02-23 RX ADMIN — ASPIRIN 81 MG: 81 TABLET, CHEWABLE ORAL at 09:12

## 2025-02-23 RX ADMIN — PANCRELIPASE 12000 UNITS: 30000; 6000; 19000 CAPSULE, DELAYED RELEASE PELLETS ORAL at 12:05

## 2025-02-23 RX ADMIN — PANCRELIPASE 12000 UNITS: 30000; 6000; 19000 CAPSULE, DELAYED RELEASE PELLETS ORAL at 17:29

## 2025-02-23 RX ADMIN — ENOXAPARIN SODIUM 40 MG: 40 INJECTION SUBCUTANEOUS at 09:12

## 2025-02-23 RX ADMIN — DICLOFENAC SODIUM 2 G: 10 GEL TOPICAL at 09:12

## 2025-02-23 RX ADMIN — CLOPIDOGREL BISULFATE 75 MG: 75 TABLET, FILM COATED ORAL at 09:12

## 2025-02-23 RX ADMIN — AMLODIPINE BESYLATE 2.5 MG: 2.5 TABLET ORAL at 12:05

## 2025-02-23 RX ADMIN — ATORVASTATIN CALCIUM 40 MG: 40 TABLET, FILM COATED ORAL at 16:02

## 2025-02-23 RX ADMIN — CLOBETASOL PROPIONATE: 0.5 CREAM TOPICAL at 18:38

## 2025-02-23 RX ADMIN — CARVEDILOL 12.5 MG: 12.5 TABLET, FILM COATED ORAL at 16:02

## 2025-02-23 RX ADMIN — PANCRELIPASE 12000 UNITS: 30000; 6000; 19000 CAPSULE, DELAYED RELEASE PELLETS ORAL at 09:13

## 2025-02-23 RX ADMIN — CARVEDILOL 12.5 MG: 12.5 TABLET, FILM COATED ORAL at 09:13

## 2025-02-23 RX ADMIN — CLOBETASOL PROPIONATE: 0.5 CREAM TOPICAL at 09:12

## 2025-02-23 RX ADMIN — POTASSIUM CHLORIDE 20 MEQ: 1500 TABLET, EXTENDED RELEASE ORAL at 09:12

## 2025-02-23 NOTE — ASSESSMENT & PLAN NOTE
Assessment and plan:  -Patient was elevated as high as 204/98 on admission, BP continues to be as high as 170s systolic  -Restarted home lisinopril 40 mg daily  -Amlodipine restarted at reduced dose of 2.5 mg daily  -Coreg 25 mg twice daily

## 2025-02-23 NOTE — ASSESSMENT & PLAN NOTE
Assessment:  -Patient with history of syncope and presyncope presenting for syncopal episode as prodromal lightheadedness/dizziness and nausea   -CT head and CTA negative for ischemic stroke and hemorrhage, MRI brain negative for acute ischemic or hemorrhagic changes but does show chronic microangiopathy  -Patient experiencing nausea with multiple episodes of vomiting in the ED    Given positive orthostatics and recent changes to oupt BP meds likely vasovagal / hypotension and some possible GI illness that could have caused syncope versus peripheral vertigo.  Neuroimaging including CT and MRI negative for acute CVA        Plan:  -Apparent improvement 2/23 orthostatics but plan for another repeat tomorrow.  Encouraged ambulation with the patient's nurse  -Neurology okay for discharge on aspirin monotherapy, Plavix discontinued  -Goal is normotension per neurology  -Can continue aspirin and Lipitor

## 2025-02-23 NOTE — ASSESSMENT & PLAN NOTE
Assessment and plan:   -History of Whipple for PNET in 2021  -continue home pancrelipase 64056 units TID with meals

## 2025-02-23 NOTE — ASSESSMENT & PLAN NOTE
Assessment and plan:  -Evidence of LVH on EKG on this admission   -2/23/2025 TTE: moderate concentric hypertrophy. The left ventricular ejection fraction is 70%.  Mid cavity dynamic obstruction with Valsalva with a peak gradient of 64 mmHg-2/2023 TTE: moderate concentric hypertrophy. The left ventricular ejection fraction is 70% by visual estimation. Systolic function is vigorous. Wall motion is normal. Diastolic function is mildly abnormal, consistent with grade I (abnormal) relaxation   -Redemonstrated 11/2023 cardiac MRI: Moderately thickened interventricular septum measuring up to 15 mm   -Coreg increased to 25 mg twice daily  -Started on amlodipine 2.5 mg daily  -Restarted lisinopril 40 mg daily

## 2025-02-23 NOTE — PROGRESS NOTES
Progress Note - Internal Medicine   Name: Jen Crawford 64 y.o. female I MRN: [id number]  Unit/Bed#: CW2 211-01 I Date of Admission: 2/21/2025   Date of Service: 2/23/2025 I Hospital Day: 1           Assessment & Plan  Syncope and collapse  Assessment:  -Patient with history of syncope and presyncope presenting for syncopal episode as prodromal lightheadedness/dizziness and nausea   -CT head and CTA negative for ischemic stroke and hemorrhage, MRI brain negative for acute ischemic or hemorrhagic changes but does show chronic microangiopathy  -Patient experiencing nausea with multiple episodes of vomiting in the ED    Given positive orthostatics and recent changes to oupt BP meds likely vasovagal / hypotension and some possible GI illness that could have caused syncope versus peripheral vertigo.  Neuroimaging including CT and MRI negative for acute CVA        Plan:  -Apparent improvement 2/23 orthostatics but plan for another repeat tomorrow.  Encouraged ambulation with the patient's nurse  -Neurology okay for discharge on aspirin monotherapy, Plavix discontinued  -Goal is normotension per neurology  -Can continue aspirin and Lipitor    Stage 3a chronic kidney disease (HCC)  Lab Results   Component Value Date    EGFR 42 02/22/2025    EGFR 42 02/21/2025    EGFR 49 09/17/2024    CREATININE 1.32 (H) 02/22/2025    CREATININE 1.31 (H) 02/21/2025    CREATININE 1.17 09/17/2024   Assessment and plan:  -Baseline creatinine appears to be around 1.2  -Monitor with daily metabolic panel  -Monitor response to Isolyte 1 L bolus  Hypertension  Assessment and plan:  -Patient was elevated as high as 204/98 on admission, BP continues to be as high as 170s systolic  -Restarted home lisinopril 40 mg daily  -Amlodipine restarted at reduced dose of 2.5 mg daily  -Coreg 25 mg twice daily  Pancreatic insufficiency  Assessment and plan:   -History of Whipple for PNET in 2021  -continue home pancrelipase 54507 units TID with meals      Hypertrophic cardiomyopathy (HCC)  Assessment and plan:  -Evidence of LVH on EKG on this admission   -2/23/2025 TTE: moderate concentric hypertrophy. The left ventricular ejection fraction is 70%.  Mid cavity dynamic obstruction with Valsalva with a peak gradient of 64 mmHg-2/2023 TTE: moderate concentric hypertrophy. The left ventricular ejection fraction is 70% by visual estimation. Systolic function is vigorous. Wall motion is normal. Diastolic function is mildly abnormal, consistent with grade I (abnormal) relaxation   -Redemonstrated 11/2023 cardiac MRI: Moderately thickened interventricular septum measuring up to 15 mm   -Coreg increased to 25 mg twice daily  -Started on amlodipine 2.5 mg daily  -Restarted lisinopril 40 mg daily  Vertigo  Assessment and plan:  -Likely peripheral cause  -dc Valium   Nausea  No abd pain or recent illness. No diarrhea. Nausea worse with standing. Was able to eat breakfast 2/22 prior to return of nausea.  IV fluids as above  Zofran as needed      Disposition: Continue inpatient management    Team: SOD TEAM A    Subjective   Patient seen and examined. No acute events overnight.  Patient endorses feeling some dizziness.  She has had some nausea but no vomiting.  She denies having any chest pain, abdominal pain, headache, or shortness of breath.  She has been able to walk with assistance.    Objective :  Temp:  [99 °F (37.2 °C)] 99 °F (37.2 °C)  HR:  [65-75] 65  BP: (121-172)/(59-85) 121/59  Resp:  [18] 18  SpO2:  [98 %] 98 %  O2 Device: None (Room air)    I/O         02/21 0701  02/22 0700 02/22 0701  02/23 0700 02/23 0701  02/24 0700    P.O.  585     I.V. (mL/kg) 1000 (15.2)      Total Intake(mL/kg) 1000 (15.2) 585 (8.9)     Net +1000 +585            Unmeasured Urine Occurrence 1 x 2 x           Weights:        Body mass index is 25.74 kg/m².  Weight (last 2 days)       Date/Time Weight    02/21/25 1349 65.9 (145.28)            Physical Exam  Constitutional:       General: She  is not in acute distress.  Cardiovascular:      Rate and Rhythm: Normal rate and regular rhythm.      Heart sounds: Murmur heard.      Comments: Systolic murmur at the base  Pulmonary:      Effort: Pulmonary effort is normal. No respiratory distress.      Breath sounds: Normal breath sounds. No wheezing or rales.   Musculoskeletal:      Right lower leg: No edema.      Left lower leg: No edema.   Skin:     General: Skin is warm and dry.      Capillary Refill: Capillary refill takes less than 2 seconds.      Coloration: Skin is not jaundiced or pale.   Neurological:      Mental Status: She is alert and oriented to person, place, and time.         Lab Results: I have reviewed the following results:  Recent Labs     02/21/25  1343 02/21/25  1346 02/21/25  1346 02/21/25  1756 02/22/25  0541 02/22/25  0846 02/23/25  0609   WBC  --  10.26*  --   --   --    < > 7.11   HGB 12.9 12.7  --   --   --    < > 10.7*   HCT 38 37.3  --   --   --    < > 32.1*   PLT  --  268  --   --   --    < > 212   SODIUM  --  138   < >  --  138  --   --    K  --  4.2   < >  --  4.1  --   --    CL  --  101   < >  --  103  --   --    CO2 27 27   < >  --  26  --   --    BUN  --  17   < >  --  24  --   --    CREATININE  --  1.31*   < >  --  1.32*  --   --    GLUC  --  153*   < >  --  108  --   --    CAIONIZED 1.25  --   --   --   --   --   --    PTT  --  27  --   --   --   --   --    INR  --  1.04  --   --   --   --   --    HSTNI0  --  8  --   --   --   --   --    HSTNI2  --   --   --  8  --   --   --     < > = values in this interval not displayed.       Imaging Results Review: I reviewed radiology reports from this admission including: CT head.  Other Study Results Review: EKG was reviewed.     Currently Ordered Meds:   Current Facility-Administered Medications:     [Held by provider] amLODIPine (NORVASC) tablet 10 mg, Daily    aspirin chewable tablet 81 mg, Daily    atorvastatin (LIPITOR) tablet 40 mg, Daily With Dinner    carvedilol (COREG) tablet  12.5 mg, BID With Meals    clobetasol (TEMOVATE) 0.05 % cream, BID    clopidogrel (PLAVIX) tablet 75 mg, Daily    Diclofenac Sodium (VOLTAREN) 1 % topical gel 2 g, 4x Daily    enoxaparin (LOVENOX) subcutaneous injection 40 mg, Daily    [Held by provider] lisinopril (ZESTRIL) tablet 40 mg, Daily    multi-electrolyte (PLASMALYTE-A/ISOLYTE-S PH 7.4) IV solution, Continuous, Last Rate: 100 mL/hr (02/22/25 1138)    ondansetron (ZOFRAN) injection 4 mg, Q6H PRN    pancrelipase (Lip-Prot-Amyl) (CREON) delayed release capsule 12,000 Units, TID With Meals    potassium chloride (Klor-Con M20) CR tablet 20 mEq, Daily  VTE Pharmacologic Prophylaxis: Enoxaparin (Lovenox)  VTE Mechanical Prophylaxis: sequential compression device    Administrative Statements     Portions of the record may have been created with voice recognition software.

## 2025-02-23 NOTE — QUICK NOTE
Patient is feeling better, some dizziness still with position change.    MRI brain 2/23/2025 White matter changes suggestive of chronic microangiopathy. No acute intracranial pathology.     TTE 2/23/2025 EF 70%, small pfo    Given positive orthostatics and patient's negative mri, okay to resume home aspirin monotherapy and d/c plavix as this was not a stroke or tia. No further neurodiagnostics indicated.     Jen Carwford will need follow-up in in 6 weeks with general neurology team for Peripheral vertigo in 60 minute appointment. They will not require outpatient neurological testing. Staff message sent

## 2025-02-23 NOTE — ASSESSMENT & PLAN NOTE
Syncope and collapse  CTA H/neck- Negative acute injury  CT stroke brain- Negative acute injury  CXR- Negative acute injury\  Admitted to [facility] for further workup    Trauma signing off please call with questions

## 2025-02-23 NOTE — ASSESSMENT & PLAN NOTE
No abd pain or recent illness. No diarrhea. Nausea worse with standing. Was able to eat breakfast 2/22 prior to return of nausea.  IV fluids as above  Zofran as needed

## 2025-02-23 NOTE — PROGRESS NOTES
Tertiary Survey/ Progress Note - Trauma   Name: Jen Crawford 64 y.o. female I MRN: [id number]  Unit/Bed#: CW2 211-01 I Date of Admission: 2/21/2025   Date of Service: 2/23/2025 I Hospital Day: 1    Assessment & Plan  Fall  Syncope and collapse  CTA H/neck- Negative acute injury  CT stroke brain- Negative acute injury  CXR- Negative acute injury\  Admitted to [facility] for further workup    Trauma signing off please call with questions    Bowel Regimen: NA  VTE Prophylaxis:Enoxaparin (Lovenox)     Disposition: TBD likely home once cleared Trauma service signing off.    24 Hour Events : Patient on Medicine team. Neurology following for syncope work up. CTH/CTA no evidence for stroke, MRI: White matter changes suggestive of chronic microangiopathy. No acute intracranial pathology. Did have positive orthostatics, received bolus IVF. Likely vasovagal hypotension possible GI illness as well. Continues to be monitored and worked up with Medicine.     Subjective : No new pain    Objective :  Temp:  [98.7 °F (37.1 °C)-99 °F (37.2 °C)] 98.7 °F (37.1 °C)  HR:  [65-79] 79  BP: (121-172)/(59-93) 152/93  Resp:  [16-18] 16  SpO2:  [97 %-99 %] 97 %  O2 Device: None (Room air)    I/O         02/21 0701  02/22 0700 02/22 0701  02/23 0700 02/23 0701  02/24 0700    P.O.  585 240    I.V. (mL/kg) 1000 (15.2)      Total Intake(mL/kg) 1000 (15.2) 585 (8.9) 240 (3.6)    Net +1000 +585 +240           Unmeasured Urine Occurrence 1 x 2 x             Physical Exam  Constitutional:       Appearance: Normal appearance.   HENT:      Head: Atraumatic.   Eyes:      Pupils: Pupils are equal, round, and reactive to light.   Cardiovascular:      Rate and Rhythm: Normal rate and regular rhythm.      Pulses: Normal pulses.      Heart sounds: No murmur heard.     No gallop.   Pulmonary:      Effort: Pulmonary effort is normal. No respiratory distress.      Breath sounds: Normal breath sounds. No wheezing or rales.   Abdominal:      General: Bowel sounds  are normal. There is no distension.      Palpations: Abdomen is soft.      Tenderness: There is no abdominal tenderness. There is no guarding.   Musculoskeletal:         General: Normal range of motion.      Cervical back: Normal range of motion.      Comments: No swelling or tenderness or ecchymosis of BUE, BLE  Good ROM BUE, BLE  No crepitus over BUE or BLE joints  Good strength BUE/BLE 5/5   Skin:     General: Skin is warm.      Capillary Refill: Capillary refill takes less than 2 seconds.   Neurological:      Mental Status: She is alert and oriented to person, place, and time.   Psychiatric:         Behavior: Behavior normal.               Lab Results: I have reviewed the following results:  Recent Labs     02/21/25  1343 02/21/25  1346 02/21/25  1756 02/22/25  0541 02/23/25  0609   WBC  --  10.26*  --    < > 7.11   HGB 12.9 12.7  --    < > 10.7*   HCT 38 37.3  --    < > 32.1*   PLT  --  268  --    < > 212   SODIUM  --  138  --    < > 138   K  --  4.2  --    < > 4.1   CL  --  101  --    < > 105   CO2 27 27  --    < > 27   BUN  --  17  --    < > 16   CREATININE  --  1.31*  --    < > 1.07   GLUC  --  153*  --    < > 91   CAIONIZED 1.25  --   --   --   --    MG  --   --   --   --  1.7*   PTT  --  27  --   --   --    INR  --  1.04  --   --   --    HSTNI0  --  8  --   --   --    HSTNI2  --   --  8  --   --     < > = values in this interval not displayed.       Imaging Results Review: No pertinent imaging studies reviewed.  Other Study Results Review: No additional pertinent studies reviewed.

## 2025-02-24 ENCOUNTER — TELEPHONE (OUTPATIENT)
Age: 65
End: 2025-02-24

## 2025-02-24 VITALS
DIASTOLIC BLOOD PRESSURE: 77 MMHG | BODY MASS INDEX: 25.69 KG/M2 | HEIGHT: 63 IN | RESPIRATION RATE: 17 BRPM | WEIGHT: 145 LBS | SYSTOLIC BLOOD PRESSURE: 130 MMHG | HEART RATE: 75 BPM | OXYGEN SATURATION: 98 % | TEMPERATURE: 99.8 F

## 2025-02-24 LAB
ANION GAP SERPL CALCULATED.3IONS-SCNC: 8 MMOL/L (ref 4–13)
BASOPHILS # BLD AUTO: 0.04 THOUSANDS/ÂΜL (ref 0–0.1)
BASOPHILS NFR BLD AUTO: 1 % (ref 0–1)
BUN SERPL-MCNC: 19 MG/DL (ref 5–25)
CALCIUM SERPL-MCNC: 9.1 MG/DL (ref 8.4–10.2)
CHLORIDE SERPL-SCNC: 104 MMOL/L (ref 96–108)
CO2 SERPL-SCNC: 26 MMOL/L (ref 21–32)
CREAT SERPL-MCNC: 1.03 MG/DL (ref 0.6–1.3)
EOSINOPHIL # BLD AUTO: 0.17 THOUSAND/ÂΜL (ref 0–0.61)
EOSINOPHIL NFR BLD AUTO: 2 % (ref 0–6)
ERYTHROCYTE [DISTWIDTH] IN BLOOD BY AUTOMATED COUNT: 12.2 % (ref 11.6–15.1)
GFR SERPL CREATININE-BSD FRML MDRD: 57 ML/MIN/1.73SQ M
GLUCOSE SERPL-MCNC: 99 MG/DL (ref 65–140)
HCT VFR BLD AUTO: 32.1 % (ref 34.8–46.1)
HGB BLD-MCNC: 10.6 G/DL (ref 11.5–15.4)
IMM GRANULOCYTES # BLD AUTO: 0.04 THOUSAND/UL (ref 0–0.2)
IMM GRANULOCYTES NFR BLD AUTO: 1 % (ref 0–2)
LYMPHOCYTES # BLD AUTO: 3.69 THOUSANDS/ÂΜL (ref 0.6–4.47)
LYMPHOCYTES NFR BLD AUTO: 43 % (ref 14–44)
MAGNESIUM SERPL-MCNC: 1.8 MG/DL (ref 1.9–2.7)
MCH RBC QN AUTO: 32 PG (ref 26.8–34.3)
MCHC RBC AUTO-ENTMCNC: 33 G/DL (ref 31.4–37.4)
MCV RBC AUTO: 97 FL (ref 82–98)
MONOCYTES # BLD AUTO: 0.88 THOUSAND/ÂΜL (ref 0.17–1.22)
MONOCYTES NFR BLD AUTO: 11 % (ref 4–12)
NEUTROPHILS # BLD AUTO: 3.52 THOUSANDS/ÂΜL (ref 1.85–7.62)
NEUTS SEG NFR BLD AUTO: 42 % (ref 43–75)
NRBC BLD AUTO-RTO: 0 /100 WBCS
PLATELET # BLD AUTO: 217 THOUSANDS/UL (ref 149–390)
PMV BLD AUTO: 11.2 FL (ref 8.9–12.7)
POTASSIUM SERPL-SCNC: 4.6 MMOL/L (ref 3.5–5.3)
RBC # BLD AUTO: 3.31 MILLION/UL (ref 3.81–5.12)
SODIUM SERPL-SCNC: 138 MMOL/L (ref 135–147)
WBC # BLD AUTO: 8.34 THOUSAND/UL (ref 4.31–10.16)

## 2025-02-24 PROCEDURE — 85025 COMPLETE CBC W/AUTO DIFF WBC: CPT

## 2025-02-24 PROCEDURE — 83735 ASSAY OF MAGNESIUM: CPT

## 2025-02-24 PROCEDURE — 80048 BASIC METABOLIC PNL TOTAL CA: CPT

## 2025-02-24 PROCEDURE — 99238 HOSP IP/OBS DSCHRG MGMT 30/<: CPT | Performed by: INTERNAL MEDICINE

## 2025-02-24 RX ORDER — ATORVASTATIN CALCIUM 40 MG/1
40 TABLET, FILM COATED ORAL
Qty: 30 TABLET | Refills: 1 | Status: SHIPPED | OUTPATIENT
Start: 2025-02-24

## 2025-02-24 RX ORDER — ASPIRIN 81 MG/1
81 TABLET, CHEWABLE ORAL DAILY
Qty: 30 TABLET | Refills: 1 | Status: SHIPPED | OUTPATIENT
Start: 2025-02-25

## 2025-02-24 RX ORDER — CARVEDILOL 12.5 MG/1
12.5 TABLET ORAL 2 TIMES DAILY WITH MEALS
Qty: 30 TABLET | Refills: 1 | Status: SHIPPED | OUTPATIENT
Start: 2025-02-24 | End: 2025-03-05

## 2025-02-24 RX ADMIN — CLOBETASOL PROPIONATE: 0.5 CREAM TOPICAL at 08:30

## 2025-02-24 RX ADMIN — PANCRELIPASE 12000 UNITS: 30000; 6000; 19000 CAPSULE, DELAYED RELEASE PELLETS ORAL at 11:53

## 2025-02-24 RX ADMIN — PANCRELIPASE 12000 UNITS: 30000; 6000; 19000 CAPSULE, DELAYED RELEASE PELLETS ORAL at 08:30

## 2025-02-24 RX ADMIN — ENOXAPARIN SODIUM 40 MG: 40 INJECTION SUBCUTANEOUS at 08:28

## 2025-02-24 RX ADMIN — AMLODIPINE BESYLATE 2.5 MG: 2.5 TABLET ORAL at 08:29

## 2025-02-24 RX ADMIN — LISINOPRIL 40 MG: 20 TABLET ORAL at 08:28

## 2025-02-24 RX ADMIN — POTASSIUM CHLORIDE 20 MEQ: 1500 TABLET, EXTENDED RELEASE ORAL at 08:29

## 2025-02-24 RX ADMIN — ASPIRIN 81 MG: 81 TABLET, CHEWABLE ORAL at 08:28

## 2025-02-24 RX ADMIN — CARVEDILOL 12.5 MG: 12.5 TABLET, FILM COATED ORAL at 08:28

## 2025-02-24 RX ADMIN — DICLOFENAC SODIUM 2 G: 10 GEL TOPICAL at 08:30

## 2025-02-24 NOTE — CASE MANAGEMENT
Case Management Discharge Planning Note    Patient name Jen Crawford  Location 2 211/CW2 211-01 MRN [id number]  : 1960 Date 2025       Current Admission Date: 2025  Current Admission Diagnosis:Syncope and collapse   Patient Active Problem List    Diagnosis Date Noted Date Diagnosed    Fall 2025     Fall down steps 2025     Syncope and collapse 2025     Vertigo 2025     Hypertrophic cardiomyopathy (HCC) 2025     Neuroendocrine tumor of pancreas 2024     Lung nodules 2023     Pre-op exam 2023     Dyspnea on exertion 2023     Need for influenza vaccination 2023     BMI 27.0-27.9,adult 2023     Chronic pain of both knees 03/15/2023     Aortic aneurysm (HCC) 2023     Encounter for follow-up surveillance of neuroendocrine carcinoma 2022     Pancreatic insufficiency 2021     Hypertension 2021     H/O Whipple procedure 2021     Personal history of breast cancer 11/15/2021     Anemia 11/15/2021     History of neuroendocrine cancer 2021     Stage 3a chronic kidney disease (HCC) 2021     Depression 2021     Tarsal tunnel syndrome, left      Hyperlipemia 2019     Stroke-like symptoms 2019     Incidental pulmonary nodule 2019     Nausea 2018       LOS (days): 2  Geometric Mean LOS (GMLOS) (days): 2.3  Days to GMLOS:0.2     OBJECTIVE:  Risk of Unplanned Readmission Score: 11.22         Current admission status: Inpatient   Preferred Pharmacy:   [id number] - [place], [place] - [address]  [place] [address]  Phone: [phone] Fax: [phone]    [id number] - [place], [place] - [address]  [address]  [place] [address]  Phone: [phone] Fax: [phone]    Primary Care Provider: Ginny Guillory MD    Primary Insurance: [employer]  Secondary Insurance: [employer]    DISCHARGE  DETAILS:        CM notified by provider pt medically stable for discharge, no therapy needs noted.  CM consult noted from 2/21/2025 for OP resources.  Per chart review - no CM needs identified at this time. Notified provider of same. CM to remain available for dcp needs.

## 2025-02-24 NOTE — ASSESSMENT & PLAN NOTE
Assessment and plan:  -Likely peripheral cause  -dc Valium  -Recommended to follow-up with neurology in 6 weeks outpatient for peripheral vertigo evaluation

## 2025-02-24 NOTE — PLAN OF CARE
Problem: CARDIOVASCULAR - ADULT  Goal: Maintains optimal cardiac output and hemodynamic stability  Description: INTERVENTIONS:  - Monitor I/O, vital signs and rhythm  - Monitor for S/S and trends of decreased cardiac output  - Administer and titrate ordered vasoactive medications to optimize hemodynamic stability  - Assess quality of pulses, skin color and temperature  - Assess for signs of decreased coronary artery perfusion  - Instruct patient to report change in severity of symptoms  2/24/2025 1331 by Ulises Bradley RN  Outcome: Adequate for Discharge  2/24/2025 0732 by Ulises Bradley RN  Outcome: Progressing  Goal: Absence of cardiac dysrhythmias or at baseline rhythm  Description: INTERVENTIONS:  - Continuous cardiac monitoring, vital signs, obtain 12 lead EKG if ordered  - Administer antiarrhythmic and heart rate control medications as ordered  - Monitor electrolytes and administer replacement therapy as ordered  2/24/2025 1331 by Ulises Bradley RN  Outcome: Adequate for Discharge  2/24/2025 0732 by Ulises Bradley RN  Outcome: Progressing     Problem: GASTROINTESTINAL - ADULT  Goal: Minimal or absence of nausea and/or vomiting  Description: INTERVENTIONS:  - Administer IV fluids if ordered to ensure adequate hydration  - Maintain NPO status until nausea and vomiting are resolved  - Nasogastric tube if ordered  - Administer ordered antiemetic medications as needed  - Provide nonpharmacologic comfort measures as appropriate  - Advance diet as tolerated, if ordered  - Consider nutrition services referral to assist patient with adequate nutrition and appropriate food choices  2/24/2025 1331 by Ulises Bradley RN  Outcome: Adequate for Discharge  2/24/2025 0732 by Ulises Bradley RN  Outcome: Progressing  Goal: Maintains adequate nutritional intake  Description: INTERVENTIONS:  - Monitor percentage of each meal consumed  - Identify factors contributing to decreased intake, treat as appropriate  - Assist with meals  as needed  - Monitor I&O, weight, and lab values if indicated  - Obtain nutrition services referral as needed  2/24/2025 1331 by Ulises Bradley RN  Outcome: Adequate for Discharge  2/24/2025 0732 by Ulises Bradley RN  Outcome: Progressing     Problem: PAIN - ADULT  Goal: Verbalizes/displays adequate comfort level or baseline comfort level  Description: Interventions:  - Encourage patient to monitor pain and request assistance  - Assess pain using appropriate pain scale  - Administer analgesics based on type and severity of pain and evaluate response  - Implement non-pharmacological measures as appropriate and evaluate response  - Consider cultural and social influences on pain and pain management  - Notify physician/advanced practitioner if interventions unsuccessful or patient reports new pain  2/24/2025 1331 by Ulises Bradley RN  Outcome: Adequate for Discharge  2/24/2025 0732 by Ulises Bradley RN  Outcome: Progressing     Problem: INFECTION - ADULT  Goal: Absence or prevention of progression during hospitalization  Description: INTERVENTIONS:  - Assess and monitor for signs and symptoms of infection  - Monitor lab/diagnostic results  - Monitor all insertion sites, i.e. indwelling lines, tubes, and drains  - Monitor endotracheal if appropriate and nasal secretions for changes in amount and color  - [place] appropriate cooling/warming therapies per order  - Administer medications as ordered  - Instruct and encourage patient and family to use good hand hygiene technique  - Identify and instruct in appropriate isolation precautions for identified infection/condition  2/24/2025 1331 by Ulises Bradley RN  Outcome: Adequate for Discharge  2/24/2025 0732 by Ulises Bradley RN  Outcome: Progressing  Goal: Absence of fever/infection during neutropenic period  Description: INTERVENTIONS:  - Monitor WBC    2/24/2025 1331 by Ulises Bradley RN  Outcome: Adequate for Discharge  2/24/2025 0732 by Ulises Bradley RN  Outcome:  Progressing     Problem: SAFETY ADULT  Goal: Patient will remain free of falls  Description: INTERVENTIONS:  - Educate patient/family on patient safety including physical limitations  - Instruct patient to call for assistance with activity   - Consult OT/PT to assist with strengthening/mobility   - Keep Call bell within reach  - Keep bed low and locked with side rails adjusted as appropriate  - Keep care items and personal belongings within reach    - Apply yellow socks and bracelet for high fall risk patients  - Consider moving patient to room near nurses station  2/24/2025 1331 by Ulises Bradley RN  Outcome: Adequate for Discharge  2/24/2025 0732 by Ulises Bradley RN  Outcome: Progressing  Goal: Maintain or return to baseline ADL function  Description: INTERVENTIONS:  -  Assess patient's ability to carry out ADLs; assess patient's baseline for ADL function and identify physical deficits which impact ability to perform ADLs (bathing, care of mouth/teeth, toileting, grooming, dressing, etc.)  - Assess/evaluate cause of self-care deficits   - Assess range of motion  - Assess patient's mobility; develop plan if impaired  - Assess patient's need for assistive devices and provide as appropriate  - Encourage maximum independence but intervene and supervise when necessary  - Involve family in performance of ADLs  - Assess for home care needs following discharge   - Consider OT consult to assist with ADL evaluation and planning for discharge  - Provide patient education as appropriate  2/24/2025 1331 by Ulises Bradley RN  Outcome: Adequate for Discharge  2/24/2025 0732 by Ulises Bradley RN  Outcome: Progressing  Goal: Maintains/Returns to pre admission functional level  Description: INTERVENTIONS:  \  2/24/2025 0732 by Ulises Bradley RN  Outcome: Progressing     Problem: DISCHARGE PLANNING  Goal: Discharge to home or other facility with appropriate resources  Description: INTERVENTIONS:  - Identify barriers to discharge  w/patient and caregiver  - Arrange for needed discharge resources and transportation as appropriate  - Identify discharge learning needs (meds, wound care, etc.)  - Arrange for interpretive services to assist at discharge as needed  - Refer to Case Management Department for coordinating discharge planning if the patient needs post-hospital services based on physician/advanced practitioner order or complex needs related to functional status, cognitive ability, or social support system  2/24/2025 1331 by Ulises Bradley RN  Outcome: Adequate for Discharge  2/24/2025 0732 by Ulises Bradley RN  Outcome: Progressing     Problem: Knowledge Deficit  Goal: Patient/family/caregiver demonstrates understanding of disease process, treatment plan, medications, and discharge instructions  Description: Complete learning assessment and assess knowledge base.  Interventions:  - Provide teaching at level of understanding  - Provide teaching via preferred learning methods  2/24/2025 1331 by Ulises Bradley RN  Outcome: Adequate for Discharge  2/24/2025 0732 by Ulises Bradley RN  Outcome: Progressing

## 2025-02-24 NOTE — PLAN OF CARE
Problem: CARDIOVASCULAR - ADULT  Goal: Maintains optimal cardiac output and hemodynamic stability  Description: INTERVENTIONS:  - Monitor I/O, vital signs and rhythm  - Monitor for S/S and trends of decreased cardiac output  - Administer and titrate ordered vasoactive medications to optimize hemodynamic stability  - Assess quality of pulses, skin color and temperature  - Assess for signs of decreased coronary artery perfusion  - Instruct patient to report change in severity of symptoms  Outcome: Progressing     Problem: SAFETY ADULT  Goal: Patient will remain free of falls  Description: INTERVENTIONS:  - Educate patient/family on patient safety including physical limitations  - Instruct patient to call for assistance with activity   - Consult OT/PT to assist with strengthening/mobility   - Keep Call bell within reach  - Keep bed low and locked with side rails adjusted as appropriate  - Keep care items and personal belongings within reach  - Initiate and maintain comfort rounds  - Make Fall Risk Sign visible to staff  - Offer Toileting every 2 Hours, in advance of need  - Initiate/Maintain bed alarm  - Apply yellow socks and bracelet for high fall risk patients  - Consider moving patient to room near nurses station  Outcome: Progressing     Problem: DISCHARGE PLANNING  Goal: Discharge to home or other facility with appropriate resources  Description: INTERVENTIONS:  - Identify barriers to discharge w/patient and caregiver  - Arrange for needed discharge resources and transportation as appropriate  - Identify discharge learning needs (meds, wound care, etc.)  - Arrange for interpretive services to assist at discharge as needed  - Refer to Case Management Department for coordinating discharge planning if the patient needs post-hospital services based on physician/advanced practitioner order or complex needs related to functional status, cognitive ability, or social support system  Outcome: Progressing     Problem:  Knowledge Deficit  Goal: Patient/family/caregiver demonstrates understanding of disease process, treatment plan, medications, and discharge instructions  Description: Complete learning assessment and assess knowledge base.  Interventions:  - Provide teaching at level of understanding  - Provide teaching via preferred learning methods  Outcome: Progressing

## 2025-02-24 NOTE — PROGRESS NOTES
Father Janice gave a blessing and prayer.    02/24/25 1600   Clinical Encounter Type   Visited With Patient   Bahai Encounters   Bahai Needs Prayer

## 2025-02-24 NOTE — ASSESSMENT & PLAN NOTE
Assessment and plan:  -Patient was elevated as high as 204/98 on admission, BP continues to be as high as 170s systolic  -Restarted home lisinopril 40 mg daily  -Amlodipine discontinued prior to discharge  -Will discharge on Coreg 12.5 mg twice daily

## 2025-02-24 NOTE — PLAN OF CARE
Problem: CARDIOVASCULAR - ADULT  Goal: Maintains optimal cardiac output and hemodynamic stability  Description: INTERVENTIONS:  - Monitor I/O, vital signs and rhythm  - Monitor for S/S and trends of decreased cardiac output  - Administer and titrate ordered vasoactive medications to optimize hemodynamic stability  - Assess quality of pulses, skin color and temperature  - Assess for signs of decreased coronary artery perfusion  - Instruct patient to report change in severity of symptoms  Outcome: Progressing  Goal: Absence of cardiac dysrhythmias or at baseline rhythm  Description: INTERVENTIONS:  - Continuous cardiac monitoring, vital signs, obtain 12 lead EKG if ordered  - Administer antiarrhythmic and heart rate control medications as ordered  - Monitor electrolytes and administer replacement therapy as ordered  Outcome: Progressing     Problem: GASTROINTESTINAL - ADULT  Goal: Minimal or absence of nausea and/or vomiting  Description: INTERVENTIONS:  - Administer IV fluids if ordered to ensure adequate hydration  - Maintain NPO status until nausea and vomiting are resolved  - Nasogastric tube if ordered  - Administer ordered antiemetic medications as needed  - Provide nonpharmacologic comfort measures as appropriate  - Advance diet as tolerated, if ordered  - Consider nutrition services referral to assist patient with adequate nutrition and appropriate food choices  Outcome: Progressing  Goal: Maintains adequate nutritional intake  Description: INTERVENTIONS:  - Monitor percentage of each meal consumed  - Identify factors contributing to decreased intake, treat as appropriate  - Assist with meals as needed  - Monitor I&O, weight, and lab values if indicated  - Obtain nutrition services referral as needed  Outcome: Progressing     Problem: PAIN - ADULT  Goal: Verbalizes/displays adequate comfort level or baseline comfort level  Description: Interventions:  - Encourage patient to monitor pain and request  assistance  - Assess pain using appropriate pain scale  - Administer analgesics based on type and severity of pain and evaluate response  - Implement non-pharmacological measures as appropriate and evaluate response  - Consider cultural and social influences on pain and pain management  - Notify physician/advanced practitioner if interventions unsuccessful or patient reports new pain  Outcome: Progressing     Problem: INFECTION - ADULT  Goal: Absence or prevention of progression during hospitalization  Description: INTERVENTIONS:  - Assess and monitor for signs and symptoms of infection  - Monitor lab/diagnostic results  - Monitor all insertion sites, i.e. indwelling lines, tubes, and drains  - Monitor endotracheal if appropriate and nasal secretions for changes in amount and color  - [place] appropriate cooling/warming therapies per order  - Administer medications as ordered  - Instruct and encourage patient and family to use good hand hygiene technique  - Identify and instruct in appropriate isolation precautions for identified infection/condition  Outcome: Progressing  Goal: Absence of fever/infection during neutropenic period  Description: INTERVENTIONS:  - Monitor WBC    Outcome: Progressing     Problem: SAFETY ADULT  Goal: Patient will remain free of falls  Description: INTERVENTIONS:  - Educate patient/family on patient safety including physical limitations  - Instruct patient to call for assistance with activity   - Consult OT/PT to assist with strengthening/mobility   - Keep Call bell within reach  - Keep bed low and locked with side rails adjusted as appropriate  - Keep care items and personal belongings within reach  - Initiate and maintain comfort rounds  - Make Fall Risk Sign visible to staff  - Apply yellow socks and bracelet for high fall risk patients  - Consider moving patient to room near nurses station  Outcome: Progressing  Goal: Maintain or return to baseline ADL function  Description:  INTERVENTIONS:  -  Assess patient's ability to carry out ADLs; assess patient's baseline for ADL function and identify physical deficits which impact ability to perform ADLs (bathing, care of mouth/teeth, toileting, grooming, dressing, etc.)  - Assess/evaluate cause of self-care deficits   - Assess range of motion  - Assess patient's mobility; develop plan if impaired  - Assess patient's need for assistive devices and provide as appropriate  - Encourage maximum independence but intervene and supervise when necessary  - Involve family in performance of ADLs  - Assess for home care needs following discharge   - Consider OT consult to assist with ADL evaluation and planning for discharge  - Provide patient education as appropriate  Outcome: Progressing  Goal: Maintains/Returns to pre admission functional level  Description: INTERVENTIONS:  - Perform AM-PAC 6 Click Basic Mobility/ Daily Activity assessment daily.  - Set and communicate daily mobility goal to care team and patient/family/caregiver.   - Out of bed for toileting  - Record patient progress and toleration of activity level   Outcome: Progressing     Problem: DISCHARGE PLANNING  Goal: Discharge to home or other facility with appropriate resources  Description: INTERVENTIONS:  - Identify barriers to discharge w/patient and caregiver  - Arrange for needed discharge resources and transportation as appropriate  - Identify discharge learning needs (meds, wound care, etc.)  - Arrange for interpretive services to assist at discharge as needed  - Refer to Case Management Department for coordinating discharge planning if the patient needs post-hospital services based on physician/advanced practitioner order or complex needs related to functional status, cognitive ability, or social support system  Outcome: Progressing     Problem: Knowledge Deficit  Goal: Patient/family/caregiver demonstrates understanding of disease process, treatment plan, medications, and discharge  instructions  Description: Complete learning assessment and assess knowledge base.  Interventions:  - Provide teaching at level of understanding  - Provide teaching via preferred learning methods  Outcome: Progressing

## 2025-02-24 NOTE — ASSESSMENT & PLAN NOTE
Assessment and plan:  -Evidence of LVH on EKG on this admission   -2/23/2025 TTE: moderate concentric hypertrophy. The left ventricular ejection fraction is 70%.  Mid cavity dynamic obstruction with Valsalva with a peak gradient of 64 mmHg-2/2023 TTE: moderate concentric hypertrophy. The left ventricular ejection fraction is 70% by visual estimation. Systolic function is vigorous. Wall motion is normal. Diastolic function is mildly abnormal, consistent with grade I (abnormal) relaxation   -Redemonstrated 11/2023 cardiac MRI: Moderately thickened interventricular septum measuring up to 15 mm   -Continue Coreg 12.5 mg twice daily  -Restarted lisinopril 40 mg daily  -Recommend cardiology referral for outpatient evaluation and hypertrophic cardiomyopathy specialty clinic

## 2025-02-24 NOTE — ASSESSMENT & PLAN NOTE
Lab Results   Component Value Date    EGFR 57 02/24/2025    EGFR 54 02/23/2025    EGFR 42 02/22/2025    CREATININE 1.03 02/24/2025    CREATININE 1.07 02/23/2025    CREATININE 1.32 (H) 02/22/2025   Assessment and plan:  -Baseline creatinine appears to be around 1.2  -Monitor with daily metabolic panel  -Monitor response status post Isolyte 1 L bolus

## 2025-02-24 NOTE — DISCHARGE INSTR - AVS FIRST PAGE
Deje de jihan amlodipino inmediatamente  Continúe tomando lisinopril en la dosis que ally en santana casa de 40 mg aby vez al día  Comenzará a jihan carvedilol en aby dosis reducida de 12,5 mg dos veces al día  Según el departamento de neurología, deje de jihan Plavix y podrá seguir tomando aspirina 81 mg todos los días  Deberá hacer un seguimiento con neurología en 6 semanas para aby evaluación adicional de colette síntomas  Realice un seguimiento con cardiología después de esta admisión para analizar la derivación a la clínica de especialidades dados los hallazgos anormales en santana ecocardiograma  Realice un seguimiento con santana médico de atención primaria después de esta admisión

## 2025-02-24 NOTE — TELEPHONE ENCOUNTER
Hello,     I have Scheduled with TAMEKA PATEL, 4/24/2025, 3PM. HFALESSANDRO BRADY.     CAN NOT BE SEEN PRIOR SHE WILL BE TRAVELING ...     Thank you,     Daniella  STILL ADMITTED:2/21/2025 - present (3 days)  [facility]        HFU/ ZULEIKA CHILDERSH/ Peripheral vertigo    DC-     ----- Message from Rylee Tirado MD sent at 2/23/2025 12:18 PM EST -----  Regarding: HFU  Jen Crawford will need follow-up in in 6 weeks with general neurology team for Peripheral vertigo in 60 minute appointment. They will not require outpatient neurological testing

## 2025-02-24 NOTE — OCCUPATIONAL THERAPY NOTE
Occupational Therapy         Patient Name: Jen Crawford  Today's Date: 2/24/2025 02/24/25 0746   OT Last Visit   OT Visit Date 02/24/25   Note Type   Note type Screen   Cancel Reasons Other   Additional Comments Pt was evaluated by OT on 2/22/25 and cleared for d/c home - no further OT needs were identified at that time - no change in functional status to [facility] re-evaluation.  Recommend oupt follow up with geriatric medicine PRN for family concerns re: medication compliance - d/c from caseload   AM-PAC Daily Activity Inpatient   Lower Body Dressing 4   Bathing 4   Toileting 4   Upper Body Dressing 4   Grooming 4   Eating 4   Daily Activity Raw Score 24   Daily Activity Standardized Score (Calc for Raw Score >=11) 57.54       Lindsay Melendrez

## 2025-02-24 NOTE — ASSESSMENT & PLAN NOTE
Assessment:  -Patient with history of syncope and presyncope presenting for syncopal episode as prodromal lightheadedness/dizziness and nausea   -CT head and CTA negative for ischemic stroke and hemorrhage, MRI brain negative for acute ischemic or hemorrhagic changes but does show chronic microangiopathy  -Patient experiencing nausea with multiple episodes of vomiting in the ED    Given positive orthostatics and recent changes to oupt BP meds likely vasovagal / hypotension and some possible GI illness that could have caused syncope versus peripheral vertigo.  Neuroimaging including CT and MRI negative for acute CVA        Plan:  -Apparent improvement 2/23 orthostatics.   -Encouraged ambulation with the patient's nurse  -Neurology okay for discharge on aspirin monotherapy, Plavix discontinued  -Goal is normotension per neurology  -Can continue aspirin and Lipitor

## 2025-02-24 NOTE — ASSESSMENT & PLAN NOTE
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

## 2025-02-24 NOTE — DISCHARGE SUMMARY
Discharge Summary - Internal Medicine   Name: Jen Crawford 64 y.o. female I MRN: [id number]  Unit/Bed#: CW2 211-01 I Date of Admission: 2/21/2025   Date of Service: 2/24/2025 I Hospital Day: 2    Assessment & Plan  Syncope and collapse  Assessment:  -Patient with history of syncope and presyncope presenting for syncopal episode as prodromal lightheadedness/dizziness and nausea   -CT head and CTA negative for ischemic stroke and hemorrhage, MRI brain negative for acute ischemic or hemorrhagic changes but does show chronic microangiopathy  -Patient experiencing nausea with multiple episodes of vomiting in the ED    Given positive orthostatics and recent changes to oupt BP meds likely vasovagal / hypotension and some possible GI illness that could have caused syncope versus peripheral vertigo.  Neuroimaging including CT and MRI negative for acute CVA        Plan:  -Apparent improvement 2/23 orthostatics.   -Encouraged ambulation with the patient's nurse  -Neurology okay for discharge on aspirin monotherapy, Plavix discontinued  -Goal is normotension per neurology  -Can continue aspirin and Lipitor    Stage 3a chronic kidney disease (HCC)  Lab Results   Component Value Date    EGFR 57 02/24/2025    EGFR 54 02/23/2025    EGFR 42 02/22/2025    CREATININE 1.03 02/24/2025    CREATININE 1.07 02/23/2025    CREATININE 1.32 (H) 02/22/2025   Assessment and plan:  -Baseline creatinine appears to be around 1.2  -Monitor with daily metabolic panel  -Monitor response status post Isolyte 1 L bolus  Hypertension  Assessment and plan:  -Patient was elevated as high as 204/98 on admission, BP continues to be as high as 170s systolic  -Restarted home lisinopril 40 mg daily  -Amlodipine discontinued prior to discharge  -Will discharge on Coreg 12.5 mg twice daily  Pancreatic insufficiency  Assessment and plan:   -History of Whipple for PNET in 2021  -continue home pancrelipase 89552 units TID with meals     Hypertrophic  cardiomyopathy (HCC)  Assessment and plan:  -Evidence of LVH on EKG on this admission   -2/23/2025 TTE: moderate concentric hypertrophy. The left ventricular ejection fraction is 70%.  Mid cavity dynamic obstruction with Valsalva with a peak gradient of 64 mmHg-2/2023 TTE: moderate concentric hypertrophy. The left ventricular ejection fraction is 70% by visual estimation. Systolic function is vigorous. Wall motion is normal. Diastolic function is mildly abnormal, consistent with grade I (abnormal) relaxation   -Redemonstrated 11/2023 cardiac MRI: Moderately thickened interventricular septum measuring up to 15 mm   -Continue Coreg 12.5 mg twice daily  -Restarted lisinopril 40 mg daily  -Recommend cardiology referral for outpatient evaluation and hypertrophic cardiomyopathy specialty clinic  Vertigo  Assessment and plan:  -Likely peripheral cause  -dc Valium  -Recommended to follow-up with neurology in 6 weeks outpatient for peripheral vertigo evaluation  Nausea  No abd pain or recent illness. No diarrhea. Nausea worse with standing. Was able to eat breakfast 2/22 prior to return of nausea.  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  She says that for about 15 minutes before the fall she was experiencing dizziness, lightheadedness and nausea, however she did not have any vomiting.  She denies any sensation of chest pain, heart palpitations, or  warmth or flushing prior to the fall.  She has never had a similar experience before.  In the ED she had multiple episodes of vomiting. She endorses continued pain in the back of her head ongoing nausea.  She denies having any fevers or chills, cough or shortness of breath, abdominal pain, constipation or diarrhea.      In the ED the patient received a CT stroke which revealed no evidence of acute infarct or parenchymal hemorrhage and a CTA brain significant intracranial stenosis, large vessel occlusions or aneurysm.  Allopurinol chest and trauma x-rays revealed no acute pulmonary processes or obvious displaced fractures.  EKG revealed normal sinus rhythm and potential LVH.  CBC was significant for a mildly elevated WBC count of 10.26 and BMP revealed a mildly elevated creatinine of 1.31 (baseline approximately 1.2).  Glucose was elevated at 153 the patient received a dose of IV fentanyl as well as IV Zofran for nausea.    Procedures Performed: No orders of the defined types were placed in this encounter.      Summary of Hospital Course: Following admission, patient was evaluated by neurology and trauma following a fall and syncope.  CT head and neck, brain and chest x-ray were negative for acute injury with the trauma team signing off at this time.  Given concern of her syncopal episodes, patient wound was evaluated with MRI brain inpatient which showed signs of chronic microangiopathy without signs of acute CVA.  Per neurology, patient likely exhibiting symptoms associated with peripheral vertigo and was recommended to stop Plavix and continue on aspirin 81 mg with outpatient follow-up.  During this admission, her syncope was likely attributed to vasovagal given prodrome symptoms versus orthostasis in the setting of vomiting prior to admission.  She was given maintenance IVF as well as 1 L Isolyte bolus, following which her orthostatic hypotension improved and symptoms have improved as well.  Part of her  "neurological workup included TTE which showed signs of concentric hypertrophy likely in the setting of uncontrolled hypertension due to patient endorsing noncompliance with medication.  There was also associated finding of small PFO, EF equal to 70% and hyperdynamic function with mild cavity obstruction indicative of possible hypertrophic cardiomyopathy as previously documented on prior cardiology evaluations.  At time of evaluation today, patient reports symptoms have since resolved and seen at Mon dynamically stable for discharge to home given no acute rehab needs per physical therapy.  She was advised to strongly continue her antihypertensive regimen due to her uncontrolled hypertension and will be discharged on Coreg 12.5 mg twice daily and lisinopril 100 mg once daily.  She was advised to follow-up with her PCP following discharge to go over the events of this admission, as well as given neurology referral for the peripheral vertigo outpatient evaluation and cardiology follow-up to further recommend patient establish with the hypertrophic cardiomyopathy clinic.  Course and discharge instructions were discussed with the patient as well as the patient's daughter over the phone at bedside.    Significant Findings, Care, Treatment and Services Provided: As above    Complications: None    Discharge Day Visit / Exam:   /68   Pulse 70   Temp 98.8 °F (37.1 °C)   Resp 17   Ht 5' 3\" (1.6 m)   Wt 65.8 kg (145 lb)   LMP  (LMP Unknown)   SpO2 97%   BMI 25.69 kg/m²   Physical Exam  Vitals and nursing note reviewed.   Constitutional:       General: She is not in acute distress.     Appearance: She is well-developed.   HENT:      Head: Normocephalic and atraumatic.   Eyes:      Conjunctiva/sclera: Conjunctivae normal.   Cardiovascular:      Rate and Rhythm: Normal rate and regular rhythm.      Heart sounds: Murmur heard.   Pulmonary:      Effort: Pulmonary effort is normal. No respiratory distress.      Breath " sounds: Normal breath sounds.   Abdominal:      Palpations: Abdomen is soft.      Tenderness: There is no abdominal tenderness.   Musculoskeletal:         General: No swelling.      Cervical back: Neck supple.   Skin:     General: Skin is warm and dry.      Capillary Refill: Capillary refill takes less than 2 seconds.   Neurological:      Mental Status: She is alert.   Psychiatric:         Mood and Affect: Mood normal.          Discussion with Family: Updated  (daughter) via phone.    Condition at Discharge: good       Discharge instructions/Information to patient and family:   See After Visit Summary (AVS) for information provided to patient and family.      Provisions for Follow-Up Care:  See after visit summary for information related to follow-up care and any pertinent home health orders.      PCP: Ginny Guillory MD    Disposition: Home    Planned Readmission: No     Discharge Medications:  See after visit summary for reconciled discharge medications provided to patient and family.      Discharge Statement:  I have spent a total time of 60 minutes in caring for this patient on the day of the visit/encounter. .

## 2025-02-25 ENCOUNTER — TRANSITIONAL CARE MANAGEMENT (OUTPATIENT)
Dept: INTERNAL MEDICINE CLINIC | Facility: CLINIC | Age: 65
End: 2025-02-25

## 2025-03-05 ENCOUNTER — OFFICE VISIT (OUTPATIENT)
Dept: INTERNAL MEDICINE CLINIC | Facility: CLINIC | Age: 65
End: 2025-03-05

## 2025-03-05 VITALS
SYSTOLIC BLOOD PRESSURE: 154 MMHG | TEMPERATURE: 98.6 F | BODY MASS INDEX: 25.76 KG/M2 | OXYGEN SATURATION: 98 % | WEIGHT: 145.4 LBS | HEART RATE: 84 BPM | DIASTOLIC BLOOD PRESSURE: 80 MMHG

## 2025-03-05 DIAGNOSIS — R55 SYNCOPE AND COLLAPSE: ICD-10-CM

## 2025-03-05 DIAGNOSIS — I10 HYPERTENSION, UNSPECIFIED TYPE: ICD-10-CM

## 2025-03-05 PROCEDURE — 99495 TRANSJ CARE MGMT MOD F2F 14D: CPT | Performed by: INTERNAL MEDICINE

## 2025-03-05 RX ORDER — CARVEDILOL 25 MG/1
25 TABLET ORAL 2 TIMES DAILY WITH MEALS
Qty: 30 TABLET | Refills: 1 | Status: SHIPPED | OUTPATIENT
Start: 2025-03-05

## 2025-03-05 NOTE — PATIENT INSTRUCTIONS
- Stop amlodipine, lisinopril, and potassium  - Take Coreg 25 mg twice daily  - Make appointment with Cardiology ASAP  - Follow-up 4-6 weeks

## 2025-03-05 NOTE — PROGRESS NOTES
Transition of Care Visit  Name: Jen Crawford      : 1960      MRN: [id number]  Encounter Provider: April Cleveland MD  Encounter Date: 3/5/2025   Encounter department: [facility]    Assessment & Plan  Syncope and collapse  Patient presented to the ED after consuming a ground-level fall from standing position when getting out of her car.  Per patient, she felt dizzy, lightheaded for approximately 15 minutes prior to this fall.  She does endorse head strike and loss of consciousness with multiple episodes of vomiting occurring in the ED.  Imaging completed during hospitalization was negative for any acute infarcts, hemorrhages, stenosis, aneurysm, occlusions.  Overall, her trauma workup was negative.  Patient was evaluated by neurology who suspected symptoms were secondary to peripheral vertigo vs vasovagal syncope vs orthostatic hypotension.    Patient has not had any additional syncopal episodes but does note she feels dizzy after standing too quickly.  She was advised to take lisinopril and Coreg, and discontinue her amlodipine.  Patient has been taking her amlodipine but denies starting lisinopril or Coreg.  Patient would benefit from Coreg in the setting of HOCM and suspect she is being overtreated for her hypertension.    PE: positive orthostatic vitals in office today. 150 sitting decreased to 130 on standing    Ddx: Suspect symptoms may be multifactorial 2/2 orthostatic hypotension and peripheral vertigo    Plan:  -Advised patient to discontinue Lisinopril and Amlodipine today   -Continue Coreg and increase dose to 25 mg BID   -Patient also advised to discontinue potassium supplement   -Patient will need cardiology appointment for further evaluation of her hypertrophic cardiomyopathy and small PFO, we will assist in obtaining appointment  -Counseled on return and ED precautions   -Recommend follow-up in 4-6 weeks     Orders:    carvedilol (COREG) 25 mg  tablet; Take 1 tablet (25 mg total) by mouth 2 (two) times a day with meals    Hypertension, unspecified type  See plan above.   Orders:    carvedilol (COREG) 25 mg tablet; Take 1 tablet (25 mg total) by mouth 2 (two) times a day with meals    BMI Counseling: Body mass index is 25.76 kg/m². The BMI is above normal. Nutrition recommendations include decreasing portion sizes, decreasing fast food intake, consuming healthier snacks and moderation in carbohydrate intake. Exercise recommendations include exercising 3-5 times per week. Rationale for BMI follow-up plan is due to patient being overweight or obese.       History of Present Illness     Transitional Care Management Review:   Jen Crawford is a 64 y.o. female here for TCM follow up.     Patient is a 64-year-old female with PMHx of HTN, HLD, HOCM, CKD 3A, aortic aneurysm, anemia, breast cancer, and pancreatic neuroendocrine tumor s/p whipple in 2021 who presents today for TCM visit.  Patient is accompanied by her daughter who is providing [place] translation.  Patient presented to the ED after consuming a ground-level fall from standing position when getting out of her car.  Per patient, she felt dizzy, lightheaded for approximately 15 minutes prior to this fall.  She does endorse head strike and loss of consciousness with multiple episodes of vomiting occurring in the ED.  Imaging completed during hospitalization was negative for any acute infarcts, hemorrhages, stenosis, aneurysm, occlusions.  Overall, her trauma workup was negative.  Patient was evaluated by neurology who suspected symptoms were secondary to peripheral vertigo vs vasovagal syncope vs orthostatic hypotension.  She had a TTE completed which shows concentric hypertrophy in the setting of uncontrolled hypertension and noncompliance with her medications.  Findings are also significant for a small PFO.  At discharge, patient was advised to start Coreg 12.5 mg twice daily, lisinopril 100 mg  daily, and discontinue her previously prescribed amlodipine.  Patient was to follow-up with neurology for an outpatient evaluation of peripheral vertigo which is scheduled for April 2025.  Per patient, she attempted to schedule with cardiology for her hypertrophic cardiomyopathy but was unable to get an appointment and was told to call back in April for further assistance in scheduling.    At today's visit, patient states she has been taking her amlodipine and had not started her lisinopril or Coreg that was prescribed at discharge.  She would like to take the least amount of medications as possible.  She denies any further syncopal episodes or falls but does states she gets dizzy when standing up too quickly.  Otherwise, she has been feeling well since her discharge and offers no new complaints today.    During the TCM phone call patient stated:  TCM Call       Date and time call was made  4/19/2021 11:07 AM    Hospital care reviewed  Records reviewed    Patient was hospitial[facility] at  [facility]    Date of Admission  02/21/25    Date of discharge  02/24/25    Diagnosis  Syncope and collapse Principal problem R55    Disposition  Home    Were the patients medications reviewed and updated  Yes    Current Symptoms  Fatigue; Weakness    Weakness severity  Mild    Fatigue severity  Mild          TCM Call       Post hospital issues  Reduced activity  PT. IS S/P BREAST CANCER WITH CHEMO AND RADIATION WITH MASTECTOMY IN 2018    Should patient be enrolled in anticoag monitoring?  Yes    Scheduled for follow up?  Yes  4/22/2021 WITH DR. CAMARILLO    Patients specialists  Other (comment)    Other specialists names  DR. JYOTI HUFF - [facility]    Other specialists contcat #  [phone]    Did you obtain your prescribed medications  --  USES [facility] [address]    Do you need help managing your prescriptions or medications  No  PT. IS ABLE TO MANAGE HER OWN MEDS    Is transportation to your appointment needed  No  PT.  WALKS OR  DRIVES HER    I have advised the patient to call PCP with any new or worsening symptoms  SILVANASTEFFANIEMAKENZIE OMAR DONALDSON    Living Arrangements  Spouse or Significiant other    Are you recieving any outpatient services  No    Are you recieving home care services  No    Are you using any community resources  No    Have you fallen in the last 12 months  No    Interperter language line needed  Yes  [employer]  [id number]    Counseling  Patient    Counseling topics  instructions for management; Importance of RX compliance    Comments  YULIYA  PROGRAM, ADVISED PT. TO MONITOR HER WEIGHT, TODAY , BP TODAY 168/82, DENIES LOWER EXT. EDEMA            Review of Systems   Constitutional:  Negative for activity change, fatigue and fever.   HENT:  Negative for congestion.    Eyes:  Negative for visual disturbance.   Respiratory:  Negative for chest tightness, shortness of breath and wheezing.    Cardiovascular:  Negative for chest pain, palpitations and leg swelling.   Gastrointestinal:  Negative for abdominal pain, diarrhea, nausea and vomiting.   Musculoskeletal:  Negative for arthralgias.   Neurological:  Positive for dizziness. Negative for syncope, weakness, light-headedness and headaches.   All other systems reviewed and are negative.    Objective   /80 (BP Location: Left arm, Patient Position: Sitting, Cuff Size: Adult)   Pulse 84   Temp 98.6 °F (37 °C) (Temporal)   Wt 66 kg (145 lb 6.4 oz)   LMP  (LMP Unknown)   SpO2 98%   BMI 25.76 kg/m²     Physical Exam  Vitals and nursing note reviewed.   Constitutional:       General: She is not in acute distress.     Appearance: She is not ill-appearing or diaphoretic.      Comments: Orthostatic vitals positive    HENT:      Head: Normocephalic and atraumatic.      Nose: No congestion.   Eyes:      General: No scleral icterus.     Conjunctiva/sclera: Conjunctivae normal.   Cardiovascular:      Rate and Rhythm: Normal rate and regular rhythm.      Pulses:  Normal pulses.      Heart sounds: Murmur heard.   Pulmonary:      Effort: Pulmonary effort is normal. No respiratory distress.      Breath sounds: Normal breath sounds. No wheezing.   Abdominal:      General: There is no distension.      Palpations: Abdomen is soft.      Tenderness: There is no abdominal tenderness.   Musculoskeletal:         General: Normal range of motion.      Cervical back: Normal range of motion.      Right lower leg: No edema.      Left lower leg: No edema.   Skin:     General: Skin is warm.      Capillary Refill: Capillary refill takes less than 2 seconds.   Neurological:      Mental Status: She is alert. Mental status is at baseline.   Psychiatric:         Mood and Affect: Mood normal.         Behavior: Behavior normal.       Medications have been reviewed by provider in current encounter

## 2025-03-06 NOTE — ASSESSMENT & PLAN NOTE
Patient presented to the ED after consuming a ground-level fall from standing position when getting out of her car.  Per patient, she felt dizzy, lightheaded for approximately 15 minutes prior to this fall.  She does endorse head strike and loss of consciousness with multiple episodes of vomiting occurring in the ED.  Imaging completed during hospitalization was negative for any acute infarcts, hemorrhages, stenosis, aneurysm, occlusions.  Overall, her trauma workup was negative.  Patient was evaluated by neurology who suspected symptoms were secondary to peripheral vertigo vs vasovagal syncope vs orthostatic hypotension.    Patient has not had any additional syncopal episodes but does note she feels dizzy after standing too quickly.  She was advised to take lisinopril and Coreg, and discontinue her amlodipine.  Patient has been taking her amlodipine but denies starting lisinopril or Coreg.  Patient would benefit from Coreg in the setting of HOCM and suspect she is being overtreated for her hypertension.    PE: positive orthostatic vitals in office today. 150 sitting decreased to 130 on standing    Ddx: Suspect symptoms may be multifactorial 2/2 orthostatic hypotension and peripheral vertigo    Plan:  -Advised patient to discontinue Lisinopril and Amlodipine today   -Continue Coreg and increase dose to 25 mg BID   -Patient also advised to discontinue potassium supplement   -Patient will need cardiology appointment for further evaluation of her hypertrophic cardiomyopathy and small PFO, we will assist in obtaining appointment  -Counseled on return and ED precautions   -Recommend follow-up in 4-6 weeks     Orders:    carvedilol (COREG) 25 mg tablet; Take 1 tablet (25 mg total) by mouth 2 (two) times a day with meals

## 2025-03-06 NOTE — ASSESSMENT & PLAN NOTE
See plan above.   Orders:    carvedilol (COREG) 25 mg tablet; Take 1 tablet (25 mg total) by mouth 2 (two) times a day with meals

## 2025-03-26 ENCOUNTER — PATIENT OUTREACH (OUTPATIENT)
Dept: INTERNAL MEDICINE CLINIC | Facility: CLINIC | Age: 65
End: 2025-03-26

## 2025-03-26 NOTE — PROGRESS NOTES
In basket message received from provider. Provider is requesting that RN CM complete medication reconciliation with patient at appointment scheduled on 4/2.     Outreach to patients daughter. Advised of above request to do medication reconciliation. Daughter agreeable . RN CM will meet with daughter and patient at appointment.

## 2025-04-02 ENCOUNTER — PATIENT OUTREACH (OUTPATIENT)
Dept: INTERNAL MEDICINE CLINIC | Facility: CLINIC | Age: 65
End: 2025-04-02

## 2025-04-02 NOTE — PROGRESS NOTES
Pt rescheduled todays appointment. RN CM was  to review medications with daughter.   RN CM will plan to meet with daughter at next scheduled appointment.

## 2025-04-16 ENCOUNTER — OFFICE VISIT (OUTPATIENT)
Dept: INTERNAL MEDICINE CLINIC | Facility: CLINIC | Age: 65
End: 2025-04-16

## 2025-04-16 ENCOUNTER — PATIENT OUTREACH (OUTPATIENT)
Dept: INTERNAL MEDICINE CLINIC | Facility: CLINIC | Age: 65
End: 2025-04-16

## 2025-04-16 VITALS
TEMPERATURE: 98 F | DIASTOLIC BLOOD PRESSURE: 70 MMHG | OXYGEN SATURATION: 98 % | HEART RATE: 85 BPM | SYSTOLIC BLOOD PRESSURE: 162 MMHG | BODY MASS INDEX: 25.69 KG/M2 | WEIGHT: 145 LBS

## 2025-04-16 DIAGNOSIS — Z85.89 ENCOUNTER FOR FOLLOW-UP SURVEILLANCE OF NEUROENDOCRINE CARCINOMA: ICD-10-CM

## 2025-04-16 DIAGNOSIS — I10 PRIMARY HYPERTENSION: Primary | ICD-10-CM

## 2025-04-16 DIAGNOSIS — Z08 ENCOUNTER FOR FOLLOW-UP SURVEILLANCE OF NEUROENDOCRINE CARCINOMA: ICD-10-CM

## 2025-04-16 DIAGNOSIS — I42.2 HYPERTROPHIC CARDIOMYOPATHY (HCC): ICD-10-CM

## 2025-04-16 PROCEDURE — 99213 OFFICE O/P EST LOW 20 MIN: CPT | Performed by: INTERNAL MEDICINE

## 2025-04-16 PROCEDURE — G2211 COMPLEX E/M VISIT ADD ON: HCPCS | Performed by: INTERNAL MEDICINE

## 2025-04-16 NOTE — PROGRESS NOTES
RN CM met with patient and daughter. Medications reviewed as requested by provider.   No additional questions or concerns. Pt and daughter understand medications and reasons for taking.     Provided daughter with RN CM contact information if any additional needs arise.

## 2025-04-16 NOTE — PROGRESS NOTES
Name: Jen Crawford      : 1960      MRN: [id number]  Encounter Provider: Ginny Guillory MD  Encounter Date: 2025   Encounter department: [facility] [place]  :  Assessment & Plan  Encounter for follow-up surveillance of neuroendocrine carcinoma    Orders:    pancrelipase, Lip-Prot-Amyl, (CREON) 12,000 units capsule; Take 12,000 units of lipase by mouth 3 (three) times a day with meals    Primary hypertension  Blood pressure noted to be 187/84 with a repeat of 162/70.  Patient will start taking her Coreg 25 mg twice a day.  Will return in 4 weeks for hypertension evaluation     .  Hypertrophic cardiomyopathy (HCC)  Patient with history of hypertrophic cardiomyopathy with echo showing a mid cavity dynamic obstruction with Valsalva had a peak gradient of 64.  Now off of amlodipine and lisinopril.  Patient's blood pressure was elevated to 162/70 today however not on carvedilol twice a day has only been taking it once a day.  Has an appointment with cardiology on .  Patient will start taking her Coreg 25 mg twice daily and at her appointment with cardiology if blood pressure continues to be elevated would recommend starting either diltiazem or verapamil.              History of Present Illness   HPI    64-year-old female with a history of hypertension hyperlipidemia, HCM, aortic aneurysm, pancreatic neuroendocrine tumor status post Whipple presenting for hypertension follow-up.  Patient was seen on 3/5 and was noted to have an elevated blood pressure in the 150s.  She had recently had an echo that showed her mid cavity dynamic obstruction with Valsalva had a peak gradient of 64 which is concerning for hypertrophic cardiomyopathy potentially severe.  She has had imaging in the past that shows septal hypertrophy.  She previously was on amlodipine and lisinopril and Coreg for her blood pressure management but given her HCM she was DC'd on lisinopril and amlodipine and her  carvedilol was increased to 25 mg twice daily.  Patient states that she was not aware she was supposed to be taking the carvedilol twice a day and has only been taking it once a day she does not have a blood pressure cuff at home and does not check her blood pressure.  Denies any chest pain shortness of breath fever chills nausea vomiting.  Has an appointment with cardiology on 4/23    Review of Systems   Constitutional:  Negative for chills and fever.   HENT:  Negative for ear pain and sore throat.    Eyes:  Negative for pain and visual disturbance.   Respiratory:  Negative for cough and shortness of breath.    Cardiovascular:  Negative for chest pain and palpitations.   Gastrointestinal:  Negative for abdominal pain and vomiting.   Genitourinary:  Negative for dysuria and hematuria.   Musculoskeletal:  Negative for arthralgias and back pain.   Skin:  Negative for color change and rash.   Neurological:  Negative for seizures and syncope.   All other systems reviewed and are negative.      Objective   /70   Pulse 85   Temp 98 °F (36.7 °C) (Temporal)   Wt 65.8 kg (145 lb)   LMP  (LMP Unknown)   SpO2 98%   BMI 25.69 kg/m²      Physical Exam  Vitals and nursing note reviewed.   Constitutional:       General: She is not in acute distress.     Appearance: She is well-developed.   HENT:      Head: Normocephalic and atraumatic.   Eyes:      Conjunctiva/sclera: Conjunctivae normal.   Cardiovascular:      Rate and Rhythm: Normal rate and regular rhythm.      Heart sounds: No murmur heard.  Pulmonary:      Effort: Pulmonary effort is normal. No respiratory distress.      Breath sounds: Normal breath sounds.   Abdominal:      Palpations: Abdomen is soft.      Tenderness: There is no abdominal tenderness.   Musculoskeletal:         General: No swelling.      Cervical back: Neck supple.   Skin:     General: Skin is warm and dry.      Capillary Refill: Capillary refill takes less than 2 seconds.   Neurological:       Mental Status: She is alert.   Psychiatric:         Mood and Affect: Mood normal.

## 2025-04-16 NOTE — ASSESSMENT & PLAN NOTE
Orders:    pancrelipase, Lip-Prot-Amyl, (CREON) 12,000 units capsule; Take 12,000 units of lipase by mouth 3 (three) times a day with meals

## 2025-04-17 NOTE — ASSESSMENT & PLAN NOTE
Blood pressure noted to be 187/84 with a repeat of 162/70.  Patient will start taking her Coreg 25 mg twice a day.  Will return in 4 weeks for hypertension evaluation     .

## 2025-04-17 NOTE — ASSESSMENT & PLAN NOTE
Patient with history of hypertrophic cardiomyopathy with echo showing a mid cavity dynamic obstruction with Valsalva had a peak gradient of 64.  Now off of amlodipine and lisinopril.  Patient's blood pressure was elevated to 162/70 today however not on carvedilol twice a day has only been taking it once a day.  Has an appointment with cardiology on 4/23.  Patient will start taking her Coreg 25 mg twice daily and at her appointment with cardiology if blood pressure continues to be elevated would recommend starting either diltiazem or verapamil.

## 2025-04-20 NOTE — PROGRESS NOTES
Name: Jen Crawford      : 1960      MRN: [id number]  Encounter Provider: Christopher Vines DO  Encounter Date: 2025   Encounter department: [facility] [place]  :  Assessment & Plan  Postural dizziness with presyncope  64F right-handed female with a complex cardiovascular history including suspected hypertrophic cardiomyopathy (HCOM), recent BP medication adjustment, and positive orthostatic vitals, presenting for post-hospital follow-up after admission for dizziness and fall on 2025.    Symptoms were characterized by acute onset spinning vertigo with nausea and vomiting after standing, resulting in a fall with head strike but no LOC. Workup including CT head, CTA H/N, and MRI brain were negative for acute pathology. NIHSS remained 0 throughout admission. No focal neurologic findings were noted on serial examinations. BP variability and symptomatic orthostatic hypotension were documented, with clinical improvement following fluid resuscitation.     Impression: Presyncopal episode with postural dizziness and associated nausea vomiting likely cardiac etiology given hx of HOCM.    Plan: Continuing following with cardiology. Can follow up as needed.           Dizzinesses           There are no Patient Instructions on file for this visit.     History of Present Illness   HPI 64-year-old right-handed female presenting to the neurology outpatient clinic for the first time for hospital follow-up. Patient was recently admitted at [facility] from  to 2025 after she presented status post fall. She has a PMHx of prior syncopal episode (2023) with negative MRI after TNK administration, HTN, HLD, hypertrophic cardiomyopathy, CKD, aortic aneurysm, breast cancer, pancreatic neuroendocrine tumor s/p Whipple procedure ()    RECENT HOSPITAL ADMISSION:   On 2025 patient stood from a sitting position and began experiencing  acute onset room spinning dizziness with associated nausea/vomiting resulting in a fall with head strike but no LOC. Trauma alert initially called but stroke alert was initiated as reported symptoms of dizziness and N/V. She denied having other neurological symptoms including weakness, sensory changes, speech difficulties or vision difficulties. She denied hearing loss or tinnitus. CT head with no acute intracranial abnormality. CTA H/N with no LVO or significant stenosis. Initial neurological evaluation reported to be unremarkable with no nystagmus or skew deviation on exam. Due to patient cooperation with difficulty keeping eyes open head impulse testing was unable to be performed. NIHSS 0. She did report improvement in her symptoms at time of initial neurological eval. Not a TNK candidate with mild/non-disabling symptoms. Not an endovascular candidate with no LVO/IR target. 2) vascular risk factors patient was ultimately admitted along the stroke pathway where she was continued on her home AP therapy aspirin 81 mg QD and was loaded with Plavix 300 mg x1.     Hospital course: On hospital day 2 noted improvement of symptoms. At rest reported some nausea but otherwise asymptomatic. Dizziness returned with head movement and would resolve when she was not moving. Hemoglobin A1c 5.5. LDL 99. TTE with EF 70%, normal size of atria bilaterally, a small PFO was identified in the atrial septum, and hyperdynamic function with mild cavity obstruction indicative of possible hypertrophic cardiomyopathy as previously documented on prior cardiology evaluations. MRI brain was obtained which did not reveal evidence of acute intracranial pathology. Blood pressure readings were noted to have variable changes throughout her stay. She was noted to have positive orthostatics BP readings in which she became symptomatic responded with fluid rehydration. Given her Hx c/f HCOM and recent adjustments to her BP regimen a month PTA, IM felt  this may have been contributing to her presyncopal episode. Patient had resolution of symptoms and improved clinically and was stable for discharge. Given absence of intracranial pathology Plavix was discontinued and she was discharged on her home aspirin regimen. Due to her her negative stroke workup and additional clinical features neurology felt that her dizziness does not contributed to a central prominence. Etiology favored to be peripheral cause. She was discharged home February 24 with instructions to follow-up with cardiology.      Review of Systems   Constitutional:  Negative for chills, fatigue and fever.   HENT:  Negative for drooling, hearing loss, rhinorrhea, sinus pressure, sinus pain, tinnitus, trouble swallowing and voice change.    Eyes:  Negative for photophobia and visual disturbance.   Respiratory:  Negative for apnea, cough and shortness of breath.    Cardiovascular:  Negative for chest pain, palpitations and leg swelling.   Gastrointestinal:  Negative for abdominal pain, constipation, diarrhea, nausea and vomiting.   Endocrine: Negative for cold intolerance and heat intolerance.   Genitourinary:  Negative for difficulty urinating.   Musculoskeletal:  Negative for gait problem, myalgias, neck pain and neck stiffness.   Skin:  Negative for pallor and rash.   Neurological:  Negative for dizziness, tremors, seizures, syncope, facial asymmetry, speech difficulty, weakness, light-headedness, numbness and headaches.   Psychiatric/Behavioral:  Negative for agitation, behavioral problems, confusion, decreased concentration and sleep disturbance.    All other systems reviewed and are negative.   I have personally reviewed the MA's review of systems and made changes as necessary.     Objective   LMP  (LMP Unknown)     GENERAL EXAM:  General Appearance: in no apparent distress, well developed and well nourished, non-toxic, and in no respiratory distress and acyanotic  HENT: Normocephalic and atraumatic.  Nose and Ears normal.   Neck: Full range of motion with no pain or limitations in flexion, extension, lateral flexion and rotation  Cardiovascular: Distal extremities warm without palpable edema or tenderness, no observed significant swelling.   Pulmonary: Pulmonary effort is normal. Not in respiratory distress  Musculoskeletal: No swelling or deformity.  Skin: Warm and dry  Psychiatric: Normal behavior and appropriate affect     NEUROLOGIC  EXAM:  MENTAL STATUS:   Alertness: alert  Orientation: time, date, person, place, city, president  Speech/Language Normal Rate, Tone, Rhythm, Clear, No Dysarthria , Coherent, Fluent, and Able to identify/Name both Low/High Frequency Objects  Commands: Can follow multistep commands  Current and Remote Memory:intact  Affect: normal  Thought content exhibits logical connections    CRANIAL NERVES:  I Not tested   II Visual Fields normal   II, Ill,  IV, VI Pupils equal, round, reactive to light and accommodation  EOM full and intact   V facial sensation was normal and symmetrical   VII facial symmetry equal   VIII Normal hearing to speech   IX, X Normal Palatal Elevation, No Uvular Deviation   XI Shoulder shrug and head turn is normal   XII Midline tongue protrusion      MOTOR:   Pronator Drift - Absent  Atrophy - No atrophy or muscle wasting  Normal Tone  No Involuntary Movements  No Tremors Appreciated  ARM RIGHT  LEFT LEG RIGHT  LEFT   Deltoid 5/5 5/5 lliopsoas 5/5 5/5   Biceps 5/5 5/5 Quads 5/5 5/5   Triceps 5/5 5/5 Hamstrings 5/5 5/5   Wrist Extension 5/5 5/5 Ankle Dorsiflexion 5/5 5/5   Wrist Flexion 5/5 5/5 Ankle Plantarflexion 5/5 5/5     REFLEXES:   Reflexes are normal and symmetric  DTR's are 2/4 in all tested locations  Ankle Clonus: Absent  Babinski: Bilateral toes downgoing  Bingham Sign: Absent     BICEP   TRICEPS   BRACHIO   PATELLAR   ACHILLES   RIGHT 2+ 2+ 2+ 2+ 2+   LEFT 2+ 2+ 2+ 2+ 2+     SENSORY:  Normal sensation to light touch, pinprick, vibration, temperature,  and proprioception in all limbs, romberg negative    COORDINATION:   Fast Alternative Movements:  R - Intact, L - Intact  Finger To Nose:  R - Intact, L - Intact, Heel To Shin: R - Intact, L - Intact  Romberg Test: normal    STATION/GAIT: Assistive Device - None, Rises from Chair - Without Assistance, Normal Gait, Base - Normal, Stride Length - Normal, Adrienne - Normal, Arm Swing - Normal, Turn Around - Normal, Normal Tandem Gait, and Normal Heel/Toe Walking    Lab results reviewed:  Labs: Hemoglobin A1c 5.5 (2/22/2025), LDL 99 (2/22/2025)  Radiology Results Review: I have reviewed the following images/report studies in PACS: No results found.   MRI brain without contrast (2/23/2025): White matter changes suggestive of chronic microangiopathy. No acute intracranial pathology.  Other studies reviewed:  TTE (2/21/2025): EF 70%, hyperdynamic systolic function, mildly abnormal diastolic function, mid cavity dynamic obstruction with Valsalva, small PFO

## 2025-04-22 NOTE — PROGRESS NOTES
Cardiology Follow Up    Jen Crawford  1960  [id number]  [facility] [place]  [address]  [place] [address] [phone]    Assessment & Plan  Hypertrophic cardiomyopathy (HCC)  2/23/22 TTE showed The left ventricular ejection fraction is 70%. Systolic function is hyperdynamic. Wall motion is normal. Diastolic function is mildly abnormal, consistent with grade I (abnormal) relaxation. There is mid cavity dynamic obstruction with Valsalva with a peak gradient of 64 mmHg.   Denies symptoms of CP, palpitations, shortness of breath, lightheadedness or dizziness.    She will be reffered to Dr Amanda for further management   Continue on Coreg 25mg BID  Instructed to ensure she is hydrating daily   Mixed hyperlipidemia  2/22/25 , , HDL 31, LDL 99   Diet controlled  Primary hypertension  RUE sitting 134/64  Continue on Coreg 25mg BID  DASH diet   Aortic valve stenosis, etiology of cardiac valve disease unspecified  2/23/25 TTE showed mild AS aortic valve mean gradient 12 mmHg aortic valve area 1.21cm2        Interval History:   Ms Jen Crawford was admitted to [facility] 2/21 - 2/24/25 with syncope and collapse.  She experienced prodromal symptoms of lightheadedness dizziness and nausea.  CT of the head and CTA negative for ischemic stroke and hemorrhage.  MRI of the brain negative for acute ischemia or hemorrhage but showed chronic microangiopathic.  She experienced nausea and multiple episodes of vomiting in the ED.  Blood pressure positive for orthostasis and recent changes in outpatient blood pressure meds likely vasovagal.  Neurology okay for aspirin monotherapy Plavix discontinued.  She continued on aspirin and Lipitor.  Admission blood pressure 204/98.  Lisinopril 40 mg daily started.  She was discharged on Coreg 12.5 mg twice daily.  TTE showed moderate concentric  hypertrophy left ventricular ejection fraction 70% mid cavity dynamic obstruction with Valsalva and peak gradient of 64 mmHg.  It was recommended she be seen by HCM clinic.      Jen presents to our office for a recent hospitalization follow up visit.  She is accompanied by her daughter who is interpreting for Jen today.  Jen denies dyspnea with minimal or moderate exertion, CP, palpitations, lightheadedness or dizziness.  She does not follow a low sodium diet.      Medical History   Primary Cardiologist Dr Sommer  Hypertension  Hyperlipidemia  HCM, denies family hx of HCM  CKD IIIa baseline creat 1.03 - 1.07    History of endocrine cancer status post Whipple procedure  Anemia      Patient Active Problem List   Diagnosis    Nausea    Stroke-like symptoms    Incidental pulmonary nodule    Hyperlipemia    Tarsal tunnel syndrome, left    Depression    Stage 3a chronic kidney disease (HCC)    History of neuroendocrine cancer    Personal history of breast cancer    Anemia    Hypertension    H/O Whipple procedure    Pancreatic insufficiency    Encounter for follow-up surveillance of neuroendocrine carcinoma    Aortic aneurysm (HCC)    Chronic pain of both knees    Need for influenza vaccination    BMI 27.0-27.9,adult    Lung nodules    Pre-op exam    Dyspnea on exertion    Neuroendocrine tumor of pancreas    Hypertrophic cardiomyopathy (HCC)    Fall down steps    Syncope and collapse    Vertigo    Fall     Past Medical History:   Diagnosis Date    Back ache     Cancer (HCC)     Hyperlipidemia     Hypertension     Pancreatic mass 4/12/2021     Social History     Socioeconomic History    Marital status: /Civil Union     Spouse name: Peewee Dietz    Number of children: Not on file    Years of education: Not on file    Highest education level: Not on file   Occupational History    Occupation: unemployed   Tobacco Use    Smoking status: Never    Smokeless tobacco: Never    Tobacco comments:      quit 15 years ago // PT IS STATING SHE NEVER SMOKED 11/2023   Vaping Use    Vaping status: Never Used   Substance and Sexual Activity    Alcohol use: Not Currently    Drug use: No    Sexual activity: Not Currently   Other Topics Concern    Not on file   Social History Narrative    Not on file     Social Drivers of Health     Financial Resource Strain: Low Risk  (1/29/2025)    Overall Financial Resource Strain (CARDIA)     Difficulty of Paying Living Expenses: Not hard at all   Food Insecurity: No Food Insecurity (1/29/2025)    Hunger Vital Sign     Worried About Running Out of Food in the Last Year: Never true     Ran Out of Food in the Last Year: Never true   Transportation Needs: No Transportation Needs (1/29/2025)    PRAPARE - Transportation     Lack of Transportation (Medical): No     Lack of Transportation (Non-Medical): No   Physical Activity: Inactive (4/22/2021)    Exercise Vital Sign     Days of Exercise per Week: 0 days     Minutes of Exercise per Session: 0 min   Stress: Not on file   Social Connections: Not on file   Intimate Partner Violence: Not on file   Housing Stability: Low Risk  (1/29/2025)    Housing Stability Vital Sign     Unable to Pay for Housing in the Last Year: No     Number of Times Moved in the Last Year: 1     Homeless in the Last Year: No      Family History   Problem Relation Age of Onset    Hypertension Mother     Heart disease Mother     Diabetes Mother     Esophageal cancer Father     Uterine cancer Maternal Grandmother     Liver cancer Brother     Uterine cancer Maternal Aunt      Past Surgical History:   Procedure Laterality Date    APPENDECTOMY      BILATERAL OOPHORECTOMY      BREAST SURGERY Right     partial mastectomy     COLONOSCOPY  06/17/2021    HERNIA REPAIR      HYSTERECTOMY      LAPAROTOMY N/A 08/24/2021    Procedure: LAPAROTOMY EXPLORATORY;  Surgeon: René Gurrola MD;  Location: [facility];  Service: Surgical Oncology    MASTECTOMY Right     partial    WHIPPLE  PROCEDURE/PANCREATICO-DUODENECTOMY N/A 08/24/2021    Procedure: WHIPPLE PROCEDURE/PANCREATICO-DUODENECTOMY;  Surgeon: René Gurrola MD;  Location: [facility];  Service: Surgical Oncology       Current Outpatient Medications:     aspirin 81 mg chewable tablet, Chew 1 tablet (81 mg total) daily, Disp: 30 tablet, Rfl: 1    carvedilol (COREG) 25 mg tablet, Take 1 tablet (25 mg total) by mouth 2 (two) times a day with meals, Disp: 30 tablet, Rfl: 1    pancrelipase, Lip-Prot-Amyl, (CREON) 12,000 units capsule, Take 12,000 units of lipase by mouth 3 (three) times a day with meals, Disp: 500 capsule, Rfl: 3    clobetasol (TEMOVATE) 0.05 % cream, Apply topically 2 (two) times a day Apply to wrists. Take a 1-week break between each 2-week application. (Patient not taking: Reported on 4/16/2025), Disp: 45 g, Rfl: 2    Diclofenac Sodium (VOLTAREN) 1 %, APPLY 2 G TOPICALLY 4 (FOUR) TIMES A DAY FOR 10 DAYS (Patient not taking: Reported on 4/16/2025), Disp: 100 g, Rfl: 1    lidocaine (Lidoderm) 5 %, Apply 1 patch topically over 12 hours daily Remove & Discard patch within 12 hours or as directed by MD (Patient not taking: Reported on 4/16/2025), Disp: 30 patch, Rfl: 1  No Known Allergies    Labs:  No results displayed because visit has over 200 results.        Imaging: No results found.    Review of Systems:  Review of Systems   All other systems reviewed and are negative.      Physical Exam:  Physical Exam  Vitals reviewed.   Constitutional:       Appearance: Normal appearance.   HENT:      Head: Normocephalic.   Cardiovascular:      Rate and Rhythm: Normal rate and regular rhythm.      Pulses: Normal pulses.      Heart sounds: Murmur heard.      Comments: 2/6 TEMO   Pulmonary:      Effort: Pulmonary effort is normal.      Breath sounds: Normal breath sounds.   Musculoskeletal:         General: Normal range of motion.      Cervical back: Normal range of motion and neck supple.      Right lower leg: No edema.      Left lower  leg: No edema.   Skin:     General: Skin is warm and dry.      Capillary Refill: Capillary refill takes less than 2 seconds.   Neurological:      General: No focal deficit present.      Mental Status: She is alert and oriented to person, place, and time.   Psychiatric:         Mood and Affect: Mood normal.         Behavior: Behavior normal.

## 2025-04-23 ENCOUNTER — OFFICE VISIT (OUTPATIENT)
Dept: CARDIOLOGY CLINIC | Facility: CLINIC | Age: 65
End: 2025-04-23
Payer: COMMERCIAL

## 2025-04-23 VITALS
HEART RATE: 86 BPM | OXYGEN SATURATION: 99 % | WEIGHT: 144.3 LBS | SYSTOLIC BLOOD PRESSURE: 160 MMHG | DIASTOLIC BLOOD PRESSURE: 82 MMHG | BODY MASS INDEX: 25.57 KG/M2 | HEIGHT: 63 IN

## 2025-04-23 DIAGNOSIS — E78.2 MIXED HYPERLIPIDEMIA: ICD-10-CM

## 2025-04-23 DIAGNOSIS — I10 PRIMARY HYPERTENSION: ICD-10-CM

## 2025-04-23 DIAGNOSIS — I35.0 AORTIC VALVE STENOSIS, ETIOLOGY OF CARDIAC VALVE DISEASE UNSPECIFIED: ICD-10-CM

## 2025-04-23 DIAGNOSIS — I42.2 HYPERTROPHIC CARDIOMYOPATHY (HCC): Primary | ICD-10-CM

## 2025-04-23 PROCEDURE — 99214 OFFICE O/P EST MOD 30 MIN: CPT | Performed by: NURSE PRACTITIONER

## 2025-04-24 ENCOUNTER — OFFICE VISIT (OUTPATIENT)
Dept: NEUROLOGY | Facility: CLINIC | Age: 65
End: 2025-04-24

## 2025-04-24 VITALS
SYSTOLIC BLOOD PRESSURE: 164 MMHG | HEIGHT: 63 IN | DIASTOLIC BLOOD PRESSURE: 78 MMHG | HEART RATE: 91 BPM | BODY MASS INDEX: 25.52 KG/M2 | WEIGHT: 144 LBS

## 2025-04-24 DIAGNOSIS — R55 POSTURAL DIZZINESS WITH PRESYNCOPE: ICD-10-CM

## 2025-04-24 DIAGNOSIS — R42 DIZZINESSES: Primary | ICD-10-CM

## 2025-04-24 DIAGNOSIS — R42 POSTURAL DIZZINESS WITH PRESYNCOPE: ICD-10-CM

## 2025-04-24 NOTE — ASSESSMENT & PLAN NOTE
2/23/22 TTE showed The left ventricular ejection fraction is 70%. Systolic function is hyperdynamic. Wall motion is normal. Diastolic function is mildly abnormal, consistent with grade I (abnormal) relaxation. There is mid cavity dynamic obstruction with Valsalva with a peak gradient of 64 mmHg.   Denies symptoms of CP, palpitations, shortness of breath, lightheadedness or dizziness.    She will be reffered to Dr Amanda for further management   Continue on Coreg 25mg BID  Instructed to ensure she is hydrating daily

## 2025-05-15 ENCOUNTER — HOSPITAL ENCOUNTER (OUTPATIENT)
Dept: RADIOLOGY | Age: 65
Discharge: HOME | End: 2025-05-15
Payer: COMMERCIAL

## 2025-05-15 VITALS — WEIGHT: 144 LBS | BODY MASS INDEX: 25.52 KG/M2 | HEIGHT: 63 IN

## 2025-05-15 DIAGNOSIS — Z12.31 ENCOUNTER FOR SCREENING MAMMOGRAM FOR BREAST CANCER: ICD-10-CM

## 2025-05-15 PROCEDURE — 77063 BREAST TOMOSYNTHESIS BI: CPT

## 2025-05-15 PROCEDURE — 77067 SCR MAMMO BI INCL CAD: CPT

## 2025-05-20 ENCOUNTER — TELEPHONE (OUTPATIENT)
Dept: INTERNAL MEDICINE CLINIC | Facility: CLINIC | Age: 65
End: 2025-05-20

## 2025-06-05 DIAGNOSIS — K86.89 PANCREATIC INSUFFICIENCY: ICD-10-CM

## 2025-06-05 RX ORDER — PANTOPRAZOLE SODIUM 40 MG/1
40 TABLET, DELAYED RELEASE ORAL
Qty: 90 TABLET | Refills: 3 | OUTPATIENT
Start: 2025-06-05

## 2025-07-29 ENCOUNTER — OFFICE VISIT (OUTPATIENT)
Dept: CARDIOLOGY CLINIC | Facility: CLINIC | Age: 65
End: 2025-07-29
Payer: COMMERCIAL

## 2025-07-29 VITALS
DIASTOLIC BLOOD PRESSURE: 92 MMHG | WEIGHT: 146 LBS | OXYGEN SATURATION: 99 % | HEART RATE: 65 BPM | BODY MASS INDEX: 25.87 KG/M2 | SYSTOLIC BLOOD PRESSURE: 188 MMHG | HEIGHT: 63 IN

## 2025-07-29 DIAGNOSIS — E78.2 MIXED HYPERLIPIDEMIA: ICD-10-CM

## 2025-07-29 DIAGNOSIS — I10 PRIMARY HYPERTENSION: Primary | ICD-10-CM

## 2025-07-29 DIAGNOSIS — I51.7 LEFT VENTRICULAR HYPERTROPHY: ICD-10-CM

## 2025-07-29 PROCEDURE — 99214 OFFICE O/P EST MOD 30 MIN: CPT | Performed by: INTERNAL MEDICINE

## 2025-07-29 RX ORDER — AMLODIPINE BESYLATE 5 MG/1
5 TABLET ORAL DAILY
Qty: 30 TABLET | Refills: 3 | Status: SHIPPED | OUTPATIENT
Start: 2025-07-29

## 2025-07-29 RX ORDER — LOSARTAN POTASSIUM 25 MG/1
25 TABLET ORAL DAILY
Qty: 30 TABLET | Refills: 3 | Status: SHIPPED | OUTPATIENT
Start: 2025-07-29

## 2025-08-02 ENCOUNTER — APPOINTMENT (EMERGENCY)
Dept: RADIOLOGY | Facility: HOSPITAL | Age: 65
End: 2025-08-02
Payer: COMMERCIAL

## 2025-08-02 ENCOUNTER — HOSPITAL ENCOUNTER (EMERGENCY)
Facility: HOSPITAL | Age: 65
Discharge: HOME | End: 2025-08-02
Attending: EMERGENCY MEDICINE
Payer: COMMERCIAL

## (undated) DEVICE — 1840 FOAM BLOCK NEEDLE COUNTER: Brand: DEVON

## (undated) DEVICE — ELECTRODE BLADE MOD E-Z CLEAN 2.5IN 6.4CM -0012M

## (undated) DEVICE — SUT SILK 3-0 SH 30 IN K832H

## (undated) DEVICE — INTENDED FOR TISSUE SEPARATION, AND OTHER PROCEDURES THAT REQUIRE A SHARP SURGICAL BLADE TO PUNCTURE OR CUT.: Brand: BARD-PARKER SAFETY BLADES SIZE 10, STERILE

## (undated) DEVICE — PLUMEPEN PRO 10FT

## (undated) DEVICE — SUT SILK 2-0 18 IN A185H

## (undated) DEVICE — POOLE SUCTION HANDLE: Brand: CARDINAL HEALTH

## (undated) DEVICE — MEDI-VAC YANK SUCT HNDL W/TPRD BULBOUS TIP: Brand: CARDINAL HEALTH

## (undated) DEVICE — FOGARTY SPRING CLIPS 6MM: Brand: FOGARTY SOFTJAW

## (undated) DEVICE — SURGICEL 4 X 8

## (undated) DEVICE — 40601 PROLONGED POSITIONING SYSTEM: Brand: 40601 PROLONGED POSITIONING SYSTEM

## (undated) DEVICE — TRAY FOLEY 16FR URIMETER SURESTEP

## (undated) DEVICE — LIGACLIP MCA MULTIPLE CLIP APPLIERS, 20 SMALL CLIPS: Brand: LIGACLIP

## (undated) DEVICE — SUT VICRYL 2-0 D-SPECIAL 27 IN D8114

## (undated) DEVICE — LIGACLIP MCA MULTIPLE CLIP APPLIERS, 20 MEDIUM CLIPS: Brand: LIGACLIP

## (undated) DEVICE — SUT PROLENE 3-0 SH 36 IN 8522H

## (undated) DEVICE — PROXIMATE RELOADABLE LINEAR CUTTER WITH SAFETY LOCK-OUT, 75MM: Brand: PROXIMATE

## (undated) DEVICE — SUT SILK 2-0 SH 30 IN K833H

## (undated) DEVICE — SUT SILK 3-0 18 IN A184H

## (undated) DEVICE — 3M™ IOBAN™ 2 ANTIMICROBIAL INCISE DRAPE 6650EZ: Brand: IOBAN™ 2

## (undated) DEVICE — GLOVE INDICATOR PI UNDERGLOVE SZ 7 BLUE

## (undated) DEVICE — INTENDED FOR TISSUE SEPARATION, AND OTHER PROCEDURES THAT REQUIRE A SHARP SURGICAL BLADE TO PUNCTURE OR CUT.: Brand: BARD-PARKER SAFETY BLADES SIZE 15, STERILE

## (undated) DEVICE — GLOVE SRG BIOGEL ECLIPSE 7

## (undated) DEVICE — PROXIMATE LINEAR CUTTER RELOAD, BLUE, 75MM: Brand: PROXIMATE

## (undated) DEVICE — SUT SILK 2-0 SH CR/8 18 IN C012D

## (undated) DEVICE — HARMONIC 1100 SHEARS, 20CM SHAFT LENGTH: Brand: HARMONIC

## (undated) DEVICE — JP CHANNEL DRAIN 19FR, FULL FLUTES: Brand: JACKSON-PRATT

## (undated) DEVICE — JACKSON-PRATT 100CC BULB RESERVOIR: Brand: CARDINAL HEALTH

## (undated) DEVICE — 3M™ TEGADERM™ TRANSPARENT FILM DRESSING FRAME STYLE, 1628, 6 IN X 8 IN (15 CM X 20 CM), 10/CT 8CT/CASE: Brand: 3M™ TEGADERM™

## (undated) DEVICE — SUT SILK 3-0 SH CR/8 18 IN C013D

## (undated) DEVICE — SUT VICRYL 3-0 SH 27 IN J416H

## (undated) DEVICE — BETHLEHEM MAJOR GENERAL PACK: Brand: CARDINAL HEALTH

## (undated) DEVICE — VESSEL LOOPS X-RAY DETECTABLE: Brand: DEROYAL

## (undated) DEVICE — ENSEAL LAPAROSCOPIC TISSUE SEALER G2 CURVED JAW FOR USE WITH G2 GENERATOR 5MM DIAMETER 35CM SHAFT LENGTH: Brand: ENSEAL

## (undated) DEVICE — BLANKET HYPOTHERMIA ADULT GAYMAR

## (undated) DEVICE — GAUZE SPONGES,USP TYPE VII GAUZE, 12 PLY: Brand: CURITY